# Patient Record
Sex: MALE | Race: WHITE | NOT HISPANIC OR LATINO | Employment: OTHER | URBAN - METROPOLITAN AREA
[De-identification: names, ages, dates, MRNs, and addresses within clinical notes are randomized per-mention and may not be internally consistent; named-entity substitution may affect disease eponyms.]

---

## 2017-03-23 ENCOUNTER — OFFICE VISIT (OUTPATIENT)
Dept: CARDIOLOGY | Facility: CLINIC | Age: 77
End: 2017-03-23
Payer: MEDICARE

## 2017-03-23 ENCOUNTER — HOSPITAL ENCOUNTER (OUTPATIENT)
Dept: CARDIOLOGY | Facility: HOSPITAL | Age: 77
Discharge: HOME | End: 2017-03-23
Attending: INTERNAL MEDICINE
Payer: MEDICARE

## 2017-03-23 VITALS
SYSTOLIC BLOOD PRESSURE: 130 MMHG | WEIGHT: 232 LBS | HEIGHT: 72 IN | HEART RATE: 90 BPM | DIASTOLIC BLOOD PRESSURE: 80 MMHG | BODY MASS INDEX: 31.42 KG/M2

## 2017-03-23 DIAGNOSIS — I25.84 CORONARY ARTERY DISEASE DUE TO CALCIFIED CORONARY LESION: ICD-10-CM

## 2017-03-23 DIAGNOSIS — I20.89 ANGINA EFFORT: Primary | ICD-10-CM

## 2017-03-23 DIAGNOSIS — I25.10 CORONARY ARTERY DISEASE DUE TO CALCIFIED CORONARY LESION: ICD-10-CM

## 2017-03-23 DIAGNOSIS — I25.5 CARDIOMYOPATHY, ISCHEMIC: ICD-10-CM

## 2017-03-23 DIAGNOSIS — G47.33 OSA (OBSTRUCTIVE SLEEP APNEA): ICD-10-CM

## 2017-03-23 DIAGNOSIS — E78.2 MIXED HYPERLIPIDEMIA: ICD-10-CM

## 2017-03-23 DIAGNOSIS — I10 ESSENTIAL HYPERTENSION: ICD-10-CM

## 2017-03-23 DIAGNOSIS — E11.9 TYPE 2 DIABETES MELLITUS WITHOUT COMPLICATION, WITHOUT LONG-TERM CURRENT USE OF INSULIN: ICD-10-CM

## 2017-03-23 LAB
DIASTOLIC DYSFUNCTION: YES
MITRAL VALVE MOBILITY: NORMAL
RETIRED EF AND QEF - SEE NOTES: 55 (ref 55–65)

## 2017-03-23 PROCEDURE — 1160F RVW MEDS BY RX/DR IN RCRD: CPT | Mod: S$GLB,,, | Performed by: INTERNAL MEDICINE

## 2017-03-23 PROCEDURE — 93306 TTE W/DOPPLER COMPLETE: CPT | Mod: 26,,, | Performed by: INTERNAL MEDICINE

## 2017-03-23 PROCEDURE — 3075F SYST BP GE 130 - 139MM HG: CPT | Mod: S$GLB,,, | Performed by: INTERNAL MEDICINE

## 2017-03-23 PROCEDURE — 1126F AMNT PAIN NOTED NONE PRSNT: CPT | Mod: S$GLB,,, | Performed by: INTERNAL MEDICINE

## 2017-03-23 PROCEDURE — 3079F DIAST BP 80-89 MM HG: CPT | Mod: S$GLB,,, | Performed by: INTERNAL MEDICINE

## 2017-03-23 PROCEDURE — 99999 PR PBB SHADOW E&M-EST. PATIENT-LVL III: CPT | Mod: PBBFAC,,, | Performed by: INTERNAL MEDICINE

## 2017-03-23 PROCEDURE — 1157F ADVNC CARE PLAN IN RCRD: CPT | Mod: S$GLB,,, | Performed by: INTERNAL MEDICINE

## 2017-03-23 PROCEDURE — 93306 TTE W/DOPPLER COMPLETE: CPT

## 2017-03-23 PROCEDURE — 1159F MED LIST DOCD IN RCRD: CPT | Mod: S$GLB,,, | Performed by: INTERNAL MEDICINE

## 2017-03-23 PROCEDURE — 99214 OFFICE O/P EST MOD 30 MIN: CPT | Mod: S$GLB,,, | Performed by: INTERNAL MEDICINE

## 2017-03-23 RX ORDER — AMLODIPINE BESYLATE 5 MG/1
5 TABLET ORAL DAILY
Qty: 90 TABLET | Refills: 3 | Status: SHIPPED | OUTPATIENT
Start: 2017-03-23 | End: 2017-09-18 | Stop reason: DRUGHIGH

## 2017-03-23 RX ORDER — SODIUM CHLORIDE 9 MG/ML
INJECTION, SOLUTION INTRAVENOUS CONTINUOUS
Status: CANCELLED | OUTPATIENT
Start: 2017-03-23

## 2017-03-23 RX ORDER — NITROGLYCERIN 0.4 MG/1
0.4 TABLET SUBLINGUAL EVERY 5 MIN PRN
Qty: 20 TABLET | Refills: 12 | Status: SHIPPED | OUTPATIENT
Start: 2017-03-23 | End: 2023-03-25 | Stop reason: SDUPTHER

## 2017-03-23 RX ORDER — DIPHENHYDRAMINE HCL 25 MG
50 CAPSULE ORAL ONCE
Status: CANCELLED | OUTPATIENT
Start: 2017-03-23 | End: 2017-03-23

## 2017-03-23 RX ORDER — CLOPIDOGREL BISULFATE 75 MG/1
75 TABLET ORAL DAILY
Qty: 90 TABLET | Refills: 3 | Status: SHIPPED | OUTPATIENT
Start: 2017-03-23 | End: 2017-07-06

## 2017-03-23 NOTE — PROGRESS NOTES
Subjective:   Patient ID:  Julien Meléndez is a 76 y.o. male who presents for follow up of Coronary Artery Disease; Hyperlipidemia; Hypertension; and Chest Pain      HPI:       Julien Meléndez 76 y.o. male is here complaining of worsening dyspnea and chest discomfort over the past month. 5/10 discomfort. Symptoms are anginal equivalent. He has CAD with ASMI 2010 and MILES LAD. Recurrent chest pains 12/2015 with new MILES placed RCA. ( OM! And 70% apical LAD disease treated medicaly). EF 50-55%. Last PCI was guided with a stress test. He has KELSEY but has not used the machine for several years. He has worsening KELSEY symptoms. He is compliant with his medications. He is on aspirin and plavix for DAPT.         Patient Active Problem List    Diagnosis Date Noted    Angina effort 03/23/2017    KELSEY (obstructive sleep apnea) 10/26/2016    Type 2 diabetes mellitus without complication 01/26/2016    Abnormal findings on diagnostic imaging of heart and coronary circulation 12/29/2015    Chest pain 12/29/2015    Coronary artery disease due to calcified coronary lesion 12/28/2015           Left heart cath 12/2015-Dr. Hernandez        LM patent  LAD patent except for distal 75%  LCX patent  OM1   RCA 90%      PCI of RCA 2.75 x 18 mm MILES  Unsuccessful PCI of OM1         LVEDP 11 mmHg  3.5 x 18 mm       Left hearth cath 3/1/2010 -Dr. Hernandez   RCA PCI with 3.5 x 15 mm  BMS    Left heart cath 1/31/2010-Dr. Hernandez   LAD 3.0 x 12 and 3.5 x 24 mm  BMS       Cardiomyopathy, ischemic 09/17/2013    Diabetes mellitus 09/17/2013    MI (myocardial infarction)     Diabetes mellitus     Hyperlipidemia     Hypertension     Hiatal hernia            Right Arm BP - Sitting: (P) 120/80  Left Arm BP - Sitting: (P) 130/80        LABS    LAST HbA1c  Lab Results   Component Value Date    HGBA1C 6.9 (H) 02/01/2010       Lipid panel  Lab Results   Component Value Date    CHOL 149 02/01/2010     Lab Results   Component Value Date     HDL 31 (L) 02/01/2010     Lab Results   Component Value Date    LDLCALC 97.4 02/01/2010     Lab Results   Component Value Date    TRIG 103 02/01/2010     Lab Results   Component Value Date    CHOLHDL 20.8 02/01/2010            Review of Systems   Constitution: Positive for malaise/fatigue. Negative for diaphoresis, weakness, night sweats, weight gain and weight loss.   HENT: Negative for congestion.    Eyes: Negative for blurred vision, discharge and double vision.   Cardiovascular: Positive for chest pain and dyspnea on exertion. Negative for claudication, cyanosis, irregular heartbeat, leg swelling, near-syncope, orthopnea, palpitations, paroxysmal nocturnal dyspnea and syncope.   Respiratory: Positive for shortness of breath, sleep disturbances due to breathing and snoring. Negative for cough and wheezing.    Endocrine: Negative for cold intolerance, heat intolerance and polyphagia.   Hematologic/Lymphatic: Negative for adenopathy and bleeding problem. Does not bruise/bleed easily.   Skin: Negative for dry skin and nail changes.   Musculoskeletal: Negative for arthritis, back pain, falls, joint pain, myalgias and neck pain.   Gastrointestinal: Negative for bloating, abdominal pain, change in bowel habit and constipation.   Genitourinary: Negative for bladder incontinence, dysuria, flank pain, genital sores and missed menses.   Neurological: Negative for aphonia, brief paralysis, difficulty with concentration and dizziness.   Psychiatric/Behavioral: Negative for altered mental status and memory loss. The patient does not have insomnia.    Allergic/Immunologic: Negative for environmental allergies.       Objective:   Physical Exam   Constitutional: He is oriented to person, place, and time. He appears well-developed and well-nourished. He is not intubated.   HENT:   Head: Normocephalic and atraumatic.   Right Ear: External ear normal.   Left Ear: External ear normal.   Mouth/Throat: Oropharynx is clear and moist.    Eyes: Conjunctivae and EOM are normal. Pupils are equal, round, and reactive to light. Right eye exhibits no discharge. Left eye exhibits no discharge. No scleral icterus.   Neck: Normal range of motion. Neck supple. Normal carotid pulses, no hepatojugular reflux and no JVD present. Carotid bruit is not present. No tracheal deviation present. No thyromegaly present.   Cardiovascular: Normal rate, regular rhythm, S1 normal and S2 normal.   No extrasystoles are present. PMI is not displaced.  Exam reveals no gallop, no S3, no distant heart sounds, no friction rub and no midsystolic click.    No murmur heard.  Pulses:       Carotid pulses are 2+ on the right side, and 2+ on the left side.       Radial pulses are 1+ on the right side, and 2+ on the left side.        Femoral pulses are 2+ on the right side, and 2+ on the left side.       Popliteal pulses are 2+ on the right side, and 2+ on the left side.        Dorsalis pedis pulses are 1+ on the right side, and 1+ on the left side.        Posterior tibial pulses are 1+ on the right side, and 1+ on the left side.   Pulmonary/Chest: Effort normal and breath sounds normal. No accessory muscle usage or stridor. No apnea, no tachypnea and no bradypnea. He is not intubated. No respiratory distress. He has no decreased breath sounds. He has no wheezes. He has no rales. He exhibits no tenderness and no bony tenderness.   Abdominal: He exhibits no distension, no pulsatile liver, no abdominal bruit, no ascites, no pulsatile midline mass and no mass. There is no tenderness. There is no rebound and no guarding.   Musculoskeletal: Normal range of motion. He exhibits no edema or tenderness.     Deformed ankle   Lymphadenopathy:     He has no cervical adenopathy.   Neurological: He is alert and oriented to person, place, and time. He has normal reflexes. No cranial nerve deficit. Coordination normal.   Skin: Skin is warm. No rash noted. No erythema. No pallor.   Psychiatric: He has  a normal mood and affect. His behavior is normal. Judgment and thought content normal.       Assessment:     1. Coronary artery disease due to calcified coronary lesion    2. KELSEY (obstructive sleep apnea)    3. Type 2 diabetes mellitus without complication, without long-term current use of insulin    4. Mixed hyperlipidemia    5. Cardiomyopathy, ischemic    6. Essential hypertension    7. Angina effort        Plan:           Ischemic work up   Echo   Stress test   ECG    Add norvasc as second anti-anginal medication        He will need a left heart cath  R radial access  MILES candidate  Will call with date and time      Risks, benefits and alternatives of cardiac catheterization and possible intervention were discussed with the patient. All questions were answered and informed consent obtained. I discussed the importance of compliance with dual antiplatelet therapy with the patient to prevent acute or late stent thrombosis with premature discontinuation of the therapy.          Continue with current medical plan and lifestyle changes.  Return sooner for concerns or questions. If symptoms persist go to the ED  I have reviewed all pertinent data on this patient         He will have a sleep study and a referral to sleep disorder clinic            I have reviewed the patient's medical history in detail and updated the computerized patient record.    Orders Placed This Encounter   Procedures    NM Myocardial Perfusion Spect Multi Pharmacologic     Standing Status:   Future     Standing Expiration Date:   3/23/2018     Order Specific Question:   Stress Medication to use:     Answer:   Regadenoson    Lipid panel     fasting     Standing Status:   Future     Standing Expiration Date:   5/22/2018    Comprehensive metabolic panel     Standing Status:   Future     Standing Expiration Date:   5/22/2018    CBC auto differential     Standing Status:   Future     Standing Expiration Date:   5/22/2018    NM Multi Study RX  Stress Card Component     Standing Status:   Future     Standing Expiration Date:   3/23/2018     Order Specific Question:   Which medicaton for the stress procedure?     Answer:   Regadenoson    EKG 12-lead    2D Echo w/ Color Flow Doppler     Standing Status:   Future     Standing Expiration Date:   3/23/2018       Follow up as scheduled. Return sooner for concerns or questions            He expressed verbal understanding and agreed with the plan        Patient's Medications   New Prescriptions    AMLODIPINE (NORVASC) 5 MG TABLET    Take 1 tablet (5 mg total) by mouth once daily.    CLOPIDOGREL (PLAVIX) 75 MG TABLET    Take 1 tablet (75 mg total) by mouth once daily.    NITROGLYCERIN (NITROSTAT) 0.4 MG SL TABLET    Place 1 tablet (0.4 mg total) under the tongue every 5 (five) minutes as needed for Chest pain.   Previous Medications    ASPIRIN (ECOTRIN) 81 MG EC TABLET    Take 81 mg by mouth once daily.    ATORVASTATIN (LIPITOR) 40 MG TABLET    Take 1 tablet (40 mg total) by mouth once daily.    FLUOXETINE (PROZAC) 20 MG CAPSULE    Take 1 capsule by mouth once daily.    GLIPIZIDE (GLUCOTROL) 5 MG TABLET    Take 1 tablet by mouth Daily.    LOSARTAN (COZAAR) 100 MG TABLET    Take 1 tablet by mouth Daily.    MECLIZINE (ANTIVERT) 25 MG TABLET    Take 1 tablet (25 mg total) by mouth 3 (three) times daily as needed for Dizziness.    METFORMIN (GLUCOPHAGE) 1000 MG TABLET    Take 1 tablet (1,000 mg total) by mouth daily with breakfast.    METOPROLOL SUCCINATE (TOPROL-XL) 25 MG 24 HR TABLET    Take 1 tablet (25 mg total) by mouth once daily.    TRAZODONE (DESYREL) 150 MG TABLET       Modified Medications    No medications on file   Discontinued Medications    No medications on file

## 2017-03-23 NOTE — PATIENT INSTRUCTIONS
Stable Angina  The chest discomfort you have appears to be coming from your heart -- a condition called angina. Angina is a pain in the heart due to poor blood flow to the heart muscle. This can occur when plaque builds up on the artery wall or a blood clot forms in one or more of the small blood vessels that deliver oxygen to the heart muscle. Plaque is a fatty material made up of cholesterol, calcium deposits, and other materials.  Exercise, increased activity, emotional upset, or stress can trigger this pain. With proper treatment and lifestyle changes to reduce risk factors, most people with angina are able to maintain a full and active life.  Angina is not a heart attack. But if angina pain is severe or prolonged, it is a sign of an impending heart attack, also called acute myocardial infarction, or AMI. Your angina is under control at this time. Therefore, it is safe for you to go home. Follow up as instructed by your doctor for any further tests and office visits.    Home care  · Rest at home today and avoid any emotional or physically strenuous activity.  · Take medicine (usually nitroglycerin) for chest pain exactly as prescribed. Keep your nitroglycerin with you at all times.  · When taking nitroglycerin for angina, sit or lie down. The medicine may make you feel dizzy.  ¨ Place one tablet under your tongue, or between your lip and gum, or between your cheek and gum. Let the tablet dissolve completely; do not chew or swallow the tablet.  ¨ If you use a spray, then spray once on or under your tongue. Do not inhale. Right after you use the spray, close your mouth. Wait a few seconds before you swallow.  ¨ After taking one tablet or spraying once, continue sitting or lying for 5 minutes.  ¨ If the angina goes away completely, rest awhile and continue your normal routine.  Note: Your healthcare provider may give you slightly different instructions than those above. If so, follow them  carefully.  Prevention  · Learn how to take your own blood pressure. Keep a record of your results. Ask your doctor which readings mean that you need medical attention. Reduce salt intake and follow lifestyle change instructions if you have high blood pressure (hypertension).  · Maintain a healthy weight. Get help to lose any extra pounds. Talk to your doctor about a safe diet program. Sudden large weight losses can be dangerous.  · If you have diabetes, talk to your doctor about healthy control of your blood sugar.  · Begin an exercise program. Ask your doctor how to get started. You can benefit from simple activities such as walking or gardening. Short, high intensity exercise sessions may also be beneficial.  · Break the smoking habit. Enroll in a stop-smoking program to improve your chances of success.  · Get adequate rest.  · Avoid stressful situations. Learn stress-management techniques.  Follow-up care  Follow up with your doctor, or as advised.  If an X-ray was done , it will be reviewed by another specialist. You will be notified of any new findings that may affect your care.  Call 911  This is the fastest and safest way to get to the nearest emergency department. The paramedics can also start treatment on the way to the hospital, saving valuable time for your heart.  · If angina gets worse, it continues, or if it stops and returns, call 911 immediately, Do not delay. You may be having a heart attack.  · After you call 911, take a second nitroglycerine tablet or spray unless instructed otherwise. When repeating doses, sit down if possible because it can make you feel lightheaded or dizzy. Wait another 5 minutes. If the angina still does not go away, take a third tablet or spray. Do not take more than 3 tablets or sprays within 15 minutes. Stay on the phone with 911 for further instructions.  · Your healthcare provider may give you slightly different instructions than those above. If so, follow them  "carefully.  Don't wait until your symptoms are severe to call 911. Other reasons to call 911 besides chest pain include:  · Trouble breathing  · Feeling lightheaded, faint, or dizzy  · Rapid heart beat  · Slower than usual heart rate compared to your normal  · Angina with weakness, dizziness, fainting, heavy sweating, nausea, or vomiting  · Extreme drowsiness, or confusion  · Weakness of an arm or leg or on one side of the face  · Difficulty with speech or vision  When to seek medical advice  Remember, the signs and symptoms of a heart attack are not always like they are on TV. Sometimes they are not so obvious. You may only feel weak or just "not right." If it is not clear or if you have any doubt, call for advice.  · Seek help if there is a change in the type of pain, if it feels different, or if your symptoms are mild.  · Do not drive yourself. Have someone else drive. If no one can drive you, call 911.  · If your doctor has given you medicine to take when you have symptoms, take them, but do not delay getting  help.  · Do not delay. Fast diagnosis and treatment can prevent or  limit the amount of heart damage during a heart attack or stroke.  · Do not go to your doctor's office or a clinic because they may not be able to provide all the testing and treatment you need.  Date Last Reviewed: 12/30/2015  © 3653-3020 [employer]. [address] [place] [address], [place] [address]. All rights reserved. This information is not intended as a substitute for professional medical care. Always follow your healthcare professional's instructions.        "

## 2017-03-24 ENCOUNTER — HOSPITAL ENCOUNTER (OUTPATIENT)
Dept: RADIOLOGY | Facility: HOSPITAL | Age: 77
Discharge: HOME | End: 2017-03-24
Attending: INTERNAL MEDICINE
Payer: MEDICARE

## 2017-03-24 ENCOUNTER — HOSPITAL ENCOUNTER (OUTPATIENT)
Dept: CARDIOLOGY | Facility: HOSPITAL | Age: 77
Discharge: HOME | End: 2017-03-24
Attending: INTERNAL MEDICINE
Payer: MEDICARE

## 2017-03-24 DIAGNOSIS — I25.84 CORONARY ARTERY DISEASE DUE TO CALCIFIED CORONARY LESION: ICD-10-CM

## 2017-03-24 DIAGNOSIS — I25.10 CORONARY ARTERY DISEASE DUE TO CALCIFIED CORONARY LESION: ICD-10-CM

## 2017-03-24 LAB — DIASTOLIC DYSFUNCTION: NO

## 2017-03-24 PROCEDURE — 93016 CV STRESS TEST SUPVJ ONLY: CPT | Mod: ,,, | Performed by: INTERNAL MEDICINE

## 2017-03-24 PROCEDURE — 78452 HT MUSCLE IMAGE SPECT MULT: CPT | Mod: TC

## 2017-03-24 PROCEDURE — 78452 HT MUSCLE IMAGE SPECT MULT: CPT | Mod: 26,,, | Performed by: RADIOLOGY

## 2017-03-24 PROCEDURE — 93018 CV STRESS TEST I&R ONLY: CPT | Mod: ,,, | Performed by: INTERNAL MEDICINE

## 2017-03-27 ENCOUNTER — TELEPHONE (OUTPATIENT)
Dept: CARDIOLOGY | Facility: CLINIC | Age: 77
End: 2017-03-27

## 2017-03-27 DIAGNOSIS — I25.84 CORONARY ARTERY DISEASE DUE TO CALCIFIED CORONARY LESION: Primary | ICD-10-CM

## 2017-03-27 DIAGNOSIS — I25.10 CORONARY ARTERY DISEASE DUE TO CALCIFIED CORONARY LESION: Primary | ICD-10-CM

## 2017-03-27 DIAGNOSIS — R94.39 EQUIVOCAL STRESS TEST: ICD-10-CM

## 2017-03-27 DIAGNOSIS — R93.1 ABNORMAL FINDINGS ON DIAGNOSTIC IMAGING OF HEART AND CORONARY CIRCULATION: ICD-10-CM

## 2017-03-27 NOTE — PROGRESS NOTES
Your echo revealed diastolic dysfunction with a normal ejection fraction        No problems with the valves        Thanks        ZN

## 2017-03-27 NOTE — PROGRESS NOTES
Your stress test revealed an area with a fixed defect meaning related to a previous heart attack      I will obtain a PET scan for further assessment to find out the source of your symptoms      Thanks        ZN

## 2017-03-27 NOTE — TELEPHONE ENCOUNTER
Your stress test revealed an area with a fixed defect meaning related to a previous heart attack      I will obtain a PET scan for further assessment to find out the source of your symptoms      Thanks        ZN          He needs a PET scan  Order placed        ZN

## 2017-04-03 ENCOUNTER — CLINICAL SUPPORT (OUTPATIENT)
Dept: CARDIOLOGY | Facility: CLINIC | Age: 77
End: 2017-04-03
Payer: MEDICARE

## 2017-04-03 DIAGNOSIS — I25.84 CORONARY ARTERY DISEASE DUE TO CALCIFIED CORONARY LESION: ICD-10-CM

## 2017-04-03 DIAGNOSIS — R94.39 EQUIVOCAL STRESS TEST: ICD-10-CM

## 2017-04-03 DIAGNOSIS — I25.10 CORONARY ARTERY DISEASE DUE TO CALCIFIED CORONARY LESION: ICD-10-CM

## 2017-04-03 DIAGNOSIS — R93.1 ABNORMAL FINDINGS ON DIAGNOSTIC IMAGING OF HEART AND CORONARY CIRCULATION: ICD-10-CM

## 2017-04-03 LAB — DIASTOLIC DYSFUNCTION: NO

## 2017-04-03 PROCEDURE — A9555 RB82 RUBIDIUM: HCPCS | Mod: S$GLB,,, | Performed by: INTERNAL MEDICINE

## 2017-04-03 PROCEDURE — 93015 CV STRESS TEST SUPVJ I&R: CPT | Mod: S$GLB,,, | Performed by: INTERNAL MEDICINE

## 2017-04-03 PROCEDURE — 78492 MYOCRD IMG PET MLT RST&STRS: CPT | Mod: S$GLB,,, | Performed by: INTERNAL MEDICINE

## 2017-04-07 ENCOUNTER — TELEPHONE (OUTPATIENT)
Dept: CARDIOLOGY | Facility: CLINIC | Age: 77
End: 2017-04-07

## 2017-04-07 NOTE — TELEPHONE ENCOUNTER
----- Message from Bess Campa sent at 4/7/2017 10:33 AM CDT -----  Contact: self/[phone]  Patient is calling to get his test results.  Please advise

## 2017-04-12 ENCOUNTER — TELEPHONE (OUTPATIENT)
Dept: CARDIOLOGY | Facility: CLINIC | Age: 77
End: 2017-04-12

## 2017-04-12 NOTE — TELEPHONE ENCOUNTER
Overall your stress test is ok        It revealed an area of an old event at the tip of the heart        There is a also a small area at the back of the heart where it has been the same for a long time        This is the conclusion of the stress test        1. There is a small sized moderate intensity fixed defect consistent with non-transmural infarct in the anteroapical wall in the usual distribution of the distal Left Anterior Descending Artery. This defect comprises 10 % of the left ventricular   myocardium.   2. There is a very small sized mild ischemia in the distal to apical inferolateral wall in the usual distribution of the distal OM1. This defect comprises 5 % of the left ventricular myocardium.         For now we can continue with medical therapy unless you are having refractory symptoms          We can discuss these results during your follow up          Thanks        ZN

## 2017-04-13 NOTE — TELEPHONE ENCOUNTER
Related results to patient, he scheduled an appointment to see us in [facility] on Wednesday.

## 2017-04-19 ENCOUNTER — OFFICE VISIT (OUTPATIENT)
Dept: CARDIOLOGY | Facility: CLINIC | Age: 77
End: 2017-04-19
Payer: MEDICARE

## 2017-04-19 VITALS
HEART RATE: 78 BPM | BODY MASS INDEX: 30.09 KG/M2 | WEIGHT: 227 LBS | DIASTOLIC BLOOD PRESSURE: 80 MMHG | HEIGHT: 73 IN | SYSTOLIC BLOOD PRESSURE: 130 MMHG

## 2017-04-19 DIAGNOSIS — G47.33 OSA (OBSTRUCTIVE SLEEP APNEA): ICD-10-CM

## 2017-04-19 DIAGNOSIS — I25.10 CORONARY ARTERY DISEASE, ANGINA PRESENCE UNSPECIFIED, UNSPECIFIED VESSEL OR LESION TYPE, UNSPECIFIED WHETHER NATIVE OR TRANSPLANTED HEART: Primary | ICD-10-CM

## 2017-04-19 DIAGNOSIS — I25.10 CORONARY ARTERY DISEASE DUE TO CALCIFIED CORONARY LESION: ICD-10-CM

## 2017-04-19 DIAGNOSIS — E78.2 MIXED HYPERLIPIDEMIA: ICD-10-CM

## 2017-04-19 DIAGNOSIS — I25.84 CORONARY ARTERY DISEASE DUE TO CALCIFIED CORONARY LESION: ICD-10-CM

## 2017-04-19 DIAGNOSIS — I20.89 ANGINA EFFORT: ICD-10-CM

## 2017-04-19 DIAGNOSIS — R93.1 ABNORMAL FINDINGS ON DIAGNOSTIC IMAGING OF HEART AND CORONARY CIRCULATION: ICD-10-CM

## 2017-04-19 PROCEDURE — 99214 OFFICE O/P EST MOD 30 MIN: CPT | Mod: S$GLB,,, | Performed by: INTERNAL MEDICINE

## 2017-04-19 PROCEDURE — 99999 PR PBB SHADOW E&M-EST. PATIENT-LVL III: CPT | Mod: PBBFAC,,, | Performed by: INTERNAL MEDICINE

## 2017-04-19 PROCEDURE — 1159F MED LIST DOCD IN RCRD: CPT | Mod: S$GLB,,, | Performed by: INTERNAL MEDICINE

## 2017-04-19 PROCEDURE — 1126F AMNT PAIN NOTED NONE PRSNT: CPT | Mod: S$GLB,,, | Performed by: INTERNAL MEDICINE

## 2017-04-19 PROCEDURE — 1160F RVW MEDS BY RX/DR IN RCRD: CPT | Mod: S$GLB,,, | Performed by: INTERNAL MEDICINE

## 2017-04-19 PROCEDURE — 3079F DIAST BP 80-89 MM HG: CPT | Mod: S$GLB,,, | Performed by: INTERNAL MEDICINE

## 2017-04-19 PROCEDURE — 3075F SYST BP GE 130 - 139MM HG: CPT | Mod: S$GLB,,, | Performed by: INTERNAL MEDICINE

## 2017-04-19 RX ORDER — ISOSORBIDE MONONITRATE 30 MG/1
30 TABLET, EXTENDED RELEASE ORAL DAILY
Qty: 90 TABLET | Refills: 3 | Status: SHIPPED | OUTPATIENT
Start: 2017-04-19 | End: 2018-03-31 | Stop reason: SDUPTHER

## 2017-04-19 RX ORDER — LOSARTAN POTASSIUM AND HYDROCHLOROTHIAZIDE 25; 100 MG/1; MG/1
1 TABLET ORAL DAILY
Qty: 90 TABLET | Refills: 3 | Status: SHIPPED | OUTPATIENT
Start: 2017-04-19 | End: 2017-09-18 | Stop reason: SDUPTHER

## 2017-04-19 NOTE — PATIENT INSTRUCTIONS
Obstructive Sleep Apnea  Obstructive sleep apnea is a condition that causes your air passages to become narrowed or blocked during sleep. As a result, breathing stops for short periods. Your body wakes up enough for breathing to begin again, though you don't remember it. The cycle of stopped breathing and brief awakenings can repeat dozens of times a night. This prevents the body from getting to the deeper stages of sleep that are needed for good rest.  Signs of sleep apnea include loud snoring, noisy breathing, and gasping sounds during sleep. Daytime symptoms include waking up tired after a full night's sleep, waking up with headaches, feeling very sleepy or falling asleep during the day, and having problems with memory or concentration.  Risk factors for sleep apnea include:  · Being overweight  · Being a man, or a woman in menopause  · Smoking  · Using alcohol or sedating medications or herbs  · Having enlarged structures in the nose or throat  Home care  Lifestyle changes that can help treat snoring and sleep apnea include the following:  · If you are overweight, lose weight. Talk to your healthcare provider about a weight-loss plan for you.  · Avoid alcohol for 3 to 4 hours before bedtime. Avoid sedating medications. Ask your healthcare provider about the medications you take.  · If you smoke, talk to your healthcare provider about ways to quit.  · Sleep on your side. This can help prevent gravity from pulling relaxed throat tissues into your breathing passages.  · If you have allergies or sinus problems that block your nose, ask your healthcare provider for help.  Follow up  Follow up with your healthcare provider as advised. A diagnosis of sleep apnea is made with a sleep study. Your healthcare provider can tell you more about this test.  When to seek medical care  Sleep apnea can make you more likely to have certain health problems. These include high blood pressure, heart attack, stroke, and sexual  dysfunction. If you have sleep apnea, talk to your healthcare provider about the best treatments for you.  Date Last Reviewed: 5/3/2015  © 8749-4426 [employer]. [address] [place] [address], [place] [address]. All rights reserved. This information is not intended as a substitute for professional medical care. Always follow your healthcare professional's instructions.        Obstructive Sleep Apnea  Obstructive sleep apnea is a condition that causes your air passages to become narrowed or blocked during sleep. As a result, breathing stops for short periods. Your body wakes up enough for breathing to begin again, though you don't remember it. The cycle of stopped breathing and brief awakenings can repeat dozens of times a night. This prevents the body from getting to the deeper stages of sleep that are needed for good rest.  Signs of sleep apnea include loud snoring, noisy breathing, and gasping sounds during sleep. Daytime symptoms include waking up tired after a full night's sleep, waking up with headaches, feeling very sleepy or falling asleep during the day, and having problems with memory or concentration.  Risk factors for sleep apnea include:  · Being overweight  · Being a man, or a woman in menopause  · Smoking  · Using alcohol or sedating medications or herbs  · Having enlarged structures in the nose or throat  Home care  Lifestyle changes that can help treat snoring and sleep apnea include the following:  · If you are overweight, lose weight. Talk to your healthcare provider about a weight-loss plan for you.  · Avoid alcohol for 3 to 4 hours before bedtime. Avoid sedating medications. Ask your healthcare provider about the medications you take.  · If you smoke, talk to your healthcare provider about ways to quit.  · Sleep on your side. This can help prevent gravity from pulling relaxed throat tissues into your breathing passages.  · If you have allergies or sinus problems that block your nose, ask  your healthcare provider for help.  Follow up  Follow up with your healthcare provider as advised. A diagnosis of sleep apnea is made with a sleep study. Your healthcare provider can tell you more about this test.  When to seek medical care  Sleep apnea can make you more likely to have certain health problems. These include high blood pressure, heart attack, stroke, and sexual dysfunction. If you have sleep apnea, talk to your healthcare provider about the best treatments for you.  Date Last Reviewed: 5/3/2015  © 4220-2722 [employer]. [address] [place] [id number]. All rights reserved. This information is not intended as a substitute for professional medical care. Always follow your healthcare professional's instructions.        Heart Disease Education    The heart beats 60 to 100 times per minute, 24 hours a day. This equals almost 1000,000 times a day. It pumps blood with oxygen and nutrients to the tissues and organs of the body. But the heart is a muscle and needs its own supply of blood. Blood flow to the heart is supplied by the coronary arteries. Coronary artery disease (atherosclerosis) is a result of cholesterol, saturated fat, and calcium deposits (plaques) that build up inside the walls. This causes inflammation within the coronary arteries. These plaques narrow the artery and reduce blood flow to the heart muscle. The reduction in blood flow to the heart muscle decreases oxygen supply to the heart. If the narrowing is significant enough, the oxygen supply to one or more regions of the heart can be temporarily or permanently shut down. This can cause chest pain, and possibly death of heart tissue (heart attack).  Types of chest pain  Angina is the name for pain in the heart muscle. Angina is a warning sign of serious heart disease. When untreated it can lead to a heart attack, also known as acute myocardial infarction, or AMI. Angina occurs when there is not enough blood and  oxygen flowing to the heart for the amount of work it is doing. This most often happens during physical exertion, when the heart is working hardest. It is usually relieved by rest or nitroglycerin. Angina may also occur after a large meal when extra blood is sent to the digestive organs and less goes to the heart. In the case of advanced or unstable heart disease, angina can occur at rest or awaken you from sleep. Angina usually lasts from a few minutes up to 20 minutes or more. When treated early, the effects of angina can be reversed without permanent damage to the heart. Angina is a serious condition and needs to be evaluated by a medical professional immediately.  There are two types of angina -- stable and unstable:  · Stable angina usually occurs with a predictable level of activity. Being stable, its character, severity, and occurrence do not change much over time. It usually starts with activity, and resolves with rest or taking your medicine as instructed by your doctor. The symptoms usually do not last long.  · Unstable angina changes or gets worse over time. It is different from whatever you are used to. It may feel different or worse, begin without cause, occur with exercise or exertion, wake you up from sleep, and last longer. It may not respond in the same way as it does when you take your usual medicines for an attack. This type of angina can be a warning sign of an impending heart attack.     A heart attack is usually the result of a blood clot that suddenly forms in a coronary artery that has been narrowed with plaque. When this occurs, blood flow may be cut off to a part of the heart muscle, causing the cells to die. This weakens the pumping action of the heart, which affects the delivery of blood to all the other organs in the body including the brain. This damage is not reversible. However, early treatment can limit the amount of damage.  The pain you feel with angina and a heart attack may have  "a similar quality. However, it is usually different in intensity and duration. Here are some typical descriptions of a heart attack:  · It is most often experienced as a squeezing, crushing, pressure-like sensation in the center of the chest.  · It is sometimes described as something heavy sitting on my chest.  · It may feel more like a bad case of indigestion.  · The pain may spread from the chest to the arm, shoulder, throat or jaw.  · Sometimes the pain is not felt in the chest at all, but only in the arm, shoulder, throat or jaw.  · There may also be nausea, vomiting, dizziness or light-headedness, sweating and trouble breathing.  · Palpitations, or your heart beating rapidly  · A new, irregular heart beat  · Unexplained weakness  You may not be able to tell the difference between "bad" angina and a heart attack at home. Seek help if your symptoms are different than usual. Do not be in denial or just try to "tough it out."  Call 911  This is the fastest and safest way to get to the emergency department. The paramedics can also start treatment on the way to the hospital, saving valuable time for your heart.  · If the angina gets worse, if it continues, or if it stops and returns, call 911 immediately. Do not delay. You may be having a heart attack.  · After you call 911, take a second tablet or spray unless instructed otherwise. When repeating doses, sit down if possible, because it can make you feel lightheaded or dizzy. Wait another 5 minutes. If the angina still does not go away, take a third tablet or spray. Do not take more than 3 tablets or sprays within 15 minutes. Stay on the phone with 911 for further instruction.  · Your healthcare provider may give you slightly different instructions than those above. If so, follow them carefully.  Do not wait until symptoms become severe to call 911.  Other reasons to call 911 include:  · Trouble breathing  · Feeling lightheaded, faint, or dizzy  · Rapid heart " "beat  · Slower than usual heart rate compared to your normal  · Angina with weakness, dizziness, fainting, heavy sweating, nausea, or vomiting  · Extreme drowsiness, confusion  · Weakness of an arm or leg or one side of the face  · Difficulty with speech or vision  When to seek medical care  Remember, the signs and symptoms of a heart attack are not always like they are on TV. Sometimes they are not so obvious. You may only feel weak, or just not right. If it is not clear or if you have any doubt, call for advice.  · Seek help if there is a change in the type of pain, if it feels different, or if your symptoms are mild.  · Do not drive yourself. Have someone else drive you. If no one can drive, call 911.  · Do not delay. Fast diagnosis and treatment can prevent or limit the amount of heart damage during a heart attack.  · Do not go to your doctor's office or a clinic as they may not be able to provide all the testing and treatment required for this condition.  · If your doctor has given you medicine to take when symptoms occur, take them but don't delay getting help trying to locate medicines.  What happens in the emergency department  The emergency department is connected to your local emergency medical system (EMS) through 911. That's why during a cardiac emergency, calling 911 is the fastest way to get help. The goal of the emergency department is to rapidly screen, evaluate, and treat people.  Once you are there, an electrocardiogram (ECG or heart tracing) will be done. Blood samples may be taken to look for the presence of heart enzymes that leak from damaged heart cells and show if a heart attack is occurring. You will often be evaluated by a heart specialist (cardiologist) who decides the best course of action. In the case of severe angina or early heart attack, and depending on the circumstances, powerful "clot busting" medicines can be used to dissolve blood clots in the coronary artery. In other cases, you " may be taken to a cardiac catheterization lab. Here, a tiny balloon-tipped catheter is advanced through blood vessels to the heart. There the balloon is inflated pushing open the blood vessel restoring blood flow.  Risk factors for heart disease  Risk factors for heart disease are a combination of genetic and lifestyle. Many risk factors work by either directly or indirectly damaging the blood vessels of the heart, or by increasing the risk of forming blood or cholesterol clots, which then clog up and block the arteries.     Examples of physical lifestyle risk factors:  · Cigarette smoking  · High blood pressure  · High blood cholesterol  · Use of stimulant drugs such as cocaine, crack, and amphetamines  · Eating a high-fat, high-cholesterol meal  · Diabetes   · Obesity which increases risk for diabetes and high blood pressure  · Lack of regular physical activity     Examples of emotional lifestyle factors:  · Chronic high stress levels release stress hormones. These raise blood pressure and cholesterol level and makes blood clot more easily.  · Held-in anger, hostile or cynical attitude  · Social and emotional isolation, lack of intimacy  · Loss of relationship  · Depression  Other factors that increase the risk of heart attack that you cannot control :  · Age. The older you get beyond 40, the greater is your risk of significant coronary artery disease.  · Gender. More men than women get heart disease; but once past menopause, women who are not taking estrogen replacement have the same risk as men for a heart attack.  · Family history. If your mother, father, brother or sister has coronary artery disease, your risk of having it is higher than a person your age without this family history.  What can you do to decrease your risk  To reduce your risk of heart disease:  · Get regular checkups with your doctor.  · Take your medicines for blood pressure, cholesterol or diabetes as directed.  · Watch your diet. Eat a  heart healthy diet choosing fresh foods, less salt, cholesterol, and fat  · Stop smoking. Get help if needed.  · Get regular exercise.  · Manage stress.  · Carry a list of medicines and doses in your wallet.  Date Last Reviewed: 12/30/2015  © 1091-1931 [employer]. [address] [place] [address], [place] [address]. All rights reserved. This information is not intended as a substitute for professional medical care. Always follow your healthcare professional's instructions.

## 2017-04-19 NOTE — PROGRESS NOTES
Subjective:   Patient ID:  Julien Meléndez is a 76 y.o. male who presents for follow up of Coronary Artery Disease; Hyperlipidemia; Hypertension; Shortness of Breath; and Fatigue      HPI:       Julien Meléndez 76 y.o. male is here complaining of worsening dyspnea and chest discomfort over the past month. 5/10 discomfort. Symptoms are not 100% similar to his angina. He has CAD with AMI 2010 and MILES LAD. Recurrent chest pains 12/2015 with new MILES placed distal RCA. ( OM! and 70% small apical LAD disease treated medicaly). EF 50-55%. Last PCI was guided with a stress test. He has KELSEY but has not used the machine for several years. He has worsening KELSEY symptoms. He is compliant with his medications. He is on aspirin and plavix for DAPT.       Nuclear stress test 3/2017 revealed a sizable fixed perfusion defect with associated akinesis involving the septal wall near the apex consistent with infarction without reversible ischemia. EF 49% down to 46% with stress.      PET stress 4/2017 revealed a small sized moderate intensity fixed defect consistent with non-transmural infarct in the anteroapical wall in the usual distribution of the distal Left Anterior Descending Artery. This defect comprises 10 % of the left ventricular myocardium. There is a very small sized mild ischemia in the distal to apical inferolateral wall in the usual distribution of the distal OM1. This defect comprises 5 % of the left ventricular myocardium.            Patient Active Problem List    Diagnosis Date Noted    Equivocal stress test 03/27/2017       Fixed defect noted on SPECT      Angina effort 03/23/2017    KELSEY (obstructive sleep apnea) 10/26/2016         12/2016   AHI 5.7 with oxygen jude 87%   AHI 11.2 in supine position   REM AHI 40            Type 2 diabetes mellitus without complication 01/26/2016    Abnormal findings on diagnostic imaging of heart and coronary circulation 12/29/2015         PET stress 4/2017   10% apical fixed  defect   5% reversible ischemia at the distal apex   Normal EF                Chest pain 12/29/2015    Coronary artery disease due to calcified coronary lesion 12/28/2015           Left heart cath 12/2015-Dr. Hernandez        LM patent  LAD patent except for distal 75%  LCX patent  OM1   RCA 90%      PCI of RCA 2.75 x 18 mm MILES  Unsuccessful PCI of OM1         LVEDP 11 mmHg  3.5 x 18 mm       Left hearth cath 3/1/2010 -Dr. Hernandez   RCA PCI with 3.5 x 15 mm  BMS    Left heart cath 1/31/2010-Dr. Hernandez   LAD 3.0 x 12 and 3.5 x 24 mm  BMS       Cardiomyopathy, ischemic 09/17/2013    Diabetes mellitus 09/17/2013    MI (myocardial infarction)     Diabetes mellitus     Hyperlipidemia     Hypertension     Hiatal hernia            Right Arm BP - Sitting: (P) 130/80  Left Arm BP - Sitting: (P) 132/80        LABS    LAST HbA1c  Lab Results   Component Value Date    HGBA1C 6.9 (H) 02/01/2010       Lipid panel  Lab Results   Component Value Date    CHOL 149 02/01/2010     Lab Results   Component Value Date    HDL 31 (L) 02/01/2010     Lab Results   Component Value Date    LDLCALC 97.4 02/01/2010     Lab Results   Component Value Date    TRIG 103 02/01/2010     Lab Results   Component Value Date    CHOLHDL 20.8 02/01/2010            Review of Systems   Constitution: Positive for malaise/fatigue. Negative for diaphoresis, weakness, night sweats, weight gain and weight loss.   HENT: Negative for congestion.    Eyes: Negative for blurred vision, discharge and double vision.   Cardiovascular: Positive for chest pain and dyspnea on exertion. Negative for claudication, cyanosis, irregular heartbeat, leg swelling, near-syncope, orthopnea, palpitations, paroxysmal nocturnal dyspnea and syncope.   Respiratory: Positive for shortness of breath, sleep disturbances due to breathing and snoring. Negative for cough and wheezing.    Endocrine: Negative for cold intolerance, heat intolerance and polyphagia.    Hematologic/Lymphatic: Negative for adenopathy and bleeding problem. Does not bruise/bleed easily.   Skin: Negative for dry skin and nail changes.   Musculoskeletal: Negative for arthritis, back pain, falls, joint pain, myalgias and neck pain.   Gastrointestinal: Negative for bloating, abdominal pain, change in bowel habit and constipation.   Genitourinary: Negative for bladder incontinence, dysuria, flank pain, genital sores and missed menses.   Neurological: Negative for aphonia, brief paralysis, difficulty with concentration and dizziness.   Psychiatric/Behavioral: Negative for altered mental status and memory loss. The patient does not have insomnia.    Allergic/Immunologic: Negative for environmental allergies.       Objective:   Physical Exam   Constitutional: He is oriented to person, place, and time. He appears well-developed and well-nourished. He is not intubated.   HENT:   Head: Normocephalic and atraumatic.   Right Ear: External ear normal.   Left Ear: External ear normal.   Mouth/Throat: Oropharynx is clear and moist.   Eyes: Conjunctivae and EOM are normal. Pupils are equal, round, and reactive to light. Right eye exhibits no discharge. Left eye exhibits no discharge. No scleral icterus.   Neck: Normal range of motion. Neck supple. Normal carotid pulses, no hepatojugular reflux and no JVD present. Carotid bruit is not present. No tracheal deviation present. No thyromegaly present.   Cardiovascular: Normal rate, regular rhythm, S1 normal and S2 normal.   No extrasystoles are present. PMI is not displaced.  Exam reveals no gallop, no S3, no distant heart sounds, no friction rub and no midsystolic click.    No murmur heard.  Pulses:       Carotid pulses are 2+ on the right side, and 2+ on the left side.       Radial pulses are 1+ on the right side, and 2+ on the left side.        Femoral pulses are 2+ on the right side, and 2+ on the left side.       Popliteal pulses are 2+ on the right side, and 2+ on  the left side.        Dorsalis pedis pulses are 1+ on the right side, and 1+ on the left side.        Posterior tibial pulses are 1+ on the right side, and 1+ on the left side.   Pulmonary/Chest: Effort normal and breath sounds normal. No accessory muscle usage or stridor. No apnea, no tachypnea and no bradypnea. He is not intubated. No respiratory distress. He has no decreased breath sounds. He has no wheezes. He has no rales. He exhibits no tenderness and no bony tenderness.   Abdominal: He exhibits no distension, no pulsatile liver, no abdominal bruit, no ascites, no pulsatile midline mass and no mass. There is no tenderness. There is no rebound and no guarding.   Musculoskeletal: Normal range of motion. He exhibits no edema or tenderness.     Deformed ankle   Lymphadenopathy:     He has no cervical adenopathy.   Neurological: He is alert and oriented to person, place, and time. He has normal reflexes. No cranial nerve deficit. Coordination normal.   Skin: Skin is warm. No rash noted. No erythema. No pallor.   Psychiatric: He has a normal mood and affect. His behavior is normal. Judgment and thought content normal.       Assessment:     1. Coronary artery disease, angina presence unspecified, unspecified vessel or lesion type, unspecified whether native or transplanted heart    2. KELSEY (obstructive sleep apnea)    3. Abnormal findings on diagnostic imaging of heart and coronary circulation    4. Mixed hyperlipidemia    5. Coronary artery disease due to calcified coronary lesion    6. Angina effort        Plan:           Based on his 2 stress tests we will continue with medical therapy   I reviewed his angiogram with him    We discussed the stress tests with him   No intervention for fixed defect   Medical therapy for 5% reversible ischemia        Will add imdur  Will hctz to losartan      Meanwhile he will proceed with treatment of KELSEY  Referral to sleep disorder           If all fails    He will proceed with left  heart cath   R CFA for support    MILES candidate               Continue with current medical plan and lifestyle changes.  Return sooner for concerns or questions. If symptoms persist go to the ED  I have reviewed all pertinent data on this patient         He will have a sleep study and a referral to sleep disorder clinic            I have reviewed the patient's medical history in detail and updated the computerized patient record.    Orders Placed This Encounter   Procedures    Ambulatory consult to Sleep Disorders     Referral Priority:   Routine     Referral Type:   Consultation     Referred to Provider:   Edel Chiu MD     Number of Visits Requested:   1       Follow up as scheduled. Return sooner for concerns or questions  Follow up in 3 months              He expressed verbal understanding and agreed with the plan        Patient's Medications   New Prescriptions    ISOSORBIDE MONONITRATE (IMDUR) 30 MG 24 HR TABLET    Take 1 tablet (30 mg total) by mouth once daily.    LOSARTAN-HYDROCHLOROTHIAZIDE 100-25 MG (HYZAAR) 100-25 MG PER TABLET    Take 1 tablet by mouth once daily.   Previous Medications    AMLODIPINE (NORVASC) 5 MG TABLET    Take 1 tablet (5 mg total) by mouth once daily.    ASPIRIN (ECOTRIN) 81 MG EC TABLET    Take 81 mg by mouth once daily.    ATORVASTATIN (LIPITOR) 40 MG TABLET    Take 1 tablet (40 mg total) by mouth once daily.    CLOPIDOGREL (PLAVIX) 75 MG TABLET    Take 1 tablet (75 mg total) by mouth once daily.    FLUOXETINE (PROZAC) 20 MG CAPSULE    Take 1 capsule by mouth once daily.    GLIPIZIDE (GLUCOTROL) 5 MG TABLET    Take 1 tablet by mouth Daily.    MECLIZINE (ANTIVERT) 25 MG TABLET    Take 1 tablet (25 mg total) by mouth 3 (three) times daily as needed for Dizziness.    METFORMIN (GLUCOPHAGE) 1000 MG TABLET    Take 1 tablet (1,000 mg total) by mouth daily with breakfast.    METOPROLOL SUCCINATE (TOPROL-XL) 25 MG 24 HR TABLET    Take 1 tablet (25 mg total) by mouth once daily.     NITROGLYCERIN (NITROSTAT) 0.4 MG SL TABLET    Place 1 tablet (0.4 mg total) under the tongue every 5 (five) minutes as needed for Chest pain.    TRAZODONE (DESYREL) 150 MG TABLET       Modified Medications    No medications on file   Discontinued Medications    LOSARTAN (COZAAR) 100 MG TABLET    Take 1 tablet by mouth Daily.

## 2017-04-25 ENCOUNTER — LAB VISIT (OUTPATIENT)
Dept: LAB | Facility: HOSPITAL | Age: 77
End: 2017-04-25
Attending: INTERNAL MEDICINE
Payer: MEDICARE

## 2017-04-25 DIAGNOSIS — R19.03 ABDOMINAL SWELLING, RIGHT LOWER QUADRANT: Primary | ICD-10-CM

## 2017-04-25 LAB
ALBUMIN SERPL BCP-MCNC: 4.4 G/DL
ALP SERPL-CCNC: 44 U/L
ALT SERPL W/O P-5'-P-CCNC: 17 U/L
ANION GAP SERPL CALC-SCNC: 8 MMOL/L
AST SERPL-CCNC: 11 U/L
BILIRUB SERPL-MCNC: 1 MG/DL
BUN SERPL-MCNC: 29 MG/DL
CALCIUM SERPL-MCNC: 10.1 MG/DL
CHLORIDE SERPL-SCNC: 101 MMOL/L
CO2 SERPL-SCNC: 30 MMOL/L
CREAT SERPL-MCNC: 1 MG/DL
EST. GFR  (AFRICAN AMERICAN): >60 ML/MIN/1.73 M^2
EST. GFR  (NON AFRICAN AMERICAN): >60 ML/MIN/1.73 M^2
GLUCOSE SERPL-MCNC: 143 MG/DL
POTASSIUM SERPL-SCNC: 4.4 MMOL/L
PROT SERPL-MCNC: 7.4 G/DL
SODIUM SERPL-SCNC: 139 MMOL/L

## 2017-04-25 PROCEDURE — 36415 COLL VENOUS BLD VENIPUNCTURE: CPT

## 2017-04-25 PROCEDURE — 80053 COMPREHEN METABOLIC PANEL: CPT

## 2017-04-28 ENCOUNTER — HOSPITAL ENCOUNTER (OUTPATIENT)
Dept: RADIOLOGY | Facility: HOSPITAL | Age: 77
Discharge: HOME | End: 2017-04-28
Attending: INTERNAL MEDICINE
Payer: MEDICARE

## 2017-04-28 DIAGNOSIS — R19.03 ABDOMINAL SWELLING, RIGHT LOWER QUADRANT: ICD-10-CM

## 2017-04-28 PROCEDURE — 74160 CT ABDOMEN W/CONTRAST: CPT | Mod: 26,,, | Performed by: RADIOLOGY

## 2017-04-28 PROCEDURE — 74160 CT ABDOMEN W/CONTRAST: CPT | Mod: TC

## 2017-04-28 PROCEDURE — 25500020 PHARM REV CODE 255: Performed by: INTERNAL MEDICINE

## 2017-04-28 RX ADMIN — IOHEXOL 100 ML: 350 INJECTION, SOLUTION INTRAVENOUS at 01:04

## 2017-07-03 ENCOUNTER — TELEPHONE (OUTPATIENT)
Dept: CARDIOLOGY | Facility: CLINIC | Age: 77
End: 2017-07-03

## 2017-07-03 NOTE — TELEPHONE ENCOUNTER
Spoke to patient, he states he has been experiencing SOB more frequent. Patient is requesting an appt asap, appt has been scheduled with NP for evaluation.

## 2017-07-03 NOTE — TELEPHONE ENCOUNTER
----- Message from Sarita Casanova sent at 6/30/2017  2:25 PM CDT -----  Contact: [phone]  Pt requesting a sooner appt either [facility]/ sooner than September. Please advise.

## 2017-07-06 ENCOUNTER — OFFICE VISIT (OUTPATIENT)
Dept: CARDIOLOGY | Facility: CLINIC | Age: 77
End: 2017-07-06
Payer: MEDICARE

## 2017-07-06 VITALS
WEIGHT: 225 LBS | OXYGEN SATURATION: 97 % | BODY MASS INDEX: 29.82 KG/M2 | HEART RATE: 94 BPM | SYSTOLIC BLOOD PRESSURE: 103 MMHG | HEIGHT: 73 IN | DIASTOLIC BLOOD PRESSURE: 66 MMHG

## 2017-07-06 DIAGNOSIS — E78.2 MIXED HYPERLIPIDEMIA: ICD-10-CM

## 2017-07-06 DIAGNOSIS — R53.82 CHRONIC FATIGUE: ICD-10-CM

## 2017-07-06 DIAGNOSIS — I25.5 CARDIOMYOPATHY, ISCHEMIC: ICD-10-CM

## 2017-07-06 DIAGNOSIS — I25.10 CORONARY ARTERY DISEASE DUE TO CALCIFIED CORONARY LESION: ICD-10-CM

## 2017-07-06 DIAGNOSIS — I25.84 CORONARY ARTERY DISEASE DUE TO CALCIFIED CORONARY LESION: ICD-10-CM

## 2017-07-06 DIAGNOSIS — I10 ESSENTIAL HYPERTENSION: ICD-10-CM

## 2017-07-06 DIAGNOSIS — G47.33 OSA (OBSTRUCTIVE SLEEP APNEA): Primary | ICD-10-CM

## 2017-07-06 DIAGNOSIS — R06.02 SHORTNESS OF BREATH: ICD-10-CM

## 2017-07-06 PROCEDURE — 99214 OFFICE O/P EST MOD 30 MIN: CPT | Mod: S$GLB,,, | Performed by: NURSE PRACTITIONER

## 2017-07-06 PROCEDURE — 99999 PR PBB SHADOW E&M-EST. PATIENT-LVL III: CPT | Mod: PBBFAC,,, | Performed by: NURSE PRACTITIONER

## 2017-07-06 PROCEDURE — 1159F MED LIST DOCD IN RCRD: CPT | Mod: S$GLB,,, | Performed by: NURSE PRACTITIONER

## 2017-07-06 PROCEDURE — 1125F AMNT PAIN NOTED PAIN PRSNT: CPT | Mod: S$GLB,,, | Performed by: NURSE PRACTITIONER

## 2017-07-06 RX ORDER — TRAMADOL HYDROCHLORIDE 50 MG/1
1 TABLET ORAL 2 TIMES DAILY PRN
COMMUNITY
Start: 2017-05-01 | End: 2020-07-20 | Stop reason: SDUPTHER

## 2017-07-06 NOTE — PROGRESS NOTES
Subjective:       Patient ID: Julien Meléndez is a 76 y.o. male who presents for follow up of Coronary Artery Disease; Hyperlipidemia; Hypertension; Shortness of Breath; and Fatigue    Chief Complaint: Shortness of Breath    Julien Meléndez 76 y.o. male is here complaining of worsening dyspnea over the past month. He has CAD with AMI 2010 and MILES LAD. Recurrent chest pains 12/2015 with new MILES placed distal RCA. ( OM1 and 70% small apical LAD disease treated medicaly). EF 50-55%. Last PCI was guided with a stress test. He has KELSEY but has not used the machine for several years. He has worsening KELSEY symptoms. He is compliant with his medications. He is on aspirin alone, has not taken Plavix since seen by Dr Hernandez.    Nuclear stress test 3/2017 revealed a sizable fixed perfusion defect with associated akinesis involving the septal wall near the apex consistent with infarction without reversible ischemia. EF 49% down to 46% with stress.   PET stress 4/2017 revealed a small sized moderate intensity fixed defect consistent with non-transmural infarct in the anteroapical wall in the usual distribution of the distal Left Anterior Descending Artery. This defect comprises 10 % of the left ventricular myocardium. There is a very small sized mild ischemia in the distal to apical inferolateral wall in the usual distribution of the distal OM1. This defect comprises 5 % of the left ventricular myocardium.  Based on his 2 stress tests recommended continued medical therapy.   His KELSEY remains untreated and he has not followed up with sleep medicine since he was last seen in clinic (04/2017).   Plan at that time was to resume treatment for KELSEY and - If all fails                         He will proceed with left heart cath                        R CFA for support                         MILES candidate           His fatigue and SOB remain his largest complaints. Denies any chest pain, tightness, dizziness, syncope, diaphoresis. Denies  edema, palpitations, and his SOB occurs at rest. He does not sleep well at night, + snoring, with daytime fatigue and frequent naps.       Review of Systems   Constitutional: Positive for fatigue. Negative for diaphoresis.   HENT: Negative.    Eyes: Negative.    Respiratory: Positive for shortness of breath. Negative for cough, chest tightness, wheezing and stridor.    Cardiovascular: Negative for chest pain, palpitations and leg swelling.   Gastrointestinal: Negative.    Endocrine: Negative.    Genitourinary: Negative.    Musculoskeletal: Negative.    Skin: Negative.    Allergic/Immunologic: Negative.    Neurological: Negative for dizziness and light-headedness.   Hematological: Negative.    Psychiatric/Behavioral: Negative.        Objective:      Physical Exam   Constitutional: He is oriented to person, place, and time. He appears well-developed and well-nourished. No distress.   HENT:   Head: Normocephalic and atraumatic.   Eyes: Right eye exhibits no discharge. Left eye exhibits no discharge.   Neck: No JVD present.   Cardiovascular: Normal rate, regular rhythm, normal heart sounds and intact distal pulses.  Exam reveals no gallop and no friction rub.    No murmur heard.  Pulmonary/Chest: Effort normal and breath sounds normal. No respiratory distress. He has no wheezes. He has no rales. He exhibits no tenderness.   Abdominal: Soft. Bowel sounds are normal.   Musculoskeletal: He exhibits no edema.   Neurological: He is alert and oriented to person, place, and time.   Skin: Skin is dry. Capillary refill takes less than 2 seconds. He is not diaphoretic.   Psychiatric: He has a normal mood and affect. His behavior is normal. Judgment and thought content normal.       Assessment:       1. KELSEY (obstructive sleep apnea)    2. Cardiomyopathy, ischemic    3. Coronary artery disease due to calcified coronary lesion    4. Essential hypertension    5. Mixed hyperlipidemia    6. Chronic fatigue    7. Shortness of breath         Plan:       Julien was seen today for shortness of breath.    Diagnoses and all orders for this visit:    KELSEY (obstructive sleep apnea)  -     Ambulatory consult to Sleep Disorders    Cardiomyopathy, ischemic    Coronary artery disease due to calcified coronary lesion    Essential hypertension    Mixed hyperlipidemia    Chronic fatigue    Shortness of breath    PET STRESS 04/2017  CONCLUSIONS: ABNORMAL MYOCARDIAL PERFUSION PET STRESS TEST  1. There is a small sized moderate intensity fixed defect consistent with non-transmural infarct in the anteroapical wall in the usual distribution of the distal Left Anterior Descending Artery. This defect comprises 10 % of the left ventricular   myocardium.   2. There is a very small sized mild ischemia in the distal to apical inferolateral wall in the usual distribution of the distal OM1. This defect comprises 5 % of the left ventricular myocardium.   3. There is abnormal wall motion at rest showing hypokinesis of the anteroseptal wall of the left ventricle. There is abnormal wall motion at stress showing hypokinesis of the anteroseptal wall of the left ventricle.   4. Visually estimated LV function is low normal at rest. LV function is normal at stress.  (normal is >= 51%)  5. The ventricular volumes are normal at rest and stress.   6. The extracardiac distribution of radioactivity is normal.   7. There was no previous study available to compare.    ECHO 03/2017  CONCLUSIONS     1 - Normal left ventricular systolic function (EF 55-60%).     2 - Left ventricular diastolic dysfunction.     3 - Concentric remodeling.     4 - Normal right ventricular systolic function .     Left heart cath 12/2015-Dr. Hernandez   LM patent  LAD patent except for distal 75%  LCX patent  OM1   RCA 90%   PCI of RCA 2.75 x 18 mm MILES  Unsuccessful PCI of OM1    LVEDP 11 mmHg  3.5 x 18 mm         Left hearth cath 3/1/2010 -Dr. Hernandez                        RCA PCI with 3.5 x 15 mm   BMS     Left heart cath 1/31/2010-Dr. Hernandez                        LAD 3.0 x 12 and 3.5 x 24 mm  BMS     Had a long discussion with patient regarding symptoms of untreated KELSEY. He is to proceed with previous recommendations of sleep study and referral to sleep disorder clinic.  No longer having chest pain and tolerating medications without significant side effects; continue current regimen  As discussed in clinic in 04/2017- If all fails                         He will proceed with left heart cath                        R CFA for support                         MILES candidate   Continue with current medical plan and lifestyle changes.  Follow up in one month- after sleep study and recs followed per sleep med  Return sooner for concerns or questions. If symptoms persist or worsen go to the ED  Recent labs and testing reviewed with patient and all questions answered to patient's satisfaction with verbalization of understanding

## 2017-07-11 PROBLEM — R53.82 CHRONIC FATIGUE: Status: ACTIVE | Noted: 2017-07-11

## 2017-07-11 PROBLEM — R06.02 SHORTNESS OF BREATH: Status: ACTIVE | Noted: 2017-07-11

## 2017-08-08 ENCOUNTER — OFFICE VISIT (OUTPATIENT)
Dept: CARDIOLOGY | Facility: CLINIC | Age: 77
End: 2017-08-08
Payer: MEDICARE

## 2017-08-08 VITALS
HEIGHT: 73 IN | HEART RATE: 86 BPM | WEIGHT: 224.69 LBS | BODY MASS INDEX: 29.78 KG/M2 | SYSTOLIC BLOOD PRESSURE: 98 MMHG | DIASTOLIC BLOOD PRESSURE: 64 MMHG | OXYGEN SATURATION: 96 %

## 2017-08-08 DIAGNOSIS — G47.33 OSA (OBSTRUCTIVE SLEEP APNEA): Primary | ICD-10-CM

## 2017-08-08 DIAGNOSIS — I25.84 CORONARY ARTERY DISEASE DUE TO CALCIFIED CORONARY LESION: ICD-10-CM

## 2017-08-08 DIAGNOSIS — R93.1 ABNORMAL FINDINGS ON DIAGNOSTIC IMAGING OF HEART AND CORONARY CIRCULATION: ICD-10-CM

## 2017-08-08 DIAGNOSIS — I10 ESSENTIAL HYPERTENSION: ICD-10-CM

## 2017-08-08 DIAGNOSIS — I25.10 CORONARY ARTERY DISEASE DUE TO CALCIFIED CORONARY LESION: ICD-10-CM

## 2017-08-08 DIAGNOSIS — I25.5 CARDIOMYOPATHY, ISCHEMIC: ICD-10-CM

## 2017-08-08 PROCEDURE — 1125F AMNT PAIN NOTED PAIN PRSNT: CPT | Mod: S$GLB,,, | Performed by: NURSE PRACTITIONER

## 2017-08-08 PROCEDURE — 99214 OFFICE O/P EST MOD 30 MIN: CPT | Mod: S$GLB,,, | Performed by: NURSE PRACTITIONER

## 2017-08-08 PROCEDURE — 3008F BODY MASS INDEX DOCD: CPT | Mod: S$GLB,,, | Performed by: NURSE PRACTITIONER

## 2017-08-08 PROCEDURE — 99999 PR PBB SHADOW E&M-EST. PATIENT-LVL III: CPT | Mod: PBBFAC,,, | Performed by: NURSE PRACTITIONER

## 2017-08-08 PROCEDURE — 3078F DIAST BP <80 MM HG: CPT | Mod: S$GLB,,, | Performed by: NURSE PRACTITIONER

## 2017-08-08 PROCEDURE — 3074F SYST BP LT 130 MM HG: CPT | Mod: S$GLB,,, | Performed by: NURSE PRACTITIONER

## 2017-08-08 PROCEDURE — 1159F MED LIST DOCD IN RCRD: CPT | Mod: S$GLB,,, | Performed by: NURSE PRACTITIONER

## 2017-08-08 RX ORDER — GLUCOSAM/CHON-MSM1/C/MANG/BOSW 500-416.6
TABLET ORAL
Status: ON HOLD | COMMUNITY
Start: 2017-07-15 | End: 2023-04-21 | Stop reason: HOSPADM

## 2017-08-08 RX ORDER — CALCIUM CITRATE/VITAMIN D3 200MG-6.25
TABLET ORAL
COMMUNITY
Start: 2017-07-15 | End: 2023-02-28 | Stop reason: SDUPTHER

## 2017-08-08 NOTE — PROGRESS NOTES
Subjective:       Patient ID: Julien Meléndez is a 76 y.o. male who presents for follow up of Coronary Artery Disease; Hyperlipidemia; Hypertension; Shortness of Breath; and Fatigue    Chief Complaint: SOB, fatigue    Julien Meléndez 76 y.o. male is here for follow up, last seen a month ago with worsening dyspnea and daytime fatigue.   He has CAD with AMI 2010 and MILES LAD. Recurrent chest pains 12/2015 with new MILES placed distal RCA. ( OM1 and 70% small apical LAD disease treated medicaly). EF 50-55%. Last PCI was guided with a stress test. He has KELSEY but has not used the machine for several years. He has worsening KELSEY symptoms. He is compliant with his medications. He is on aspirin alone, has not taken Plavix since seen by Dr Hernandez.    Nuclear stress test 3/2017 revealed a sizable fixed perfusion defect with associated akinesis involving the septal wall near the apex consistent with infarction without reversible ischemia. EF 49% down to 46% with stress.   PET stress 4/2017 revealed a small sized moderate intensity fixed defect consistent with non-transmural infarct in the anteroapical wall in the usual distribution of the distal Left Anterior Descending Artery. This defect comprises 10 % of the left ventricular myocardium. There is a very small sized mild ischemia in the distal to apical inferolateral wall in the usual distribution of the distal OM1. This defect comprises 5 % of the left ventricular myocardium.  Based on his 2 stress tests recommended continued medical therapy.   His KELSEY remains untreated and he has not followed up with sleep medicine since he was last seen in clinic (04/2017, 07/2017).   Plan at that time was to resume treatment for KELSEY and - If all fails                         He will proceed with left heart cath                        R CFA for support                         MILES candidate           His fatigue and SOB remain his largest complaints. Denies any chest pain, tightness, dizziness,  syncope, diaphoresis. Denies edema, palpitations, and his SOB occurs at rest. He does not sleep well at night, + snoring, with daytime fatigue and frequent naps. He has an appointment with sleep medicine at the end of September.        Review of Systems   Constitutional: Positive for fatigue.   HENT: Negative.    Eyes: Negative.    Respiratory: Positive for shortness of breath. Negative for chest tightness and wheezing.    Cardiovascular: Negative for chest pain, palpitations and leg swelling.   Gastrointestinal: Negative.    Endocrine: Negative.    Genitourinary: Negative.    Musculoskeletal: Positive for arthralgias.   Skin: Negative.    Allergic/Immunologic: Negative.    Neurological: Negative for dizziness, syncope, speech difficulty, weakness, light-headedness and headaches.   Hematological: Negative.    Psychiatric/Behavioral: Positive for sleep disturbance.       Objective:      Physical Exam   Constitutional: He is oriented to person, place, and time. He appears well-developed and well-nourished. No distress.   HENT:   Head: Normocephalic and atraumatic.   Eyes: Right eye exhibits no discharge. Left eye exhibits no discharge.   Neck: No JVD present.   Cardiovascular: Normal rate, regular rhythm, normal heart sounds and intact distal pulses.  Exam reveals no gallop and no friction rub.    No murmur heard.  Pulmonary/Chest: Effort normal and breath sounds normal. He has no wheezes. He has no rales.   Abdominal: Soft. Bowel sounds are normal.   Musculoskeletal: He exhibits no edema.   Neurological: He is alert and oriented to person, place, and time.   Skin: Skin is warm and dry. Capillary refill takes less than 2 seconds. He is not diaphoretic.   Psychiatric: He has a normal mood and affect. His behavior is normal. Judgment and thought content normal.       Assessment:       1. KELSEY (obstructive sleep apnea)    2. Coronary artery disease due to calcified coronary lesion    3. Cardiomyopathy, ischemic    4.  Abnormal findings on diagnostic imaging of heart and coronary circulation    5. Essential hypertension        Plan:       Diagnoses and all orders for this visit:    KELSEY (obstructive sleep apnea)    Coronary artery disease due to calcified coronary lesion    Cardiomyopathy, ischemic    Abnormal findings on diagnostic imaging of heart and coronary circulation    Essential hypertension      PET STRESS 04/2017  CONCLUSIONS: ABNORMAL MYOCARDIAL PERFUSION PET STRESS TEST  1. There is a small sized moderate intensity fixed defect consistent with non-transmural infarct in the anteroapical wall in the usual distribution of the distal Left Anterior Descending Artery. This defect comprises 10 % of the left ventricular   myocardium.   2. There is a very small sized mild ischemia in the distal to apical inferolateral wall in the usual distribution of the distal OM1. This defect comprises 5 % of the left ventricular myocardium.   3. There is abnormal wall motion at rest showing hypokinesis of the anteroseptal wall of the left ventricle. There is abnormal wall motion at stress showing hypokinesis of the anteroseptal wall of the left ventricle.   4. Visually estimated LV function is low normal at rest. LV function is normal at stress.  (normal is >= 51%)  5. The ventricular volumes are normal at rest and stress.   6. The extracardiac distribution of radioactivity is normal.   7. There was no previous study available to compare.     ECHO 03/2017  CONCLUSIONS     1 - Normal left ventricular systolic function (EF 55-60%).     2 - Left ventricular diastolic dysfunction.     3 - Concentric remodeling.     4 - Normal right ventricular systolic function .      Left heart cath 12/2015-Dr. Hernandez   LM patent  LAD patent except for distal 75%  LCX patent  OM1   RCA 90%   PCI of RCA 2.75 x 18 mm MILES  Unsuccessful PCI of OM1    LVEDP 11 mmHg  3.5 x 18 mm         Left hearth cath 3/1/2010 -Dr. Hernandez                        RCA PCI with  3.5 x 15 mm  BMS     Left heart cath 1/31/2010-Dr. Hernandez                        LAD 3.0 x 12 and 3.5 x 24 mm  BMS      Current symptoms of fatigue and SOB felt to be related to untreated KELSEY.   Keep appointment with sleep disorder clinic in September  Tolerating medications without significant side effects; continue current regimen  Continued weight loss encouraged; down 6 lbs from January    As previously discussed- If all fails                         He will proceed with left heart cath                        R CFA for support                         MILES candidate   Continue with current medical plan and lifestyle changes.  Follow up in 3 mo  Return sooner for concerns or questions. If symptoms persist or worsen go to the ED  Recent labs and testing reviewed with patient and all questions answered to patient's satisfaction with verbalization of understanding    Results for orders placed or performed in visit on 04/25/17   COMPREHENSIVE METABOLIC PANEL   Result Value Ref Range    Sodium 139 136 - 145 mmol/L    Potassium 4.4 3.5 - 5.1 mmol/L    Chloride 101 95 - 110 mmol/L    CO2 30 (H) 23 - 29 mmol/L    Glucose 143 (H) 70 - 110 mg/dL    BUN, Bld 29 (H) 8 - 23 mg/dL    Creatinine 1.0 0.5 - 1.4 mg/dL    Calcium 10.1 8.7 - 10.5 mg/dL    Total Protein 7.4 6.0 - 8.4 g/dL    Albumin 4.4 3.5 - 5.2 g/dL    Total Bilirubin 1.0 0.1 - 1.0 mg/dL    Alkaline Phosphatase 44 (L) 55 - 135 U/L    AST 11 10 - 40 U/L    ALT 17 10 - 44 U/L    Anion Gap 8 8 - 16 mmol/L    eGFR if African American >60 >60 mL/min/1.73 m^2    eGFR if non African American >60 >60 mL/min/1.73 m^2       Current Outpatient Prescriptions on File Prior to Visit   Medication Sig Dispense Refill    amlodipine (NORVASC) 5 MG tablet Take 1 tablet (5 mg total) by mouth once daily. 90 tablet 3    aspirin (ECOTRIN) 81 MG EC tablet Take 81 mg by mouth once daily.      atorvastatin (LIPITOR) 40 MG tablet Take 1 tablet (40 mg total) by mouth once daily.  90 tablet 3    fluoxetine (PROZAC) 20 MG capsule Take 1 capsule by mouth once daily.      glipiZIDE (GLUCOTROL) 5 MG tablet Take 1 tablet by mouth Daily.      isosorbide mononitrate (IMDUR) 30 MG 24 hr tablet Take 1 tablet (30 mg total) by mouth once daily. 90 tablet 3    losartan-hydrochlorothiazide 100-25 mg (HYZAAR) 100-25 mg per tablet Take 1 tablet by mouth once daily. 90 tablet 3    metformin (GLUCOPHAGE) 1000 MG tablet Take 1 tablet (1,000 mg total) by mouth daily with breakfast. 30 tablet 11    metoprolol succinate (TOPROL-XL) 25 MG 24 hr tablet Take 1 tablet (25 mg total) by mouth once daily. 90 tablet 3    nitroGLYCERIN (NITROSTAT) 0.4 MG SL tablet Place 1 tablet (0.4 mg total) under the tongue every 5 (five) minutes as needed for Chest pain. 20 tablet 12    tramadol (ULTRAM) 50 mg tablet Take 1 tablet by mouth 2 (two) times daily as needed.      trazodone (DESYREL) 150 MG tablet   0    meclizine (ANTIVERT) 25 mg tablet Take 1 tablet (25 mg total) by mouth 3 (three) times daily as needed for Dizziness. 20 tablet 0     No current facility-administered medications on file prior to visit.

## 2017-09-18 ENCOUNTER — OFFICE VISIT (OUTPATIENT)
Dept: CARDIOLOGY | Facility: CLINIC | Age: 77
End: 2017-09-18
Payer: MEDICARE

## 2017-09-18 VITALS
WEIGHT: 228 LBS | BODY MASS INDEX: 30.22 KG/M2 | DIASTOLIC BLOOD PRESSURE: 50 MMHG | HEART RATE: 90 BPM | HEIGHT: 73 IN | SYSTOLIC BLOOD PRESSURE: 92 MMHG

## 2017-09-18 DIAGNOSIS — G47.33 OSA (OBSTRUCTIVE SLEEP APNEA): ICD-10-CM

## 2017-09-18 DIAGNOSIS — R53.82 CHRONIC FATIGUE: ICD-10-CM

## 2017-09-18 DIAGNOSIS — I25.10 CORONARY ARTERY DISEASE, ANGINA PRESENCE UNSPECIFIED, UNSPECIFIED VESSEL OR LESION TYPE, UNSPECIFIED WHETHER NATIVE OR TRANSPLANTED HEART: ICD-10-CM

## 2017-09-18 DIAGNOSIS — I25.10 CORONARY ARTERY DISEASE DUE TO CALCIFIED CORONARY LESION: Primary | ICD-10-CM

## 2017-09-18 DIAGNOSIS — I25.5 CARDIOMYOPATHY, ISCHEMIC: ICD-10-CM

## 2017-09-18 DIAGNOSIS — R06.02 SHORTNESS OF BREATH: ICD-10-CM

## 2017-09-18 DIAGNOSIS — E11.9 TYPE 2 DIABETES MELLITUS WITHOUT COMPLICATION, WITHOUT LONG-TERM CURRENT USE OF INSULIN: ICD-10-CM

## 2017-09-18 DIAGNOSIS — I25.84 CORONARY ARTERY DISEASE DUE TO CALCIFIED CORONARY LESION: Primary | ICD-10-CM

## 2017-09-18 PROCEDURE — 3074F SYST BP LT 130 MM HG: CPT | Mod: S$GLB,,, | Performed by: INTERNAL MEDICINE

## 2017-09-18 PROCEDURE — 1126F AMNT PAIN NOTED NONE PRSNT: CPT | Mod: S$GLB,,, | Performed by: INTERNAL MEDICINE

## 2017-09-18 PROCEDURE — 99999 PR PBB SHADOW E&M-EST. PATIENT-LVL III: CPT | Mod: PBBFAC,,, | Performed by: INTERNAL MEDICINE

## 2017-09-18 PROCEDURE — 99214 OFFICE O/P EST MOD 30 MIN: CPT | Mod: S$GLB,,, | Performed by: INTERNAL MEDICINE

## 2017-09-18 PROCEDURE — 1159F MED LIST DOCD IN RCRD: CPT | Mod: S$GLB,,, | Performed by: INTERNAL MEDICINE

## 2017-09-18 PROCEDURE — 3078F DIAST BP <80 MM HG: CPT | Mod: S$GLB,,, | Performed by: INTERNAL MEDICINE

## 2017-09-18 PROCEDURE — 3008F BODY MASS INDEX DOCD: CPT | Mod: S$GLB,,, | Performed by: INTERNAL MEDICINE

## 2017-09-18 RX ORDER — LOSARTAN POTASSIUM AND HYDROCHLOROTHIAZIDE 25; 100 MG/1; MG/1
0.5 TABLET ORAL DAILY
Qty: 45 TABLET | Refills: 3
Start: 2017-09-18 | End: 2018-01-17 | Stop reason: SDUPTHER

## 2017-09-18 NOTE — PROGRESS NOTES
Subjective:   Patient ID:  Julien Meléndez is a 77 y.o. male who presents for follow up of Coronary Artery Disease; Hyperlipidemia; Hypertension; iatrogenic hypotension; and Abnormal Stress Test      HPI:     Julien Meléndez 77 y.o. male is here follow up and complaining of worsening dyspnea. He has also noted worsening fatigue. He has KELSEY and a visit with the sleep disorder team soon to assist with CPAP machine. He has CAD with AMI 2010 and MILES LAD. Recurrent chest pains 12/2015 with new MILES placed distal RCA. ( OM1 and 70% small apical LAD disease treated medically). EF 50-55%. Last PCI was guided with a stress test. He is on aspirin and plavix for DAPT. He is complaining of dizziness with fatigue. He is on 5 hypertensive agents with a SBP 90 mmHg        Nuclear stress test 3/2017 revealed a sizable fixed perfusion defect with associated akinesis involving the septal wall near the apex consistent with infarction without reversible ischemia. EF 49% down to 46% with stress. PET stress 4/2017 revealed a small sized moderate intensity fixed defect consistent with non-transmural infarct in the anteroapical wall in the usual distribution of the distal Left Anterior Descending Artery. This defect comprises 10 % of the left ventricular myocardium. There is a very small sized mild ischemia in the distal to apical inferolateral wall in the usual distribution of the distal OM1. This defect comprises 5 % of the left ventricular myocardium. After his PET stress the decision was medical therapy.             Patient Active Problem List    Diagnosis Date Noted    Chronic fatigue 07/11/2017    Shortness of breath 07/11/2017    Equivocal stress test 03/27/2017       Fixed defect noted on SPECT      Angina effort 03/23/2017    KELSEY (obstructive sleep apnea) 10/26/2016         12/2016   AHI 5.7 with oxygen jued 87%   AHI 11.2 in supine position   REM AHI 40            Type 2 diabetes mellitus without complication 01/26/2016     Abnormal findings on diagnostic imaging of heart and coronary circulation 2015         PET stress 2017   10% apical fixed defect   5% reversible ischemia at the distal apex   Normal EF                Chest pain 2015    Coronary artery disease due to calcified coronary lesion 2015           Left heart cath 2015-Dr. Hernandez        LM patent  LAD patent except for distal 75%  LCX patent  OM1   RCA 90%      PCI of RCA 2.75 x 18 mm MILES  Unsuccessful PCI of OM1         LVEDP 11 mmHg  3.5 x 18 mm       Left hearth cath 3/1/2010 -Dr. Hernandez   RCA PCI with 3.5 x 15 mm  BMS    Left heart cath 2010-Dr. Hernandez   LAD 3.0 x 12 and 3.5 x 24 mm  BMS       Cardiomyopathy, ischemic 2013    Diabetes mellitus 2013    MI (myocardial infarction)     Diabetes mellitus     Hyperlipidemia     Hypertension     Hiatal hernia            Right Arm BP - Sittin/50  Left Arm BP - Sittin/50        LABS    LAST HbA1c  Lab Results   Component Value Date    HGBA1C 6.9 (H) 2010       Lipid panel  Lab Results   Component Value Date    CHOL 149 2010     Lab Results   Component Value Date    HDL 31 (L) 2010     Lab Results   Component Value Date    LDLCALC 97.4 2010     Lab Results   Component Value Date    TRIG 103 2010     Lab Results   Component Value Date    CHOLHDL 20.8 2010            Review of Systems   Constitution: Positive for weakness. Negative for diaphoresis, night sweats, weight gain and weight loss.   HENT: Negative for congestion.    Eyes: Negative for blurred vision, discharge and double vision.   Cardiovascular: Negative for chest pain, claudication, cyanosis, dyspnea on exertion, irregular heartbeat, leg swelling, near-syncope, orthopnea, palpitations, paroxysmal nocturnal dyspnea and syncope.   Respiratory: Positive for shortness of breath, sleep disturbances due to breathing and snoring. Negative for cough and wheezing.     Endocrine: Negative for cold intolerance, heat intolerance and polyphagia.   Hematologic/Lymphatic: Negative for adenopathy and bleeding problem. Does not bruise/bleed easily.   Skin: Negative for dry skin and nail changes.   Musculoskeletal: Negative for arthritis, back pain, falls, joint pain, myalgias and neck pain.   Gastrointestinal: Negative for bloating, abdominal pain, change in bowel habit and constipation.   Genitourinary: Negative for bladder incontinence, dysuria, flank pain, genital sores and missed menses.   Neurological: Positive for dizziness. Negative for aphonia, brief paralysis and difficulty with concentration.   Psychiatric/Behavioral: Negative for altered mental status and memory loss. The patient does not have insomnia.    Allergic/Immunologic: Negative for environmental allergies.       Objective:   Physical Exam   Constitutional: He is oriented to person, place, and time. He appears well-developed and well-nourished. He is not intubated.   HENT:   Head: Normocephalic and atraumatic.   Right Ear: External ear normal.   Left Ear: External ear normal.   Mouth/Throat: Oropharynx is clear and moist.   Eyes: Conjunctivae and EOM are normal. Pupils are equal, round, and reactive to light. Right eye exhibits no discharge. Left eye exhibits no discharge. No scleral icterus.   Neck: Normal range of motion. Neck supple. Normal carotid pulses, no hepatojugular reflux and no JVD present. Carotid bruit is not present. No tracheal deviation present. No thyromegaly present.   Cardiovascular: Normal rate, regular rhythm, S1 normal and S2 normal.   No extrasystoles are present. PMI is not displaced.  Exam reveals no gallop, no S3, no distant heart sounds, no friction rub and no midsystolic click.    No murmur heard.  Pulses:       Carotid pulses are 2+ on the right side, and 2+ on the left side.       Radial pulses are 2+ on the right side, and 2+ on the left side.        Femoral pulses are 2+ on the right  side, and 2+ on the left side.       Popliteal pulses are 2+ on the right side, and 2+ on the left side.        Dorsalis pedis pulses are 2+ on the right side, and 2+ on the left side.        Posterior tibial pulses are 2+ on the right side, and 2+ on the left side.   Pulmonary/Chest: Effort normal and breath sounds normal. No accessory muscle usage or stridor. No apnea, no tachypnea and no bradypnea. He is not intubated. No respiratory distress. He has no decreased breath sounds. He has no wheezes. He has no rales. He exhibits no tenderness and no bony tenderness.   Abdominal: He exhibits no distension, no pulsatile liver, no abdominal bruit, no ascites, no pulsatile midline mass and no mass. There is no tenderness. There is no rebound and no guarding.   Musculoskeletal: Normal range of motion. He exhibits no edema or tenderness.   Lymphadenopathy:     He has no cervical adenopathy.   Neurological: He is alert and oriented to person, place, and time. He has normal reflexes. No cranial nerve deficit. Coordination normal.   Skin: Skin is warm. No rash noted. No erythema. No pallor.   Psychiatric: He has a normal mood and affect. His behavior is normal. Judgment and thought content normal.       Assessment:     1. Coronary artery disease due to calcified coronary lesion    2. KELSEY (obstructive sleep apnea)    3. Cardiomyopathy, ischemic    4. Type 2 diabetes mellitus without complication, without long-term current use of insulin    5. Chronic fatigue    6. Shortness of breath    7. Coronary artery disease, angina presence unspecified, unspecified vessel or lesion type, unspecified whether native or transplanted heart        Plan:         Stop amlodipine for now  Reduce losartan/hctz 100/25 to 50/12.5 mg daily  Return in 2 to 3 weeks for BP check  If BP remains less than 120 he will reduce toprol xl to 12.5 mg daily        Follow with sleep disorder clinic for KELSEY  Reassurance  Medical therapy for CAD for now   Distal  LAD is infarcted   OM1  with 5% ischemic burden      Follow up with [facility] in 2 to 3 weeks        Return sooner for concerns or questions. If symptoms persist go to the ED  I have reviewed all pertinent data on this patient         Follow up as scheduled. Return sooner for concerns or questions            He expressed verbal understanding and agreed with the plan        Patient's Medications   New Prescriptions    No medications on file   Previous Medications    ASPIRIN (ECOTRIN) 81 MG EC TABLET    Take 81 mg by mouth once daily.    ATORVASTATIN (LIPITOR) 40 MG TABLET    Take 1 tablet (40 mg total) by mouth once daily.    FLUOXETINE (PROZAC) 20 MG CAPSULE    Take 1 capsule by mouth once daily.    GLIPIZIDE (GLUCOTROL) 5 MG TABLET    Take 1 tablet by mouth Daily.    ISOSORBIDE MONONITRATE (IMDUR) 30 MG 24 HR TABLET    Take 1 tablet (30 mg total) by mouth once daily.    MECLIZINE (ANTIVERT) 25 MG TABLET    Take 1 tablet (25 mg total) by mouth 3 (three) times daily as needed for Dizziness.    METFORMIN (GLUCOPHAGE) 1000 MG TABLET    Take 1 tablet (1,000 mg total) by mouth daily with breakfast.    METOPROLOL SUCCINATE (TOPROL-XL) 25 MG 24 HR TABLET    Take 1 tablet (25 mg total) by mouth once daily.    NITROGLYCERIN (NITROSTAT) 0.4 MG SL TABLET    Place 1 tablet (0.4 mg total) under the tongue every 5 (five) minutes as needed for Chest pain.    TRAMADOL (ULTRAM) 50 MG TABLET    Take 1 tablet by mouth 2 (two) times daily as needed.    TRAZODONE (DESYREL) 150 MG TABLET        TRUE METRIX GLUCOSE TEST STRIP STRP        TRUEPLUS LANCETS 28 GAUGE MISC       Modified Medications    Modified Medication Previous Medication    LOSARTAN-HYDROCHLOROTHIAZIDE 100-25 MG (HYZAAR) 100-25 MG PER TABLET losartan-hydrochlorothiazide 100-25 mg (HYZAAR) 100-25 mg per tablet       Take 0.5 tablets by mouth once daily.    Take 1 tablet by mouth once daily.   Discontinued Medications    AMLODIPINE (NORVASC) 5 MG TABLET    Take 1  tablet (5 mg total) by mouth once daily.

## 2017-09-18 NOTE — PATIENT INSTRUCTIONS
Heart Disease Education    The heart beats 60 to 100 times per minute, 24 hours a day. This equals almost 1000,000 times a day. It pumps blood with oxygen and nutrients to the tissues and organs of the body. But the heart is a muscle and needs its own supply of blood. Blood flow to the heart is supplied by the coronary arteries. Coronary artery disease (atherosclerosis) is a result of cholesterol, saturated fat, and calcium deposits (plaques) that build up inside the walls. This causes inflammation within the coronary arteries. These plaques narrow the artery and reduce blood flow to the heart muscle. The reduction in blood flow to the heart muscle decreases oxygen supply to the heart. If the narrowing is significant enough, the oxygen supply to one or more regions of the heart can be temporarily or permanently shut down. This can cause chest pain, and possibly death of heart tissue (heart attack).  Types of chest pain  Angina is the name for pain in the heart muscle. Angina is a warning sign of serious heart disease. When untreated it can lead to a heart attack, also known as acute myocardial infarction, or AMI. Angina occurs when there is not enough blood and oxygen flowing to the heart for the amount of work it is doing. This most often happens during physical exertion, when the heart is working hardest. It is usually relieved by rest or nitroglycerin. Angina may also occur after a large meal when extra blood is sent to the digestive organs and less goes to the heart. In the case of advanced or unstable heart disease, angina can occur at rest or awaken you from sleep. Angina usually lasts from a few minutes up to 20 minutes or more. When treated early, the effects of angina can be reversed without permanent damage to the heart. Angina is a serious condition and needs to be evaluated by a medical professional immediately.  There are two types of angina -- stable and unstable:  · Stable angina usually occurs  with a predictable level of activity. Being stable, its character, severity, and occurrence do not change much over time. It usually starts with activity, and resolves with rest or taking your medicine as instructed by your doctor. The symptoms usually do not last long.  · Unstable angina changes or gets worse over time. It is different from whatever you are used to. It may feel different or worse, begin without cause, occur with exercise or exertion, wake you up from sleep, and last longer. It may not respond in the same way as it does when you take your usual medicines for an attack. This type of angina can be a warning sign of an impending heart attack.     A heart attack is usually the result of a blood clot that suddenly forms in a coronary artery that has been narrowed with plaque. When this occurs, blood flow may be cut off to a part of the heart muscle, causing the cells to die. This weakens the pumping action of the heart, which affects the delivery of blood to all the other organs in the body including the brain. This damage is not reversible. However, early treatment can limit the amount of damage.  The pain you feel with angina and a heart attack may have a similar quality. However, it is usually different in intensity and duration. Here are some typical descriptions of a heart attack:  · It is most often experienced as a squeezing, crushing, pressure-like sensation in the center of the chest.  · It is sometimes described as something heavy sitting on my chest.  · It may feel more like a bad case of indigestion.  · The pain may spread from the chest to the arm, shoulder, throat or jaw.  · Sometimes the pain is not felt in the chest at all, but only in the arm, shoulder, throat or jaw.  · There may also be nausea, vomiting, dizziness or light-headedness, sweating and trouble breathing.  · Palpitations, or your heart beating rapidly  · A new, irregular heart beat  · Unexplained weakness  You may not be  "able to tell the difference between "bad" angina and a heart attack at home. Seek help if your symptoms are different than usual. Do not be in denial or just try to "tough it out."  Call 911  This is the fastest and safest way to get to the emergency department. The paramedics can also start treatment on the way to the hospital, saving valuable time for your heart.  · If the angina gets worse, if it continues, or if it stops and returns, call 911 immediately. Do not delay. You may be having a heart attack.  · After you call 911, take a second tablet or spray unless instructed otherwise. When repeating doses, sit down if possible, because it can make you feel lightheaded or dizzy. Wait another 5 minutes. If the angina still does not go away, take a third tablet or spray. Do not take more than 3 tablets or sprays within 15 minutes. Stay on the phone with 911 for further instruction.  · Your healthcare provider may give you slightly different instructions than those above. If so, follow them carefully.  Do not wait until symptoms become severe to call 911.  Other reasons to call 911 include:  · Trouble breathing  · Feeling lightheaded, faint, or dizzy  · Rapid heart beat  · Slower than usual heart rate compared to your normal  · Angina with weakness, dizziness, fainting, heavy sweating, nausea, or vomiting  · Extreme drowsiness, confusion  · Weakness of an arm or leg or one side of the face  · Difficulty with speech or vision  When to seek medical care  Remember, the signs and symptoms of a heart attack are not always like they are on TV. Sometimes they are not so obvious. You may only feel weak, or just not right. If it is not clear or if you have any doubt, call for advice.  · Seek help if there is a change in the type of pain, if it feels different, or if your symptoms are mild.  · Do not drive yourself. Have someone else drive you. If no one can drive, call 911.  · Do not delay. Fast diagnosis and treatment can " "prevent or limit the amount of heart damage during a heart attack.  · Do not go to your doctor's office or a clinic as they may not be able to provide all the testing and treatment required for this condition.  · If your doctor has given you medicine to take when symptoms occur, take them but don't delay getting help trying to locate medicines.  What happens in the emergency department  The emergency department is connected to your local emergency medical system (EMS) through 911. That's why during a cardiac emergency, calling 911 is the fastest way to get help. The goal of the emergency department is to rapidly screen, evaluate, and treat people.  Once you are there, an electrocardiogram (ECG or heart tracing) will be done. Blood samples may be taken to look for the presence of heart enzymes that leak from damaged heart cells and show if a heart attack is occurring. You will often be evaluated by a heart specialist (cardiologist) who decides the best course of action. In the case of severe angina or early heart attack, and depending on the circumstances, powerful "clot busting" medicines can be used to dissolve blood clots in the coronary artery. In other cases, you may be taken to a cardiac catheterization lab. Here, a tiny balloon-tipped catheter is advanced through blood vessels to the heart. There the balloon is inflated pushing open the blood vessel restoring blood flow.  Risk factors for heart disease  Risk factors for heart disease are a combination of genetic and lifestyle. Many risk factors work by either directly or indirectly damaging the blood vessels of the heart, or by increasing the risk of forming blood or cholesterol clots, which then clog up and block the arteries.     Examples of physical lifestyle risk factors:  · Cigarette smoking  · High blood pressure  · High blood cholesterol  · Use of stimulant drugs such as cocaine, crack, and amphetamines  · Eating a high-fat, high-cholesterol " meal  · Diabetes   · Obesity which increases risk for diabetes and high blood pressure  · Lack of regular physical activity     Examples of emotional lifestyle factors:  · Chronic high stress levels release stress hormones. These raise blood pressure and cholesterol level and makes blood clot more easily.  · Held-in anger, hostile or cynical attitude  · Social and emotional isolation, lack of intimacy  · Loss of relationship  · Depression  Other factors that increase the risk of heart attack that you cannot control :  · Age. The older you get beyond 40, the greater is your risk of significant coronary artery disease.  · Gender. More men than women get heart disease; but once past menopause, women who are not taking estrogen replacement have the same risk as men for a heart attack.  · Family history. If your mother, father, brother or sister has coronary artery disease, your risk of having it is higher than a person your age without this family history.  What can you do to decrease your risk  To reduce your risk of heart disease:  · Get regular checkups with your doctor.  · Take your medicines for blood pressure, cholesterol or diabetes as directed.  · Watch your diet. Eat a heart healthy diet choosing fresh foods, less salt, cholesterol, and fat  · Stop smoking. Get help if needed.  · Get regular exercise.  · Manage stress.  · Carry a list of medicines and doses in your wallet.  Date Last Reviewed: 12/30/2015  © 2581-8008 [employer]. [address] [place] [address], [place] [address]. All rights reserved. This information is not intended as a substitute for professional medical care. Always follow your healthcare professional's instructions.

## 2017-09-25 ENCOUNTER — OFFICE VISIT (OUTPATIENT)
Dept: SLEEP MEDICINE | Facility: CLINIC | Age: 77
End: 2017-09-25
Payer: MEDICARE

## 2017-09-25 VITALS
HEIGHT: 73 IN | WEIGHT: 228 LBS | DIASTOLIC BLOOD PRESSURE: 67 MMHG | HEART RATE: 82 BPM | BODY MASS INDEX: 30.22 KG/M2 | SYSTOLIC BLOOD PRESSURE: 111 MMHG

## 2017-09-25 DIAGNOSIS — G47.33 OSA (OBSTRUCTIVE SLEEP APNEA): Primary | ICD-10-CM

## 2017-09-25 PROCEDURE — 3008F BODY MASS INDEX DOCD: CPT | Mod: S$GLB,,, | Performed by: PSYCHIATRY & NEUROLOGY

## 2017-09-25 PROCEDURE — 3078F DIAST BP <80 MM HG: CPT | Mod: S$GLB,,, | Performed by: PSYCHIATRY & NEUROLOGY

## 2017-09-25 PROCEDURE — 3074F SYST BP LT 130 MM HG: CPT | Mod: S$GLB,,, | Performed by: PSYCHIATRY & NEUROLOGY

## 2017-09-25 PROCEDURE — 1125F AMNT PAIN NOTED PAIN PRSNT: CPT | Mod: S$GLB,,, | Performed by: PSYCHIATRY & NEUROLOGY

## 2017-09-25 PROCEDURE — 99999 PR PBB SHADOW E&M-EST. PATIENT-LVL III: CPT | Mod: PBBFAC,,, | Performed by: PSYCHIATRY & NEUROLOGY

## 2017-09-25 PROCEDURE — 99204 OFFICE O/P NEW MOD 45 MIN: CPT | Mod: S$GLB,,, | Performed by: PSYCHIATRY & NEUROLOGY

## 2017-09-25 PROCEDURE — 1159F MED LIST DOCD IN RCRD: CPT | Mod: S$GLB,,, | Performed by: PSYCHIATRY & NEUROLOGY

## 2017-09-25 NOTE — PROGRESS NOTES
Julien Meléndez  was seen at the request of  James Mathew MD for sleep evaluation.    09/25/2017 INITIAL HISTORY OF PRESENT ILLNESS:  Julien Meléndez is a 77 y.o. male is here to be evaluated for a sleep disorder.       CHIEF COMPLAINT:      The patient's complaints include excessive daytime sleepiness, excessive daytime fatigue, snoring and interrupted sleep since  Years back.    Had PSG in 2016 and had been using his CPAP set at 7 cm - due for replacement. Usage 5.7 hrs on the old F and P machine.    Denies  dry mouth and sore throat  Denies nasal congestion   Denies  morning headaches  Reports  interrupted sleep  Denies frequent leg movements  Denies symptoms concerning for parasomnia    The ESS ([place] Sleepiness Score) taken on initial visit is 8 /24    The patient never had tonsillectomy, adenoidectomy or UPPP      SLEEP ROUTINE AND LIFESTYLE 09/25/2017 :    Occupation:    Bed partner: his wife     Time to bed - wake up time on a workday : 11pm to 8 AM  Time to bed - wake up time on a day off: 1 pm to  8 AM  Sleep onset latency: depends up to 30 min  Disruptions or awakenings: 1-3  Time to fall back into sleep: a few min   Perceived sleep quality: 2/5  Perceived total sleep time:  2  hours.  Daytime naps: 0-1     PREVIOUS SLEEP STUDIES:     PSG/ SPLIT night study  In 12/3/16 showed significant KELSEY with the AHI of 5.7/hour and SaO2 minimum of 87%.    DME:       PAST MEDICAL HISTORY:    Active Ambulatory Problems     Diagnosis Date Noted    MI (myocardial infarction)     Diabetes mellitus     Hyperlipidemia     Hypertension     Hiatal hernia     Cardiomyopathy, ischemic 09/17/2013    Diabetes mellitus 09/17/2013    Coronary artery disease due to calcified coronary lesion 12/28/2015    Abnormal findings on diagnostic imaging of heart and coronary circulation 12/29/2015    Chest pain 12/29/2015    Type 2 diabetes mellitus without complication 01/26/2016    KELSEY (obstructive sleep apnea) 10/26/2016     Angina effort 03/23/2017    Equivocal stress test 03/27/2017    Chronic fatigue 07/11/2017    Shortness of breath 07/11/2017     Resolved Ambulatory Problems     Diagnosis Date Noted    No Resolved Ambulatory Problems     Past Medical History:   Diagnosis Date    Diabetes mellitus     Hiatal hernia     Hyperlipidemia     Hypertension     MI (myocardial infarction)                 PAST SURGICAL HISTORY:    Past Surgical History:   Procedure Laterality Date    ABDOMINAL HERNIA REPAIR      CARDIAC CATHETERIZATION      3 stents placed    CATARACT EXTRACTION, BILATERAL      HERNIA REPAIR      inguinal         FAMILY HISTORY:                Family History   Problem Relation Age of Onset    Heart disease Mother     Stroke Mother     Heart disease Sister     Heart disease Brother     Heart disease Maternal Uncle     Heart disease Brother     Heart disease Sister     Heart disease Maternal Uncle     Heart disease Maternal Uncle     Heart disease Maternal Uncle        SOCIAL HISTORY:          Tobacco:   History   Smoking Status    Never Smoker   Smokeless Tobacco    Never Used       alcohol use:    History   Alcohol Use No                   ALLERGIES:  Review of patient's allergies indicates:  No Known Allergies    CURRENT MEDICATIONS:    Current Outpatient Prescriptions   Medication Sig Dispense Refill    aspirin (ECOTRIN) 81 MG EC tablet Take 81 mg by mouth once daily.      atorvastatin (LIPITOR) 40 MG tablet Take 1 tablet (40 mg total) by mouth once daily. 90 tablet 3    fluoxetine (PROZAC) 20 MG capsule Take 1 capsule by mouth once daily.      glipiZIDE (GLUCOTROL) 5 MG tablet Take 1 tablet by mouth Daily.      isosorbide mononitrate (IMDUR) 30 MG 24 hr tablet Take 1 tablet (30 mg total) by mouth once daily. 90 tablet 3    losartan-hydrochlorothiazide 100-25 mg (HYZAAR) 100-25 mg per tablet Take 0.5 tablets by mouth once daily. 45 tablet 3    meclizine (ANTIVERT) 25 mg tablet Take 1  "tablet (25 mg total) by mouth 3 (three) times daily as needed for Dizziness. 20 tablet 0    metformin (GLUCOPHAGE) 1000 MG tablet Take 1 tablet (1,000 mg total) by mouth daily with breakfast. 30 tablet 11    metoprolol succinate (TOPROL-XL) 25 MG 24 hr tablet Take 1 tablet (25 mg total) by mouth once daily. 90 tablet 3    tramadol (ULTRAM) 50 mg tablet Take 1 tablet by mouth 2 (two) times daily as needed.      trazodone (DESYREL) 150 MG tablet   0    TRUE METRIX GLUCOSE TEST STRIP Strp       TRUEPLUS LANCETS 28 gauge Misc       nitroGLYCERIN (NITROSTAT) 0.4 MG SL tablet Place 1 tablet (0.4 mg total) under the tongue every 5 (five) minutes as needed for Chest pain. 20 tablet 12     No current facility-administered medications for this visit.                       REVIEW OF SYSTEMS:   Sleep related symptoms as per HPI    denies weight gain  Denies dyspnea  Denies palpitations  Denies acid reflux   Reports polyuria x 1-2  Denies  mood diturbance  Denies  anemia  Denies  muscle pain  Denies  Gait imbalance    Otherwise, a balance of 10 systems reviewed is negative.    PHYSICAL EXAM:  /67 (BP Location: Left arm, Patient Position: Sitting)   Pulse 82   Ht 6' 1" (1.854 m)   Wt 103.4 kg (228 lb)   BMI 30.08 kg/m²   GENERAL: Overweight body habitus, well groomed.  HEENT:   HEENT:  Conjunctivae are non-erythematous; Pupils equal, round, and reactive to light; Nose is symmetrical; Nasal mucosa is pink and moist; Septum is midline; Inferior turbinates are normal; Nasal airflow is normal; Posterior pharynx is pink; Modified Mallampati:II-III; Posterior palate is low; Tonsils not visualized; Uvula is wide and elongated;Tongue is enlarged; Dentition is fair; No TMJ tenderness; Jaw opening and protrusion without click and without discomfort.  NECK: Supple. Neck circumference is 16 inches. No thyromegaly. No palpable nodes.     SKIN: On face and neck: No abrasions, no rashes, no lesions.  No subcutaneous nodules are " "palpable.  RESPIRATORY: Chest is clear to auscultation.  Normal chest expansion and non-labored breathing at rest.  CARDIOVASCULAR: Normal S1, S2.  No murmurs, gallops or rubs. No carotid bruits bilaterally.  No edema. No clubbing. No cyanosis.    NEURO: Oriented to time, place and person. Normal attention span and concentration. Gait normal.    PSYCH: Affect is full. Mood is normal  MUSCULOSKELETAL: Moves 4 extremities. Gait normal.         Using [facility]: no      ASSESSMENT:    1. KELSEY (obstructive sleep apnea). The patient symptomatically has  excessive daytime sleepiness, snoring, excessive daytime fatigue and interrupted sleep  with exam findings of "a crowded oral airway and elevated body mass index. The patient has medical co-morbidities of CAD and diabetes, h/o MI and cardiomyopathy,  which can be worsened by KELSEY. This warrants ttreatment. Mild-to moderate KELSEY re-confirmed Dev 2016. His old CPAP is reflecting 5.7 hrs of use every night, but old and is due for replacement.           PLAN:      Treatment: prescription  for autoCPAP  6-18 cm with the mask of a patient's choice was given to the patient.    Education: During our discussion today, we talked about the etiology of obstructive sleep apnea as well as the potential ramifications of untreated sleep apnea, which could include daytime sleepiness, hypertension, heart disease and/or stroke.      We discussed potential treatment options, which could include weight loss, body positioning, continuous positive airway pressure (CPAP), or referral for surgical consideration. The patient preferred CPAP option.     Discussed purpose of PAP therapy, health benefits of CPAP, as well as the potential ramifications of untreated sleep apnea, which could include daytime sleepiness, hypertension, heart disease and/or stroke. An AHI of 15 is associated with increased risk CVD.     The patient should avoid ETOH and sedatives at night, as it tends to aggravate KELSEY. Regular " replacement of CPAP mask, tubing and filter was recommended.    Precautions: The patient was advised to abstain from driving should he feel sleepy or drowsy.    Follow up: MD/NP after 1 month of PAP use.    Thank you for allowing me the opportunity to participate in the care of your patient.

## 2017-09-25 NOTE — LETTER
September 28, 2017      James Mathew MD  [address]  [address]  [facility] [address]           [facility]  [address] [facility] [address]  Phone: [phone]  Fax: [phone]          Patient: Julien Meléndez   MR Number: [id number]   YOB: 1940   Date of Visit: 9/25/2017       Dear Dr. James Mathew:    Thank you for referring Julien Meléndez to me for evaluation. Attached you will find relevant portions of my assessment and plan of care.    If you have questions, please do not hesitate to call me. I look forward to following Julien Meléndez along with you.    Sincerely,        Enclosure  CC:  No Recipients    If you would like to receive this communication electronically, please contact [email or web address] or [phone] to request more information on [employer] access.    For providers and/or their staff who would like to refer a patient to [facility], please contact us through our one-stop-shop provider referral line, Uli Tejeda, at [phone].    If you feel you have received this communication in error or would no longer like to receive these types of communications, please e-mail [email or web address]

## 2017-09-25 NOTE — PATIENT INSTRUCTIONS
DME [facility]:  [phone]     You will hear from them within week.    We will schedule follow up    Our number is  [phone] (ext 1).

## 2017-10-10 ENCOUNTER — OFFICE VISIT (OUTPATIENT)
Dept: CARDIOLOGY | Facility: CLINIC | Age: 77
End: 2017-10-10
Payer: MEDICARE

## 2017-10-10 VITALS
SYSTOLIC BLOOD PRESSURE: 114 MMHG | BODY MASS INDEX: 30.06 KG/M2 | DIASTOLIC BLOOD PRESSURE: 75 MMHG | OXYGEN SATURATION: 96 % | HEART RATE: 91 BPM | HEIGHT: 73 IN | WEIGHT: 226.81 LBS

## 2017-10-10 DIAGNOSIS — I25.10 CORONARY ARTERY DISEASE, ANGINA PRESENCE UNSPECIFIED, UNSPECIFIED VESSEL OR LESION TYPE, UNSPECIFIED WHETHER NATIVE OR TRANSPLANTED HEART: Primary | ICD-10-CM

## 2017-10-10 DIAGNOSIS — I25.5 CARDIOMYOPATHY, ISCHEMIC: ICD-10-CM

## 2017-10-10 DIAGNOSIS — I25.10 CORONARY ARTERY DISEASE DUE TO CALCIFIED CORONARY LESION: ICD-10-CM

## 2017-10-10 DIAGNOSIS — I10 ESSENTIAL HYPERTENSION: ICD-10-CM

## 2017-10-10 DIAGNOSIS — R06.09 DOE (DYSPNEA ON EXERTION): ICD-10-CM

## 2017-10-10 DIAGNOSIS — I25.84 CORONARY ARTERY DISEASE DUE TO CALCIFIED CORONARY LESION: ICD-10-CM

## 2017-10-10 DIAGNOSIS — I20.89 ANGINA EFFORT: Primary | ICD-10-CM

## 2017-10-10 DIAGNOSIS — R53.82 CHRONIC FATIGUE: ICD-10-CM

## 2017-10-10 DIAGNOSIS — R07.9 CHEST PAIN, UNSPECIFIED TYPE: ICD-10-CM

## 2017-10-10 PROCEDURE — 99999 PR PBB SHADOW E&M-EST. PATIENT-LVL III: CPT | Mod: PBBFAC,,, | Performed by: NURSE PRACTITIONER

## 2017-10-10 PROCEDURE — 99214 OFFICE O/P EST MOD 30 MIN: CPT | Mod: S$GLB,,, | Performed by: NURSE PRACTITIONER

## 2017-10-10 RX ORDER — CLOPIDOGREL BISULFATE 75 MG/1
1 TABLET ORAL DAILY
COMMUNITY
Start: 2017-08-19 | End: 2017-12-18 | Stop reason: SDUPTHER

## 2017-10-10 RX ORDER — DIPHENHYDRAMINE HCL 25 MG
50 CAPSULE ORAL ONCE
Status: CANCELLED | OUTPATIENT
Start: 2017-10-10 | End: 2017-10-10

## 2017-10-10 RX ORDER — METOPROLOL SUCCINATE 25 MG/1
12.5 TABLET, EXTENDED RELEASE ORAL DAILY
Qty: 90 TABLET | Refills: 3 | Status: SHIPPED | OUTPATIENT
Start: 2017-10-10 | End: 2021-04-30

## 2017-10-10 NOTE — PROGRESS NOTES
Subjective:       Patient ID: Julien Meléndez is a 77 y.o. male  who presents for follow up of Coronary Artery Disease; Hyperlipidemia; Hypertension; iatrogenic hypotension; and Abnormal Stress Test        Chief Complaint: Follow-up and Shortness of Breath    HPI: Julien Meléndez 77 y.o. male is here follow up.     He has CAD with AMI 2010 and MILES LAD.   Recurrent chest pains 12/2015 with new MILES placed distal RCA. ( OM1 and 70% small apical LAD disease treated medically).   EF 50-55%.   Last PCI was guided with a stress test.   He is on aspirin and plavix for DAPT.     Nuclear stress test 3/2017 revealed a sizable fixed perfusion defect with associated akinesis involving the septal wall near the apex consistent with infarction without reversible ischemia.   EF 49% down to 46% with stress.   PET stress 4/2017 revealed a small sized moderate intensity fixed defect consistent with non-transmural infarct in the anteroapical wall in the usual distribution of the distal Left Anterior Descending Artery. This defect comprises 10 % of the left ventricular myocardium. There is a very small sized mild ischemia in the distal to apical inferolateral wall in the usual distribution of the distal OM1. This defect comprises 5 % of the left ventricular myocardium.     After his PET stress the decision was medical therapy.     Last seen in Clinic on 09/18/2017 and was complaining of dizziness, worsening dyspnea and worsening fatigue. (on 5 hypertensive agents with a SBP 90 mmHg). where his amlodipine was discontinued and losartan/hctz 100/25 was decreased to 50/12.5 mg daily  Since then he followed up with sleep disorder clinic for KELSEY and had titration of  autoCPAP  6-18 cm with the mask of a patient's choice was given to the patient- he is to follow up with sleep med in 1 mo. Used his new mask last night for the first time and reports it was well tolerated.   He is here today for BP check and further medication adjustment if  indicated.   Continues to report fatigue and severe KERNS that improves with rest. Dizziness improved with medication changes.   Patient irritated and expressing frustration today regarding continued symptoms and feels his QOL is very poor due to symptoms.   Denies any chest pain but strongly feels his symptoms are his anginal equivalent. Compliant with medications and diet and plans to continue CPAP.  /70 right sitting, 110/70 left sitting  Reduce toprol xl to 12.5 mg daily   [facility] for further coronary evaluation given CAD history and symptoms worrisome for anginal equivalent               Review of Systems   Constitutional: Positive for activity change and fatigue. Negative for diaphoresis.   HENT: Negative.    Eyes: Negative.    Respiratory: Positive for chest tightness and shortness of breath.         KELSEY   Cardiovascular: Negative for chest pain, palpitations and leg swelling.   Gastrointestinal: Negative.  Negative for abdominal pain and nausea.   Endocrine: Negative.    Genitourinary: Negative.    Musculoskeletal: Negative.    Skin: Negative.    Allergic/Immunologic: Negative.    Neurological: Positive for dizziness (improved).   Hematological: Negative.    Psychiatric/Behavioral: Positive for agitation and dysphoric mood.       Objective:      Physical Exam   Constitutional: He is oriented to person, place, and time. He appears well-developed and well-nourished. No distress.   HENT:   Head: Normocephalic and atraumatic.   Eyes: Right eye exhibits no discharge. Left eye exhibits no discharge.   Neck: No JVD present.   Cardiovascular: Normal rate and regular rhythm.    Pulmonary/Chest: Effort normal and breath sounds normal. No respiratory distress. He has no wheezes. He exhibits no tenderness.   Abdominal: Soft. Bowel sounds are normal.   Musculoskeletal: He exhibits no edema.   Neurological: He is alert and oriented to person, place, and time.   Skin: Skin is warm and dry. Capillary refill takes less than  2 seconds. He is not diaphoretic.   Psychiatric: He has a normal mood and affect. His behavior is normal. Judgment and thought content normal.       Assessment:       1. Angina effort    2. Cardiomyopathy, ischemic    3. Coronary artery disease due to calcified coronary lesion    4. Essential hypertension    5. Chest pain, unspecified type    6. Chronic fatigue        Plan:       Julien was seen today for follow-up and shortness of breath.    Diagnoses and all orders for this visit:    Angina effort  -     metoprolol succinate (TOPROL-XL) 25 MG 24 hr tablet; Take 0.5 tablets (12.5 mg total) by mouth once daily.    Cardiomyopathy, ischemic  -     metoprolol succinate (TOPROL-XL) 25 MG 24 hr tablet; Take 0.5 tablets (12.5 mg total) by mouth once daily.    Coronary artery disease due to calcified coronary lesion  -     metoprolol succinate (TOPROL-XL) 25 MG 24 hr tablet; Take 0.5 tablets (12.5 mg total) by mouth once daily.    Essential hypertension  -     metoprolol succinate (TOPROL-XL) 25 MG 24 hr tablet; Take 0.5 tablets (12.5 mg total) by mouth once daily.    Last seen in Clinic on 09/18/2017 and was complaining of dizziness, worsening dyspnea and worsening fatigue. (on 5 hypertensive agents with a SBP 90 mmHg). where his amlodipine was discontinued and losartan/hctz 100/25 was decreased to 50/12.5 mg daily  Since then he followed up with sleep disorder clinic for KELSEY and had titration of  autoCPAP  6-18 cm with the mask of a patient's choice was given to the patient- he is to follow up with sleep med in 1 mo. Used his new mask last night for the first time and reports it was well tolerated.   He is here today for BP check and further medication adjustment if indicated.   Continues to report fatigue and severe KERNS that improves with rest. Dizziness improved with medication changes.   Patient irritated and expressing frustration today regarding continued symptoms and feels his QOL is very poor due to symptoms.  "  Denies any chest pain but strongly feels his symptoms are his anginal equivalent. Compliant with medications and diet and plans to continue CPAP.  /70 right sitting, 110/70 left sitting  Reduce toprol xl to 12.5 mg daily   Recommend [facility] given persistence of symptoms worrisome for anginal equivalent     Vitals:    10/10/17 1435   BP: 114/75   BP Location: Left arm   BP Method: Large (Automatic)   Pulse: 91   SpO2: 96%   Weight: 102.9 kg (226 lb 12.8 oz)   Height: 6' 1" (1.854 m)     Results for orders placed or performed in visit on 04/25/17   COMPREHENSIVE METABOLIC PANEL   Result Value Ref Range    Sodium 139 136 - 145 mmol/L    Potassium 4.4 3.5 - 5.1 mmol/L    Chloride 101 95 - 110 mmol/L    CO2 30 (H) 23 - 29 mmol/L    Glucose 143 (H) 70 - 110 mg/dL    BUN, Bld 29 (H) 8 - 23 mg/dL    Creatinine 1.0 0.5 - 1.4 mg/dL    Calcium 10.1 8.7 - 10.5 mg/dL    Total Protein 7.4 6.0 - 8.4 g/dL    Albumin 4.4 3.5 - 5.2 g/dL    Total Bilirubin 1.0 0.1 - 1.0 mg/dL    Alkaline Phosphatase 44 (L) 55 - 135 U/L    AST 11 10 - 40 U/L    ALT 17 10 - 44 U/L    Anion Gap 8 8 - 16 mmol/L    eGFR if African American >60 >60 mL/min/1.73 m^2    eGFR if non African American >60 >60 mL/min/1.73 m^2               "

## 2017-10-19 NOTE — NURSING
Called and spoke with patient.  Arrival time 7:30am.  Stop Metformin on Sunday and do not take glipizide the day of procedure. .  Pt verbalizes full understanding of all pre op instructions and denies questions.

## 2017-10-24 ENCOUNTER — HOSPITAL ENCOUNTER (OUTPATIENT)
Facility: HOSPITAL | Age: 77
Discharge: HOME | End: 2017-10-24
Attending: INTERNAL MEDICINE | Admitting: INTERNAL MEDICINE
Payer: MEDICARE

## 2017-10-24 VITALS
HEART RATE: 70 BPM | HEIGHT: 72 IN | WEIGHT: 222 LBS | RESPIRATION RATE: 14 BRPM | OXYGEN SATURATION: 97 % | SYSTOLIC BLOOD PRESSURE: 150 MMHG | BODY MASS INDEX: 30.07 KG/M2 | DIASTOLIC BLOOD PRESSURE: 81 MMHG | TEMPERATURE: 98 F

## 2017-10-24 DIAGNOSIS — I25.10 CORONARY ARTERY DISEASE, ANGINA PRESENCE UNSPECIFIED, UNSPECIFIED VESSEL OR LESION TYPE, UNSPECIFIED WHETHER NATIVE OR TRANSPLANTED HEART: ICD-10-CM

## 2017-10-24 DIAGNOSIS — I25.10 CORONARY ARTERY DISEASE DUE TO CALCIFIED CORONARY LESION: Primary | ICD-10-CM

## 2017-10-24 DIAGNOSIS — R06.09 DOE (DYSPNEA ON EXERTION): ICD-10-CM

## 2017-10-24 DIAGNOSIS — I25.84 CORONARY ARTERY DISEASE DUE TO CALCIFIED CORONARY LESION: Primary | ICD-10-CM
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
## 2017-10-24 LAB
ANION GAP SERPL CALC-SCNC: 9 MMOL/L
BASOPHILS # BLD AUTO: 0.01 K/UL
BASOPHILS NFR BLD: 0.1 %
BUN SERPL-MCNC: 21 MG/DL
CALCIUM SERPL-MCNC: 10 MG/DL
CHLORIDE SERPL-SCNC: 100 MMOL/L
CO2 SERPL-SCNC: 30 MMOL/L
CORONARY STENOSIS: ABNORMAL
CREAT SERPL-MCNC: 1.1 MG/DL
DIFFERENTIAL METHOD: ABNORMAL
EOSINOPHIL # BLD AUTO: 0.2 K/UL
EOSINOPHIL NFR BLD: 2.3 %
ERYTHROCYTE [DISTWIDTH] IN BLOOD BY AUTOMATED COUNT: 13.3 %
EST. GFR  (AFRICAN AMERICAN): >60 ML/MIN/1.73 M^2
EST. GFR  (NON AFRICAN AMERICAN): >60 ML/MIN/1.73 M^2
GLUCOSE SERPL-MCNC: 177 MG/DL
HCT VFR BLD AUTO: 46.9 %
HGB BLD-MCNC: 16.3 G/DL
INR PPP: 1
LYMPHOCYTES # BLD AUTO: 2.2 K/UL
LYMPHOCYTES NFR BLD: 31.2 %
MCH RBC QN AUTO: 31.5 PG
MCHC RBC AUTO-ENTMCNC: 34.8 G/DL
MCV RBC AUTO: 91 FL
MONOCYTES # BLD AUTO: 0.8 K/UL
MONOCYTES NFR BLD: 10.6 %
NEUTROPHILS # BLD AUTO: 3.9 K/UL
NEUTROPHILS NFR BLD: 55.5 %
PLATELET # BLD AUTO: 181 K/UL
PMV BLD AUTO: 11 FL
POTASSIUM SERPL-SCNC: 3.6 MMOL/L
PROTHROMBIN TIME: 10.5 SEC
RBC # BLD AUTO: 5.17 M/UL
SODIUM SERPL-SCNC: 139 MMOL/L
WBC # BLD AUTO: 7.06 K/UL

## 2017-10-24 PROCEDURE — 27200085 CATH LAB PROCEDURE

## 2017-10-24 PROCEDURE — 85610 PROTHROMBIN TIME: CPT

## 2017-10-24 PROCEDURE — 99152 MOD SED SAME PHYS/QHP 5/>YRS: CPT | Mod: ,,, | Performed by: INTERNAL MEDICINE

## 2017-10-24 PROCEDURE — 63600175 PHARM REV CODE 636 W HCPCS

## 2017-10-24 PROCEDURE — 25000003 PHARM REV CODE 250

## 2017-10-24 PROCEDURE — 85025 COMPLETE CBC W/AUTO DIFF WBC: CPT

## 2017-10-24 PROCEDURE — 25500020 PHARM REV CODE 255

## 2017-10-24 PROCEDURE — 80048 BASIC METABOLIC PNL TOTAL CA: CPT

## 2017-10-24 PROCEDURE — 93005 ELECTROCARDIOGRAM TRACING: CPT

## 2017-10-24 PROCEDURE — 93010 ELECTROCARDIOGRAM REPORT: CPT | Mod: 59,,, | Performed by: INTERNAL MEDICINE

## 2017-10-24 PROCEDURE — 36415 COLL VENOUS BLD VENIPUNCTURE: CPT

## 2017-10-24 PROCEDURE — 93458 L HRT ARTERY/VENTRICLE ANGIO: CPT | Mod: 26,,, | Performed by: INTERNAL MEDICINE

## 2017-10-24 RX ORDER — SODIUM CHLORIDE 9 MG/ML
INJECTION, SOLUTION INTRAVENOUS CONTINUOUS
Status: ACTIVE | OUTPATIENT
Start: 2017-10-24 | End: 2017-10-24

## 2017-10-24 RX ORDER — DIPHENHYDRAMINE HCL 50 MG
50 CAPSULE ORAL ONCE
Status: DISCONTINUED | OUTPATIENT
Start: 2017-10-24 | End: 2017-10-24 | Stop reason: HOSPADM

## 2017-10-24 NOTE — NURSING
Patient discharged to home as ordered. Right radial site CDI with no sign of hematoma or bleeding. .  VSS. No c/o of pain or distress.  PIV d/c. Tip in tact.    All discharge instructions,given  to patient. Patient instructed on follow-up appointment as ordered. Patient verbalized understanding of and agreement with all discharge instructions given.

## 2017-10-24 NOTE — INTERVAL H&P NOTE
The patient has been examined and the H&P has been reviewed:        Anesthesia/Surgery risks, benefits and alternative options discussed and understood by patient/family.          Active Hospital Problems    Diagnosis  POA    Coronary artery disease [I25.10]  Yes    Shortness of breath [R06.02]  Yes    Chronic fatigue [R53.82]  Yes    Equivocal stress test [R94.39]  Yes       Fixed defect noted on SPECT      Angina effort [I20.8]  Yes    KELSEY (obstructive sleep apnea) [G47.33]  Yes         12/2016   AHI 5.7 with oxygen jude 87%   AHI 11.2 in supine position   REM AHI 40            Type 2 diabetes mellitus without complication [E11.9]  Yes    Chest pain [R07.9]  Yes    Abnormal findings on diagnostic imaging of heart and coronary circulation [R93.1]  Yes         PET stress 4/2017   10% apical fixed defect   5% reversible ischemia at the distal apex   Normal EF                Coronary artery disease due to calcified coronary lesion [I25.10, I25.84]  Yes           Left heart cath 12/2015-Dr. Hernandez        LM patent  LAD patent except for distal 75%  LCX patent  OM1   RCA 90%      PCI of RCA 2.75 x 18 mm MILES  Unsuccessful PCI of OM1         LVEDP 11 mmHg  3.5 x 18 mm       Left hearth cath 3/1/2010 -Dr. Hernandez   RCA PCI with 3.5 x 15 mm  BMS    Left heart cath 1/31/2010-Dr. Hernandez   LAD 3.0 x 12 and 3.5 x 24 mm  BMS       Cardiomyopathy, ischemic [I25.5]  Yes    MI (myocardial infarction) [I21.9]  Yes    Hypertension [I10]  Yes    Hyperlipidemia [E78.5]  Yes      Resolved Hospital Problems    Diagnosis Date Resolved POA   No resolved problems to display.         He is here for [facility] via R radial access        Risks, benefits and alternatives of cardiac catheterization and possible intervention were discussed with the patient. All questions were answered and informed consent obtained.     I discussed the importance of compliance with dual antiplatelet therapy with the patient to prevent  acute or late stent thrombosis with premature discontinuation of the therapy.      Patient is a candidate for drug eluting stents.     Verbally the patient has expressed full understanding of the clinical importance of dual antiplatelet therapy compliance. Drug eluting stents require a minimum of 12 months of dual anti-platelet therapy.

## 2017-10-24 NOTE — H&P (VIEW-ONLY)
Subjective:       Patient ID: Julien Meléndez is a 77 y.o. male  who presents for follow up of Coronary Artery Disease; Hyperlipidemia; Hypertension; iatrogenic hypotension; and Abnormal Stress Test        Chief Complaint: Follow-up and Shortness of Breath    HPI: Julien Meléndez 77 y.o. male is here follow up.     He has CAD with AMI 2010 and MILES LAD.   Recurrent chest pains 12/2015 with new MIELS placed distal RCA. ( OM1 and 70% small apical LAD disease treated medically).   EF 50-55%.   Last PCI was guided with a stress test.   He is on aspirin and plavix for DAPT.     Nuclear stress test 3/2017 revealed a sizable fixed perfusion defect with associated akinesis involving the septal wall near the apex consistent with infarction without reversible ischemia.   EF 49% down to 46% with stress.   PET stress 4/2017 revealed a small sized moderate intensity fixed defect consistent with non-transmural infarct in the anteroapical wall in the usual distribution of the distal Left Anterior Descending Artery. This defect comprises 10 % of the left ventricular myocardium. There is a very small sized mild ischemia in the distal to apical inferolateral wall in the usual distribution of the distal OM1. This defect comprises 5 % of the left ventricular myocardium.     After his PET stress the decision was medical therapy.     Last seen in Clinic on 09/18/2017 and was complaining of dizziness, worsening dyspnea and worsening fatigue. (on 5 hypertensive agents with a SBP 90 mmHg). where his amlodipine was discontinued and losartan/hctz 100/25 was decreased to 50/12.5 mg daily  Since then he followed up with sleep disorder clinic for KELSEY and had titration of  autoCPAP  6-18 cm with the mask of a patient's choice was given to the patient- he is to follow up with sleep med in 1 mo. Used his new mask last night for the first time and reports it was well tolerated.   He is here today for BP check and further medication adjustment if  indicated.   Continues to report fatigue and severe KERNS that improves with rest. Dizziness improved with medication changes.   Patient irritated and expressing frustration today regarding continued symptoms and feels his QOL is very poor due to symptoms.   Denies any chest pain but strongly feels his symptoms are his anginal equivalent. Compliant with medications and diet and plans to continue CPAP.  /70 right sitting, 110/70 left sitting  Reduce toprol xl to 12.5 mg daily   [facility] for further coronary evaluation given CAD history and symptoms worrisome for anginal equivalent               Review of Systems   Constitutional: Positive for activity change and fatigue. Negative for diaphoresis.   HENT: Negative.    Eyes: Negative.    Respiratory: Positive for chest tightness and shortness of breath.         KELSEY   Cardiovascular: Negative for chest pain, palpitations and leg swelling.   Gastrointestinal: Negative.  Negative for abdominal pain and nausea.   Endocrine: Negative.    Genitourinary: Negative.    Musculoskeletal: Negative.    Skin: Negative.    Allergic/Immunologic: Negative.    Neurological: Positive for dizziness (improved).   Hematological: Negative.    Psychiatric/Behavioral: Positive for agitation and dysphoric mood.       Objective:      Physical Exam   Constitutional: He is oriented to person, place, and time. He appears well-developed and well-nourished. No distress.   HENT:   Head: Normocephalic and atraumatic.   Eyes: Right eye exhibits no discharge. Left eye exhibits no discharge.   Neck: No JVD present.   Cardiovascular: Normal rate and regular rhythm.    Pulmonary/Chest: Effort normal and breath sounds normal. No respiratory distress. He has no wheezes. He exhibits no tenderness.   Abdominal: Soft. Bowel sounds are normal.   Musculoskeletal: He exhibits no edema.   Neurological: He is alert and oriented to person, place, and time.   Skin: Skin is warm and dry. Capillary refill takes less than  2 seconds. He is not diaphoretic.   Psychiatric: He has a normal mood and affect. His behavior is normal. Judgment and thought content normal.       Assessment:       1. Angina effort    2. Cardiomyopathy, ischemic    3. Coronary artery disease due to calcified coronary lesion    4. Essential hypertension    5. Chest pain, unspecified type    6. Chronic fatigue        Plan:       Julien was seen today for follow-up and shortness of breath.    Diagnoses and all orders for this visit:    Angina effort  -     metoprolol succinate (TOPROL-XL) 25 MG 24 hr tablet; Take 0.5 tablets (12.5 mg total) by mouth once daily.    Cardiomyopathy, ischemic  -     metoprolol succinate (TOPROL-XL) 25 MG 24 hr tablet; Take 0.5 tablets (12.5 mg total) by mouth once daily.    Coronary artery disease due to calcified coronary lesion  -     metoprolol succinate (TOPROL-XL) 25 MG 24 hr tablet; Take 0.5 tablets (12.5 mg total) by mouth once daily.    Essential hypertension  -     metoprolol succinate (TOPROL-XL) 25 MG 24 hr tablet; Take 0.5 tablets (12.5 mg total) by mouth once daily.    Last seen in Clinic on 09/18/2017 and was complaining of dizziness, worsening dyspnea and worsening fatigue. (on 5 hypertensive agents with a SBP 90 mmHg). where his amlodipine was discontinued and losartan/hctz 100/25 was decreased to 50/12.5 mg daily  Since then he followed up with sleep disorder clinic for KELSEY and had titration of  autoCPAP  6-18 cm with the mask of a patient's choice was given to the patient- he is to follow up with sleep med in 1 mo. Used his new mask last night for the first time and reports it was well tolerated.   He is here today for BP check and further medication adjustment if indicated.   Continues to report fatigue and severe KERNS that improves with rest. Dizziness improved with medication changes.   Patient irritated and expressing frustration today regarding continued symptoms and feels his QOL is very poor due to symptoms.  "  Denies any chest pain but strongly feels his symptoms are his anginal equivalent. Compliant with medications and diet and plans to continue CPAP.  /70 right sitting, 110/70 left sitting  Reduce toprol xl to 12.5 mg daily   Recommend [facility] given persistence of symptoms worrisome for anginal equivalent     Vitals:    10/10/17 1435   BP: 114/75   BP Location: Left arm   BP Method: Large (Automatic)   Pulse: 91   SpO2: 96%   Weight: 102.9 kg (226 lb 12.8 oz)   Height: 6' 1" (1.854 m)     Results for orders placed or performed in visit on 04/25/17   COMPREHENSIVE METABOLIC PANEL   Result Value Ref Range    Sodium 139 136 - 145 mmol/L    Potassium 4.4 3.5 - 5.1 mmol/L    Chloride 101 95 - 110 mmol/L    CO2 30 (H) 23 - 29 mmol/L    Glucose 143 (H) 70 - 110 mg/dL    BUN, Bld 29 (H) 8 - 23 mg/dL    Creatinine 1.0 0.5 - 1.4 mg/dL    Calcium 10.1 8.7 - 10.5 mg/dL    Total Protein 7.4 6.0 - 8.4 g/dL    Albumin 4.4 3.5 - 5.2 g/dL    Total Bilirubin 1.0 0.1 - 1.0 mg/dL    Alkaline Phosphatase 44 (L) 55 - 135 U/L    AST 11 10 - 40 U/L    ALT 17 10 - 44 U/L    Anion Gap 8 8 - 16 mmol/L    eGFR if African American >60 >60 mL/min/1.73 m^2    eGFR if non African American >60 >60 mL/min/1.73 m^2               "

## 2017-10-24 NOTE — PROGRESS NOTES
S/p LHC via R radial access      LM patent  LAD patent proximal stent and diffuse disease distally   LCX patent with OM1  that obtains collaterals from RCA  RCA patent stent with luminal irregularities distally      EF 55% with LVEDP 15 mmHg        Plan:      Medical therapy  Evaluate for non cardiac causes of CP        Full report to follow

## 2017-10-24 NOTE — PROGRESS NOTES
Pt arrived to cath lab recovery, pt aaox4, vss, denies pain or any other discomfort. R radial arterial access site w vasc band in place. Site cdi, no bleeding or hematoma. Pulses palpable, cap refill less <3sec, pt denies numbness or any other discomfort to extremity. Site care instructions reviewed at this time w pt, verbalized understanding. Will cont to monitor.

## 2017-10-24 NOTE — PLAN OF CARE
Patient lying in bed with no c/o pain or distress noted.  Monitors applied, VSS.  Yellow socks placed on patient and fall risk reviewed with patient.  Pt verbalizes understanding.  Procedure and recovery process explained to pt and all questions answered.  Will continue to monitor.

## 2017-10-24 NOTE — NURSING
2cc air removed from right radial vasc band.  No hematoma or bleeding noted.  Will continue to monitor

## 2017-11-03 NOTE — DISCHARGE SUMMARY
[facility]  Cardiology  Discharge Summary      Patient Name: Julien Meléndez  MRN: [id number]  Admission Date: 10/24/2017  Hospital Length of Stay: 0 days  Discharge Date and Time: 10/24/2017  Attending Physician: No att. providers found  Discharging Provider: Edwin Jose MD  Primary Care Physician: Kelvin Wayne MD    HPI:       Narrative        Date of Procedure:  10/24/2017    A. Indication/Pre-Operative Diagnosis     The patient is a 77 year old male that was referred for catheterization by Dr. Edwin Jose for abnormal CV function study (Intermediate risk) and angina (CCS III).     B. Summary/Post-Operative Diagnosis       S/p LHC via R radial access.    Patent LM.    Patent proximal LAD stent and diffuse disease distally.    LCX: 60 prox LCX with subtotal OM1  that obtains collaterals from RCA.    RCA patent stent with luminal irregularities distally.    EF 55% with LVEDP 15 mmHg.    C. HPI     I have reviewed the history and physical completed on 10/24/2017. The patient has been examined and the following changes have been noted:        Subjective:      Patient ID: Julien Meléndez is a 77 y.o. male  who presents for follow up of Coronary Artery Disease; Hyperlipidemia; Hypertension; iatrogenic hypotension; and Abnormal Stress Test        Chief Complaint: Follow-up and Shortness of Breath     HPI: Julien Meléndez 77 y.o. male is here follow up.      He has CAD with AMI 2010 and MILES LAD.   Recurrent chest pains 12/2015 with new MILES placed distal RCA. ( OM1 and 70% small apical LAD disease treated medically).   EF 50-55%.   Last PCI was guided with a stress test.   He is on aspirin and plavix for DAPT.     Nuclear stress test 3/2017 revealed a sizable fixed perfusion defect with associated akinesis involving the septal wall near the apex consistent with infarction without reversible ischemia.   EF 49% down to 46% with stress.   PET stress 4/2017 revealed a small sized moderate  intensity fixed defect consistent with non-transmural infarct in the anteroapical wall in the usual distribution of the distal Left Anterior Descending Artery. This defect comprises 10 % of the left   ventricular myocardium. There is a very small sized mild ischemia in the distal to apical inferolateral wall in the usual distribution of the distal OM1. This defect comprises 5 % of the left ventricular myocardium.      After his PET stress the decision was medical therapy.      He continues to have recurrent chest pain    Patient history was obtained from the patient.     Counseling: The patient was counseled regarding the potential risks and benefits of this procedure, as well as possible alternative approaches to the problem and gave informed consent.    The ASA Score was Class II.     [facility] Risk Score for MACE is 2.30%. [facility] Risk Score for Death is 0.20%.     The patient had a previous angiogram at an outside facility. The films were available for review.     Height: 70 in.      Weight: 222 lbs.      BMI: 31.80 kg/m2    OUTPATIENT MEDICATIONS: Medications were reviewed.  ALLERGIES: Allergies were reviewed.    Laboratory data revealed:     10/24/2017 CREAT  1.1, GLU  177, HCT  46.9, HGB  16.3, INR  1.0, K  3.6, NA  139, PLT  181, PTI  10.5, WBCIR  7.06, BUN  21                D. Hemodynamic Results     LVEDP (Pre): 15 mmHg  LVEDP (Post): 15 mmHg  Ejection Fraction: 55%  Global LV Function: normal    AIR REST:  AO: 110/60 (81)  LV: 110  LVEDP: 15    E. Angiographic Results     Diagnostic:          Patient has a right dominant coronary artery.        - Left Main Coronary Artery:             The LM has luminal irregularities. There is MARISABEL 3 flow.     - Left Anterior Descending Artery:             The proximal LAD has luminal irregularities. There is MARISABEL 3 flow. Patent stent             The mid LAD has luminal irregularities. There is MARISABEL 3 flow.             The distal LAD has a 95% stenosis. There is  MARISABEL 3 flow. The remaining portion of the vessel is of small caliber.     - Left Circumflex Artery:             The proximal LCX has a 60% stenosis. There is MARISABEL 3 flow.             The distal LCX has luminal irregularities. There is MARISABEL 3 flow.     - OM1:             The proximal OM1 has subtotal (99). There is MARISABEL 3 flow. Obtains collaterals from RCA      - Right Coronary Artery:             The mid RCA has a 40% stenosis. There is MARISABEL 3 flow. Patent distal stent     - Posterior Descending Artery:             The ostial PDA has a 70% stenosis. There is MARISABEL 3 flow. The remaining portion of the vessel is of small caliber.     - Posterior Lateral Branch:             The PLB has luminal irregularities. There is MARISABEL 3 flow.     - Radial:             The radial is normal.      Intervention          Radial:              The following items were used: Us Probe Cover.    F. Details of Procedure     PROCEDURES PERFORMED: LHC, Left Ventriculogram and Coronary Angio    ANESTHESIA: Conscious sedation was achieved with 50 mcg of FENTANYL 100MCG/2ML and 2 mg of Versed. Local anesthesia was achieved with 20 ml of Lidocaine 1%. Moderate conscious sedation was performed and cardiorespiratory functions were monitored the   entire procedure by Cesar Padilla RN. Sedation began at 09:27 AM and concluded at 09:42 AM, totalling 15.7 minutes.     PRIMARY SURGEON: Edwin Azul MD    COMPLICATIONS: There were no complications.    Medications given on sterile field: Lidocaine 1% (20 ml).    Medications given during procedure: Heparin 1000u/500cc Bag (2 bags), Verapamil Inj 2ml / 5 Mg (1.25 mg), Nitroglycerine (tridil) 5mg/ml (1250 mcg), Heparin 1000u/ml Inj (2750 units), Versed (2 mg) and Fentanyl 100mcg/2ml (50 mcg).    The patient was brought to the catheterization laboratory after premedication with oral Benadryl 50 mg. Bilateral groin prepped and draped. Right radial prepped and draped. The Kit, Manifold was flushed and connected  to contrast. A Us Probe Cover was   applied to the right radial. After local anesthesia, a Sheath 5/6 Fr Glide Slender was inserted into the right Radial artery.     AORTIC ROOT    A Wire Bentson 035 X 260 was inserted into the Aortic Root.     Right  CORONARY ARTERIES    A Cath 5fr Jayden 110cm was inserted into the right coronary arteries. The Wire Bentson 035 X 260 was removed. Angiography performed in multiple views in the right coronary arteries.     Left  CORONARY ARTERIES    The Cath 5fr Jayden 110cm was repositioned into the left coronary arteries. Angiography performed in multiple views in the left coronary arteries. The Cath 5fr Jayden 110cm was removed.     Left  VENTRICLE    A Cath 5fr Pigtail 125cm was inserted into the left ventricle. Hemodynamics recorded in the left ventricle. Angiography performed in the left ventricle.     The Cath 5fr Pigtail 125cm was removed. Total Visipaque injected was 150.0 ml. Total Visipaque used was 200.0 ml. The Sheath 5/6 Fr Glide Slender was removed. A Closure Vascband Radial L was applied to the right radial.       Fluoroscopy Time 5.3 minutes   Radiation Dose 813.2 mGy   Contrast Injected 150 ml Visipaque   Contrast Used  200 ml Visipaque            Procedure log documented by RT Andrey and verified by Edwin Azul MD    ESTIMATED BLOOD LOSS is < 50 cc.    SPECIMEN: No specimen.     G. Recommendations     1. Routine post-cath care.  2. Maximize medical management.  3. Reassurance.  4. ASA 81mg.  5. Statin therapy.  6. Evaluate for non cardiac causes of chest pain    I certify that I was present for catheter insertion, catheter manipulation, angiography, angiographic interpretation, and all interventional procedures performed on this patient.     This document has been electronically          Procedure(s) (LRB):  HEART CATH-LEFT (Left)     Indwelling Lines/Drains at time of discharge:  Lines/Drains/Airways          No matching active lines, drains, or airways           Hospital Course see HP    Consults: none    Significant Diagnostic Studies:       Labs, ecg, and angiogram       Pending Diagnostic Studies:     None          Final Active Diagnoses:    Diagnosis Date Noted POA    Coronary artery disease [I25.10] 10/24/2017 Yes    Shortness of breath [R06.02] 07/11/2017 Yes    Chronic fatigue [R53.82] 07/11/2017 Yes    Equivocal stress test [R94.39] 03/27/2017 Yes    Angina effort [I20.8] 03/23/2017 Yes    KELSEY (obstructive sleep apnea) [G47.33] 10/26/2016 Yes    Type 2 diabetes mellitus without complication [E11.9] 01/26/2016 Yes    Chest pain [R07.9] 12/29/2015 Yes    Abnormal findings on diagnostic imaging of heart and coronary circulation [R93.1] 12/29/2015 Yes    Coronary artery disease due to calcified coronary lesion [I25.10, I25.84] 12/28/2015 Yes    Cardiomyopathy, ischemic [I25.5] 09/17/2013 Yes    MI (myocardial infarction) [I21.9]  Yes    Hypertension [I10]  Yes    Hyperlipidemia [E78.5]  Yes      Problems Resolved During this Admission:    Diagnosis Date Noted Date Resolved POA       Discharged Condition:  Stable     Follow Up:    Patient Instructions:     DC home      Follow up with PCP      Follow up with Dr. Azul or primary cardiologist as scheduled      Cardiac diet      Resume normal activities in 3 days      Medications:      Discharge Medication List as of 10/24/2017 11:37 AM      CONTINUE these medications which have NOT CHANGED    Details   aspirin (ECOTRIN) 81 MG EC tablet Take 81 mg by mouth once daily., Until Discontinued, Historical Med      atorvastatin (LIPITOR) 40 MG tablet Take 1 tablet (40 mg total) by mouth once daily., Starting 10/26/2016, Until Thu 10/26/17, Normal      clopidogrel (PLAVIX) 75 mg tablet Take 1 tablet by mouth once daily., Starting Sat 8/19/2017, Historical Med      fluoxetine (PROZAC) 20 MG capsule Take 1 capsule by mouth once daily., Starting 6/30/2014, Until Discontinued, Historical Med      glipiZIDE  (GLUCOTROL) 5 MG tablet Take 1 tablet by mouth Daily., Starting 9/4/2013, Until Discontinued, Historical Med      isosorbide mononitrate (IMDUR) 30 MG 24 hr tablet Take 1 tablet (30 mg total) by mouth once daily., Starting 4/19/2017, Until Thu 4/19/18, Normal      losartan-hydrochlorothiazide 100-25 mg (HYZAAR) 100-25 mg per tablet Take 0.5 tablets by mouth once daily., Starting Mon 9/18/2017, Until Tue 9/18/2018, No Print      meclizine (ANTIVERT) 25 mg tablet Take 1 tablet (25 mg total) by mouth 3 (three) times daily as needed for Dizziness., Starting 2/16/2017, Until Discontinued, Print      metformin (GLUCOPHAGE) 1000 MG tablet Take 1 tablet (1,000 mg total) by mouth daily with breakfast., Starting 12/30/2015, Until Discontinued, Normal      metoprolol succinate (TOPROL-XL) 25 MG 24 hr tablet Take 0.5 tablets (12.5 mg total) by mouth once daily., Starting Tue 10/10/2017, Normal      trazodone (DESYREL) 150 MG tablet Starting 6/16/2014, Until Discontinued, Historical Med      TRUE METRIX GLUCOSE TEST STRIP Strp Starting Sat 7/15/2017, Historical Med      TRUEPLUS LANCETS 28 gauge Misc Starting Sat 7/15/2017, Historical Med      nitroGLYCERIN (NITROSTAT) 0.4 MG SL tablet Place 1 tablet (0.4 mg total) under the tongue every 5 (five) minutes as needed for Chest pain., Starting Thu 3/23/2017, Until Tue 8/8/2017, Normal      tramadol (ULTRAM) 50 mg tablet Take 1 tablet by mouth 2 (two) times daily as needed., Starting Mon 5/1/2017, Historical Med                Time spent on the discharge of patient: 35 minutes    Edwin Azul MD  Cardiology  [facility]

## 2017-11-09 ENCOUNTER — OFFICE VISIT (OUTPATIENT)
Dept: SLEEP MEDICINE | Facility: CLINIC | Age: 77
End: 2017-11-09
Payer: MEDICARE

## 2017-11-09 VITALS
HEART RATE: 72 BPM | HEIGHT: 72 IN | SYSTOLIC BLOOD PRESSURE: 102 MMHG | WEIGHT: 220.44 LBS | DIASTOLIC BLOOD PRESSURE: 70 MMHG | BODY MASS INDEX: 29.86 KG/M2

## 2017-11-09 DIAGNOSIS — G47.30 HYPERSOMNIA WITH SLEEP APNEA: Primary | ICD-10-CM

## 2017-11-09 DIAGNOSIS — G47.10 HYPERSOMNIA WITH SLEEP APNEA: Primary | ICD-10-CM

## 2017-11-09 PROCEDURE — 99999 PR PBB SHADOW E&M-EST. PATIENT-LVL III: CPT | Mod: PBBFAC,,, | Performed by: NURSE PRACTITIONER

## 2017-11-09 PROCEDURE — 99214 OFFICE O/P EST MOD 30 MIN: CPT | Mod: S$GLB,,, | Performed by: NURSE PRACTITIONER

## 2017-11-09 RX ORDER — ARMODAFINIL 150 MG/1
150 TABLET ORAL DAILY
Qty: 90 TABLET | Refills: 1 | Status: SHIPPED | OUTPATIENT
Start: 2017-11-09 | End: 2018-02-07

## 2017-11-09 NOTE — PROGRESS NOTES
Julien Meléndez  was seen as f/u today for mgt of KELSEY. Last seen by MD Chiu 9/25/17, this is my initial visit with him.     He has since been setup with apap 6-18cm. Long time user cpap. Continued resolution snoring. Still very tired in am, just as tired as when he goes to bed.Has undergone workup for this, negative thus far. KERNS with bending over, also has hiatal hernia though.Tired with activities. BP low side, his meds have been cut in half already. ESS=10    Interrogation, 90% tile 7.8-8.5cm. AHI 3, 0% periodic, 27/30d>4h. avg 7.8h/n. % mask fit  2010 last hemoglobin A1c 6.9    HISTORY  09/25/2017 INITIAL HISTORY OF PRESENT ILLNESS:  Julien Meléndez is a 77 y.o. male is here to be evaluated for a sleep disorder.       CHIEF COMPLAINT:      The patient's complaints include excessive daytime sleepiness, excessive daytime fatigue, snoring and interrupted sleep since  Years back.    Had PSG in 2016 and had been using his CPAP set at 7 cm - due for replacement. Usage 5.7 hrs on the old F and P machine.    Denies  dry mouth and sore throat  Denies nasal congestion   Denies  morning headaches  Reports  interrupted sleep  Denies frequent leg movements  Denies symptoms concerning for parasomnia    The ESS ([place] Sleepiness Score) taken on initial visit is 8 /24    The patient never had tonsillectomy, adenoidectomy or UPPP      SLEEP ROUTINE AND LIFESTYLE 11/09/2017 :    Occupation:    Bed partner: his wife     Time to bed - wake up time on a workday : 11pm to 8 AM  Time to bed - wake up time on a day off: 1 pm to  8 AM  Sleep onset latency: depends up to 30 min  Disruptions or awakenings: 1-3  Time to fall back into sleep: a few min   Perceived sleep quality: 2/5  Perceived total sleep time:  2  hours.  Daytime naps: 0-1     PREVIOUS SLEEP STUDIES:     PSG/ SPLIT night study  In 12/3/16 showed significant KELSEY with the AHI of 5.7/hour and SaO2 minimum of 87%.    PAST MEDICAL HISTORY:    Active Ambulatory  Problems     Diagnosis Date Noted    MI (myocardial infarction)     Diabetes mellitus     Hyperlipidemia     Hypertension     Hiatal hernia     Cardiomyopathy, ischemic 09/17/2013    Diabetes mellitus 09/17/2013    Coronary artery disease due to calcified coronary lesion 12/28/2015    Abnormal findings on diagnostic imaging of heart and coronary circulation 12/29/2015    Chest pain 12/29/2015    Type 2 diabetes mellitus without complication 01/26/2016    KELSEY (obstructive sleep apnea) 10/26/2016    Angina effort 03/23/2017    Equivocal stress test 03/27/2017    Chronic fatigue 07/11/2017    Shortness of breath 07/11/2017    Coronary artery disease 10/24/2017                    REVIEW OF SYSTEMS: Sleep related symptoms as per HPI, tried/sleepy, 8# loss, low mood b/c symptoms limit his activities fully. Otherwise a balance review of 10-systems is negative.     PHYSICAL EXAM:  /70   Pulse 72   Ht 6' (1.829 m)   Wt 100 kg (220 lb 7.4 oz)   BMI 29.90 kg/m²   GENERAL: Overweight body habitus, well groomed.      ASSESSMENT:    1. KELSEY (obstructive sleep apnea). Remains adherent with cpap, effective AHI<5. Snoring resolved. Mask fit optimal and no periodic breathing. Candidate for wake promoting medication due to some lingering hypersomnia  He has medical co-morbidities of CAD, hypertension and diabetes, h/o MI and cardiomyopathy,  which can be worsened by KELSEY. This warrants continued ttreatment.       PLAN:    Continue apap, adjust 6-10cm. Continue excellent use nightly.   [employer] prn supplies   Discussed effectiveness oftherapy and potential ramifications of untreated KELSEY, including heart disease, hypertension, cognitive difficulties, stroke, and diabetes.    Trial nuvigil 150mg 1/2-1 tab qd, discussed safe use/potential s/e/purpose  RTC 6-mos

## 2017-12-11 RX ORDER — ATORVASTATIN CALCIUM 40 MG/1
TABLET, FILM COATED ORAL
Qty: 90 TABLET | Refills: 3 | Status: SHIPPED | OUTPATIENT
Start: 2017-12-11 | End: 2018-09-10 | Stop reason: SDUPTHER

## 2017-12-19 RX ORDER — CLOPIDOGREL BISULFATE 75 MG/1
TABLET ORAL
Qty: 90 TABLET | Refills: 3 | Status: SHIPPED | OUTPATIENT
Start: 2017-12-19 | End: 2018-05-11

## 2017-12-20 RX ORDER — AMLODIPINE BESYLATE 5 MG/1
TABLET ORAL
Qty: 90 TABLET | Refills: 3 | Status: SHIPPED | OUTPATIENT
Start: 2017-12-20 | End: 2018-09-26 | Stop reason: SDUPTHER

## 2018-01-17 DIAGNOSIS — R53.82 CHRONIC FATIGUE: ICD-10-CM

## 2018-01-17 DIAGNOSIS — I25.10 CORONARY ARTERY DISEASE, ANGINA PRESENCE UNSPECIFIED, UNSPECIFIED VESSEL OR LESION TYPE, UNSPECIFIED WHETHER NATIVE OR TRANSPLANTED HEART: ICD-10-CM

## 2018-01-17 RX ORDER — LOSARTAN POTASSIUM AND HYDROCHLOROTHIAZIDE 25; 100 MG/1; MG/1
TABLET ORAL
Qty: 90 TABLET | Refills: 3 | Status: SHIPPED | OUTPATIENT
Start: 2018-01-17 | End: 2020-09-28 | Stop reason: SDUPTHER

## 2018-03-31 DIAGNOSIS — I25.10 CORONARY ARTERY DISEASE, ANGINA PRESENCE UNSPECIFIED, UNSPECIFIED VESSEL OR LESION TYPE, UNSPECIFIED WHETHER NATIVE OR TRANSPLANTED HEART: ICD-10-CM

## 2018-04-02 RX ORDER — ISOSORBIDE MONONITRATE 30 MG/1
TABLET, EXTENDED RELEASE ORAL
Qty: 90 TABLET | Refills: 3 | Status: SHIPPED | OUTPATIENT
Start: 2018-04-02 | End: 2021-03-17

## 2018-05-11 ENCOUNTER — OFFICE VISIT (OUTPATIENT)
Dept: SLEEP MEDICINE | Facility: CLINIC | Age: 78
End: 2018-05-11
Payer: MEDICARE

## 2018-05-11 VITALS
WEIGHT: 233.5 LBS | HEIGHT: 72 IN | HEART RATE: 89 BPM | BODY MASS INDEX: 31.63 KG/M2 | SYSTOLIC BLOOD PRESSURE: 134 MMHG | DIASTOLIC BLOOD PRESSURE: 80 MMHG

## 2018-05-11 DIAGNOSIS — G47.10 HYPERSOMNIA WITH SLEEP APNEA: Primary | ICD-10-CM

## 2018-05-11 DIAGNOSIS — I25.5 CARDIOMYOPATHY, ISCHEMIC: ICD-10-CM

## 2018-05-11 DIAGNOSIS — I10 ESSENTIAL HYPERTENSION: ICD-10-CM

## 2018-05-11 DIAGNOSIS — E11.9 TYPE 2 DIABETES MELLITUS WITHOUT COMPLICATION, WITHOUT LONG-TERM CURRENT USE OF INSULIN: ICD-10-CM

## 2018-05-11 DIAGNOSIS — G47.30 HYPERSOMNIA WITH SLEEP APNEA: Primary | ICD-10-CM

## 2018-05-11 PROCEDURE — 99999 PR PBB SHADOW E&M-EST. PATIENT-LVL IV: CPT | Mod: PBBFAC,,, | Performed by: NURSE PRACTITIONER

## 2018-05-11 PROCEDURE — 3079F DIAST BP 80-89 MM HG: CPT | Mod: CPTII,S$GLB,, | Performed by: NURSE PRACTITIONER

## 2018-05-11 PROCEDURE — 3075F SYST BP GE 130 - 139MM HG: CPT | Mod: CPTII,S$GLB,, | Performed by: NURSE PRACTITIONER

## 2018-05-11 PROCEDURE — 99214 OFFICE O/P EST MOD 30 MIN: CPT | Mod: S$GLB,,, | Performed by: NURSE PRACTITIONER

## 2018-05-11 RX ORDER — MODAFINIL 200 MG/1
200 TABLET ORAL DAILY
Qty: 30 TABLET | Refills: 3 | Status: SHIPPED | OUTPATIENT
Start: 2018-05-11 | End: 2018-06-10

## 2018-05-11 NOTE — PROGRESS NOTES
Julien Meléndez  was seen as f/u today for mgt of KELSEY. Last seen 11/2017    He continues to feel short of breath and always tired. Aggravated that he can't do things he wants to.Has had cardiac and lung testing all normal. Using apap. Didn't get nuvigil after last seen, didn't hear anything from anyone. 13# gain. ESS=10. Occasional oral drying.   CECILE mask, DORA 3.5-7, % mask fit, avg use 6.2-8h/n. 24/30d>4h. 90% tile 7-8cm      HISTORY  09/25/2017 INITIAL HISTORY OF PRESENT ILLNESS:  Julien Meléndez is a 77 y.o. male is here to be evaluated for a sleep disorder.       CHIEF COMPLAINT:      The patient's complaints include excessive daytime sleepiness, excessive daytime fatigue, snoring and interrupted sleep since  Years back.    Had PSG in 2016 and had been using his CPAP set at 7 cm - due for replacement. Usage 5.7 hrs on the old F and P machine.    Denies  dry mouth and sore throat  Denies nasal congestion   Denies  morning headaches  Reports  interrupted sleep  Denies frequent leg movements  Denies symptoms concerning for parasomnia    The ESS ([place] Sleepiness Score) taken on initial visit is 8 /24    The patient never had tonsillectomy, adenoidectomy or UPPP      SLEEP ROUTINE AND LIFESTYLE 05/11/2018 :    Occupation:    Bed partner: his wife     Time to bed - wake up time on a workday : 11pm to 8 AM  Time to bed - wake up time on a day off: 1 pm to  8 AM  Sleep onset latency: depends up to 30 min  Disruptions or awakenings: 1-3  Time to fall back into sleep: a few min   Perceived sleep quality: 2/5  Perceived total sleep time:  2  hours.  Daytime naps: 0-1    Nov 2017:  He has since been setup with apap 6-18cm. Long time user cpap. Continued resolution snoring. Still very tired in am, just as tired as when he goes to bed.Has undergone workup for this, negative thus far. KERNS with bending over, also has hiatal hernia though.Tired with activities. BP low side, his meds have been cut in half already.  ESS=10    Interrogation, 90% tile 7.8-8.5cm. AHI 3, 0% periodic, 27/30d>4h. avg 7.8h/n. % mask fit  2010 last hemoglobin A1c 6.9       PREVIOUS SLEEP STUDIES:     PSG/ SPLIT night study  In 12/3/16 showed significant KELSEY with the AHI of 5.7/hour and SaO2 minimum of 87%.    PAST MEDICAL HISTORY:    Active Ambulatory Problems     Diagnosis Date Noted    MI (myocardial infarction)     Diabetes mellitus     Hyperlipidemia     Hypertension     Hiatal hernia     Cardiomyopathy, ischemic 09/17/2013    Diabetes mellitus 09/17/2013    Coronary artery disease due to calcified coronary lesion 12/28/2015    Abnormal findings on diagnostic imaging of heart and coronary circulation 12/29/2015    Chest pain 12/29/2015    Type 2 diabetes mellitus without complication 01/26/2016    KELSEY (obstructive sleep apnea) 10/26/2016    Angina effort 03/23/2017    Equivocal stress test 03/27/2017    Chronic fatigue 07/11/2017    Shortness of breath 07/11/2017    Coronary artery disease 10/24/2017                    REVIEW OF SYSTEMS: Sleep related symptoms as per HPI, tired/sleepy. Otherwise a balance review of 10-systems is negative.     PHYSICAL EXAM:  /80   Pulse 89   Ht 6' (1.829 m)   Wt 105.9 kg (233 lb 8 oz)   BMI 31.67 kg/m²   GENERAL: Obese body habitus, well groomed.      ASSESSMENT:    1. KELSEY (obstructive sleep apnea). Remains adherent with cpap, effective AHI<5. Snoring resolved. Mask fit optimal and no periodic breathing. Candidate for wake promoting medication due to some lingering hypersomnia 5//11/18: Persistent hypersomnia, using apap therapy consistenty ahi 3.5-7 (mild residual elevated).   He has medical co-morbidities of CAD, hypertension and diabetes, h/o MI and cardiomyopathy,  which can be worsened by KELSEY. This warrants continued ttreatment.       PLAN:  Adjust today apap 8-14cm, ramp 6cm. Consistent begin use of chin strap. Continue excellent use nightly. OBTAIN dedicated CPAP TITRATION  study to ensure effective therapy/given persistent hypersomnia, assess central  [employer] prn supplies   Discussed effectiveness oftherapy and potential ramifications of untreated KELSEY, including heart disease, hypertension, cognitive difficulties, stroke, and diabetes.    Trial Provigil 200mg 1/2 tab bid prn, discussed safe use/potential s/e/purpose  RTC 6 wks, sooner prn according to study results

## 2018-06-05 ENCOUNTER — TELEPHONE (OUTPATIENT)
Dept: SLEEP MEDICINE | Facility: OTHER | Age: 78
End: 2018-06-05

## 2018-07-19 ENCOUNTER — TELEPHONE (OUTPATIENT)
Dept: SLEEP MEDICINE | Facility: OTHER | Age: 78
End: 2018-07-19

## 2018-07-30 ENCOUNTER — TELEPHONE (OUTPATIENT)
Dept: SLEEP MEDICINE | Facility: CLINIC | Age: 78
End: 2018-07-30

## 2018-08-06 ENCOUNTER — TELEPHONE (OUTPATIENT)
Dept: SLEEP MEDICINE | Facility: OTHER | Age: 78
End: 2018-08-06

## 2018-08-07 ENCOUNTER — HOSPITAL ENCOUNTER (OUTPATIENT)
Dept: SLEEP MEDICINE | Facility: HOSPITAL | Age: 78
Discharge: HOME | End: 2018-08-07
Attending: INTERNAL MEDICINE
Payer: MEDICARE

## 2018-08-07 DIAGNOSIS — G47.10 HYPERSOMNIA WITH SLEEP APNEA: ICD-10-CM

## 2018-08-07 DIAGNOSIS — G47.30 HYPERSOMNIA WITH SLEEP APNEA: ICD-10-CM

## 2018-08-07 PROCEDURE — 95811 POLYSOM 6/>YRS CPAP 4/> PARM: CPT

## 2018-08-08 NOTE — PROGRESS NOTES
"Education was done via explanation of sleep study process and procedsure. All questions were answered.    Pt. tolerated CPAP well. Questionable optimal of 10 appeared to have elininated most respiratory events. Some arousals were observed. REM sleep was obtained in side position. Supine sleep was encouraged.     Low sat of 92% was observed in study. EKG revealed rare PAC and rare PVC. Medium Simplus Full Face Mask was used during CPAP titration. Pt. responds to titration in a.m. "I like this mask better" Thank you letter was given in a.m.  "

## 2018-08-31 ENCOUNTER — PATIENT MESSAGE (OUTPATIENT)
Dept: SLEEP MEDICINE | Facility: CLINIC | Age: 78
End: 2018-08-31

## 2018-08-31 DIAGNOSIS — G47.33 OBSTRUCTIVE SLEEP APNEA: Primary | ICD-10-CM

## 2018-09-10 RX ORDER — ATORVASTATIN CALCIUM 40 MG/1
TABLET, FILM COATED ORAL
Qty: 90 TABLET | Refills: 3 | Status: SHIPPED | OUTPATIENT
Start: 2018-09-10 | End: 2019-07-01 | Stop reason: SDUPTHER

## 2018-09-27 RX ORDER — CLOPIDOGREL BISULFATE 75 MG/1
TABLET ORAL
Qty: 90 TABLET | Refills: 3 | Status: SHIPPED | OUTPATIENT
Start: 2018-09-27 | End: 2020-11-02 | Stop reason: ALTCHOICE

## 2018-09-27 RX ORDER — AMLODIPINE BESYLATE 5 MG/1
TABLET ORAL
Qty: 90 TABLET | Refills: 3 | Status: ON HOLD | OUTPATIENT
Start: 2018-09-27 | End: 2023-06-09 | Stop reason: HOSPADM

## 2018-12-07 ENCOUNTER — HOSPITAL ENCOUNTER (OUTPATIENT)
Dept: RADIOLOGY | Facility: HOSPITAL | Age: 78
Discharge: HOME | End: 2018-12-07
Attending: INTERNAL MEDICINE
Payer: MEDICARE

## 2018-12-07 DIAGNOSIS — M25.522 LEFT ELBOW PAIN: ICD-10-CM

## 2018-12-07 DIAGNOSIS — M25.522 LEFT ELBOW PAIN: Primary | ICD-10-CM

## 2018-12-07 DIAGNOSIS — S59.902A INJURY OF LEFT ELBOW: ICD-10-CM

## 2018-12-07 PROCEDURE — 73070 X-RAY EXAM OF ELBOW: CPT | Mod: TC,FY,LT

## 2018-12-07 PROCEDURE — 73070 X-RAY EXAM OF ELBOW: CPT | Mod: 26,LT,, | Performed by: RADIOLOGY

## 2019-04-25 DIAGNOSIS — R10.9 RIGHT SIDED ABDOMINAL PAIN: Primary | ICD-10-CM

## 2019-05-02 ENCOUNTER — HOSPITAL ENCOUNTER (OUTPATIENT)
Dept: RADIOLOGY | Facility: HOSPITAL | Age: 79
Discharge: HOME | End: 2019-05-02
Attending: INTERNAL MEDICINE
Payer: MEDICARE

## 2019-05-02 DIAGNOSIS — R10.9 RIGHT SIDED ABDOMINAL PAIN: ICD-10-CM

## 2019-05-02 LAB
CREAT SERPL-MCNC: 1 MG/DL (ref 0.5–1.4)
SAMPLE: NORMAL

## 2019-05-02 PROCEDURE — 74160 CT ABDOMEN W/CONTRAST: CPT | Mod: TC

## 2019-05-02 PROCEDURE — 74160 CT ABDOMEN WITH CONTRAST: ICD-10-PCS | Mod: 26,,, | Performed by: RADIOLOGY

## 2019-05-02 PROCEDURE — 25500020 PHARM REV CODE 255: Performed by: INTERNAL MEDICINE

## 2019-05-02 PROCEDURE — 74160 CT ABDOMEN W/CONTRAST: CPT | Mod: 26,,, | Performed by: RADIOLOGY

## 2019-05-02 RX ADMIN — IOHEXOL 30 ML: 350 INJECTION, SOLUTION INTRAVENOUS at 08:05

## 2019-05-02 RX ADMIN — IOHEXOL 100 ML: 350 INJECTION, SOLUTION INTRAVENOUS at 09:05

## 2019-07-01 RX ORDER — ATORVASTATIN CALCIUM 40 MG/1
TABLET, FILM COATED ORAL
Qty: 90 TABLET | Refills: 3 | Status: SHIPPED | OUTPATIENT
Start: 2019-07-01 | End: 2022-04-12 | Stop reason: SDUPTHER

## 2019-07-25 ENCOUNTER — TELEPHONE (OUTPATIENT)
Dept: CARDIOLOGY | Facility: HOSPITAL | Age: 79
End: 2019-07-25

## 2019-07-25 DIAGNOSIS — E78.2 MIXED HYPERLIPIDEMIA: ICD-10-CM

## 2019-07-25 DIAGNOSIS — I25.10 CORONARY ARTERY DISEASE WITHOUT ANGINA PECTORIS, UNSPECIFIED VESSEL OR LESION TYPE, UNSPECIFIED WHETHER NATIVE OR TRANSPLANTED HEART: Primary | ICD-10-CM

## 2019-07-25 NOTE — TELEPHONE ENCOUNTER
----- Message from Tory Jeronimo MA sent at 7/25/2019  3:05 PM CDT -----  Does patient need labs prior to follow up?

## 2019-07-31 ENCOUNTER — HOSPITAL ENCOUNTER (OUTPATIENT)
Dept: CARDIOLOGY | Facility: HOSPITAL | Age: 79
Discharge: HOME | End: 2019-07-31
Attending: INTERNAL MEDICINE
Payer: MEDICARE

## 2019-07-31 DIAGNOSIS — I25.10 CORONARY ARTERY DISEASE WITHOUT ANGINA PECTORIS, UNSPECIFIED VESSEL OR LESION TYPE, UNSPECIFIED WHETHER NATIVE OR TRANSPLANTED HEART: ICD-10-CM

## 2019-07-31 LAB
AORTIC ROOT ANNULUS: 2.99 CM
AORTIC VALVE CUSP SEPERATION: 1.81 CM
AV INDEX (PROSTH): 0.74
AV MEAN GRADIENT: 4 MMHG
AV PEAK GRADIENT: 6 MMHG
AV VALVE AREA: 2.87 CM2
AV VELOCITY RATIO: 0.74
CV ECHO LV RWT: 0.56 CM
DOP CALC AO PEAK VEL: 1.26 M/S
DOP CALC AO VTI: 27.43 CM
DOP CALC LVOT AREA: 3.9 CM2
DOP CALC LVOT DIAMETER: 2.22 CM
DOP CALC LVOT PEAK VEL: 0.93 M/S
DOP CALC LVOT STROKE VOLUME: 78.73 CM3
DOP CALC MV VTI: 34 CM
DOP CALCLVOT PEAK VEL VTI: 20.35 CM
E WAVE DECELERATION TIME: 219.57 MSEC
E/A RATIO: 0.56
ECHO LV POSTERIOR WALL: 1.18 CM (ref 0.6–1.1)
FRACTIONAL SHORTENING: 29 % (ref 28–44)
INTERVENTRICULAR SEPTUM: 0.87 CM (ref 0.6–1.1)
IVC PROX: 0.8 CM
LA MAJOR: 5.26 CM
LA MINOR: 5.6 CM
LA WIDTH: 3.9 CM
LEFT ATRIUM SIZE: 3.32 CM
LEFT ATRIUM VOLUME: 59.7 CM3
LEFT INTERNAL DIMENSION IN SYSTOLE: 2.98 CM (ref 2.1–4)
LEFT VENTRICLE DIASTOLIC VOLUME: 78.68 ML
LEFT VENTRICLE SYSTOLIC VOLUME: 34.46 ML
LEFT VENTRICULAR INTERNAL DIMENSION IN DIASTOLE: 4.2 CM (ref 3.5–6)
LEFT VENTRICULAR MASS: 142.08 G
MV A" WAVE DURATION": 143 MSEC
MV PEAK A VEL: 1.49 M/S
MV PEAK E VEL: 0.83 M/S
MV STENOSIS PRESSURE HALF TIME: 64 MS
MV VALVE AREA BY CONTINUITY EQUATION: 2.32 CM2
MV VALVE AREA P 1/2 METHOD: 3.44 CM2
PV PEAK VELOCITY: 0.96 CM/S
RA MAJOR: 4.55 CM
RA PRESSURE: 3 MMHG
RA WIDTH: 2.55 CM
SINUS: 2.55 CM
TRICUSPID ANNULAR PLANE SYSTOLIC EXCURSION: 2.14 CM

## 2019-07-31 PROCEDURE — 93306 TTE W/DOPPLER COMPLETE: CPT | Mod: 26,,, | Performed by: STUDENT IN AN ORGANIZED HEALTH CARE EDUCATION/TRAINING PROGRAM

## 2019-07-31 PROCEDURE — 93306 TRANSTHORACIC ECHO (TTE) COMPLETE (CUPID ONLY): ICD-10-PCS | Mod: 26,,, | Performed by: STUDENT IN AN ORGANIZED HEALTH CARE EDUCATION/TRAINING PROGRAM

## 2019-07-31 PROCEDURE — 93306 TTE W/DOPPLER COMPLETE: CPT | Mod: PO

## 2019-08-07 ENCOUNTER — OFFICE VISIT (OUTPATIENT)
Dept: CARDIOLOGY | Facility: CLINIC | Age: 79
End: 2019-08-07
Payer: MEDICARE

## 2019-08-07 VITALS
BODY MASS INDEX: 30.07 KG/M2 | WEIGHT: 222 LBS | DIASTOLIC BLOOD PRESSURE: 80 MMHG | SYSTOLIC BLOOD PRESSURE: 140 MMHG | HEIGHT: 72 IN

## 2019-08-07 DIAGNOSIS — I10 ESSENTIAL HYPERTENSION: ICD-10-CM

## 2019-08-07 DIAGNOSIS — R93.1 ABNORMAL FINDINGS ON DIAGNOSTIC IMAGING OF HEART AND CORONARY CIRCULATION: ICD-10-CM

## 2019-08-07 DIAGNOSIS — I20.89 ANGINA EFFORT: Primary | ICD-10-CM

## 2019-08-07 DIAGNOSIS — E78.2 MIXED HYPERLIPIDEMIA: ICD-10-CM

## 2019-08-07 DIAGNOSIS — I25.5 CARDIOMYOPATHY, ISCHEMIC: ICD-10-CM

## 2019-08-07 DIAGNOSIS — I25.84 CORONARY ARTERY DISEASE DUE TO CALCIFIED CORONARY LESION: ICD-10-CM

## 2019-08-07 DIAGNOSIS — R53.82 CHRONIC FATIGUE: ICD-10-CM

## 2019-08-07 DIAGNOSIS — I25.10 CORONARY ARTERY DISEASE, ANGINA PRESENCE UNSPECIFIED, UNSPECIFIED VESSEL OR LESION TYPE, UNSPECIFIED WHETHER NATIVE OR TRANSPLANTED HEART: ICD-10-CM

## 2019-08-07 DIAGNOSIS — R06.02 SHORTNESS OF BREATH: ICD-10-CM

## 2019-08-07 DIAGNOSIS — I25.10 CORONARY ARTERY DISEASE DUE TO CALCIFIED CORONARY LESION: ICD-10-CM

## 2019-08-07 DIAGNOSIS — E11.9 TYPE 2 DIABETES MELLITUS WITHOUT COMPLICATION, WITHOUT LONG-TERM CURRENT USE OF INSULIN: ICD-10-CM

## 2019-08-07 PROCEDURE — 99999 PR PBB SHADOW E&M-EST. PATIENT-LVL III: ICD-10-PCS | Mod: PBBFAC,,, | Performed by: INTERNAL MEDICINE

## 2019-08-07 PROCEDURE — 3079F DIAST BP 80-89 MM HG: CPT | Mod: CPTII,S$GLB,, | Performed by: INTERNAL MEDICINE

## 2019-08-07 PROCEDURE — 99215 PR OFFICE/OUTPT VISIT, EST, LEVL V, 40-54 MIN: ICD-10-PCS | Mod: S$GLB,,, | Performed by: INTERNAL MEDICINE

## 2019-08-07 PROCEDURE — 93000 EKG 12-LEAD: ICD-10-PCS | Mod: S$GLB,,, | Performed by: INTERNAL MEDICINE

## 2019-08-07 PROCEDURE — 99215 OFFICE O/P EST HI 40 MIN: CPT | Mod: S$GLB,,, | Performed by: INTERNAL MEDICINE

## 2019-08-07 PROCEDURE — 3077F SYST BP >= 140 MM HG: CPT | Mod: CPTII,S$GLB,, | Performed by: INTERNAL MEDICINE

## 2019-08-07 PROCEDURE — 93000 ELECTROCARDIOGRAM COMPLETE: CPT | Mod: S$GLB,,, | Performed by: INTERNAL MEDICINE

## 2019-08-07 PROCEDURE — 99999 PR PBB SHADOW E&M-EST. PATIENT-LVL III: CPT | Mod: PBBFAC,,, | Performed by: INTERNAL MEDICINE

## 2019-08-07 PROCEDURE — 3077F PR MOST RECENT SYSTOLIC BLOOD PRESSURE >= 140 MM HG: ICD-10-PCS | Mod: CPTII,S$GLB,, | Performed by: INTERNAL MEDICINE

## 2019-08-07 PROCEDURE — 3079F PR MOST RECENT DIASTOLIC BLOOD PRESSURE 80-89 MM HG: ICD-10-PCS | Mod: CPTII,S$GLB,, | Performed by: INTERNAL MEDICINE

## 2019-08-07 RX ORDER — SODIUM CHLORIDE 9 MG/ML
INJECTION, SOLUTION INTRAVENOUS CONTINUOUS
Status: CANCELLED | OUTPATIENT
Start: 2019-08-07

## 2019-08-07 RX ORDER — DIPHENHYDRAMINE HCL 25 MG
50 CAPSULE ORAL ONCE
Status: CANCELLED | OUTPATIENT
Start: 2019-08-07 | End: 2019-08-07

## 2019-08-07 NOTE — PATIENT INSTRUCTIONS
Heart Disease Education    The heart beats 60 to 100 times per minute, 24 hours a day. This equals almost 1000,000 times a day. It pumps blood with oxygen and nutrients to the tissues and organs of the body. But the heart is a muscle and needs its own supply of blood. Blood flow to the heart is supplied by the coronary arteries. Coronary artery disease (atherosclerosis) is a result of cholesterol, saturated fat, and calcium deposits (plaques) that build up inside the walls. This causes inflammation within the coronary arteries. These plaques narrow the artery and reduce blood flow to the heart muscle. The reduction in blood flow to the heart muscle decreases oxygen supply to the heart. If the narrowing is significant enough, the oxygen supply to one or more regions of the heart can be temporarily or permanently shut down. This can cause chest pain, and possibly death of heart tissue (heart attack).  Types of chest pain  Angina is the name for pain in the heart muscle. Angina is a warning sign of serious heart disease. When untreated it can lead to a heart attack, also known as acute myocardial infarction, or AMI. Angina occurs when there is not enough blood and oxygen flowing to the heart for the amount of work it is doing. This most often happens during physical exertion, when the heart is working hardest. It is usually relieved by rest or nitroglycerin. Angina may also occur after a large meal when extra blood is sent to the digestive organs and less goes to the heart. In the case of advanced or unstable heart disease, angina can occur at rest or awaken you from sleep. Angina usually lasts from a few minutes up to 20 minutes or more. When treated early, the effects of angina can be reversed without permanent damage to the heart. Angina is a serious condition and needs to be evaluated by a medical professional immediately.  There are two types of angina -- stable and unstable:  · Stable angina usually occurs  with a predictable level of activity. Being stable, its character, severity, and occurrence do not change much over time. It usually starts with activity, and resolves with rest or taking your medicine as instructed by your doctor. The symptoms usually do not last long.  · Unstable angina changes or gets worse over time. It is different from whatever you are used to. It may feel different or worse, begin without cause, occur with exercise or exertion, wake you up from sleep, and last longer. It may not respond in the same way as it does when you take your usual medicines for an attack. This type of angina can be a warning sign of an impending heart attack.     A heart attack is usually the result of a blood clot that suddenly forms in a coronary artery that has been narrowed with plaque. When this occurs, blood flow may be cut off to a part of the heart muscle, causing the cells to die. This weakens the pumping action of the heart, which affects the delivery of blood to all the other organs in the body including the brain. This damage is not reversible. However, early treatment can limit the amount of damage.  The pain you feel with angina and a heart attack may have a similar quality. However, it is usually different in intensity and duration. Here are some typical descriptions of a heart attack:  · It is most often experienced as a squeezing, crushing, pressure-like sensation in the center of the chest.  · It is sometimes described as something heavy sitting on my chest.  · It may feel more like a bad case of indigestion.  · The pain may spread from the chest to the arm, shoulder, throat or jaw.  · Sometimes the pain is not felt in the chest at all, but only in the arm, shoulder, throat or jaw.  · There may also be nausea, vomiting, dizziness or light-headedness, sweating and trouble breathing.  · Palpitations, or your heart beating rapidly  · A new, irregular heart beat  · Unexplained weakness  You may not be  "able to tell the difference between "bad" angina and a heart attack at home. Seek help if your symptoms are different than usual. Do not be in denial or just try to "tough it out."  Call 911  This is the fastest and safest way to get to the emergency department. The paramedics can also start treatment on the way to the hospital, saving valuable time for your heart.  · If the angina gets worse, if it continues, or if it stops and returns, call 911 immediately. Do not delay. You may be having a heart attack.  · After you call 911, take a second tablet or spray unless instructed otherwise. When repeating doses, sit down if possible, because it can make you feel lightheaded or dizzy. Wait another 5 minutes. If the angina still does not go away, take a third tablet or spray. Do not take more than 3 tablets or sprays within 15 minutes. Stay on the phone with 911 for further instruction.  · Your healthcare provider may give you slightly different instructions than those above. If so, follow them carefully.  Do not wait until symptoms become severe to call 911.  Other reasons to call 911 include:  · Trouble breathing  · Feeling lightheaded, faint, or dizzy  · Rapid heart beat  · Slower than usual heart rate compared to your normal  · Angina with weakness, dizziness, fainting, heavy sweating, nausea, or vomiting  · Extreme drowsiness, confusion  · Weakness of an arm or leg or one side of the face  · Difficulty with speech or vision  When to seek medical care  Remember, the signs and symptoms of a heart attack are not always like they are on TV. Sometimes they are not so obvious. You may only feel weak, or just not right. If it is not clear or if you have any doubt, call for advice.  · Seek help if there is a change in the type of pain, if it feels different, or if your symptoms are mild.  · Do not drive yourself. Have someone else drive you. If no one can drive, call 911.  · Do not delay. Fast diagnosis and treatment can " "prevent or limit the amount of heart damage during a heart attack.  · Do not go to your doctor's office or a clinic as they may not be able to provide all the testing and treatment required for this condition.  · If your doctor has given you medicine to take when symptoms occur, take them but don't delay getting help trying to locate medicines.  What happens in the emergency department  The emergency department is connected to your local emergency medical system (EMS) through 911. That's why during a cardiac emergency, calling 911 is the fastest way to get help. The goal of the emergency department is to rapidly screen, evaluate, and treat people.  Once you are there, an electrocardiogram (ECG or heart tracing) will be done. Blood samples may be taken to look for the presence of heart enzymes that leak from damaged heart cells and show if a heart attack is occurring. You will often be evaluated by a heart specialist (cardiologist) who decides the best course of action. In the case of severe angina or early heart attack, and depending on the circumstances, powerful "clot busting" medicines can be used to dissolve blood clots in the coronary artery. In other cases, you may be taken to a cardiac catheterization lab. Here, a tiny balloon-tipped catheter is advanced through blood vessels to the heart. There the balloon is inflated pushing open the blood vessel restoring blood flow.  Risk factors for heart disease  Risk factors for heart disease are a combination of genetic and lifestyle. Many risk factors work by either directly or indirectly damaging the blood vessels of the heart, or by increasing the risk of forming blood or cholesterol clots, which then clog up and block the arteries.     Examples of physical lifestyle risk factors:  · Cigarette smoking  · High blood pressure  · High blood cholesterol  · Use of stimulant drugs such as cocaine, crack, and amphetamines  · Eating a high-fat, high-cholesterol " meal  · Diabetes   · Obesity which increases risk for diabetes and high blood pressure  · Lack of regular physical activity     Examples of emotional lifestyle factors:  · Chronic high stress levels release stress hormones. These raise blood pressure and cholesterol level and makes blood clot more easily.  · Held-in anger, hostile or cynical attitude  · Social and emotional isolation, lack of intimacy  · Loss of relationship  · Depression  Other factors that increase the risk of heart attack that you cannot control :  · Age. The older you get beyond 40, the greater is your risk of significant coronary artery disease.  · Gender. More men than women get heart disease; but once past menopause, women who are not taking estrogen replacement have the same risk as men for a heart attack.  · Family history. If your mother, father, brother or sister has coronary artery disease, your risk of having it is higher than a person your age without this family history.  What can you do to decrease your risk  To reduce your risk of heart disease:  · Get regular checkups with your doctor.  · Take your medicines for blood pressure, cholesterol or diabetes as directed.  · Watch your diet. Eat a heart healthy diet choosing fresh foods, less salt, cholesterol, and fat  · Stop smoking. Get help if needed.  · Get regular exercise.  · Manage stress.  · Carry a list of medicines and doses in your wallet.  Date Last Reviewed: 12/30/2015  © 4118-5219 [employer]. [address] [place] [address], [place] [address]. All rights reserved. This information is not intended as a substitute for professional medical care. Always follow your healthcare professional's instructions.        What Is Angina?  Angina is a warning that your heart muscle is not getting enough oxygen-rich blood and is at risk for damage. Medicines, certain medical procedures, and lifestyle changes can help control angina. Talk with your healthcare provider about how to  prevent angina and what to do if you get it.    How does angina feel?  Angina is often described as chest pain, but this can be misleading. Angina is not always painful, and it isnt always felt in the chest. Angina might feel like:  · Discomfort, aching, tightness, or pressure that comes and goes. You may feel this in your chest, back, abdomen, arm, shoulder, neck, or jaw.  · Tiredness that gets worse or you have more tiredness than usual for no clear reason  · Shortness of breath while doing something that used to be easy  · Heartburn, indigestion, nausea, or sweating  Call 911 right away if any of your symptoms lasts for more than a few minutes. Or if they go away and come back. Or if they happen at rest and don't go away after taking nitroglycerin. Or if they continue to get worse. You could be having a heart attack (acute myocardial infarction).  When does angina happen?  · Angina usually happens during activity. It can also occur when youre upset or after a large meal. Sometimes angina can happen when the weather is too hot or too cold. All of these things can put more stress on your body and your heart.  · You may have unstable angina if angina starts occurring more often, lasts longer, happens even when you are resting, or causes more discomfort. Its a sign that your heart problem may be getting worse. You need to call your healthcare provider right away.  Date Last Reviewed: 10/1/2016  © 9323-6745 [employer]. [address] [place] [address], [place] [address]. All rights reserved. This information is not intended as a substitute for professional medical care. Always follow your healthcare professional's instructions.

## 2019-08-07 NOTE — PROGRESS NOTES
Subjective:   Patient ID:  Julien Meléndez is a 78 y.o. male who presents for follow up of Coronary Artery Disease; Congestive Heart Failure; Hyperlipidemia; Hypertension; and Chest Pain      HPI:     Julien Meléndez 78 y.o. male is here follow up and complaining of worsening dyspnea with chest pain. He has also noted worsening fatigue. He has KELSEY and a visit with the sleep disorder team soon to assist with CPAP machine. He has CAD with AMI 2010 and MILES LAD. Recurrent chest pains 12/2015 with new MILES placed distal RCA. ( OM1 and 70% small apical LAD disease treated medically). EF 50-55%. Last PCI was guided with a stress test. He is on aspirin and plavix for DAPT. He is complaining of dizziness with fatigue. He is on 5 hypertensive agents with a SBP 90 mmHg        Nuclear stress test 3/2017 revealed a sizable fixed perfusion defect with associated akinesis involving the septal wall near the apex consistent with infarction without reversible ischemia. EF 49% down to 46% with stress. PET stress 4/2017 revealed a small sized moderate intensity fixed defect consistent with non-transmural infarct in the anteroapical wall in the usual distribution of the distal Left Anterior Descending Artery. This defect comprises 10 % of the left ventricular myocardium. There is a very small sized mild ischemia in the distal to apical inferolateral wall in the usual distribution of the distal OM1. This defect comprises 5 % of the left ventricular myocardium. After his PET stress the decision was medical therapy.           Echo 7/2019     EF 45%   Diastolic dysfunction   Normal RV function   Normal PA pressure            Patient Active Problem List    Diagnosis Date Noted    Hypersomnia with sleep apnea     Coronary artery disease 10/24/2017    Chronic fatigue 07/11/2017    Shortness of breath 07/11/2017    Equivocal stress test 03/27/2017       Fixed defect noted on SPECT      Angina effort 03/23/2017    KELSEY (obstructive sleep  apnea) 10/26/2016         2016   AHI 5.7 with oxygen jude 87%   AHI 11.2 in supine position   REM AHI 40            Type 2 diabetes mellitus without complication 2016    Abnormal findings on diagnostic imaging of heart and coronary circulation 2015         PET stress 2017   10% apical fixed defect   5% reversible ischemia at the distal apex   Normal EF                Chest pain 2015    Coronary artery disease due to calcified coronary lesion 2015           Left heart cath 2015-Dr. Hernandez        LM patent  LAD patent except for distal 75%  LCX patent  OM1   RCA 90%      PCI of RCA 2.75 x 18 mm MILES  Unsuccessful PCI of OM1         LVEDP 11 mmHg  3.5 x 18 mm       Left hearth cath 3/1/2010 -Dr. Hernandez   RCA PCI with 3.5 x 15 mm  BMS    Left heart cath 2010-Dr. Hernandez   LAD 3.0 x 12 and 3.5 x 24 mm  BMS       Cardiomyopathy, ischemic 2013    Diabetes mellitus 2013    MI (myocardial infarction)     Diabetes mellitus     Hyperlipidemia     Hypertension     Hiatal hernia            Right Arm BP - Sittin/80  Left Arm BP - Sittin/80        LABS    LAST HbA1c  Lab Results   Component Value Date    HGBA1C 6.9 (H) 2010       Lipid panel  Lab Results   Component Value Date    CHOL 144 2019    CHOL 149 2010     Lab Results   Component Value Date    HDL 33 (L) 2019    HDL 31 (L) 2010     Lab Results   Component Value Date    LDLCALC 77.6 2019    LDLCALC 97.4 2010     Lab Results   Component Value Date    TRIG 167 (H) 2019    TRIG 103 2010     Lab Results   Component Value Date    CHOLHDL 22.9 2019    CHOLHDL 20.8 2010            Review of Systems   Constitution: Negative for diaphoresis, night sweats, weight gain and weight loss.   HENT: Negative for congestion.    Eyes: Negative for blurred vision, discharge and double vision.   Cardiovascular: Negative for chest pain,  claudication, cyanosis, dyspnea on exertion, irregular heartbeat, leg swelling, near-syncope, orthopnea, palpitations, paroxysmal nocturnal dyspnea and syncope.   Respiratory: Positive for shortness of breath, sleep disturbances due to breathing and snoring. Negative for cough and wheezing.    Endocrine: Negative for cold intolerance, heat intolerance and polyphagia.   Hematologic/Lymphatic: Negative for adenopathy and bleeding problem. Does not bruise/bleed easily.   Skin: Negative for dry skin and nail changes.   Musculoskeletal: Negative for arthritis, back pain, falls, joint pain, myalgias and neck pain.   Gastrointestinal: Negative for bloating, abdominal pain, change in bowel habit and constipation.   Genitourinary: Negative for bladder incontinence, dysuria, flank pain, genital sores and missed menses.   Neurological: Positive for dizziness and weakness. Negative for aphonia, brief paralysis and difficulty with concentration.   Psychiatric/Behavioral: Negative for altered mental status and memory loss. The patient does not have insomnia.    Allergic/Immunologic: Negative for environmental allergies.       Objective:   Physical Exam   Constitutional: He is oriented to person, place, and time. He appears well-developed and well-nourished. He is not intubated.   HENT:   Head: Normocephalic and atraumatic.   Right Ear: External ear normal.   Left Ear: External ear normal.   Mouth/Throat: Oropharynx is clear and moist.   Eyes: Pupils are equal, round, and reactive to light. Conjunctivae and EOM are normal. Right eye exhibits no discharge. Left eye exhibits no discharge. No scleral icterus.   Neck: Normal range of motion. Neck supple. Normal carotid pulses, no hepatojugular reflux and no JVD present. Carotid bruit is not present. No tracheal deviation present. No thyromegaly present.   Cardiovascular: Normal rate, regular rhythm, S1 normal and S2 normal.  No extrasystoles are present. PMI is not displaced. Exam  reveals no gallop, no S3, no distant heart sounds, no friction rub and no midsystolic click.   No murmur heard.  Pulses:       Carotid pulses are 2+ on the right side, and 2+ on the left side.       Radial pulses are 2+ on the right side, and 2+ on the left side.        Femoral pulses are 2+ on the right side, and 2+ on the left side.       Popliteal pulses are 2+ on the right side, and 2+ on the left side.        Dorsalis pedis pulses are 2+ on the right side, and 2+ on the left side.        Posterior tibial pulses are 2+ on the right side, and 2+ on the left side.   Pulmonary/Chest: Effort normal and breath sounds normal. No accessory muscle usage or stridor. No apnea, no tachypnea and no bradypnea. He is not intubated. No respiratory distress. He has no decreased breath sounds. He has no wheezes. He has no rales. He exhibits no tenderness and no bony tenderness.   Abdominal: He exhibits no distension, no pulsatile liver, no abdominal bruit, no ascites, no pulsatile midline mass and no mass. There is no tenderness. There is no rebound and no guarding.   Musculoskeletal: Normal range of motion. He exhibits no edema or tenderness.   Lymphadenopathy:     He has no cervical adenopathy.   Neurological: He is alert and oriented to person, place, and time. He has normal reflexes. No cranial nerve deficit. Coordination normal.   Skin: Skin is warm. No rash noted. No erythema. No pallor.   Psychiatric: He has a normal mood and affect. His behavior is normal. Judgment and thought content normal.       Assessment:     1. Angina effort    2. Mixed hyperlipidemia    3. Essential hypertension    4. Cardiomyopathy, ischemic    5. Coronary artery disease due to calcified coronary lesion    6. Abnormal findings on diagnostic imaging of heart and coronary circulation    7. Coronary artery disease, angina presence unspecified, unspecified vessel or lesion type, unspecified whether native or transplanted heart    8. Type 2 diabetes  mellitus without complication, without long-term current use of insulin    9. Shortness of breath    10. Chronic fatigue        Plan:         PET stress  [facility] if more ischemic than 2017  R radial access   MILES candidate        He would like to proceed to C if stress is abnormal and bypass a clinic visit          Return sooner for concerns or questions. If symptoms persist go to the ED  I have reviewed all pertinent data on this patient         Follow up as scheduled. Return sooner for concerns or questions            He expressed verbal understanding and agreed with the plan        I have reviewed the patient's medical history in detail and updated the computerized patient record.    Orders Placed This Encounter   Procedures    Cardiac PET Scan Stress     Standing Status:   Future     Standing Expiration Date:   8/7/2020     Order Specific Question:   Which medicaton for the stress procedure?     Answer:   Regadenoson    IN OFFICE EKG 12-LEAD (to Muse)     Order Specific Question:   Diagnosis     Answer:   CAD (coronary artery disease) [[id number]]    Case Request-Cath Lab: Left heart cath     Standing Status:   Standing     Number of Occurrences:   1     Order Specific Question:   CPT Code:     Answer:   CO LEFT HEART CATH INJECT VETRICULOGRAPHY, IMAGE SUPERVISE/INTERP [07805]     Order Specific Question:   Medical Necessity:     Answer:   Medically Urgent [101]       Follow up as scheduled. Return sooner for concerns or questions          Patient's Medications   New Prescriptions    No medications on file   Previous Medications    AMLODIPINE (NORVASC) 5 MG TABLET    TAKE 1 TABLET EVERY DAY    ASPIRIN (ECOTRIN) 81 MG EC TABLET    Take 81 mg by mouth once daily.    ATORVASTATIN (LIPITOR) 40 MG TABLET    TAKE 1 TABLET EVERY DAY    CLOPIDOGREL (PLAVIX) 75 MG TABLET    TAKE 1 TABLET EVERY DAY    FLUOXETINE (PROZAC) 20 MG CAPSULE    Take 1 capsule by mouth once daily.    GLIPIZIDE (GLUCOTROL) 5 MG TABLET    Take 1 tablet  by mouth Daily.    ISOSORBIDE MONONITRATE (IMDUR) 30 MG 24 HR TABLET    TAKE 1 TABLET ONE TIME DAILY    LOSARTAN-HYDROCHLOROTHIAZIDE 100-25 MG (HYZAAR) 100-25 MG PER TABLET    TAKE 1 TABLET EVERY DAY    MECLIZINE (ANTIVERT) 25 MG TABLET    Take 1 tablet (25 mg total) by mouth 3 (three) times daily as needed for Dizziness.    METFORMIN (GLUCOPHAGE) 1000 MG TABLET    Take 1 tablet (1,000 mg total) by mouth daily with breakfast.    METOPROLOL SUCCINATE (TOPROL-XL) 25 MG 24 HR TABLET    Take 0.5 tablets (12.5 mg total) by mouth once daily.    NITROGLYCERIN (NITROSTAT) 0.4 MG SL TABLET    Place 1 tablet (0.4 mg total) under the tongue every 5 (five) minutes as needed for Chest pain.    TRAMADOL (ULTRAM) 50 MG TABLET    Take 1 tablet by mouth 2 (two) times daily as needed.    TRAZODONE (DESYREL) 150 MG TABLET        TRUE METRIX GLUCOSE TEST STRIP STRP        TRUEPLUS LANCETS 28 GAUGE MISC       Modified Medications    No medications on file   Discontinued Medications    No medications on file

## 2019-09-04 ENCOUNTER — CLINICAL SUPPORT (OUTPATIENT)
Dept: CARDIOLOGY | Facility: CLINIC | Age: 79
End: 2019-09-04
Attending: INTERNAL MEDICINE
Payer: MEDICARE

## 2019-09-04 ENCOUNTER — TELEPHONE (OUTPATIENT)
Dept: CARDIOLOGY | Facility: CLINIC | Age: 79
End: 2019-09-04

## 2019-09-04 VITALS — WEIGHT: 222 LBS | HEIGHT: 72 IN | BODY MASS INDEX: 30.07 KG/M2

## 2019-09-04 DIAGNOSIS — I25.10 CORONARY ARTERY DISEASE DUE TO CALCIFIED CORONARY LESION: ICD-10-CM

## 2019-09-04 DIAGNOSIS — I25.84 CORONARY ARTERY DISEASE DUE TO CALCIFIED CORONARY LESION: ICD-10-CM

## 2019-09-04 DIAGNOSIS — I20.89 ANGINA EFFORT: ICD-10-CM

## 2019-09-04 PROCEDURE — 99999 PR PBB SHADOW E&M-EST. PATIENT-LVL I: ICD-10-PCS | Mod: PBBFAC,,,

## 2019-09-04 PROCEDURE — 99999 PR PBB SHADOW E&M-EST. PATIENT-LVL I: CPT | Mod: PBBFAC,,,

## 2019-09-04 RX ORDER — DIPYRIDAMOLE 5 MG/ML
56.51 INJECTION INTRAVENOUS
Status: DISCONTINUED | OUTPATIENT
Start: 2019-09-04 | End: 2019-11-12 | Stop reason: HOSPADM

## 2019-09-04 NOTE — TELEPHONE ENCOUNTER
----- Message from Kevyn Wolfe sent at 9/4/2019  2:11 PM CDT -----  Contact: pt   Pt was unable to complete PET scan was in too much pain pt insisted on stronger medication or to see what options are available       Pt can be reached at [phone]

## 2019-09-13 ENCOUNTER — HOSPITAL ENCOUNTER (OUTPATIENT)
Facility: HOSPITAL | Age: 79
Discharge: HOME | End: 2019-09-13
Attending: INTERNAL MEDICINE | Admitting: INTERNAL MEDICINE
Payer: MEDICARE

## 2019-09-13 ENCOUNTER — TELEPHONE (OUTPATIENT)
Dept: CARDIOLOGY | Facility: HOSPITAL | Age: 79
End: 2019-09-13

## 2019-09-13 VITALS
TEMPERATURE: 99 F | WEIGHT: 211 LBS | SYSTOLIC BLOOD PRESSURE: 135 MMHG | HEIGHT: 72 IN | DIASTOLIC BLOOD PRESSURE: 79 MMHG | HEART RATE: 77 BPM | RESPIRATION RATE: 13 BRPM | BODY MASS INDEX: 28.58 KG/M2 | OXYGEN SATURATION: 97 %

## 2019-09-13 DIAGNOSIS — N17.9 ACUTE RENAL FAILURE, UNSPECIFIED ACUTE RENAL FAILURE TYPE: Primary | ICD-10-CM

## 2019-09-13 DIAGNOSIS — I25.84 CORONARY ARTERY DISEASE DUE TO CALCIFIED CORONARY LESION: ICD-10-CM

## 2019-09-13 DIAGNOSIS — R94.39 ABNORMAL CARDIOVASCULAR STRESS TEST: Primary | ICD-10-CM

## 2019-09-13 DIAGNOSIS — I25.10 CORONARY ARTERY DISEASE DUE TO CALCIFIED CORONARY LESION: ICD-10-CM

## 2019-09-13 DIAGNOSIS — I20.89 ANGINA EFFORT: ICD-10-CM

## 2019-09-13 DIAGNOSIS — R93.1 ABNORMAL FINDINGS ON DIAGNOSTIC IMAGING OF HEART AND CORONARY CIRCULATION: ICD-10-CM

## 2019-09-13 DIAGNOSIS — I25.5 CARDIOMYOPATHY, ISCHEMIC: ICD-10-CM

## 2019-09-13 DIAGNOSIS — R79.89 ABNORMAL BLOOD CREATININE LEVEL: Primary | ICD-10-CM

## 2019-09-13 LAB
ANION GAP SERPL CALC-SCNC: 10 MMOL/L (ref 8–16)
BUN SERPL-MCNC: 33 MG/DL (ref 8–23)
CALCIUM SERPL-MCNC: 10.3 MG/DL (ref 8.7–10.5)
CHLORIDE SERPL-SCNC: 100 MMOL/L (ref 95–110)
CO2 SERPL-SCNC: 28 MMOL/L (ref 23–29)
CREAT SERPL-MCNC: 1.4 MG/DL (ref 0.5–1.4)
ERYTHROCYTE [DISTWIDTH] IN BLOOD BY AUTOMATED COUNT: 13.1 % (ref 11.5–14.5)
EST. GFR  (AFRICAN AMERICAN): 55 ML/MIN/1.73 M^2
EST. GFR  (NON AFRICAN AMERICAN): 47 ML/MIN/1.73 M^2
GLUCOSE SERPL-MCNC: 285 MG/DL (ref 70–110)
HCT VFR BLD AUTO: 46.8 % (ref 40–54)
HGB BLD-MCNC: 16.2 G/DL (ref 14–18)
MCH RBC QN AUTO: 30.9 PG (ref 27–31)
MCHC RBC AUTO-ENTMCNC: 34.6 G/DL (ref 32–36)
MCV RBC AUTO: 89 FL (ref 82–98)
PLATELET # BLD AUTO: 206 K/UL (ref 150–350)
PMV BLD AUTO: 11.8 FL (ref 9.2–12.9)
POTASSIUM SERPL-SCNC: 4.4 MMOL/L (ref 3.5–5.1)
RBC # BLD AUTO: 5.25 M/UL (ref 4.6–6.2)
SODIUM SERPL-SCNC: 138 MMOL/L (ref 136–145)
WBC # BLD AUTO: 7.95 K/UL (ref 3.9–12.7)

## 2019-09-13 PROCEDURE — 85027 COMPLETE CBC AUTOMATED: CPT

## 2019-09-13 PROCEDURE — 99900035 HC TECH TIME PER 15 MIN (STAT)

## 2019-09-13 PROCEDURE — 93005 ELECTROCARDIOGRAM TRACING: CPT

## 2019-09-13 PROCEDURE — 93010 ELECTROCARDIOGRAM REPORT: CPT | Mod: ,,, | Performed by: INTERNAL MEDICINE

## 2019-09-13 PROCEDURE — 80048 BASIC METABOLIC PNL TOTAL CA: CPT

## 2019-09-13 PROCEDURE — 93010 EKG 12-LEAD: ICD-10-PCS | Mod: ,,, | Performed by: INTERNAL MEDICINE

## 2019-09-13 PROCEDURE — 63600175 PHARM REV CODE 636 W HCPCS: Performed by: INTERNAL MEDICINE

## 2019-09-13 PROCEDURE — 36415 COLL VENOUS BLD VENIPUNCTURE: CPT

## 2019-09-13 RX ORDER — SODIUM CHLORIDE 9 MG/ML
INJECTION, SOLUTION INTRAVENOUS CONTINUOUS
Status: CANCELLED | OUTPATIENT
Start: 2019-09-13

## 2019-09-13 RX ORDER — SODIUM CHLORIDE 9 MG/ML
INJECTION, SOLUTION INTRAVENOUS CONTINUOUS
Status: DISCONTINUED | OUTPATIENT
Start: 2019-09-13 | End: 2019-09-13 | Stop reason: HOSPADM

## 2019-09-13 RX ORDER — DIPHENHYDRAMINE HCL 25 MG
50 CAPSULE ORAL ONCE
Status: CANCELLED | OUTPATIENT
Start: 2019-09-13 | End: 2019-09-13

## 2019-09-13 RX ORDER — DIPHENHYDRAMINE HCL 25 MG
50 CAPSULE ORAL ONCE
Status: DISCONTINUED | OUTPATIENT
Start: 2019-09-13 | End: 2019-09-13 | Stop reason: HOSPADM

## 2019-09-13 RX ADMIN — SODIUM CHLORIDE 100 ML/HR: 0.9 INJECTION, SOLUTION INTRAVENOUS at 06:09

## 2019-09-13 NOTE — PLAN OF CARE
Pt resting quietly in bed; pt is ambulatory and aao; no pain at rest; skin wm dry color pink; iv access left arm; pt wears glasses; clothing and glasses at bedside labeled; sinus on monitor; preop teaching completed and pt able to teach back

## 2019-09-13 NOTE — NURSING
Pt departed cath lab ambulatory with spouse after case aborted per Dr LUIS Zurita--- creat 1.4; plan discussed with pt and pts spouse; pt stable ambulatory and without complaints

## 2019-09-13 NOTE — H&P (VIEW-ONLY)
Patient ID:  Juline Meléndez is a 78 y.o. male who presents for follow up of Coronary Artery Disease; Congestive Heart Failure; Hyperlipidemia; Hypertension; and Chest Pain        HPI:      Julien Meléndez 78 y.o. male is here follow up and complaining of worsening dyspnea with chest pain. He has also noted worsening fatigue. He has KELSEY and a visit with the sleep disorder team soon to assist with CPAP machine. He has CAD with AMI 2010 and MILES LAD. Recurrent chest pains 12/2015 with new MILES placed distal RCA. ( OM1 and 70% small apical LAD disease treated medically). EF 50-55%. Last PCI was guided with a stress test. He is on aspirin and plavix for DAPT. He is complaining of dizziness with fatigue. He is on 5 hypertensive agents with a SBP 90 mmHg        Nuclear stress test 3/2017 revealed a sizable fixed perfusion defect with associated akinesis involving the septal wall near the apex consistent with infarction without reversible ischemia. EF 49% down to 46% with stress. PET stress 4/2017 revealed a small sized moderate intensity fixed defect consistent with non-transmural infarct in the anteroapical wall in the usual distribution of the distal Left Anterior Descending Artery. This defect comprises 10 % of the left ventricular myocardium. There is a very small sized mild ischemia in the distal to apical inferolateral wall in the usual distribution of the distal OM1. This defect comprises 5 % of the left ventricular myocardium. After his PET stress the decision was medical therapy.               Echo 7/2019                 EF 45%              Diastolic dysfunction              Normal RV function              Normal PA pressure                       Patient Active Problem List     Diagnosis Date Noted    Hypersomnia with sleep apnea      Coronary artery disease 10/24/2017    Chronic fatigue 07/11/2017    Shortness of breath 07/11/2017    Equivocal stress test 03/27/2017          Fixed defect noted on  SPECT       Angina effort 2017    KELSEY (obstructive sleep apnea) 10/26/2016             2016              AHI 5.7 with oxygen jude 87%              AHI 11.2 in supine position              REM AHI 40                Type 2 diabetes mellitus without complication 2016    Abnormal findings on diagnostic imaging of heart and coronary circulation 2015             PET stress 2017              10% apical fixed defect              5% reversible ischemia at the distal apex              Normal EF                      Chest pain 2015    Coronary artery disease due to calcified coronary lesion 2015                Left heart cath 2015-Dr. Hernandez           LM patent  LAD patent except for distal 75%  LCX patent  OM1   RCA 90%        PCI of RCA 2.75 x 18 mm MILES  Unsuccessful PCI of OM1            LVEDP 11 mmHg  3.5 x 18 mm         Left hearth cath 3/1/2010 -Dr. Hernandez              RCA PCI with 3.5 x 15 mm  BMS     Left heart cath 2010-Dr. Hernandez              LAD 3.0 x 12 and 3.5 x 24 mm  BMS        Cardiomyopathy, ischemic 2013    Diabetes mellitus 2013    MI (myocardial infarction)      Diabetes mellitus      Hyperlipidemia      Hypertension      Hiatal hernia                 Right Arm BP - Sittin/80  Left Arm BP - Sittin/80           LABS     LAST HbA1c        Lab Results   Component Value Date     HGBA1C 6.9 (H) 2010         Lipid panel        Lab Results   Component Value Date     CHOL 144 2019     CHOL 149 2010            Lab Results   Component Value Date     HDL 33 (L) 2019     HDL 31 (L) 2010            Lab Results   Component Value Date     LDLCALC 77.6 2019     LDLCALC 97.4 2010            Lab Results   Component Value Date     TRIG 167 (H) 2019     TRIG 103 2010            Lab Results   Component Value Date     CHOLHDL 22.9 2019     CHOLHDL 20.8 2010                Review of Systems   Constitution: Negative for diaphoresis, night sweats, weight gain and weight loss.   HENT: Negative for congestion.    Eyes: Negative for blurred vision, discharge and double vision.   Cardiovascular: Negative for chest pain, claudication, cyanosis, dyspnea on exertion, irregular heartbeat, leg swelling, near-syncope, orthopnea, palpitations, paroxysmal nocturnal dyspnea and syncope.   Respiratory: Positive for shortness of breath, sleep disturbances due to breathing and snoring. Negative for cough and wheezing.    Endocrine: Negative for cold intolerance, heat intolerance and polyphagia.   Hematologic/Lymphatic: Negative for adenopathy and bleeding problem. Does not bruise/bleed easily.   Skin: Negative for dry skin and nail changes.   Musculoskeletal: Negative for arthritis, back pain, falls, joint pain, myalgias and neck pain.   Gastrointestinal: Negative for bloating, abdominal pain, change in bowel habit and constipation.   Genitourinary: Negative for bladder incontinence, dysuria, flank pain, genital sores and missed menses.   Neurological: Positive for dizziness and weakness. Negative for aphonia, brief paralysis and difficulty with concentration.   Psychiatric/Behavioral: Negative for altered mental status and memory loss. The patient does not have insomnia.    Allergic/Immunologic: Negative for environmental allergies.         Objective:   Physical Exam   Constitutional: He is oriented to person, place, and time. He appears well-developed and well-nourished. He is not intubated.   HENT:   Head: Normocephalic and atraumatic.   Right Ear: External ear normal.   Left Ear: External ear normal.   Mouth/Throat: Oropharynx is clear and moist.   Eyes: Pupils are equal, round, and reactive to light. Conjunctivae and EOM are normal. Right eye exhibits no discharge. Left eye exhibits no discharge. No scleral icterus.   Neck: Normal range of motion. Neck supple. Normal carotid pulses, no  hepatojugular reflux and no JVD present. Carotid bruit is not present. No tracheal deviation present. No thyromegaly present.   Cardiovascular: Normal rate, regular rhythm, S1 normal and S2 normal.  No extrasystoles are present. PMI is not displaced. Exam reveals no gallop, no S3, no distant heart sounds, no friction rub and no midsystolic click.   No murmur heard.  Pulses:       Carotid pulses are 2+ on the right side, and 2+ on the left side.       Radial pulses are 2+ on the right side, and 2+ on the left side.        Femoral pulses are 2+ on the right side, and 2+ on the left side.       Popliteal pulses are 2+ on the right side, and 2+ on the left side.        Dorsalis pedis pulses are 2+ on the right side, and 2+ on the left side.        Posterior tibial pulses are 2+ on the right side, and 2+ on the left side.   Pulmonary/Chest: Effort normal and breath sounds normal. No accessory muscle usage or stridor. No apnea, no tachypnea and no bradypnea. He is not intubated. No respiratory distress. He has no decreased breath sounds. He has no wheezes. He has no rales. He exhibits no tenderness and no bony tenderness.   Abdominal: He exhibits no distension, no pulsatile liver, no abdominal bruit, no ascites, no pulsatile midline mass and no mass. There is no tenderness. There is no rebound and no guarding.   Musculoskeletal: Normal range of motion. He exhibits no edema or tenderness.   Lymphadenopathy:     He has no cervical adenopathy.   Neurological: He is alert and oriented to person, place, and time. He has normal reflexes. No cranial nerve deficit. Coordination normal.   Skin: Skin is warm. No rash noted. No erythema. No pallor.   Psychiatric: He has a normal mood and affect. His behavior is normal. Judgment and thought content normal.         Assessment:      1. Angina effort    2. Mixed hyperlipidemia    3. Essential hypertension    4. Cardiomyopathy, ischemic    5. Coronary artery disease due to calcified  coronary lesion    6. Abnormal findings on diagnostic imaging of heart and coronary circulation    7. Coronary artery disease, angina presence unspecified, unspecified vessel or lesion type, unspecified whether native or transplanted heart    8. Type 2 diabetes mellitus without complication, without long-term current use of insulin    9. Shortness of breath    10. Chronic fatigue          Plan:            PET stress  LHC if more ischemic than 2017  R radial access   MILES candidate           He would like to proceed to LHC if stress is abnormal and bypass a clinic visit              Return sooner for concerns or questions. If symptoms persist go to the ED  I have reviewed all pertinent data on this patient            Follow up as scheduled. Return sooner for concerns or questions                 He expressed verbal understanding and agreed with the plan           I have reviewed the patient's medical history in detail and updated the computerized patient record.           Orders Placed This Encounter   Procedures    Cardiac PET Scan Stress       Standing Status:   Future       Standing Expiration Date:   8/7/2020       Order Specific Question:   Which medicaton for the stress procedure?       Answer:   Regadenoson    IN OFFICE EKG 12-LEAD (to Muse)       Order Specific Question:   Diagnosis       Answer:   CAD (coronary artery disease) [[id number]]    Case Request-Cath Lab: Left heart cath       Standing Status:   Standing       Number of Occurrences:   1       Order Specific Question:   CPT Code:       Answer:   NE LEFT HEART CATH INJECT VETRICULOGRAPHY, IMAGE SUPERVISE/INTERP [51918]       Order Specific Question:   Medical Necessity:       Answer:   Medically Urgent [101]         Follow up as scheduled. Return sooner for concerns or questions                   Patient's Medications   New Prescriptions     No medications on file   Previous Medications     AMLODIPINE (NORVASC) 5 MG TABLET    TAKE 1 TABLET EVERY DAY      ASPIRIN (ECOTRIN) 81 MG EC TABLET    Take 81 mg by mouth once daily.     ATORVASTATIN (LIPITOR) 40 MG TABLET    TAKE 1 TABLET EVERY DAY     CLOPIDOGREL (PLAVIX) 75 MG TABLET    TAKE 1 TABLET EVERY DAY     FLUOXETINE (PROZAC) 20 MG CAPSULE    Take 1 capsule by mouth once daily.     GLIPIZIDE (GLUCOTROL) 5 MG TABLET    Take 1 tablet by mouth Daily.     ISOSORBIDE MONONITRATE (IMDUR) 30 MG 24 HR TABLET    TAKE 1 TABLET ONE TIME DAILY     LOSARTAN-HYDROCHLOROTHIAZIDE 100-25 MG (HYZAAR) 100-25 MG PER TABLET    TAKE 1 TABLET EVERY DAY     MECLIZINE (ANTIVERT) 25 MG TABLET    Take 1 tablet (25 mg total) by mouth 3 (three) times daily as needed for Dizziness.     METFORMIN (GLUCOPHAGE) 1000 MG TABLET    Take 1 tablet (1,000 mg total) by mouth daily with breakfast.     METOPROLOL SUCCINATE (TOPROL-XL) 25 MG 24 HR TABLET    Take 0.5 tablets (12.5 mg total) by mouth once daily.     NITROGLYCERIN (NITROSTAT) 0.4 MG SL TABLET    Place 1 tablet (0.4 mg total) under the tongue every 5 (five) minutes as needed for Chest pain.     TRAMADOL (ULTRAM) 50 MG TABLET    Take 1 tablet by mouth 2 (two) times daily as needed.     TRAZODONE (DESYREL) 150 MG TABLET         TRUE METRIX GLUCOSE TEST STRIP STRP         TRUEPLUS LANCETS 28 GAUGE MISC       Modified Medications     No medications on file   Discontinued Medications     No medications on file                He could not tolerate a stress test  He was cancelled   He came here today for [facility]  It was cancelled because of ARF      He will have a BMP next week to determine an alternative date        Patient and wife expressed verbal understanding of the plan

## 2019-09-19 ENCOUNTER — TELEPHONE (OUTPATIENT)
Dept: CARDIOLOGY | Facility: CLINIC | Age: 79
End: 2019-09-19

## 2019-09-19 NOTE — TELEPHONE ENCOUNTER
----- Message from Elaina Calvillo sent at 9/19/2019  1:48 PM CDT -----  Contact: [phone]/self  Patient is requesting orders for lab work. Briana Hood. Please notify pt when orders have been sent.

## 2019-09-21 LAB
BUN SERPL-MCNC: 28 MG/DL (ref 7–25)
BUN/CREAT SERPL: 25 (CALC) (ref 6–22)
CALCIUM SERPL-MCNC: 10.7 MG/DL (ref 8.6–10.3)
CHLORIDE SERPL-SCNC: 98 MMOL/L (ref 98–110)
CO2 SERPL-SCNC: 34 MMOL/L (ref 20–32)
CREAT SERPL-MCNC: 1.14 MG/DL (ref 0.7–1.18)
GFRSERPLBLD MDRD-ARVRAT: 61 ML/MIN/1.73M2
GLUCOSE SERPL-MCNC: 162 MG/DL (ref 65–99)
POTASSIUM SERPL-SCNC: 4.5 MMOL/L (ref 3.5–5.3)
SODIUM SERPL-SCNC: 138 MMOL/L (ref 135–146)

## 2019-09-24 ENCOUNTER — TELEPHONE (OUTPATIENT)
Dept: CARDIOLOGY | Facility: CLINIC | Age: 79
End: 2019-09-24

## 2019-09-24 NOTE — TELEPHONE ENCOUNTER
----- Message from Nany Sorto sent at 9/24/2019  9:44 AM CDT -----  Contact: [phone]-self  Patient states he is still waiting on his test results.

## 2019-09-24 NOTE — TELEPHONE ENCOUNTER
Patient notified of lab results.     Patient would like to know when will his procedure be set up.

## 2019-09-25 NOTE — TELEPHONE ENCOUNTER
The cath lab will call him with a date and time       Renal function has improved we can proceed with angiography      Thanks      ZN

## 2019-09-27 ENCOUNTER — TELEPHONE (OUTPATIENT)
Dept: CARDIOLOGY | Facility: CLINIC | Age: 79
End: 2019-09-27

## 2019-09-27 DIAGNOSIS — R06.00 DYSPNEA, UNSPECIFIED TYPE: ICD-10-CM

## 2019-09-27 DIAGNOSIS — R07.9 ACUTE CHEST PAIN: ICD-10-CM

## 2019-09-27 NOTE — TELEPHONE ENCOUNTER
----- Message from Anastasia Frank sent at 9/27/2019 11:23 AM CDT -----  Contact: Pt  Patient called in regards to testing and pt would like information concerning the tests.       Patient can be reached at [phone]

## 2019-09-27 NOTE — TELEPHONE ENCOUNTER
We have rescheduled his angiogram       We can start with a nuclear stress and wait for the result       Orders were placed      Thanks      ZN

## 2019-09-30 NOTE — TELEPHONE ENCOUNTER
Patient has been notified that his angiogram has been rescheduled and that he has a stress test scheduled 10/11. He understood

## 2019-10-03 ENCOUNTER — HOSPITAL ENCOUNTER (OUTPATIENT)
Facility: HOSPITAL | Age: 79
Discharge: HOME | End: 2019-10-03
Attending: INTERNAL MEDICINE | Admitting: INTERNAL MEDICINE
Payer: MEDICARE

## 2019-10-03 VITALS
DIASTOLIC BLOOD PRESSURE: 81 MMHG | BODY MASS INDEX: 28.58 KG/M2 | WEIGHT: 211 LBS | TEMPERATURE: 99 F | SYSTOLIC BLOOD PRESSURE: 139 MMHG | HEART RATE: 85 BPM | OXYGEN SATURATION: 98 % | HEIGHT: 72 IN | RESPIRATION RATE: 20 BRPM

## 2019-10-03 DIAGNOSIS — I20.89 ANGINA EFFORT: ICD-10-CM

## 2019-10-03 DIAGNOSIS — I25.10 CAD (CORONARY ARTERY DISEASE): ICD-10-CM

## 2019-10-03 DIAGNOSIS — R94.39 ABNORMAL CARDIOVASCULAR STRESS TEST: ICD-10-CM

## 2019-10-03 PROBLEM — N18.30 STAGE 3 CHRONIC KIDNEY DISEASE: Status: ACTIVE | Noted: 2019-10-03

## 2019-10-03 LAB
ANION GAP SERPL CALC-SCNC: 11 MMOL/L (ref 8–16)
BUN SERPL-MCNC: 18 MG/DL (ref 8–23)
CALCIUM SERPL-MCNC: 10 MG/DL (ref 8.7–10.5)
CHLORIDE SERPL-SCNC: 103 MMOL/L (ref 95–110)
CO2 SERPL-SCNC: 27 MMOL/L (ref 23–29)
CREAT SERPL-MCNC: 1.2 MG/DL (ref 0.5–1.4)
ERYTHROCYTE [DISTWIDTH] IN BLOOD BY AUTOMATED COUNT: 13.3 % (ref 11.5–14.5)
EST. GFR  (AFRICAN AMERICAN): >60 ML/MIN/1.73 M^2
EST. GFR  (NON AFRICAN AMERICAN): 57 ML/MIN/1.73 M^2
GLUCOSE SERPL-MCNC: 242 MG/DL (ref 70–110)
HCT VFR BLD AUTO: 45.8 % (ref 40–54)
HGB BLD-MCNC: 16.1 G/DL (ref 14–18)
INR PPP: 1 (ref 0.8–1.2)
INR PPP: 1 (ref 0.8–1.2)
MCH RBC QN AUTO: 31.4 PG (ref 27–31)
MCHC RBC AUTO-ENTMCNC: 35.2 G/DL (ref 32–36)
MCV RBC AUTO: 90 FL (ref 82–98)
PLATELET # BLD AUTO: 152 K/UL (ref 150–350)
PMV BLD AUTO: 11.3 FL (ref 9.2–12.9)
POC ACTIVATED CLOTTING TIME K: 180 SEC (ref 74–137)
POC ACTIVATED CLOTTING TIME K: 252 SEC (ref 74–137)
POTASSIUM SERPL-SCNC: 4.2 MMOL/L (ref 3.5–5.1)
PROTHROMBIN TIME: 10.5 SEC (ref 9–12.5)
PROTHROMBIN TIME: 10.5 SEC (ref 9–12.5)
RBC # BLD AUTO: 5.12 M/UL (ref 4.6–6.2)
SAMPLE: ABNORMAL
SAMPLE: ABNORMAL
SODIUM SERPL-SCNC: 141 MMOL/L (ref 136–145)
WBC # BLD AUTO: 6.11 K/UL (ref 3.9–12.7)

## 2019-10-03 PROCEDURE — 63600175 PHARM REV CODE 636 W HCPCS: Performed by: INTERNAL MEDICINE

## 2019-10-03 PROCEDURE — 85610 PROTHROMBIN TIME: CPT

## 2019-10-03 PROCEDURE — 27201423 OPTIME MED/SURG SUP & DEVICES STERILE SUPPLY: Performed by: INTERNAL MEDICINE

## 2019-10-03 PROCEDURE — 85347 COAGULATION TIME ACTIVATED: CPT | Performed by: INTERNAL MEDICINE

## 2019-10-03 PROCEDURE — 93458 PR CATH PLACE/CORON ANGIO, IMG SUPER/INTERP,W LEFT HEART VENTRICULOGRAPHY: ICD-10-PCS | Mod: 26,,, | Performed by: INTERNAL MEDICINE

## 2019-10-03 PROCEDURE — 99024 POSTOP FOLLOW-UP VISIT: CPT | Mod: ,,, | Performed by: INTERNAL MEDICINE

## 2019-10-03 PROCEDURE — 36415 COLL VENOUS BLD VENIPUNCTURE: CPT

## 2019-10-03 PROCEDURE — 99152 PR MOD CONSCIOUS SEDATION, SAME PHYS, 5+ YRS, FIRST 15 MIN: ICD-10-PCS | Mod: ,,, | Performed by: INTERNAL MEDICINE

## 2019-10-03 PROCEDURE — 25000003 PHARM REV CODE 250: Performed by: INTERNAL MEDICINE

## 2019-10-03 PROCEDURE — 99152 MOD SED SAME PHYS/QHP 5/>YRS: CPT | Mod: ,,, | Performed by: INTERNAL MEDICINE

## 2019-10-03 PROCEDURE — 93458 L HRT ARTERY/VENTRICLE ANGIO: CPT | Mod: 26,,, | Performed by: INTERNAL MEDICINE

## 2019-10-03 PROCEDURE — C1769 GUIDE WIRE: HCPCS | Performed by: INTERNAL MEDICINE

## 2019-10-03 PROCEDURE — 99153 MOD SED SAME PHYS/QHP EA: CPT | Performed by: INTERNAL MEDICINE

## 2019-10-03 PROCEDURE — 25500020 PHARM REV CODE 255: Performed by: INTERNAL MEDICINE

## 2019-10-03 PROCEDURE — C1894 INTRO/SHEATH, NON-LASER: HCPCS | Performed by: INTERNAL MEDICINE

## 2019-10-03 PROCEDURE — 99024 PR POST-OP FOLLOW-UP VISIT: ICD-10-PCS | Mod: ,,, | Performed by: INTERNAL MEDICINE

## 2019-10-03 PROCEDURE — 93458 L HRT ARTERY/VENTRICLE ANGIO: CPT | Performed by: INTERNAL MEDICINE

## 2019-10-03 PROCEDURE — 80048 BASIC METABOLIC PNL TOTAL CA: CPT

## 2019-10-03 PROCEDURE — 85027 COMPLETE CBC AUTOMATED: CPT

## 2019-10-03 PROCEDURE — C1887 CATHETER, GUIDING: HCPCS | Performed by: INTERNAL MEDICINE

## 2019-10-03 PROCEDURE — 99152 MOD SED SAME PHYS/QHP 5/>YRS: CPT | Performed by: INTERNAL MEDICINE

## 2019-10-03 PROCEDURE — 93005 ELECTROCARDIOGRAM TRACING: CPT | Mod: 59

## 2019-10-03 RX ORDER — VERAPAMIL HYDROCHLORIDE 2.5 MG/ML
INJECTION, SOLUTION INTRAVENOUS
Status: DISCONTINUED | OUTPATIENT
Start: 2019-10-03 | End: 2019-10-03 | Stop reason: HOSPADM

## 2019-10-03 RX ORDER — MIDAZOLAM HYDROCHLORIDE 1 MG/ML
INJECTION, SOLUTION INTRAMUSCULAR; INTRAVENOUS
Status: DISCONTINUED | OUTPATIENT
Start: 2019-10-03 | End: 2019-10-03 | Stop reason: HOSPADM

## 2019-10-03 RX ORDER — SODIUM CHLORIDE 9 MG/ML
INJECTION, SOLUTION INTRAVENOUS CONTINUOUS
Status: DISCONTINUED | OUTPATIENT
Start: 2019-10-03 | End: 2019-10-03 | Stop reason: HOSPADM

## 2019-10-03 RX ORDER — FENTANYL CITRATE 50 UG/ML
INJECTION, SOLUTION INTRAMUSCULAR; INTRAVENOUS
Status: DISCONTINUED | OUTPATIENT
Start: 2019-10-03 | End: 2019-10-03 | Stop reason: HOSPADM

## 2019-10-03 RX ORDER — LIDOCAINE HYDROCHLORIDE 10 MG/ML
INJECTION, SOLUTION EPIDURAL; INFILTRATION; INTRACAUDAL; PERINEURAL
Status: DISCONTINUED | OUTPATIENT
Start: 2019-10-03 | End: 2019-10-03 | Stop reason: HOSPADM

## 2019-10-03 RX ORDER — DIPHENHYDRAMINE HYDROCHLORIDE 50 MG/ML
INJECTION INTRAMUSCULAR; INTRAVENOUS
Status: DISCONTINUED | OUTPATIENT
Start: 2019-10-03 | End: 2019-10-03 | Stop reason: HOSPADM

## 2019-10-03 RX ORDER — IODIXANOL 320 MG/ML
INJECTION, SOLUTION INTRAVASCULAR
Status: DISCONTINUED | OUTPATIENT
Start: 2019-10-03 | End: 2019-10-03 | Stop reason: HOSPADM

## 2019-10-03 RX ORDER — HEPARIN SODIUM 1000 [USP'U]/ML
INJECTION, SOLUTION INTRAVENOUS; SUBCUTANEOUS
Status: DISCONTINUED | OUTPATIENT
Start: 2019-10-03 | End: 2019-10-03 | Stop reason: HOSPADM

## 2019-10-03 RX ORDER — DIPHENHYDRAMINE HCL 25 MG
50 CAPSULE ORAL ONCE
Status: DISCONTINUED | OUTPATIENT
Start: 2019-10-03 | End: 2019-10-03 | Stop reason: HOSPADM

## 2019-10-03 RX ORDER — HEPARIN SODIUM 200 [USP'U]/100ML
INJECTION, SOLUTION INTRAVENOUS
Status: DISCONTINUED | OUTPATIENT
Start: 2019-10-03 | End: 2019-10-03 | Stop reason: HOSPADM

## 2019-10-03 RX ADMIN — SODIUM CHLORIDE 143.55 ML: 0.9 INJECTION, SOLUTION INTRAVENOUS at 04:10

## 2019-10-03 NOTE — PLAN OF CARE
1520    Patient transferred to recovery cath lab slot 5 via stretcher with side rails up x2 .  Pt AAOx3, connected to monitors.  VSS. Right radial vasc band in place with 12ml of air in band c.d.i. no bleeding or hematoma noted. +2 petra radial pulses palpated.   Skin normal in color and warm to touch, < 3 sec cap refill.  Fall risk precautions given and patient acknowledges.  AIDET completed to pt.  Orders noted and implemented. Will continue to monitor patient.  Updated pt's wife in sx waiting room.  Will continue to monitor.

## 2019-10-03 NOTE — PLAN OF CARE
1600  2mL of air removed from radial vasc band without incident.  No hematoma or bleeding noted.  +2 petra radial pulses palpated. Skin normal in color, warm to touch, < 3 sec cap refill. No numbness or tingling.  Will continue to monitor pt.

## 2019-10-03 NOTE — INTERVAL H&P NOTE
The patient has been examined and the H&P has been reviewed:        Anesthesia/Surgery risks, benefits and alternative options discussed and understood by patient/family.          Active Hospital Problems    Diagnosis  POA    Angina effort [I20.8]  Yes     Priority: Low    Stage 3 chronic kidney disease [N18.3]  Unknown    Equivocal stress test [R94.39]  Yes       Fixed defect noted on SPECT      KELSEY (obstructive sleep apnea) [G47.33]  Yes         12/2016   AHI 5.7 with oxygen jude 87%   AHI 11.2 in supine position   REM AHI 40            Type 2 diabetes mellitus without complication [E11.9]  Yes    Abnormal findings on diagnostic imaging of heart and coronary circulation [R93.1]  Yes         PET stress 4/2017   10% apical fixed defect   5% reversible ischemia at the distal apex   Normal EF                Chest pain [R07.9]  Yes    Coronary artery disease due to calcified coronary lesion [I25.10, I25.84]  Yes           Left heart cath 12/2015-Dr. Hernandez        LM patent  LAD patent except for distal 75%  LCX patent  OM1   RCA 90%      PCI of RCA 2.75 x 18 mm MILES  Unsuccessful PCI of OM1         LVEDP 11 mmHg  3.5 x 18 mm       Left hearth cath 3/1/2010 -Dr. Hernandez   RCA PCI with 3.5 x 15 mm  BMS    Left heart cath 1/31/2010-Dr. Hernandez   LAD 3.0 x 12 and 3.5 x 24 mm  BMS       Diabetes mellitus [E11.9]  Yes    Cardiomyopathy, ischemic [I25.5]  Yes    Hyperlipidemia [E78.5]  Yes      Resolved Hospital Problems   No resolved problems to display.       He is here for [facility]  R radial access  MILES candidate      Risks, benefits and alternatives of cardiac catheterization and possible intervention were discussed with the patient. All questions were answered and informed consent obtained.     I discussed the importance of compliance with dual antiplatelet therapy with the patient to prevent acute or late stent thrombosis with premature discontinuation of the therapy.

## 2019-10-03 NOTE — PLAN OF CARE
1030  Patient lying in bed with no complaints of pain or distress noted.  Monitors applied.  VSS.  Yellow non- skid socks placed on patient. Fall risk review with patient.  Procedure and recovery process explained to patient and all questions answered.  Patient verbalized understanding. Wife is present in the WR.  Will continue to monitor.

## 2019-10-03 NOTE — DISCHARGE INSTRUCTIONS
Discharge Instructions: After Your Surgery  Youve just had surgery. During surgery, you were given medicine called anesthesia to keep you relaxed and free of pain. After surgery, you may have some pain or nausea. This is common. Here are some tips for feeling better and getting well after surgery.     Stay on schedule with your medicine.   Going home  Your healthcare provider will show you how to take care of yourself when you go home. He or she will also answer your questions. Have an adult family member or friend drive you home. For the first 24 hours after your surgery:  · Do not drive or use heavy equipment.  · Do not make important decisions or sign legal papers.  · Do not drink alcohol.  · Have someone stay with you, if needed. He or she can watch for problems and help keep you safe.  Be sure to go to all follow-up visits with your healthcare provider. And rest after your surgery for as long as your healthcare provider tells you to.  Coping with pain  If you have pain after surgery, pain medicine will help you feel better. Take it as told, before pain becomes severe. Also, ask your healthcare provider or pharmacist about other ways to control pain. This might be with heat, ice, or relaxation. And follow any other instructions your surgeon or nurse gives you.  Tips for taking pain medicine  To get the best relief possible, remember these points:  · Pain medicines can upset your stomach. Taking them with a little food may help.  · Most pain relievers taken by mouth need at least 20 to 30 minutes to start to work.  · Taking medicine on a schedule can help you remember to take it. Try to time your medicine so that you can take it before starting an activity. This might be before you get dressed, go for a walk, or sit down for dinner.  · Constipation is a common side effect of pain medicines. Call your healthcare provider before taking any medicines such as laxatives or stool softeners to help ease constipation.  Also ask if you should skip any foods. Drinking lots of fluids and eating foods such as fruits and vegetables that are high in fiber can also help. Remember, do not take laxatives unless your surgeon has prescribed them.  · Drinking alcohol and taking pain medicine can cause dizziness and slow your breathing. It can even be deadly. Do not drink alcohol while taking pain medicine.  · Pain medicine can make you react more slowly to things. Do not drive or run machinery while taking pain medicine.  Your healthcare provider may tell you to take acetaminophen to help ease your pain. Ask him or her how much you are supposed to take each day. Acetaminophen or other pain relievers may interact with your prescription medicines or other over-the-counter (OTC) medicines. Some prescription medicines have acetaminophen and other ingredients. Using both prescription and OTC acetaminophen for pain can cause you to overdose. Read the labels on your OTC medicines with care. This will help you to clearly know the list of ingredients, how much to take, and any warnings. It may also help you not take too much acetaminophen. If you have questions or do not understand the information, ask your pharmacist or healthcare provider to explain it to you before you take the OTC medicine.  Managing nausea  Some people have an upset stomach after surgery. This is often because of anesthesia, pain, or pain medicine, or the stress of surgery. These tips will help you handle nausea and eat healthy foods as you get better. If you were on a special food plan before surgery, ask your healthcare provider if you should follow it while you get better. These tips may help:  · Do not push yourself to eat. Your body will tell you when to eat and how much.  · Start off with clear liquids and soup. They are easier to digest.  · Next try semi-solid foods, such as mashed potatoes, applesauce, and gelatin, as you feel ready.  · Slowly move to solid foods. Dont  eat fatty, rich, or spicy foods at first.  · Do not force yourself to have 3 large meals a day. Instead eat smaller amounts more often.  · Take pain medicines with a small amount of solid food, such as crackers or toast, to avoid nausea.     Call your surgeon if  · You still have pain an hour after taking medicine. The medicine may not be strong enough.  · You feel too sleepy, dizzy, or groggy. The medicine may be too strong.  · You have side effects like nausea, vomiting, or skin changes, such as rash, itching, or hives.       If you have obstructive sleep apnea  You were given anesthesia medicine during surgery to keep you comfortable and free of pain. After surgery, you may have more apnea spells because of this medicine and other medicines you were given. The spells may last longer than usual.   At home:  · Keep using the continuous positive airway pressure (CPAP) device when you sleep. Unless your healthcare provider tells you not to, use it when you sleep, day or night. CPAP is a common device used to treat obstructive sleep apnea.  · Talk with your provider before taking any pain medicine, muscle relaxants, or sedatives. Your provider will tell you about the possible dangers of taking these medicines.  Date Last Reviewed: 12/1/2016 © 2000-2017 [employer]. [address] [place] [address]. All rights reserved. This information is not intended as a substitute for professional medical care. Always follow your healthcare professional's instructions.        Recovery After Procedural Sedation (Adult)  You have been given medicine by vein to make you sleep during your surgery. This may have included both a pain medicine and sleeping medicine. Most of the effects have worn off. But you may still have some drowsiness for the next 6 to 8 hours.  Home care  Follow these guidelines when you get home:  · For the next 8 hours, you should be watched by a responsible adult. This person should make  sure your condition is not getting worse.  · Don't drink any alcohol for the next 24 hours.  · Don't drive, operate dangerous machinery, or make important business or personal decisions during the next 24 hours.  Note: Your healthcare provider may tell you not to take any medicine by mouth for pain or sleep in the next 4 hours. These medicines may react with the medicines you were given in the hospital. This could cause a much stronger response than usual.  Follow-up care  Follow up with your healthcare provider if you are not alert and back to your usual level of activity within 12 hours.  When to seek medical advice  Call your healthcare provider right away if any of these occur:  · Drowsiness gets worse  · Weakness or dizziness gets worse  · Repeated vomiting  · You can't be awakened   Date Last Reviewed: 10/18/2016  © 7857-1325 [employer]. [address] [place] [address]. All rights reserved. This information is not intended as a substitute for professional medical care. Always follow your healthcare professional's instructions.  Understanding Transradial Cardiac Catheterization    Cardiac catheterization (cardiac cath) is a common, non-surgical procedure. During the procedure, your doctor will insert a long, thin tube (catheter) into an artery and move it up into your heart. Transradial means the catheter is inserted into an artery in the wrist (the radial artery). This procedure can be used to diagnose and treat certain heart problems.  Why do I need a transradial cardiac cath?  You may need a cardiac cath if signs indicate a problem with your heart. These may include:  · Symptoms of chest pain, tightness, or heaviness (known as angina). This is a common symptom of blocked heart arteries, known as coronary artery disease.  · Symptoms of weakness, dizziness, trouble breathing, or swollen legs or feet. These may be symptoms of a problem with a heart valve or the heart muscle.  · Other  test results show heart problems. Tests may include stress tests, heart scans, and echocardiography.  During a cardiac cath, your doctor can see the condition of the coronary arteries and heart valves. He or she can also check how well the heart pumps and the flow of blood through the heart. Your doctor can also measure pressures and take blood samples. And, if needed, he or she can open blocked arteries. This can help reduce symptoms of angina.  Cardiac cath is often done using a catheter inserted into an artery in the groin. During transradial cardiac cath, the catheter is inserted into an artery in the wrist. This can mean less bleeding and a faster recovery. Some people may have blockages in the groin arteries as well as in the heart arteries, making it difficult to reach the heart. The transradial approach can be used to get around this problem.   What happens during a transradial cardiac cath?  The procedure is done in the hospital or a surgery center. First, an IV line is put in your arm or hand to deliver fluids and medicines. You will likely be given medicine to relax you and make you drowsy. When the procedure begins:  · You lie on an X-ray table.  · The skin over the insertion site in your wrist is numbed.  · The doctor makes a tiny puncture or incision into the artery in the wrist. He or she then inserts a catheter and threads it through the blood vessel into your heart.    · The doctor may inject a contrast fluid through the catheter into the arteries. This fluid makes the arteries show up better on X-rays.  · Tests may be done to check the condition of your heart and arteries. If needed, the doctor can clear blockages in the arteries or do other repairs.  · When the doctor is finished, he or she will remove the catheter and put direct pressure on the site to prevent bleeding.  · You will stay for a time to recover, and then go home.  What are the risks of transradial cardiac cath?  These  include:  · Bleeding, bruising, infection, or blood clots  · Damage to the radial artery that may cause injury to the hand  · Allergic reaction to the contrast fluid  · Abnormal heartbeat (arrhythmia)  · Damage to blood vessels or tissues  · Kidney damage or failure  · The need for emergency heart surgery  · Heart attack, stroke, or death  Date Last Reviewed: 5/1/2016  © 1963-5865 [employer] [address] [place] [address], [place] [address]. All rights reserved. This information is not intended as a substitute for professional medical care. Always follow your healthcare professional's instructions.

## 2019-10-03 NOTE — Clinical Note
How Severe Is Your Skin Lesion?: mild The sheath is inserted into the right radial artery.  Has Your Skin Lesion Been Treated?: not been treated Is This A New Presentation, Or A Follow-Up?: Mole

## 2019-10-03 NOTE — Clinical Note
40 ml injected throughout the case. 260 mL total wasted during the case. 300 mL total used in the case.

## 2019-10-03 NOTE — PLAN OF CARE
1600   Remaining air removed from right radial vasc band. Gauze and tegaderm dressing applied c.d.i.   No bleeding or hematoma noted. +2 petra radial pulses palpated. Skin normal in color, warm to touch, < 3 sec cap refill, no numbness, no tingling.  Pt tolerated well.  Will continue to monitor pt.

## 2019-10-03 NOTE — PLAN OF CARE
1815    Patient VS stable, denies chest pain, right radial gauze/tegaderm CDI, no bleeding or hematoma.  +2 radial pulses, tolerated dinner with no nausea vomiting.  Iv site with tip intact/ gauze and coban CDI.  Patient verbalized understanding of post procedure instructions, copy given.  Patient escorted downstairs in wheelchair to home with wife.  Dr Azul will call patient if any medicine changes are necessary.

## 2019-10-03 NOTE — BRIEF OP NOTE
S/p LHC via R radial        Patent RCA   Patent LM   Patent LAD except for distal disease   New prox LCX  with R to L collaterals   LVEDP 10 mmHg      Plan:     Return for LCX  PCI         Full report

## 2019-10-03 NOTE — Clinical Note
Catheter is repositioned to the ostium   left main. Angiography performed of the left coronary arteries in multiple views. Angiography performed via hand injection with 18 mL of contrast.

## 2019-10-03 NOTE — Clinical Note
Catheter is inserted into the ostium   left main. Angiography performed of the left coronary arteries in multiple views. Angiography performed via hand injection with 6 mL of contrast.

## 2019-10-08 ENCOUNTER — TELEPHONE (OUTPATIENT)
Dept: CARDIOLOGY | Facility: CLINIC | Age: 79
End: 2019-10-08

## 2019-10-08 NOTE — TELEPHONE ENCOUNTER
----- Message from Kristel Ramirez sent at 10/8/2019 10:01 AM CDT -----  Contact: [phone] self   Pt is requesting to speak with you re: upcoming procedure. Please advise

## 2019-10-08 NOTE — TELEPHONE ENCOUNTER
Returned pt phone call     Pt was scheduled for nuclear stress test this Friday, requested to cancel due to him having angiogram done recently and stated he was going to be brought back to have stents placed     Cancelled stress test for pt

## 2019-10-15 DIAGNOSIS — I25.10 CORONARY ARTERY DISEASE, ANGINA PRESENCE UNSPECIFIED, UNSPECIFIED VESSEL OR LESION TYPE, UNSPECIFIED WHETHER NATIVE OR TRANSPLANTED HEART: Primary | ICD-10-CM

## 2019-10-15 NOTE — DISCHARGE SUMMARY
[facility]  Cardiology  Discharge Summary      Patient Name: Julien Meléndez  MRN: [id number]  Admission Date: 10/3/2019  Hospital Length of Stay: 0 days  Discharge Date and Time: 10/3/2019  6:30 PM  Attending Physician: No att. providers found  Discharging Provider: BEATRICE Robison, ANP  Primary Care Physician: Kelvin Wayne MD    HPI: Julien Meléndez 78 y.o. male is here follow up and complaining of worsening dyspnea with chest pain. He has also noted worsening fatigue. He has KELSEY and a visit with the sleep disorder team soon to assist with CPAP machine. He has CAD with AMI 2010 and MILES LAD. Recurrent chest pains 12/2015 with new MILES placed distal RCA. ( OM1 and 70% small apical LAD disease treated medically). EF 50-55%. Last PCI was guided with a stress test. He is on aspirin and plavix for DAPT. He is complaining of dizziness with fatigue. He is on 5 hypertensive agents with a SBP 90 mmHg        Nuclear stress test 3/2017 revealed a sizable fixed perfusion defect with associated akinesis involving the septal wall near the apex consistent with infarction without reversible ischemia. EF 49% down to 46% with stress. PET stress 4/2017 revealed a small sized moderate intensity fixed defect consistent with non-transmural infarct in the anteroapical wall in the usual distribution of the distal Left Anterior Descending Artery. This defect comprises 10 % of the left ventricular myocardium. There is a very small sized mild ischemia in the distal to apical inferolateral wall in the usual distribution of the distal OM1. This defect comprises 5 % of the left ventricular myocardium. After his PET stress the decision was medical therapy.               Echo 7/2019                 EF 45%              Diastolic dysfunction              Normal RV function              Normal PA pressure          Procedure(s) (LRB):  Left heart cath (N/A)  ANGIOGRAM, CORONARY ARTERY (N/A)     Indwelling Lines/Drains at  time of discharge: none     Hospital Course     10/3/2019 Admitted for elective [facility] for further cardiac evaluation. Taken to the cath lab for the procedure via  R radial access with following results and plan:                   Patent RCA              Patent LM              Patent LAD except for distal disease              New prox LCX  with R to L collaterals              LVEDP 10 mmHg        Plan:                 Return for LCX  PCI                      Tolerated procedure well. Recovered in the pre post cath area. HR and BP remained stable. Right radial site with no active bleeding, hematoma or ecchymosis. Deemed ready for discharge and sent home on medication regimen as detailed on AVS. Will follow up for staged intervention of LCX  PCI-will be called to schedule     Consults: none         Pending Diagnostic Studies:     None          Final Active Diagnoses:    Diagnosis Date Noted POA    Stage 3 chronic kidney disease [N18.3] 10/03/2019 Unknown    Equivocal stress test [R94.39] 03/27/2017 Yes    Angina effort [I20.8] 03/23/2017 Yes    KELSEY (obstructive sleep apnea) [G47.33] 10/26/2016 Yes    Type 2 diabetes mellitus without complication [E11.9] 01/26/2016 Yes    Abnormal findings on diagnostic imaging of heart and coronary circulation [R93.1] 12/29/2015 Yes    Chest pain [R07.9] 12/29/2015 Yes    Coronary artery disease due to calcified coronary lesion [I25.10, I25.84] 12/28/2015 Yes    Diabetes mellitus [E11.9] 09/17/2013 Yes    Cardiomyopathy, ischemic [I25.5] 09/17/2013 Yes    Hyperlipidemia [E78.5]  Yes      Problems Resolved During this Admission:       Discharged Condition: good    Follow Up:  Follow-up Information     Schedule an appointment as soon as possible for a visit to follow up.               Patient Instructions:   No discharge procedures on file.  Medications:  Reconciled Home Medications:      Medication List      ASK your doctor about these medications    amLODIPine 5 MG  tablet  Commonly known as:  NORVASC  TAKE 1 TABLET EVERY DAY     aspirin 81 MG EC tablet  Commonly known as:  ECOTRIN  Take 81 mg by mouth once daily.     atorvastatin 40 MG tablet  Commonly known as:  LIPITOR  TAKE 1 TABLET EVERY DAY     clopidogrel 75 mg tablet  Commonly known as:  PLAVIX  TAKE 1 TABLET EVERY DAY     FLUoxetine 20 MG capsule  Take 1 capsule by mouth once daily.     glipiZIDE 5 MG tablet  Commonly known as:  GLUCOTROL  Take 1 tablet by mouth Daily.     isosorbide mononitrate 30 MG 24 hr tablet  Commonly known as:  IMDUR  TAKE 1 TABLET ONE TIME DAILY     losartan-hydrochlorothiazide 100-25 mg 100-25 mg per tablet  Commonly known as:  HYZAAR  TAKE 1 TABLET EVERY DAY     meclizine 25 mg tablet  Commonly known as:  ANTIVERT  Take 1 tablet (25 mg total) by mouth 3 (three) times daily as needed for Dizziness.     metFORMIN 1000 MG tablet  Commonly known as:  GLUCOPHAGE  Take 1 tablet (1,000 mg total) by mouth daily with breakfast.     metoprolol succinate 25 MG 24 hr tablet  Commonly known as:  TOPROL-XL  Take 0.5 tablets (12.5 mg total) by mouth once daily.     nitroGLYCERIN 0.4 MG SL tablet  Commonly known as:  NITROSTAT  Place 1 tablet (0.4 mg total) under the tongue every 5 (five) minutes as needed for Chest pain.     traMADol 50 mg tablet  Commonly known as:  ULTRAM  Take 1 tablet by mouth 2 (two) times daily as needed.     traZODone 150 MG tablet  Commonly known as:  DESYREL     TRUE METRIX GLUCOSE TEST STRIP Strp  Generic drug:  blood sugar diagnostic     TRUEPLUS LANCETS 28 gauge Misc  Generic drug:  lancets            Time spent on the discharge of patient: 45 minutes    BEATRICE Robison, ANP  Cardiology  [facility]

## 2019-10-21 ENCOUNTER — TELEPHONE (OUTPATIENT)
Dept: CARDIOLOGY | Facility: CLINIC | Age: 79
End: 2019-10-21

## 2019-10-25 ENCOUNTER — TELEPHONE (OUTPATIENT)
Dept: CARDIOLOGY | Facility: CLINIC | Age: 79
End: 2019-10-25

## 2019-10-25 NOTE — TELEPHONE ENCOUNTER
Returned pt phone call     Received message in regards to pt having a question about scheduling a test     Requested call back

## 2019-10-25 NOTE — TELEPHONE ENCOUNTER
----- Message from Gina Ho sent at 10/25/2019  3:53 PM CDT -----  Contact: [phone] /self  Type:  Patient Returning Call    Who Called: self  Who Left Message for Patient: Inga Lgaos MA    Does the patient know what this is regarding?: test  Would the patient rather a call back or a response via [employer]?  call  Best Call Back Number:   Additional Information:       Post-Care Instructions: I reviewed with the patient in detail post-care instructions. Patient is to keep the biopsy site dry overnight, and then apply Vaseline twice daily until healed.

## 2019-11-12 ENCOUNTER — HOSPITAL ENCOUNTER (OUTPATIENT)
Facility: HOSPITAL | Age: 79
Discharge: HOME | End: 2019-11-12
Attending: INTERNAL MEDICINE | Admitting: INTERNAL MEDICINE
Payer: MEDICARE

## 2019-11-12 VITALS
RESPIRATION RATE: 20 BRPM | TEMPERATURE: 98 F | SYSTOLIC BLOOD PRESSURE: 137 MMHG | OXYGEN SATURATION: 98 % | HEIGHT: 72 IN | WEIGHT: 211 LBS | BODY MASS INDEX: 28.58 KG/M2 | DIASTOLIC BLOOD PRESSURE: 72 MMHG | HEART RATE: 71 BPM

## 2019-11-12 DIAGNOSIS — Z01.810 PREOP CARDIOVASCULAR EXAM: ICD-10-CM

## 2019-11-12 DIAGNOSIS — I25.10 CORONARY ARTERY DISEASE, ANGINA PRESENCE UNSPECIFIED, UNSPECIFIED VESSEL OR LESION TYPE, UNSPECIFIED WHETHER NATIVE OR TRANSPLANTED HEART: ICD-10-CM

## 2019-11-12 LAB
ANION GAP SERPL CALC-SCNC: 11 MMOL/L (ref 8–16)
ANION GAP SERPL CALC-SCNC: 8 MMOL/L (ref 8–16)
BASOPHILS # BLD AUTO: 0.04 K/UL (ref 0–0.2)
BASOPHILS NFR BLD: 0.5 % (ref 0–1.9)
BUN SERPL-MCNC: 20 MG/DL (ref 8–23)
BUN SERPL-MCNC: 22 MG/DL (ref 8–23)
CALCIUM SERPL-MCNC: 10.4 MG/DL (ref 8.7–10.5)
CALCIUM SERPL-MCNC: 9.2 MG/DL (ref 8.7–10.5)
CHLORIDE SERPL-SCNC: 100 MMOL/L (ref 95–110)
CHLORIDE SERPL-SCNC: 102 MMOL/L (ref 95–110)
CO2 SERPL-SCNC: 27 MMOL/L (ref 23–29)
CO2 SERPL-SCNC: 30 MMOL/L (ref 23–29)
CREAT SERPL-MCNC: 1 MG/DL (ref 0.5–1.4)
CREAT SERPL-MCNC: 1.2 MG/DL (ref 0.5–1.4)
DIFFERENTIAL METHOD: ABNORMAL
EOSINOPHIL # BLD AUTO: 0.2 K/UL (ref 0–0.5)
EOSINOPHIL NFR BLD: 2.2 % (ref 0–8)
ERYTHROCYTE [DISTWIDTH] IN BLOOD BY AUTOMATED COUNT: 13.7 % (ref 11.5–14.5)
EST. GFR  (AFRICAN AMERICAN): >60 ML/MIN/1.73 M^2
EST. GFR  (AFRICAN AMERICAN): >60 ML/MIN/1.73 M^2
EST. GFR  (NON AFRICAN AMERICAN): 57 ML/MIN/1.73 M^2
EST. GFR  (NON AFRICAN AMERICAN): >60 ML/MIN/1.73 M^2
GLUCOSE SERPL-MCNC: 247 MG/DL (ref 70–110)
GLUCOSE SERPL-MCNC: 251 MG/DL (ref 70–110)
HCT VFR BLD AUTO: 47.3 % (ref 40–54)
HGB BLD-MCNC: 16.1 G/DL (ref 14–18)
LYMPHOCYTES # BLD AUTO: 2.2 K/UL (ref 1–4.8)
LYMPHOCYTES NFR BLD: 27.7 % (ref 18–48)
MCH RBC QN AUTO: 31.1 PG (ref 27–31)
MCHC RBC AUTO-ENTMCNC: 34 G/DL (ref 32–36)
MCV RBC AUTO: 92 FL (ref 82–98)
MONOCYTES # BLD AUTO: 1 K/UL (ref 0.3–1)
MONOCYTES NFR BLD: 12.3 % (ref 4–15)
NEUTROPHILS # BLD AUTO: 4.6 K/UL (ref 1.8–7.7)
NEUTROPHILS NFR BLD: 57.3 % (ref 38–73)
PLATELET # BLD AUTO: 210 K/UL (ref 150–350)
PMV BLD AUTO: 11.6 FL (ref 9.2–12.9)
POC ACTIVATED CLOTTING TIME K: 191 SEC (ref 74–137)
POC ACTIVATED CLOTTING TIME K: 208 SEC (ref 74–137)
POC ACTIVATED CLOTTING TIME K: 219 SEC (ref 74–137)
POTASSIUM SERPL-SCNC: 4.1 MMOL/L (ref 3.5–5.1)
POTASSIUM SERPL-SCNC: 4.5 MMOL/L (ref 3.5–5.1)
RBC # BLD AUTO: 5.17 M/UL (ref 4.6–6.2)
SAMPLE: ABNORMAL
SODIUM SERPL-SCNC: 137 MMOL/L (ref 136–145)
SODIUM SERPL-SCNC: 141 MMOL/L (ref 136–145)
WBC # BLD AUTO: 8.06 K/UL (ref 3.9–12.7)

## 2019-11-12 PROCEDURE — C1887 CATHETER, GUIDING: HCPCS | Performed by: INTERNAL MEDICINE

## 2019-11-12 PROCEDURE — 85347 COAGULATION TIME ACTIVATED: CPT | Performed by: INTERNAL MEDICINE

## 2019-11-12 PROCEDURE — 99220 PR INITIAL OBSERVATION CARE,LEVL III: CPT | Mod: ,,, | Performed by: INTERNAL MEDICINE

## 2019-11-12 PROCEDURE — 99153 MOD SED SAME PHYS/QHP EA: CPT | Performed by: INTERNAL MEDICINE

## 2019-11-12 PROCEDURE — 93005 ELECTROCARDIOGRAM TRACING: CPT | Mod: 59

## 2019-11-12 PROCEDURE — C1753 CATH, INTRAVAS ULTRASOUND: HCPCS | Performed by: INTERNAL MEDICINE

## 2019-11-12 PROCEDURE — 63600175 PHARM REV CODE 636 W HCPCS: Performed by: INTERNAL MEDICINE

## 2019-11-12 PROCEDURE — 99220 PR INITIAL OBSERVATION CARE,LEVL III: ICD-10-PCS | Mod: ,,, | Performed by: INTERNAL MEDICINE

## 2019-11-12 PROCEDURE — C1894 INTRO/SHEATH, NON-LASER: HCPCS | Performed by: INTERNAL MEDICINE

## 2019-11-12 PROCEDURE — 99152 MOD SED SAME PHYS/QHP 5/>YRS: CPT | Performed by: INTERNAL MEDICINE

## 2019-11-12 PROCEDURE — 25500020 PHARM REV CODE 255: Performed by: INTERNAL MEDICINE

## 2019-11-12 PROCEDURE — 92978 ENDOLUMINL IVUS OCT C 1ST: CPT | Mod: LC | Performed by: INTERNAL MEDICINE

## 2019-11-12 PROCEDURE — 99152 MOD SED SAME PHYS/QHP 5/>YRS: CPT | Mod: ,,, | Performed by: INTERNAL MEDICINE

## 2019-11-12 PROCEDURE — 93458 PR CATH PLACE/CORON ANGIO, IMG SUPER/INTERP,W LEFT HEART VENTRICULOGRAPHY: ICD-10-PCS | Mod: 26,59,51, | Performed by: INTERNAL MEDICINE

## 2019-11-12 PROCEDURE — C1769 GUIDE WIRE: HCPCS | Performed by: INTERNAL MEDICINE

## 2019-11-12 PROCEDURE — 93458 L HRT ARTERY/VENTRICLE ANGIO: CPT | Mod: 26,59,51, | Performed by: INTERNAL MEDICINE

## 2019-11-12 PROCEDURE — C1760 CLOSURE DEV, VASC: HCPCS | Performed by: INTERNAL MEDICINE

## 2019-11-12 PROCEDURE — 36415 COLL VENOUS BLD VENIPUNCTURE: CPT

## 2019-11-12 PROCEDURE — 85025 COMPLETE CBC W/AUTO DIFF WBC: CPT

## 2019-11-12 PROCEDURE — 92978 PR IVUS, CORONARY, 1ST VESSEL: ICD-10-PCS | Mod: 26,LC,, | Performed by: INTERNAL MEDICINE

## 2019-11-12 PROCEDURE — 92943 PRQ TRLUML REVSC CH OCC ANT: CPT | Mod: LC,,, | Performed by: INTERNAL MEDICINE

## 2019-11-12 PROCEDURE — 92978 ENDOLUMINL IVUS OCT C 1ST: CPT | Mod: 26,LC,, | Performed by: INTERNAL MEDICINE

## 2019-11-12 PROCEDURE — C9607 PERC D-E COR REVASC CHRO SIN: HCPCS | Mod: LC | Performed by: INTERNAL MEDICINE

## 2019-11-12 PROCEDURE — C1874 STENT, COATED/COV W/DEL SYS: HCPCS | Performed by: INTERNAL MEDICINE

## 2019-11-12 PROCEDURE — 93458 L HRT ARTERY/VENTRICLE ANGIO: CPT | Mod: 59,51 | Performed by: INTERNAL MEDICINE

## 2019-11-12 PROCEDURE — 25000003 PHARM REV CODE 250: Performed by: INTERNAL MEDICINE

## 2019-11-12 PROCEDURE — 92943 PR CTO: ICD-10-PCS | Mod: LC,,, | Performed by: INTERNAL MEDICINE

## 2019-11-12 PROCEDURE — 99152 PR MOD CONSCIOUS SEDATION, SAME PHYS, 5+ YRS, FIRST 15 MIN: ICD-10-PCS | Mod: ,,, | Performed by: INTERNAL MEDICINE

## 2019-11-12 PROCEDURE — C1725 CATH, TRANSLUMIN NON-LASER: HCPCS | Performed by: INTERNAL MEDICINE

## 2019-11-12 PROCEDURE — 80048 BASIC METABOLIC PNL TOTAL CA: CPT | Mod: 91

## 2019-11-12 DEVICE — STENT RONYX30012UX RESOLUTE ONYX 3.00X12
Type: IMPLANTABLE DEVICE | Site: HEART | Status: FUNCTIONAL
Brand: RESOLUTE ONYX™

## 2019-11-12 DEVICE — STENT RONYX30026UX RESOLUTE ONYX 3.00X26
Type: IMPLANTABLE DEVICE | Site: HEART | Status: FUNCTIONAL
Brand: RESOLUTE ONYX™

## 2019-11-12 RX ORDER — IODIXANOL 320 MG/ML
INJECTION, SOLUTION INTRAVASCULAR
Status: DISCONTINUED | OUTPATIENT
Start: 2019-11-12 | End: 2019-11-12 | Stop reason: HOSPADM

## 2019-11-12 RX ORDER — FENTANYL CITRATE 50 UG/ML
INJECTION, SOLUTION INTRAMUSCULAR; INTRAVENOUS
Status: DISCONTINUED | OUTPATIENT
Start: 2019-11-12 | End: 2019-11-12 | Stop reason: HOSPADM

## 2019-11-12 RX ORDER — HEPARIN SODIUM 1000 [USP'U]/ML
INJECTION, SOLUTION INTRAVENOUS; SUBCUTANEOUS
Status: DISCONTINUED | OUTPATIENT
Start: 2019-11-12 | End: 2019-11-12 | Stop reason: HOSPADM

## 2019-11-12 RX ORDER — DIPHENHYDRAMINE HYDROCHLORIDE 50 MG/ML
INJECTION INTRAMUSCULAR; INTRAVENOUS
Status: DISCONTINUED | OUTPATIENT
Start: 2019-11-12 | End: 2019-11-12 | Stop reason: HOSPADM

## 2019-11-12 RX ORDER — MIDAZOLAM HYDROCHLORIDE 1 MG/ML
INJECTION, SOLUTION INTRAMUSCULAR; INTRAVENOUS
Status: DISCONTINUED | OUTPATIENT
Start: 2019-11-12 | End: 2019-11-12 | Stop reason: HOSPADM

## 2019-11-12 RX ORDER — LIDOCAINE HYDROCHLORIDE 10 MG/ML
INJECTION, SOLUTION EPIDURAL; INFILTRATION; INTRACAUDAL; PERINEURAL
Status: DISCONTINUED | OUTPATIENT
Start: 2019-11-12 | End: 2019-11-12 | Stop reason: HOSPADM

## 2019-11-12 RX ORDER — HEPARIN SODIUM 200 [USP'U]/100ML
INJECTION, SOLUTION INTRAVENOUS
Status: DISCONTINUED | OUTPATIENT
Start: 2019-11-12 | End: 2019-11-12 | Stop reason: HOSPADM

## 2019-11-12 RX ADMIN — SODIUM CHLORIDE 287 ML: 0.9 INJECTION, SOLUTION INTRAVENOUS at 11:11

## 2019-11-12 NOTE — DISCHARGE INSTRUCTIONS
Recovery After Procedural Sedation (Adult)  You have been given medicine by vein to make you sleep during your surgery. This may have included both a pain medicine and sleeping medicine. Most of the effects have worn off. But you may still have some drowsiness for the next 6 to 8 hours.  Home care  Follow these guidelines when you get home:  · For the next 8 hours, you should be watched by a responsible adult. This person should make sure your condition is not getting worse.  · Don't drink any alcohol for the next 24 hours.  · Don't drive, operate dangerous machinery, or make important business or personal decisions during the next 24 hours.  Note: Your healthcare provider may tell you not to take any medicine by mouth for pain or sleep in the next 4 hours. These medicines may react with the medicines you were given in the hospital. This could cause a much stronger response than usual.  Follow-up care  Follow up with your healthcare provider if you are not alert and back to your usual level of activity within 12 hours.  When to seek medical advice  Call your healthcare provider right away if any of these occur:  · Drowsiness gets worse  · Weakness or dizziness gets worse  · Repeated vomiting  · You can't be awakened   Date Last Reviewed: 10/18/2016  © 6783-7297 [employer]. [address] [place] [address], [place] [address]. All rights reserved. This information is not intended as a substitute for professional medical care. Always follow your healthcare professional's instructions.  Bleeding or Hematoma After Cardiac Catheterization  You recently had cardiac catheterization. A catheter was put into your body through a puncture site in your groin or arm. You now have bleeding from this site. When bleeding occurs, it may drip or spurt from the site. Or it may collect in a lump (hematoma) under the skin. In the emergency department, pressure will be put on the site to stop bleeding. You will be sent home when  "the bleeding stops. To prevent repeat bleeding, take some precautions at home. If the bleeding starts again, follow the advice below.    Home care  For the next 48 hours:  · Don't do any strenuous activity.  · Don't climb stairs.  · Don't lift anything heavy.  · If the puncture site is in your arm or wrist, don't lie on that arm.  · If your puncture site is in the groin, don't strain at bowel movements.  · Don't scrub the site when bathing. It is fine to get it wet after your healthcare provider says it is OK, but don't scrub it or massage it.  If bleeding happens again, call 911. Take the following steps to stop the bleeding until help arrives:  · Lie on your back.  · Place a clean cloth or gauze pad over the puncture site. Then hold firm pressure right on the site. Or have someone else apply firm pressure using the gauze pad or washcloth.  · Keep your arm straight and raised above the level of your heart if the site is in your arm or wrist. If the site is in your groin, have someone else hold pressure on the site. If you dont have someone to do this, put a 5-pound bag of rice or flour on the site and apply pressure with your hand over the bag.  · Don't press too hard! If you press too hard, your leg and foot (or arm and hand) will not get blood flow and the skin under your toenails or fingernails may turn white. The skin should look pink, like the toenails on your other foot. When you (or someone) presses on the toenail it will turn white, but when you stop pressing on the nail it will turn pink again. This is called "capillary refill." If there is someone with you, he or she can check it.  · If your toes, foot, or leg start feeling numb, tingly, or cold, ease up on the pressure, because you are probably pressing too hard.  · As soon as emergency care arrives, they will take over your care.  Follow-up care  Follow up with your healthcare provider, or as advised. Ask your healthcare provider for a contact number " to call.  Call 911  Call 911 if any of these occur:  · Chest pain or pressure  · Any bleeding from the site  · Feeling weak or faint  · Trouble breathing  · Lump (hematoma) is quickly getting larger  · Coolness, numbness, tingling, or skin color changes in the leg or arm with the puncture site  When to seek medical advice  Call your healthcare provider right away if any of these occur:  · Increased pain, redness, swelling, or drainage from the puncture site  · Nausea or vomiting  Date Last Reviewed: 1/11/2016 © 2000-2017 [employer]. [address] [place] [id number]. All rights reserved. This information is not intended as a substitute for professional medical care. Always follow your healthcare professional's instructions.        Discharge Instructions: After Your Surgery  Youve just had surgery. During surgery, you were given medicine called anesthesia to keep you relaxed and free of pain. After surgery, you may have some pain or nausea. This is common. Here are some tips for feeling better and getting well after surgery.     Stay on schedule with your medicine.   Going home  Your healthcare provider will show you how to take care of yourself when you go home. He or she will also answer your questions. Have an adult family member or friend drive you home. For the first 24 hours after your surgery:  · Do not drive or use heavy equipment.  · Do not make important decisions or sign legal papers.  · Do not drink alcohol.  · Have someone stay with you, if needed. He or she can watch for problems and help keep you safe.  Be sure to go to all follow-up visits with your healthcare provider. And rest after your surgery for as long as your healthcare provider tells you to.  Coping with pain  If you have pain after surgery, pain medicine will help you feel better. Take it as told, before pain becomes severe. Also, ask your healthcare provider or pharmacist about other ways to control pain. This might  be with heat, ice, or relaxation. And follow any other instructions your surgeon or nurse gives you.  Tips for taking pain medicine  To get the best relief possible, remember these points:  · Pain medicines can upset your stomach. Taking them with a little food may help.  · Most pain relievers taken by mouth need at least 20 to 30 minutes to start to work.  · Taking medicine on a schedule can help you remember to take it. Try to time your medicine so that you can take it before starting an activity. This might be before you get dressed, go for a walk, or sit down for dinner.  · Constipation is a common side effect of pain medicines. Call your healthcare provider before taking any medicines such as laxatives or stool softeners to help ease constipation. Also ask if you should skip any foods. Drinking lots of fluids and eating foods such as fruits and vegetables that are high in fiber can also help. Remember, do not take laxatives unless your surgeon has prescribed them.  · Drinking alcohol and taking pain medicine can cause dizziness and slow your breathing. It can even be deadly. Do not drink alcohol while taking pain medicine.  · Pain medicine can make you react more slowly to things. Do not drive or run machinery while taking pain medicine.  Your healthcare provider may tell you to take acetaminophen to help ease your pain. Ask him or her how much you are supposed to take each day. Acetaminophen or other pain relievers may interact with your prescription medicines or other over-the-counter (OTC) medicines. Some prescription medicines have acetaminophen and other ingredients. Using both prescription and OTC acetaminophen for pain can cause you to overdose. Read the labels on your OTC medicines with care. This will help you to clearly know the list of ingredients, how much to take, and any warnings. It may also help you not take too much acetaminophen. If you have questions or do not understand the information, ask  your pharmacist or healthcare provider to explain it to you before you take the OTC medicine.  Managing nausea  Some people have an upset stomach after surgery. This is often because of anesthesia, pain, or pain medicine, or the stress of surgery. These tips will help you handle nausea and eat healthy foods as you get better. If you were on a special food plan before surgery, ask your healthcare provider if you should follow it while you get better. These tips may help:  · Do not push yourself to eat. Your body will tell you when to eat and how much.  · Start off with clear liquids and soup. They are easier to digest.  · Next try semi-solid foods, such as mashed potatoes, applesauce, and gelatin, as you feel ready.  · Slowly move to solid foods. Dont eat fatty, rich, or spicy foods at first.  · Do not force yourself to have 3 large meals a day. Instead eat smaller amounts more often.  · Take pain medicines with a small amount of solid food, such as crackers or toast, to avoid nausea.     Call your surgeon if  · You still have pain an hour after taking medicine. The medicine may not be strong enough.  · You feel too sleepy, dizzy, or groggy. The medicine may be too strong.  · You have side effects like nausea, vomiting, or skin changes, such as rash, itching, or hives.       If you have obstructive sleep apnea  You were given anesthesia medicine during surgery to keep you comfortable and free of pain. After surgery, you may have more apnea spells because of this medicine and other medicines you were given. The spells may last longer than usual.   At home:  · Keep using the continuous positive airway pressure (CPAP) device when you sleep. Unless your healthcare provider tells you not to, use it when you sleep, day or night. CPAP is a common device used to treat obstructive sleep apnea.  · Talk with your provider before taking any pain medicine, muscle relaxants, or sedatives. Your provider will tell you about the  possible dangers of taking these medicines.  Date Last Reviewed: 12/1/2016  © 7979-6376 [employer]. [address] [place] [address], [place] [address]. All rights reserved. This information is not intended as a substitute for professional medical care. Always follow your healthcare professional's instructions.

## 2019-11-12 NOTE — NURSING
Meal tray served and pt tolerating po; pt is aao; sinus w/o ectopy on monitor; skin wm dry color pink; bilateral groin drsgs cdi and soft to touch and no blding swelling nor hematoma; iv patent with NS infusing per iv access left arm; radial pulses palpable; pedal and pt pulses audible w/ doppler bilaterally; discharge instructions reviewed with pt and family and family has stent card and perclose pamphlet and verbalized understanding

## 2019-11-12 NOTE — PLAN OF CARE
Patient to cath recovery per stretcher w/ rails up x 2 and connected to monitor and sinus w/o any ectopy; pt aao; pt has a slight lower back ache 3/10 and able to tolerate this pain and usual for him; skin wm dry color pink; NS patent left arm iv access; pt has bilateral groin access drsgs and cdi and soft to touch; no blding swelling nor hematoma; 2+ radial pulses and audible dp and pt pulses bilaterally; began post op and home care teaching and able to teach back; pt flat in bed; yellow band id band and yellow socks in place; pt belongings at bedside including glasses

## 2019-11-12 NOTE — BRIEF OP NOTE
S/p LCX  PCI     Bilateral CFA access 8 fr       LM patent   Distal LAD 80% stenosis   Patent RCA   LCX  with R to L collaterals     LVEDP 8 mmHg       Crossed LCX  with antegrade Filder XT + LP Turnpike   PCI with 3.0 x 26 and 3.0 x 12 mm Resolute MILES post dilated with 3.5 NC       Plan:       MILES with aspirin + plavix   Statin therapy   GMT for CAD       Full report to follow

## 2019-11-12 NOTE — NURSING
Pt ambulated in unit and to bathroom; pt has no complaints; spouse to bedside; bilateral groin drsgs cdi and no blding swelling no hematoma and reports his low back is good now;pt dressing self and reviewed care at home again as earlier and verbalized understanding; pt cleared for discharge to home

## 2019-11-12 NOTE — H&P
Patient ID:  Julien Meléndez is a 78 y.o. male who presents for follow up of Coronary Artery Disease; Congestive Heart Failure; Hyperlipidemia; Hypertension; and Chest Pain        HPI:      Julien Meléndez 78 y.o. male is here follow up and complaining of worsening dyspnea with chest pain. He has also noted worsening fatigue. He has KELSEY and a visit with the sleep disorder team soon to assist with CPAP machine. He has CAD with AMI 2010 and MILES LAD. Recurrent chest pains 12/2015 with new MILES placed distal RCA. ( OM1 and 70% small apical LAD disease treated medically). EF 50-55%. Last PCI was guided with a stress test. He is on aspirin and plavix for DAPT. He is complaining of dizziness with fatigue. He is on 5 hypertensive agents with a SBP 90 mmHg        Nuclear stress test 3/2017 revealed a sizable fixed perfusion defect with associated akinesis involving the septal wall near the apex consistent with infarction without reversible ischemia. EF 49% down to 46% with stress. PET stress 4/2017 revealed a small sized moderate intensity fixed defect consistent with non-transmural infarct in the anteroapical wall in the usual distribution of the distal Left Anterior Descending Artery. This defect comprises 10 % of the left ventricular myocardium. There is a very small sized mild ischemia in the distal to apical inferolateral wall in the usual distribution of the distal OM1. This defect comprises 5 % of the left ventricular myocardium. After his PET stress the decision was medical therapy.               Echo 7/2019                 EF 45%              Diastolic dysfunction              Normal RV function              Normal PA pressure                       Patient Active Problem List     Diagnosis Date Noted    Hypersomnia with sleep apnea      Coronary artery disease 10/24/2017    Chronic fatigue 07/11/2017    Shortness of breath 07/11/2017    Equivocal stress test 03/27/2017          Fixed defect noted on  SPECT       Angina effort 2017    KELSEY (obstructive sleep apnea) 10/26/2016             2016              AHI 5.7 with oxygen jude 87%              AHI 11.2 in supine position              REM AHI 40                Type 2 diabetes mellitus without complication 2016    Abnormal findings on diagnostic imaging of heart and coronary circulation 2015             PET stress 2017              10% apical fixed defect              5% reversible ischemia at the distal apex              Normal EF                      Chest pain 2015    Coronary artery disease due to calcified coronary lesion 2015                Left heart cath 2015-Dr. Hernandez           LM patent  LAD patent except for distal 75%  LCX patent  OM1   RCA 90%        PCI of RCA 2.75 x 18 mm MILES  Unsuccessful PCI of OM1            LVEDP 11 mmHg  3.5 x 18 mm         Left hearth cath 3/1/2010 -Dr. Hernandez              RCA PCI with 3.5 x 15 mm  BMS     Left heart cath 2010-Dr. Hernandez              LAD 3.0 x 12 and 3.5 x 24 mm  BMS        Cardiomyopathy, ischemic 2013    Diabetes mellitus 2013    MI (myocardial infarction)      Diabetes mellitus      Hyperlipidemia      Hypertension      Hiatal hernia                 Right Arm BP - Sittin/80  Left Arm BP - Sittin/80           LABS     LAST HbA1c        Lab Results   Component Value Date     HGBA1C 6.9 (H) 2010         Lipid panel        Lab Results   Component Value Date     CHOL 144 2019     CHOL 149 2010            Lab Results   Component Value Date     HDL 33 (L) 2019     HDL 31 (L) 2010            Lab Results   Component Value Date     LDLCALC 77.6 2019     LDLCALC 97.4 2010            Lab Results   Component Value Date     TRIG 167 (H) 2019     TRIG 103 2010            Lab Results   Component Value Date     CHOLHDL 22.9 2019     CHOLHDL 20.8 2010                Review of Systems   Constitution: Negative for diaphoresis, night sweats, weight gain and weight loss.   HENT: Negative for congestion.    Eyes: Negative for blurred vision, discharge and double vision.   Cardiovascular: Negative for chest pain, claudication, cyanosis, dyspnea on exertion, irregular heartbeat, leg swelling, near-syncope, orthopnea, palpitations, paroxysmal nocturnal dyspnea and syncope.   Respiratory: Positive for shortness of breath, sleep disturbances due to breathing and snoring. Negative for cough and wheezing.    Endocrine: Negative for cold intolerance, heat intolerance and polyphagia.   Hematologic/Lymphatic: Negative for adenopathy and bleeding problem. Does not bruise/bleed easily.   Skin: Negative for dry skin and nail changes.   Musculoskeletal: Negative for arthritis, back pain, falls, joint pain, myalgias and neck pain.   Gastrointestinal: Negative for bloating, abdominal pain, change in bowel habit and constipation.   Genitourinary: Negative for bladder incontinence, dysuria, flank pain, genital sores and missed menses.   Neurological: Positive for dizziness and weakness. Negative for aphonia, brief paralysis and difficulty with concentration.   Psychiatric/Behavioral: Negative for altered mental status and memory loss. The patient does not have insomnia.    Allergic/Immunologic: Negative for environmental allergies.         Objective:   Physical Exam   Constitutional: He is oriented to person, place, and time. He appears well-developed and well-nourished. He is not intubated.   HENT:   Head: Normocephalic and atraumatic.   Right Ear: External ear normal.   Left Ear: External ear normal.   Mouth/Throat: Oropharynx is clear and moist.   Eyes: Pupils are equal, round, and reactive to light. Conjunctivae and EOM are normal. Right eye exhibits no discharge. Left eye exhibits no discharge. No scleral icterus.   Neck: Normal range of motion. Neck supple. Normal carotid pulses, no  hepatojugular reflux and no JVD present. Carotid bruit is not present. No tracheal deviation present. No thyromegaly present.   Cardiovascular: Normal rate, regular rhythm, S1 normal and S2 normal.  No extrasystoles are present. PMI is not displaced. Exam reveals no gallop, no S3, no distant heart sounds, no friction rub and no midsystolic click.   No murmur heard.  Pulses:       Carotid pulses are 2+ on the right side, and 2+ on the left side.       Radial pulses are 2+ on the right side, and 2+ on the left side.        Femoral pulses are 2+ on the right side, and 2+ on the left side.       Popliteal pulses are 2+ on the right side, and 2+ on the left side.        Dorsalis pedis pulses are 2+ on the right side, and 2+ on the left side.        Posterior tibial pulses are 2+ on the right side, and 2+ on the left side.   Pulmonary/Chest: Effort normal and breath sounds normal. No accessory muscle usage or stridor. No apnea, no tachypnea and no bradypnea. He is not intubated. No respiratory distress. He has no decreased breath sounds. He has no wheezes. He has no rales. He exhibits no tenderness and no bony tenderness.   Abdominal: He exhibits no distension, no pulsatile liver, no abdominal bruit, no ascites, no pulsatile midline mass and no mass. There is no tenderness. There is no rebound and no guarding.   Musculoskeletal: Normal range of motion. He exhibits no edema or tenderness.   Lymphadenopathy:     He has no cervical adenopathy.   Neurological: He is alert and oriented to person, place, and time. He has normal reflexes. No cranial nerve deficit. Coordination normal.   Skin: Skin is warm. No rash noted. No erythema. No pallor.   Psychiatric: He has a normal mood and affect. His behavior is normal. Judgment and thought content normal.         Assessment:      1. Angina effort    2. Mixed hyperlipidemia    3. Essential hypertension    4. Cardiomyopathy, ischemic    5. Coronary artery disease due to calcified  coronary lesion    6. Abnormal findings on diagnostic imaging of heart and coronary circulation    7. Coronary artery disease, angina presence unspecified, unspecified vessel or lesion type, unspecified whether native or transplanted heart    8. Type 2 diabetes mellitus without complication, without long-term current use of insulin    9. Shortness of breath    10. Chronic fatigue          Plan:            PET stress  LHC if more ischemic than 2017  R radial access   MILES candidate           He would like to proceed to LHC if stress is abnormal and bypass a clinic visit              Return sooner for concerns or questions. If symptoms persist go to the ED  I have reviewed all pertinent data on this patient            Follow up as scheduled. Return sooner for concerns or questions                 He expressed verbal understanding and agreed with the plan           I have reviewed the patient's medical history in detail and updated the computerized patient record.           Orders Placed This Encounter   Procedures    Cardiac PET Scan Stress       Standing Status:   Future       Standing Expiration Date:   8/7/2020       Order Specific Question:   Which medicaton for the stress procedure?       Answer:   Regadenoson    IN OFFICE EKG 12-LEAD (to Muse)       Order Specific Question:   Diagnosis       Answer:   CAD (coronary artery disease) [[id number]]    Case Request-Cath Lab: Left heart cath       Standing Status:   Standing       Number of Occurrences:   1       Order Specific Question:   CPT Code:       Answer:   [place] LEFT HEART CATH INJECT VETRICULOGRAPHY, IMAGE SUPERVISE/INTERP [67688]       Order Specific Question:   Medical Necessity:       Answer:   Medically Urgent [101]         Follow up as scheduled. Return sooner for concerns or questions                   Patient's Medications   New Prescriptions     No medications on file   Previous Medications     AMLODIPINE (NORVASC) 5 MG TABLET    TAKE 1 TABLET EVERY DAY      ASPIRIN (ECOTRIN) 81 MG EC TABLET    Take 81 mg by mouth once daily.     ATORVASTATIN (LIPITOR) 40 MG TABLET    TAKE 1 TABLET EVERY DAY     CLOPIDOGREL (PLAVIX) 75 MG TABLET    TAKE 1 TABLET EVERY DAY     FLUOXETINE (PROZAC) 20 MG CAPSULE    Take 1 capsule by mouth once daily.     GLIPIZIDE (GLUCOTROL) 5 MG TABLET    Take 1 tablet by mouth Daily.     ISOSORBIDE MONONITRATE (IMDUR) 30 MG 24 HR TABLET    TAKE 1 TABLET ONE TIME DAILY     LOSARTAN-HYDROCHLOROTHIAZIDE 100-25 MG (HYZAAR) 100-25 MG PER TABLET    TAKE 1 TABLET EVERY DAY     MECLIZINE (ANTIVERT) 25 MG TABLET    Take 1 tablet (25 mg total) by mouth 3 (three) times daily as needed for Dizziness.     METFORMIN (GLUCOPHAGE) 1000 MG TABLET    Take 1 tablet (1,000 mg total) by mouth daily with breakfast.     METOPROLOL SUCCINATE (TOPROL-XL) 25 MG 24 HR TABLET    Take 0.5 tablets (12.5 mg total) by mouth once daily.     NITROGLYCERIN (NITROSTAT) 0.4 MG SL TABLET    Place 1 tablet (0.4 mg total) under the tongue every 5 (five) minutes as needed for Chest pain.     TRAMADOL (ULTRAM) 50 MG TABLET    Take 1 tablet by mouth 2 (two) times daily as needed.     TRAZODONE (DESYREL) 150 MG TABLET         TRUE METRIX GLUCOSE TEST STRIP STRP         TRUEPLUS LANCETS 28 GAUGE MISC       Modified Medications     No medications on file   Discontinued Medications     No medications on file            He is here for another attempt to treat LCX         Risks, benefits and alternatives of cardiac catheterization and possible intervention were discussed with the patient. All questions were answered and informed consent obtained.     I discussed the importance of compliance with dual antiplatelet therapy with the patient to prevent acute or late stent thrombosis with premature discontinuation of the therapy.

## 2019-11-12 NOTE — Clinical Note
Catheter is removed from the ostium   right coronary artery. Angiography performed post intervention of the right coronary arteries.

## 2019-11-12 NOTE — NURSING
Patient discharged home with spouse and daughter per pov; pt escorted per nurse in [facility] to pov; pt aao; pt stable and has no complaints; pt able to ambulate and no assistance was needed; pt has his belongings and glasses; pts spouse has stent card, perclose pamphlet and copy of written discharge instructions

## 2019-11-12 NOTE — NURSING
Urinal provided; position changed; pt supine in bed; sinus on monitor; reviewed discharge instructions with pt and family and copy to family and verbalized understanding

## 2019-11-22 ENCOUNTER — TELEPHONE (OUTPATIENT)
Dept: CARDIOLOGY | Facility: HOSPITAL | Age: 79
End: 2019-11-22

## 2019-11-28 NOTE — DISCHARGE SUMMARY
[facility]  Cardiology  Discharge Summary      Patient Name: Julien Meléndez  MRN: [id number]  Admission Date: 11/12/2019  Hospital Length of Stay: 0 days   Discharge Date and Time: 11/12/2019  Attending Physician: No att. providers found  Discharging Provider: BEATRICE Robison, ANP  Primary Care Physician: Kelvin Wayne MD    HPI:  78 y.o. male is here follow up and complaining of worsening dyspnea with chest pain. He has also noted worsening fatigue. He has KELSEY and a visit with the sleep disorder team soon to assist with CPAP machine. He has CAD with AMI 2010 and MILES LAD. Recurrent chest pains 12/2015 with new MILES placed distal RCA. ( OM1 and 70% small apical LAD disease treated medically). EF 50-55%. Last PCI was guided with a stress test. He is on aspirin and plavix for DAPT. He is complaining of dizziness with fatigue. He is on 5 hypertensive agents with a SBP 90 mmHg        Nuclear stress test 3/2017 revealed a sizable fixed perfusion defect with associated akinesis involving the septal wall near the apex consistent with infarction without reversible ischemia. EF 49% down to 46% with stress. PET stress 4/2017 revealed a small sized moderate intensity fixed defect consistent with non-transmural infarct in the anteroapical wall in the usual distribution of the distal Left Anterior Descending Artery. This defect comprises 10 % of the left ventricular myocardium. There is a very small sized mild ischemia in the distal to apical inferolateral wall in the usual distribution of the distal OM1. This defect comprises 5 % of the left ventricular myocardium. After his PET stress the decision was medical therapy.               Echo 7/2019                 EF 45%              Diastolic dysfunction              Normal RV function              Normal PA pressure                   Procedure(s) (LRB):  Repair, Chronic Total Occlusion, Coronary (N/A)     Indwelling Lines/Drains at time of discharge:  none     Hospital Course       11/12/2019 Admitted for elective revasculization of LCX . Taken to cath lab for the procedure with following results:    · S/p LCX  PCI for angina III + ICM  · Patent LM, RCA, and LAD except for 80% distal LAD  · LCX  with R to L collaterals  · LVEDP 8 mmHg  · Crossed LCX  with antegrade Filder XT + LP Turnpike  · PCI with 3.0 x 26 and 3.0 x 12 mm Resolute MILES post dilated with 3.5 NC                 Plan:                    DAPT with aspirin + plavix              Statin therapy              GMT for CAD                 Repeat echo in 3 months          Tolerated procedure well. Recovered in the pre post cath area. HR and BP remained stable. Access site remains stable  with no active bleeding, hematoma or ecchymosis. Deemed ready for discharge and sent home on medication regimen as detailed on AVS. Will follow up in cardiology clinic with Dr. Azul    Consults: none     Significant Diagnostic Studies: none     Pending Diagnostic Studies:     None          Final Active Diagnoses:    Diagnosis Date Noted POA    PRINCIPAL PROBLEM:  Cardiomyopathy, ischemic [I25.5] 09/17/2013 Yes    Coronary artery disease [I25.10] 10/24/2017 Yes    Angina effort [I20.8] 03/23/2017 Yes    KELSEY (obstructive sleep apnea) [G47.33] 10/26/2016 Yes    Type 2 diabetes mellitus without complication [E11.9] 01/26/2016 Yes    Abnormal findings on diagnostic imaging of heart and coronary circulation [R93.1] 12/29/2015 Yes    Coronary artery disease due to calcified coronary lesion [I25.10, I25.84] 12/28/2015 Yes    Hypertension [I10]  Yes    Hyperlipidemia [E78.5]  Yes      Problems Resolved During this Admission:       Discharged Condition: good     Follow Up:  Follow-up Information     Schedule an appointment as soon as possible for a visit to follow up.               Patient Instructions:   No discharge procedures on file.  Medications:  Reconciled Home Medications:      Medication List       CONTINUE taking these medications    amLODIPine 5 MG tablet  Commonly known as:  NORVASC  TAKE 1 TABLET EVERY DAY     aspirin 81 MG EC tablet  Commonly known as:  ECOTRIN  Take 81 mg by mouth once daily.     atorvastatin 40 MG tablet  Commonly known as:  LIPITOR  TAKE 1 TABLET EVERY DAY     clopidogrel 75 mg tablet  Commonly known as:  PLAVIX  TAKE 1 TABLET EVERY DAY     FLUoxetine 20 MG capsule  Take 1 capsule by mouth once daily.     glipiZIDE 5 MG tablet  Commonly known as:  GLUCOTROL  Take 1 tablet by mouth Daily.     isosorbide mononitrate 30 MG 24 hr tablet  Commonly known as:  IMDUR  TAKE 1 TABLET ONE TIME DAILY     Janumet 50-1,000 mg per tablet  Generic drug:  SITagliptan-metformin  Take 1 tablet by mouth 2 (two) times daily with meals.     losartan-hydrochlorothiazide 100-25 mg 100-25 mg per tablet  Commonly known as:  HYZAAR  TAKE 1 TABLET EVERY DAY     meclizine 25 mg tablet  Commonly known as:  ANTIVERT  Take 1 tablet (25 mg total) by mouth 3 (three) times daily as needed for Dizziness.     metFORMIN 1000 MG tablet  Commonly known as:  GLUCOPHAGE  Take 1 tablet (1,000 mg total) by mouth daily with breakfast.     metoprolol succinate 25 MG 24 hr tablet  Commonly known as:  TOPROL-XL  Take 0.5 tablets (12.5 mg total) by mouth once daily.     nitroGLYCERIN 0.4 MG SL tablet  Commonly known as:  NITROSTAT  Place 1 tablet (0.4 mg total) under the tongue every 5 (five) minutes as needed for Chest pain.     traMADol 50 mg tablet  Commonly known as:  ULTRAM  Take 1 tablet by mouth 2 (two) times daily as needed.     traZODone 150 MG tablet  Commonly known as:  DESYREL     True Metrix Glucose Test Strip Strp  Generic drug:  blood sugar diagnostic     TRUEplus Lancets 28 gauge Misc  Generic drug:  lancets            Time spent on the discharge of patient: 30 minutes    BEATRICE Robison, ANP  Cardiology  [facility]

## 2020-08-09 NOTE — H&P
Patient ID:  Julien Meléndez is a 78 y.o. male who presents for follow up of Coronary Artery Disease; Congestive Heart Failure; Hyperlipidemia; Hypertension; and Chest Pain        HPI:      Julien Meléndez 78 y.o. male is here follow up and complaining of worsening dyspnea with chest pain. He has also noted worsening fatigue. He has KELSEY and a visit with the sleep disorder team soon to assist with CPAP machine. He has CAD with AMI 2010 and MILES LAD. Recurrent chest pains 12/2015 with new MILES placed distal RCA. ( OM1 and 70% small apical LAD disease treated medically). EF 50-55%. Last PCI was guided with a stress test. He is on aspirin and plavix for DAPT. He is complaining of dizziness with fatigue. He is on 5 hypertensive agents with a SBP 90 mmHg        Nuclear stress test 3/2017 revealed a sizable fixed perfusion defect with associated akinesis involving the septal wall near the apex consistent with infarction without reversible ischemia. EF 49% down to 46% with stress. PET stress 4/2017 revealed a small sized moderate intensity fixed defect consistent with non-transmural infarct in the anteroapical wall in the usual distribution of the distal Left Anterior Descending Artery. This defect comprises 10 % of the left ventricular myocardium. There is a very small sized mild ischemia in the distal to apical inferolateral wall in the usual distribution of the distal OM1. This defect comprises 5 % of the left ventricular myocardium. After his PET stress the decision was medical therapy.               Echo 7/2019                 EF 45%              Diastolic dysfunction              Normal RV function              Normal PA pressure                       Patient Active Problem List     Diagnosis Date Noted    Hypersomnia with sleep apnea      Coronary artery disease 10/24/2017    Chronic fatigue 07/11/2017    Shortness of breath 07/11/2017    Equivocal stress test 03/27/2017          Fixed defect noted on  SPECT       Angina effort 2017    KELSEY (obstructive sleep apnea) 10/26/2016             2016              AHI 5.7 with oxygen jude 87%              AHI 11.2 in supine position              REM AHI 40                Type 2 diabetes mellitus without complication 2016    Abnormal findings on diagnostic imaging of heart and coronary circulation 2015             PET stress 2017              10% apical fixed defect              5% reversible ischemia at the distal apex              Normal EF                      Chest pain 2015    Coronary artery disease due to calcified coronary lesion 2015                Left heart cath 2015-Dr. Hernandez           LM patent  LAD patent except for distal 75%  LCX patent  OM1   RCA 90%        PCI of RCA 2.75 x 18 mm MILES  Unsuccessful PCI of OM1            LVEDP 11 mmHg  3.5 x 18 mm         Left hearth cath 3/1/2010 -Dr. Hernandez              RCA PCI with 3.5 x 15 mm  BMS     Left heart cath 2010-Dr. Hernandez              LAD 3.0 x 12 and 3.5 x 24 mm  BMS        Cardiomyopathy, ischemic 2013    Diabetes mellitus 2013    MI (myocardial infarction)      Diabetes mellitus      Hyperlipidemia      Hypertension      Hiatal hernia                 Right Arm BP - Sittin/80  Left Arm BP - Sittin/80           LABS     LAST HbA1c        Lab Results   Component Value Date     HGBA1C 6.9 (H) 2010         Lipid panel        Lab Results   Component Value Date     CHOL 144 2019     CHOL 149 2010            Lab Results   Component Value Date     HDL 33 (L) 2019     HDL 31 (L) 2010            Lab Results   Component Value Date     LDLCALC 77.6 2019     LDLCALC 97.4 2010            Lab Results   Component Value Date     TRIG 167 (H) 2019     TRIG 103 2010            Lab Results   Component Value Date     CHOLHDL 22.9 2019     CHOLHDL 20.8 2010                Review of Systems   Constitution: Negative for diaphoresis, night sweats, weight gain and weight loss.   HENT: Negative for congestion.    Eyes: Negative for blurred vision, discharge and double vision.   Cardiovascular: Negative for chest pain, claudication, cyanosis, dyspnea on exertion, irregular heartbeat, leg swelling, near-syncope, orthopnea, palpitations, paroxysmal nocturnal dyspnea and syncope.   Respiratory: Positive for shortness of breath, sleep disturbances due to breathing and snoring. Negative for cough and wheezing.    Endocrine: Negative for cold intolerance, heat intolerance and polyphagia.   Hematologic/Lymphatic: Negative for adenopathy and bleeding problem. Does not bruise/bleed easily.   Skin: Negative for dry skin and nail changes.   Musculoskeletal: Negative for arthritis, back pain, falls, joint pain, myalgias and neck pain.   Gastrointestinal: Negative for bloating, abdominal pain, change in bowel habit and constipation.   Genitourinary: Negative for bladder incontinence, dysuria, flank pain, genital sores and missed menses.   Neurological: Positive for dizziness and weakness. Negative for aphonia, brief paralysis and difficulty with concentration.   Psychiatric/Behavioral: Negative for altered mental status and memory loss. The patient does not have insomnia.    Allergic/Immunologic: Negative for environmental allergies.         Objective:   Physical Exam   Constitutional: He is oriented to person, place, and time. He appears well-developed and well-nourished. He is not intubated.   HENT:   Head: Normocephalic and atraumatic.   Right Ear: External ear normal.   Left Ear: External ear normal.   Mouth/Throat: Oropharynx is clear and moist.   Eyes: Pupils are equal, round, and reactive to light. Conjunctivae and EOM are normal. Right eye exhibits no discharge. Left eye exhibits no discharge. No scleral icterus.   Neck: Normal range of motion. Neck supple. Normal carotid pulses, no  hepatojugular reflux and no JVD present. Carotid bruit is not present. No tracheal deviation present. No thyromegaly present.   Cardiovascular: Normal rate, regular rhythm, S1 normal and S2 normal.  No extrasystoles are present. PMI is not displaced. Exam reveals no gallop, no S3, no distant heart sounds, no friction rub and no midsystolic click.   No murmur heard.  Pulses:       Carotid pulses are 2+ on the right side, and 2+ on the left side.       Radial pulses are 2+ on the right side, and 2+ on the left side.        Femoral pulses are 2+ on the right side, and 2+ on the left side.       Popliteal pulses are 2+ on the right side, and 2+ on the left side.        Dorsalis pedis pulses are 2+ on the right side, and 2+ on the left side.        Posterior tibial pulses are 2+ on the right side, and 2+ on the left side.   Pulmonary/Chest: Effort normal and breath sounds normal. No accessory muscle usage or stridor. No apnea, no tachypnea and no bradypnea. He is not intubated. No respiratory distress. He has no decreased breath sounds. He has no wheezes. He has no rales. He exhibits no tenderness and no bony tenderness.   Abdominal: He exhibits no distension, no pulsatile liver, no abdominal bruit, no ascites, no pulsatile midline mass and no mass. There is no tenderness. There is no rebound and no guarding.   Musculoskeletal: Normal range of motion. He exhibits no edema or tenderness.   Lymphadenopathy:     He has no cervical adenopathy.   Neurological: He is alert and oriented to person, place, and time. He has normal reflexes. No cranial nerve deficit. Coordination normal.   Skin: Skin is warm. No rash noted. No erythema. No pallor.   Psychiatric: He has a normal mood and affect. His behavior is normal. Judgment and thought content normal.         Assessment:      1. Angina effort    2. Mixed hyperlipidemia    3. Essential hypertension    4. Cardiomyopathy, ischemic    5. Coronary artery disease due to calcified  coronary lesion    6. Abnormal findings on diagnostic imaging of heart and coronary circulation    7. Coronary artery disease, angina presence unspecified, unspecified vessel or lesion type, unspecified whether native or transplanted heart    8. Type 2 diabetes mellitus without complication, without long-term current use of insulin    9. Shortness of breath    10. Chronic fatigue          Plan:            PET stress  LHC if more ischemic than 2017  R radial access   MILES candidate           He would like to proceed to LHC if stress is abnormal and bypass a clinic visit              Return sooner for concerns or questions. If symptoms persist go to the ED  I have reviewed all pertinent data on this patient            Follow up as scheduled. Return sooner for concerns or questions                 He expressed verbal understanding and agreed with the plan           I have reviewed the patient's medical history in detail and updated the computerized patient record.           Orders Placed This Encounter   Procedures    Cardiac PET Scan Stress       Standing Status:   Future       Standing Expiration Date:   8/7/2020       Order Specific Question:   Which medicaton for the stress procedure?       Answer:   Regadenoson    IN OFFICE EKG 12-LEAD (to Muse)       Order Specific Question:   Diagnosis       Answer:   CAD (coronary artery disease) [157287]    Case Request-Cath Lab: Left heart cath       Standing Status:   Standing       Number of Occurrences:   1       Order Specific Question:   CPT Code:       Answer:   [place] LEFT HEART CATH INJECT VETRICULOGRAPHY, IMAGE SUPERVISE/INTERP [74718]       Order Specific Question:   Medical Necessity:       Answer:   Medically Urgent [101]         Follow up as scheduled. Return sooner for concerns or questions                   Patient's Medications   New Prescriptions     No medications on file   Previous Medications     AMLODIPINE (NORVASC) 5 MG TABLET    TAKE 1 TABLET EVERY DAY      ASPIRIN (ECOTRIN) 81 MG EC TABLET    Take 81 mg by mouth once daily.     ATORVASTATIN (LIPITOR) 40 MG TABLET    TAKE 1 TABLET EVERY DAY     CLOPIDOGREL (PLAVIX) 75 MG TABLET    TAKE 1 TABLET EVERY DAY     FLUOXETINE (PROZAC) 20 MG CAPSULE    Take 1 capsule by mouth once daily.     GLIPIZIDE (GLUCOTROL) 5 MG TABLET    Take 1 tablet by mouth Daily.     ISOSORBIDE MONONITRATE (IMDUR) 30 MG 24 HR TABLET    TAKE 1 TABLET ONE TIME DAILY     LOSARTAN-HYDROCHLOROTHIAZIDE 100-25 MG (HYZAAR) 100-25 MG PER TABLET    TAKE 1 TABLET EVERY DAY     MECLIZINE (ANTIVERT) 25 MG TABLET    Take 1 tablet (25 mg total) by mouth 3 (three) times daily as needed for Dizziness.     METFORMIN (GLUCOPHAGE) 1000 MG TABLET    Take 1 tablet (1,000 mg total) by mouth daily with breakfast.     METOPROLOL SUCCINATE (TOPROL-XL) 25 MG 24 HR TABLET    Take 0.5 tablets (12.5 mg total) by mouth once daily.     NITROGLYCERIN (NITROSTAT) 0.4 MG SL TABLET    Place 1 tablet (0.4 mg total) under the tongue every 5 (five) minutes as needed for Chest pain.     TRAMADOL (ULTRAM) 50 MG TABLET    Take 1 tablet by mouth 2 (two) times daily as needed.     TRAZODONE (DESYREL) 150 MG TABLET         TRUE METRIX GLUCOSE TEST STRIP STRP         TRUEPLUS LANCETS 28 GAUGE MISC       Modified Medications     No medications on file   Discontinued Medications     No medications on file                He could not tolerate a stress test  He was cancelled   He came here today for [facility]  It was cancelled because of ARF      He will have a BMP next week to determine an alternative date        Patient and wife expressed verbal understanding of the plan           Yes

## 2020-09-01 ENCOUNTER — TELEPHONE (OUTPATIENT)
Dept: CARDIOLOGY | Facility: CLINIC | Age: 80
End: 2020-09-01

## 2020-09-01 NOTE — TELEPHONE ENCOUNTER
----- Message from Vanessa Bhat sent at 9/1/2020  9:44 AM CDT -----  Contact: [phone]/Self  Patient is requesting to speak with nurse regarding scheduling an f/u appointment. Please advise.

## 2020-09-01 NOTE — TELEPHONE ENCOUNTER
I reached out  To patient today.     you will need some test done prior to you scheduling an appointment with .    Please call us back. To schedule.      NW                                    ----- Message from Adrianna Caceres MA sent at 2020  4:54 PM CDT -----  Vanessa GUZMAN Staff  Caller: [phone]/Self (Today,  9:40 AM)         Patient is requesting to speak with nurse regarding scheduling a f/u appointment. Call back this afternoon, pt is going to a . Please advise.

## 2020-09-02 NOTE — TELEPHONE ENCOUNTER
I called and left V/message.    Mr. Meléndez you will Need to schedule some Test before     you see .    All orders are in under  Name. , and you can schedule @ [phone].    1) Myocardial Stress test    2) Lab    3) ekg.    Have all test done  1 week before you see the Doctor.        Thank you--      NW

## 2020-09-03 ENCOUNTER — LAB VISIT (OUTPATIENT)
Dept: LAB | Facility: HOSPITAL | Age: 80
End: 2020-09-03
Attending: INTERNAL MEDICINE
Payer: MEDICARE

## 2020-09-03 DIAGNOSIS — E11.9 TYPE 2 DIABETES MELLITUS WITHOUT COMPLICATION, WITHOUT LONG-TERM CURRENT USE OF INSULIN: ICD-10-CM

## 2020-09-03 DIAGNOSIS — N18.30 STAGE 3 CHRONIC KIDNEY DISEASE: ICD-10-CM

## 2020-09-03 DIAGNOSIS — Z12.5 PROSTATE CANCER SCREENING: ICD-10-CM

## 2020-09-03 LAB
ALBUMIN SERPL BCP-MCNC: 4.1 G/DL (ref 3.5–5.2)
ALP SERPL-CCNC: 50 U/L (ref 55–135)
ALT SERPL W/O P-5'-P-CCNC: 15 U/L (ref 10–44)
ANION GAP SERPL CALC-SCNC: 12 MMOL/L (ref 8–16)
AST SERPL-CCNC: 15 U/L (ref 10–40)
BILIRUB SERPL-MCNC: 0.8 MG/DL (ref 0.1–1)
BUN SERPL-MCNC: 27 MG/DL (ref 8–23)
CALCIUM SERPL-MCNC: 10 MG/DL (ref 8.7–10.5)
CHLORIDE SERPL-SCNC: 106 MMOL/L (ref 95–110)
CHOLEST SERPL-MCNC: 118 MG/DL (ref 120–199)
CHOLEST/HDLC SERPL: 3.3 {RATIO} (ref 2–5)
CO2 SERPL-SCNC: 23 MMOL/L (ref 23–29)
COMPLEXED PSA SERPL-MCNC: 1.3 NG/ML (ref 0–4)
CREAT SERPL-MCNC: 1.1 MG/DL (ref 0.5–1.4)
ERYTHROCYTE [DISTWIDTH] IN BLOOD BY AUTOMATED COUNT: 13.2 % (ref 11.5–14.5)
EST. GFR  (AFRICAN AMERICAN): >60 ML/MIN/1.73 M^2
EST. GFR  (NON AFRICAN AMERICAN): >60 ML/MIN/1.73 M^2
ESTIMATED AVG GLUCOSE: 148 MG/DL (ref 68–131)
GLUCOSE SERPL-MCNC: 118 MG/DL (ref 70–110)
HBA1C MFR BLD HPLC: 6.8 % (ref 4–5.6)
HCT VFR BLD AUTO: 44.9 % (ref 40–54)
HDLC SERPL-MCNC: 36 MG/DL (ref 40–75)
HDLC SERPL: 30.5 % (ref 20–50)
HGB BLD-MCNC: 14.9 G/DL (ref 14–18)
LDLC SERPL CALC-MCNC: 65.4 MG/DL (ref 63–159)
MCH RBC QN AUTO: 30.2 PG (ref 27–31)
MCHC RBC AUTO-ENTMCNC: 33.2 G/DL (ref 32–36)
MCV RBC AUTO: 91 FL (ref 82–98)
NONHDLC SERPL-MCNC: 82 MG/DL
PLATELET # BLD AUTO: 158 K/UL (ref 150–350)
PMV BLD AUTO: 12.2 FL (ref 9.2–12.9)
POTASSIUM SERPL-SCNC: 4.5 MMOL/L (ref 3.5–5.1)
PROT SERPL-MCNC: 6.8 G/DL (ref 6–8.4)
RBC # BLD AUTO: 4.94 M/UL (ref 4.6–6.2)
SODIUM SERPL-SCNC: 141 MMOL/L (ref 136–145)
TRIGL SERPL-MCNC: 83 MG/DL (ref 30–150)
WBC # BLD AUTO: 7.72 K/UL (ref 3.9–12.7)

## 2020-09-03 PROCEDURE — 36415 COLL VENOUS BLD VENIPUNCTURE: CPT

## 2020-09-03 PROCEDURE — 83036 HEMOGLOBIN GLYCOSYLATED A1C: CPT

## 2020-09-03 PROCEDURE — 80061 LIPID PANEL: CPT

## 2020-09-03 PROCEDURE — 84153 ASSAY OF PSA TOTAL: CPT

## 2020-09-03 PROCEDURE — 85027 COMPLETE CBC AUTOMATED: CPT

## 2020-09-03 PROCEDURE — 80053 COMPREHEN METABOLIC PANEL: CPT

## 2020-09-04 ENCOUNTER — TELEPHONE (OUTPATIENT)
Dept: CARDIOLOGY | Facility: CLINIC | Age: 80
End: 2020-09-04
Payer: MEDICARE

## 2020-09-08 ENCOUNTER — TELEPHONE (OUTPATIENT)
Dept: CARDIOLOGY | Facility: CLINIC | Age: 80
End: 2020-09-08

## 2020-09-08 PROBLEM — I25.10 ATHEROSCLEROTIC HEART DISEASE OF NATIVE CORONARY ARTERY WITHOUT ANGINA PECTORIS: Status: ACTIVE | Noted: 2017-04-25

## 2020-09-08 PROBLEM — K57.30 DIVERTICULOSIS OF LARGE INTESTINE WITHOUT PERFORATION OR ABSCESS WITHOUT BLEEDING: Status: ACTIVE | Noted: 2017-04-25

## 2020-09-08 PROBLEM — I51.9 SYSTOLIC DYSFUNCTION: Status: ACTIVE | Noted: 2017-04-25

## 2020-09-08 NOTE — TELEPHONE ENCOUNTER
I called and left V/Message reminding patient of his up coming appointments .    Nuclear Stress Test   And Lab  And /irena.          ARACELI

## 2020-09-16 ENCOUNTER — HOSPITAL ENCOUNTER (OUTPATIENT)
Dept: RADIOLOGY | Facility: HOSPITAL | Age: 80
Discharge: HOME | End: 2020-09-16
Attending: INTERNAL MEDICINE
Payer: MEDICARE

## 2020-09-16 ENCOUNTER — HOSPITAL ENCOUNTER (OUTPATIENT)
Dept: CARDIOLOGY | Facility: HOSPITAL | Age: 80
Discharge: HOME | End: 2020-09-16
Attending: INTERNAL MEDICINE
Payer: MEDICARE

## 2020-09-16 VITALS — BODY MASS INDEX: 30.48 KG/M2 | HEIGHT: 72 IN | WEIGHT: 225 LBS

## 2020-09-16 DIAGNOSIS — R07.9 ACUTE CHEST PAIN: ICD-10-CM

## 2020-09-16 DIAGNOSIS — R06.00 DYSPNEA, UNSPECIFIED TYPE: ICD-10-CM

## 2020-09-16 PROCEDURE — A9502 TC99M TETROFOSMIN: HCPCS

## 2020-09-16 PROCEDURE — 93016 CV STRESS TEST SUPVJ ONLY: CPT | Mod: ,,, | Performed by: INTERNAL MEDICINE

## 2020-09-16 PROCEDURE — 93017 CV STRESS TEST TRACING ONLY: CPT

## 2020-09-16 PROCEDURE — 93018 TREADMILL STRESS TEST (CUPID ONLY): ICD-10-PCS | Mod: ,,, | Performed by: INTERNAL MEDICINE

## 2020-09-16 PROCEDURE — 78452 HT MUSCLE IMAGE SPECT MULT: CPT | Mod: 26,,, | Performed by: RADIOLOGY

## 2020-09-16 PROCEDURE — 63600175 PHARM REV CODE 636 W HCPCS: Performed by: INTERNAL MEDICINE

## 2020-09-16 PROCEDURE — 78452 NM MYOCARDIAL PERFUSION SPECT MULTI PHARM: ICD-10-PCS | Mod: 26,,, | Performed by: RADIOLOGY

## 2020-09-16 PROCEDURE — 93018 CV STRESS TEST I&R ONLY: CPT | Mod: ,,, | Performed by: INTERNAL MEDICINE

## 2020-09-16 PROCEDURE — 93016 TREADMILL STRESS TEST (CUPID ONLY): ICD-10-PCS | Mod: ,,, | Performed by: INTERNAL MEDICINE

## 2020-09-16 RX ORDER — REGADENOSON 0.08 MG/ML
0.4 INJECTION, SOLUTION INTRAVENOUS ONCE
Status: COMPLETED | OUTPATIENT
Start: 2020-09-16 | End: 2020-09-16

## 2020-09-16 RX ADMIN — REGADENOSON 0.4 MG: 0.08 INJECTION, SOLUTION INTRAVENOUS at 10:09

## 2020-09-17 LAB
CV STRESS BASE HR: 75 BPM
DIASTOLIC BLOOD PRESSURE: 54 MMHG
OHS CV CPX 85 PERCENT MAX PREDICTED HEART RATE MALE: 119
OHS CV CPX MAX PREDICTED HEART RATE: 140
OHS CV CPX PATIENT IS FEMALE: 0
OHS CV CPX PATIENT IS MALE: 1
OHS CV CPX PEAK DIASTOLIC BLOOD PRESSURE: 66 MMHG
OHS CV CPX PEAK HEAR RATE: 96 BPM
OHS CV CPX PEAK RATE PRESSURE PRODUCT: NORMAL
OHS CV CPX PEAK SYSTOLIC BLOOD PRESSURE: 125 MMHG
OHS CV CPX PERCENT MAX PREDICTED HEART RATE ACHIEVED: 69
OHS CV CPX RATE PRESSURE PRODUCT PRESENTING: 9375
SYSTOLIC BLOOD PRESSURE: 125 MMHG

## 2020-09-28 ENCOUNTER — OFFICE VISIT (OUTPATIENT)
Dept: CARDIOLOGY | Facility: CLINIC | Age: 80
End: 2020-09-28
Payer: MEDICARE

## 2020-09-28 VITALS
HEIGHT: 73 IN | SYSTOLIC BLOOD PRESSURE: 120 MMHG | DIASTOLIC BLOOD PRESSURE: 73 MMHG | BODY MASS INDEX: 29.69 KG/M2 | HEART RATE: 75 BPM | WEIGHT: 224 LBS

## 2020-09-28 DIAGNOSIS — E11.51 TYPE 2 DIABETES MELLITUS WITH DIABETIC PERIPHERAL ANGIOPATHY WITHOUT GANGRENE, WITHOUT LONG-TERM CURRENT USE OF INSULIN: ICD-10-CM

## 2020-09-28 DIAGNOSIS — I51.9 SYSTOLIC DYSFUNCTION: ICD-10-CM

## 2020-09-28 DIAGNOSIS — I25.84 CORONARY ARTERY DISEASE DUE TO CALCIFIED CORONARY LESION: Primary | ICD-10-CM

## 2020-09-28 DIAGNOSIS — I10 ESSENTIAL HYPERTENSION: ICD-10-CM

## 2020-09-28 DIAGNOSIS — I25.5 CARDIOMYOPATHY, ISCHEMIC: ICD-10-CM

## 2020-09-28 DIAGNOSIS — I73.9 PAD (PERIPHERAL ARTERY DISEASE): ICD-10-CM

## 2020-09-28 DIAGNOSIS — I87.2 VENOUS INSUFFICIENCY OF BOTH LOWER EXTREMITIES: ICD-10-CM

## 2020-09-28 DIAGNOSIS — G47.33 OSA (OBSTRUCTIVE SLEEP APNEA): ICD-10-CM

## 2020-09-28 DIAGNOSIS — R53.82 CHRONIC FATIGUE: ICD-10-CM

## 2020-09-28 DIAGNOSIS — I25.10 CORONARY ARTERY DISEASE, ANGINA PRESENCE UNSPECIFIED, UNSPECIFIED VESSEL OR LESION TYPE, UNSPECIFIED WHETHER NATIVE OR TRANSPLANTED HEART: ICD-10-CM

## 2020-09-28 DIAGNOSIS — I25.10 CORONARY ARTERY DISEASE DUE TO CALCIFIED CORONARY LESION: Primary | ICD-10-CM

## 2020-09-28 DIAGNOSIS — E78.2 MIXED HYPERLIPIDEMIA: ICD-10-CM

## 2020-09-28 PROCEDURE — 3078F PR MOST RECENT DIASTOLIC BLOOD PRESSURE < 80 MM HG: ICD-10-PCS | Mod: CPTII,S$GLB,, | Performed by: INTERNAL MEDICINE

## 2020-09-28 PROCEDURE — 99999 PR PBB SHADOW E&M-EST. PATIENT-LVL IV: ICD-10-PCS | Mod: PBBFAC,,, | Performed by: INTERNAL MEDICINE

## 2020-09-28 PROCEDURE — 3074F PR MOST RECENT SYSTOLIC BLOOD PRESSURE < 130 MM HG: ICD-10-PCS | Mod: CPTII,S$GLB,, | Performed by: INTERNAL MEDICINE

## 2020-09-28 PROCEDURE — 3078F DIAST BP <80 MM HG: CPT | Mod: CPTII,S$GLB,, | Performed by: INTERNAL MEDICINE

## 2020-09-28 PROCEDURE — 1159F PR MEDICATION LIST DOCUMENTED IN MEDICAL RECORD: ICD-10-PCS | Mod: S$GLB,,, | Performed by: INTERNAL MEDICINE

## 2020-09-28 PROCEDURE — 1101F PR PT FALLS ASSESS DOC 0-1 FALLS W/OUT INJ PAST YR: ICD-10-PCS | Mod: CPTII,S$GLB,, | Performed by: INTERNAL MEDICINE

## 2020-09-28 PROCEDURE — 1159F MED LIST DOCD IN RCRD: CPT | Mod: S$GLB,,, | Performed by: INTERNAL MEDICINE

## 2020-09-28 PROCEDURE — 1101F PT FALLS ASSESS-DOCD LE1/YR: CPT | Mod: CPTII,S$GLB,, | Performed by: INTERNAL MEDICINE

## 2020-09-28 PROCEDURE — 99999 PR PBB SHADOW E&M-EST. PATIENT-LVL IV: CPT | Mod: PBBFAC,,, | Performed by: INTERNAL MEDICINE

## 2020-09-28 PROCEDURE — 3074F SYST BP LT 130 MM HG: CPT | Mod: CPTII,S$GLB,, | Performed by: INTERNAL MEDICINE

## 2020-09-28 PROCEDURE — 99215 OFFICE O/P EST HI 40 MIN: CPT | Mod: S$GLB,,, | Performed by: INTERNAL MEDICINE

## 2020-09-28 PROCEDURE — 99215 PR OFFICE/OUTPT VISIT, EST, LEVL V, 40-54 MIN: ICD-10-PCS | Mod: S$GLB,,, | Performed by: INTERNAL MEDICINE

## 2020-09-28 PROCEDURE — 1126F PR PAIN SEVERITY QUANTIFIED, NO PAIN PRESENT: ICD-10-PCS | Mod: S$GLB,,, | Performed by: INTERNAL MEDICINE

## 2020-09-28 PROCEDURE — 1126F AMNT PAIN NOTED NONE PRSNT: CPT | Mod: S$GLB,,, | Performed by: INTERNAL MEDICINE

## 2020-09-28 RX ORDER — LOSARTAN POTASSIUM AND HYDROCHLOROTHIAZIDE 25; 100 MG/1; MG/1
1 TABLET ORAL DAILY
Qty: 90 TABLET | Refills: 3 | Status: SHIPPED | OUTPATIENT
Start: 2020-09-28 | End: 2021-08-20

## 2020-09-28 NOTE — PROGRESS NOTES
Subjective:   Patient ID:  Julien Meléndez is a 80 y.o. male who presents for follow up of Coronary Artery Disease, Hyperlipidemia, Hypertension, and Sleep Apnea      HPI:     Julien Meléndez 80 y.o. male is here follow up of CAD, HTN, HLP, and KELSEY.         He has KELSEY but does not use his CPAP machine regularly. He has CAD with AMI 2010 and MILES LAD. Recurrent chest pains 12/2015 with new MILES placed distal RCA. ( OM1 and 70% small apical LAD disease treated medically). 11/2019 he had LCX  PCI.          Nuclear stress test 3/2017 revealed a sizable fixed perfusion defect with associated akinesis involving the septal wall near the apex consistent with infarction without reversible ischemia. EF 49% down to 46% with stress. PET stress 4/2017 revealed a small sized moderate intensity fixed defect consistent with non-transmural infarct in the anteroapical wall in the usual distribution of the distal Left Anterior Descending Artery. This defect comprises 10 % of the left ventricular myocardium. There is a very small sized mild ischemia in the distal to apical inferolateral wall in the usual distribution of the distal OM1. This defect comprises 5 % of the left ventricular myocardium. After his PET stress the decision was medical therapy.      He has noted trace bilateral edema after a long day. He has dyspnea with strenuous activities.         Echo 7/2019     EF 45%   Diastolic dysfunction   Normal RV function   Normal PA pressure       S/p LCX  PCI 11/2019                 Bilateral CFA access 8 fr                    LM patent              Distal LAD 80% stenosis              Patent RCA              LCX  with R to L collaterals                 LVEDP 8 mmHg                    Crossed LCX  with antegrade Filder XT + LP Turnpike              PCI with 3.0 x 26 and 3.0 x 12 mm Resolute MILES post dilated with 3.5 NC        9/2020       Stress test with mild hypokinesis in LV apex with infarct   Normal EF 57% at rest  down to 47% with stress   No ischemia noted     ECG during stress test revealed atrial flutter              Patient Active Problem List    Diagnosis Date Noted    Angina effort 03/23/2017     Priority: Low     IMO Regulatory Load October 2019      Stage 3 chronic kidney disease 10/03/2019    Hypersomnia with sleep apnea     Coronary artery disease 10/24/2017    Chronic fatigue 07/11/2017    Shortness of breath 07/11/2017    Atherosclerotic heart disease of native coronary artery without angina pectoris 04/25/2017    Diverticulosis of large intestine without perforation or abscess without bleeding 04/25/2017    Systolic dysfunction 04/25/2017    Equivocal stress test 03/27/2017       Fixed defect noted on SPECT      Major depressive disorder, single episode, mild 12/19/2016    KELSEY (obstructive sleep apnea) 10/26/2016         12/2016   AHI 5.7 with oxygen jude 87%   AHI 11.2 in supine position   REM AHI 40            Type 2 diabetes mellitus without complication 01/26/2016    Abnormal findings on diagnostic imaging of heart and coronary circulation 12/29/2015         PET stress 4/2017   10% apical fixed defect   5% reversible ischemia at the distal apex   Normal EF                Chest pain 12/29/2015    Coronary artery disease due to calcified coronary lesion 12/28/2015           Left heart cath 12/2015-Dr. Hernandez        LM patent  LAD patent except for distal 75%  LCX patent  OM1   RCA 90%      PCI of RCA 2.75 x 18 mm MILES  Unsuccessful PCI of OM1         LVEDP 11 mmHg  3.5 x 18 mm       Left hearth cath 3/1/2010 -Dr. Hernandez   RCA PCI with 3.5 x 15 mm  BMS    Left heart cath 1/31/2010-Dr. Hernandez   LAD 3.0 x 12 and 3.5 x 24 mm  BMS         [facility] 11/12/2019      S/p LCX  PCI     Bilateral CFA access 8 fr       LM patent   Distal LAD 80% stenosis   Patent RCA   LCX  with R to L collaterals     LVEDP 8 mmHg       Crossed LCX  with antegrade Filder XT + LP Turnpike   PCI with 3.0  x 26 and 3.0 x 12 mm Resolute MILES post dilated with 3.5 NC           PAD (peripheral artery disease) 2015    Type 2 diabetes mellitus with diabetic peripheral angiopathy without gangrene 2015    Unspecified osteoarthritis, unspecified site 2015    Cardiomyopathy, ischemic 2013    Diabetes mellitus 2013    MI (myocardial infarction)     Diabetes mellitus     Hyperlipidemia     Hypertension     Hiatal hernia            Right Arm BP - Sittin/69  Left Arm BP - Sittin/73        LABS    LAST HbA1c  Lab Results   Component Value Date    HGBA1C 6.8 (H) 2020       Lipid panel  Lab Results   Component Value Date    CHOL 118 (L) 2020    CHOL 144 2019    CHOL 149 2010     Lab Results   Component Value Date    HDL 36 (L) 2020    HDL 33 (L) 2019    HDL 31 (L) 2010     Lab Results   Component Value Date    LDLCALC 65.4 2020    LDLCALC 77.6 2019    LDLCALC 97.4 2010     Lab Results   Component Value Date    TRIG 83 2020    TRIG 167 (H) 2019    TRIG 103 2010     Lab Results   Component Value Date    CHOLHDL 30.5 2020    CHOLHDL 22.9 2019    CHOLHDL 20.8 2010            Review of Systems   Constitution: Negative for diaphoresis, night sweats, weight gain and weight loss.   HENT: Negative for congestion.    Eyes: Negative for blurred vision, discharge and double vision.   Cardiovascular: Negative for chest pain, claudication, cyanosis, dyspnea on exertion, irregular heartbeat, leg swelling, near-syncope, orthopnea, palpitations, paroxysmal nocturnal dyspnea and syncope.   Respiratory: Positive for shortness of breath, sleep disturbances due to breathing and snoring. Negative for cough and wheezing.    Endocrine: Negative for cold intolerance, heat intolerance and polyphagia.   Hematologic/Lymphatic: Negative for adenopathy and bleeding problem. Does not bruise/bleed easily.   Skin:  Negative for dry skin and nail changes.   Musculoskeletal: Negative for arthritis, back pain, falls, joint pain, myalgias and neck pain.   Gastrointestinal: Negative for bloating, abdominal pain, change in bowel habit and constipation.   Genitourinary: Negative for bladder incontinence, dysuria, flank pain, genital sores and missed menses.   Neurological: Positive for dizziness and weakness. Negative for aphonia, brief paralysis and difficulty with concentration.   Psychiatric/Behavioral: Negative for altered mental status and memory loss. The patient does not have insomnia.    Allergic/Immunologic: Negative for environmental allergies.       Objective:   Physical Exam   Constitutional: He is oriented to person, place, and time. He appears well-developed and well-nourished. He is not intubated.   HENT:   Head: Normocephalic and atraumatic.   Right Ear: External ear normal.   Left Ear: External ear normal.   Mouth/Throat: Oropharynx is clear and moist.   Eyes: Pupils are equal, round, and reactive to light. Conjunctivae and EOM are normal. Right eye exhibits no discharge. Left eye exhibits no discharge. No scleral icterus.   Neck: Normal range of motion. Neck supple. Normal carotid pulses, no hepatojugular reflux and no JVD present. Carotid bruit is not present. No tracheal deviation present. No thyromegaly present.   Cardiovascular: Normal rate, regular rhythm, S1 normal and S2 normal.  No extrasystoles are present. PMI is not displaced. Exam reveals no gallop, no S3, no distant heart sounds, no friction rub and no midsystolic click.   No murmur heard.  Pulses:       Carotid pulses are 2+ on the right side and 2+ on the left side.       Radial pulses are 2+ on the right side and 2+ on the left side.        Femoral pulses are 2+ on the right side and 2+ on the left side.       Popliteal pulses are 2+ on the right side and 2+ on the left side.        Dorsalis pedis pulses are 2+ on the right side and 2+ on the left  side.        Posterior tibial pulses are 2+ on the right side and 2+ on the left side.   Pulmonary/Chest: Effort normal and breath sounds normal. No accessory muscle usage or stridor. No apnea, no tachypnea and no bradypnea. He is not intubated. No respiratory distress. He has no decreased breath sounds. He has no wheezes. He has no rales. He exhibits no tenderness and no bony tenderness.   Abdominal: He exhibits no distension, no pulsatile liver, no abdominal bruit, no ascites, no pulsatile midline mass and no mass. There is no abdominal tenderness. There is no rebound and no guarding.   Musculoskeletal: Normal range of motion.         General: No tenderness or edema.   Lymphadenopathy:     He has no cervical adenopathy.   Neurological: He is alert and oriented to person, place, and time. He has normal reflexes. No cranial nerve deficit. Coordination normal.   Skin: Skin is warm. No rash noted. No erythema. No pallor.   Psychiatric: He has a normal mood and affect. His behavior is normal. Judgment and thought content normal.       Assessment:     1. Coronary artery disease due to calcified coronary lesion    2. Cardiomyopathy, ischemic    3. Essential hypertension    4. Mixed hyperlipidemia    5. PAD (peripheral artery disease)    6. Systolic dysfunction    7. Type 2 diabetes mellitus with diabetic peripheral angiopathy without gangrene, without long-term current use of insulin    8. KELSEY (obstructive sleep apnea)    9. Coronary artery disease, angina presence unspecified, unspecified vessel or lesion type, unspecified whether native or transplanted heart    10. Chronic fatigue    11. Venous insufficiency of both lower extremities        Plan:       Continue with medical therapy for coronary disease.  Decrease salt intake.  Encourage patient to use CPAP obstructive sleep apnea.  Exercise.  Weight loss.  Patient will start using compression stockings for edema of bilateral lower extremity which is consistent with  mild venous insufficiency.      Start anti-coagulation for atrial flutter  Referral to EP for RFA       Return sooner for concerns or questions. If symptoms persist go to the ED  I have reviewed all pertinent data on this patient         Follow up as scheduled. Return sooner for concerns or questions            He expressed verbal understanding and agreed with the plan        I have reviewed the patient's medical history in detail and updated the computerized patient record.    Orders Placed This Encounter   Procedures    COMPRESSION STOCKINGS     Knee high     Order Specific Question:   Pressure amount:     Answer:   20-30 mmHg    Comprehensive metabolic panel     Standing Status:   Future     Standing Expiration Date:   11/27/2021    Lipid Panel     Standing Status:   Future     Standing Expiration Date:   11/27/2021    Echo Color Flow Doppler? Yes     Standing Status:   Future     Standing Expiration Date:   9/28/2021     Order Specific Question:   Color Flow Doppler?     Answer:   Yes       Follow up as scheduled. Return sooner for concerns or questions          Patient's Medications   New Prescriptions    No medications on file   Previous Medications    AMLODIPINE (NORVASC) 5 MG TABLET    TAKE 1 TABLET EVERY DAY    ASPIRIN (ECOTRIN) 81 MG EC TABLET    Take 81 mg by mouth once daily.    ATORVASTATIN (LIPITOR) 40 MG TABLET    TAKE 1 TABLET EVERY DAY    CLOPIDOGREL (PLAVIX) 75 MG TABLET    TAKE 1 TABLET EVERY DAY    FLUOXETINE (PROZAC) 20 MG CAPSULE    Take 1 capsule by mouth once daily.    GLIPIZIDE (GLUCOTROL) 5 MG TABLET    Take 1 tablet by mouth Daily.    ISOSORBIDE MONONITRATE (IMDUR) 30 MG 24 HR TABLET    TAKE 1 TABLET ONE TIME DAILY    METOPROLOL SUCCINATE (TOPROL-XL) 25 MG 24 HR TABLET    Take 0.5 tablets (12.5 mg total) by mouth once daily.    NITROGLYCERIN (NITROSTAT) 0.4 MG SL TABLET    Place 1 tablet (0.4 mg total) under the tongue every 5 (five) minutes as needed for Chest pain.     SITAGLIPTAN-METFORMIN (JANUMET) 50-1,000 MG PER TABLET    Take 1 tablet by mouth 2 (two) times daily with meals.    TRAMADOL (ULTRAM) 50 MG TABLET    Take 1 tablet (50 mg total) by mouth 2 (two) times daily as needed.    TRAZODONE (DESYREL) 150 MG TABLET        TRUE METRIX GLUCOSE TEST STRIP STRP        TRUEPLUS LANCETS 28 GAUGE MISC       Modified Medications    Modified Medication Previous Medication    LOSARTAN-HYDROCHLOROTHIAZIDE 100-25 MG (HYZAAR) 100-25 MG PER TABLET losartan-hydrochlorothiazide 100-25 mg (HYZAAR) 100-25 mg per tablet       Take 1 tablet by mouth once daily.    TAKE 1 TABLET EVERY DAY   Discontinued Medications    No medications on file

## 2020-09-28 NOTE — PATIENT INSTRUCTIONS
Heart Disease Education    The heart beats 60 to 100 times per minute, 24 hours a day. This equals almost 1000,000 times a day. It pumps blood with oxygen and nutrients to the tissues and organs of the body. But the heart is a muscle and needs its own supply of blood. Blood flow to the heart is supplied by the coronary arteries. Coronary artery disease (atherosclerosis) is a result of cholesterol, saturated fat, and calcium deposits (plaques) that build up inside the walls. This causes inflammation within the coronary arteries. These plaques narrow the artery and reduce blood flow to the heart muscle. The reduction in blood flow to the heart muscle decreases oxygen supply to the heart. If the narrowing is significant enough, the oxygen supply to one or more regions of the heart can be temporarily or permanently shut down. This can cause chest pain, and possibly death of heart tissue (heart attack).  Types of chest pain  Angina is the name for pain in the heart muscle. Angina is a warning sign of serious heart disease. When untreated it can lead to a heart attack, also known as acute myocardial infarction, or AMI. Angina occurs when there is not enough blood and oxygen flowing to the heart for the amount of work it is doing. This most often happens during physical exertion, when the heart is working hardest. It is usually relieved by rest or nitroglycerin. Angina may also occur after a large meal when extra blood is sent to the digestive organs and less goes to the heart. In the case of advanced or unstable heart disease, angina can occur at rest or awaken you from sleep. Angina usually lasts from a few minutes up to 20 minutes or more. When treated early, the effects of angina can be reversed without permanent damage to the heart. Angina is a serious condition and needs to be evaluated by a medical professional immediately.  There are two types of angina -- stable and unstable:  · Stable angina usually occurs  with a predictable level of activity. Being stable, its character, severity, and occurrence do not change much over time. It usually starts with activity, and resolves with rest or taking your medicine as instructed by your doctor. The symptoms usually do not last long.  · Unstable angina changes or gets worse over time. It is different from whatever you are used to. It may feel different or worse, begin without cause, occur with exercise or exertion, wake you up from sleep, and last longer. It may not respond in the same way as it does when you take your usual medicines for an attack. This type of angina can be a warning sign of an impending heart attack.     A heart attack is usually the result of a blood clot that suddenly forms in a coronary artery that has been narrowed with plaque. When this occurs, blood flow may be cut off to a part of the heart muscle, causing the cells to die. This weakens the pumping action of the heart, which affects the delivery of blood to all the other organs in the body including the brain. This damage is not reversible. However, early treatment can limit the amount of damage.  The pain you feel with angina and a heart attack may have a similar quality. However, it is usually different in intensity and duration. Here are some typical descriptions of a heart attack:  · It is most often experienced as a squeezing, crushing, pressure-like sensation in the center of the chest.  · It is sometimes described as something heavy sitting on my chest.  · It may feel more like a bad case of indigestion.  · The pain may spread from the chest to the arm, shoulder, throat or jaw.  · Sometimes the pain is not felt in the chest at all, but only in the arm, shoulder, throat or jaw.  · There may also be nausea, vomiting, dizziness or light-headedness, sweating and trouble breathing.  · Palpitations, or your heart beating rapidly  · A new, irregular heart beat  · Unexplained weakness  You may not be  "able to tell the difference between "bad" angina and a heart attack at home. Seek help if your symptoms are different than usual. Do not be in denial or just try to "tough it out."  Call 911  This is the fastest and safest way to get to the emergency department. The paramedics can also start treatment on the way to the hospital, saving valuable time for your heart.  · If the angina gets worse, if it continues, or if it stops and returns, call 911 immediately. Do not delay. You may be having a heart attack.  · After you call 911, take a second tablet or spray unless instructed otherwise. When repeating doses, sit down if possible, because it can make you feel lightheaded or dizzy. Wait another 5 minutes. If the angina still does not go away, take a third tablet or spray. Do not take more than 3 tablets or sprays within 15 minutes. Stay on the phone with 911 for further instruction.  · Your healthcare provider may give you slightly different instructions than those above. If so, follow them carefully.  Do not wait until symptoms become severe to call 911.  Other reasons to call 911 include:  · Trouble breathing  · Feeling lightheaded, faint, or dizzy  · Rapid heart beat  · Slower than usual heart rate compared to your normal  · Angina with weakness, dizziness, fainting, heavy sweating, nausea, or vomiting  · Extreme drowsiness, confusion  · Weakness of an arm or leg or one side of the face  · Difficulty with speech or vision  When to seek medical care  Remember, the signs and symptoms of a heart attack are not always like they are on TV. Sometimes they are not so obvious. You may only feel weak, or just not right. If it is not clear or if you have any doubt, call for advice.  · Seek help if there is a change in the type of pain, if it feels different, or if your symptoms are mild.  · Do not drive yourself. Have someone else drive you. If no one can drive, call 911.  · Do not delay. Fast diagnosis and treatment can " "prevent or limit the amount of heart damage during a heart attack.  · Do not go to your doctor's office or a clinic as they may not be able to provide all the testing and treatment required for this condition.  · If your doctor has given you medicine to take when symptoms occur, take them but don't delay getting help trying to locate medicines.  What happens in the emergency department  The emergency department is connected to your local emergency medical system (EMS) through 911. That's why during a cardiac emergency, calling 911 is the fastest way to get help. The goal of the emergency department is to rapidly screen, evaluate, and treat people.  Once you are there, an electrocardiogram (ECG or heart tracing) will be done. Blood samples may be taken to look for the presence of heart enzymes that leak from damaged heart cells and show if a heart attack is occurring. You will often be evaluated by a heart specialist (cardiologist) who decides the best course of action. In the case of severe angina or early heart attack, and depending on the circumstances, powerful "clot busting" medicines can be used to dissolve blood clots in the coronary artery. In other cases, you may be taken to a cardiac catheterization lab. Here, a tiny balloon-tipped catheter is advanced through blood vessels to the heart. There the balloon is inflated pushing open the blood vessel restoring blood flow.  Risk factors for heart disease  Risk factors for heart disease are a combination of genetic and lifestyle. Many risk factors work by either directly or indirectly damaging the blood vessels of the heart, or by increasing the risk of forming blood or cholesterol clots, which then clog up and block the arteries.     Examples of physical lifestyle risk factors:  · Cigarette smoking  · High blood pressure  · High blood cholesterol  · Use of stimulant drugs such as cocaine, crack, and amphetamines  · Eating a high-fat, high-cholesterol " meal  · Diabetes   · Obesity which increases risk for diabetes and high blood pressure  · Lack of regular physical activity     Examples of emotional lifestyle factors:  · Chronic high stress levels release stress hormones. These raise blood pressure and cholesterol level and makes blood clot more easily.  · Held-in anger, hostile or cynical attitude  · Social and emotional isolation, lack of intimacy  · Loss of relationship  · Depression  Other factors that increase the risk of heart attack that you cannot control :  · Age. The older you get beyond 40, the greater is your risk of significant coronary artery disease.  · Gender. More men than women get heart disease; but once past menopause, women who are not taking estrogen replacement have the same risk as men for a heart attack.  · Family history. If your mother, father, brother or sister has coronary artery disease, your risk of having it is higher than a person your age without this family history.  What can you do to decrease your risk  To reduce your risk of heart disease:  · Get regular checkups with your doctor.  · Take your medicines for blood pressure, cholesterol or diabetes as directed.  · Watch your diet. Eat a heart healthy diet choosing fresh foods, less salt, cholesterol, and fat  · Stop smoking. Get help if needed.  · Get regular exercise.  · Manage stress.  · Carry a list of medicines and doses in your wallet.  Date Last Reviewed: 12/30/2015  © 1362-4191 [employer]. [address] [place] [address], [place] [address]. All rights reserved. This information is not intended as a substitute for professional medical care. Always follow your healthcare professional's instructions.

## 2020-11-02 ENCOUNTER — OFFICE VISIT (OUTPATIENT)
Dept: CARDIOLOGY | Facility: CLINIC | Age: 80
End: 2020-11-02
Payer: MEDICARE

## 2020-11-02 VITALS
HEART RATE: 96 BPM | HEIGHT: 73 IN | DIASTOLIC BLOOD PRESSURE: 77 MMHG | SYSTOLIC BLOOD PRESSURE: 120 MMHG | WEIGHT: 222 LBS | OXYGEN SATURATION: 98 % | BODY MASS INDEX: 29.42 KG/M2

## 2020-11-02 DIAGNOSIS — I73.9 PAD (PERIPHERAL ARTERY DISEASE): ICD-10-CM

## 2020-11-02 DIAGNOSIS — I48.91 ATRIAL FIBRILLATION, UNSPECIFIED TYPE: Primary | ICD-10-CM

## 2020-11-02 DIAGNOSIS — N18.31 STAGE 3A CHRONIC KIDNEY DISEASE: ICD-10-CM

## 2020-11-02 DIAGNOSIS — I25.10 ATHEROSCLEROSIS OF NATIVE CORONARY ARTERY OF NATIVE HEART WITHOUT ANGINA PECTORIS: ICD-10-CM

## 2020-11-02 DIAGNOSIS — I25.5 CARDIOMYOPATHY, ISCHEMIC: ICD-10-CM

## 2020-11-02 PROCEDURE — 3078F PR MOST RECENT DIASTOLIC BLOOD PRESSURE < 80 MM HG: ICD-10-PCS | Mod: CPTII,S$GLB,, | Performed by: INTERNAL MEDICINE

## 2020-11-02 PROCEDURE — 1126F AMNT PAIN NOTED NONE PRSNT: CPT | Mod: S$GLB,,, | Performed by: INTERNAL MEDICINE

## 2020-11-02 PROCEDURE — 93010 EKG 12-LEAD: ICD-10-PCS | Mod: S$GLB,,, | Performed by: INTERNAL MEDICINE

## 2020-11-02 PROCEDURE — 93005 ELECTROCARDIOGRAM TRACING: CPT | Mod: S$GLB,,, | Performed by: INTERNAL MEDICINE

## 2020-11-02 PROCEDURE — 3078F DIAST BP <80 MM HG: CPT | Mod: CPTII,S$GLB,, | Performed by: INTERNAL MEDICINE

## 2020-11-02 PROCEDURE — 1126F PR PAIN SEVERITY QUANTIFIED, NO PAIN PRESENT: ICD-10-PCS | Mod: S$GLB,,, | Performed by: INTERNAL MEDICINE

## 2020-11-02 PROCEDURE — 1159F MED LIST DOCD IN RCRD: CPT | Mod: S$GLB,,, | Performed by: INTERNAL MEDICINE

## 2020-11-02 PROCEDURE — 1101F PT FALLS ASSESS-DOCD LE1/YR: CPT | Mod: CPTII,S$GLB,, | Performed by: INTERNAL MEDICINE

## 2020-11-02 PROCEDURE — 99999 PR PBB SHADOW E&M-EST. PATIENT-LVL III: CPT | Mod: PBBFAC,,, | Performed by: INTERNAL MEDICINE

## 2020-11-02 PROCEDURE — 1159F PR MEDICATION LIST DOCUMENTED IN MEDICAL RECORD: ICD-10-PCS | Mod: S$GLB,,, | Performed by: INTERNAL MEDICINE

## 2020-11-02 PROCEDURE — 99215 PR OFFICE/OUTPT VISIT, EST, LEVL V, 40-54 MIN: ICD-10-PCS | Mod: S$GLB,,, | Performed by: INTERNAL MEDICINE

## 2020-11-02 PROCEDURE — 3074F PR MOST RECENT SYSTOLIC BLOOD PRESSURE < 130 MM HG: ICD-10-PCS | Mod: CPTII,S$GLB,, | Performed by: INTERNAL MEDICINE

## 2020-11-02 PROCEDURE — 99215 OFFICE O/P EST HI 40 MIN: CPT | Mod: S$GLB,,, | Performed by: INTERNAL MEDICINE

## 2020-11-02 PROCEDURE — 93010 ELECTROCARDIOGRAM REPORT: CPT | Mod: S$GLB,,, | Performed by: INTERNAL MEDICINE

## 2020-11-02 PROCEDURE — 93005 EKG 12-LEAD: ICD-10-PCS | Mod: S$GLB,,, | Performed by: INTERNAL MEDICINE

## 2020-11-02 PROCEDURE — 3074F SYST BP LT 130 MM HG: CPT | Mod: CPTII,S$GLB,, | Performed by: INTERNAL MEDICINE

## 2020-11-02 PROCEDURE — 1101F PR PT FALLS ASSESS DOC 0-1 FALLS W/OUT INJ PAST YR: ICD-10-PCS | Mod: CPTII,S$GLB,, | Performed by: INTERNAL MEDICINE

## 2020-11-02 PROCEDURE — 99999 PR PBB SHADOW E&M-EST. PATIENT-LVL III: ICD-10-PCS | Mod: PBBFAC,,, | Performed by: INTERNAL MEDICINE

## 2020-11-02 RX ORDER — SODIUM CHLORIDE 9 MG/ML
INJECTION, SOLUTION INTRAVENOUS CONTINUOUS
Status: CANCELLED | OUTPATIENT
Start: 2020-11-02

## 2020-11-02 NOTE — H&P (VIEW-ONLY)
Subjective:   Patient ID:  Juilen Meléndez is a 80 y.o. male who presents for follow up of Atrial Fibrillation, Coronary Artery Disease, Hyperlipidemia, Hypertension, and Congestive Heart Failure      HPI:     Julien Meléndez 80 y.o. male is here follow up of CAD, HTN, HLP, and KELSEY.         He has KELSEY but does not use his CPAP machine regularly. He has CAD with AMI 2010 and MILES LAD. Recurrent chest pains 12/2015 with new MILES placed distal RCA. ( OM1 and 70% small apical LAD disease treated medically). 11/2019 he had LCX  PCI.          Nuclear stress test 3/2017 revealed a sizable fixed perfusion defect with associated akinesis involving the septal wall near the apex consistent with infarction without reversible ischemia. EF 49% down to 46% with stress. PET stress 4/2017 revealed a small sized moderate intensity fixed defect consistent with non-transmural infarct in the anteroapical wall in the usual distribution of the distal Left Anterior Descending Artery. This defect comprises 10 % of the left ventricular myocardium. There is a very small sized mild ischemia in the distal to apical inferolateral wall in the usual distribution of the distal OM1. This defect comprises 5 % of the left ventricular myocardium. After his PET stress the decision was medical therapy.      He has noted trace bilateral edema after a long day. He has dyspnea with strenuous activities. Overall not feeling well. Denies palpitations.         Echo 7/2019     EF 45%   Diastolic dysfunction   Normal RV function   Normal PA pressure       S/p LCX  PCI 11/2019                 Bilateral CFA access 8 fr                    LM patent              Distal LAD 80% stenosis              Patent RCA              LCX  with R to L collaterals                 LVEDP 8 mmHg                    Crossed LCX  with antegrade Filder XT + LP Turnpike              PCI with 3.0 x 26 and 3.0 x 12 mm Resolute MILES post dilated with 3.5 NC         9/2020       Stress test with mild hypokinesis in LV apex with infarct   Normal EF 57% at rest down to 47% with stress   No ischemia noted     ECG during stress test revealed atrial flutter        ECG 11/2/2020: afib with       Patient Active Problem List    Diagnosis Date Noted    Angina effort 03/23/2017     Priority: Low     IMO Regulatory Load October 2019      Atrial fibrillation 11/02/2020    Stage 3 chronic kidney disease 10/03/2019    Hypersomnia with sleep apnea     Coronary artery disease 10/24/2017    Chronic fatigue 07/11/2017    Shortness of breath 07/11/2017    Atherosclerotic heart disease of native coronary artery without angina pectoris 04/25/2017    Diverticulosis of large intestine without perforation or abscess without bleeding 04/25/2017    Systolic dysfunction 04/25/2017    Equivocal stress test 03/27/2017       Fixed defect noted on SPECT      Major depressive disorder, single episode, mild 12/19/2016    KELSEY (obstructive sleep apnea) 10/26/2016         12/2016   AHI 5.7 with oxygen jude 87%   AHI 11.2 in supine position   REM AHI 40            Type 2 diabetes mellitus without complication 01/26/2016    Abnormal findings on diagnostic imaging of heart and coronary circulation 12/29/2015         PET stress 4/2017   10% apical fixed defect   5% reversible ischemia at the distal apex   Normal EF                Chest pain 12/29/2015    Coronary artery disease due to calcified coronary lesion 12/28/2015           Left heart cath 12/2015-Dr. Hernandez        LM patent  LAD patent except for distal 75%  LCX patent  OM1   RCA 90%      PCI of RCA 2.75 x 18 mm MILES  Unsuccessful PCI of OM1         LVEDP 11 mmHg  3.5 x 18 mm       Left hearth cath 3/1/2010 -Dr. Hernandez   RCA PCI with 3.5 x 15 mm  BMS    Left heart cath 1/31/2010-Dr. Hernandez   LAD 3.0 x 12 and 3.5 x 24 mm  BMS         C 11/12/2019      S/p LCX  PCI     Bilateral CFA access 8 fr       LM  patent   Distal LAD 80% stenosis   Patent RCA   LCX  with R to L collaterals     LVEDP 8 mmHg       Crossed LCX  with antegrade Filder XT + LP Turnpike   PCI with 3.0 x 26 and 3.0 x 12 mm Resolute MILES post dilated with 3.5 NC           PAD (peripheral artery disease) 2015    Type 2 diabetes mellitus with diabetic peripheral angiopathy without gangrene 2015    Unspecified osteoarthritis, unspecified site 2015    Cardiomyopathy, ischemic 2013    Diabetes mellitus 2013    MI (myocardial infarction)     Diabetes mellitus     Hyperlipidemia     Hypertension     Hiatal hernia            Right Arm BP - Sittin/77  Left Arm BP - Sittin/79        LABS    LAST HbA1c  Lab Results   Component Value Date    HGBA1C 6.8 (H) 2020       Lipid panel  Lab Results   Component Value Date    CHOL 118 (L) 2020    CHOL 144 2019    CHOL 149 2010     Lab Results   Component Value Date    HDL 36 (L) 2020    HDL 33 (L) 2019    HDL 31 (L) 2010     Lab Results   Component Value Date    LDLCALC 65.4 2020    LDLCALC 77.6 2019    LDLCALC 97.4 2010     Lab Results   Component Value Date    TRIG 83 2020    TRIG 167 (H) 2019    TRIG 103 2010     Lab Results   Component Value Date    CHOLHDL 30.5 2020    CHOLHDL 22.9 2019    CHOLHDL 20.8 2010            Review of Systems   Constitution: Negative for diaphoresis, night sweats, weight gain and weight loss.   HENT: Negative for congestion.    Eyes: Negative for blurred vision, discharge and double vision.   Cardiovascular: Negative for chest pain, claudication, cyanosis, dyspnea on exertion, irregular heartbeat, leg swelling, near-syncope, orthopnea, palpitations, paroxysmal nocturnal dyspnea and syncope.   Respiratory: Positive for shortness of breath, sleep disturbances due to breathing and snoring. Negative for cough and wheezing.    Endocrine:  Negative for cold intolerance, heat intolerance and polyphagia.   Hematologic/Lymphatic: Negative for adenopathy and bleeding problem. Does not bruise/bleed easily.   Skin: Negative for dry skin and nail changes.   Musculoskeletal: Negative for arthritis, back pain, falls, joint pain, myalgias and neck pain.   Gastrointestinal: Negative for bloating, abdominal pain, change in bowel habit and constipation.   Genitourinary: Negative for bladder incontinence, dysuria, flank pain, genital sores and missed menses.   Neurological: Positive for dizziness and weakness. Negative for aphonia, brief paralysis and difficulty with concentration.   Psychiatric/Behavioral: Negative for altered mental status and memory loss. The patient does not have insomnia.    Allergic/Immunologic: Negative for environmental allergies.       Objective:   Physical Exam   Constitutional: He is oriented to person, place, and time. He appears well-developed and well-nourished. He is not intubated.   HENT:   Head: Normocephalic and atraumatic.   Right Ear: External ear normal.   Left Ear: External ear normal.   Mouth/Throat: Oropharynx is clear and moist.   Eyes: Pupils are equal, round, and reactive to light. Conjunctivae and EOM are normal. Right eye exhibits no discharge. Left eye exhibits no discharge. No scleral icterus.   Neck: Normal range of motion. Neck supple. Normal carotid pulses, no hepatojugular reflux and no JVD present. Carotid bruit is not present. No tracheal deviation present. No thyromegaly present.   Cardiovascular: S1 normal and S2 normal. An irregularly irregular rhythm present.  No extrasystoles are present. Tachycardia present. PMI is not displaced. Exam reveals no gallop, no S3, no distant heart sounds, no friction rub and no midsystolic click.   No murmur heard.  Pulses:       Carotid pulses are 2+ on the right side and 2+ on the left side.       Radial pulses are 2+ on the right side and 2+ on the left side.        Femoral  pulses are 2+ on the right side and 2+ on the left side.       Popliteal pulses are 2+ on the right side and 2+ on the left side.        Dorsalis pedis pulses are 2+ on the right side and 2+ on the left side.        Posterior tibial pulses are 2+ on the right side and 2+ on the left side.   Pulmonary/Chest: Effort normal and breath sounds normal. No accessory muscle usage or stridor. No apnea, no tachypnea and no bradypnea. He is not intubated. No respiratory distress. He has no decreased breath sounds. He has no wheezes. He has no rales. He exhibits no tenderness and no bony tenderness.   Abdominal: He exhibits no distension, no pulsatile liver, no abdominal bruit, no ascites, no pulsatile midline mass and no mass. There is no abdominal tenderness. There is no rebound and no guarding.   Musculoskeletal: Normal range of motion.         General: No tenderness or edema.   Lymphadenopathy:     He has no cervical adenopathy.   Neurological: He is alert and oriented to person, place, and time. He has normal reflexes. No cranial nerve deficit. Coordination normal.   Skin: Skin is warm. No rash noted. No erythema. No pallor.   Psychiatric: He has a normal mood and affect. His behavior is normal. Judgment and thought content normal.       Assessment:     1. Atrial fibrillation, unspecified type    2. Atherosclerosis of native coronary artery of native heart without angina pectoris    3. PAD (peripheral artery disease)    4. Cardiomyopathy, ischemic    5. Stage 3a chronic kidney disease        Plan:       Continue with medical therapy for coronary disease.  Decrease salt intake.  Encourage patient to use CPAP obstructive sleep apnea.  Exercise.  Weight loss.  Patient will start using compression stockings for edema of bilateral lower extremity which is consistent with mild venous insufficiency.      Start anti-coagulation for atrial fibrillation  [facility] +/- DCCV   Referral to EP for RFA   He can stop plavix (he completed 12  months of DAPT)  Reduce bleeding risk with triple therapy         Return sooner for concerns or questions. If symptoms persist go to the ED  I have reviewed all pertinent data on this patient         Follow up as scheduled. Return sooner for concerns or questions            He expressed verbal understanding and agreed with the plan        I have reviewed the patient's medical history in detail and updated the computerized patient record.    Orders Placed This Encounter   Procedures    EKG 12-lead     Order Specific Question:   Diagnosis     Answer:   A-fib [[id number]]    Case Request-Cath Lab: Transesophageal echo ([facility]) intra-procedure log documentation     Standing Status:   Standing     Number of Occurrences:   1     Order Specific Question:   CPT Code:     Answer:   [facility] ECHO HEART,TRANSESOPHAGEAL,COMPLETE [14013]     Order Specific Question:   CPT Code:     Answer:   [facility] CARDIOVERSION, ELECTIVE;EXTERN [57193]     Order Specific Question:   Medical Necessity:     Answer:   Medically Urgent [101]     Order Specific Question:   Is an on-site pathologist required for this procedure?     Answer:   N/A       Follow up as scheduled. Return sooner for concerns or questions          Patient's Medications   New Prescriptions    APIXABAN (ELIQUIS) 5 MG TAB    Take 1 tablet (5 mg total) by mouth 2 (two) times daily.   Previous Medications    AMLODIPINE (NORVASC) 5 MG TABLET    TAKE 1 TABLET EVERY DAY    ASPIRIN (ECOTRIN) 81 MG EC TABLET    Take 81 mg by mouth once daily.    ATORVASTATIN (LIPITOR) 40 MG TABLET    TAKE 1 TABLET EVERY DAY    FLUOXETINE (PROZAC) 20 MG CAPSULE    Take 1 capsule by mouth once daily.    GLIPIZIDE (GLUCOTROL) 5 MG TABLET    Take 1 tablet by mouth Daily.    ISOSORBIDE MONONITRATE (IMDUR) 30 MG 24 HR TABLET    TAKE 1 TABLET ONE TIME DAILY    LOSARTAN-HYDROCHLOROTHIAZIDE 100-25 MG (HYZAAR) 100-25 MG PER TABLET    Take 1 tablet by mouth once daily.    METOPROLOL SUCCINATE (TOPROL-XL) 25 MG 24 HR TABLET     Take 0.5 tablets (12.5 mg total) by mouth once daily.    NITROGLYCERIN (NITROSTAT) 0.4 MG SL TABLET    Place 1 tablet (0.4 mg total) under the tongue every 5 (five) minutes as needed for Chest pain.    SITAGLIPTAN-METFORMIN (JANUMET) 50-1,000 MG PER TABLET    Take 1 tablet by mouth 2 (two) times daily with meals.    TRAMADOL (ULTRAM) 50 MG TABLET    Take 1 tablet (50 mg total) by mouth 2 (two) times daily as needed.    TRAZODONE (DESYREL) 150 MG TABLET        TRUE METRIX GLUCOSE TEST STRIP STRP        TRUEPLUS LANCETS 28 GAUGE MISC       Modified Medications    No medications on file   Discontinued Medications    CLOPIDOGREL (PLAVIX) 75 MG TABLET    TAKE 1 TABLET EVERY DAY

## 2020-11-02 NOTE — PROGRESS NOTES
Subjective:   Patient ID:  Julien Meléndez is a 80 y.o. male who presents for follow up of Atrial Fibrillation, Coronary Artery Disease, Hyperlipidemia, Hypertension, and Congestive Heart Failure      HPI:     Julien Meléndez 80 y.o. male is here follow up of CAD, HTN, HLP, and KELSEY.         He has KELSEY but does not use his CPAP machine regularly. He has CAD with AMI 2010 and MILES LAD. Recurrent chest pains 12/2015 with new MILES placed distal RCA. ( OM1 and 70% small apical LAD disease treated medically). 11/2019 he had LCX  PCI.          Nuclear stress test 3/2017 revealed a sizable fixed perfusion defect with associated akinesis involving the septal wall near the apex consistent with infarction without reversible ischemia. EF 49% down to 46% with stress. PET stress 4/2017 revealed a small sized moderate intensity fixed defect consistent with non-transmural infarct in the anteroapical wall in the usual distribution of the distal Left Anterior Descending Artery. This defect comprises 10 % of the left ventricular myocardium. There is a very small sized mild ischemia in the distal to apical inferolateral wall in the usual distribution of the distal OM1. This defect comprises 5 % of the left ventricular myocardium. After his PET stress the decision was medical therapy.      He has noted trace bilateral edema after a long day. He has dyspnea with strenuous activities. Overall not feeling well. Denies palpitations.         Echo 7/2019     EF 45%   Diastolic dysfunction   Normal RV function   Normal PA pressure       S/p LCX  PCI 11/2019                 Bilateral CFA access 8 fr                    LM patent              Distal LAD 80% stenosis              Patent RCA              LCX  with R to L collaterals                 LVEDP 8 mmHg                    Crossed LCX  with antegrade Filder XT + LP Turnpike              PCI with 3.0 x 26 and 3.0 x 12 mm Resolute MILES post dilated with 3.5 NC         9/2020       Stress test with mild hypokinesis in LV apex with infarct   Normal EF 57% at rest down to 47% with stress   No ischemia noted     ECG during stress test revealed atrial flutter        ECG 11/2/2020: afib with       Patient Active Problem List    Diagnosis Date Noted    Angina effort 03/23/2017     Priority: Low     IMO Regulatory Load October 2019      Atrial fibrillation 11/02/2020    Stage 3 chronic kidney disease 10/03/2019    Hypersomnia with sleep apnea     Coronary artery disease 10/24/2017    Chronic fatigue 07/11/2017    Shortness of breath 07/11/2017    Atherosclerotic heart disease of native coronary artery without angina pectoris 04/25/2017    Diverticulosis of large intestine without perforation or abscess without bleeding 04/25/2017    Systolic dysfunction 04/25/2017    Equivocal stress test 03/27/2017       Fixed defect noted on SPECT      Major depressive disorder, single episode, mild 12/19/2016    KELSEY (obstructive sleep apnea) 10/26/2016         12/2016   AHI 5.7 with oxygen jude 87%   AHI 11.2 in supine position   REM AHI 40            Type 2 diabetes mellitus without complication 01/26/2016    Abnormal findings on diagnostic imaging of heart and coronary circulation 12/29/2015         PET stress 4/2017   10% apical fixed defect   5% reversible ischemia at the distal apex   Normal EF                Chest pain 12/29/2015    Coronary artery disease due to calcified coronary lesion 12/28/2015           Left heart cath 12/2015-Dr. Hernandez        LM patent  LAD patent except for distal 75%  LCX patent  OM1   RCA 90%      PCI of RCA 2.75 x 18 mm MILES  Unsuccessful PCI of OM1         LVEDP 11 mmHg  3.5 x 18 mm       Left hearth cath 3/1/2010 -Dr. Hernandez   RCA PCI with 3.5 x 15 mm  BMS    Left heart cath 1/31/2010-Dr. Hernandez   LAD 3.0 x 12 and 3.5 x 24 mm  BMS         C 11/12/2019      S/p LCX  PCI     Bilateral CFA access 8 fr       LM  patent   Distal LAD 80% stenosis   Patent RCA   LCX  with R to L collaterals     LVEDP 8 mmHg       Crossed LCX  with antegrade Filder XT + LP Turnpike   PCI with 3.0 x 26 and 3.0 x 12 mm Resolute MILES post dilated with 3.5 NC           PAD (peripheral artery disease) 2015    Type 2 diabetes mellitus with diabetic peripheral angiopathy without gangrene 2015    Unspecified osteoarthritis, unspecified site 2015    Cardiomyopathy, ischemic 2013    Diabetes mellitus 2013    MI (myocardial infarction)     Diabetes mellitus     Hyperlipidemia     Hypertension     Hiatal hernia            Right Arm BP - Sittin/77  Left Arm BP - Sittin/79        LABS    LAST HbA1c  Lab Results   Component Value Date    HGBA1C 6.8 (H) 2020       Lipid panel  Lab Results   Component Value Date    CHOL 118 (L) 2020    CHOL 144 2019    CHOL 149 2010     Lab Results   Component Value Date    HDL 36 (L) 2020    HDL 33 (L) 2019    HDL 31 (L) 2010     Lab Results   Component Value Date    LDLCALC 65.4 2020    LDLCALC 77.6 2019    LDLCALC 97.4 2010     Lab Results   Component Value Date    TRIG 83 2020    TRIG 167 (H) 2019    TRIG 103 2010     Lab Results   Component Value Date    CHOLHDL 30.5 2020    CHOLHDL 22.9 2019    CHOLHDL 20.8 2010            Review of Systems   Constitution: Negative for diaphoresis, night sweats, weight gain and weight loss.   HENT: Negative for congestion.    Eyes: Negative for blurred vision, discharge and double vision.   Cardiovascular: Negative for chest pain, claudication, cyanosis, dyspnea on exertion, irregular heartbeat, leg swelling, near-syncope, orthopnea, palpitations, paroxysmal nocturnal dyspnea and syncope.   Respiratory: Positive for shortness of breath, sleep disturbances due to breathing and snoring. Negative for cough and wheezing.    Endocrine:  Negative for cold intolerance, heat intolerance and polyphagia.   Hematologic/Lymphatic: Negative for adenopathy and bleeding problem. Does not bruise/bleed easily.   Skin: Negative for dry skin and nail changes.   Musculoskeletal: Negative for arthritis, back pain, falls, joint pain, myalgias and neck pain.   Gastrointestinal: Negative for bloating, abdominal pain, change in bowel habit and constipation.   Genitourinary: Negative for bladder incontinence, dysuria, flank pain, genital sores and missed menses.   Neurological: Positive for dizziness and weakness. Negative for aphonia, brief paralysis and difficulty with concentration.   Psychiatric/Behavioral: Negative for altered mental status and memory loss. The patient does not have insomnia.    Allergic/Immunologic: Negative for environmental allergies.       Objective:   Physical Exam   Constitutional: He is oriented to person, place, and time. He appears well-developed and well-nourished. He is not intubated.   HENT:   Head: Normocephalic and atraumatic.   Right Ear: External ear normal.   Left Ear: External ear normal.   Mouth/Throat: Oropharynx is clear and moist.   Eyes: Pupils are equal, round, and reactive to light. Conjunctivae and EOM are normal. Right eye exhibits no discharge. Left eye exhibits no discharge. No scleral icterus.   Neck: Normal range of motion. Neck supple. Normal carotid pulses, no hepatojugular reflux and no JVD present. Carotid bruit is not present. No tracheal deviation present. No thyromegaly present.   Cardiovascular: S1 normal and S2 normal. An irregularly irregular rhythm present.  No extrasystoles are present. Tachycardia present. PMI is not displaced. Exam reveals no gallop, no S3, no distant heart sounds, no friction rub and no midsystolic click.   No murmur heard.  Pulses:       Carotid pulses are 2+ on the right side and 2+ on the left side.       Radial pulses are 2+ on the right side and 2+ on the left side.        Femoral  pulses are 2+ on the right side and 2+ on the left side.       Popliteal pulses are 2+ on the right side and 2+ on the left side.        Dorsalis pedis pulses are 2+ on the right side and 2+ on the left side.        Posterior tibial pulses are 2+ on the right side and 2+ on the left side.   Pulmonary/Chest: Effort normal and breath sounds normal. No accessory muscle usage or stridor. No apnea, no tachypnea and no bradypnea. He is not intubated. No respiratory distress. He has no decreased breath sounds. He has no wheezes. He has no rales. He exhibits no tenderness and no bony tenderness.   Abdominal: He exhibits no distension, no pulsatile liver, no abdominal bruit, no ascites, no pulsatile midline mass and no mass. There is no abdominal tenderness. There is no rebound and no guarding.   Musculoskeletal: Normal range of motion.         General: No tenderness or edema.   Lymphadenopathy:     He has no cervical adenopathy.   Neurological: He is alert and oriented to person, place, and time. He has normal reflexes. No cranial nerve deficit. Coordination normal.   Skin: Skin is warm. No rash noted. No erythema. No pallor.   Psychiatric: He has a normal mood and affect. His behavior is normal. Judgment and thought content normal.       Assessment:     1. Atrial fibrillation, unspecified type    2. Atherosclerosis of native coronary artery of native heart without angina pectoris    3. PAD (peripheral artery disease)    4. Cardiomyopathy, ischemic    5. Stage 3a chronic kidney disease        Plan:       Continue with medical therapy for coronary disease.  Decrease salt intake.  Encourage patient to use CPAP obstructive sleep apnea.  Exercise.  Weight loss.  Patient will start using compression stockings for edema of bilateral lower extremity which is consistent with mild venous insufficiency.      Start anti-coagulation for atrial fibrillation  [facility] +/- DCCV   Referral to EP for RFA   He can stop plavix (he completed 12  months of DAPT)  Reduce bleeding risk with triple therapy         Return sooner for concerns or questions. If symptoms persist go to the ED  I have reviewed all pertinent data on this patient         Follow up as scheduled. Return sooner for concerns or questions            He expressed verbal understanding and agreed with the plan        I have reviewed the patient's medical history in detail and updated the computerized patient record.    Orders Placed This Encounter   Procedures    EKG 12-lead     Order Specific Question:   Diagnosis     Answer:   A-fib [[id number]]    Case Request-Cath Lab: Transesophageal echo ([facility]) intra-procedure log documentation     Standing Status:   Standing     Number of Occurrences:   1     Order Specific Question:   CPT Code:     Answer:   [facility] ECHO HEART,TRANSESOPHAGEAL,COMPLETE [69099]     Order Specific Question:   CPT Code:     Answer:   [facility] CARDIOVERSION, ELECTIVE;EXTERN [45454]     Order Specific Question:   Medical Necessity:     Answer:   Medically Urgent [101]     Order Specific Question:   Is an on-site pathologist required for this procedure?     Answer:   N/A       Follow up as scheduled. Return sooner for concerns or questions          Patient's Medications   New Prescriptions    APIXABAN (ELIQUIS) 5 MG TAB    Take 1 tablet (5 mg total) by mouth 2 (two) times daily.   Previous Medications    AMLODIPINE (NORVASC) 5 MG TABLET    TAKE 1 TABLET EVERY DAY    ASPIRIN (ECOTRIN) 81 MG EC TABLET    Take 81 mg by mouth once daily.    ATORVASTATIN (LIPITOR) 40 MG TABLET    TAKE 1 TABLET EVERY DAY    FLUOXETINE (PROZAC) 20 MG CAPSULE    Take 1 capsule by mouth once daily.    GLIPIZIDE (GLUCOTROL) 5 MG TABLET    Take 1 tablet by mouth Daily.    ISOSORBIDE MONONITRATE (IMDUR) 30 MG 24 HR TABLET    TAKE 1 TABLET ONE TIME DAILY    LOSARTAN-HYDROCHLOROTHIAZIDE 100-25 MG (HYZAAR) 100-25 MG PER TABLET    Take 1 tablet by mouth once daily.    METOPROLOL SUCCINATE (TOPROL-XL) 25 MG 24 HR TABLET     Take 0.5 tablets (12.5 mg total) by mouth once daily.    NITROGLYCERIN (NITROSTAT) 0.4 MG SL TABLET    Place 1 tablet (0.4 mg total) under the tongue every 5 (five) minutes as needed for Chest pain.    SITAGLIPTAN-METFORMIN (JANUMET) 50-1,000 MG PER TABLET    Take 1 tablet by mouth 2 (two) times daily with meals.    TRAMADOL (ULTRAM) 50 MG TABLET    Take 1 tablet (50 mg total) by mouth 2 (two) times daily as needed.    TRAZODONE (DESYREL) 150 MG TABLET        TRUE METRIX GLUCOSE TEST STRIP STRP        TRUEPLUS LANCETS 28 GAUGE MISC       Modified Medications    No medications on file   Discontinued Medications    CLOPIDOGREL (PLAVIX) 75 MG TABLET    TAKE 1 TABLET EVERY DAY

## 2020-11-03 ENCOUNTER — CLINICAL SUPPORT (OUTPATIENT)
Dept: URGENT CARE | Facility: CLINIC | Age: 80
End: 2020-11-03
Payer: MEDICARE

## 2020-11-03 DIAGNOSIS — I48.0 PAROXYSMAL A-FIB: ICD-10-CM

## 2020-11-03 DIAGNOSIS — I48.91 ATRIAL FIBRILLATION, UNSPECIFIED TYPE: Primary | ICD-10-CM

## 2020-11-03 DIAGNOSIS — Z01.818 PRE-OP EXAM: ICD-10-CM

## 2020-11-03 PROCEDURE — 99211 OFF/OP EST MAY X REQ PHY/QHP: CPT | Mod: S$GLB,,, | Performed by: PHYSICIAN ASSISTANT

## 2020-11-03 PROCEDURE — 99211 PR OFFICE/OUTPT VISIT, EST, LEVL I: ICD-10-PCS | Mod: S$GLB,,, | Performed by: PHYSICIAN ASSISTANT

## 2020-11-03 PROCEDURE — U0003 INFECTIOUS AGENT DETECTION BY NUCLEIC ACID (DNA OR RNA); SEVERE ACUTE RESPIRATORY SYNDROME CORONAVIRUS 2 (SARS-COV-2) (CORONAVIRUS DISEASE [COVID-19]), AMPLIFIED PROBE TECHNIQUE, MAKING USE OF HIGH THROUGHPUT TECHNOLOGIES AS DESCRIBED BY CMS-2020-01-R: HCPCS

## 2020-11-03 NOTE — PROGRESS NOTES

## 2020-11-04 LAB — SARS-COV-2 RNA RESP QL NAA+PROBE: NOT DETECTED

## 2020-11-05 ENCOUNTER — HOSPITAL ENCOUNTER (OUTPATIENT)
Facility: HOSPITAL | Age: 80
Discharge: HOME | End: 2020-11-05
Attending: INTERNAL MEDICINE | Admitting: INTERNAL MEDICINE
Payer: MEDICARE

## 2020-11-05 ENCOUNTER — ANESTHESIA EVENT (OUTPATIENT)
Dept: CARDIOLOGY | Facility: HOSPITAL | Age: 80
End: 2020-11-05
Payer: MEDICARE

## 2020-11-05 ENCOUNTER — HOSPITAL ENCOUNTER (OUTPATIENT)
Dept: CARDIOLOGY | Facility: HOSPITAL | Age: 80
Discharge: HOME | End: 2020-11-05
Attending: INTERNAL MEDICINE | Admitting: INTERNAL MEDICINE
Payer: MEDICARE

## 2020-11-05 ENCOUNTER — ANESTHESIA (OUTPATIENT)
Dept: CARDIOLOGY | Facility: HOSPITAL | Age: 80
End: 2020-11-05
Payer: MEDICARE

## 2020-11-05 VITALS
HEART RATE: 85 BPM | OXYGEN SATURATION: 98 % | SYSTOLIC BLOOD PRESSURE: 135 MMHG | HEIGHT: 72 IN | BODY MASS INDEX: 29.39 KG/M2 | WEIGHT: 217 LBS | TEMPERATURE: 98 F | RESPIRATION RATE: 19 BRPM | DIASTOLIC BLOOD PRESSURE: 80 MMHG

## 2020-11-05 VITALS — BODY MASS INDEX: 29.39 KG/M2 | WEIGHT: 217 LBS | HEIGHT: 72 IN

## 2020-11-05 DIAGNOSIS — I48.91 ATRIAL FIBRILLATION, UNSPECIFIED TYPE: ICD-10-CM

## 2020-11-05 DIAGNOSIS — Z01.818 PRE-OP EXAM: ICD-10-CM

## 2020-11-05 DIAGNOSIS — I48.0 PAROXYSMAL A-FIB: ICD-10-CM

## 2020-11-05 LAB
ANION GAP SERPL CALC-SCNC: 11 MMOL/L (ref 8–16)
BASOPHILS # BLD AUTO: 0.05 K/UL (ref 0–0.2)
BASOPHILS NFR BLD: 0.6 % (ref 0–1.9)
BUN SERPL-MCNC: 19 MG/DL (ref 8–23)
CALCIUM SERPL-MCNC: 10 MG/DL (ref 8.7–10.5)
CHLORIDE SERPL-SCNC: 104 MMOL/L (ref 95–110)
CO2 SERPL-SCNC: 27 MMOL/L (ref 23–29)
CREAT SERPL-MCNC: 1.2 MG/DL (ref 0.5–1.4)
DIFFERENTIAL METHOD: NORMAL
EOSINOPHIL # BLD AUTO: 0.2 K/UL (ref 0–0.5)
EOSINOPHIL NFR BLD: 2.1 % (ref 0–8)
ERYTHROCYTE [DISTWIDTH] IN BLOOD BY AUTOMATED COUNT: 13.5 % (ref 11.5–14.5)
EST. GFR  (AFRICAN AMERICAN): >60 ML/MIN/1.73 M^2
EST. GFR  (NON AFRICAN AMERICAN): 57 ML/MIN/1.73 M^2
GLUCOSE SERPL-MCNC: 131 MG/DL (ref 70–110)
HCT VFR BLD AUTO: 46.8 % (ref 40–54)
HGB BLD-MCNC: 15.8 G/DL (ref 14–18)
IMM GRANULOCYTES # BLD AUTO: 0.04 K/UL (ref 0–0.04)
IMM GRANULOCYTES NFR BLD AUTO: 0.5 % (ref 0–0.5)
LYMPHOCYTES # BLD AUTO: 2.1 K/UL (ref 1–4.8)
LYMPHOCYTES NFR BLD: 23.9 % (ref 18–48)
MCH RBC QN AUTO: 30.6 PG (ref 27–31)
MCHC RBC AUTO-ENTMCNC: 33.8 G/DL (ref 32–36)
MCV RBC AUTO: 91 FL (ref 82–98)
MONOCYTES # BLD AUTO: 0.8 K/UL (ref 0.3–1)
MONOCYTES NFR BLD: 9.3 % (ref 4–15)
NEUTROPHILS # BLD AUTO: 5.6 K/UL (ref 1.8–7.7)
NEUTROPHILS NFR BLD: 63.6 % (ref 38–73)
NRBC BLD-RTO: 0 /100 WBC
PLATELET # BLD AUTO: 167 K/UL (ref 150–350)
PMV BLD AUTO: 11.7 FL (ref 9.2–12.9)
POTASSIUM SERPL-SCNC: 4.5 MMOL/L (ref 3.5–5.1)
RBC # BLD AUTO: 5.17 M/UL (ref 4.6–6.2)
SODIUM SERPL-SCNC: 142 MMOL/L (ref 136–145)
WBC # BLD AUTO: 8.71 K/UL (ref 3.9–12.7)

## 2020-11-05 PROCEDURE — 25000003 PHARM REV CODE 250: Performed by: NURSE ANESTHETIST, CERTIFIED REGISTERED

## 2020-11-05 PROCEDURE — 93312 TRANSESOPHAGEAL ECHO (TEE) W/ POSSIBLE CARDIOVERSION: ICD-10-PCS | Mod: 26,,, | Performed by: INTERNAL MEDICINE

## 2020-11-05 PROCEDURE — 85025 COMPLETE CBC W/AUTO DIFF WBC: CPT

## 2020-11-05 PROCEDURE — 93312 ECHO TRANSESOPHAGEAL: CPT | Mod: 26,,, | Performed by: INTERNAL MEDICINE

## 2020-11-05 PROCEDURE — 80048 BASIC METABOLIC PNL TOTAL CA: CPT

## 2020-11-05 PROCEDURE — 93010 EKG 12-LEAD: ICD-10-PCS | Mod: ,,, | Performed by: INTERNAL MEDICINE

## 2020-11-05 PROCEDURE — 93320 DOPPLER ECHO COMPLETE: CPT | Mod: 26,,, | Performed by: INTERNAL MEDICINE

## 2020-11-05 PROCEDURE — 37000008 HC ANESTHESIA 1ST 15 MINUTES: Performed by: INTERNAL MEDICINE

## 2020-11-05 PROCEDURE — 93005 ELECTROCARDIOGRAM TRACING: CPT

## 2020-11-05 PROCEDURE — 93320 TRANSESOPHAGEAL ECHO (TEE) W/ POSSIBLE CARDIOVERSION: ICD-10-PCS | Mod: 26,,, | Performed by: INTERNAL MEDICINE

## 2020-11-05 PROCEDURE — 36415 COLL VENOUS BLD VENIPUNCTURE: CPT

## 2020-11-05 PROCEDURE — 92960 TRANSESOPHAGEAL ECHO (TEE) W/ POSSIBLE CARDIOVERSION: ICD-10-PCS | Mod: ,,, | Performed by: INTERNAL MEDICINE

## 2020-11-05 PROCEDURE — 63600175 PHARM REV CODE 636 W HCPCS: Performed by: NURSE ANESTHETIST, CERTIFIED REGISTERED

## 2020-11-05 PROCEDURE — 37000009 HC ANESTHESIA EA ADD 15 MINS: Performed by: INTERNAL MEDICINE

## 2020-11-05 PROCEDURE — 93325 DOPPLER ECHO COLOR FLOW MAPG: CPT

## 2020-11-05 PROCEDURE — 93010 ELECTROCARDIOGRAM REPORT: CPT | Mod: 76,,, | Performed by: INTERNAL MEDICINE

## 2020-11-05 PROCEDURE — 92960 CARDIOVERSION ELECTRIC EXT: CPT | Mod: ,,, | Performed by: INTERNAL MEDICINE

## 2020-11-05 RX ORDER — PROPOFOL 10 MG/ML
VIAL (ML) INTRAVENOUS
Status: DISCONTINUED | OUTPATIENT
Start: 2020-11-05 | End: 2020-11-05

## 2020-11-05 RX ORDER — SODIUM CHLORIDE 9 MG/ML
INJECTION, SOLUTION INTRAVENOUS CONTINUOUS PRN
Status: DISCONTINUED | OUTPATIENT
Start: 2020-11-05 | End: 2020-11-05

## 2020-11-05 RX ORDER — LIDOCAINE HYDROCHLORIDE 20 MG/ML
INJECTION INTRAVENOUS
Status: DISCONTINUED | OUTPATIENT
Start: 2020-11-05 | End: 2020-11-05

## 2020-11-05 RX ADMIN — PROPOFOL 20 MG: 10 INJECTION, EMULSION INTRAVENOUS at 12:11

## 2020-11-05 RX ADMIN — PROPOFOL 50 MG: 10 INJECTION, EMULSION INTRAVENOUS at 12:11

## 2020-11-05 RX ADMIN — LIDOCAINE HYDROCHLORIDE 100 MG: 20 INJECTION, SOLUTION INTRAVENOUS at 12:11

## 2020-11-05 RX ADMIN — SODIUM CHLORIDE: 0.9 INJECTION, SOLUTION INTRAVENOUS at 12:11

## 2020-11-05 NOTE — ANESTHESIA PREPROCEDURE EVALUATION
11/05/2020     Julien Meléndez is a 80 y.o., male here for KIRA    Past Medical History:   Diagnosis Date    Diabetes mellitus     Hiatal hernia     under Dr Saenz' care    Hyperlipidemia     Hypertension     MI (myocardial infarction)     Sleep apnea      Past Surgical History:   Procedure Laterality Date    ABDOMINAL HERNIA REPAIR      CARDIAC CATHETERIZATION      3 stents placed    CATARACT EXTRACTION, BILATERAL      CORONARY ANGIOGRAPHY N/A 10/3/2019    Procedure: ANGIOGRAM, CORONARY ARTERY;  Surgeon: Edwin Azul MD;  Location: [facility] CATH LAB/EP;  Service: Cardiology;  Laterality: N/A;    HERNIA REPAIR      inguinal    LEFT HEART CATHETERIZATION Left 9/13/2019    Procedure: Left heart cath;  Surgeon: Edwin Azul MD;  Location: [facility] CATH LAB/EP;  Service: Cardiology;  Laterality: Left;    LEFT HEART CATHETERIZATION N/A 10/3/2019    Procedure: Left heart cath;  Surgeon: Edwin Azul MD;  Location: [facility] CATH LAB/EP;  Service: Cardiology;  Laterality: N/A;         Anesthesia Evaluation    I have reviewed the Patient Summary Reports.    I have reviewed the Nursing Notes. I have reviewed the NPO Status.      Review of Systems  Anesthesia Hx:  History of prior surgery of interest to airway management or planning:  Denies Personal Hx of Anesthesia complications.   Cardiovascular:   Hypertension Past MI CAD   Angina    Pulmonary:   Sleep Apnea    Renal/:   Chronic Renal Disease    Hepatic/GI:   Hiatal Hernia,    Endocrine:   Diabetes        Physical Exam  General:  Well nourished    Airway/Jaw/Neck:  Airway Findings: Mouth Opening: Normal Tongue: Normal     Eyes/Ears/Nose:  EYES/EARS/NOSE FINDINGS: Normal    Chest/Lungs:  Chest/Lungs Clear    Heart/Vascular:  Heart Findings: Normal       Mental Status:  Mental Status Findings: Normal      TTE 2019  · Mildly decreased left ventricular  systolic function. The estimated ejection fraction is 45-50%  · Local segmental wall motion abnormalities: LV apex is hypokinetic. (unchanged from prior)  · Concentric left ventricular hypertrophy.  · Grade I (mild) left ventricular diastolic dysfunction consistent with impaired relaxation.  · Normal right ventricular systolic function.  · Normal central venous pressure (3 mm Hg).         Anesthesia Plan  Type of Anesthesia, risks & benefits discussed:  Anesthesia Type:  general, MAC  Patient's Preference:   Intra-op Monitoring Plan:   Intra-op Monitoring Plan Comments:   Post Op Pain Control Plan:   Post Op Pain Control Plan Comments:   Induction:    Beta Blocker:         Informed Consent: Patient understands risks and agrees with Anesthesia plan.  Questions answered. Anesthesia consent signed with patient.  ASA Score: 3     Day of Surgery Review of History & Physical:            Ready For Surgery From Anesthesia Perspective.

## 2020-11-05 NOTE — DISCHARGE INSTRUCTIONS
· Because cardioversion typically requires sedation, you won't be able to drive home. You will need a ride. Wait at least 24 hours before driving a car or operating heavy machinery after receiving sedating medicines.  · Dont be alarmed if the skin on your chest is irritated or feels like it is sunburned. Your healthcare provider may prescribe a soothing lotion to relieve this discomfort. These minor symptoms will go away in a few days.  · Ask your healthcare provider about medicines to keep your heart rhythm steady.  · Learn to take your own pulse. Keep a record of your results. Ask your healthcare provider when you should seek emergency medical attention. He or she will tell you which pulse rate reading is dangerous.   · Keep in mind this procedure may need to be repeated if the abnormal heart rhythm returns. After the procedure, your healthcare provider will tell you if the treatment worked or if you will need further treatments or medication.  · For the next 8 hours, you should be watched by a responsible adult. This person should make sure your condition is not getting worse.  · Don't drink any alcohol for the next 24 hours.  · Don't drive, operate dangerous machinery, or make important business or personal decisions during the next 24 hours.  · Tell your healthcare provider about any pain, or if you cough up or vomit blood, or have trouble swallowing.  You can eat and drink again when your throat is no longer numb.  Discharge Instructions: After Your Surgery  Youve just had surgery. During surgery, you were given medicine called anesthesia to keep you relaxed and free of pain. After surgery, you may have some pain or nausea. This is common. Here are some tips for feeling better and getting well after surgery.     Stay on schedule with your medicine.   Going home  Your healthcare provider will show you how to take care of yourself when you go home. He or she will also answer your questions. Have an adult  family member or friend drive you home. For the first 24 hours after your surgery:  Do not drive or use heavy equipment.  Do not make important decisions or sign legal papers.  Do not drink alcohol.  Have someone stay with you, if needed. He or she can watch for problems and help keep you safe.  Be sure to go to all follow-up visits with your healthcare provider. And rest after your surgery for as long as your healthcare provider tells you to.  Coping with pain  If you have pain after surgery, pain medicine will help you feel better. Take it as told, before pain becomes severe. Also, ask your healthcare provider or pharmacist about other ways to control pain. This might be with heat, ice, or relaxation. And follow any other instructions your surgeon or nurse gives you.  Tips for taking pain medicine  To get the best relief possible, remember these points:  Pain medicines can upset your stomach. Taking them with a little food may help.  Most pain relievers taken by mouth need at least 20 to 30 minutes to start to work.  Taking medicine on a schedule can help you remember to take it. Try to time your medicine so that you can take it before starting an activity. This might be before you get dressed, go for a walk, or sit down for dinner.  Constipation is a common side effect of pain medicines. Call your healthcare provider before taking any medicines such as laxatives or stool softeners to help ease constipation. Also ask if you should skip any foods. Drinking lots of fluids and eating foods such as fruits and vegetables that are high in fiber can also help. Remember, do not take laxatives unless your surgeon has prescribed them.  Drinking alcohol and taking pain medicine can cause dizziness and slow your breathing. It can even be deadly. Do not drink alcohol while taking pain medicine.  Pain medicine can make you react more slowly to things. Do not drive or run machinery while taking pain medicine.  Your healthcare  provider may tell you to take acetaminophen to help ease your pain. Ask him or her how much you are supposed to take each day. Acetaminophen or other pain relievers may interact with your prescription medicines or other over-the-counter (OTC) medicines. Some prescription medicines have acetaminophen and other ingredients. Using both prescription and OTC acetaminophen for pain can cause you to overdose. Read the labels on your OTC medicines with care. This will help you to clearly know the list of ingredients, how much to take, and any warnings. It may also help you not take too much acetaminophen. If you have questions or do not understand the information, ask your pharmacist or healthcare provider to explain it to you before you take the OTC medicine.  Managing nausea  Some people have an upset stomach after surgery. This is often because of anesthesia, pain, or pain medicine, or the stress of surgery. These tips will help you handle nausea and eat healthy foods as you get better. If you were on a special food plan before surgery, ask your healthcare provider if you should follow it while you get better. These tips may help:  Do not push yourself to eat. Your body will tell you when to eat and how much.  Start off with clear liquids and soup. They are easier to digest.  Next try semi-solid foods, such as mashed potatoes, applesauce, and gelatin, as you feel ready.  Slowly move to solid foods. Dont eat fatty, rich, or spicy foods at first.  Do not force yourself to have 3 large meals a day. Instead eat smaller amounts more often.  Take pain medicines with a small amount of solid food, such as crackers or toast, to avoid nausea.     Call your surgeon if  You still have pain an hour after taking medicine. The medicine may not be strong enough.  You feel too sleepy, dizzy, or groggy. The medicine may be too strong.  You have side effects like nausea, vomiting, or skin changes, such as rash, itching, or hives.        If you have obstructive sleep apnea  You were given anesthesia medicine during surgery to keep you comfortable and free of pain. After surgery, you may have more apnea spells because of this medicine and other medicines you were given. The spells may last longer than usual.   At home:  Keep using the continuous positive airway pressure (CPAP) device when you sleep. Unless your healthcare provider tells you not to, use it when you sleep, day or night. CPAP is a common device used to treat obstructive sleep apnea.  Talk with your provider before taking any pain medicine, muscle relaxants, or sedatives. Your provider will tell you about the possible dangers of taking these medicines.  Date Last Reviewed: 12/1/2016  © 0286-0830 [employer]. [address] [place] [address], [place] [id number]. All rights reserved. This information is not intended as a substitute for professional medical care. Always follow your healthcare professional's instructions.        Transesophageal Echocardiography (KIRA)      Transesophageal echocardiography (KIRA) is a test done to record images of your heart with a probe inside your esophagus. These images help your healthcare provider find and treat problems such as infection, disease, or defects in your hearts function, walls or valves. This test may be done when a chest echocardiogram (transthoracic) does not give your provider enough information.  Before your test  Tell your provider about all the medicines you take. Ask if its OK to take them before the test.  Dont eat or drink for 6 to 8 hours before the test. This includes water.  Tell your healthcare provider if you have ulcers, a hiatal hernia, or problems swallowing. Also report a history of narrowing of the esophagus, or any other previous gastrointestinal problems.  Also, let him or her know of any allergies to medicines or sedatives.  Also let your provider know if you have dental implants or dentures that should be  removed before the test.  Arrange to have someone drive you home after the exam.  During your KIRA  When you arrive for your KIRA, you will change into a hospital gown, and then be taken to the testing room.  Your provider will spray your throat with a numbing medicine. You may be given a medicine through an IV (intravenous) in your arm to help you relax. You may also be given oxygen. Then youll be asked to lie on your left side.  The healthcare provider gently inserts the small, lubricated probe into your mouth. As you swallow, he or she will slowly guide the tube into your esophagus.  You may feel the healthcare provider moving the probe, but it shouldnt hurt or interfere with your breathing. A nurse checks your heart rate, blood pressure, and breathing. The test usually takes 20 to 40 minutes.  The nurse or assistant will suction any saliva out of your mouth, similar to when you have a dental cleaning.  After the test  Tell your healthcare provider about any pain, or if you cough up or vomit blood, or have trouble swallowing.  You can eat and drink again when your throat is no longer numb.  Do not drive a car or run heavy machinery for at least 24 hours after getting sedation. After 24 hours you can return to normal activity unless your healthcare provider tells you otherwise.  Be sure to keep your follow-up appointment to go over the results with your healthcare provider.  Your next appointment is: ____________________  Date Last Reviewed: 12/1/2016  © 8346-8318 [employer]. [address] [place] [address], [place] [address]. All rights reserved. This information is not intended as a substitute for professional medical care. Always follow your healthcare professional's instructions.      ·

## 2020-11-05 NOTE — BRIEF OP NOTE
S/p KIRA + DCCV       No YOSSI thrombus   DCCV to NSR         DC home         DOAC  Rate control           Full report to follow

## 2020-11-05 NOTE — INTERVAL H&P NOTE
The patient has been examined and the H&P has been reviewed:          Active Hospital Problems    Diagnosis  POA    *Atrial fibrillation [I48.91]  Yes    Shortness of breath [R06.02]  Yes    KELSEY (obstructive sleep apnea) [G47.33]  Yes         12/2016   AHI 5.7 with oxygen jude 87%   AHI 11.2 in supine position   REM AHI 40            Abnormal findings on diagnostic imaging of heart and coronary circulation [R93.1]  Yes         PET stress 4/2017   10% apical fixed defect   5% reversible ischemia at the distal apex   Normal EF                Coronary artery disease due to calcified coronary lesion [I25.10, I25.84]  Yes           Left heart cath 12/2015-Dr. Hernandez        LM patent  LAD patent except for distal 75%  LCX patent  OM1   RCA 90%      PCI of RCA 2.75 x 18 mm MILES  Unsuccessful PCI of OM1         LVEDP 11 mmHg  3.5 x 18 mm       Left hearth cath 3/1/2010 -Dr. Hernandez   RCA PCI with 3.5 x 15 mm  BMS    Left heart cath 1/31/2010-Dr. Hernandez   LAD 3.0 x 12 and 3.5 x 24 mm  BMS         [facility] 11/12/2019      S/p LCX  PCI     Bilateral CFA access 8 fr       LM patent   Distal LAD 80% stenosis   Patent RCA   LCX  with R to L collaterals     LVEDP 8 mmHg       Crossed LCX  with antegrade Filder XT + LP Turnpike   PCI with 3.0 x 26 and 3.0 x 12 mm Resolute MILES post dilated with 3.5 NC           PAD (peripheral artery disease) [I73.9]  Yes    Unspecified osteoarthritis, unspecified site [M19.90]  Yes      Resolved Hospital Problems   No resolved problems to display.

## 2020-11-05 NOTE — PROGRESS NOTES
Pt ready for discharge as ordered per MD Pickering. Pt aaox4, neuro intact, denies any cp, NV or any other discomfort. Discharge instructions reviewed w pt and spouse, both verbalized full understanding and denies further questions. PIV removed as ordered, tip intact, gauze and coban applied. Pt wheeled to front of building where he left w spouse on private vehicle, NAD.

## 2020-11-05 NOTE — TRANSFER OF CARE
Anesthesia Transfer of Care Note    Patient: Julien Meléndez    Procedure(s) Performed: Procedure(s) (LRB):  Transesophageal echo (KIRA) intra-procedure log documentation (N/A)    Patient location: Cath Lab    Anesthesia Type: MAC    Transport from OR: Transported from OR on 6-10 L/min O2 by face mask with adequate spontaneous ventilation    Post pain: adequate analgesia    Post assessment: no apparent anesthetic complications    Post vital signs: stable    Level of consciousness: awake    Nausea/Vomiting: no nausea/vomiting    Complications: none    Transfer of care protocol was followed      Last vitals:   Visit Vitals  BP (!) 142/81 (BP Location: Right arm, Patient Position: Lying)   Pulse 95   Temp 36.7 °C (98.1 °F) (Oral)   Resp 16   Ht 6' (1.829 m)   Wt 98.4 kg (217 lb)   SpO2 99%   BMI 29.43 kg/m²

## 2020-11-05 NOTE — PROGRESS NOTES
KIRA completed. Pt tolerated well. Will recover as per protocol. Fall risk precautions in place. Will cont to monitor.

## 2020-11-05 NOTE — ANESTHESIA POSTPROCEDURE EVALUATION
Anesthesia Post Evaluation    Patient: Julien Meléndez    Procedure(s) Performed: Procedure(s) (LRB):  Transesophageal echo (KIRA) intra-procedure log documentation (N/A)    Final Anesthesia Type: MAC    Patient location during evaluation: Cath Lab  Patient participation: Yes- Able to Participate  Level of consciousness: awake and alert  Post-procedure vital signs: reviewed and stable  Pain management: adequate  Airway patency: patent  KELSEY mitigation strategies: Multimodal analgesia  PONV status at discharge: No PONV  Anesthetic complications: no      Cardiovascular status: hemodynamically stable and blood pressure returned to baseline  Respiratory status: room air, unassisted and spontaneous ventilation  Hydration status: euvolemic  Follow-up not needed.          Vitals Value Taken Time   /54 11/05/20 1325   Temp 36.6 °C (97.8 °F) 11/05/20 1300   Pulse 84 11/05/20 1325   Resp 24 11/05/20 1325   SpO2 95 % 11/05/20 1325   Vitals shown include unvalidated device data.      No case tracking events are documented in the log.      Pain/Rangel Score: Rangel Score: 8 (11/5/2020  1:00 PM)

## 2020-11-17 LAB
AORTIC ROOT ANNULUS: 3.3 CM
BSA FOR ECHO PROCEDURE: 2.24 M2
TV PEAK SYSTOLIC PULMONARY ARTERY PRESSURE: 25 MMHG

## 2020-12-03 NOTE — DISCHARGE SUMMARY
[facility]  Cardiology  Discharge Summary      Patient Name: Julien Meléndez  MRN: [id number]  Admission Date: 11/5/2020  Hospital Length of Stay: 0 days  Discharge Date and Time: 11/5/2020  Attending Physician: No att. providers found  Discharging Provider: Edwin Azul MD  Primary Care Physician: Kelvin Wayne MD    HPI:     · A 200 J synchronized cardioversion was successfully performed with restoration of normal sinus rhythm.  · The left ventricle is normal in size with low normal systolic function. The estimated ejection fraction is 50%.  · Normal right ventricular size with normal right ventricular systolic function.  · Mild tricuspid regurgitation.  · No interatrial septal defect present.  · No ASD or PFO closure device in interatrial septum.  · Normal appearing left atrial appendage. No thrombus is present in the appendage. Normal appendage velocities.  · Grade 1 plaque present in the descending aorta.    Procedure(s) (LRB):  Transesophageal echo (MIRELA) intra-procedure log documentation (N/A)     Indwelling Lines/Drains at time of discharge:  Lines/Drains/Airways     None                 Hospital Course see above    Consults: anesthesia     Significant Diagnostic Studies:     Labs + ecg + mirela + dccv      Pending Diagnostic Studies:     None          Final Active Diagnoses:    Diagnosis Date Noted POA    PRINCIPAL PROBLEM:  Atrial fibrillation [I48.91] 11/02/2020 Yes    Shortness of breath [R06.02] 07/11/2017 Yes    KELSEY (obstructive sleep apnea) [G47.33] 10/26/2016 Yes    Abnormal findings on diagnostic imaging of heart and coronary circulation [R93.1] 12/29/2015 Yes    Coronary artery disease due to calcified coronary lesion [I25.10, I25.84] 12/28/2015 Yes    PAD (peripheral artery disease) [I73.9] 11/12/2015 Yes    Unspecified osteoarthritis, unspecified site [M19.90] 11/12/2015 Yes      Problems Resolved During this Admission:       Discharged Condition: stable     Follow  Up:      DC home      Follow up with PCP      Follow up with Dr. Azul or primary cardiologist as scheduled      Cardiac diet      Resume normal activities in 3 days      Patient Instructions:      CBC W/ AUTO DIFFERENTIAL   Standing Status: Future Standing Exp. Date: 01/01/22     Basic Metabolic Panel   Standing Status: Future Standing Exp. Date: 01/01/22     COVID-19 Routine Screening   Standing Status: Future Number of Occurrences: 1 Standing Exp. Date: 01/01/22     Order Specific Question Answer Comments   Is the patient symptomatic? No    Is this needed for pre-procedure or pre-op testing? Yes    Diagnosis: Pre-op exam [580433]      Medications:      Discharge Medication List as of 11/5/2020  1:58 PM      CONTINUE these medications which have NOT CHANGED    Details   amLODIPine (NORVASC) 5 MG tablet TAKE 1 TABLET EVERY DAY, Normal      apixaban (ELIQUIS) 5 mg Tab Take 1 tablet (5 mg total) by mouth 2 (two) times daily., Starting Mon 11/2/2020, Normal      aspirin (ECOTRIN) 81 MG EC tablet Take 81 mg by mouth once daily., Until Discontinued, Historical Med      atorvastatin (LIPITOR) 40 MG tablet TAKE 1 TABLET EVERY DAY, Normal      fluoxetine (PROZAC) 20 MG capsule Take 1 capsule by mouth once daily., Starting 6/30/2014, Until Discontinued, Historical Med      glipiZIDE (GLUCOTROL) 5 MG tablet Take 1 tablet by mouth Daily., Starting 9/4/2013, Until Discontinued, Historical Med      isosorbide mononitrate (IMDUR) 30 MG 24 hr tablet TAKE 1 TABLET ONE TIME DAILY, Normal      losartan-hydrochlorothiazide 100-25 mg (HYZAAR) 100-25 mg per tablet Take 1 tablet by mouth once daily., Starting Mon 9/28/2020, Normal      metoprolol succinate (TOPROL-XL) 25 MG 24 hr tablet Take 0.5 tablets (12.5 mg total) by mouth once daily., Starting Tue 10/10/2017, Normal      SITagliptan-metformin (JANUMET) 50-1,000 mg per tablet Take 1 tablet by mouth 2 (two) times daily with meals., Historical Med      traMADoL (ULTRAM) 50 mg  tablet Take 1 tablet (50 mg total) by mouth 2 (two) times daily as needed., Starting Mon 10/26/2020, Normal      trazodone (DESYREL) 150 MG tablet Starting 6/16/2014, Until Discontinued, Historical Med      nitroGLYCERIN (NITROSTAT) 0.4 MG SL tablet Place 1 tablet (0.4 mg total) under the tongue every 5 (five) minutes as needed for Chest pain., Starting Thu 3/23/2017, Until Mon 11/2/2020, Normal      TRUE METRIX GLUCOSE TEST STRIP Strp Starting Sat 7/15/2017, Historical Med      TRUEPLUS LANCETS 28 gauge Misc Starting Sat 7/15/2017, Historical Med                Time spent on the discharge of patient: 45 minutes    Edwin Azul MD  Cardiology  [facility]

## 2021-03-09 ENCOUNTER — LAB VISIT (OUTPATIENT)
Dept: LAB | Facility: HOSPITAL | Age: 81
End: 2021-03-09
Attending: INTERNAL MEDICINE
Payer: MEDICARE

## 2021-03-09 DIAGNOSIS — N18.31 STAGE 3A CHRONIC KIDNEY DISEASE: ICD-10-CM

## 2021-03-09 DIAGNOSIS — I10 ESSENTIAL HYPERTENSION: ICD-10-CM

## 2021-03-09 DIAGNOSIS — E78.2 MIXED HYPERLIPIDEMIA: ICD-10-CM

## 2021-03-09 DIAGNOSIS — R53.82 CHRONIC FATIGUE: ICD-10-CM

## 2021-03-09 DIAGNOSIS — R45.84 ANHEDONIA: ICD-10-CM

## 2021-03-09 DIAGNOSIS — E11.51 TYPE 2 DIABETES MELLITUS WITH DIABETIC PERIPHERAL ANGIOPATHY WITHOUT GANGRENE, WITHOUT LONG-TERM CURRENT USE OF INSULIN: ICD-10-CM

## 2021-03-09 DIAGNOSIS — Z86.16 HISTORY OF COVID-19: ICD-10-CM

## 2021-03-09 PROBLEM — M25.562 PAIN IN LEFT KNEE: Status: ACTIVE | Noted: 2021-03-09

## 2021-03-09 LAB
ALBUMIN SERPL BCP-MCNC: 4.4 G/DL (ref 3.5–5.2)
ALP SERPL-CCNC: 48 U/L (ref 55–135)
ALT SERPL W/O P-5'-P-CCNC: 16 U/L (ref 10–44)
ANION GAP SERPL CALC-SCNC: 10 MMOL/L (ref 8–16)
AST SERPL-CCNC: 15 U/L (ref 10–40)
BASOPHILS # BLD AUTO: 0.05 K/UL (ref 0–0.2)
BASOPHILS NFR BLD: 0.7 % (ref 0–1.9)
BILIRUB SERPL-MCNC: 1 MG/DL (ref 0.1–1)
BUN SERPL-MCNC: 28 MG/DL (ref 8–23)
CALCIUM SERPL-MCNC: 10.3 MG/DL (ref 8.7–10.5)
CHLORIDE SERPL-SCNC: 102 MMOL/L (ref 95–110)
CHOLEST SERPL-MCNC: 133 MG/DL (ref 120–199)
CHOLEST/HDLC SERPL: 3.7 {RATIO} (ref 2–5)
CO2 SERPL-SCNC: 29 MMOL/L (ref 23–29)
CREAT SERPL-MCNC: 1.2 MG/DL (ref 0.5–1.4)
DIFFERENTIAL METHOD: ABNORMAL
EOSINOPHIL # BLD AUTO: 0.1 K/UL (ref 0–0.5)
EOSINOPHIL NFR BLD: 1.9 % (ref 0–8)
ERYTHROCYTE [DISTWIDTH] IN BLOOD BY AUTOMATED COUNT: 13.4 % (ref 11.5–14.5)
EST. GFR  (AFRICAN AMERICAN): >60 ML/MIN/1.73 M^2
EST. GFR  (NON AFRICAN AMERICAN): 57 ML/MIN/1.73 M^2
ESTIMATED AVG GLUCOSE: 154 MG/DL (ref 68–131)
GLUCOSE SERPL-MCNC: 198 MG/DL (ref 70–110)
HBA1C MFR BLD: 7 % (ref 4–5.6)
HCT VFR BLD AUTO: 45.7 % (ref 40–54)
HDLC SERPL-MCNC: 36 MG/DL (ref 40–75)
HDLC SERPL: 27.1 % (ref 20–50)
HGB BLD-MCNC: 15.5 G/DL (ref 14–18)
IMM GRANULOCYTES # BLD AUTO: 0.04 K/UL (ref 0–0.04)
IMM GRANULOCYTES NFR BLD AUTO: 0.5 % (ref 0–0.5)
LDLC SERPL CALC-MCNC: 76 MG/DL (ref 63–159)
LYMPHOCYTES # BLD AUTO: 1.8 K/UL (ref 1–4.8)
LYMPHOCYTES NFR BLD: 24 % (ref 18–48)
MCH RBC QN AUTO: 31.6 PG (ref 27–31)
MCHC RBC AUTO-ENTMCNC: 33.9 G/DL (ref 32–36)
MCV RBC AUTO: 93 FL (ref 82–98)
MONOCYTES # BLD AUTO: 0.9 K/UL (ref 0.3–1)
MONOCYTES NFR BLD: 12 % (ref 4–15)
NEUTROPHILS # BLD AUTO: 4.6 K/UL (ref 1.8–7.7)
NEUTROPHILS NFR BLD: 60.9 % (ref 38–73)
NONHDLC SERPL-MCNC: 97 MG/DL
NRBC BLD-RTO: 0 /100 WBC
PLATELET # BLD AUTO: 187 K/UL (ref 150–350)
PMV BLD AUTO: 11.9 FL (ref 9.2–12.9)
POTASSIUM SERPL-SCNC: 4.8 MMOL/L (ref 3.5–5.1)
PROT SERPL-MCNC: 7.7 G/DL (ref 6–8.4)
RBC # BLD AUTO: 4.9 M/UL (ref 4.6–6.2)
SODIUM SERPL-SCNC: 141 MMOL/L (ref 136–145)
TRIGL SERPL-MCNC: 105 MG/DL (ref 30–150)
TSH SERPL DL<=0.005 MIU/L-ACNC: 1.57 UIU/ML (ref 0.4–4)
VIT B12 SERPL-MCNC: 413 PG/ML (ref 210–950)
WBC # BLD AUTO: 7.49 K/UL (ref 3.9–12.7)

## 2021-03-09 PROCEDURE — 86769 SARS-COV-2 COVID-19 ANTIBODY: CPT | Performed by: INTERNAL MEDICINE

## 2021-03-09 PROCEDURE — 80053 COMPREHEN METABOLIC PANEL: CPT | Performed by: INTERNAL MEDICINE

## 2021-03-09 PROCEDURE — 85025 COMPLETE CBC W/AUTO DIFF WBC: CPT | Performed by: INTERNAL MEDICINE

## 2021-03-09 PROCEDURE — 82607 VITAMIN B-12: CPT | Performed by: INTERNAL MEDICINE

## 2021-03-09 PROCEDURE — 80061 LIPID PANEL: CPT | Performed by: INTERNAL MEDICINE

## 2021-03-09 PROCEDURE — 84443 ASSAY THYROID STIM HORMONE: CPT | Performed by: INTERNAL MEDICINE

## 2021-03-09 PROCEDURE — 83036 HEMOGLOBIN GLYCOSYLATED A1C: CPT | Performed by: INTERNAL MEDICINE

## 2021-03-10 LAB — SARS-COV-2 IGG SERPLBLD QL IA.RAPID: POSITIVE

## 2021-03-18 ENCOUNTER — TELEPHONE (OUTPATIENT)
Dept: CARDIOLOGY | Facility: CLINIC | Age: 81
End: 2021-03-18

## 2021-03-19 ENCOUNTER — TELEPHONE (OUTPATIENT)
Dept: CARDIOLOGY | Facility: CLINIC | Age: 81
End: 2021-03-19

## 2021-03-26 ENCOUNTER — TELEPHONE (OUTPATIENT)
Dept: CARDIOLOGY | Facility: CLINIC | Age: 81
End: 2021-03-26

## 2021-03-29 ENCOUNTER — TELEPHONE (OUTPATIENT)
Dept: CARDIOLOGY | Facility: CLINIC | Age: 81
End: 2021-03-29

## 2021-03-29 ENCOUNTER — ANTI-COAG VISIT (OUTPATIENT)
Dept: CARDIOLOGY | Facility: CLINIC | Age: 81
End: 2021-03-29

## 2021-03-29 DIAGNOSIS — I48.91 ATRIAL FIBRILLATION, UNSPECIFIED TYPE: Primary | ICD-10-CM

## 2021-03-29 DIAGNOSIS — I48.91 ATRIAL FIBRILLATION, UNSPECIFIED TYPE: ICD-10-CM

## 2021-03-29 DIAGNOSIS — Z79.01 LONG TERM (CURRENT) USE OF ANTICOAGULANTS: Primary | ICD-10-CM

## 2021-03-29 RX ORDER — WARFARIN SODIUM 5 MG/1
5 TABLET ORAL DAILY
Qty: 90 TABLET | Refills: 3 | Status: SHIPPED | OUTPATIENT
Start: 2021-03-29 | End: 2021-03-29

## 2021-03-29 RX ORDER — WARFARIN SODIUM 5 MG/1
5 TABLET ORAL DAILY
Qty: 90 TABLET | Refills: 3 | Status: SHIPPED | OUTPATIENT
Start: 2021-03-29 | End: 2021-06-02 | Stop reason: SDUPTHER

## 2021-03-30 ENCOUNTER — TELEPHONE (OUTPATIENT)
Dept: CARDIOLOGY | Facility: CLINIC | Age: 81
End: 2021-03-30

## 2021-04-07 ENCOUNTER — RESEARCH ENCOUNTER (OUTPATIENT)
Dept: RESEARCH | Facility: HOSPITAL | Age: 81
End: 2021-04-07

## 2021-04-08 ENCOUNTER — PATIENT MESSAGE (OUTPATIENT)
Dept: CARDIOLOGY | Facility: CLINIC | Age: 81
End: 2021-04-08

## 2021-04-12 ENCOUNTER — TELEPHONE (OUTPATIENT)
Dept: ELECTROPHYSIOLOGY | Facility: CLINIC | Age: 81
End: 2021-04-12

## 2021-04-14 ENCOUNTER — TELEPHONE (OUTPATIENT)
Dept: CARDIOLOGY | Facility: CLINIC | Age: 81
End: 2021-04-14

## 2021-04-14 ENCOUNTER — TELEPHONE (OUTPATIENT)
Dept: ELECTROPHYSIOLOGY | Facility: CLINIC | Age: 81
End: 2021-04-14

## 2021-04-15 ENCOUNTER — TELEPHONE (OUTPATIENT)
Dept: ELECTROPHYSIOLOGY | Facility: CLINIC | Age: 81
End: 2021-04-15

## 2021-04-16 ENCOUNTER — PATIENT MESSAGE (OUTPATIENT)
Dept: CARDIOLOGY | Facility: CLINIC | Age: 81
End: 2021-04-16

## 2021-04-20 ENCOUNTER — TELEPHONE (OUTPATIENT)
Dept: CARDIOLOGY | Facility: CLINIC | Age: 81
End: 2021-04-20

## 2021-04-21 ENCOUNTER — ANTI-COAG VISIT (OUTPATIENT)
Dept: CARDIOLOGY | Facility: CLINIC | Age: 81
End: 2021-04-21
Payer: MEDICARE

## 2021-04-21 DIAGNOSIS — I48.91 ATRIAL FIBRILLATION, UNSPECIFIED TYPE: Primary | ICD-10-CM

## 2021-04-21 DIAGNOSIS — Z79.01 LONG TERM (CURRENT) USE OF ANTICOAGULANTS: ICD-10-CM

## 2021-04-21 PROCEDURE — 93793 ANTICOAG MGMT PT WARFARIN: CPT | Mod: S$GLB,,,

## 2021-04-21 PROCEDURE — 93793 PR ANTICOAGULANT MGMT FOR PT TAKING WARFARIN: ICD-10-PCS | Mod: S$GLB,,,

## 2021-04-27 ENCOUNTER — ANTI-COAG VISIT (OUTPATIENT)
Dept: CARDIOLOGY | Facility: CLINIC | Age: 81
End: 2021-04-27
Payer: MEDICARE

## 2021-04-27 DIAGNOSIS — I48.91 ATRIAL FIBRILLATION, UNSPECIFIED TYPE: Primary | ICD-10-CM

## 2021-04-27 DIAGNOSIS — Z79.01 LONG TERM (CURRENT) USE OF ANTICOAGULANTS: ICD-10-CM

## 2021-04-27 PROCEDURE — 93793 ANTICOAG MGMT PT WARFARIN: CPT | Mod: S$GLB,,,

## 2021-04-27 PROCEDURE — 93793 PR ANTICOAGULANT MGMT FOR PT TAKING WARFARIN: ICD-10-PCS | Mod: S$GLB,,,

## 2021-04-29 ENCOUNTER — TELEPHONE (OUTPATIENT)
Dept: RESEARCH | Facility: HOSPITAL | Age: 81
End: 2021-04-29

## 2021-04-29 PROBLEM — I48.3 TYPICAL ATRIAL FLUTTER: Status: ACTIVE | Noted: 2021-04-29

## 2021-04-30 ENCOUNTER — RESEARCH ENCOUNTER (OUTPATIENT)
Dept: RESEARCH | Facility: HOSPITAL | Age: 81
End: 2021-04-30

## 2021-04-30 ENCOUNTER — OFFICE VISIT (OUTPATIENT)
Dept: CARDIOLOGY | Facility: CLINIC | Age: 81
End: 2021-04-30
Payer: MEDICARE

## 2021-04-30 VITALS
DIASTOLIC BLOOD PRESSURE: 74 MMHG | HEART RATE: 72 BPM | HEIGHT: 72 IN | WEIGHT: 218 LBS | BODY MASS INDEX: 29.53 KG/M2 | SYSTOLIC BLOOD PRESSURE: 143 MMHG

## 2021-04-30 DIAGNOSIS — I49.8 OTHER SPECIFIED CARDIAC ARRHYTHMIAS: Primary | ICD-10-CM

## 2021-04-30 DIAGNOSIS — I48.19 PERSISTENT ATRIAL FIBRILLATION: Primary | ICD-10-CM

## 2021-04-30 DIAGNOSIS — R53.82 CHRONIC FATIGUE: ICD-10-CM

## 2021-04-30 DIAGNOSIS — G47.33 OSA (OBSTRUCTIVE SLEEP APNEA): ICD-10-CM

## 2021-04-30 DIAGNOSIS — E11.9 TYPE 2 DIABETES MELLITUS WITHOUT COMPLICATION, WITHOUT LONG-TERM CURRENT USE OF INSULIN: ICD-10-CM

## 2021-04-30 DIAGNOSIS — I25.10 CORONARY ARTERY DISEASE DUE TO CALCIFIED CORONARY LESION: ICD-10-CM

## 2021-04-30 DIAGNOSIS — I25.10 CORONARY ARTERY DISEASE, ANGINA PRESENCE UNSPECIFIED, UNSPECIFIED VESSEL OR LESION TYPE, UNSPECIFIED WHETHER NATIVE OR TRANSPLANTED HEART: ICD-10-CM

## 2021-04-30 DIAGNOSIS — I25.84 CORONARY ARTERY DISEASE DUE TO CALCIFIED CORONARY LESION: ICD-10-CM

## 2021-04-30 DIAGNOSIS — I48.3 TYPICAL ATRIAL FLUTTER: ICD-10-CM

## 2021-04-30 DIAGNOSIS — I10 ESSENTIAL HYPERTENSION: ICD-10-CM

## 2021-04-30 DIAGNOSIS — I49.8 OTHER SPECIFIED CARDIAC ARRHYTHMIAS: ICD-10-CM

## 2021-04-30 DIAGNOSIS — R07.9 CHEST PAIN, UNSPECIFIED TYPE: ICD-10-CM

## 2021-04-30 DIAGNOSIS — I25.5 CARDIOMYOPATHY, ISCHEMIC: ICD-10-CM

## 2021-04-30 PROCEDURE — 99214 PR OFFICE/OUTPT VISIT, EST, LEVL IV, 30-39 MIN: ICD-10-PCS | Mod: S$GLB,,, | Performed by: INTERNAL MEDICINE

## 2021-04-30 PROCEDURE — 99999 PR PBB SHADOW E&M-EST. PATIENT-LVL III: CPT | Mod: PBBFAC,,, | Performed by: INTERNAL MEDICINE

## 2021-04-30 PROCEDURE — 93010 ELECTROCARDIOGRAM REPORT: CPT | Mod: S$GLB,,, | Performed by: INTERNAL MEDICINE

## 2021-04-30 PROCEDURE — 99999 PR PBB SHADOW E&M-EST. PATIENT-LVL III: ICD-10-PCS | Mod: PBBFAC,,, | Performed by: INTERNAL MEDICINE

## 2021-04-30 PROCEDURE — 93005 ELECTROCARDIOGRAM TRACING: CPT | Mod: S$GLB,,, | Performed by: INTERNAL MEDICINE

## 2021-04-30 PROCEDURE — 99214 OFFICE O/P EST MOD 30 MIN: CPT | Mod: S$GLB,,, | Performed by: INTERNAL MEDICINE

## 2021-04-30 PROCEDURE — 93010 RHYTHM STRIP: ICD-10-PCS | Mod: S$GLB,,, | Performed by: INTERNAL MEDICINE

## 2021-04-30 PROCEDURE — 93005 RHYTHM STRIP: ICD-10-PCS | Mod: S$GLB,,, | Performed by: INTERNAL MEDICINE

## 2021-04-30 RX ORDER — METOPROLOL SUCCINATE 50 MG/1
50 TABLET, EXTENDED RELEASE ORAL DAILY
Qty: 90 TABLET | Refills: 3 | Status: SHIPPED | OUTPATIENT
Start: 2021-04-30 | End: 2022-03-14

## 2021-05-03 ENCOUNTER — ANTI-COAG VISIT (OUTPATIENT)
Dept: CARDIOLOGY | Facility: CLINIC | Age: 81
End: 2021-05-03
Payer: MEDICARE

## 2021-05-03 DIAGNOSIS — I48.91 ATRIAL FIBRILLATION, UNSPECIFIED TYPE: Primary | ICD-10-CM

## 2021-05-03 DIAGNOSIS — Z79.01 LONG TERM (CURRENT) USE OF ANTICOAGULANTS: ICD-10-CM

## 2021-05-03 PROCEDURE — 93793 PR ANTICOAGULANT MGMT FOR PT TAKING WARFARIN: ICD-10-PCS | Mod: S$GLB,,,

## 2021-05-03 PROCEDURE — 93793 ANTICOAG MGMT PT WARFARIN: CPT | Mod: S$GLB,,,

## 2021-05-10 ENCOUNTER — ANTI-COAG VISIT (OUTPATIENT)
Dept: CARDIOLOGY | Facility: CLINIC | Age: 81
End: 2021-05-10
Payer: MEDICARE

## 2021-05-10 DIAGNOSIS — I48.91 ATRIAL FIBRILLATION, UNSPECIFIED TYPE: ICD-10-CM

## 2021-05-10 DIAGNOSIS — Z79.01 LONG TERM (CURRENT) USE OF ANTICOAGULANTS: Primary | ICD-10-CM

## 2021-05-10 PROCEDURE — 93793 ANTICOAG MGMT PT WARFARIN: CPT | Mod: S$GLB,,,

## 2021-05-10 PROCEDURE — 93793 PR ANTICOAGULANT MGMT FOR PT TAKING WARFARIN: ICD-10-PCS | Mod: S$GLB,,,

## 2021-05-17 ENCOUNTER — ANTI-COAG VISIT (OUTPATIENT)
Dept: CARDIOLOGY | Facility: CLINIC | Age: 81
End: 2021-05-17
Payer: MEDICARE

## 2021-05-17 DIAGNOSIS — Z79.01 LONG TERM (CURRENT) USE OF ANTICOAGULANTS: ICD-10-CM

## 2021-05-17 DIAGNOSIS — I48.91 ATRIAL FIBRILLATION, UNSPECIFIED TYPE: Primary | ICD-10-CM

## 2021-05-17 PROCEDURE — 93793 PR ANTICOAGULANT MGMT FOR PT TAKING WARFARIN: ICD-10-PCS | Mod: S$GLB,,,

## 2021-05-17 PROCEDURE — 93793 ANTICOAG MGMT PT WARFARIN: CPT | Mod: S$GLB,,,

## 2021-05-26 ENCOUNTER — TELEPHONE (OUTPATIENT)
Dept: RESEARCH | Facility: HOSPITAL | Age: 81
End: 2021-05-26

## 2021-06-02 ENCOUNTER — ANTI-COAG VISIT (OUTPATIENT)
Dept: CARDIOLOGY | Facility: CLINIC | Age: 81
End: 2021-06-02
Payer: MEDICARE

## 2021-06-02 DIAGNOSIS — Z79.01 LONG TERM (CURRENT) USE OF ANTICOAGULANTS: Primary | ICD-10-CM

## 2021-06-02 DIAGNOSIS — I48.91 ATRIAL FIBRILLATION, UNSPECIFIED TYPE: ICD-10-CM

## 2021-06-02 PROCEDURE — 93793 PR ANTICOAGULANT MGMT FOR PT TAKING WARFARIN: ICD-10-PCS | Mod: S$GLB,,,

## 2021-06-02 PROCEDURE — 93793 ANTICOAG MGMT PT WARFARIN: CPT | Mod: S$GLB,,,

## 2021-06-02 RX ORDER — WARFARIN SODIUM 5 MG/1
TABLET ORAL
Qty: 45 TABLET | Refills: 5 | Status: SHIPPED | OUTPATIENT
Start: 2021-06-02 | End: 2021-09-28

## 2021-06-07 ENCOUNTER — RESEARCH ENCOUNTER (OUTPATIENT)
Dept: RESEARCH | Facility: HOSPITAL | Age: 81
End: 2021-06-07

## 2021-06-14 ENCOUNTER — ANTI-COAG VISIT (OUTPATIENT)
Dept: CARDIOLOGY | Facility: CLINIC | Age: 81
End: 2021-06-14
Payer: MEDICARE

## 2021-06-14 DIAGNOSIS — Z79.01 LONG TERM (CURRENT) USE OF ANTICOAGULANTS: ICD-10-CM

## 2021-06-14 DIAGNOSIS — I48.91 ATRIAL FIBRILLATION, UNSPECIFIED TYPE: Primary | ICD-10-CM

## 2021-06-14 PROCEDURE — 93793 ANTICOAG MGMT PT WARFARIN: CPT | Mod: S$GLB,,,

## 2021-06-14 PROCEDURE — 93793 PR ANTICOAGULANT MGMT FOR PT TAKING WARFARIN: ICD-10-PCS | Mod: S$GLB,,,

## 2021-06-28 ENCOUNTER — ANTI-COAG VISIT (OUTPATIENT)
Dept: CARDIOLOGY | Facility: CLINIC | Age: 81
End: 2021-06-28
Payer: MEDICARE

## 2021-06-28 DIAGNOSIS — I48.91 ATRIAL FIBRILLATION, UNSPECIFIED TYPE: Primary | ICD-10-CM

## 2021-06-28 DIAGNOSIS — Z79.01 LONG TERM (CURRENT) USE OF ANTICOAGULANTS: ICD-10-CM

## 2021-06-28 PROCEDURE — 93793 PR ANTICOAGULANT MGMT FOR PT TAKING WARFARIN: ICD-10-PCS | Mod: S$GLB,,,

## 2021-06-28 PROCEDURE — 93793 ANTICOAG MGMT PT WARFARIN: CPT | Mod: S$GLB,,,

## 2021-07-08 ENCOUNTER — ANTI-COAG VISIT (OUTPATIENT)
Dept: CARDIOLOGY | Facility: CLINIC | Age: 81
End: 2021-07-08
Payer: MEDICARE

## 2021-07-08 DIAGNOSIS — Z79.01 LONG TERM (CURRENT) USE OF ANTICOAGULANTS: ICD-10-CM

## 2021-07-08 DIAGNOSIS — I48.91 ATRIAL FIBRILLATION, UNSPECIFIED TYPE: Primary | ICD-10-CM

## 2021-07-08 PROCEDURE — 93793 PR ANTICOAGULANT MGMT FOR PT TAKING WARFARIN: ICD-10-PCS | Mod: S$GLB,,,

## 2021-07-08 PROCEDURE — 93793 ANTICOAG MGMT PT WARFARIN: CPT | Mod: S$GLB,,,

## 2021-07-15 ENCOUNTER — ANTI-COAG VISIT (OUTPATIENT)
Dept: CARDIOLOGY | Facility: CLINIC | Age: 81
End: 2021-07-15
Payer: MEDICARE

## 2021-07-15 DIAGNOSIS — Z79.01 LONG TERM (CURRENT) USE OF ANTICOAGULANTS: ICD-10-CM

## 2021-07-15 DIAGNOSIS — I48.91 ATRIAL FIBRILLATION, UNSPECIFIED TYPE: Primary | ICD-10-CM

## 2021-07-15 PROCEDURE — 93793 ANTICOAG MGMT PT WARFARIN: CPT | Mod: S$GLB,,,

## 2021-07-15 PROCEDURE — 93793 PR ANTICOAGULANT MGMT FOR PT TAKING WARFARIN: ICD-10-PCS | Mod: S$GLB,,,

## 2021-07-29 ENCOUNTER — ANTI-COAG VISIT (OUTPATIENT)
Dept: CARDIOLOGY | Facility: CLINIC | Age: 81
End: 2021-07-29
Payer: MEDICARE

## 2021-07-29 ENCOUNTER — PATIENT MESSAGE (OUTPATIENT)
Dept: CARDIOLOGY | Facility: CLINIC | Age: 81
End: 2021-07-29

## 2021-07-29 DIAGNOSIS — I48.91 ATRIAL FIBRILLATION, UNSPECIFIED TYPE: Primary | ICD-10-CM

## 2021-07-29 DIAGNOSIS — Z79.01 LONG TERM (CURRENT) USE OF ANTICOAGULANTS: ICD-10-CM

## 2021-07-29 PROCEDURE — 93793 PR ANTICOAGULANT MGMT FOR PT TAKING WARFARIN: ICD-10-PCS | Mod: S$GLB,,,

## 2021-07-29 PROCEDURE — 93793 ANTICOAG MGMT PT WARFARIN: CPT | Mod: S$GLB,,,

## 2021-08-05 ENCOUNTER — ANTI-COAG VISIT (OUTPATIENT)
Dept: CARDIOLOGY | Facility: CLINIC | Age: 81
End: 2021-08-05
Payer: MEDICARE

## 2021-08-05 DIAGNOSIS — Z79.01 LONG TERM (CURRENT) USE OF ANTICOAGULANTS: ICD-10-CM

## 2021-08-05 DIAGNOSIS — I48.91 ATRIAL FIBRILLATION, UNSPECIFIED TYPE: Primary | ICD-10-CM

## 2021-08-05 PROCEDURE — 93793 ANTICOAG MGMT PT WARFARIN: CPT | Mod: S$GLB,,,

## 2021-08-05 PROCEDURE — 93793 PR ANTICOAGULANT MGMT FOR PT TAKING WARFARIN: ICD-10-PCS | Mod: S$GLB,,,

## 2021-08-16 ENCOUNTER — ANTI-COAG VISIT (OUTPATIENT)
Dept: CARDIOLOGY | Facility: CLINIC | Age: 81
End: 2021-08-16
Payer: MEDICARE

## 2021-08-16 DIAGNOSIS — Z79.01 LONG TERM (CURRENT) USE OF ANTICOAGULANTS: ICD-10-CM

## 2021-08-16 DIAGNOSIS — I48.91 ATRIAL FIBRILLATION, UNSPECIFIED TYPE: Primary | ICD-10-CM

## 2021-08-16 PROCEDURE — 93793 PR ANTICOAGULANT MGMT FOR PT TAKING WARFARIN: ICD-10-PCS | Mod: S$GLB,,,

## 2021-08-16 PROCEDURE — 93793 ANTICOAG MGMT PT WARFARIN: CPT | Mod: S$GLB,,,

## 2021-09-14 ENCOUNTER — LAB VISIT (OUTPATIENT)
Dept: LAB | Facility: HOSPITAL | Age: 81
End: 2021-09-14
Attending: INTERNAL MEDICINE
Payer: MEDICARE

## 2021-09-14 ENCOUNTER — ANTI-COAG VISIT (OUTPATIENT)
Dept: CARDIOLOGY | Facility: CLINIC | Age: 81
End: 2021-09-14
Payer: MEDICARE

## 2021-09-14 DIAGNOSIS — Z12.5 PROSTATE CANCER SCREENING: ICD-10-CM

## 2021-09-14 DIAGNOSIS — E11.9 TYPE 2 DIABETES MELLITUS WITHOUT COMPLICATION, WITHOUT LONG-TERM CURRENT USE OF INSULIN: ICD-10-CM

## 2021-09-14 DIAGNOSIS — Z79.01 LONG TERM (CURRENT) USE OF ANTICOAGULANTS: ICD-10-CM

## 2021-09-14 DIAGNOSIS — I48.91 ATRIAL FIBRILLATION, UNSPECIFIED TYPE: Primary | ICD-10-CM

## 2021-09-14 DIAGNOSIS — N18.31 STAGE 3A CHRONIC KIDNEY DISEASE: ICD-10-CM

## 2021-09-14 DIAGNOSIS — I10 ESSENTIAL HYPERTENSION: ICD-10-CM

## 2021-09-14 LAB
ALBUMIN SERPL BCP-MCNC: 4.1 G/DL (ref 3.5–5.2)
ALP SERPL-CCNC: 50 U/L (ref 55–135)
ALT SERPL W/O P-5'-P-CCNC: 17 U/L (ref 10–44)
ANION GAP SERPL CALC-SCNC: 9 MMOL/L (ref 8–16)
AST SERPL-CCNC: 17 U/L (ref 10–40)
BASOPHILS # BLD AUTO: 0.04 K/UL (ref 0–0.2)
BASOPHILS NFR BLD: 0.5 % (ref 0–1.9)
BILIRUB SERPL-MCNC: 0.8 MG/DL (ref 0.1–1)
BUN SERPL-MCNC: 24 MG/DL (ref 8–23)
CALCIUM SERPL-MCNC: 10.3 MG/DL (ref 8.7–10.5)
CHLORIDE SERPL-SCNC: 103 MMOL/L (ref 95–110)
CHOLEST SERPL-MCNC: 204 MG/DL (ref 120–199)
CHOLEST/HDLC SERPL: 5.1 {RATIO} (ref 2–5)
CO2 SERPL-SCNC: 26 MMOL/L (ref 23–29)
COMPLEXED PSA SERPL-MCNC: 2.3 NG/ML (ref 0–4)
CREAT SERPL-MCNC: 1.3 MG/DL (ref 0.5–1.4)
DIFFERENTIAL METHOD: ABNORMAL
EOSINOPHIL # BLD AUTO: 0.1 K/UL (ref 0–0.5)
EOSINOPHIL NFR BLD: 1.5 % (ref 0–8)
ERYTHROCYTE [DISTWIDTH] IN BLOOD BY AUTOMATED COUNT: 13.2 % (ref 11.5–14.5)
EST. GFR  (AFRICAN AMERICAN): 59 ML/MIN/1.73 M^2
EST. GFR  (NON AFRICAN AMERICAN): 51 ML/MIN/1.73 M^2
GLUCOSE SERPL-MCNC: 199 MG/DL (ref 70–110)
HCT VFR BLD AUTO: 44.8 % (ref 40–54)
HDLC SERPL-MCNC: 40 MG/DL (ref 40–75)
HDLC SERPL: 19.6 % (ref 20–50)
HGB BLD-MCNC: 15.8 G/DL (ref 14–18)
IMM GRANULOCYTES # BLD AUTO: 0.05 K/UL (ref 0–0.04)
IMM GRANULOCYTES NFR BLD AUTO: 0.6 % (ref 0–0.5)
LDLC SERPL CALC-MCNC: 134 MG/DL (ref 63–159)
LYMPHOCYTES # BLD AUTO: 2.3 K/UL (ref 1–4.8)
LYMPHOCYTES NFR BLD: 26.9 % (ref 18–48)
MCH RBC QN AUTO: 32 PG (ref 27–31)
MCHC RBC AUTO-ENTMCNC: 35.3 G/DL (ref 32–36)
MCV RBC AUTO: 91 FL (ref 82–98)
MONOCYTES # BLD AUTO: 0.9 K/UL (ref 0.3–1)
MONOCYTES NFR BLD: 10 % (ref 4–15)
NEUTROPHILS # BLD AUTO: 5.2 K/UL (ref 1.8–7.7)
NEUTROPHILS NFR BLD: 60.5 % (ref 38–73)
NONHDLC SERPL-MCNC: 164 MG/DL
NRBC BLD-RTO: 0 /100 WBC
PLATELET # BLD AUTO: 181 K/UL (ref 150–450)
PMV BLD AUTO: 11.6 FL (ref 9.2–12.9)
POTASSIUM SERPL-SCNC: 4.7 MMOL/L (ref 3.5–5.1)
PROT SERPL-MCNC: 7.3 G/DL (ref 6–8.4)
RBC # BLD AUTO: 4.94 M/UL (ref 4.6–6.2)
SODIUM SERPL-SCNC: 138 MMOL/L (ref 136–145)
TRIGL SERPL-MCNC: 150 MG/DL (ref 30–150)
WBC # BLD AUTO: 8.56 K/UL (ref 3.9–12.7)

## 2021-09-14 PROCEDURE — 93793 ANTICOAG MGMT PT WARFARIN: CPT | Mod: S$GLB,,,

## 2021-09-14 PROCEDURE — 80061 LIPID PANEL: CPT | Performed by: INTERNAL MEDICINE

## 2021-09-14 PROCEDURE — 36415 COLL VENOUS BLD VENIPUNCTURE: CPT | Performed by: INTERNAL MEDICINE

## 2021-09-14 PROCEDURE — 84153 ASSAY OF PSA TOTAL: CPT | Performed by: INTERNAL MEDICINE

## 2021-09-14 PROCEDURE — 85025 COMPLETE CBC W/AUTO DIFF WBC: CPT | Performed by: INTERNAL MEDICINE

## 2021-09-14 PROCEDURE — 80053 COMPREHEN METABOLIC PANEL: CPT | Performed by: INTERNAL MEDICINE

## 2021-09-14 PROCEDURE — 93793 PR ANTICOAGULANT MGMT FOR PT TAKING WARFARIN: ICD-10-PCS | Mod: S$GLB,,,

## 2021-09-20 ENCOUNTER — ANTI-COAG VISIT (OUTPATIENT)
Dept: CARDIOLOGY | Facility: CLINIC | Age: 81
End: 2021-09-20
Payer: MEDICARE

## 2021-09-20 DIAGNOSIS — Z79.01 LONG TERM (CURRENT) USE OF ANTICOAGULANTS: ICD-10-CM

## 2021-09-20 DIAGNOSIS — I48.91 ATRIAL FIBRILLATION, UNSPECIFIED TYPE: Primary | ICD-10-CM

## 2021-09-20 PROCEDURE — 93793 ANTICOAG MGMT PT WARFARIN: CPT | Mod: S$GLB,,,

## 2021-09-20 PROCEDURE — 93793 PR ANTICOAGULANT MGMT FOR PT TAKING WARFARIN: ICD-10-PCS | Mod: S$GLB,,,

## 2021-09-27 ENCOUNTER — ANTI-COAG VISIT (OUTPATIENT)
Dept: CARDIOLOGY | Facility: CLINIC | Age: 81
End: 2021-09-27
Payer: MEDICARE

## 2021-09-27 DIAGNOSIS — Z79.01 LONG TERM (CURRENT) USE OF ANTICOAGULANTS: ICD-10-CM

## 2021-09-27 DIAGNOSIS — I48.91 ATRIAL FIBRILLATION, UNSPECIFIED TYPE: Primary | ICD-10-CM

## 2021-09-27 PROCEDURE — 93793 PR ANTICOAGULANT MGMT FOR PT TAKING WARFARIN: ICD-10-PCS | Mod: S$GLB,,,

## 2021-09-27 PROCEDURE — 93793 ANTICOAG MGMT PT WARFARIN: CPT | Mod: S$GLB,,,

## 2021-09-29 ENCOUNTER — OFFICE VISIT (OUTPATIENT)
Dept: CARDIOLOGY | Facility: CLINIC | Age: 81
End: 2021-09-29
Payer: MEDICARE

## 2021-09-29 VITALS
SYSTOLIC BLOOD PRESSURE: 131 MMHG | WEIGHT: 217 LBS | DIASTOLIC BLOOD PRESSURE: 79 MMHG | HEIGHT: 72 IN | HEART RATE: 70 BPM | BODY MASS INDEX: 29.39 KG/M2

## 2021-09-29 DIAGNOSIS — G47.33 OSA (OBSTRUCTIVE SLEEP APNEA): ICD-10-CM

## 2021-09-29 DIAGNOSIS — E78.2 MIXED HYPERLIPIDEMIA: ICD-10-CM

## 2021-09-29 DIAGNOSIS — Z79.01 LONG TERM (CURRENT) USE OF ANTICOAGULANTS: ICD-10-CM

## 2021-09-29 DIAGNOSIS — I48.0 PAROXYSMAL ATRIAL FIBRILLATION: ICD-10-CM

## 2021-09-29 DIAGNOSIS — N18.31 STAGE 3A CHRONIC KIDNEY DISEASE: ICD-10-CM

## 2021-09-29 DIAGNOSIS — I25.10 CORONARY ARTERY DISEASE WITHOUT ANGINA PECTORIS, UNSPECIFIED VESSEL OR LESION TYPE, UNSPECIFIED WHETHER NATIVE OR TRANSPLANTED HEART: Primary | ICD-10-CM

## 2021-09-29 PROCEDURE — 1159F PR MEDICATION LIST DOCUMENTED IN MEDICAL RECORD: ICD-10-PCS | Mod: CPTII,S$GLB,, | Performed by: INTERNAL MEDICINE

## 2021-09-29 PROCEDURE — 3078F DIAST BP <80 MM HG: CPT | Mod: CPTII,S$GLB,, | Performed by: INTERNAL MEDICINE

## 2021-09-29 PROCEDURE — 93000 ELECTROCARDIOGRAM COMPLETE: CPT | Mod: S$GLB,,, | Performed by: INTERNAL MEDICINE

## 2021-09-29 PROCEDURE — 1126F AMNT PAIN NOTED NONE PRSNT: CPT | Mod: CPTII,S$GLB,, | Performed by: INTERNAL MEDICINE

## 2021-09-29 PROCEDURE — 99999 PR PBB SHADOW E&M-EST. PATIENT-LVL IV: CPT | Mod: PBBFAC,,, | Performed by: INTERNAL MEDICINE

## 2021-09-29 PROCEDURE — 3075F PR MOST RECENT SYSTOLIC BLOOD PRESS GE 130-139MM HG: ICD-10-PCS | Mod: CPTII,S$GLB,, | Performed by: INTERNAL MEDICINE

## 2021-09-29 PROCEDURE — 3075F SYST BP GE 130 - 139MM HG: CPT | Mod: CPTII,S$GLB,, | Performed by: INTERNAL MEDICINE

## 2021-09-29 PROCEDURE — 99215 PR OFFICE/OUTPT VISIT, EST, LEVL V, 40-54 MIN: ICD-10-PCS | Mod: S$GLB,,, | Performed by: INTERNAL MEDICINE

## 2021-09-29 PROCEDURE — 1159F MED LIST DOCD IN RCRD: CPT | Mod: CPTII,S$GLB,, | Performed by: INTERNAL MEDICINE

## 2021-09-29 PROCEDURE — 99999 PR PBB SHADOW E&M-EST. PATIENT-LVL IV: ICD-10-PCS | Mod: PBBFAC,,, | Performed by: INTERNAL MEDICINE

## 2021-09-29 PROCEDURE — 1101F PR PT FALLS ASSESS DOC 0-1 FALLS W/OUT INJ PAST YR: ICD-10-PCS | Mod: CPTII,S$GLB,, | Performed by: INTERNAL MEDICINE

## 2021-09-29 PROCEDURE — 3288F PR FALLS RISK ASSESSMENT DOCUMENTED: ICD-10-PCS | Mod: CPTII,S$GLB,, | Performed by: INTERNAL MEDICINE

## 2021-09-29 PROCEDURE — 3288F FALL RISK ASSESSMENT DOCD: CPT | Mod: CPTII,S$GLB,, | Performed by: INTERNAL MEDICINE

## 2021-09-29 PROCEDURE — 99215 OFFICE O/P EST HI 40 MIN: CPT | Mod: S$GLB,,, | Performed by: INTERNAL MEDICINE

## 2021-09-29 PROCEDURE — 1101F PT FALLS ASSESS-DOCD LE1/YR: CPT | Mod: CPTII,S$GLB,, | Performed by: INTERNAL MEDICINE

## 2021-09-29 PROCEDURE — 3078F PR MOST RECENT DIASTOLIC BLOOD PRESSURE < 80 MM HG: ICD-10-PCS | Mod: CPTII,S$GLB,, | Performed by: INTERNAL MEDICINE

## 2021-09-29 PROCEDURE — 1126F PR PAIN SEVERITY QUANTIFIED, NO PAIN PRESENT: ICD-10-PCS | Mod: CPTII,S$GLB,, | Performed by: INTERNAL MEDICINE

## 2021-09-29 PROCEDURE — 93000 EKG 12-LEAD: ICD-10-PCS | Mod: S$GLB,,, | Performed by: INTERNAL MEDICINE

## 2021-10-06 ENCOUNTER — RESEARCH ENCOUNTER (OUTPATIENT)
Dept: RESEARCH | Facility: HOSPITAL | Age: 81
End: 2021-10-06

## 2021-10-11 ENCOUNTER — ANTI-COAG VISIT (OUTPATIENT)
Dept: CARDIOLOGY | Facility: CLINIC | Age: 81
End: 2021-10-11
Payer: MEDICARE

## 2021-10-11 DIAGNOSIS — Z79.01 LONG TERM (CURRENT) USE OF ANTICOAGULANTS: ICD-10-CM

## 2021-10-11 DIAGNOSIS — I48.91 ATRIAL FIBRILLATION, UNSPECIFIED TYPE: Primary | ICD-10-CM

## 2021-10-11 PROCEDURE — 93793 PR ANTICOAGULANT MGMT FOR PT TAKING WARFARIN: ICD-10-PCS | Mod: S$GLB,,,

## 2021-10-11 PROCEDURE — 93793 ANTICOAG MGMT PT WARFARIN: CPT | Mod: S$GLB,,,

## 2021-10-25 ENCOUNTER — ANTI-COAG VISIT (OUTPATIENT)
Dept: CARDIOLOGY | Facility: CLINIC | Age: 81
End: 2021-10-25
Payer: MEDICARE

## 2021-10-25 DIAGNOSIS — I48.91 ATRIAL FIBRILLATION, UNSPECIFIED TYPE: Primary | ICD-10-CM

## 2021-10-25 DIAGNOSIS — Z79.01 LONG TERM (CURRENT) USE OF ANTICOAGULANTS: ICD-10-CM

## 2021-10-25 PROCEDURE — 93793 PR ANTICOAGULANT MGMT FOR PT TAKING WARFARIN: ICD-10-PCS | Mod: S$GLB,,,

## 2021-10-25 PROCEDURE — 93793 ANTICOAG MGMT PT WARFARIN: CPT | Mod: S$GLB,,,

## 2021-11-08 ENCOUNTER — ANTI-COAG VISIT (OUTPATIENT)
Dept: CARDIOLOGY | Facility: CLINIC | Age: 81
End: 2021-11-08
Payer: MEDICARE

## 2021-11-08 DIAGNOSIS — Z79.01 LONG TERM (CURRENT) USE OF ANTICOAGULANTS: ICD-10-CM

## 2021-11-08 DIAGNOSIS — I48.91 ATRIAL FIBRILLATION, UNSPECIFIED TYPE: Primary | ICD-10-CM

## 2021-11-08 PROCEDURE — 93793 ANTICOAG MGMT PT WARFARIN: CPT | Mod: S$GLB,,,

## 2021-11-08 PROCEDURE — 93793 PR ANTICOAGULANT MGMT FOR PT TAKING WARFARIN: ICD-10-PCS | Mod: S$GLB,,,

## 2021-11-23 ENCOUNTER — TELEPHONE (OUTPATIENT)
Dept: SLEEP MEDICINE | Facility: CLINIC | Age: 81
End: 2021-11-23
Payer: MEDICARE

## 2021-11-24 ENCOUNTER — OFFICE VISIT (OUTPATIENT)
Dept: SLEEP MEDICINE | Facility: CLINIC | Age: 81
End: 2021-11-24
Payer: MEDICARE

## 2021-11-24 VITALS
HEART RATE: 63 BPM | HEIGHT: 72 IN | BODY MASS INDEX: 29.12 KG/M2 | SYSTOLIC BLOOD PRESSURE: 119 MMHG | DIASTOLIC BLOOD PRESSURE: 69 MMHG | WEIGHT: 215 LBS

## 2021-11-24 DIAGNOSIS — I25.10 CORONARY ARTERY DISEASE WITHOUT ANGINA PECTORIS, UNSPECIFIED VESSEL OR LESION TYPE, UNSPECIFIED WHETHER NATIVE OR TRANSPLANTED HEART: ICD-10-CM

## 2021-11-24 PROCEDURE — 99999 PR PBB SHADOW E&M-EST. PATIENT-LVL IV: CPT | Mod: PBBFAC,,, | Performed by: PSYCHIATRY & NEUROLOGY

## 2021-11-24 PROCEDURE — 99204 OFFICE O/P NEW MOD 45 MIN: CPT | Mod: S$GLB,,, | Performed by: PSYCHIATRY & NEUROLOGY

## 2021-11-24 PROCEDURE — 99204 PR OFFICE/OUTPT VISIT, NEW, LEVL IV, 45-59 MIN: ICD-10-PCS | Mod: S$GLB,,, | Performed by: PSYCHIATRY & NEUROLOGY

## 2021-11-24 PROCEDURE — 99999 PR PBB SHADOW E&M-EST. PATIENT-LVL IV: ICD-10-PCS | Mod: PBBFAC,,, | Performed by: PSYCHIATRY & NEUROLOGY

## 2021-11-29 ENCOUNTER — ANTI-COAG VISIT (OUTPATIENT)
Dept: CARDIOLOGY | Facility: CLINIC | Age: 81
End: 2021-11-29
Payer: MEDICARE

## 2021-11-29 DIAGNOSIS — I48.91 ATRIAL FIBRILLATION, UNSPECIFIED TYPE: Primary | ICD-10-CM

## 2021-11-29 DIAGNOSIS — Z79.01 LONG TERM (CURRENT) USE OF ANTICOAGULANTS: ICD-10-CM

## 2021-11-29 PROCEDURE — 93793 ANTICOAG MGMT PT WARFARIN: CPT | Mod: S$GLB,,,

## 2021-11-29 PROCEDURE — 93793 PR ANTICOAGULANT MGMT FOR PT TAKING WARFARIN: ICD-10-PCS | Mod: S$GLB,,,

## 2022-01-03 ENCOUNTER — ANTI-COAG VISIT (OUTPATIENT)
Dept: CARDIOLOGY | Facility: CLINIC | Age: 82
End: 2022-01-03
Payer: MEDICARE

## 2022-01-03 DIAGNOSIS — I48.91 ATRIAL FIBRILLATION, UNSPECIFIED TYPE: Primary | ICD-10-CM

## 2022-01-03 DIAGNOSIS — Z79.01 LONG TERM (CURRENT) USE OF ANTICOAGULANTS: ICD-10-CM

## 2022-01-03 PROCEDURE — 93793 ANTICOAG MGMT PT WARFARIN: CPT | Mod: S$GLB,,,

## 2022-01-03 PROCEDURE — 93793 PR ANTICOAGULANT MGMT FOR PT TAKING WARFARIN: ICD-10-PCS | Mod: S$GLB,,,

## 2022-01-10 ENCOUNTER — ANTI-COAG VISIT (OUTPATIENT)
Dept: CARDIOLOGY | Facility: CLINIC | Age: 82
End: 2022-01-10
Payer: MEDICARE

## 2022-01-10 DIAGNOSIS — Z79.01 LONG TERM (CURRENT) USE OF ANTICOAGULANTS: ICD-10-CM

## 2022-01-10 DIAGNOSIS — I48.91 ATRIAL FIBRILLATION, UNSPECIFIED TYPE: Primary | ICD-10-CM

## 2022-01-10 PROCEDURE — 93793 ANTICOAG MGMT PT WARFARIN: CPT | Mod: S$GLB,,,

## 2022-01-10 PROCEDURE — 93793 PR ANTICOAGULANT MGMT FOR PT TAKING WARFARIN: ICD-10-PCS | Mod: S$GLB,,,

## 2022-01-24 ENCOUNTER — ANTI-COAG VISIT (OUTPATIENT)
Dept: CARDIOLOGY | Facility: CLINIC | Age: 82
End: 2022-01-24
Payer: MEDICARE

## 2022-01-24 DIAGNOSIS — Z79.01 LONG TERM (CURRENT) USE OF ANTICOAGULANTS: ICD-10-CM

## 2022-01-24 DIAGNOSIS — I48.91 ATRIAL FIBRILLATION, UNSPECIFIED TYPE: Primary | ICD-10-CM

## 2022-01-24 PROCEDURE — 93793 PR ANTICOAGULANT MGMT FOR PT TAKING WARFARIN: ICD-10-PCS | Mod: S$GLB,,,

## 2022-01-24 PROCEDURE — 93793 ANTICOAG MGMT PT WARFARIN: CPT | Mod: S$GLB,,,

## 2022-02-21 ENCOUNTER — ANTI-COAG VISIT (OUTPATIENT)
Dept: CARDIOLOGY | Facility: CLINIC | Age: 82
End: 2022-02-21
Payer: MEDICARE

## 2022-02-21 DIAGNOSIS — I48.91 ATRIAL FIBRILLATION, UNSPECIFIED TYPE: Primary | ICD-10-CM

## 2022-02-21 DIAGNOSIS — Z79.01 LONG TERM (CURRENT) USE OF ANTICOAGULANTS: ICD-10-CM

## 2022-02-21 PROCEDURE — 93793 ANTICOAG MGMT PT WARFARIN: CPT | Mod: S$GLB,,,

## 2022-02-21 PROCEDURE — 93793 PR ANTICOAGULANT MGMT FOR PT TAKING WARFARIN: ICD-10-PCS | Mod: S$GLB,,,

## 2022-03-07 ENCOUNTER — ANTI-COAG VISIT (OUTPATIENT)
Dept: CARDIOLOGY | Facility: CLINIC | Age: 82
End: 2022-03-07
Payer: MEDICARE

## 2022-03-07 DIAGNOSIS — Z79.01 LONG TERM (CURRENT) USE OF ANTICOAGULANTS: ICD-10-CM

## 2022-03-07 DIAGNOSIS — I48.91 ATRIAL FIBRILLATION, UNSPECIFIED TYPE: Primary | ICD-10-CM

## 2022-03-07 PROCEDURE — 93793 ANTICOAG MGMT PT WARFARIN: CPT | Mod: S$GLB,,,

## 2022-03-07 PROCEDURE — 93793 PR ANTICOAGULANT MGMT FOR PT TAKING WARFARIN: ICD-10-PCS | Mod: S$GLB,,,

## 2022-03-07 NOTE — PROGRESS NOTES
Patient was given lab result, verified correct coumadin dose, reports no changes, Patient was given coumadin instructions and next lab date, verbalized understanding

## 2022-03-14 PROBLEM — I50.9 CONGESTIVE HEART FAILURE, UNSPECIFIED HF CHRONICITY, UNSPECIFIED HEART FAILURE TYPE: Status: ACTIVE | Noted: 2022-03-14

## 2022-03-21 ENCOUNTER — ANTI-COAG VISIT (OUTPATIENT)
Dept: CARDIOLOGY | Facility: CLINIC | Age: 82
End: 2022-03-21
Payer: MEDICARE

## 2022-03-21 DIAGNOSIS — Z79.01 LONG TERM (CURRENT) USE OF ANTICOAGULANTS: ICD-10-CM

## 2022-03-21 DIAGNOSIS — I48.91 ATRIAL FIBRILLATION, UNSPECIFIED TYPE: Primary | ICD-10-CM

## 2022-03-21 PROCEDURE — 99211 PR OFFICE/OUTPT VISIT, EST, LEVL I: ICD-10-PCS | Mod: S$GLB,,, | Performed by: INTERNAL MEDICINE

## 2022-03-21 PROCEDURE — 99211 OFF/OP EST MAY X REQ PHY/QHP: CPT | Mod: S$GLB,,, | Performed by: INTERNAL MEDICINE

## 2022-04-11 ENCOUNTER — ANTI-COAG VISIT (OUTPATIENT)
Dept: CARDIOLOGY | Facility: CLINIC | Age: 82
End: 2022-04-11
Payer: MEDICARE

## 2022-04-11 DIAGNOSIS — Z79.01 LONG TERM (CURRENT) USE OF ANTICOAGULANTS: ICD-10-CM

## 2022-04-11 DIAGNOSIS — I48.91 ATRIAL FIBRILLATION, UNSPECIFIED TYPE: Primary | ICD-10-CM

## 2022-04-11 PROCEDURE — 93793 ANTICOAG MGMT PT WARFARIN: CPT | Mod: S$GLB,,,

## 2022-04-11 PROCEDURE — 93793 PR ANTICOAGULANT MGMT FOR PT TAKING WARFARIN: ICD-10-PCS | Mod: S$GLB,,,

## 2022-04-22 DIAGNOSIS — R06.02 SOB (SHORTNESS OF BREATH): Primary | ICD-10-CM

## 2022-04-26 DIAGNOSIS — Z20.822 ENCOUNTER FOR LABORATORY TESTING FOR COVID-19 VIRUS: ICD-10-CM

## 2022-05-09 ENCOUNTER — ANTI-COAG VISIT (OUTPATIENT)
Dept: CARDIOLOGY | Facility: CLINIC | Age: 82
End: 2022-05-09
Payer: MEDICARE

## 2022-05-09 DIAGNOSIS — I48.91 ATRIAL FIBRILLATION, UNSPECIFIED TYPE: Primary | ICD-10-CM

## 2022-05-09 DIAGNOSIS — Z79.01 LONG TERM (CURRENT) USE OF ANTICOAGULANTS: ICD-10-CM

## 2022-05-09 PROCEDURE — 93793 PR ANTICOAGULANT MGMT FOR PT TAKING WARFARIN: ICD-10-PCS | Mod: S$GLB,,,

## 2022-05-09 PROCEDURE — 93793 ANTICOAG MGMT PT WARFARIN: CPT | Mod: S$GLB,,,

## 2022-05-20 ENCOUNTER — HOSPITAL ENCOUNTER (OUTPATIENT)
Dept: PREADMISSION TESTING | Facility: HOSPITAL | Age: 82
Discharge: HOME | End: 2022-05-20
Attending: INTERNAL MEDICINE
Payer: MEDICARE

## 2022-05-20 DIAGNOSIS — Z20.822 ENCOUNTER FOR LABORATORY TESTING FOR COVID-19 VIRUS: ICD-10-CM

## 2022-05-20 LAB — SARS-COV-2 RNA RESP QL NAA+PROBE: NOT DETECTED

## 2022-05-20 PROCEDURE — U0003 INFECTIOUS AGENT DETECTION BY NUCLEIC ACID (DNA OR RNA); SEVERE ACUTE RESPIRATORY SYNDROME CORONAVIRUS 2 (SARS-COV-2) (CORONAVIRUS DISEASE [COVID-19]), AMPLIFIED PROBE TECHNIQUE, MAKING USE OF HIGH THROUGHPUT TECHNOLOGIES AS DESCRIBED BY CMS-2020-01-R: HCPCS | Performed by: INTERNAL MEDICINE

## 2022-05-20 PROCEDURE — U0005 INFEC AGEN DETEC AMPLI PROBE: HCPCS | Performed by: INTERNAL MEDICINE

## 2022-05-23 ENCOUNTER — HOSPITAL ENCOUNTER (OUTPATIENT)
Dept: PULMONOLOGY | Facility: HOSPITAL | Age: 82
Discharge: HOME | End: 2022-05-23
Attending: INTERNAL MEDICINE
Payer: MEDICARE

## 2022-05-23 DIAGNOSIS — R06.02 SOB (SHORTNESS OF BREATH): ICD-10-CM

## 2022-05-23 PROCEDURE — 99900035 HC TECH TIME PER 15 MIN (STAT)

## 2022-05-23 PROCEDURE — 94729 DIFFUSING CAPACITY: CPT

## 2022-05-23 PROCEDURE — 94060 EVALUATION OF WHEEZING: CPT

## 2022-05-23 PROCEDURE — 94727 PR PULM FUNCTION TEST BY GAS: ICD-10-PCS | Mod: 26,,, | Performed by: INTERNAL MEDICINE

## 2022-05-23 PROCEDURE — 94729 DIFFUSING CAPACITY: CPT | Mod: 26,,, | Performed by: INTERNAL MEDICINE

## 2022-05-23 PROCEDURE — 94799 PR NIF/PIF PULMONARY FUNCTION TEST: ICD-10-PCS | Mod: 26,,, | Performed by: INTERNAL MEDICINE

## 2022-05-23 PROCEDURE — 94799 UNLISTED PULMONARY SVC/PX: CPT | Mod: 26,,, | Performed by: INTERNAL MEDICINE

## 2022-05-23 PROCEDURE — 94727 GAS DIL/WSHOT DETER LNG VOL: CPT | Mod: 26,,, | Performed by: INTERNAL MEDICINE

## 2022-05-23 PROCEDURE — 94729 PR C02/MEMBANE DIFFUSE CAPACITY: ICD-10-PCS | Mod: 26,,, | Performed by: INTERNAL MEDICINE

## 2022-05-23 PROCEDURE — 99900031 HC PATIENT EDUCATION (STAT)

## 2022-05-23 PROCEDURE — 94727 GAS DIL/WSHOT DETER LNG VOL: CPT

## 2022-05-23 PROCEDURE — 94060 EVALUATION OF WHEEZING: CPT | Mod: 26,,, | Performed by: INTERNAL MEDICINE

## 2022-05-23 PROCEDURE — 94060 PR EVAL OF BRONCHOSPASM: ICD-10-PCS | Mod: 26,,, | Performed by: INTERNAL MEDICINE

## 2022-05-25 LAB
BRPFT: ABNORMAL
DLCO SINGLE BREATH LLN: 19.77
DLCO SINGLE BREATH PRE REF: 88.2 %
DLCO SINGLE BREATH REF: 26.7
DLCOC SBVA LLN: 2.47
DLCOC SBVA REF: 3.54
DLCOC SINGLE BREATH LLN: 19.77
DLCOC SINGLE BREATH REF: 26.7
DLCOVA LLN: 2.47
DLCOVA PRE REF: 104 %
DLCOVA PRE: 3.69 ML/(MIN*MMHG*L) (ref 2.47–4.62)
DLCOVA REF: 3.54
ERVN2 LLN: -16449.03
ERVN2 PRE REF: 68.7 %
ERVN2 PRE: 0.67 L (ref -16449.03–16450.97)
ERVN2 REF: 0.97
FEF 25 75 CHG: 12.5 %
FEF 25 75 LLN: 0.76
FEF 25 75 POST REF: 124.8 %
FEF 25 75 PRE REF: 110.9 %
FEF 25 75 REF: 2.06
FET100 CHG: -14.4 %
FEV1 CHG: 6.2 %
FEV1 FVC CHG: 2.1 %
FEV1 FVC LLN: 59
FEV1 FVC POST REF: 109 %
FEV1 FVC PRE REF: 106.8 %
FEV1 FVC REF: 74
FEV1 LLN: 2.07
FEV1 POST REF: 88 %
FEV1 PRE REF: 82.8 %
FEV1 REF: 3.01
FRCN2 LLN: 2.93
FRCN2 PRE REF: 77.3 %
FRCN2 REF: 3.92
FVC CHG: 4 %
FVC LLN: 2.95
FVC POST REF: 79.8 %
FVC PRE REF: 76.7 %
FVC REF: 4.11
IVC PRE: 3.07 L (ref 2.95–5.28)
IVC SINGLE BREATH LLN: 2.95
IVC SINGLE BREATH PRE REF: 74.8 %
IVC SINGLE BREATH REF: 4.11
MEP LLN: 83
MEP PRE REF: 97.6 %
MEP PRE: 97.58 CMH2O (ref 83.23–116.78)
MEP REF: 100
MIP LLN: 58
MIP PRE REF: 133.2 %
MIP PRE: 99.87 CMH2O (ref 58.23–91.78)
MIP REF: 75
MVV LLN: 103
MVV PRE REF: 62.7 %
MVV REF: 121
PEF CHG: 11.9 %
PEF LLN: 5.11
PEF POST REF: 116.9 %
PEF PRE REF: 104.5 %
PEF REF: 7.57
POST FEF 25 75: 2.58 L/S (ref 0.76–4)
POST FET 100: 5.46 SEC
POST FEV1 FVC: 80.8 % (ref 59.2–87.68)
POST FEV1: 2.65 L (ref 2.07–3.88)
POST FVC: 3.28 L (ref 2.95–5.28)
POST PEF: 8.85 L/S (ref 5.11–10.02)
PRE DLCO: 23.55 ML/(MIN*MMHG) (ref 19.77–33.63)
PRE FEF 25 75: 2.29 L/S (ref 0.76–4)
PRE FET 100: 6.38 SEC
PRE FEV1 FVC: 79.12 % (ref 59.2–87.68)
PRE FEV1: 2.49 L (ref 2.07–3.88)
PRE FRC N2: 3.03 L (ref 2.93–4.91)
PRE FVC: 3.15 L (ref 2.95–5.28)
PRE MVV: 75.83 L/MIN (ref 102.84–139.14)
PRE PEF: 7.91 L/S (ref 5.11–10.02)
RVN2 LLN: 2.27
RVN2 PRE REF: 80.2 %
RVN2 PRE: 2.36 L (ref 2.27–3.62)
RVN2 REF: 2.95
RVN2TLCN2 LLN: 36.57
RVN2TLCN2 PRE REF: 86.3 %
RVN2TLCN2 PRE: 39.33 % (ref 36.57–54.53)
RVN2TLCN2 REF: 45.55
TLCN2 LLN: 6.38
TLCN2 PRE REF: 79.8 %
TLCN2 PRE: 6.01 L (ref 6.38–8.68)
TLCN2 REF: 7.53
VA PRE: 6.39 L (ref 7.38–7.38)
VA SINGLE BREATH LLN: 7.38
VA SINGLE BREATH PRE REF: 86.6 %
VA SINGLE BREATH REF: 7.38
VCMAXN2 LLN: 2.95
VCMAXN2 PRE REF: 88.8 %
VCMAXN2 PRE: 3.65 L (ref 2.95–5.28)
VCMAXN2 REF: 4.11

## 2022-06-06 ENCOUNTER — ANTI-COAG VISIT (OUTPATIENT)
Dept: CARDIOLOGY | Facility: CLINIC | Age: 82
End: 2022-06-06
Payer: MEDICARE

## 2022-06-06 DIAGNOSIS — I48.91 ATRIAL FIBRILLATION, UNSPECIFIED TYPE: Primary | ICD-10-CM

## 2022-06-06 DIAGNOSIS — Z79.01 LONG TERM (CURRENT) USE OF ANTICOAGULANTS: ICD-10-CM

## 2022-06-06 PROCEDURE — 93793 PR ANTICOAGULANT MGMT FOR PT TAKING WARFARIN: ICD-10-PCS | Mod: S$GLB,,,

## 2022-06-06 PROCEDURE — 93793 ANTICOAG MGMT PT WARFARIN: CPT | Mod: S$GLB,,,

## 2022-06-13 ENCOUNTER — ANTI-COAG VISIT (OUTPATIENT)
Dept: CARDIOLOGY | Facility: CLINIC | Age: 82
End: 2022-06-13
Payer: MEDICARE

## 2022-06-13 DIAGNOSIS — Z79.01 LONG TERM (CURRENT) USE OF ANTICOAGULANTS: ICD-10-CM

## 2022-06-13 DIAGNOSIS — I48.91 ATRIAL FIBRILLATION, UNSPECIFIED TYPE: Primary | ICD-10-CM

## 2022-06-13 PROCEDURE — 93793 PR ANTICOAGULANT MGMT FOR PT TAKING WARFARIN: ICD-10-PCS | Mod: S$GLB,,,

## 2022-06-13 PROCEDURE — 93793 ANTICOAG MGMT PT WARFARIN: CPT | Mod: S$GLB,,,

## 2022-06-20 ENCOUNTER — ANTI-COAG VISIT (OUTPATIENT)
Dept: CARDIOLOGY | Facility: CLINIC | Age: 82
End: 2022-06-20
Payer: MEDICARE

## 2022-06-20 DIAGNOSIS — Z79.01 LONG TERM (CURRENT) USE OF ANTICOAGULANTS: ICD-10-CM

## 2022-06-20 DIAGNOSIS — I48.91 ATRIAL FIBRILLATION, UNSPECIFIED TYPE: Primary | ICD-10-CM

## 2022-06-20 PROCEDURE — 93793 PR ANTICOAGULANT MGMT FOR PT TAKING WARFARIN: ICD-10-PCS | Mod: S$GLB,,,

## 2022-06-20 PROCEDURE — 93793 ANTICOAG MGMT PT WARFARIN: CPT | Mod: S$GLB,,,

## 2022-06-20 NOTE — PROGRESS NOTES
Patient called 06/20/22 to get advisement also to inform us he still has () as his DR. He went to DR Martin) for a second opinon.

## 2022-06-20 NOTE — PROGRESS NOTES
INR low. Pt reports holding for angiogram with stent on 6/17. Has patient changed cardiologists and no longer seeing Dr. Jose? There is no info in [facility] or [facility] everywhere regarding clearance or procedure. If patient has new cardiologist, then coumadin management should be referred to them.

## 2022-06-27 ENCOUNTER — ANTI-COAG VISIT (OUTPATIENT)
Dept: CARDIOLOGY | Facility: CLINIC | Age: 82
End: 2022-06-27
Payer: MEDICARE

## 2022-06-27 DIAGNOSIS — I48.91 ATRIAL FIBRILLATION, UNSPECIFIED TYPE: Primary | ICD-10-CM

## 2022-06-27 DIAGNOSIS — Z79.01 LONG TERM (CURRENT) USE OF ANTICOAGULANTS: ICD-10-CM

## 2022-06-27 PROCEDURE — 93793 PR ANTICOAGULANT MGMT FOR PT TAKING WARFARIN: ICD-10-PCS | Mod: S$GLB,,,

## 2022-06-27 PROCEDURE — 93793 ANTICOAG MGMT PT WARFARIN: CPT | Mod: S$GLB,,,

## 2022-06-27 NOTE — PROGRESS NOTES
INR below goal, no pertinent patient findings. Will increase weekly dose back to 52.5mg, patient previously therapeutic at this dose but was reduced due to procedure. Please see calendar for further details.

## 2022-07-05 ENCOUNTER — ANTI-COAG VISIT (OUTPATIENT)
Dept: CARDIOLOGY | Facility: CLINIC | Age: 82
End: 2022-07-05
Payer: MEDICARE

## 2022-07-05 DIAGNOSIS — Z79.01 LONG TERM (CURRENT) USE OF ANTICOAGULANTS: ICD-10-CM

## 2022-07-05 DIAGNOSIS — I48.91 ATRIAL FIBRILLATION, UNSPECIFIED TYPE: Primary | ICD-10-CM

## 2022-07-05 PROCEDURE — 93793 ANTICOAG MGMT PT WARFARIN: CPT | Mod: S$GLB,,,

## 2022-07-05 PROCEDURE — 93793 PR ANTICOAGULANT MGMT FOR PT TAKING WARFARIN: ICD-10-PCS | Mod: S$GLB,,,

## 2022-07-19 ENCOUNTER — ANTI-COAG VISIT (OUTPATIENT)
Dept: CARDIOLOGY | Facility: CLINIC | Age: 82
End: 2022-07-19
Payer: MEDICARE

## 2022-07-19 DIAGNOSIS — I48.91 ATRIAL FIBRILLATION, UNSPECIFIED TYPE: Primary | ICD-10-CM

## 2022-07-19 DIAGNOSIS — Z79.01 LONG TERM (CURRENT) USE OF ANTICOAGULANTS: ICD-10-CM

## 2022-07-19 PROCEDURE — 93793 PR ANTICOAGULANT MGMT FOR PT TAKING WARFARIN: ICD-10-PCS | Mod: S$GLB,,,

## 2022-07-19 PROCEDURE — 93793 ANTICOAG MGMT PT WARFARIN: CPT | Mod: S$GLB,,,

## 2022-07-19 NOTE — PROGRESS NOTES
INR baseline. Pt denies changes. We do not get baseline INRs without some kind of change. Dose is established. He either missed dose(s) and doesn't realize it or he started a vitamin/supplement or ate something to reverse his levels. Bolus dose today. Recommend set up a weekly pillbox to keep tabs on dosing.  Recheck INR in 1 week    Update: Pt now reports he had another angiogram 7/11. He held 7/9-7/13. He reported an angiogram was done last month too. This is concerning that he has a new cardiologist. If he has switched providers his warfarin management needs to be referred to his new cardiologist. Otherwise, patient needs to keep us informed anytime someone tells him to hold his warfarin.     Update 7/20: Pt did not bolus as planned 7/19. He is unsure if he will be changing providers at this time. If pt will no longer be seeing Dr. Jose or Dr. Buenrostro, then he needs to let us know when he decides.

## 2022-07-26 ENCOUNTER — ANTI-COAG VISIT (OUTPATIENT)
Dept: CARDIOLOGY | Facility: CLINIC | Age: 82
End: 2022-07-26
Payer: MEDICARE

## 2022-07-26 DIAGNOSIS — Z79.01 LONG TERM (CURRENT) USE OF ANTICOAGULANTS: ICD-10-CM

## 2022-07-26 DIAGNOSIS — I48.91 ATRIAL FIBRILLATION, UNSPECIFIED TYPE: Primary | ICD-10-CM

## 2022-07-26 PROCEDURE — 93793 PR ANTICOAGULANT MGMT FOR PT TAKING WARFARIN: ICD-10-PCS | Mod: S$GLB,,,

## 2022-07-26 PROCEDURE — 93793 ANTICOAG MGMT PT WARFARIN: CPT | Mod: S$GLB,,,

## 2022-08-02 ENCOUNTER — ANTI-COAG VISIT (OUTPATIENT)
Dept: CARDIOLOGY | Facility: CLINIC | Age: 82
End: 2022-08-02
Payer: MEDICARE

## 2022-08-02 DIAGNOSIS — I48.91 ATRIAL FIBRILLATION, UNSPECIFIED TYPE: Primary | ICD-10-CM

## 2022-08-02 DIAGNOSIS — Z79.01 LONG TERM (CURRENT) USE OF ANTICOAGULANTS: ICD-10-CM

## 2022-08-02 PROCEDURE — 93793 PR ANTICOAGULANT MGMT FOR PT TAKING WARFARIN: ICD-10-PCS | Mod: S$GLB,,,

## 2022-08-02 PROCEDURE — 93793 ANTICOAG MGMT PT WARFARIN: CPT | Mod: S$GLB,,,

## 2022-08-02 RX ORDER — METHYLPREDNISOLONE 4 MG/1
TABLET ORAL
COMMUNITY
Start: 2022-07-19 | End: 2022-10-05

## 2022-08-02 NOTE — PROGRESS NOTES
INR is high. Current dose has been most stable. Pt denies changes. Hold dose. Pt has other labs tomorrow. Repeat INR tomorrow.     Noted Rx for medrol on 7/19 - Did pt take this? Has he been ill?    Update:  Pt cannot confirm med name but says he took a pack of something and has had back pain. INR likely high due to DDI. Or high due to increased pain/health change. Will reassess INR tomorrow and plan depending on INR trend after the hold.

## 2022-08-03 ENCOUNTER — ANTI-COAG VISIT (OUTPATIENT)
Dept: CARDIOLOGY | Facility: CLINIC | Age: 82
End: 2022-08-03
Payer: MEDICARE

## 2022-08-03 DIAGNOSIS — I48.91 ATRIAL FIBRILLATION, UNSPECIFIED TYPE: Primary | ICD-10-CM

## 2022-08-03 DIAGNOSIS — Z79.01 LONG TERM (CURRENT) USE OF ANTICOAGULANTS: ICD-10-CM

## 2022-08-03 PROCEDURE — 93793 PR ANTICOAGULANT MGMT FOR PT TAKING WARFARIN: ICD-10-PCS | Mod: S$GLB,,,

## 2022-08-03 PROCEDURE — 93793 ANTICOAG MGMT PT WARFARIN: CPT | Mod: S$GLB,,,

## 2022-08-09 ENCOUNTER — ANTI-COAG VISIT (OUTPATIENT)
Dept: CARDIOLOGY | Facility: CLINIC | Age: 82
End: 2022-08-09
Payer: MEDICARE

## 2022-08-09 DIAGNOSIS — I48.91 ATRIAL FIBRILLATION, UNSPECIFIED TYPE: Primary | ICD-10-CM

## 2022-08-09 DIAGNOSIS — Z79.01 LONG TERM (CURRENT) USE OF ANTICOAGULANTS: ICD-10-CM

## 2022-08-09 PROCEDURE — 93793 ANTICOAG MGMT PT WARFARIN: CPT | Mod: S$GLB,,,

## 2022-08-09 PROCEDURE — 93793 PR ANTICOAGULANT MGMT FOR PT TAKING WARFARIN: ICD-10-PCS | Mod: S$GLB,,,

## 2022-08-16 ENCOUNTER — ANTI-COAG VISIT (OUTPATIENT)
Dept: CARDIOLOGY | Facility: CLINIC | Age: 82
End: 2022-08-16
Payer: MEDICARE

## 2022-08-16 DIAGNOSIS — Z79.01 LONG TERM (CURRENT) USE OF ANTICOAGULANTS: ICD-10-CM

## 2022-08-16 DIAGNOSIS — I48.91 ATRIAL FIBRILLATION, UNSPECIFIED TYPE: Primary | ICD-10-CM

## 2022-08-16 PROCEDURE — 93793 ANTICOAG MGMT PT WARFARIN: CPT | Mod: S$GLB,,,

## 2022-08-16 PROCEDURE — 93793 PR ANTICOAGULANT MGMT FOR PT TAKING WARFARIN: ICD-10-PCS | Mod: S$GLB,,,

## 2022-08-24 ENCOUNTER — ANTI-COAG VISIT (OUTPATIENT)
Dept: CARDIOLOGY | Facility: CLINIC | Age: 82
End: 2022-08-24
Payer: MEDICARE

## 2022-08-24 PROCEDURE — 93793 PR ANTICOAGULANT MGMT FOR PT TAKING WARFARIN: ICD-10-PCS | Mod: S$GLB,,,

## 2022-08-24 PROCEDURE — 93793 ANTICOAG MGMT PT WARFARIN: CPT | Mod: S$GLB,,,

## 2022-08-24 NOTE — PROGRESS NOTES
INR not at goal. Medications, chart, and patient findings reviewed. 2.5mg 8/24 x1 and decreased maintenance dose. See calendar for adjustments to dose and follow up plan.

## 2022-09-01 ENCOUNTER — ANTI-COAG VISIT (OUTPATIENT)
Dept: CARDIOLOGY | Facility: CLINIC | Age: 82
End: 2022-09-01
Payer: MEDICARE

## 2022-09-01 DIAGNOSIS — Z79.01 LONG TERM (CURRENT) USE OF ANTICOAGULANTS: ICD-10-CM

## 2022-09-01 DIAGNOSIS — I48.91 ATRIAL FIBRILLATION, UNSPECIFIED TYPE: Primary | ICD-10-CM

## 2022-09-01 PROCEDURE — 93793 ANTICOAG MGMT PT WARFARIN: CPT | Mod: S$GLB,,,

## 2022-09-01 PROCEDURE — 93793 PR ANTICOAGULANT MGMT FOR PT TAKING WARFARIN: ICD-10-PCS | Mod: S$GLB,,,

## 2022-09-08 ENCOUNTER — ANTI-COAG VISIT (OUTPATIENT)
Dept: CARDIOLOGY | Facility: CLINIC | Age: 82
End: 2022-09-08
Payer: MEDICARE

## 2022-09-08 DIAGNOSIS — Z79.01 LONG TERM (CURRENT) USE OF ANTICOAGULANTS: ICD-10-CM

## 2022-09-08 DIAGNOSIS — I48.91 ATRIAL FIBRILLATION, UNSPECIFIED TYPE: Primary | ICD-10-CM

## 2022-09-08 PROCEDURE — 93793 ANTICOAG MGMT PT WARFARIN: CPT | Mod: S$GLB,,,

## 2022-09-08 PROCEDURE — 93793 PR ANTICOAGULANT MGMT FOR PT TAKING WARFARIN: ICD-10-PCS | Mod: S$GLB,,,

## 2022-09-15 ENCOUNTER — ANTI-COAG VISIT (OUTPATIENT)
Dept: CARDIOLOGY | Facility: CLINIC | Age: 82
End: 2022-09-15
Payer: MEDICARE

## 2022-09-15 DIAGNOSIS — Z79.01 LONG TERM (CURRENT) USE OF ANTICOAGULANTS: ICD-10-CM

## 2022-09-15 DIAGNOSIS — I48.91 ATRIAL FIBRILLATION, UNSPECIFIED TYPE: Primary | ICD-10-CM

## 2022-09-15 PROCEDURE — 93793 PR ANTICOAGULANT MGMT FOR PT TAKING WARFARIN: ICD-10-PCS | Mod: S$GLB,,,

## 2022-09-15 PROCEDURE — 93793 ANTICOAG MGMT PT WARFARIN: CPT | Mod: S$GLB,,,

## 2022-09-22 ENCOUNTER — ANTI-COAG VISIT (OUTPATIENT)
Dept: CARDIOLOGY | Facility: CLINIC | Age: 82
End: 2022-09-22
Payer: MEDICARE

## 2022-09-22 DIAGNOSIS — I48.91 ATRIAL FIBRILLATION, UNSPECIFIED TYPE: Primary | ICD-10-CM

## 2022-09-22 DIAGNOSIS — Z79.01 LONG TERM (CURRENT) USE OF ANTICOAGULANTS: ICD-10-CM

## 2022-09-22 PROCEDURE — 93793 ANTICOAG MGMT PT WARFARIN: CPT | Mod: S$GLB,,,

## 2022-09-22 PROCEDURE — 93793 PR ANTICOAGULANT MGMT FOR PT TAKING WARFARIN: ICD-10-PCS | Mod: S$GLB,,,

## 2022-09-22 NOTE — PROGRESS NOTES
INR very low. No changes reports. Dose recently lowered for high INRs of no explanation. Increase dose. Recheck INR in 1 week

## 2022-09-29 ENCOUNTER — ANTI-COAG VISIT (OUTPATIENT)
Dept: CARDIOLOGY | Facility: CLINIC | Age: 82
End: 2022-09-29
Payer: MEDICARE

## 2022-09-29 DIAGNOSIS — I48.91 ATRIAL FIBRILLATION, UNSPECIFIED TYPE: Primary | ICD-10-CM

## 2022-09-29 DIAGNOSIS — Z79.01 LONG TERM (CURRENT) USE OF ANTICOAGULANTS: ICD-10-CM

## 2022-09-29 PROCEDURE — 93793 PR ANTICOAGULANT MGMT FOR PT TAKING WARFARIN: ICD-10-PCS | Mod: S$GLB,,,

## 2022-09-29 PROCEDURE — 93793 ANTICOAG MGMT PT WARFARIN: CPT | Mod: S$GLB,,,

## 2022-09-29 NOTE — PROGRESS NOTES
INR the same despite dose increase. Pt denies changes. Dose previously much higher and had to be lowered recently for unexplained high INRs. Increase dose. Recheck INR in 1 week

## 2022-10-05 PROBLEM — F32.A DEPRESSIVE DISORDER: Status: ACTIVE | Noted: 2022-04-20

## 2022-10-05 PROBLEM — E11.9 ABSENCE OF DISORDER OF RETINA OF RIGHT EYE DUE TO DIABETES MELLITUS: Status: ACTIVE | Noted: 2022-04-20

## 2022-10-05 PROBLEM — I70.213 INTERMITTENT CLAUDICATION OF BOTH LOWER EXTREMITIES DUE TO ATHEROSCLEROSIS: Status: ACTIVE | Noted: 2022-05-31

## 2022-10-05 PROBLEM — Z98.890 HISTORY OF LEFT HEART CATHETERIZATION (LHC): Status: ACTIVE | Noted: 2022-04-20

## 2022-10-06 ENCOUNTER — ANTI-COAG VISIT (OUTPATIENT)
Dept: CARDIOLOGY | Facility: CLINIC | Age: 82
End: 2022-10-06
Payer: MEDICARE

## 2022-10-06 DIAGNOSIS — Z79.01 LONG TERM (CURRENT) USE OF ANTICOAGULANTS: ICD-10-CM

## 2022-10-06 DIAGNOSIS — I48.91 ATRIAL FIBRILLATION, UNSPECIFIED TYPE: Primary | ICD-10-CM

## 2022-10-06 PROCEDURE — 93793 PR ANTICOAGULANT MGMT FOR PT TAKING WARFARIN: ICD-10-PCS | Mod: S$GLB,,,

## 2022-10-06 PROCEDURE — 93793 ANTICOAG MGMT PT WARFARIN: CPT | Mod: S$GLB,,,

## 2022-10-13 ENCOUNTER — ANTI-COAG VISIT (OUTPATIENT)
Dept: CARDIOLOGY | Facility: CLINIC | Age: 82
End: 2022-10-13
Payer: MEDICARE

## 2022-10-13 DIAGNOSIS — Z79.01 LONG TERM (CURRENT) USE OF ANTICOAGULANTS: ICD-10-CM

## 2022-10-13 DIAGNOSIS — I48.91 ATRIAL FIBRILLATION, UNSPECIFIED TYPE: Primary | ICD-10-CM

## 2022-10-13 PROCEDURE — 93793 PR ANTICOAGULANT MGMT FOR PT TAKING WARFARIN: ICD-10-PCS | Mod: S$GLB,,,

## 2022-10-13 PROCEDURE — 93793 ANTICOAG MGMT PT WARFARIN: CPT | Mod: S$GLB,,,

## 2022-10-27 ENCOUNTER — ANTI-COAG VISIT (OUTPATIENT)
Dept: CARDIOLOGY | Facility: CLINIC | Age: 82
End: 2022-10-27
Payer: MEDICARE

## 2022-10-27 DIAGNOSIS — Z79.01 LONG TERM (CURRENT) USE OF ANTICOAGULANTS: ICD-10-CM

## 2022-10-27 DIAGNOSIS — I48.91 ATRIAL FIBRILLATION, UNSPECIFIED TYPE: Primary | ICD-10-CM

## 2022-10-27 PROCEDURE — 93793 PR ANTICOAGULANT MGMT FOR PT TAKING WARFARIN: ICD-10-PCS | Mod: S$GLB,,,

## 2022-10-27 PROCEDURE — 93793 ANTICOAG MGMT PT WARFARIN: CPT | Mod: S$GLB,,,

## 2022-11-03 ENCOUNTER — ANTI-COAG VISIT (OUTPATIENT)
Dept: CARDIOLOGY | Facility: CLINIC | Age: 82
End: 2022-11-03
Payer: MEDICARE

## 2022-11-03 DIAGNOSIS — Z79.01 LONG TERM (CURRENT) USE OF ANTICOAGULANTS: ICD-10-CM

## 2022-11-03 DIAGNOSIS — I48.91 ATRIAL FIBRILLATION, UNSPECIFIED TYPE: Primary | ICD-10-CM

## 2022-11-03 PROCEDURE — 93793 ANTICOAG MGMT PT WARFARIN: CPT | Mod: S$GLB,,,

## 2022-11-03 PROCEDURE — 93793 PR ANTICOAGULANT MGMT FOR PT TAKING WARFARIN: ICD-10-PCS | Mod: S$GLB,,,

## 2022-11-10 ENCOUNTER — ANTI-COAG VISIT (OUTPATIENT)
Dept: CARDIOLOGY | Facility: CLINIC | Age: 82
End: 2022-11-10
Payer: MEDICARE

## 2022-11-10 DIAGNOSIS — Z79.01 LONG TERM (CURRENT) USE OF ANTICOAGULANTS: ICD-10-CM

## 2022-11-10 DIAGNOSIS — I48.91 ATRIAL FIBRILLATION, UNSPECIFIED TYPE: Primary | ICD-10-CM

## 2022-11-10 PROCEDURE — 93793 PR ANTICOAGULANT MGMT FOR PT TAKING WARFARIN: ICD-10-PCS | Mod: S$GLB,,,

## 2022-11-10 PROCEDURE — 93793 ANTICOAG MGMT PT WARFARIN: CPT | Mod: S$GLB,,,

## 2022-11-21 ENCOUNTER — ANTI-COAG VISIT (OUTPATIENT)
Dept: CARDIOLOGY | Facility: CLINIC | Age: 82
End: 2022-11-21
Payer: MEDICARE

## 2022-11-21 DIAGNOSIS — I48.91 ATRIAL FIBRILLATION, UNSPECIFIED TYPE: Primary | ICD-10-CM

## 2022-11-21 DIAGNOSIS — Z79.01 LONG TERM (CURRENT) USE OF ANTICOAGULANTS: ICD-10-CM

## 2022-11-21 PROCEDURE — 93793 ANTICOAG MGMT PT WARFARIN: CPT | Mod: S$GLB,,,

## 2022-11-21 PROCEDURE — 93793 PR ANTICOAGULANT MGMT FOR PT TAKING WARFARIN: ICD-10-PCS | Mod: S$GLB,,,

## 2022-12-13 ENCOUNTER — ANTI-COAG VISIT (OUTPATIENT)
Dept: CARDIOLOGY | Facility: CLINIC | Age: 82
End: 2022-12-13
Payer: MEDICARE

## 2022-12-13 DIAGNOSIS — Z79.01 LONG TERM (CURRENT) USE OF ANTICOAGULANTS: ICD-10-CM

## 2022-12-13 DIAGNOSIS — I48.91 ATRIAL FIBRILLATION, UNSPECIFIED TYPE: Primary | ICD-10-CM

## 2022-12-13 PROCEDURE — 93793 ANTICOAG MGMT PT WARFARIN: CPT | Mod: S$GLB,,,

## 2022-12-13 PROCEDURE — 93793 PR ANTICOAGULANT MGMT FOR PT TAKING WARFARIN: ICD-10-PCS | Mod: S$GLB,,,

## 2023-01-04 ENCOUNTER — HOSPITAL ENCOUNTER (OUTPATIENT)
Dept: RADIOLOGY | Facility: HOSPITAL | Age: 83
Discharge: HOME | End: 2023-01-04
Attending: STUDENT IN AN ORGANIZED HEALTH CARE EDUCATION/TRAINING PROGRAM
Payer: MEDICARE

## 2023-01-04 ENCOUNTER — OFFICE VISIT (OUTPATIENT)
Dept: PODIATRY | Facility: CLINIC | Age: 83
End: 2023-01-04
Payer: MEDICARE

## 2023-01-04 VITALS
HEART RATE: 74 BPM | DIASTOLIC BLOOD PRESSURE: 76 MMHG | SYSTOLIC BLOOD PRESSURE: 138 MMHG | HEIGHT: 72 IN | WEIGHT: 218 LBS | BODY MASS INDEX: 29.53 KG/M2

## 2023-01-04 DIAGNOSIS — M21.619 BUNION: ICD-10-CM

## 2023-01-04 DIAGNOSIS — L60.9 DISEASE OF NAIL: ICD-10-CM

## 2023-01-04 DIAGNOSIS — E11.42 DIABETIC POLYNEUROPATHY ASSOCIATED WITH TYPE 2 DIABETES MELLITUS: ICD-10-CM

## 2023-01-04 DIAGNOSIS — I73.9 PAD (PERIPHERAL ARTERY DISEASE): ICD-10-CM

## 2023-01-04 DIAGNOSIS — M21.619 BUNION: Primary | ICD-10-CM

## 2023-01-04 PROCEDURE — 3078F DIAST BP <80 MM HG: CPT | Mod: CPTII,S$GLB,, | Performed by: STUDENT IN AN ORGANIZED HEALTH CARE EDUCATION/TRAINING PROGRAM

## 2023-01-04 PROCEDURE — 11721 ROUTINE FOOT CARE: ICD-10-PCS | Mod: Q8,S$GLB,, | Performed by: STUDENT IN AN ORGANIZED HEALTH CARE EDUCATION/TRAINING PROGRAM

## 2023-01-04 PROCEDURE — 1100F PTFALLS ASSESS-DOCD GE2>/YR: CPT | Mod: CPTII,S$GLB,, | Performed by: STUDENT IN AN ORGANIZED HEALTH CARE EDUCATION/TRAINING PROGRAM

## 2023-01-04 PROCEDURE — 73630 X-RAY EXAM OF FOOT: CPT | Mod: TC,PN,RT

## 2023-01-04 PROCEDURE — 1125F PR PAIN SEVERITY QUANTIFIED, PAIN PRESENT: ICD-10-PCS | Mod: CPTII,S$GLB,, | Performed by: STUDENT IN AN ORGANIZED HEALTH CARE EDUCATION/TRAINING PROGRAM

## 2023-01-04 PROCEDURE — 73630 XR FOOT COMPLETE 3 VIEW RIGHT: ICD-10-PCS | Mod: 26,RT,, | Performed by: STUDENT IN AN ORGANIZED HEALTH CARE EDUCATION/TRAINING PROGRAM

## 2023-01-04 PROCEDURE — 1100F PR PT FALLS ASSESS DOC 2+ FALLS/FALL W/INJURY/YR: ICD-10-PCS | Mod: CPTII,S$GLB,, | Performed by: STUDENT IN AN ORGANIZED HEALTH CARE EDUCATION/TRAINING PROGRAM

## 2023-01-04 PROCEDURE — 1159F PR MEDICATION LIST DOCUMENTED IN MEDICAL RECORD: ICD-10-PCS | Mod: CPTII,S$GLB,, | Performed by: STUDENT IN AN ORGANIZED HEALTH CARE EDUCATION/TRAINING PROGRAM

## 2023-01-04 PROCEDURE — 99203 OFFICE O/P NEW LOW 30 MIN: CPT | Mod: 25,S$GLB,, | Performed by: STUDENT IN AN ORGANIZED HEALTH CARE EDUCATION/TRAINING PROGRAM

## 2023-01-04 PROCEDURE — 99203 PR OFFICE/OUTPT VISIT, NEW, LEVL III, 30-44 MIN: ICD-10-PCS | Mod: 25,S$GLB,, | Performed by: STUDENT IN AN ORGANIZED HEALTH CARE EDUCATION/TRAINING PROGRAM

## 2023-01-04 PROCEDURE — 3075F PR MOST RECENT SYSTOLIC BLOOD PRESS GE 130-139MM HG: ICD-10-PCS | Mod: CPTII,S$GLB,, | Performed by: STUDENT IN AN ORGANIZED HEALTH CARE EDUCATION/TRAINING PROGRAM

## 2023-01-04 PROCEDURE — 3288F FALL RISK ASSESSMENT DOCD: CPT | Mod: CPTII,S$GLB,, | Performed by: STUDENT IN AN ORGANIZED HEALTH CARE EDUCATION/TRAINING PROGRAM

## 2023-01-04 PROCEDURE — 99999 PR PBB SHADOW E&M-EST. PATIENT-LVL IV: ICD-10-PCS | Mod: PBBFAC,,, | Performed by: STUDENT IN AN ORGANIZED HEALTH CARE EDUCATION/TRAINING PROGRAM

## 2023-01-04 PROCEDURE — 73630 X-RAY EXAM OF FOOT: CPT | Mod: 26,RT,, | Performed by: STUDENT IN AN ORGANIZED HEALTH CARE EDUCATION/TRAINING PROGRAM

## 2023-01-04 PROCEDURE — 3075F SYST BP GE 130 - 139MM HG: CPT | Mod: CPTII,S$GLB,, | Performed by: STUDENT IN AN ORGANIZED HEALTH CARE EDUCATION/TRAINING PROGRAM

## 2023-01-04 PROCEDURE — 3078F PR MOST RECENT DIASTOLIC BLOOD PRESSURE < 80 MM HG: ICD-10-PCS | Mod: CPTII,S$GLB,, | Performed by: STUDENT IN AN ORGANIZED HEALTH CARE EDUCATION/TRAINING PROGRAM

## 2023-01-04 PROCEDURE — 11721 DEBRIDE NAIL 6 OR MORE: CPT | Mod: Q8,S$GLB,, | Performed by: STUDENT IN AN ORGANIZED HEALTH CARE EDUCATION/TRAINING PROGRAM

## 2023-01-04 PROCEDURE — 99999 PR PBB SHADOW E&M-EST. PATIENT-LVL IV: CPT | Mod: PBBFAC,,, | Performed by: STUDENT IN AN ORGANIZED HEALTH CARE EDUCATION/TRAINING PROGRAM

## 2023-01-04 PROCEDURE — 1159F MED LIST DOCD IN RCRD: CPT | Mod: CPTII,S$GLB,, | Performed by: STUDENT IN AN ORGANIZED HEALTH CARE EDUCATION/TRAINING PROGRAM

## 2023-01-04 PROCEDURE — 1125F AMNT PAIN NOTED PAIN PRSNT: CPT | Mod: CPTII,S$GLB,, | Performed by: STUDENT IN AN ORGANIZED HEALTH CARE EDUCATION/TRAINING PROGRAM

## 2023-01-04 PROCEDURE — 3288F PR FALLS RISK ASSESSMENT DOCUMENTED: ICD-10-PCS | Mod: CPTII,S$GLB,, | Performed by: STUDENT IN AN ORGANIZED HEALTH CARE EDUCATION/TRAINING PROGRAM

## 2023-01-04 NOTE — PROCEDURES
"Routine Foot Care    Date/Time: 1/4/2023 1:30 PM  Performed by: Diya Leung DPM  Authorized by: Diya Leung DPM     Time out: Immediately prior to procedure a "time out" was called to verify the correct patient, procedure, equipment, support staff and site/side marked as required.    Consent Done?:  Yes (Verbal)  Hyperkeratotic Skin Lesions?: No      Nail Care Type:  Debride  Location(s): All  (Left 1st Toe, Left 3rd Toe, Left 2nd Toe, Left 4th Toe, Left 5th Toe, Right 1st Toe, Right 2nd Toe, Right 3rd Toe, Right 4th Toe and Right 5th Toe)  Patient tolerance:  Patient tolerated the procedure well with no immediate complications  "

## 2023-01-04 NOTE — PROGRESS NOTES
Subjective:      Patient ID: Julien Meléndez is a 82 y.o. male.    Chief Complaint: Nail Care (Nail care with concern for nail fungus)    Julien is a 82 y.o. male who presents to the clinic upon referral from Dr. Lora  for evaluation and treatment of diabetic feet. Julien has a past medical history of Diabetes mellitus, Hiatal hernia, Hyperlipidemia, Hypertension, MI (myocardial infarction), and Sleep apnea. Patient relates no major problem with feet. Only complaints today consist of relates to bunion pain to right foot. States history of PAD. Here for routine foot care.    PCP: Kelvin Wayne MD    Date Last Seen by PCP: Dr. Martin 10/12/22    Current shoe gear: Casual shoes    Hemoglobin A1C   Date Value Ref Range Status   10/06/2022 7.2 (H) 4.0 - 5.6 % Final     Comment:     ADA Screening Guidelines:  5.7-6.4%  Consistent with prediabetes  >or=6.5%  Consistent with diabetes    High levels of fetal hemoglobin interfere with the HbA1C  assay. Heterozygous hemoglobin variants (HbS, HgC, etc)do  not significantly interfere with this assay.   However, presence of multiple variants may affect accuracy.     08/03/2022 7.8 (H) 4.0 - 5.6 % Final     Comment:     ADA Screening Guidelines:  5.7-6.4%  Consistent with prediabetes  >or=6.5%  Consistent with diabetes    High levels of fetal hemoglobin interfere with the HbA1C  assay. Heterozygous hemoglobin variants (HbS, HgC, etc)do  not significantly interfere with this assay.   However, presence of multiple variants may affect accuracy.     03/21/2022 7.6 (H) 4.0 - 5.6 % Final     Comment:     ADA Screening Guidelines:  5.7-6.4%  Consistent with prediabetes  >or=6.5%  Consistent with diabetes    High levels of fetal hemoglobin interfere with the HbA1C  assay. Heterozygous hemoglobin variants (HbS, HgC, etc)do  not significantly interfere with this assay.   However, presence of multiple variants may affect accuracy.           Review of Systems   Constitutional: Negative for  chills, decreased appetite, diaphoresis and fever.   HENT:  Negative for congestion and hearing loss.    Cardiovascular:  Negative for chest pain, leg swelling and syncope.   Respiratory:  Negative for cough and shortness of breath.    Skin:  Positive for color change, dry skin, nail changes and poor wound healing. Negative for flushing, itching and rash.   Musculoskeletal:  Positive for arthritis, back pain and joint pain.   Gastrointestinal:  Negative for nausea and vomiting.   Neurological:  Positive for numbness and paresthesias. Negative for focal weakness and weakness.   Psychiatric/Behavioral:  Negative for altered mental status. The patient is not nervous/anxious.          Objective:      Physical Exam  Constitutional:       General: He is not in acute distress.     Appearance: Normal appearance. He is well-developed. He is not diaphoretic.   Cardiovascular:      Comments: Dorsalis pedis and posterior tibial pulses are diminished. Skin temperature is within normal limits. Toes are cool to touch and feet are warm proximally. Hair growth is diminished. Skin is atrophic and with mild hyperpigmentation. Mild edema noted, bilaterally. Telangiectasias to medial ankle, bilaterally   Musculoskeletal:         General: Tenderness present.      Comments: Adequate joint range of motion without pain, limitation, nor crepitation to foot and ankle joints. Muscle strength is 5/5 in all groups bilaterally.    Pes planus with HAV deformity, bilaterally, R>L with localized erythema and tenderness to right foot dorsomedial bony exostosis at 1st MTP       Feet:      Right foot:      Skin integrity: Dry skin present. No ulcer, blister, erythema or warmth.      Left foot:      Skin integrity: Dry skin present. No ulcer, blister, erythema or warmth.   Skin:     General: Skin is warm and dry.      Capillary Refill: Capillary refill takes less than 2 seconds.      Comments: Skin is warm and dry, no acute signs of infection noted  bilaterally      Toenails are thickened by 2-4 mm's, dystrophic, and are darkened in coloration with subungual fungal debris.     Otherwise, no open wounds, macerations or hyperkeratotic lesions, bilaterally            Neurological:      Mental Status: He is alert and oriented to person, place, and time.      Sensory: Sensory deficit present.      Motor: No abnormal muscle tone.      Comments: Light touch within normal limits. [place] 5.07 monofilamant testing is diminished. Vibratory sensation is diminished bilaterally.   Psychiatric:         Mood and Affect: Mood normal.         Behavior: Behavior normal.         Thought Content: Thought content normal.             Assessment:       Encounter Diagnoses   Name Primary?    Bunion Yes    Diabetic polyneuropathy associated with type 2 diabetes mellitus     PAD (peripheral artery disease)     Disease of nail          Plan:       Julien was seen today for nail care.    Diagnoses and all orders for this visit:    Bunion  -     X-Ray Foot Complete Right; Future  -     Uric acid; Future    Diabetic polyneuropathy associated with type 2 diabetes mellitus  -     Routine Foot Care    PAD (peripheral artery disease)  -     Routine Foot Care    Disease of nail  -     Routine Foot Care      I counseled the patient on his conditions, their implications and medical management.    -Xray ordered for bunion pain, discussed importance of accommodative and supportive shoes with a wide toe box to reduce pressure. Recommend daily and frequent foot inspections     -Continue follow up with Interventional Cardiology for PAD    -Recommend OTC treatment for thickened toenails    - Shoe inspection. Diabetic Foot Education. Patient reminded of the importance of good nutrition and blood sugar control to help prevent podiatric complications of diabetes. Patient instructed on proper foot hygeine. We discussed wearing proper shoe gear, daily foot inspections, never walking without  protective shoe gear, never putting sharp instruments to feet, routine podiatric nail visits every 2-3 months.      - With patient's permission, nails were aggressively reduced and debrided x 10 to their soft tissue attachment mechanically, removing all offending nail and debris. Patient relates relief following the procedure. He will continue to monitor the areas daily, inspect his feet, wear protective shoe gear when ambulatory, moisturizer to maintain skin integrity and follow in this office in approximately 2-3 months, sooner p.r.n.

## 2023-01-09 ENCOUNTER — TELEPHONE (OUTPATIENT)
Dept: PODIATRY | Facility: CLINIC | Age: 83
End: 2023-01-09
Payer: MEDICARE

## 2023-01-10 ENCOUNTER — ANTI-COAG VISIT (OUTPATIENT)
Dept: CARDIOLOGY | Facility: CLINIC | Age: 83
End: 2023-01-10
Payer: MEDICARE

## 2023-01-10 DIAGNOSIS — I48.91 ATRIAL FIBRILLATION, UNSPECIFIED TYPE: Primary | ICD-10-CM

## 2023-01-10 DIAGNOSIS — Z79.01 LONG TERM (CURRENT) USE OF ANTICOAGULANTS: ICD-10-CM

## 2023-01-10 PROCEDURE — 93793 PR ANTICOAGULANT MGMT FOR PT TAKING WARFARIN: ICD-10-PCS | Mod: S$GLB,,,

## 2023-01-10 PROCEDURE — 93793 ANTICOAG MGMT PT WARFARIN: CPT | Mod: S$GLB,,,

## 2023-01-26 ENCOUNTER — ANTI-COAG VISIT (OUTPATIENT)
Dept: CARDIOLOGY | Facility: CLINIC | Age: 83
End: 2023-01-26
Payer: MEDICARE

## 2023-01-26 DIAGNOSIS — Z79.01 LONG TERM (CURRENT) USE OF ANTICOAGULANTS: ICD-10-CM

## 2023-01-26 DIAGNOSIS — I48.91 ATRIAL FIBRILLATION, UNSPECIFIED TYPE: Primary | ICD-10-CM

## 2023-01-26 PROCEDURE — 93793 PR ANTICOAGULANT MGMT FOR PT TAKING WARFARIN: ICD-10-PCS | Mod: S$GLB,,,

## 2023-01-26 PROCEDURE — 93793 ANTICOAG MGMT PT WARFARIN: CPT | Mod: S$GLB,,,

## 2023-02-03 ENCOUNTER — TELEPHONE (OUTPATIENT)
Dept: SLEEP MEDICINE | Facility: CLINIC | Age: 83
End: 2023-02-03
Payer: MEDICARE

## 2023-02-03 NOTE — TELEPHONE ENCOUNTER
----- Message from Rupal Chacon sent at 2/3/2023  3:44 PM CST -----  Regarding: FW: Appt Request  Contact: JULIEN TORO [[id number]]    ----- Message -----  From: Bridget Zayas  Sent: 2/3/2023   3:38 PM CST  To: [facility]ON[facility] Staff  Subject: Appt Request                                     Name of Who is Calling:   Julien Toro          What is the request in detail: Pt states recently received his CPAP Machine and wanted to schedule an appt in regards to getting the . He is requesting a sooner appt then 5/2 ( which is showing the next avail )   Please advise           Can the clinic reply by [facility]: No           What Number to Call Back if not in [email or web address]: Home Phone      [phone]  Work Phone      Not on file.  Mobile          [phone]

## 2023-02-16 ENCOUNTER — ANTI-COAG VISIT (OUTPATIENT)
Dept: CARDIOLOGY | Facility: CLINIC | Age: 83
End: 2023-02-16
Payer: MEDICARE

## 2023-02-16 DIAGNOSIS — I48.91 ATRIAL FIBRILLATION, UNSPECIFIED TYPE: Primary | ICD-10-CM

## 2023-02-16 DIAGNOSIS — Z79.01 LONG TERM (CURRENT) USE OF ANTICOAGULANTS: ICD-10-CM

## 2023-02-16 PROCEDURE — 93793 ANTICOAG MGMT PT WARFARIN: CPT | Mod: S$GLB,,,

## 2023-02-16 PROCEDURE — 93793 PR ANTICOAGULANT MGMT FOR PT TAKING WARFARIN: ICD-10-PCS | Mod: S$GLB,,,

## 2023-03-16 ENCOUNTER — ANTI-COAG VISIT (OUTPATIENT)
Dept: CARDIOLOGY | Facility: CLINIC | Age: 83
End: 2023-03-16
Payer: MEDICARE

## 2023-03-16 DIAGNOSIS — Z79.01 LONG TERM (CURRENT) USE OF ANTICOAGULANTS: ICD-10-CM

## 2023-03-16 DIAGNOSIS — I48.91 ATRIAL FIBRILLATION, UNSPECIFIED TYPE: Primary | ICD-10-CM

## 2023-03-16 PROCEDURE — 93793 ANTICOAG MGMT PT WARFARIN: CPT | Mod: S$GLB,,,

## 2023-03-16 PROCEDURE — 93793 PR ANTICOAGULANT MGMT FOR PT TAKING WARFARIN: ICD-10-PCS | Mod: S$GLB,,,

## 2023-03-25 PROBLEM — I48.91 ATRIAL FIBRILLATION, UNSPECIFIED TYPE: Status: ACTIVE | Noted: 2021-03-29

## 2023-03-25 PROBLEM — L03.116 CELLULITIS OF LEFT FOOT: Status: ACTIVE | Noted: 2023-03-25

## 2023-03-27 PROBLEM — L89.890: Status: ACTIVE | Noted: 2023-03-27

## 2023-03-27 PROBLEM — M79.672 ACUTE PAIN OF LEFT FOOT: Status: ACTIVE | Noted: 2023-03-27

## 2023-04-03 PROBLEM — I48.0 PAF (PAROXYSMAL ATRIAL FIBRILLATION): Status: ACTIVE | Noted: 2021-03-29

## 2023-04-06 DIAGNOSIS — S91.302A OPEN WOUND OF LEFT FOOT, INITIAL ENCOUNTER: Primary | ICD-10-CM

## 2023-04-11 PROBLEM — M79.672 ACUTE PAIN OF LEFT FOOT: Status: RESOLVED | Noted: 2023-03-27 | Resolved: 2023-04-11

## 2023-04-11 PROBLEM — I70.203 ATHEROSCLEROSIS OF ARTERY OF BOTH LOWER EXTREMITIES: Status: ACTIVE | Noted: 2023-04-11

## 2023-04-11 PROBLEM — I70.213 ATHEROSCLEROSIS OF NATIVE ARTERY OF BOTH LOWER EXTREMITIES WITH INTERMITTENT CLAUDICATION: Status: ACTIVE | Noted: 2023-04-11

## 2023-04-13 PROBLEM — I73.9 PAD (PERIPHERAL ARTERY DISEASE): Status: ACTIVE | Noted: 2023-04-13

## 2023-04-13 PROBLEM — I70.213 ATHEROSCLEROSIS OF NATIVE ARTERY OF BOTH LOWER EXTREMITIES WITH INTERMITTENT CLAUDICATION: Status: RESOLVED | Noted: 2023-04-11 | Resolved: 2023-04-13

## 2023-04-14 DIAGNOSIS — S91.302A OPEN WOUND OF LEFT FOOT, INITIAL ENCOUNTER: Primary | ICD-10-CM

## 2023-04-14 PROBLEM — L03.116 CELLULITIS OF LEFT FOOT: Status: RESOLVED | Noted: 2023-03-25 | Resolved: 2023-04-14

## 2023-04-14 PROBLEM — I70.223 CRITICAL LIMB ISCHEMIA OF BOTH LOWER EXTREMITIES: Status: ACTIVE | Noted: 2023-04-14

## 2023-04-16 ENCOUNTER — HOSPITAL ENCOUNTER (INPATIENT)
Facility: HOSPITAL | Age: 83
LOS: 3 days | Discharge: NURSING, REHAB OR LONG-TERM CARE | DRG: 377 | End: 2023-04-21
Attending: STUDENT IN AN ORGANIZED HEALTH CARE EDUCATION/TRAINING PROGRAM | Admitting: FAMILY MEDICINE
Payer: MEDICARE

## 2023-04-16 DIAGNOSIS — K92.2 UGIB (UPPER GASTROINTESTINAL BLEED): ICD-10-CM

## 2023-04-16 DIAGNOSIS — I48.91 ATRIAL FIBRILLATION, UNSPECIFIED TYPE: ICD-10-CM

## 2023-04-16 DIAGNOSIS — Z79.01 LONG TERM (CURRENT) USE OF ANTICOAGULANTS: ICD-10-CM

## 2023-04-16 DIAGNOSIS — R11.10 VOMITING: ICD-10-CM

## 2023-04-16 LAB
ABO + RH BLD: NORMAL
ALBUMIN SERPL BCP-MCNC: 3 G/DL (ref 3.5–5.2)
ALP SERPL-CCNC: 72 U/L (ref 55–135)
ALT SERPL W/O P-5'-P-CCNC: 14 U/L (ref 10–44)
ANION GAP SERPL CALC-SCNC: 11 MMOL/L (ref 8–16)
AST SERPL-CCNC: 14 U/L (ref 10–40)
BASOPHILS # BLD AUTO: 0.04 K/UL (ref 0–0.2)
BASOPHILS NFR BLD: 0.5 % (ref 0–1.9)
BILIRUB SERPL-MCNC: 0.6 MG/DL (ref 0.1–1)
BLD GP AB SCN CELLS X3 SERPL QL: NORMAL
BUN SERPL-MCNC: 22 MG/DL (ref 8–23)
CALCIUM SERPL-MCNC: 9.5 MG/DL (ref 8.7–10.5)
CHLORIDE SERPL-SCNC: 104 MMOL/L (ref 95–110)
CO2 SERPL-SCNC: 23 MMOL/L (ref 23–29)
CREAT SERPL-MCNC: 1.1 MG/DL (ref 0.5–1.4)
DIFFERENTIAL METHOD: ABNORMAL
EOSINOPHIL # BLD AUTO: 0.2 K/UL (ref 0–0.5)
EOSINOPHIL NFR BLD: 1.9 % (ref 0–8)
ERYTHROCYTE [DISTWIDTH] IN BLOOD BY AUTOMATED COUNT: 12.9 % (ref 11.5–14.5)
EST. GFR  (NO RACE VARIABLE): >60 ML/MIN/1.73 M^2
GLUCOSE SERPL-MCNC: 187 MG/DL (ref 70–110)
HCT VFR BLD AUTO: 35.5 % (ref 40–54)
HGB BLD-MCNC: 12 G/DL (ref 14–18)
IMM GRANULOCYTES # BLD AUTO: 0.07 K/UL (ref 0–0.04)
IMM GRANULOCYTES NFR BLD AUTO: 0.9 % (ref 0–0.5)
INR PPP: 1.1 (ref 0.8–1.2)
INR PPP: 1.2 (ref 0.8–1.2)
LYMPHOCYTES # BLD AUTO: 1.6 K/UL (ref 1–4.8)
LYMPHOCYTES NFR BLD: 20.2 % (ref 18–48)
MCH RBC QN AUTO: 31.2 PG (ref 27–31)
MCHC RBC AUTO-ENTMCNC: 33.8 G/DL (ref 32–36)
MCV RBC AUTO: 92 FL (ref 82–98)
MONOCYTES # BLD AUTO: 0.9 K/UL (ref 0.3–1)
MONOCYTES NFR BLD: 11.1 % (ref 4–15)
NEUTROPHILS # BLD AUTO: 5.3 K/UL (ref 1.8–7.7)
NEUTROPHILS NFR BLD: 65.4 % (ref 38–73)
NRBC BLD-RTO: 0 /100 WBC
PLATELET # BLD AUTO: 286 K/UL (ref 150–450)
PMV BLD AUTO: 11.7 FL (ref 9.2–12.9)
POTASSIUM SERPL-SCNC: 4.3 MMOL/L (ref 3.5–5.1)
PROT SERPL-MCNC: 6.3 G/DL (ref 6–8.4)
PROTHROMBIN TIME: 11.4 SEC (ref 9–12.5)
PROTHROMBIN TIME: 11.9 SEC (ref 9–12.5)
RBC # BLD AUTO: 3.85 M/UL (ref 4.6–6.2)
SODIUM SERPL-SCNC: 138 MMOL/L (ref 136–145)
SPECIMEN OUTDATE: NORMAL
WBC # BLD AUTO: 8.08 K/UL (ref 3.9–12.7)

## 2023-04-16 PROCEDURE — 96375 TX/PRO/DX INJ NEW DRUG ADDON: CPT

## 2023-04-16 PROCEDURE — 80053 COMPREHEN METABOLIC PANEL: CPT | Performed by: STUDENT IN AN ORGANIZED HEALTH CARE EDUCATION/TRAINING PROGRAM

## 2023-04-16 PROCEDURE — 63600175 PHARM REV CODE 636 W HCPCS: Performed by: STUDENT IN AN ORGANIZED HEALTH CARE EDUCATION/TRAINING PROGRAM

## 2023-04-16 PROCEDURE — 93010 ELECTROCARDIOGRAM REPORT: CPT | Mod: ,,, | Performed by: INTERNAL MEDICINE

## 2023-04-16 PROCEDURE — 63600175 PHARM REV CODE 636 W HCPCS: Performed by: NURSE PRACTITIONER

## 2023-04-16 PROCEDURE — 85610 PROTHROMBIN TIME: CPT | Performed by: STUDENT IN AN ORGANIZED HEALTH CARE EDUCATION/TRAINING PROGRAM

## 2023-04-16 PROCEDURE — 99285 EMERGENCY DEPT VISIT HI MDM: CPT

## 2023-04-16 PROCEDURE — 93005 ELECTROCARDIOGRAM TRACING: CPT

## 2023-04-16 PROCEDURE — C9113 INJ PANTOPRAZOLE SODIUM, VIA: HCPCS | Performed by: STUDENT IN AN ORGANIZED HEALTH CARE EDUCATION/TRAINING PROGRAM

## 2023-04-16 PROCEDURE — G0378 HOSPITAL OBSERVATION PER HR: HCPCS

## 2023-04-16 PROCEDURE — 93010 EKG 12-LEAD: ICD-10-PCS | Mod: ,,, | Performed by: INTERNAL MEDICINE

## 2023-04-16 PROCEDURE — 85025 COMPLETE CBC W/AUTO DIFF WBC: CPT | Performed by: STUDENT IN AN ORGANIZED HEALTH CARE EDUCATION/TRAINING PROGRAM

## 2023-04-16 PROCEDURE — 85610 PROTHROMBIN TIME: CPT | Mod: 91 | Performed by: NURSE PRACTITIONER

## 2023-04-16 PROCEDURE — 86900 BLOOD TYPING SEROLOGIC ABO: CPT | Performed by: INTERNAL MEDICINE

## 2023-04-16 PROCEDURE — 25000003 PHARM REV CODE 250: Performed by: NURSE PRACTITIONER

## 2023-04-16 RX ORDER — AMLODIPINE BESYLATE 5 MG/1
5 TABLET ORAL DAILY
Status: DISCONTINUED | OUTPATIENT
Start: 2023-04-17 | End: 2023-04-19

## 2023-04-16 RX ORDER — DOXYCYCLINE HYCLATE 100 MG
100 TABLET ORAL EVERY 12 HOURS
Status: DISCONTINUED | OUTPATIENT
Start: 2023-04-16 | End: 2023-04-21 | Stop reason: HOSPADM

## 2023-04-16 RX ORDER — ATORVASTATIN CALCIUM 40 MG/1
40 TABLET, FILM COATED ORAL DAILY
Status: DISCONTINUED | OUTPATIENT
Start: 2023-04-17 | End: 2023-04-21 | Stop reason: HOSPADM

## 2023-04-16 RX ORDER — ONDANSETRON 2 MG/ML
4 INJECTION INTRAMUSCULAR; INTRAVENOUS EVERY 6 HOURS PRN
Status: DISCONTINUED | OUTPATIENT
Start: 2023-04-16 | End: 2023-04-21 | Stop reason: HOSPADM

## 2023-04-16 RX ORDER — PANTOPRAZOLE SODIUM 40 MG/10ML
40 INJECTION, POWDER, LYOPHILIZED, FOR SOLUTION INTRAVENOUS 2 TIMES DAILY
Status: DISCONTINUED | OUTPATIENT
Start: 2023-04-17 | End: 2023-04-17

## 2023-04-16 RX ORDER — METOPROLOL SUCCINATE 25 MG/1
25 TABLET, EXTENDED RELEASE ORAL DAILY
Status: DISCONTINUED | OUTPATIENT
Start: 2023-04-17 | End: 2023-04-19

## 2023-04-16 RX ORDER — GLUCAGON 1 MG
1 KIT INJECTION
Status: DISCONTINUED | OUTPATIENT
Start: 2023-04-16 | End: 2023-04-21 | Stop reason: HOSPADM

## 2023-04-16 RX ORDER — HYDROCHLOROTHIAZIDE 25 MG/1
25 TABLET ORAL DAILY
Status: DISCONTINUED | OUTPATIENT
Start: 2023-04-17 | End: 2023-04-17

## 2023-04-16 RX ORDER — INSULIN ASPART 100 [IU]/ML
0-5 INJECTION, SOLUTION INTRAVENOUS; SUBCUTANEOUS EVERY 6 HOURS PRN
Status: DISCONTINUED | OUTPATIENT
Start: 2023-04-16 | End: 2023-04-18

## 2023-04-16 RX ORDER — OXYCODONE AND ACETAMINOPHEN 5; 325 MG/1; MG/1
1 TABLET ORAL EVERY 4 HOURS PRN
Status: DISCONTINUED | OUTPATIENT
Start: 2023-04-16 | End: 2023-04-21 | Stop reason: HOSPADM

## 2023-04-16 RX ORDER — RANOLAZINE 500 MG/1
1000 TABLET, EXTENDED RELEASE ORAL 2 TIMES DAILY
Status: DISCONTINUED | OUTPATIENT
Start: 2023-04-16 | End: 2023-04-17

## 2023-04-16 RX ORDER — PANTOPRAZOLE SODIUM 40 MG/10ML
40 INJECTION, POWDER, LYOPHILIZED, FOR SOLUTION INTRAVENOUS
Status: COMPLETED | OUTPATIENT
Start: 2023-04-16 | End: 2023-04-16

## 2023-04-16 RX ORDER — LOSARTAN POTASSIUM 50 MG/1
100 TABLET ORAL DAILY
Status: DISCONTINUED | OUTPATIENT
Start: 2023-04-17 | End: 2023-04-17

## 2023-04-16 RX ORDER — ACETAMINOPHEN 325 MG/1
650 TABLET ORAL EVERY 6 HOURS PRN
Status: DISCONTINUED | OUTPATIENT
Start: 2023-04-16 | End: 2023-04-21 | Stop reason: HOSPADM

## 2023-04-16 RX ORDER — ISOSORBIDE MONONITRATE 30 MG/1
30 TABLET, EXTENDED RELEASE ORAL DAILY
Status: DISCONTINUED | OUTPATIENT
Start: 2023-04-17 | End: 2023-04-21 | Stop reason: HOSPADM

## 2023-04-16 RX ORDER — FLUOXETINE HYDROCHLORIDE 20 MG/1
40 CAPSULE ORAL DAILY
Status: DISCONTINUED | OUTPATIENT
Start: 2023-04-17 | End: 2023-04-21 | Stop reason: HOSPADM

## 2023-04-16 RX ORDER — ONDANSETRON 8 MG/1
8 TABLET, ORALLY DISINTEGRATING ORAL EVERY 6 HOURS PRN
Status: DISCONTINUED | OUTPATIENT
Start: 2023-04-16 | End: 2023-04-21 | Stop reason: HOSPADM

## 2023-04-16 RX ADMIN — OXYCODONE HYDROCHLORIDE AND ACETAMINOPHEN 1 TABLET: 5; 325 TABLET ORAL at 09:04

## 2023-04-16 RX ADMIN — DOXYCYCLINE HYCLATE 100 MG: 100 TABLET, COATED ORAL at 09:04

## 2023-04-16 RX ADMIN — TRAZODONE HYDROCHLORIDE 150 MG: 100 TABLET ORAL at 09:04

## 2023-04-16 RX ADMIN — ONDANSETRON HYDROCHLORIDE 4 MG: 2 INJECTION, SOLUTION INTRAMUSCULAR; INTRAVENOUS at 08:04

## 2023-04-16 RX ADMIN — PANTOPRAZOLE SODIUM 40 MG: 40 INJECTION, POWDER, FOR SOLUTION INTRAVENOUS at 06:04

## 2023-04-16 RX ADMIN — RANOLAZINE 1000 MG: 500 TABLET, FILM COATED, EXTENDED RELEASE ORAL at 09:04

## 2023-04-16 NOTE — ED PROVIDER NOTES
Encounter Date: 4/16/2023       History     Chief Complaint   Patient presents with    Emesis     Nursing home reports coffee ground emesis x 1 today. No other symptoms. Presents awake, alert. Denies pain at this time.      [age] T presents with an episode of coffee-ground emesis that occurred this morning.  S recently admitted to [facility] for cellulitis and was discharged home on doxycycline.  Today he had a moderate size by volume coffee-ground emesis.  He is asymptomatic here in feels mildly improved admits to mild nausea.  No melena or.  No recent endoscopy.  No chest pain shortness a breath or fevers.    Review of patient's allergies indicates:   Allergen Reactions    Nystatin      Other reaction(s): Unknown     Past Medical History:   Diagnosis Date    Diabetes mellitus     Hiatal hernia     under Dr Saenz' care    Hyperlipidemia     Hypertension     MI (myocardial infarction)     Sleep apnea      Past Surgical History:   Procedure Laterality Date    ABDOMINAL HERNIA REPAIR      ANGIOGRAM, EXTREMITY, BILATERAL Bilateral 4/5/2023    Procedure: ANGIOGRAM, EXTREMITY, BILATERAL;  Surgeon: Michael Giraldo III, MD;  Location: [facility] CATH LAB;  Service: Cardiology;  Laterality: Bilateral;    ANGIOGRAPHY OF LOWER EXTREMITY Left 4/12/2023    Procedure: Angiogram Extremity Unilateral;  Surgeon: Michael Giraldo III, MD;  Location: [facility] CATH LAB;  Service: Cardiology;  Laterality: Left;    AORTOGRAPHY WITH SERIALOGRAPHY Bilateral 4/5/2023    Procedure: AORTOGRAM, WITH SERIALOGRAPHY;  Surgeon: Michael Giraldo III, MD;  Location: [facility] CATH LAB;  Service: Cardiology;  Laterality: Bilateral;    APPLICATION OF WOUND VACUUM-ASSISTED CLOSURE DEVICE Left 3/29/2023    Procedure: APPLICATION, WOUND VAC;  Surgeon: Alona Pierce DPM;  Location: [facility] OR;  Service: Podiatry;  Laterality: Left;    APPLICATION OF WOUND VACUUM-ASSISTED CLOSURE DEVICE Left 4/4/2023    Procedure: APPLICATION, WOUND VAC;  Surgeon: Alona  KILLIAN Pierce DPM;  Location: [facility] OR;  Service: Podiatry;  Laterality: Left;    BONE BIOPSY Left 3/29/2023    Procedure: BIOPSY, BONE;  Surgeon: Alona Pierce DPM;  Location: [facility] OR;  Service: Podiatry;  Laterality: Left;  1st met    CARDIAC CATHETERIZATION      3 stents placed    CATARACT EXTRACTION, BILATERAL      CLOSURE DEVICE Left 4/12/2023    Procedure: Placement of Closure Device;  Surgeon: Michael Giraldo III, MD;  Location: [facility] CATH LAB;  Service: Cardiology;  Laterality: Left;    CORONARY ANGIOGRAPHY N/A 10/3/2019    Procedure: ANGIOGRAM, CORONARY ARTERY;  Surgeon: Edwin Azul MD;  Location: [facility] CATH LAB/EP;  Service: Cardiology;  Laterality: N/A;    DEBRIDEMENT OF FOOT Left 3/29/2023    Procedure: DEBRIDEMENT, FOOT;  Surgeon: Alona Pierce DPM;  Location: [facility] OR;  Service: Podiatry;  Laterality: Left;    DEBRIDEMENT OF FOOT Left 4/4/2023    Procedure: DEBRIDEMENT, FOOT;  Surgeon: Alona Pierce DPM;  Location: [facility];  Service: Podiatry;  Laterality: Left;    HERNIA REPAIR      inguinal    IVUS (INTRAVASCULAR ULTRASOUND) Left 4/12/2023    Procedure: ULTRASOUND, INTRAVASCULAR;  Surgeon: Michael Giraldo III, MD;  Location: [facility] CATH LAB;  Service: Cardiology;  Laterality: Left;    LEFT HEART CATHETERIZATION Left 9/13/2019    Procedure: Left heart cath;  Surgeon: Edwin Azul MD;  Location: [facility] CATH LAB/EP;  Service: Cardiology;  Laterality: Left;    LEFT HEART CATHETERIZATION N/A 10/3/2019    Procedure: Left heart cath;  Surgeon: Edwin Azul MD;  Location: [facility] CATH LAB/EP;  Service: Cardiology;  Laterality: N/A;    OSTEOTOMY OF METATARSAL BONE Right 4/4/2023    Procedure: OSTEOTOMY, METATARSAL BONE;  Surgeon: Alona Pierce DPM;  Location: [facility] OR;  Service: Podiatry;  Laterality: Right;    PERCUTANEOUS TRANSLUMINAL ANGIOPLASTY Left 4/12/2023    Procedure: PTA (ANGIOPLASTY, PERCUTANEOUS, TRANSLUMINAL);  Surgeon: Michael Giraldo III, MD;  Location: [facility] CATH LAB;  Service: Cardiology;   Laterality: Left;    SURGICAL REMOVAL OF BUNION WITH OSTEOTOMY OF METATARSAL BONE Right 4/4/2023    Procedure: BUNIONECTOMY, WITH METATARSAL OSTEOTOMY;  Surgeon: Alona Pierce DPM;  Location: [facility] OR;  Service: Podiatry;  Laterality: Right;    TOE AMPUTATION Left 4/4/2023    Procedure: AMPUTATION, TOE;  Surgeon: Alona Pierce DPM;  Location: [facility] OR;  Service: Podiatry;  Laterality: Left;  1st ray     Family History   Problem Relation Age of Onset    Heart disease Mother     Stroke Mother     Heart disease Sister     Heart disease Brother     Heart disease Maternal Uncle     Heart disease Brother     Heart disease Sister     Heart disease Maternal Uncle     Heart disease Maternal Uncle     Heart disease Maternal Uncle      Social History     Tobacco Use    Smoking status: Never    Smokeless tobacco: Never   Substance Use Topics    Alcohol use: No    Drug use: No     Review of Systems   All other systems reviewed and are negative.    Physical Exam     Initial Vitals   BP Pulse Resp Temp SpO2   04/16/23 1721 04/16/23 1721 04/16/23 1723 04/16/23 1723 04/16/23 1721   123/66 90 20 98.2 °F (36.8 °C) 97 %      MAP       --                Physical Exam    Nursing note and vitals reviewed.  Constitutional: He appears well-developed and well-nourished. He is not diaphoretic. No distress.   Lying supine holding an emesis bag his face   HENT:   Head: Normocephalic and atraumatic.   Eyes: EOM are normal. Pupils are equal, round, and reactive to light.   Neck: Neck supple. No JVD present.   Normal range of motion.  Cardiovascular:  Normal rate and regular rhythm.           Pulmonary/Chest: Breath sounds normal. No stridor. No respiratory distress.   Abdominal: Abdomen is soft. There is no abdominal tenderness.   Musculoskeletal:         General: No edema. Normal range of motion.      Cervical back: Normal range of motion and neck supple.     Neurological: He is alert and oriented to person, place, and time. GCS score is 15. GCS  eye subscore is 4. GCS verbal subscore is 5. GCS motor subscore is 6.   Skin: Skin is warm and dry. Capillary refill takes less than 2 seconds.   Psychiatric: He has a normal mood and affect. Thought content normal.       ED Course   Procedures  Labs Reviewed   CBC W/ AUTO DIFFERENTIAL - Abnormal; Notable for the following components:       Result Value    RBC 3.85 (*)     Hemoglobin 12.0 (*)     Hematocrit 35.5 (*)     MCH 31.2 (*)     Immature Granulocytes 0.9 (*)     Immature Grans (Abs) 0.07 (*)     All other components within normal limits   COMPREHENSIVE METABOLIC PANEL - Abnormal; Notable for the following components:    Glucose 187 (*)     Albumin 3.0 (*)     All other components within normal limits   PROTIME-INR   PROTIME-INR   TYPE & SCREEN   POCT GLUCOSE MONITORING CONTINUOUS          Imaging Results    None          Medications   amLODIPine tablet 5 mg (has no administration in time range)   atorvastatin tablet 40 mg (has no administration in time range)   doxycycline tablet 100 mg (100 mg Oral Given 4/16/23 2148)   FLUoxetine capsule 40 mg (has no administration in time range)   isosorbide mononitrate 24 hr tablet 30 mg (has no administration in time range)   metoprolol succinate (TOPROL-XL) 24 hr tablet 25 mg (has no administration in time range)   oxyCODONE-acetaminophen 5-325 mg per tablet 1 tablet (1 tablet Oral Given 4/16/23 2148)   ranolazine 12 hr tablet 1,000 mg (1,000 mg Oral Given 4/16/23 2148)   traZODone tablet 150 mg (150 mg Oral Given 4/16/23 2149)   losartan tablet 100 mg (has no administration in time range)   hydroCHLOROthiazide tablet 25 mg (has no administration in time range)   glucagon (human recombinant) injection 1 mg (has no administration in time range)   dextrose 10% bolus 125 mL 125 mL (has no administration in time range)   dextrose 10% bolus 250 mL 250 mL (has no administration in time range)   pantoprazole injection 40 mg (has no administration in time range)   insulin  aspart U-100 pen 0-5 Units (has no administration in time range)   ondansetron injection 4 mg (4 mg Intravenous Given 4/16/23 2027)   ondansetron disintegrating tablet 8 mg (has no administration in time range)   acetaminophen tablet 650 mg (has no administration in time range)   pantoprazole injection 40 mg (40 mg Intravenous Given 4/16/23 1819)     Medical Decision Making:   Initial Assessment:   Hemodynamically stable. Afebrile. Phonating and protecting the airway spontaneously. No clinical evidence for cardiovascular instability or impending airway compromise. Examination as above.  Plan for admission for dedicated Gastroenterology evaluation and endoscopy.  Hemoglobin stable.  Him a height no hypotension no evidence for melena.  Is not tachycardic.  Will provide dose of Protonix in bed.           ED Course as of 04/16/23 2346   Sun Apr 16, 2023 1852 Twelve lead EKG per my independent interpreted as normal sinus rhythm at a rate of 86.  Normal axis.  There is a prolonged QT interval.  No ST segment elevation.   [BG]   1852 Labs reviewed.  Cbc stable.  CMP negative. INR negative.  [BG]   1853 The patient was reassessed frequently and managed aggressively while in the emergency department. Due to the clinical workup and presentation, the patient's condition is concerning and will require additional evaluation. I discussed the objective findings with him including laboratory studies, diagnostic imaging, and any consultant involvement. He was educated on their clinical presentation and all questions were answered. he acknowledged and verbally agreed to the treatment plan. The patient will be admitted to the hospital for further management.     DISCLAIMER: This note was prepared with [employer] voice recognition transcription software. Garbled syntax, mangled pronouns, and other bizarre constructions may be attributed to that software system.     [BG]      ED Course User Index  [BG] Zbigniew Cardoso MD                  Clinical Impression:   Final diagnoses:  [R11.10] Vomiting  [K92.2] UGIB (upper gastrointestinal bleed)        ED Disposition Condition    Observation Stable                Zbigniew Cardoso MD  04/16/23 9505

## 2023-04-16 NOTE — Clinical Note
Diagnosis: UGIB (upper gastrointestinal bleed) [005445]   Future Attending Provider: ERIN MÉNDEZ [[id number]]   Admitting Provider:: CANDI ROOT [[id number]]

## 2023-04-17 ENCOUNTER — PATIENT OUTREACH (OUTPATIENT)
Dept: ADMINISTRATIVE | Facility: CLINIC | Age: 83
End: 2023-04-17
Payer: MEDICARE

## 2023-04-17 ENCOUNTER — ANESTHESIA (OUTPATIENT)
Dept: ENDOSCOPY | Facility: HOSPITAL | Age: 83
DRG: 377 | End: 2023-04-17
Payer: MEDICARE

## 2023-04-17 ENCOUNTER — ANESTHESIA EVENT (OUTPATIENT)
Dept: ENDOSCOPY | Facility: HOSPITAL | Age: 83
DRG: 377 | End: 2023-04-17
Payer: MEDICARE

## 2023-04-17 PROBLEM — Z98.890 POSTOPERATIVE STATE: Status: ACTIVE | Noted: 2023-04-17

## 2023-04-17 LAB
ANION GAP SERPL CALC-SCNC: 13 MMOL/L (ref 8–16)
BASOPHILS # BLD AUTO: 0.05 K/UL (ref 0–0.2)
BASOPHILS # BLD AUTO: 0.05 K/UL (ref 0–0.2)
BASOPHILS NFR BLD: 0.6 % (ref 0–1.9)
BASOPHILS NFR BLD: 0.8 % (ref 0–1.9)
BUN SERPL-MCNC: 26 MG/DL (ref 8–23)
CALCIUM SERPL-MCNC: 9 MG/DL (ref 8.7–10.5)
CHLORIDE SERPL-SCNC: 105 MMOL/L (ref 95–110)
CO2 SERPL-SCNC: 21 MMOL/L (ref 23–29)
CREAT SERPL-MCNC: 1 MG/DL (ref 0.5–1.4)
DIFFERENTIAL METHOD: ABNORMAL
DIFFERENTIAL METHOD: ABNORMAL
EOSINOPHIL # BLD AUTO: 0.2 K/UL (ref 0–0.5)
EOSINOPHIL # BLD AUTO: 0.2 K/UL (ref 0–0.5)
EOSINOPHIL NFR BLD: 1.8 % (ref 0–8)
EOSINOPHIL NFR BLD: 3.2 % (ref 0–8)
ERYTHROCYTE [DISTWIDTH] IN BLOOD BY AUTOMATED COUNT: 12.9 % (ref 11.5–14.5)
ERYTHROCYTE [DISTWIDTH] IN BLOOD BY AUTOMATED COUNT: 12.9 % (ref 11.5–14.5)
EST. GFR  (NO RACE VARIABLE): >60 ML/MIN/1.73 M^2
GLUCOSE SERPL-MCNC: 204 MG/DL (ref 70–110)
HCT VFR BLD AUTO: 32.2 % (ref 40–54)
HCT VFR BLD AUTO: 34.2 % (ref 40–54)
HGB BLD-MCNC: 11 G/DL (ref 14–18)
HGB BLD-MCNC: 11.6 G/DL (ref 14–18)
IMM GRANULOCYTES # BLD AUTO: 0.06 K/UL (ref 0–0.04)
IMM GRANULOCYTES # BLD AUTO: 0.08 K/UL (ref 0–0.04)
IMM GRANULOCYTES NFR BLD AUTO: 0.9 % (ref 0–0.5)
IMM GRANULOCYTES NFR BLD AUTO: 0.9 % (ref 0–0.5)
LYMPHOCYTES # BLD AUTO: 1.3 K/UL (ref 1–4.8)
LYMPHOCYTES # BLD AUTO: 1.5 K/UL (ref 1–4.8)
LYMPHOCYTES NFR BLD: 17.8 % (ref 18–48)
LYMPHOCYTES NFR BLD: 19.7 % (ref 18–48)
MCH RBC QN AUTO: 30.9 PG (ref 27–31)
MCH RBC QN AUTO: 31.3 PG (ref 27–31)
MCHC RBC AUTO-ENTMCNC: 33.9 G/DL (ref 32–36)
MCHC RBC AUTO-ENTMCNC: 34.2 G/DL (ref 32–36)
MCV RBC AUTO: 90 FL (ref 82–98)
MCV RBC AUTO: 92 FL (ref 82–98)
MONOCYTES # BLD AUTO: 0.8 K/UL (ref 0.3–1)
MONOCYTES # BLD AUTO: 1 K/UL (ref 0.3–1)
MONOCYTES NFR BLD: 11.2 % (ref 4–15)
MONOCYTES NFR BLD: 11.6 % (ref 4–15)
NEUTROPHILS # BLD AUTO: 4.2 K/UL (ref 1.8–7.7)
NEUTROPHILS # BLD AUTO: 5.8 K/UL (ref 1.8–7.7)
NEUTROPHILS NFR BLD: 63.8 % (ref 38–73)
NEUTROPHILS NFR BLD: 67.7 % (ref 38–73)
NRBC BLD-RTO: 0 /100 WBC
NRBC BLD-RTO: 0 /100 WBC
PLATELET # BLD AUTO: 250 K/UL (ref 150–450)
PLATELET # BLD AUTO: 260 K/UL (ref 150–450)
PMV BLD AUTO: 11.3 FL (ref 9.2–12.9)
PMV BLD AUTO: 11.4 FL (ref 9.2–12.9)
POCT GLUCOSE: 222 MG/DL (ref 70–110)
POTASSIUM SERPL-SCNC: 3.9 MMOL/L (ref 3.5–5.1)
RBC # BLD AUTO: 3.56 M/UL (ref 4.6–6.2)
RBC # BLD AUTO: 3.71 M/UL (ref 4.6–6.2)
SODIUM SERPL-SCNC: 139 MMOL/L (ref 136–145)
WBC # BLD AUTO: 6.61 K/UL (ref 3.9–12.7)
WBC # BLD AUTO: 8.5 K/UL (ref 3.9–12.7)

## 2023-04-17 PROCEDURE — 96376 TX/PRO/DX INJ SAME DRUG ADON: CPT

## 2023-04-17 PROCEDURE — 43255 EGD CONTROL BLEEDING ANY: CPT | Performed by: INTERNAL MEDICINE

## 2023-04-17 PROCEDURE — 80048 BASIC METABOLIC PNL TOTAL CA: CPT | Performed by: NURSE PRACTITIONER

## 2023-04-17 PROCEDURE — 25000003 PHARM REV CODE 250: Performed by: NURSE PRACTITIONER

## 2023-04-17 PROCEDURE — C9113 INJ PANTOPRAZOLE SODIUM, VIA: HCPCS | Performed by: NURSE PRACTITIONER

## 2023-04-17 PROCEDURE — 63600175 PHARM REV CODE 636 W HCPCS: Performed by: NURSE ANESTHETIST, CERTIFIED REGISTERED

## 2023-04-17 PROCEDURE — 37000008 HC ANESTHESIA 1ST 15 MINUTES: Performed by: INTERNAL MEDICINE

## 2023-04-17 PROCEDURE — 43255 EGD CONTROL BLEEDING ANY: CPT | Mod: ,,, | Performed by: INTERNAL MEDICINE

## 2023-04-17 PROCEDURE — 99024 POSTOP FOLLOW-UP VISIT: CPT | Mod: ,,, | Performed by: PODIATRIST

## 2023-04-17 PROCEDURE — D9220A PRA ANESTHESIA: Mod: CRNA,,, | Performed by: NURSE ANESTHETIST, CERTIFIED REGISTERED

## 2023-04-17 PROCEDURE — G0378 HOSPITAL OBSERVATION PER HR: HCPCS

## 2023-04-17 PROCEDURE — 96372 THER/PROPH/DIAG INJ SC/IM: CPT | Performed by: NURSE PRACTITIONER

## 2023-04-17 PROCEDURE — 96365 THER/PROPH/DIAG IV INF INIT: CPT

## 2023-04-17 PROCEDURE — 63600175 PHARM REV CODE 636 W HCPCS: Performed by: INTERNAL MEDICINE

## 2023-04-17 PROCEDURE — 27201028 HC NEEDLE, SCLERO: Performed by: INTERNAL MEDICINE

## 2023-04-17 PROCEDURE — D9220A PRA ANESTHESIA: ICD-10-PCS | Mod: CRNA,,, | Performed by: NURSE ANESTHETIST, CERTIFIED REGISTERED

## 2023-04-17 PROCEDURE — C9113 INJ PANTOPRAZOLE SODIUM, VIA: HCPCS | Performed by: INTERNAL MEDICINE

## 2023-04-17 PROCEDURE — D9220A PRA ANESTHESIA: Mod: ANES,,, | Performed by: ANESTHESIOLOGY

## 2023-04-17 PROCEDURE — 36415 COLL VENOUS BLD VENIPUNCTURE: CPT | Performed by: INTERNAL MEDICINE

## 2023-04-17 PROCEDURE — 96366 THER/PROPH/DIAG IV INF ADDON: CPT

## 2023-04-17 PROCEDURE — 85025 COMPLETE CBC W/AUTO DIFF WBC: CPT | Mod: 91 | Performed by: INTERNAL MEDICINE

## 2023-04-17 PROCEDURE — 27200997: Performed by: INTERNAL MEDICINE

## 2023-04-17 PROCEDURE — 25000003 PHARM REV CODE 250: Performed by: INTERNAL MEDICINE

## 2023-04-17 PROCEDURE — 27202363 HC INJECTION AGENT, SUBMUCOSAL, ANY: Performed by: INTERNAL MEDICINE

## 2023-04-17 PROCEDURE — 85025 COMPLETE CBC W/AUTO DIFF WBC: CPT | Performed by: NURSE PRACTITIONER

## 2023-04-17 PROCEDURE — 99024 PR POST-OP FOLLOW-UP VISIT: ICD-10-PCS | Mod: ,,, | Performed by: PODIATRIST

## 2023-04-17 PROCEDURE — 25000003 PHARM REV CODE 250: Performed by: NURSE ANESTHETIST, CERTIFIED REGISTERED

## 2023-04-17 PROCEDURE — 63600175 PHARM REV CODE 636 W HCPCS: Performed by: NURSE PRACTITIONER

## 2023-04-17 PROCEDURE — 99223 PR INITIAL HOSPITAL CARE,LEVL III: ICD-10-PCS | Mod: 25,,, | Performed by: INTERNAL MEDICINE

## 2023-04-17 PROCEDURE — D9220A PRA ANESTHESIA: ICD-10-PCS | Mod: ANES,,, | Performed by: ANESTHESIOLOGY

## 2023-04-17 PROCEDURE — 43255 PR EGD, FLEX, W/CTRL BLEED, ANY METHOD: ICD-10-PCS | Mod: ,,, | Performed by: INTERNAL MEDICINE

## 2023-04-17 PROCEDURE — 99223 1ST HOSP IP/OBS HIGH 75: CPT | Mod: 25,,, | Performed by: INTERNAL MEDICINE

## 2023-04-17 PROCEDURE — 37000009 HC ANESTHESIA EA ADD 15 MINS: Performed by: INTERNAL MEDICINE

## 2023-04-17 RX ORDER — ETOMIDATE 2 MG/ML
INJECTION INTRAVENOUS
Status: DISCONTINUED | OUTPATIENT
Start: 2023-04-17 | End: 2023-04-17

## 2023-04-17 RX ORDER — EPINEPHRINE 0.1 MG/ML
INJECTION INTRAVENOUS
Status: COMPLETED | OUTPATIENT
Start: 2023-04-17 | End: 2023-04-17

## 2023-04-17 RX ORDER — SUCRALFATE 1 G/1
1 TABLET ORAL
Status: DISCONTINUED | OUTPATIENT
Start: 2023-04-17 | End: 2023-04-21 | Stop reason: HOSPADM

## 2023-04-17 RX ORDER — LIDOCAINE HYDROCHLORIDE 20 MG/ML
INJECTION INTRAVENOUS
Status: DISCONTINUED | OUTPATIENT
Start: 2023-04-17 | End: 2023-04-17

## 2023-04-17 RX ORDER — ONDANSETRON 2 MG/ML
INJECTION INTRAMUSCULAR; INTRAVENOUS
Status: DISCONTINUED | OUTPATIENT
Start: 2023-04-17 | End: 2023-04-17

## 2023-04-17 RX ORDER — PROPOFOL 10 MG/ML
VIAL (ML) INTRAVENOUS
Status: DISCONTINUED | OUTPATIENT
Start: 2023-04-17 | End: 2023-04-17

## 2023-04-17 RX ORDER — PROPOFOL 10 MG/ML
VIAL (ML) INTRAVENOUS CONTINUOUS PRN
Status: DISCONTINUED | OUTPATIENT
Start: 2023-04-17 | End: 2023-04-17

## 2023-04-17 RX ADMIN — PROPOFOL 50 MG: 10 INJECTION, EMULSION INTRAVENOUS at 03:04

## 2023-04-17 RX ADMIN — ETOMIDATE 10 MG: 2 INJECTION, SOLUTION INTRAVENOUS at 03:04

## 2023-04-17 RX ADMIN — AMLODIPINE BESYLATE 5 MG: 5 TABLET ORAL at 09:04

## 2023-04-17 RX ADMIN — DOXYCYCLINE HYCLATE 100 MG: 100 TABLET, COATED ORAL at 09:04

## 2023-04-17 RX ADMIN — SODIUM CHLORIDE 8 MG/HR: 900 INJECTION INTRAVENOUS at 09:04

## 2023-04-17 RX ADMIN — SODIUM CHLORIDE: 0.9 INJECTION, SOLUTION INTRAVENOUS at 03:04

## 2023-04-17 RX ADMIN — LIDOCAINE HYDROCHLORIDE 100 MG: 20 INJECTION, SOLUTION INTRAVENOUS at 03:04

## 2023-04-17 RX ADMIN — ONDANSETRON 4 MG: 2 INJECTION, SOLUTION INTRAMUSCULAR; INTRAVENOUS at 03:04

## 2023-04-17 RX ADMIN — TRAZODONE HYDROCHLORIDE 150 MG: 100 TABLET ORAL at 09:04

## 2023-04-17 RX ADMIN — PANTOPRAZOLE SODIUM 40 MG: 40 INJECTION, POWDER, FOR SOLUTION INTRAVENOUS at 09:04

## 2023-04-17 RX ADMIN — INSULIN ASPART 1 UNITS: 100 INJECTION, SOLUTION INTRAVENOUS; SUBCUTANEOUS at 09:04

## 2023-04-17 RX ADMIN — PROPOFOL 50 MCG/KG/MIN: 10 INJECTION, EMULSION INTRAVENOUS at 03:04

## 2023-04-17 RX ADMIN — ESMOLOL HYDROCHLORIDE 30 MG: 100 INJECTION, SOLUTION INTRAVENOUS at 04:04

## 2023-04-17 RX ADMIN — LOSARTAN POTASSIUM 100 MG: 50 TABLET, FILM COATED ORAL at 09:04

## 2023-04-17 RX ADMIN — HYDROCHLOROTHIAZIDE 25 MG: 25 TABLET ORAL at 09:04

## 2023-04-17 RX ADMIN — SUCRALFATE 1 G: 1 TABLET ORAL at 09:04

## 2023-04-17 RX ADMIN — ESMOLOL HYDROCHLORIDE 20 MG: 100 INJECTION, SOLUTION INTRAVENOUS at 04:04

## 2023-04-17 NOTE — ASSESSMENT & PLAN NOTE
· Recently treated at [facility] and discharged 4/15  · Will continue doxycycline  · Stop date 04/28/2023  · S/p left great toe amputation, consult Podiatry  · Dressing CDI

## 2023-04-17 NOTE — PROGRESS NOTES
VN cued into room.  Pt resting comfortably in bed with call light and personal belongings within reach.  NADN.  Wife, Kylee, and daughter at bedside.  Pt reports no pain.  No other needs or complaints at this time.  Reminded pt to use call light as needed.  VN will continue to follow and be available as needed.       04/17/23 1127   Admission   Initial VN Admission Questions Complete   Communication Issues? None   Shift   Virtual Nurse - Rounding Complete   Virtual Nurse - Patient Verbalized Approval Of Camera Use;VN Rounding   Type of Frequent Check   Type Other (see comments)  (Admission)   Safety/Activity   Patient Rounds bed in low position;placement of personal items at bedside;bed wheels locked;call light in patient/parent reach;visualized patient;clutter free environment maintained   Safety Promotion/Fall Prevention assistive device/personal item within reach;family to remain at bedside   Positioning   Body Position supine   Head of Bed (HOB) Positioning HOB elevated

## 2023-04-17 NOTE — CONSULTS
[facility] - Endoscopy ([facility])  Wound Care    Patient Name:  Julien Meléndez   MRN:  [id number]  Date: 4/17/2023  Diagnosis: UGIB (upper gastrointestinal bleed)    History:     Past Medical History:   Diagnosis Date    Diabetes mellitus     Hiatal hernia     under Dr Saenz' care    Hyperlipidemia     Hypertension     MI (myocardial infarction)     Sleep apnea        Social History     Socioeconomic History    Marital status:    Tobacco Use    Smoking status: Never    Smokeless tobacco: Never   Substance and Sexual Activity    Alcohol use: No    Drug use: No       Precautions:     Allergies as of 04/16/2023 - Reviewed 04/16/2023   Allergen Reaction Noted    Nystatin  03/30/2020       WOC Assessment Details/Treatment     Pt s/p foot surgery to R/L foot- see photos in media file- pin in place to R great toe- L lateral foot with DTI- L medial foot surgery site with clotting dressing in place- applied xeroform dressings - spoke with Dr. Gomez recommending podiatry consult to manage foot wounds. Notified nurse.     04/17/2023

## 2023-04-17 NOTE — ASSESSMENT & PLAN NOTE
· Patient denies CP   · Continue statin  · Warfarin on hold at this time in the setting of GIB  · May resume when cleared by GI

## 2023-04-17 NOTE — ASSESSMENT & PLAN NOTE
With hematemesis.  On anticag.    Recs:  EGD today, higher than avg risk given anticoag use  PPI IV BID  Supportive care  Trend hgb  Monitor stool  Npo now

## 2023-04-17 NOTE — SUBJECTIVE & OBJECTIVE
Interval History: This morning patient took some PO meds about 1-2 hours later he had an episode of hematemesis, BP remained slightly elevated . CBC stable.    Review of Systems   Constitutional:  Positive for appetite change. Negative for chills, fatigue and fever.   HENT:  Negative for trouble swallowing.    Respiratory:  Negative for shortness of breath.    Cardiovascular:  Negative for chest pain.   Gastrointestinal:  Positive for nausea and vomiting. Negative for abdominal pain, blood in stool and diarrhea.        Coffee-ground emesis   Genitourinary:  Negative for decreased urine volume.   Musculoskeletal:  Negative for myalgias.   Skin:  Positive for wound (Left great toe amputation). Negative for color change and pallor.   Neurological:  Negative for dizziness, weakness, light-headedness, numbness and headaches.   Psychiatric/Behavioral:  Negative for agitation and confusion. The patient is not nervous/anxious.    Objective:     Vital Signs (Most Recent):  Temp: 97.2 °F (36.2 °C) (04/17/23 1120)  Pulse: 101 (04/17/23 1120)  Resp: 20 (04/17/23 0938)  BP: (!) 150/64 (04/17/23 1120)  SpO2: 96 % (04/17/23 1120)   Vital Signs (24h Range):  Temp:  [97.2 °F (36.2 °C)-98.6 °F (37 °C)] 97.2 °F (36.2 °C)  Pulse:  [] 101  Resp:  [15-20] 20  SpO2:  [95 %-98 %] 96 %  BP: (122-164)/(57-88) 150/64        There is no height or weight on file to calculate BMI.    Intake/Output Summary (Last 24 hours) at 4/17/2023 1330  Last data filed at 4/17/2023 1123  Gross per 24 hour   Intake --   Output 150 ml   Net -150 ml      Physical Exam  Vitals and nursing note reviewed.   Constitutional:       General: He is not in acute distress.     Appearance: He is not ill-appearing.   HENT:      Head: Normocephalic and atraumatic.   Cardiovascular:      Rate and Rhythm: Normal rate and regular rhythm.      Pulses:           Popliteal pulses are 1+ on the left side.        Posterior tibial pulses are 1+ on the right side.       Heart sounds: Normal heart sounds.   Pulmonary:      Effort: Pulmonary effort is normal. No respiratory distress.      Breath sounds: Normal breath sounds. No wheezing or rhonchi.   Abdominal:      General: Bowel sounds are normal. There is no distension.      Palpations: Abdomen is soft.      Tenderness: There is no abdominal tenderness. There is no guarding.   Musculoskeletal:      Right lower leg: No edema.      Left lower leg: No edema.      Left Lower Extremity: (Left great toe amp)  Feet:      Comments: Unable to assess left foot d/t surgical dressing - dressing CDI  Skin:     General: Skin is warm.      Findings: Bruising (BUE) present.   Neurological:      Mental Status: He is alert and oriented to person, place, and time.   Psychiatric:         Mood and Affect: Mood normal.         Behavior: Behavior normal.         Thought Content: Thought content normal.         Judgment: Judgment normal.       Significant Labs: All pertinent labs within the past 24 hours have been reviewed.    Significant Imaging: I have reviewed all pertinent imaging results/findings within the past 24 hours.

## 2023-04-17 NOTE — ASSESSMENT & PLAN NOTE
· Patient reported a moderate amount of coffee-ground emesis  · IV Protonix 40 mg b.i.d.  · Clear liquids for tonight  · NPO after MN  · Consult placed to GI  · H/H 12.0/35.5  · Transfuse for HGB < 7  · Monitor, had another episode of bright red hematemesis 4/17 in the am, awaiting EGD

## 2023-04-17 NOTE — ASSESSMENT & PLAN NOTE
Patient's FSGs are controlled on current medication regimen.  Last A1c reviewed-   Lab Results   Component Value Date    HGBA1C 7.0 (H) 03/25/2023     Most recent fingerstick glucose reviewed- No results for input(s): POCTGLUCOSE in the last 24 hours.  Current correctional scale  Low  Maintain anti-hyperglycemic dose as follows-   Antihyperglycemics (From admission, onward)    Start     Stop Route Frequency Ordered    04/16/23 2013  insulin aspart U-100 pen 0-5 Units         -- SubQ Every 6 hours PRN 04/16/23 1914        Hold Oral hypoglycemics while patient is in the hospital.

## 2023-04-17 NOTE — ASSESSMENT & PLAN NOTE
Patient with Paroxysmal (<7 days) atrial fibrillation which is controlled currently with Beta Blocker. Patient is currently in sinus rhythm.[id number] Score: 4. HASBLED Score: 3. Anticoagulation not indicated due to Active GIB.    Warfarin on hold at this time

## 2023-04-17 NOTE — HPI
This is 81 y/o male hx of multiple medical issues including Afib on coumadin, chf, CAD, left foot wound s/p amputation, here for hematemesis.  Gi consulted for hematemesis.  Pt was just d/c to nursing rehab. Last night began with hiccups then led to nausea and retching that was followed by dark red, bloody emesis x1. No further episodes. No abd pain. No radiating symptoms. No alleviating factors. Never had this before. No prior egd.

## 2023-04-17 NOTE — SUBJECTIVE & OBJECTIVE
Past Medical History:   Diagnosis Date    Diabetes mellitus     Hiatal hernia     under Dr Saenz' care    Hyperlipidemia     Hypertension     MI (myocardial infarction)     Sleep apnea        Past Surgical History:   Procedure Laterality Date    ABDOMINAL HERNIA REPAIR      ANGIOGRAM, EXTREMITY, BILATERAL Bilateral 4/5/2023    Procedure: ANGIOGRAM, EXTREMITY, BILATERAL;  Surgeon: Michael Giraldo III, MD;  Location: [facility] CATH LAB;  Service: Cardiology;  Laterality: Bilateral;    ANGIOGRAPHY OF LOWER EXTREMITY Left 4/12/2023    Procedure: Angiogram Extremity Unilateral;  Surgeon: Michael Giraldo III, MD;  Location: [facility] CATH LAB;  Service: Cardiology;  Laterality: Left;    AORTOGRAPHY WITH SERIALOGRAPHY Bilateral 4/5/2023    Procedure: AORTOGRAM, WITH SERIALOGRAPHY;  Surgeon: Michael Giraldo III, MD;  Location: [facility] CATH LAB;  Service: Cardiology;  Laterality: Bilateral;    APPLICATION OF WOUND VACUUM-ASSISTED CLOSURE DEVICE Left 3/29/2023    Procedure: APPLICATION, WOUND VAC;  Surgeon: Alona Pierce DPM;  Location: [facility] OR;  Service: Podiatry;  Laterality: Left;    APPLICATION OF WOUND VACUUM-ASSISTED CLOSURE DEVICE Left 4/4/2023    Procedure: APPLICATION, WOUND VAC;  Surgeon: Alona Pierce DPM;  Location: [facility] OR;  Service: Podiatry;  Laterality: Left;    BONE BIOPSY Left 3/29/2023    Procedure: BIOPSY, BONE;  Surgeon: Alona Pierce DPM;  Location: [facility] OR;  Service: Podiatry;  Laterality: Left;  1st met    CARDIAC CATHETERIZATION      3 stents placed    CATARACT EXTRACTION, BILATERAL      CLOSURE DEVICE Left 4/12/2023    Procedure: Placement of Closure Device;  Surgeon: Michael Giraldo III, MD;  Location: [facility] CATH LAB;  Service: Cardiology;  Laterality: Left;    CORONARY ANGIOGRAPHY N/A 10/3/2019    Procedure: ANGIOGRAM, CORONARY ARTERY;  Surgeon: Edwin Azul MD;  Location: [facility] CATH LAB/EP;  Service: Cardiology;  Laterality: N/A;    DEBRIDEMENT OF FOOT Left 3/29/2023    Procedure: DEBRIDEMENT,  FOOT;  Surgeon: Alona Pierce DPM;  Location: [facility];  Service: Podiatry;  Laterality: Left;    DEBRIDEMENT OF FOOT Left 4/4/2023    Procedure: DEBRIDEMENT, FOOT;  Surgeon: Alona Pierce DPM;  Location: [facility] OR;  Service: Podiatry;  Laterality: Left;    HERNIA REPAIR      inguinal    IVUS (INTRAVASCULAR ULTRASOUND) Left 4/12/2023    Procedure: ULTRASOUND, INTRAVASCULAR;  Surgeon: Michael Giraldo III, MD;  Location: [facility] CATH LAB;  Service: Cardiology;  Laterality: Left;    LEFT HEART CATHETERIZATION Left 9/13/2019    Procedure: Left heart cath;  Surgeon: Edwin Azul MD;  Location: [facility] CATH LAB/EP;  Service: Cardiology;  Laterality: Left;    LEFT HEART CATHETERIZATION N/A 10/3/2019    Procedure: Left heart cath;  Surgeon: Edwin Azul MD;  Location: [facility] CATH LAB/EP;  Service: Cardiology;  Laterality: N/A;    OSTEOTOMY OF METATARSAL BONE Right 4/4/2023    Procedure: OSTEOTOMY, METATARSAL BONE;  Surgeon: Alona Pierce DPM;  Location: [facility];  Service: Podiatry;  Laterality: Right;    PERCUTANEOUS TRANSLUMINAL ANGIOPLASTY Left 4/12/2023    Procedure: PTA (ANGIOPLASTY, PERCUTANEOUS, TRANSLUMINAL);  Surgeon: Michael Giraldo III, MD;  Location: [facility] CATH LAB;  Service: Cardiology;  Laterality: Left;    SURGICAL REMOVAL OF BUNION WITH OSTEOTOMY OF METATARSAL BONE Right 4/4/2023    Procedure: BUNIONECTOMY, WITH METATARSAL OSTEOTOMY;  Surgeon: Alnoa Pierce DPM;  Location: [facility];  Service: Podiatry;  Laterality: Right;    TOE AMPUTATION Left 4/4/2023    Procedure: AMPUTATION, TOE;  Surgeon: Alona Pierce DPM;  Location: [facility];  Service: Podiatry;  Laterality: Left;  1st ray       Review of patient's allergies indicates:   Allergen Reactions    Nystatin      Other reaction(s): Unknown       No current facility-administered medications on file prior to encounter.     Current Outpatient Medications on File Prior to Encounter   Medication Sig    amLODIPine (NORVASC) 5 MG tablet TAKE 1 TABLET EVERY DAY  (Patient taking differently: Take 5 mg by mouth once daily.)    aspirin (ECOTRIN) 81 MG EC tablet Take 81 mg by mouth once daily.    atorvastatin (LIPITOR) 40 MG tablet Take 1 tablet (40 mg total) by mouth once daily.    clopidogreL (PLAVIX) 75 mg tablet Take 75 mg by mouth once daily.    clotrimazole (LOTRIMIN) 1 % cream Apply topically 2 (two) times daily.    doxycycline (VIBRAMYCIN) 100 MG Cap Take 1 capsule (100 mg total) by mouth every 12 (twelve) hours. for 14 days    FLUoxetine 40 MG capsule Take 40 mg by mouth once daily.    glipiZIDE (GLUCOTROL) 5 MG tablet TAKE 1 TABLET TWICE DAILY (Patient taking differently: Take 5 mg by mouth 2 (two) times a day.)    isosorbide mononitrate (IMDUR) 30 MG 24 hr tablet TAKE 1 TABLET EVERY DAY (Patient taking differently: Take 30 mg by mouth once daily.)    losartan-hydrochlorothiazide 100-25 mg (HYZAAR) 100-25 mg per tablet TAKE 1 TABLET EVERY DAY (Patient taking differently: Take 1 tablet by mouth once daily.)    metoprolol succinate (TOPROL-XL) 25 MG 24 hr tablet TAKE 1 TABLET EVERY DAY (Patient taking differently: Take 25 mg by mouth once daily.)    nitroGLYCERIN (NITROSTAT) 0.4 MG SL tablet Place 0.4 mg under the tongue every 5 (five) minutes as needed for Chest pain.    oxyCODONE-acetaminophen (PERCOCET) 5-325 mg per tablet Take 1 tablet by mouth every 4 (four) hours as needed for Pain.    ranolazine (RANEXA) 1,000 mg Tb12 Take 1,000 mg by mouth 2 (two) times daily.    SITagliptan-metformin (JANUMET XR) 100-1,000 mg TM24 Take 1 tablet by mouth once daily.    traZODone (DESYREL) 150 MG tablet TAKE 1 TABLET (150 MG TOTAL) BY MOUTH NIGHTLY.    TRUE METRIX GLUCOSE METER Misc TEST TWICE A DAY    TRUE METRIX GLUCOSE TEST STRIP Strp TEST BLOOD SUGAR TWICE A DAY    TRUEPLUS LANCETS 28 gauge Misc     warfarin (COUMADIN) 5 MG tablet TAKE 1 AND 1/2 TABLETS (7.5MG) EVERY DAY OR AS DIRECTED BY COUMADIN CLINIC (Patient taking differently: Take 7.5 mg by mouth Daily. TAKE 1 AND 1/2  TABLETS (7.5MG) EVERY DAY OR AS DIRECTED BY COUMADIN CLINIC)     Family History       Problem Relation (Age of Onset)    Heart disease Mother, Sister, Brother, Maternal Uncle, Brother, Sister, Maternal Uncle, Maternal Uncle, Maternal Uncle    Stroke Mother          Tobacco Use    Smoking status: Never    Smokeless tobacco: Never   Substance and Sexual Activity    Alcohol use: No    Drug use: No    Sexual activity: Not on file     Review of Systems   Constitutional:  Positive for appetite change. Negative for chills, fatigue and fever.   HENT:  Negative for trouble swallowing.    Respiratory:  Negative for shortness of breath.    Cardiovascular:  Negative for chest pain.   Gastrointestinal:  Positive for nausea and vomiting. Negative for abdominal pain, blood in stool and diarrhea.        Coffee-ground emesis   Genitourinary:  Negative for decreased urine volume.   Musculoskeletal:  Negative for myalgias.   Skin:  Positive for wound (Left great toe amputation). Negative for color change and pallor.   Neurological:  Negative for dizziness, weakness, light-headedness, numbness and headaches.   Psychiatric/Behavioral:  Negative for agitation and confusion. The patient is not nervous/anxious.    Objective:     Vital Signs (Most Recent):  Temp: 98.2 °F (36.8 °C) (04/16/23 1723)  Pulse: 90 (04/16/23 1721)  Resp: 20 (04/16/23 1723)  BP: 123/66 (04/16/23 1721)  SpO2: 97 % (04/16/23 1721) Vital Signs (24h Range):  Temp:  [98.2 °F (36.8 °C)] 98.2 °F (36.8 °C)  Pulse:  [90] 90  Resp:  [20] 20  SpO2:  [97 %] 97 %  BP: (123)/(66) 123/66        There is no height or weight on file to calculate BMI.    Physical Exam  Vitals and nursing note reviewed.   Constitutional:       General: He is not in acute distress.     Appearance: He is ill-appearing.   HENT:      Head: Normocephalic and atraumatic.   Cardiovascular:      Rate and Rhythm: Normal rate and regular rhythm.      Pulses:           Popliteal pulses are 1+ on the left side.         Posterior tibial pulses are 1+ on the right side.      Heart sounds: Normal heart sounds.   Pulmonary:      Effort: Pulmonary effort is normal. No respiratory distress.      Breath sounds: Normal breath sounds. No wheezing or rhonchi.   Abdominal:      General: Bowel sounds are normal. There is no distension.      Palpations: Abdomen is soft.      Tenderness: There is no abdominal tenderness. There is no guarding.   Musculoskeletal:      Right lower leg: No edema.      Left lower leg: No edema.      Left Lower Extremity: (Left great toe amp)  Feet:      Comments: Unable to assess left foot d/t surgical dressing - dressing CDI  Skin:     General: Skin is warm.      Findings: Bruising (BUE) present.   Neurological:      Mental Status: He is alert and oriented to person, place, and time.   Psychiatric:         Mood and Affect: Mood normal.         Behavior: Behavior normal.         Thought Content: Thought content normal.         Judgment: Judgment normal.           Significant Labs: All pertinent labs within the past 24 hours have been reviewed.    Significant Imaging: I have reviewed all pertinent imaging results/findings within the past 24 hours.

## 2023-04-17 NOTE — ANESTHESIA PREPROCEDURE EVALUATION
04/17/2023  Julien Meléndez is a 82 y.o., male.      Pre-op Assessment    I have reviewed the Patient Summary Reports.     I have reviewed the Nursing Notes. I have reviewed the NPO Status.   I have reviewed the Medications.     Review of Systems  Anesthesia Hx:  History of prior surgery of interest to airway management or planning:  Denies Personal Hx of Anesthesia complications.   Cardiovascular:   Hypertension Past MI CAD   Angina CHF NL BiV Fxn on previous echo   Pulmonary:   Shortness of breath Sleep Apnea    Renal/:   Chronic Renal Disease    Hepatic/GI:   Hiatal Hernia,    Musculoskeletal:   Arthritis     Neurological:   Neuromuscular Disease,    Endocrine:   Diabetes    Psych:   Psychiatric History          Physical Exam  General: Well nourished    Airway:  Mallampati: III / II  Mouth Opening: Normal  TM Distance: Normal  Tongue: Normal  Neck ROM: Normal ROM    Chest/Lungs:  Normal Respiratory Rate    Heart:  Rate: Normal        Anesthesia Plan  Type of Anesthesia, risks & benefits discussed:    Anesthesia Type: Gen Natural Airway, MAC  Intra-op Monitoring Plan: Standard ASA Monitors  Post Op Pain Control Plan: multimodal analgesia  Induction:  IV  Informed Consent: Informed consent signed with the Patient and all parties understand the risks and agree with anesthesia plan.  All questions answered.   ASA Score: 3  Day of Surgery Review of History & Physical: H&P Update referred to the surgeon/provider.  Anesthesia Plan Notes: NPO confirmed.   Does well with Anesthesia per wife.  2020 echo with good BiV Fxn    Ready For Surgery From Anesthesia Perspective.     .

## 2023-04-17 NOTE — [employer] PATIENT INSTRUCTIONS
Discharge Summary/Instructions after an Endoscopic Procedure  Patient Name: Julien Meléndez  Patient MRN: [id number]  Patient YOB: 1940 Monday, April 17, 2023  Otto Villarreal MD  Dear patient,  As a result of recent federal legislation (The Federal Cures Act), you may   receive lab or pathology results from your procedure in your [facility]   account before your physician is able to contact you. Your physician or   their representative will relay the results to you with their   recommendations at their soonest availability.  Thank you,  Your health is very important to us during the Covid Crisis. Following your   procedure today, you will receive a daily text for 2 weeks asking about   signs or symptoms of Covid 19.  Please respond to this text when you   receive it so we can follow up and keep you as safe as possible.   RESTRICTIONS:  During your procedure today, you received medications for sedation.  These   medications may affect your judgment, balance and coordination.  Therefore,   for 24 hours, you have the following restrictions:   - DO NOT drive a car, operate machinery, make legal/financial decisions,   sign important papers or drink alcohol.    ACTIVITY:  Today: no heavy lifting, straining or running due to procedural   sedation/anesthesia.  The following day: return to full activity including work.  DIET:  Eat and drink normally unless instructed otherwise.     TREATMENT FOR COMMON SIDE EFFECTS:  - Mild abdominal pain, nausea, belching, bloating or excessive gas:  rest,   eat lightly and use a heating pad.  - Sore Throat: treat with throat lozenges and/or gargle with warm salt   water.  - Because air was used during the procedure, expelling large amounts of air   from your rectum or belching is normal.  - If a bowel prep was taken, you may not have a bowel movement for 1-3 days.    This is normal.  SYMPTOMS TO WATCH FOR AND REPORT TO YOUR PHYSICIAN:  1. Abdominal pain or bloating, other than  gas cramps.  2. Chest pain.  3. Back pain.  4. Signs of infection such as: chills or fever occurring within 24 hours   after the procedure.  5. Rectal bleeding, which would show as bright red, maroon, or black stools.   (A tablespoon of blood from the rectum is not serious, especially if   hemorrhoids are present.)  6. Vomiting.  7. Weakness or dizziness.  GO DIRECTLY TO THE NEAREST EMERGENCY ROOM IF YOU HAVE ANY OF THE FOLLOWING:      Difficulty breathing              Chills and/or fever over 101 F   Persistent vomiting and/or vomiting blood   Severe abdominal pain   Severe chest pain   Black, tarry stools   Bleeding- more than one tablespoon   Any other symptom or condition that you feel may need urgent attention  Your doctor recommends these additional instructions:  If any biopsies were taken, your doctors clinic will contact you in 1 to 2   weeks with any results.  - Return patient to hospital [facility] for ongoing care.   - Full liquid diet.   - Continue present medications.   - Protonix gtt. Add carafate TID. Watch for rebleeding.   - Will need to hold anticoag until bleeding has resolved.  For questions, problems or results please call your physician - Otto Villarreal MD.  EMERGENCY PHONE NUMBER: [phone],  LAB RESULTS: [phone]  IF A COMPLICATION OR EMERGENCY SITUATION ARISES AND YOU ARE UNABLE TO REACH   YOUR PHYSICIAN - GO DIRECTLY TO THE EMERGENCY ROOM.  Otto Villarreal MD  4/17/2023 4:06:51 PM  This report has been verified and signed electronically.  Dear patient,  As a result of recent federal legislation (The Federal Cures Act), you may   receive lab or pathology results from your procedure in your [facility]   account before your physician is able to contact you. Your physician or   their representative will relay the results to you with their   recommendations at their soonest availability.  Thank you,  PROVATION

## 2023-04-17 NOTE — H&P
[facility] Medicine  History & Physical    Patient Name: Julien Meléndez  MRN: [id number]  Patient Class: OP- Observation  Admission Date: 4/16/2023  Attending Physician: Marina Gomez MD   Primary Care Provider: Kelvin Wayne MD         Patient information was obtained from patient, past medical records and ER records.     Subjective:     Principal Problem:UGIB (upper gastrointestinal bleed)    Chief Complaint:   Chief Complaint   Patient presents with    Emesis     Nursing home reports coffee ground emesis x 1 today. No other symptoms. Presents awake, alert. Denies pain at this time.         HPI: Julien Meléndez an 82-year-old male with PMH DMT2, MI, CAD, CAD, CKD, paroxysmal AFib, CHF, ischemic cardiomyopathy, hiatal hernia, HTN, HLD, and left foot cellulitis s/p great toe amputation.  Patient presents to the ED today after an episode of coffee ground emesis that occurred this morning.  Patient was recently discharged from [facility] for left foot cellulitis which required a left great toe amputation and he was discharged to a nursing facility on doxycycline x14 days (due to stop 04/28/2023).  Per patient, he had 1 episode of coffee-ground emesis that he states was moderate in amount.  He denies CP, SOB, abdominal pain, diarrhea, melena, hematochezia, dizziness, lightheadedness, fevers, chills, or any other signs of bleeding.    Hospital medicine will admit patient for GIB workup with consult to GI.      Past Medical History:   Diagnosis Date    Diabetes mellitus     Hiatal hernia     under Dr Saenz' care    Hyperlipidemia     Hypertension     MI (myocardial infarction)     Sleep apnea        Past Surgical History:   Procedure Laterality Date    ABDOMINAL HERNIA REPAIR      ANGIOGRAM, EXTREMITY, BILATERAL Bilateral 4/5/2023    Procedure: ANGIOGRAM, EXTREMITY, BILATERAL;  Surgeon: Michael Giraldo III, MD;  Location: [facility] CATH LAB;  Service: Cardiology;   Laterality: Bilateral;    ANGIOGRAPHY OF LOWER EXTREMITY Left 4/12/2023    Procedure: Angiogram Extremity Unilateral;  Surgeon: Michael Giraldo III, MD;  Location: [facility] CATH LAB;  Service: Cardiology;  Laterality: Left;    AORTOGRAPHY WITH SERIALOGRAPHY Bilateral 4/5/2023    Procedure: AORTOGRAM, WITH SERIALOGRAPHY;  Surgeon: Michael Giraldo III, MD;  Location: [facility] CATH LAB;  Service: Cardiology;  Laterality: Bilateral;    APPLICATION OF WOUND VACUUM-ASSISTED CLOSURE DEVICE Left 3/29/2023    Procedure: APPLICATION, WOUND VAC;  Surgeon: Alona Pierce DPM;  Location: [facility] OR;  Service: Podiatry;  Laterality: Left;    APPLICATION OF WOUND VACUUM-ASSISTED CLOSURE DEVICE Left 4/4/2023    Procedure: APPLICATION, WOUND VAC;  Surgeon: Alona Pierce DPM;  Location: [facility] OR;  Service: Podiatry;  Laterality: Left;    BONE BIOPSY Left 3/29/2023    Procedure: BIOPSY, BONE;  Surgeon: Alona Pierce DPM;  Location: [facility] OR;  Service: Podiatry;  Laterality: Left;  1st met    CARDIAC CATHETERIZATION      3 stents placed    CATARACT EXTRACTION, BILATERAL      CLOSURE DEVICE Left 4/12/2023    Procedure: Placement of Closure Device;  Surgeon: Michael Giraldo III, MD;  Location: [facility] CATH LAB;  Service: Cardiology;  Laterality: Left;    CORONARY ANGIOGRAPHY N/A 10/3/2019    Procedure: ANGIOGRAM, CORONARY ARTERY;  Surgeon: Edwin Azul MD;  Location: [facility] CATH LAB/EP;  Service: Cardiology;  Laterality: N/A;    DEBRIDEMENT OF FOOT Left 3/29/2023    Procedure: DEBRIDEMENT, FOOT;  Surgeon: Alona Pierce DPM;  Location: [facility] OR;  Service: Podiatry;  Laterality: Left;    DEBRIDEMENT OF FOOT Left 4/4/2023    Procedure: DEBRIDEMENT, FOOT;  Surgeon: Alona Pierce DPM;  Location: [facility] OR;  Service: Podiatry;  Laterality: Left;    HERNIA REPAIR      inguinal    IVUS (INTRAVASCULAR ULTRASOUND) Left 4/12/2023    Procedure: ULTRASOUND, INTRAVASCULAR;  Surgeon: Michael Giraldo III, MD;  Location: [facility] CATH LAB;  Service:  Cardiology;  Laterality: Left;    LEFT HEART CATHETERIZATION Left 9/13/2019    Procedure: Left heart cath;  Surgeon: Edwin Azul MD;  Location: [facility] CATH LAB/EP;  Service: Cardiology;  Laterality: Left;    LEFT HEART CATHETERIZATION N/A 10/3/2019    Procedure: Left heart cath;  Surgeon: Edwin Azul MD;  Location: [facility] CATH LAB/EP;  Service: Cardiology;  Laterality: N/A;    OSTEOTOMY OF METATARSAL BONE Right 4/4/2023    Procedure: OSTEOTOMY, METATARSAL BONE;  Surgeon: Alona Pierce DPM;  Location: [facility] OR;  Service: Podiatry;  Laterality: Right;    PERCUTANEOUS TRANSLUMINAL ANGIOPLASTY Left 4/12/2023    Procedure: PTA (ANGIOPLASTY, PERCUTANEOUS, TRANSLUMINAL);  Surgeon: Michael Giraldo III, MD;  Location: [facility] CATH LAB;  Service: Cardiology;  Laterality: Left;    SURGICAL REMOVAL OF BUNION WITH OSTEOTOMY OF METATARSAL BONE Right 4/4/2023    Procedure: BUNIONECTOMY, WITH METATARSAL OSTEOTOMY;  Surgeon: Alona Pierce DPM;  Location: [facility] OR;  Service: Podiatry;  Laterality: Right;    TOE AMPUTATION Left 4/4/2023    Procedure: AMPUTATION, TOE;  Surgeon: Alona Pierce DPM;  Location: [facility] OR;  Service: Podiatry;  Laterality: Left;  1st ray       Review of patient's allergies indicates:   Allergen Reactions    Nystatin      Other reaction(s): Unknown       No current facility-administered medications on file prior to encounter.     Current Outpatient Medications on File Prior to Encounter   Medication Sig    amLODIPine (NORVASC) 5 MG tablet TAKE 1 TABLET EVERY DAY (Patient taking differently: Take 5 mg by mouth once daily.)    aspirin (ECOTRIN) 81 MG EC tablet Take 81 mg by mouth once daily.    atorvastatin (LIPITOR) 40 MG tablet Take 1 tablet (40 mg total) by mouth once daily.    clopidogreL (PLAVIX) 75 mg tablet Take 75 mg by mouth once daily.    clotrimazole (LOTRIMIN) 1 % cream Apply topically 2 (two) times daily.    doxycycline (VIBRAMYCIN) 100 MG Cap Take 1 capsule (100 mg total) by mouth  every 12 (twelve) hours. for 14 days    FLUoxetine 40 MG capsule Take 40 mg by mouth once daily.    glipiZIDE (GLUCOTROL) 5 MG tablet TAKE 1 TABLET TWICE DAILY (Patient taking differently: Take 5 mg by mouth 2 (two) times a day.)    isosorbide mononitrate (IMDUR) 30 MG 24 hr tablet TAKE 1 TABLET EVERY DAY (Patient taking differently: Take 30 mg by mouth once daily.)    losartan-hydrochlorothiazide 100-25 mg (HYZAAR) 100-25 mg per tablet TAKE 1 TABLET EVERY DAY (Patient taking differently: Take 1 tablet by mouth once daily.)    metoprolol succinate (TOPROL-XL) 25 MG 24 hr tablet TAKE 1 TABLET EVERY DAY (Patient taking differently: Take 25 mg by mouth once daily.)    nitroGLYCERIN (NITROSTAT) 0.4 MG SL tablet Place 0.4 mg under the tongue every 5 (five) minutes as needed for Chest pain.    oxyCODONE-acetaminophen (PERCOCET) 5-325 mg per tablet Take 1 tablet by mouth every 4 (four) hours as needed for Pain.    ranolazine (RANEXA) 1,000 mg Tb12 Take 1,000 mg by mouth 2 (two) times daily.    SITagliptan-metformin (JANUMET XR) 100-1,000 mg TM24 Take 1 tablet by mouth once daily.    traZODone (DESYREL) 150 MG tablet TAKE 1 TABLET (150 MG TOTAL) BY MOUTH NIGHTLY.    TRUE METRIX GLUCOSE METER Misc TEST TWICE A DAY    TRUE METRIX GLUCOSE TEST STRIP Strp TEST BLOOD SUGAR TWICE A DAY    TRUEPLUS LANCETS 28 gauge Misc     warfarin (COUMADIN) 5 MG tablet TAKE 1 AND 1/2 TABLETS (7.5MG) EVERY DAY OR AS DIRECTED BY COUMADIN CLINIC (Patient taking differently: Take 7.5 mg by mouth Daily. TAKE 1 AND 1/2 TABLETS (7.5MG) EVERY DAY OR AS DIRECTED BY COUMADIN CLINIC)     Family History       Problem Relation (Age of Onset)    Heart disease Mother, Sister, Brother, Maternal Uncle, Brother, Sister, Maternal Uncle, Maternal Uncle, Maternal Uncle    Stroke Mother          Tobacco Use    Smoking status: Never    Smokeless tobacco: Never   Substance and Sexual Activity    Alcohol use: No    Drug use: No    Sexual activity:  Not on file     Review of Systems   Constitutional:  Positive for appetite change. Negative for chills, fatigue and fever.   HENT:  Negative for trouble swallowing.    Respiratory:  Negative for shortness of breath.    Cardiovascular:  Negative for chest pain.   Gastrointestinal:  Positive for nausea and vomiting. Negative for abdominal pain, blood in stool and diarrhea.        Coffee-ground emesis   Genitourinary:  Negative for decreased urine volume.   Musculoskeletal:  Negative for myalgias.   Skin:  Positive for wound (Left great toe amputation). Negative for color change and pallor.   Neurological:  Negative for dizziness, weakness, light-headedness, numbness and headaches.   Psychiatric/Behavioral:  Negative for agitation and confusion. The patient is not nervous/anxious.    Objective:     Vital Signs (Most Recent):  Temp: 98.2 °F (36.8 °C) (04/16/23 1723)  Pulse: 90 (04/16/23 1721)  Resp: 20 (04/16/23 1723)  BP: 123/66 (04/16/23 1721)  SpO2: 97 % (04/16/23 1721) Vital Signs (24h Range):  Temp:  [98.2 °F (36.8 °C)] 98.2 °F (36.8 °C)  Pulse:  [90] 90  Resp:  [20] 20  SpO2:  [97 %] 97 %  BP: (123)/(66) 123/66        There is no height or weight on file to calculate BMI.    Physical Exam  Vitals and nursing note reviewed.   Constitutional:       General: He is not in acute distress.     Appearance: He is ill-appearing.   HENT:      Head: Normocephalic and atraumatic.   Cardiovascular:      Rate and Rhythm: Normal rate and regular rhythm.      Pulses:           Popliteal pulses are 1+ on the left side.        Posterior tibial pulses are 1+ on the right side.      Heart sounds: Normal heart sounds.   Pulmonary:      Effort: Pulmonary effort is normal. No respiratory distress.      Breath sounds: Normal breath sounds. No wheezing or rhonchi.   Abdominal:      General: Bowel sounds are normal. There is no distension.      Palpations: Abdomen is soft.      Tenderness: There is no abdominal tenderness. There is no  guarding.   Musculoskeletal:      Right lower leg: No edema.      Left lower leg: No edema.      Left Lower Extremity: (Left great toe amp)  Feet:      Comments: Unable to assess left foot d/t surgical dressing - dressing CDI  Skin:     General: Skin is warm.      Findings: Bruising (BUE) present.   Neurological:      Mental Status: He is alert and oriented to person, place, and time.   Psychiatric:         Mood and Affect: Mood normal.         Behavior: Behavior normal.         Thought Content: Thought content normal.         Judgment: Judgment normal.           Significant Labs: All pertinent labs within the past 24 hours have been reviewed.    Significant Imaging: I have reviewed all pertinent imaging results/findings within the past 24 hours.    Assessment/Plan:     * UGIB (upper gastrointestinal bleed)  · Patient reported a moderate amount of coffee-ground emesis  · IV Protonix 40 mg b.i.d.  · Clear liquids for tonight  · NPO after MN  · Consult placed to GI  · H/H 12.0/35.5  · Transfuse for HGB < 7  · Monitor      Cellulitis of left foot  · Recently treated at [facility] and discharged 4/15  · Will continue doxycycline  · Stop date 04/28/2023  · S/p left great toe amputation   · Dressing CDI      PAF (paroxysmal atrial fibrillation)  Patient with Paroxysmal (<7 days) atrial fibrillation which is controlled currently with Beta Blocker. Patient is currently in sinus rhythm.[id number] Score: 4. HASBLED Score: 3. Anticoagulation not indicated due to Active GIB.    Warfarin on hold at this time        Coronary artery disease due to calcified coronary lesion  · Patient denies CP   · Continue statin  · Warfarin on hold at this time in the setting of GIB  · May resume when cleared by GI      Diabetes mellitus type 2, noninsulin dependent  Patient's FSGs are controlled on current medication regimen.  Last A1c reviewed-   Lab Results   Component Value Date    HGBA1C 7.0 (H) 03/25/2023     Most recent  fingerstick glucose reviewed- No results for input(s): POCTGLUCOSE in the last 24 hours.  Current correctional scale  Low  Maintain anti-hyperglycemic dose as follows-   Antihyperglycemics (From admission, onward)    Start     Stop Route Frequency Ordered    04/16/23 2013  insulin aspart U-100 pen 0-5 Units         -- SubQ Every 6 hours PRN 04/16/23 1914        Hold Oral hypoglycemics while patient is in the hospital.    Hypertension  · Continue home BP meds   · Monitor for hypotension      Mixed hyperlipidemia  · Continue statin        VTE Risk Mitigation (From admission, onward)         Ordered     IP VTE HIGH RISK PATIENT  Once         04/16/23 1914     Place sequential compression device  Until discontinued         04/16/23 1914                     On 04/16/2023, patient should be placed in hospital observation services under my care in collaboration with Marina Gomez MD.      Kristel Persaud NP  Department of Hospital Medicine  [facility] - Emergency Dept

## 2023-04-17 NOTE — SUBJECTIVE & OBJECTIVE
Past Medical History:   Diagnosis Date    Diabetes mellitus     Hiatal hernia     under Dr Saenz' care    Hyperlipidemia     Hypertension     MI (myocardial infarction)     Sleep apnea        Past Surgical History:   Procedure Laterality Date    ABDOMINAL HERNIA REPAIR      ANGIOGRAM, EXTREMITY, BILATERAL Bilateral 4/5/2023    Procedure: ANGIOGRAM, EXTREMITY, BILATERAL;  Surgeon: Michael Giraldo III, MD;  Location: [facility] CATH LAB;  Service: Cardiology;  Laterality: Bilateral;    ANGIOGRAPHY OF LOWER EXTREMITY Left 4/12/2023    Procedure: Angiogram Extremity Unilateral;  Surgeon: Michael Giraldo III, MD;  Location: [facility] CATH LAB;  Service: Cardiology;  Laterality: Left;    AORTOGRAPHY WITH SERIALOGRAPHY Bilateral 4/5/2023    Procedure: AORTOGRAM, WITH SERIALOGRAPHY;  Surgeon: Michael Giraldo III, MD;  Location: [facility] CATH LAB;  Service: Cardiology;  Laterality: Bilateral;    APPLICATION OF WOUND VACUUM-ASSISTED CLOSURE DEVICE Left 3/29/2023    Procedure: APPLICATION, WOUND VAC;  Surgeon: Alona Pierce DPM;  Location: [facility] OR;  Service: Podiatry;  Laterality: Left;    APPLICATION OF WOUND VACUUM-ASSISTED CLOSURE DEVICE Left 4/4/2023    Procedure: APPLICATION, WOUND VAC;  Surgeon: Alona Pierce DPM;  Location: [facility] OR;  Service: Podiatry;  Laterality: Left;    BONE BIOPSY Left 3/29/2023    Procedure: BIOPSY, BONE;  Surgeon: Alona Pierce DPM;  Location: [facility] OR;  Service: Podiatry;  Laterality: Left;  1st met    CARDIAC CATHETERIZATION      3 stents placed    CATARACT EXTRACTION, BILATERAL      CLOSURE DEVICE Left 4/12/2023    Procedure: Placement of Closure Device;  Surgeon: Michael Giraldo III, MD;  Location: [facility] CATH LAB;  Service: Cardiology;  Laterality: Left;    CORONARY ANGIOGRAPHY N/A 10/3/2019    Procedure: ANGIOGRAM, CORONARY ARTERY;  Surgeon: Edwin Azul MD;  Location: [facility] CATH LAB/EP;  Service: Cardiology;  Laterality: N/A;    DEBRIDEMENT OF FOOT Left 3/29/2023    Procedure: DEBRIDEMENT,  FOOT;  Surgeon: Alona Pierce DPM;  Location: [facility];  Service: Podiatry;  Laterality: Left;    DEBRIDEMENT OF FOOT Left 4/4/2023    Procedure: DEBRIDEMENT, FOOT;  Surgeon: Alona Pierce DPM;  Location: [facility] OR;  Service: Podiatry;  Laterality: Left;    HERNIA REPAIR      inguinal    IVUS (INTRAVASCULAR ULTRASOUND) Left 4/12/2023    Procedure: ULTRASOUND, INTRAVASCULAR;  Surgeon: Michael Giraldo III, MD;  Location: [facility] CATH LAB;  Service: Cardiology;  Laterality: Left;    LEFT HEART CATHETERIZATION Left 9/13/2019    Procedure: Left heart cath;  Surgeon: Edwin Azul MD;  Location: [facility] CATH LAB/EP;  Service: Cardiology;  Laterality: Left;    LEFT HEART CATHETERIZATION N/A 10/3/2019    Procedure: Left heart cath;  Surgeon: Edwin Azul MD;  Location: [facility] CATH LAB/EP;  Service: Cardiology;  Laterality: N/A;    OSTEOTOMY OF METATARSAL BONE Right 4/4/2023    Procedure: OSTEOTOMY, METATARSAL BONE;  Surgeon: Alona Pierce DPM;  Location: [facility];  Service: Podiatry;  Laterality: Right;    PERCUTANEOUS TRANSLUMINAL ANGIOPLASTY Left 4/12/2023    Procedure: PTA (ANGIOPLASTY, PERCUTANEOUS, TRANSLUMINAL);  Surgeon: Michael Giraldo III, MD;  Location: [facility] CATH LAB;  Service: Cardiology;  Laterality: Left;    SURGICAL REMOVAL OF BUNION WITH OSTEOTOMY OF METATARSAL BONE Right 4/4/2023    Procedure: BUNIONECTOMY, WITH METATARSAL OSTEOTOMY;  Surgeon: Alona Pierce DPM;  Location: [facility];  Service: Podiatry;  Laterality: Right;    TOE AMPUTATION Left 4/4/2023    Procedure: AMPUTATION, TOE;  Surgeon: Alona Pierce DPM;  Location: [facility];  Service: Podiatry;  Laterality: Left;  1st ray       Review of patient's allergies indicates:   Allergen Reactions    Nystatin      Other reaction(s): Unknown     Family History       Problem Relation (Age of Onset)    Heart disease Mother, Sister, Brother, Maternal Uncle, Brother, Sister, Maternal Uncle, Maternal Uncle, Maternal Uncle    Stroke Mother          Tobacco Use     Smoking status: Never    Smokeless tobacco: Never   Substance and Sexual Activity    Alcohol use: No    Drug use: No    Sexual activity: Not on file     Review of Systems   Constitutional:  Positive for activity change and fatigue. Negative for chills and fever.   HENT:  Negative for facial swelling, mouth sores and trouble swallowing.    Eyes:  Negative for pain and redness.   Respiratory:  Negative for cough and shortness of breath.    Cardiovascular:  Negative for chest pain and leg swelling.   Gastrointestinal:  Positive for nausea and vomiting.   Genitourinary:  Negative for dysuria and hematuria.   Musculoskeletal:  Negative for gait problem and neck stiffness.   Skin:  Negative for rash and wound.   Neurological:  Negative for seizures and headaches.   Psychiatric/Behavioral:  Negative for confusion and hallucinations.    Objective:     Vital Signs (Most Recent):  Temp: 98.2 °F (36.8 °C) (04/16/23 1723)  Pulse: 89 (04/17/23 0500)  Resp: 18 (04/16/23 2148)  BP: 134/67 (04/17/23 0500)  SpO2: 95 % (04/17/23 0500) Vital Signs (24h Range):  Temp:  [98.2 °F (36.8 °C)] 98.2 °F (36.8 °C)  Pulse:  [85-90] 89  Resp:  [15-20] 18  SpO2:  [95 %-98 %] 95 %  BP: (123-136)/(66-88) 134/67        There is no height or weight on file to calculate BMI.    No intake or output data in the 24 hours ending 04/17/23 0704    Lines/Drains/Airways       Peripheral Intravenous Line  Duration                  Peripheral IV - Single Lumen 04/16/23 1717 20 G Left Antecubital <1 day                    Physical Exam  Vitals reviewed.   Constitutional:       General: He is not in acute distress.     Appearance: He is well-developed.   HENT:      Head: Normocephalic and atraumatic.   Eyes:      General: No scleral icterus.     Conjunctiva/sclera: Conjunctivae normal.   Neck:      Thyroid: No thyromegaly.   Cardiovascular:      Rate and Rhythm: Normal rate and regular rhythm.   Pulmonary:      Effort: Pulmonary effort is normal. No respiratory  distress.      Breath sounds: Normal breath sounds.   Abdominal:      General: There is no distension.      Palpations: Abdomen is soft.      Tenderness: There is no abdominal tenderness.   Musculoskeletal:         General: No tenderness. Normal range of motion.      Cervical back: Neck supple.      Comments: Foot bandages in place   Lymphadenopathy:      Cervical: No cervical adenopathy.   Skin:     General: Skin is warm and dry.      Findings: No rash.   Neurological:      Mental Status: He is alert and oriented to person, place, and time.      Gait: Gait normal.   Psychiatric:         Mood and Affect: Mood normal.         Behavior: Behavior normal.       Significant Labs:  CBC:   Recent Labs   Lab 04/16/23  1734 04/17/23  0552   WBC 8.08 6.61   HGB 12.0* 11.0*   HCT 35.5* 32.2*    250     BMP:   Recent Labs   Lab 04/17/23  0552   *      K 3.9      CO2 21*   BUN 26*   CREATININE 1.0   CALCIUM 9.0     CMP:   Recent Labs   Lab 04/16/23  1734 04/17/23  0552   * 204*   CALCIUM 9.5 9.0   ALBUMIN 3.0*  --    PROT 6.3  --     139   K 4.3 3.9   CO2 23 21*    105   BUN 22 26*   CREATININE 1.1 1.0   ALKPHOS 72  --    ALT 14  --    AST 14  --    BILITOT 0.6  --      Coagulation:   Recent Labs   Lab 04/16/23  1933   INR 1.2       Significant Imaging:  Imaging results within the past 24 hours have been reviewed.

## 2023-04-17 NOTE — ED NOTES
Report received from SHEELA Colin   Assumed care of pt   Pt resting in stretcher voices no complaints at this time   Wife at bedside

## 2023-04-17 NOTE — PROGRESS NOTES
[facility] Medicine  Progress Note    Patient Name: Julien Meléndez  MRN: [id number]  Patient Class: OP- Observation   Admission Date: 4/16/2023  Length of Stay: 0 days  Attending Physician: Marina Gomez MD  Primary Care Provider: Kelvin Wayne MD        Subjective:     Principal Problem:UGIB (upper gastrointestinal bleed)        HPI:  Julien Meléndez an 82-year-old male with PMH DMT2, MI, CAD, CAD, CKD, paroxysmal AFib, CHF, ischemic cardiomyopathy, hiatal hernia, HTN, HLD, and left foot cellulitis s/p great toe amputation.  Patient presents to the ED today after an episode of coffee ground emesis that occurred this morning.  Patient was recently discharged from [facility] for left foot cellulitis which required a left great toe amputation and he was discharged to a nursing facility on doxycycline x14 days (due to stop 04/28/2023).  Per patient, he had 1 episode of coffee-ground emesis that he states was moderate in amount.  He denies CP, SOB, abdominal pain, diarrhea, melena, hematochezia, dizziness, lightheadedness, fevers, chills, or any other signs of bleeding.    Hospital medicine will admit patient for GIB workup with consult to GI.      Overview/Hospital Course:  No notes on file    Interval History: This morning patient took some PO meds about 1-2 hours later he had an episode of hematemesis, BP remained slightly elevated . CBC stable.    Review of Systems   Constitutional:  Positive for appetite change. Negative for chills, fatigue and fever.   HENT:  Negative for trouble swallowing.    Respiratory:  Negative for shortness of breath.    Cardiovascular:  Negative for chest pain.   Gastrointestinal:  Positive for nausea and vomiting. Negative for abdominal pain, blood in stool and diarrhea.        Coffee-ground emesis   Genitourinary:  Negative for decreased urine volume.   Musculoskeletal:  Negative for myalgias.   Skin:  Positive for wound (Left great toe  amputation). Negative for color change and pallor.   Neurological:  Negative for dizziness, weakness, light-headedness, numbness and headaches.   Psychiatric/Behavioral:  Negative for agitation and confusion. The patient is not nervous/anxious.    Objective:     Vital Signs (Most Recent):  Temp: 97.2 °F (36.2 °C) (04/17/23 1120)  Pulse: 101 (04/17/23 1120)  Resp: 20 (04/17/23 0938)  BP: (!) 150/64 (04/17/23 1120)  SpO2: 96 % (04/17/23 1120)   Vital Signs (24h Range):  Temp:  [97.2 °F (36.2 °C)-98.6 °F (37 °C)] 97.2 °F (36.2 °C)  Pulse:  [] 101  Resp:  [15-20] 20  SpO2:  [95 %-98 %] 96 %  BP: (122-164)/(57-88) 150/64        There is no height or weight on file to calculate BMI.    Intake/Output Summary (Last 24 hours) at 4/17/2023 1330  Last data filed at 4/17/2023 1123  Gross per 24 hour   Intake --   Output 150 ml   Net -150 ml      Physical Exam  Vitals and nursing note reviewed.   Constitutional:       General: He is not in acute distress.     Appearance: He is not ill-appearing.   HENT:      Head: Normocephalic and atraumatic.   Cardiovascular:      Rate and Rhythm: Normal rate and regular rhythm.      Pulses:           Popliteal pulses are 1+ on the left side.        Posterior tibial pulses are 1+ on the right side.      Heart sounds: Normal heart sounds.   Pulmonary:      Effort: Pulmonary effort is normal. No respiratory distress.      Breath sounds: Normal breath sounds. No wheezing or rhonchi.   Abdominal:      General: Bowel sounds are normal. There is no distension.      Palpations: Abdomen is soft.      Tenderness: There is no abdominal tenderness. There is no guarding.   Musculoskeletal:      Right lower leg: No edema.      Left lower leg: No edema.      Left Lower Extremity: (Left great toe amp)  Feet:      Comments: Unable to assess left foot d/t surgical dressing - dressing CDI  Skin:     General: Skin is warm.      Findings: Bruising (BUE) present.   Neurological:      Mental Status: He is  alert and oriented to person, place, and time.   Psychiatric:         Mood and Affect: Mood normal.         Behavior: Behavior normal.         Thought Content: Thought content normal.         Judgment: Judgment normal.       Significant Labs: All pertinent labs within the past 24 hours have been reviewed.    Significant Imaging: I have reviewed all pertinent imaging results/findings within the past 24 hours.      Assessment/Plan:      * UGIB (upper gastrointestinal bleed)  · Patient reported a moderate amount of coffee-ground emesis  · IV Protonix 40 mg b.i.d.  · Clear liquids for tonight  · NPO after MN  · Consult placed to GI  · H/H 12.0/35.5  · Transfuse for HGB < 7  · Monitor, had another episode of bright red hematemesis 4/17 in the am, awaiting EGD      Cellulitis of left foot  · Recently treated at [facility] and discharged 4/15  · Will continue doxycycline  · Stop date 04/28/2023  · S/p left great toe amputation, consult Podiatry  · Dressing CDI      PAF (paroxysmal atrial fibrillation)  Patient with Paroxysmal (<7 days) atrial fibrillation which is controlled currently with Beta Blocker. Patient is currently in sinus rhythm.[id number] Score: 4. HASBLED Score: 3. Anticoagulation not indicated due to Active GIB.    Warfarin on hold at this time        Coronary artery disease due to calcified coronary lesion  · Patient denies CP   · Continue statin  · Warfarin on hold at this time in the setting of GIB  · May resume when cleared by GI      Diabetes mellitus type 2, noninsulin dependent  Patient's FSGs are controlled on current medication regimen.  Last A1c reviewed-   Lab Results   Component Value Date    HGBA1C 7.0 (H) 03/25/2023     Most recent fingerstick glucose reviewed- No results for input(s): POCTGLUCOSE in the last 24 hours.  Current correctional scale  Low  Maintain anti-hyperglycemic dose as follows-   Antihyperglycemics (From admission, onward)    Start     Stop Route Frequency Ordered     04/16/23 2013  insulin aspart U-100 pen 0-5 Units         -- SubQ Every 6 hours PRN 04/16/23 1914        Hold Oral hypoglycemics while patient is in the hospital.    Hypertension  · Continue home BP meds   · Monitor for hypotension      Mixed hyperlipidemia  · Continue statin        VTE Risk Mitigation (From admission, onward)         Ordered     IP VTE HIGH RISK PATIENT  Once         04/16/23 1914     Place sequential compression device  Until discontinued         04/16/23 1914                Discharge Planning   AURELIA:      Code Status: Full Code   Is the patient medically ready for discharge?:     Reason for patient still in hospital (select all that apply): Patient trending condition and Consult recommendations                     Marina Gomez MD  Department of Hospital Medicine   [facility]

## 2023-04-17 NOTE — ASSESSMENT & PLAN NOTE
· Recently treated at [facility] and discharged 4/15  · Will continue doxycycline  · Stop date 04/28/2023  · S/p left great toe amputation   · Dressing CDI

## 2023-04-17 NOTE — CONSULTS
[facility] - Emergency Dept  Gastroenterology  Consult Note    Patient Name: Julien Meléndez  MRN: [id number]  Admission Date: 4/16/2023  Hospital Length of Stay: 0 days  Code Status: Full Code   Attending Provider: Marina Gomez MD   Consulting Provider: Otto Villarreal MD  Primary Care Physician: Kelvin Wayne MD  Principal Problem:UGIB (upper gastrointestinal bleed)    Inpatient consult to Gastroenterology-[facility]  Consult performed by: Otto Villarreal MD  Consult ordered by: Zbigniew Cardoso MD        Subjective:     HPI:  This is 83 y/o male hx of multiple medical issues including Afib on coumadin, chf, CAD, left foot wound s/p amputation, here for hematemesis.  Gi consulted for hematemesis.  Pt was just d/c to nursing rehab. Last night began with hiccups then led to nausea and retching that was followed by dark red, bloody emesis x1. No further episodes. No abd pain. No radiating symptoms. No alleviating factors. Never had this before. No prior egd.          Past Medical History:   Diagnosis Date    Diabetes mellitus     Hiatal hernia     under Dr Saenz' care    Hyperlipidemia     Hypertension     MI (myocardial infarction)     Sleep apnea        Past Surgical History:   Procedure Laterality Date    ABDOMINAL HERNIA REPAIR      ANGIOGRAM, EXTREMITY, BILATERAL Bilateral 4/5/2023    Procedure: ANGIOGRAM, EXTREMITY, BILATERAL;  Surgeon: Michael Giraldo III, MD;  Location: [facility] CATH LAB;  Service: Cardiology;  Laterality: Bilateral;    ANGIOGRAPHY OF LOWER EXTREMITY Left 4/12/2023    Procedure: Angiogram Extremity Unilateral;  Surgeon: Michael Giraldo III, MD;  Location: [facility] CATH LAB;  Service: Cardiology;  Laterality: Left;    AORTOGRAPHY WITH SERIALOGRAPHY Bilateral 4/5/2023    Procedure: AORTOGRAM, WITH SERIALOGRAPHY;  Surgeon: Michael Giraldo III, MD;  Location: [facility] CATH LAB;  Service: Cardiology;  Laterality: Bilateral;    APPLICATION OF WOUND VACUUM-ASSISTED CLOSURE DEVICE Left  3/29/2023    Procedure: APPLICATION, WOUND VAC;  Surgeon: Alona Pierce DPM;  Location: [facility] OR;  Service: Podiatry;  Laterality: Left;    APPLICATION OF WOUND VACUUM-ASSISTED CLOSURE DEVICE Left 4/4/2023    Procedure: APPLICATION, WOUND VAC;  Surgeon: Alona Pierce DPM;  Location: [facility] OR;  Service: Podiatry;  Laterality: Left;    BONE BIOPSY Left 3/29/2023    Procedure: BIOPSY, BONE;  Surgeon: Alona Pierce DPM;  Location: [facility] OR;  Service: Podiatry;  Laterality: Left;  1st met    CARDIAC CATHETERIZATION      3 stents placed    CATARACT EXTRACTION, BILATERAL      CLOSURE DEVICE Left 4/12/2023    Procedure: Placement of Closure Device;  Surgeon: Michael Giraldo III, MD;  Location: [facility] CATH LAB;  Service: Cardiology;  Laterality: Left;    CORONARY ANGIOGRAPHY N/A 10/3/2019    Procedure: ANGIOGRAM, CORONARY ARTERY;  Surgeon: Edwin Azul MD;  Location: [facility] CATH LAB/EP;  Service: Cardiology;  Laterality: N/A;    DEBRIDEMENT OF FOOT Left 3/29/2023    Procedure: DEBRIDEMENT, FOOT;  Surgeon: Alona Pierce DPM;  Location: [facility] OR;  Service: Podiatry;  Laterality: Left;    DEBRIDEMENT OF FOOT Left 4/4/2023    Procedure: DEBRIDEMENT, FOOT;  Surgeon: Alona Pierce DPM;  Location: [facility] OR;  Service: Podiatry;  Laterality: Left;    HERNIA REPAIR      inguinal    IVUS (INTRAVASCULAR ULTRASOUND) Left 4/12/2023    Procedure: ULTRASOUND, INTRAVASCULAR;  Surgeon: Michael Giraldo III, MD;  Location: [facility] CATH LAB;  Service: Cardiology;  Laterality: Left;    LEFT HEART CATHETERIZATION Left 9/13/2019    Procedure: Left heart cath;  Surgeon: Edwin Azul MD;  Location: [facility] CATH LAB/EP;  Service: Cardiology;  Laterality: Left;    LEFT HEART CATHETERIZATION N/A 10/3/2019    Procedure: Left heart cath;  Surgeon: Edwin Azul MD;  Location: [facility] CATH LAB/EP;  Service: Cardiology;  Laterality: N/A;    OSTEOTOMY OF METATARSAL BONE Right 4/4/2023    Procedure: OSTEOTOMY, METATARSAL BONE;  Surgeon: Alona Pierce  ABIMBOLA;  [facility];  Service: Podiatry;  Laterality: Right;    PERCUTANEOUS TRANSLUMINAL ANGIOPLASTY Left 4/12/2023    Procedure: PTA (ANGIOPLASTY, PERCUTANEOUS, TRANSLUMINAL);  Surgeon: Michael Giraldo III, MD;  [facility];  Service: Cardiology;  Laterality: Left;    SURGICAL REMOVAL OF BUNION WITH OSTEOTOMY OF METATARSAL BONE Right 4/4/2023    Procedure: BUNIONECTOMY, WITH METATARSAL OSTEOTOMY;  Surgeon: Alona Pierce DPM;  [facility];  Service: Podiatry;  Laterality: Right;    TOE AMPUTATION Left 4/4/2023    Procedure: AMPUTATION, TOE;  Surgeon: Alona Pierce DPM;  [facility];  Service: Podiatry;  Laterality: Left;  1st ray       Review of patient's allergies indicates:   Allergen Reactions    Nystatin      Other reaction(s): Unknown     Family History       Problem Relation (Age of Onset)    Heart disease Mother, Sister, Brother, Maternal Uncle, Brother, Sister, Maternal Uncle, Maternal Uncle, Maternal Uncle    Stroke Mother          Tobacco Use    Smoking status: Never    Smokeless tobacco: Never   Substance and Sexual Activity    Alcohol use: No    Drug use: No    Sexual activity: Not on file     Review of Systems   Constitutional:  Positive for activity change and fatigue. Negative for chills and fever.   HENT:  Negative for facial swelling, mouth sores and trouble swallowing.    Eyes:  Negative for pain and redness.   Respiratory:  Negative for cough and shortness of breath.    Cardiovascular:  Negative for chest pain and leg swelling.   Gastrointestinal:  Positive for nausea and vomiting.   Genitourinary:  Negative for dysuria and hematuria.   Musculoskeletal:  Negative for gait problem and neck stiffness.   Skin:  Negative for rash and wound.   Neurological:  Negative for seizures and headaches.   Psychiatric/Behavioral:  Negative for confusion and hallucinations.    Objective:     Vital Signs (Most Recent):  Temp: 98.2 °F (36.8 °C) (04/16/23 1723)  Pulse: 89  (04/17/23 0500)  Resp: 18 (04/16/23 2148)  BP: 134/67 (04/17/23 0500)  SpO2: 95 % (04/17/23 0500) Vital Signs (24h Range):  Temp:  [98.2 °F (36.8 °C)] 98.2 °F (36.8 °C)  Pulse:  [85-90] 89  Resp:  [15-20] 18  SpO2:  [95 %-98 %] 95 %  BP: (123-136)/(66-88) 134/67        There is no height or weight on file to calculate BMI.    No intake or output data in the 24 hours ending 04/17/23 0704    Lines/Drains/Airways       Peripheral Intravenous Line  Duration                  Peripheral IV - Single Lumen 04/16/23 1717 20 G Left Antecubital <1 day                    Physical Exam  Vitals reviewed.   Constitutional:       General: He is not in acute distress.     Appearance: He is well-developed.   HENT:      Head: Normocephalic and atraumatic.   Eyes:      General: No scleral icterus.     Conjunctiva/sclera: Conjunctivae normal.   Neck:      Thyroid: No thyromegaly.   Cardiovascular:      Rate and Rhythm: Normal rate and regular rhythm.   Pulmonary:      Effort: Pulmonary effort is normal. No respiratory distress.      Breath sounds: Normal breath sounds.   Abdominal:      General: There is no distension.      Palpations: Abdomen is soft.      Tenderness: There is no abdominal tenderness.   Musculoskeletal:         General: No tenderness. Normal range of motion.      Cervical back: Neck supple.      Comments: Foot bandages in place   Lymphadenopathy:      Cervical: No cervical adenopathy.   Skin:     General: Skin is warm and dry.      Findings: No rash.   Neurological:      Mental Status: He is alert and oriented to person, place, and time.      Gait: Gait normal.   Psychiatric:         Mood and Affect: Mood normal.         Behavior: Behavior normal.       Significant Labs:  CBC:   Recent Labs   Lab 04/16/23  1734 04/17/23  0552   WBC 8.08 6.61   HGB 12.0* 11.0*   HCT 35.5* 32.2*    250     BMP:   Recent Labs   Lab 04/17/23  0552   *      K 3.9      CO2 21*   BUN 26*   CREATININE 1.0   CALCIUM  9.0     CMP:   Recent Labs   Lab 04/16/23  1734 04/17/23  0552   * 204*   CALCIUM 9.5 9.0   ALBUMIN 3.0*  --    PROT 6.3  --     139   K 4.3 3.9   CO2 23 21*    105   BUN 22 26*   CREATININE 1.1 1.0   ALKPHOS 72  --    ALT 14  --    AST 14  --    BILITOT 0.6  --      Coagulation:   Recent Labs   Lab 04/16/23  1933   INR 1.2       Significant Imaging:  Imaging results within the past 24 hours have been reviewed.    Assessment/Plan:     GI  * UGIB (upper gastrointestinal bleed)  With hematemesis.  On anticag.    Recs:  EGD today, higher than avg risk given anticoag use  PPI IV BID  Supportive care  Trend hgb  Monitor stool  Npo now        Thank you for your consult. I will follow-up with patient. Please contact us if you have any additional questions.    Otto Villarreal MD  Gastroenterology  [facility] - Emergency Dept

## 2023-04-17 NOTE — TRANSFER OF CARE
"Anesthesia Transfer of Care Note    Patient: Julien Meléndez    Procedure(s) Performed: Procedure(s) (LRB):  EGD (ESOPHAGOGASTRODUODENOSCOPY) (N/A)    Patient location: GI    Anesthesia Type: general and other: gen natural airway    Transport from OR: Transported from OR on room air with adequate spontaneous ventilation    Post pain: adequate analgesia    Post assessment: no apparent anesthetic complications and tolerated procedure well    Post vital signs: stable    Level of consciousness: awake, alert and oriented    Nausea/Vomiting: no nausea/vomiting    Complications: none    Transfer of care protocol was followed      Last vitals:   Visit Vitals  /78   Pulse 99   Temp 37 °C (98.6 °F) (Temporal)   Resp 18   Ht 6' 1" (1.854 m)   Wt 96 kg (211 lb 10.3 oz)   SpO2 99%   BMI 27.92 kg/m²     "

## 2023-04-17 NOTE — HPI
Julien Meléndez an 82-year-old male with PMH DMT2, MI, CAD, CAD, CKD, paroxysmal AFib, CHF, ischemic cardiomyopathy, hiatal hernia, HTN, HLD, and left foot cellulitis s/p great toe amputation.  Patient presents to the ED today after an episode of coffee ground emesis that occurred this morning.  Patient was recently discharged from [facility] for left foot cellulitis which required a left great toe amputation and he was discharged to a nursing facility on doxycycline x14 days (due to stop 04/28/2023).  Per patient, he had 1 episode of coffee-ground emesis that he states was moderate in amount.  He denies CP, SOB, abdominal pain, diarrhea, melena, hematochezia, dizziness, lightheadedness, fevers, chills, or any other signs of bleeding.    Hospital medicine will admit patient for GIB workup with consult to GI.

## 2023-04-17 NOTE — PLAN OF CARE
As of 1640 - pt is off the floor for endoscopic procedure   CM will meet with pt in am - in the event pt is discharged overnight - CM will reach out to pt with f/u apt info.

## 2023-04-17 NOTE — ASSESSMENT & PLAN NOTE
Patient with Paroxysmal (<7 days) atrial fibrillation which is controlled currently with Beta Blocker. Patient is currently in sinus rhythm.[id number] Score: 4. HASBLED Score: 3. Anticoagulation not indicated due to Active GIB.    Warfarin on hold at this time

## 2023-04-17 NOTE — ASSESSMENT & PLAN NOTE
· Patient reported a moderate amount of coffee-ground emesis  · IV Protonix 40 mg b.i.d.  · Clear liquids for tonight  · NPO after MN  · Consult placed to GI  · H/H 12.0/35.5  · Transfuse for HGB < 7  · Monitor

## 2023-04-18 ENCOUNTER — PATIENT OUTREACH (OUTPATIENT)
Dept: ADMINISTRATIVE | Facility: OTHER | Age: 83
End: 2023-04-18
Payer: MEDICARE

## 2023-04-18 LAB
ANION GAP SERPL CALC-SCNC: 12 MMOL/L (ref 8–16)
BASOPHILS # BLD AUTO: 0.05 K/UL (ref 0–0.2)
BASOPHILS # BLD AUTO: 0.07 K/UL (ref 0–0.2)
BASOPHILS NFR BLD: 0.8 % (ref 0–1.9)
BASOPHILS NFR BLD: 0.8 % (ref 0–1.9)
BUN SERPL-MCNC: 28 MG/DL (ref 8–23)
CALCIUM SERPL-MCNC: 9 MG/DL (ref 8.7–10.5)
CHLORIDE SERPL-SCNC: 104 MMOL/L (ref 95–110)
CO2 SERPL-SCNC: 24 MMOL/L (ref 23–29)
CREAT SERPL-MCNC: 1.1 MG/DL (ref 0.5–1.4)
DIFFERENTIAL METHOD: ABNORMAL
DIFFERENTIAL METHOD: ABNORMAL
EOSINOPHIL # BLD AUTO: 0.3 K/UL (ref 0–0.5)
EOSINOPHIL # BLD AUTO: 0.3 K/UL (ref 0–0.5)
EOSINOPHIL NFR BLD: 3.2 % (ref 0–8)
EOSINOPHIL NFR BLD: 5.1 % (ref 0–8)
ERYTHROCYTE [DISTWIDTH] IN BLOOD BY AUTOMATED COUNT: 13 % (ref 11.5–14.5)
ERYTHROCYTE [DISTWIDTH] IN BLOOD BY AUTOMATED COUNT: 13.4 % (ref 11.5–14.5)
EST. GFR  (NO RACE VARIABLE): >60 ML/MIN/1.73 M^2
GLUCOSE SERPL-MCNC: 205 MG/DL (ref 70–110)
HCT VFR BLD AUTO: 30.3 % (ref 40–54)
HCT VFR BLD AUTO: 33.7 % (ref 40–54)
HGB BLD-MCNC: 10.1 G/DL (ref 14–18)
HGB BLD-MCNC: 11.3 G/DL (ref 14–18)
IMM GRANULOCYTES # BLD AUTO: 0.05 K/UL (ref 0–0.04)
IMM GRANULOCYTES # BLD AUTO: 0.1 K/UL (ref 0–0.04)
IMM GRANULOCYTES NFR BLD AUTO: 0.8 % (ref 0–0.5)
IMM GRANULOCYTES NFR BLD AUTO: 1.2 % (ref 0–0.5)
LYMPHOCYTES # BLD AUTO: 1.5 K/UL (ref 1–4.8)
LYMPHOCYTES # BLD AUTO: 1.7 K/UL (ref 1–4.8)
LYMPHOCYTES NFR BLD: 17.9 % (ref 18–48)
LYMPHOCYTES NFR BLD: 26.1 % (ref 18–48)
MCH RBC QN AUTO: 30.9 PG (ref 27–31)
MCH RBC QN AUTO: 31 PG (ref 27–31)
MCHC RBC AUTO-ENTMCNC: 33.3 G/DL (ref 32–36)
MCHC RBC AUTO-ENTMCNC: 33.5 G/DL (ref 32–36)
MCV RBC AUTO: 92 FL (ref 82–98)
MCV RBC AUTO: 93 FL (ref 82–98)
MONOCYTES # BLD AUTO: 0.8 K/UL (ref 0.3–1)
MONOCYTES # BLD AUTO: 0.9 K/UL (ref 0.3–1)
MONOCYTES NFR BLD: 10.9 % (ref 4–15)
MONOCYTES NFR BLD: 12.4 % (ref 4–15)
NEUTROPHILS # BLD AUTO: 3.6 K/UL (ref 1.8–7.7)
NEUTROPHILS # BLD AUTO: 5.5 K/UL (ref 1.8–7.7)
NEUTROPHILS NFR BLD: 54.8 % (ref 38–73)
NEUTROPHILS NFR BLD: 66 % (ref 38–73)
NRBC BLD-RTO: 0 /100 WBC
NRBC BLD-RTO: 0 /100 WBC
PLATELET # BLD AUTO: 242 K/UL (ref 150–450)
PLATELET # BLD AUTO: 253 K/UL (ref 150–450)
PMV BLD AUTO: 11.3 FL (ref 9.2–12.9)
PMV BLD AUTO: 11.4 FL (ref 9.2–12.9)
POCT GLUCOSE: 201 MG/DL (ref 70–110)
POCT GLUCOSE: 213 MG/DL (ref 70–110)
POCT GLUCOSE: 223 MG/DL (ref 70–110)
POCT GLUCOSE: 229 MG/DL (ref 70–110)
POTASSIUM SERPL-SCNC: 3.6 MMOL/L (ref 3.5–5.1)
RBC # BLD AUTO: 3.27 M/UL (ref 4.6–6.2)
RBC # BLD AUTO: 3.65 M/UL (ref 4.6–6.2)
SODIUM SERPL-SCNC: 140 MMOL/L (ref 136–145)
WBC # BLD AUTO: 6.52 K/UL (ref 3.9–12.7)
WBC # BLD AUTO: 8.37 K/UL (ref 3.9–12.7)

## 2023-04-18 PROCEDURE — 36415 COLL VENOUS BLD VENIPUNCTURE: CPT | Performed by: NURSE PRACTITIONER

## 2023-04-18 PROCEDURE — C9113 INJ PANTOPRAZOLE SODIUM, VIA: HCPCS | Performed by: INTERNAL MEDICINE

## 2023-04-18 PROCEDURE — 85025 COMPLETE CBC W/AUTO DIFF WBC: CPT | Performed by: NURSE PRACTITIONER

## 2023-04-18 PROCEDURE — 97165 OT EVAL LOW COMPLEX 30 MIN: CPT

## 2023-04-18 PROCEDURE — 80048 BASIC METABOLIC PNL TOTAL CA: CPT | Performed by: NURSE PRACTITIONER

## 2023-04-18 PROCEDURE — 85025 COMPLETE CBC W/AUTO DIFF WBC: CPT | Mod: 91 | Performed by: FAMILY MEDICINE

## 2023-04-18 PROCEDURE — 25000003 PHARM REV CODE 250: Performed by: INTERNAL MEDICINE

## 2023-04-18 PROCEDURE — 97161 PT EVAL LOW COMPLEX 20 MIN: CPT

## 2023-04-18 PROCEDURE — 63600175 PHARM REV CODE 636 W HCPCS: Performed by: NURSE PRACTITIONER

## 2023-04-18 PROCEDURE — 25000003 PHARM REV CODE 250: Performed by: NURSE PRACTITIONER

## 2023-04-18 PROCEDURE — 63600175 PHARM REV CODE 636 W HCPCS: Performed by: INTERNAL MEDICINE

## 2023-04-18 PROCEDURE — 99232 SBSQ HOSP IP/OBS MODERATE 35: CPT | Mod: GC,,, | Performed by: INTERNAL MEDICINE

## 2023-04-18 PROCEDURE — 99232 PR SUBSEQUENT HOSPITAL CARE,LEVL II: ICD-10-PCS | Mod: GC,,, | Performed by: INTERNAL MEDICINE

## 2023-04-18 PROCEDURE — 11000001 HC ACUTE MED/SURG PRIVATE ROOM

## 2023-04-18 PROCEDURE — 36415 COLL VENOUS BLD VENIPUNCTURE: CPT | Performed by: FAMILY MEDICINE

## 2023-04-18 RX ORDER — TALC
6 POWDER (GRAM) TOPICAL NIGHTLY PRN
Status: DISCONTINUED | OUTPATIENT
Start: 2023-04-18 | End: 2023-04-21 | Stop reason: HOSPADM

## 2023-04-18 RX ORDER — INSULIN ASPART 100 [IU]/ML
0-5 INJECTION, SOLUTION INTRAVENOUS; SUBCUTANEOUS
Status: DISCONTINUED | OUTPATIENT
Start: 2023-04-19 | End: 2023-04-21 | Stop reason: HOSPADM

## 2023-04-18 RX ADMIN — DOXYCYCLINE HYCLATE 100 MG: 100 TABLET, COATED ORAL at 09:04

## 2023-04-18 RX ADMIN — SODIUM CHLORIDE 8 MG/HR: 900 INJECTION INTRAVENOUS at 11:04

## 2023-04-18 RX ADMIN — SUCRALFATE 1 G: 1 TABLET ORAL at 05:04

## 2023-04-18 RX ADMIN — INSULIN ASPART 1 UNITS: 100 INJECTION, SOLUTION INTRAVENOUS; SUBCUTANEOUS at 09:04

## 2023-04-18 RX ADMIN — SUCRALFATE 1 G: 1 TABLET ORAL at 11:04

## 2023-04-18 RX ADMIN — Medication 6 MG: at 09:04

## 2023-04-18 RX ADMIN — DOXYCYCLINE HYCLATE 100 MG: 100 TABLET, COATED ORAL at 08:04

## 2023-04-18 RX ADMIN — SODIUM CHLORIDE 8 MG/HR: 900 INJECTION INTRAVENOUS at 04:04

## 2023-04-18 RX ADMIN — SODIUM CHLORIDE 8 MG/HR: 900 INJECTION INTRAVENOUS at 01:04

## 2023-04-18 RX ADMIN — TRAZODONE HYDROCHLORIDE 150 MG: 100 TABLET ORAL at 09:04

## 2023-04-18 RX ADMIN — SODIUM CHLORIDE 8 MG/HR: 900 INJECTION INTRAVENOUS at 10:04

## 2023-04-18 RX ADMIN — INSULIN ASPART 2 UNITS: 100 INJECTION, SOLUTION INTRAVENOUS; SUBCUTANEOUS at 08:04

## 2023-04-18 RX ADMIN — SUCRALFATE 1 G: 1 TABLET ORAL at 03:04

## 2023-04-18 RX ADMIN — ATORVASTATIN CALCIUM 40 MG: 40 TABLET, FILM COATED ORAL at 08:04

## 2023-04-18 RX ADMIN — ONDANSETRON 8 MG: 8 TABLET, ORALLY DISINTEGRATING ORAL at 02:04

## 2023-04-18 RX ADMIN — SODIUM CHLORIDE 8 MG/HR: 900 INJECTION INTRAVENOUS at 06:04

## 2023-04-18 RX ADMIN — Medication 6 MG: at 12:04

## 2023-04-18 RX ADMIN — SUCRALFATE 1 G: 1 TABLET ORAL at 09:04

## 2023-04-18 RX ADMIN — FLUOXETINE HYDROCHLORIDE 40 MG: 20 CAPSULE ORAL at 08:04

## 2023-04-18 RX ADMIN — METOPROLOL SUCCINATE 25 MG: 25 TABLET, EXTENDED RELEASE ORAL at 08:04

## 2023-04-18 NOTE — SUBJECTIVE & OBJECTIVE
Interval History:  Awake and alert, sitting up in bed, requesting to be helped back to bed.  No new complaint  Appreciate GI- S/p EGD on 4/17 reporting hemorrhagic gastritis with focal lesion losing-treated and recommend continue Protonix gtt and Carafate.  Advance diet as tolerated  H/H slowly down trending-monitor    Review of Systems   Constitutional:  Positive for appetite change. Negative for chills and fever.   Respiratory:  Negative for shortness of breath.    Cardiovascular:  Negative for chest pain.   Gastrointestinal:  Negative for abdominal pain, blood in stool, diarrhea, nausea and vomiting.   Genitourinary:  Negative for decreased urine volume.   Musculoskeletal:  Negative for myalgias.   Skin:  Positive for wound (Left great toe amputation). Negative for color change and pallor.   Neurological:  Negative for dizziness, weakness, light-headedness, numbness and headaches.   Psychiatric/Behavioral:  Negative for agitation and confusion. The patient is not nervous/anxious.    Objective:     Vital Signs (Most Recent):  Temp: 98 °F (36.7 °C) (04/18/23 0731)  Pulse: 102 (04/18/23 0731)  Resp: 18 (04/18/23 0731)  BP: 97/64 (04/18/23 0731)  SpO2: (!) 93 % (04/18/23 0731)   Vital Signs (24h Range):  Temp:  [96.5 °F (35.8 °C)-98.6 °F (37 °C)] 98 °F (36.7 °C)  Pulse:  [] 102  Resp:  [18-22] 18  SpO2:  [93 %-100 %] 93 %  BP: ()/(60-91) 97/64     Weight: 96 kg (211 lb 10.3 oz)  Body mass index is 27.92 kg/m².    Intake/Output Summary (Last 24 hours) at 4/18/2023 1101  Last data filed at 4/18/2023 0550  Gross per 24 hour   Intake 1029.61 ml   Output 810 ml   Net 219.61 ml      Physical Exam  Vitals and nursing note reviewed.   Constitutional:       General: He is not in acute distress.     Appearance: He is not ill-appearing.   HENT:      Head: Normocephalic and atraumatic.   Cardiovascular:      Rate and Rhythm: Normal rate and regular rhythm.      Pulses:           Popliteal pulses are 1+ on the left  side.        Posterior tibial pulses are 1+ on the right side.      Heart sounds: Normal heart sounds.   Pulmonary:      Effort: Pulmonary effort is normal. No respiratory distress.      Breath sounds: Normal breath sounds. No wheezing or rhonchi.   Abdominal:      General: Bowel sounds are normal. There is no distension.      Palpations: Abdomen is soft.      Tenderness: There is no abdominal tenderness. There is no guarding.   Musculoskeletal:      Right lower leg: No edema.      Left lower leg: No edema.      Left Lower Extremity: (Left great toe amp)  Feet:      Comments: Unable to assess left foot d/t surgical dressing - dressing CDI  Skin:     General: Skin is warm.      Findings: Bruising (BUE) present.   Neurological:      Mental Status: He is alert and oriented to person, place, and time.   Psychiatric:         Mood and Affect: Mood normal.         Behavior: Behavior normal.       Significant Labs: A1C:   Recent Labs   Lab 03/25/23  1734   HGBA1C 7.0*     ABGs: No results for input(s): PH, PCO2, HCO3, POCSATURATED, BE, TOTALHB, COHB, METHB, O2HB, POCFIO2, PO2 in the last 48 hours.  Blood Culture: No results for input(s): LABBLOO in the last 48 hours.  CBC:   Recent Labs   Lab 04/17/23  0552 04/17/23  1140 04/18/23  0248   WBC 6.61 8.50 6.52   HGB 11.0* 11.6* 10.1*   HCT 32.2* 34.2* 30.3*    260 242     CMP:   Recent Labs   Lab 04/16/23  1734 04/17/23  0552 04/18/23  0248    139 140   K 4.3 3.9 3.6    105 104   CO2 23 21* 24   * 204* 205*   BUN 22 26* 28*   CREATININE 1.1 1.0 1.1   CALCIUM 9.5 9.0 9.0   PROT 6.3  --   --    ALBUMIN 3.0*  --   --    BILITOT 0.6  --   --    ALKPHOS 72  --   --    AST 14  --   --    ALT 14  --   --    ANIONGAP 11 13 12     Lactic Acid: No results for input(s): LACTATE in the last 48 hours.  Lipase: No results for input(s): LIPASE in the last 48 hours.  Lipid Panel: No results for input(s): CHOL, HDL, LDLCALC, TRIG, CHOLHDL in the last 48  hours.  Magnesium: No results for input(s): MG in the last 48 hours.  Troponin: No results for input(s): TROPONINI, TROPONINIHS in the last 48 hours.  TSH: No results for input(s): TSH in the last 4320 hours.  Urine Culture: No results for input(s): LABURIN in the last 48 hours.  Urine Studies: No results for input(s): COLORU, APPEARANCEUA, PHUR, SPECGRAV, PROTEINUA, GLUCUA, KETONESU, BILIRUBINUA, OCCULTUA, NITRITE, UROBILINOGEN, LEUKOCYTESUR, RBCUA, WBCUA, BACTERIA, SQUAMEPITHEL, HYALINECASTS in the last 48 hours.    Invalid input(s): WRIGHTSUR    Significant Imaging: I have reviewed all pertinent imaging results/findings within the past 24 hours.

## 2023-04-18 NOTE — ASSESSMENT & PLAN NOTE
S/P L 1st ray amputation 3/29 and debridement 4/13 due to gangrene and cellulitis. S/P R MILLY bunionectomy DOS 4/04.     No urgent intervention indicated   Post operative sites assessed and redressed  Appreciate wound care cleansing, dressing, and achieving hemostasis of foot wounds  Podiatry will follow with additional recommendations, considerations of wound vac pending continued hemostasis of left foot

## 2023-04-18 NOTE — SUBJECTIVE & OBJECTIVE
Subjective:     Interval History: NAEON. VSS. Afebrile. Dressings clean, dry, and intact. Patient 1 day s/p EGD with dieulafoy lesion and hemorrhagic gastritis s/p epi, bipolar cautery and clip x1. No new pedal complaints.     Scheduled Meds:   amLODIPine  5 mg Oral Daily    atorvastatin  40 mg Oral Daily    doxycycline  100 mg Oral Q12H    FLUoxetine  40 mg Oral Daily    isosorbide mononitrate  30 mg Oral Daily    metoprolol succinate  25 mg Oral Daily    sucralfate  1 g Oral QID (AC & HS)    traZODone  150 mg Oral Nightly     Continuous Infusions:   pantoprazole (PROTONIX) IV infusion 8 mg/hr (04/18/23 1637)     PRN Meds:acetaminophen, dextrose 10%, dextrose 10%, glucagon (human recombinant), insulin aspart U-100, melatonin, ondansetron, ondansetron, oxyCODONE-acetaminophen    Review of Systems   Constitutional:  Positive for appetite change. Negative for chills and fever.   Respiratory:  Negative for shortness of breath.    Cardiovascular:  Negative for chest pain.   Gastrointestinal:  Negative for abdominal pain, blood in stool, diarrhea, nausea and vomiting.   Genitourinary:  Negative for decreased urine volume.   Musculoskeletal:  Negative for myalgias.   Skin:  Positive for wound (Left foot with exposed bone). Negative for color change and pallor.   Neurological:  Negative for dizziness, weakness, light-headedness, numbness and headaches.   Psychiatric/Behavioral:  Negative for agitation and confusion. The patient is not nervous/anxious.    Objective:     Vital Signs (Most Recent):  Temp: 96.7 °F (35.9 °C) (04/18/23 1634)  Pulse: 77 (04/18/23 1634)  Resp: 18 (04/18/23 1634)  BP: 135/60 (04/18/23 1634)  SpO2: 96 % (04/18/23 1634) Vital Signs (24h Range):  Temp:  [96.5 °F (35.8 °C)-98.3 °F (36.8 °C)] 96.7 °F (35.9 °C)  Pulse:  [] 77  Resp:  [18] 18  SpO2:  [93 %-97 %] 96 %  BP: ()/(55-82) 135/60     Weight: 96 kg (211 lb 10.3 oz)  Body mass index is 27.92 kg/m².    Foot Exam    Right Foot/Ankle      Inspection and Palpation  Skin Exam: drainage and ulcer;     Neurovascular  Dorsalis pedis: absent  Posterior tibial: absent    Comments  Hardware in place, K wire through distal great toe. No signs of necrosis.     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 No excessive bleeding or non viable tissues appreciated.     Laboratory:  CBC:   Recent Labs   Lab 04/18/23  1217   WBC 8.37   RBC 3.65*   HGB 11.3*   HCT 33.7*      MCV 92   MCH 31.0   MCHC 33.5     CMP:   Recent Labs   Lab 04/16/23  1734 04/17/23  0552 04/18/23  0248   *   < > 205*   CALCIUM 9.5   < > 9.0   ALBUMIN 3.0*  --   --    PROT 6.3  --   --       < > 140   K 4.3   < > 3.6   CO2 23   < > 24      < > 104   BUN 22   < > 28*   CREATININE 1.1   < > 1.1   ALKPHOS 72  --   --    ALT 14  --   --    AST 14  --   --    BILITOT 0.6  --   --     < > = values in this interval not displayed.     Microbiology Results (last 7 days)       ** No results found for the last 168 hours. **          Specimen (24h ago, onward)      None          All pertinent labs reviewed within the last 24 hours.    Diagnostic Results:  I have reviewed all pertinent imaging results/findings within the past 24 hours.    Clinical Findings:          Wound base appears darkened below due to surgicel hemostatic dressing

## 2023-04-18 NOTE — ASSESSMENT & PLAN NOTE
· Patient reported a moderate amount of coffee-ground emesis  · IV Protonix 40 mg b.i.d.  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  Rec full liquid diet, continue Protonixgtt, add Carafate t.i.d. and will need to hold anticoagulation until bleeding has resolved

## 2023-04-18 NOTE — SUBJECTIVE & OBJECTIVE
Subjective:     Interval History: Patient states he has not had any recurrent n/v or bowel movements since his procedure yesterday. He denies any abdominal pains. He reports his appetite is not great, but able to tolerate his liquid diet yesterday with no issues.     Review of Systems   Constitutional:  Positive for appetite change.   Gastrointestinal:  Negative for abdominal pain, constipation, diarrhea, nausea and vomiting.   Objective:     Vital Signs (Most Recent):  Temp: 98 °F (36.7 °C) (04/18/23 0731)  Pulse: 102 (04/18/23 0731)  Resp: 18 (04/18/23 0731)  BP: 97/64 (04/18/23 0731)  SpO2: (!) 93 % (04/18/23 0731)   Vital Signs (24h Range):  Temp:  [96.5 °F (35.8 °C)-98.6 °F (37 °C)] 98 °F (36.7 °C)  Pulse:  [] 102  Resp:  [18-22] 18  SpO2:  [93 %-100 %] 93 %  BP: ()/(60-91) 97/64     Weight: 96 kg (211 lb 10.3 oz) (04/17/23 1300)  Body mass index is 27.92 kg/m².      Intake/Output Summary (Last 24 hours) at 4/18/2023 1052  Last data filed at 4/18/2023 0550  Gross per 24 hour   Intake 1029.61 ml   Output 810 ml   Net 219.61 ml       Lines/Drains/Airways       Peripheral Intravenous Line  Duration                  Peripheral IV - Single Lumen 04/16/23 1717 20 G Left Antecubital 1 day                    Physical Exam  Constitutional:       General: He is not in acute distress.     Appearance: He is not toxic-appearing.   Cardiovascular:      Rate and Rhythm: Rhythm irregular.   Pulmonary:      Effort: Pulmonary effort is normal. No respiratory distress.   Abdominal:      General: Abdomen is flat. Bowel sounds are normal.      Palpations: Abdomen is soft.      Tenderness: There is no abdominal tenderness.       Significant Labs:  CBC:   Recent Labs   Lab 04/17/23  0552 04/17/23  1140 04/18/23  0248   WBC 6.61 8.50 6.52   HGB 11.0* 11.6* 10.1*   HCT 32.2* 34.2* 30.3*    260 242         Significant Imaging:  Imaging results within the past 24 hours have been reviewed.

## 2023-04-18 NOTE — ANESTHESIA POSTPROCEDURE EVALUATION
Anesthesia Post Evaluation    Patient: Julien Meléndez    Procedure(s) Performed: Procedure(s) (LRB):  EGD (ESOPHAGOGASTRODUODENOSCOPY) (N/A)    Final Anesthesia Type: general      Patient location during evaluation: PACU  Patient participation: Yes- Able to Participate  Level of consciousness: awake and alert  Post-procedure vital signs: reviewed and stable  Pain management: adequate  Airway patency: patent    PONV status at discharge: No PONV  Anesthetic complications: no      Cardiovascular status: blood pressure returned to baseline  Respiratory status: unassisted  Hydration status: euvolemic  Follow-up not needed.          Vitals Value Taken Time   /60 04/18/23 0502   Temp 36.8 °C (98.3 °F) 04/18/23 0502   Pulse 91 04/18/23 0502   Resp 18 04/18/23 0502   SpO2 96 % 04/18/23 0502         Event Time   Out of Recovery 04/17/2023 16:58:19         Pain/Rangel Score: Pain Rating Post Med Admin: 0 (4/17/2023  7:02 PM)  Rangel Score: 10 (4/17/2023  4:40 PM)

## 2023-04-18 NOTE — PLAN OF CARE
VN note: Patient chart, labs, and vitals reviewed. VN to continue to be available as needed.     Problem: Adjustment to Illness (Gastrointestinal Bleeding)  Goal: Optimal Coping with Acute Illness  Outcome: Ongoing, Progressing     Problem: Bleeding (Gastrointestinal Bleeding)  Goal: Hemostasis  Outcome: Ongoing, Progressing     Problem: Adult Inpatient Plan of Care  Goal: Plan of Care Review  Outcome: Ongoing, Progressing  Goal: Patient-Specific Goal (Individualized)  Outcome: Ongoing, Progressing  Goal: Absence of Hospital-Acquired Illness or Injury  Outcome: Ongoing, Progressing  Goal: Optimal Comfort and Wellbeing  Outcome: Ongoing, Progressing  Goal: Readiness for Transition of Care  Outcome: Ongoing, Progressing     Problem: Diabetes Comorbidity  Goal: Blood Glucose Level Within Targeted Range  Outcome: Ongoing, Progressing     Problem: Impaired Wound Healing  Goal: Optimal Wound Healing  Outcome: Ongoing, Progressing     Problem: Fall Injury Risk  Goal: Absence of Fall and Fall-Related Injury  Outcome: Ongoing, Progressing     Problem: Skin or Soft Tissue Infection  Goal: Absence of Infection Signs and Symptoms  Outcome: Ongoing, Progressing     Problem: Pain Acute  Goal: Acceptable Pain Control and Functional Ability  Outcome: Ongoing, Progressing     Problem: Heart Failure Comorbidity  Goal: Maintenance of Heart Failure Symptom Control  Outcome: Ongoing, Progressing     Problem: Hypertension Comorbidity  Goal: Blood Pressure in Desired Range  Outcome: Ongoing, Progressing     Problem: Skin Injury Risk Increased  Goal: Skin Health and Integrity  Outcome: Ongoing, Progressing

## 2023-04-18 NOTE — PLAN OF CARE
1900 Pt appeared to be in no distress. Reported no pain.     2100 Accu Ck: 222 covered w/2 u aspart.    Tele: ST, , No alarms.     Bed in lowest position, wheels locked, non skid socks, ID band worn, personal items and call bell with in reach, bed alarm set.

## 2023-04-18 NOTE — ASSESSMENT & PLAN NOTE
EGD with hemorrhagic gastritis s/p epi, bipolar cautery and clip x1. No recurrent bleeding noted.     Recs:  Continue PPI gtt at this time   Carafate TID   Advance diet as tolerated   Supportive care  Trend hgb

## 2023-04-18 NOTE — PT/OT/SLP EVAL
Physical Therapy Evaluation    Patient Name:  Julien Meléndez   MRN:  [id number]    Recommendations:     Discharge Recommendations: nursing facility, skilled   Discharge Equipment Recommendations: wheelchair   Barriers to Discharge:  increased caregiver burden of care    Assessment:     Julien Meléndez is a 82 y.o. male admitted with a medical diagnosis of UGIB (upper gastrointestinal bleed).  He presents with the following impairments/functional limitations: weakness, impaired endurance, impaired sensation, impaired self care skills, impaired functional mobility, gait instability, impaired balance, decreased upper extremity function, decreased lower extremity function, decreased ROM, pain, impaired skin, orthopedic precautions. Secure chat received following completion of evaluation with updated WB status - RLE WBAT in surgical shoe, LLE PWB to heel in surgical shoe. Recommending pt return to post acute placement upon d/c.    Rehab Prognosis: Good; patient would benefit from acute skilled PT services to address these deficits and reach maximum level of function.    Recent Surgery: Procedure(s) (LRB):  EGD (ESOPHAGOGASTRODUODENOSCOPY) (N/A) 1 Day Post-Op    Plan:     During this hospitalization, patient to be seen 5 x/week to address the identified rehab impairments via therapeutic activities, therapeutic exercises, wheelchair management/training and progress toward the following goals:    Plan of Care Expires:  05/18/23    Subjective     Chief Complaint: B foot pain  Patient/Family Comments/goals: pt pleasant and agreeable to participate in therapy session  Pain/Comfort:  Pain Rating 1:  (5-6/10)  Location - Side 1: Bilateral  Location 1: foot  Pain Rating Post-Intervention 1:  (not rated)    Patients cultural, spiritual, Yazdanism conflicts given the current situation: no    Living Environment:  Pt lives with his wife in a [facility] with no JUSTIN and WIS with shower chair.  Prior to admission, patients level of function  was independent without AD prior to his foot surgeries in March and April - pt recently d/c from hospital to SNF but did not receive therapy yet as he was only there for 1-2 days prior to being admitted for GIB. Pt has not stood since his foot surgery.  Equipment used at home: bedside commode, walker, rolling, shower chair.  DME owned (not currently used):  [employer], RW . Upon discharge, patient will have assistance from NH staff.    Objective:     Communicated with nurse prior to session.  Patient found HOB elevated with bed alarm  upon PT entry to room.    General Precautions: Standard, fall  Orthopedic Precautions:RLE weight bearing as tolerated, LLE partial weight bearing (RLE WBAT in surgical shoe, LLE heel WB in surgical shoe - per secure chat with Dr. 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  Pt and wife reporting he has surgical or [employer] shoes but they are in his wife's car and she stated she will bring them tomorrow.  Pt transitioned to sit EOB. Completed UE/LE assessment then completed lateral scooting along EOB, only maintaining weight through B  heels during activity.  Returned to supine and LEs elevated.    Patient left HOB elevated with all lines intact, call button in reach, bed alarm on, nurse notified, and pt's wife present.    GOALS:   Multidisciplinary Problems       Physical Therapy Goals          Problem: Physical Therapy    Goal Priority Disciplines Outcome Goal Variances Interventions   Physical Therapy Goal     PT, PT/OT Ongoing, Progressing     Description: Goals to be met by: 23     Patient will increase functional independence with mobility by performin. Supine <> sit with Modified [place]  2. Bed to chair transfer with Contact Guard Assistance   3. Lower extremity exercise program x 10 reps per handout, with supervision                         History:     Past Medical History:   Diagnosis Date    Diabetes mellitus     Hiatal hernia     under Dr Saenz' care    Hyperlipidemia     Hypertension     MI (myocardial infarction)     Sleep apnea        Past Surgical History:   Procedure Laterality Date    ABDOMINAL HERNIA REPAIR      ANGIOGRAM, EXTREMITY, BILATERAL Bilateral 2023    Procedure: ANGIOGRAM, EXTREMITY, BILATERAL;  Surgeon: Michael Giraldo III, MD;  Location: [facility] CATH LAB;  Service: Cardiology;  Laterality: Bilateral;    ANGIOGRAPHY OF LOWER EXTREMITY Left 2023    Procedure: Angiogram Extremity Unilateral;  Surgeon: Michael Giraldo III, MD;  Location: [facility] CATH LAB;  Service: Cardiology;  Laterality: Left;    AORTOGRAPHY WITH SERIALOGRAPHY Bilateral 2023    Procedure: AORTOGRAM, WITH SERIALOGRAPHY;  Surgeon: Michael Giraldo III, MD;  Location: [facility] CATH LAB;  Service: Cardiology;  Laterality: Bilateral;    APPLICATION OF WOUND VACUUM-ASSISTED CLOSURE DEVICE Left 3/29/2023    Procedure: APPLICATION, WOUND VAC;  Surgeon: Alona Pierce DPM;  Location: [facility] OR;  Service: Podiatry;  Laterality: Left;    APPLICATION OF WOUND VACUUM-ASSISTED CLOSURE DEVICE Left 2023    Procedure: APPLICATION, WOUND  VAC;  Surgeon: Alona Piecre DPM;  Location: [facility] OR;  Service: Podiatry;  Laterality: Left;    APPLICATION OF WOUND VACUUM-ASSISTED CLOSURE DEVICE Left 4/13/2023    Procedure: APPLICATION, WOUND VAC;  Surgeon: Alona Pierce DPM;  Location: [facility] OR;  Service: Podiatry;  Laterality: Left;    BONE BIOPSY Left 3/29/2023    Procedure: BIOPSY, BONE;  Surgeon: Alona Pierce DPM;  Location: [facility] OR;  Service: Podiatry;  Laterality: Left;  1st met    CARDIAC CATHETERIZATION      3 stents placed    CATARACT EXTRACTION, BILATERAL      CLOSURE DEVICE Left 4/12/2023    Procedure: Placement of Closure Device;  Surgeon: Michael Giraldo III, MD;  Location: [facility] CATH LAB;  Service: Cardiology;  Laterality: Left;    CORONARY ANGIOGRAPHY N/A 10/3/2019    Procedure: ANGIOGRAM, CORONARY ARTERY;  Surgeon: Edwin Azul MD;  Location: [facility] CATH LAB/EP;  Service: Cardiology;  Laterality: N/A;    DEBRIDEMENT OF FOOT Left 3/29/2023    Procedure: DEBRIDEMENT, FOOT;  Surgeon: Alona Pierce DPM;  Location: [facility] OR;  Service: Podiatry;  Laterality: Left;    DEBRIDEMENT OF FOOT Left 4/4/2023    Procedure: DEBRIDEMENT, FOOT;  Surgeon: Alona Pierce DPM;  Location: [facility] OR;  Service: Podiatry;  Laterality: Left;    DEBRIDEMENT OF FOOT Left 4/13/2023    Procedure: DEBRIDEMENT, FOOT;  Surgeon: Alona Pierce DPM;  Location: [facility] OR;  Service: Podiatry;  Laterality: Left;    ESOPHAGOGASTRODUODENOSCOPY N/A 4/17/2023    Procedure: EGD (ESOPHAGOGASTRODUODENOSCOPY);  Surgeon: Otto Villarreal MD;  Location: [facility] ENDO;  Service: Endoscopy;  Laterality: N/A;    HERNIA REPAIR      inguinal    IVUS (INTRAVASCULAR ULTRASOUND) Left 4/12/2023    Procedure: ULTRASOUND, INTRAVASCULAR;  Surgeon: Michael Giraldo III, MD;  Location: [facility] CATH LAB;  Service: Cardiology;  Laterality: Left;    LEFT HEART CATHETERIZATION Left 9/13/2019    Procedure: Left heart cath;  Surgeon: Edwin Azul MD;  Location: [facility] CATH LAB/EP;  Service: Cardiology;  Laterality:  Left;    LEFT HEART CATHETERIZATION N/A 10/3/2019    Procedure: Left heart cath;  Surgeon: Edwin Azul MD;  Location: [facility] CATH LAB/EP;  Service: Cardiology;  Laterality: N/A;    OSTEOTOMY OF METATARSAL BONE Right 4/4/2023    Procedure: OSTEOTOMY, METATARSAL BONE;  Surgeon: Alona Pierce DPM;  Location: [facility] OR;  Service: Podiatry;  Laterality: Right;    PERCUTANEOUS TRANSLUMINAL ANGIOPLASTY Left 4/12/2023    Procedure: PTA (ANGIOPLASTY, PERCUTANEOUS, TRANSLUMINAL);  Surgeon: Michael Giraldo III, MD;  Location: [facility] CATH LAB;  Service: Cardiology;  Laterality: Left;    SURGICAL REMOVAL OF BUNION WITH OSTEOTOMY OF METATARSAL BONE Right 4/4/2023    Procedure: BUNIONECTOMY, WITH METATARSAL OSTEOTOMY;  Surgeon: Alona Pierce DPM;  Location: [facility] OR;  Service: Podiatry;  Laterality: Right;    TOE AMPUTATION Left 4/4/2023    Procedure: AMPUTATION, TOE;  Surgeon: Alona Pierce DPM;  Location: [facility] OR;  Service: Podiatry;  Laterality: Left;  1st ray       Time Tracking:     PT Received On: 04/18/23  PT Start Time: 1507     PT Stop Time: 1520  PT Total Time (min): 13 min     Billable Minutes: Evaluation 13      04/18/2023

## 2023-04-18 NOTE — PROGRESS NOTES
"IP Liaison - Initial Visit Note    Patient: Julien Meléndez  MRN:  [id number]  Date of Service:  4/18/2023  Completed by:  JERALD Saucedo    Reason for Visit   Patient presents with    IP Liaison Initial Visit       RSW met with patient at bedside in order to complete SDOH questionnaire and liaison assessment. RSW noted per ED note pt arrived to hospital from facility. Pt unable to name facility but stated "I am just there to gain my strength back." Pt stated his goal is to return home eventually. Pt expressed having a supportive family who assists pt with all social needs. Pt also stated that pt family checks in on him at NH. Pt declined the need for resources at this time.    The following were addressed during this visit:  - Review SDOH Questions   - Complete patient assessment   - Complete initial visit with patient        Patient Summary     IP Liaison Patient Assessment    General  Level of Caregiver support: Member independent and does not need caregiver assistance  Have you had to make a decision between paying for any of the following in the last 2 months?: None  Transportation means: Family  Employment status: Retired and not working  Assessments  Was the PHQ Depression Screening completed this visit?: No  Was the DC-7 Screening completed this visit?: No       JERALD Saucedo  "

## 2023-04-18 NOTE — PROGRESS NOTES
[facility] Medicine  Progress Note    Patient Name: Julien Meléndez  MRN: [id number]  Patient Class: IP- Inpatient   Admission Date: 4/16/2023  Length of Stay: 0 days  Attending Physician: Cynthia Sanchez*  Primary Care Provider: Kelvin Wayne MD        Subjective:     Principal Problem:UGIB (upper gastrointestinal bleed)        HPI:  Julien Meléndez an 82-year-old male with PMH DMT2, MI, CAD, CAD, CKD, paroxysmal AFib, CHF, ischemic cardiomyopathy, hiatal hernia, HTN, HLD, and left foot cellulitis s/p great toe amputation.  Patient presents to the ED today after an episode of coffee ground emesis that occurred this morning.  Patient was recently discharged from [facility] for left foot cellulitis which required a left great toe amputation and he was discharged to a nursing facility on doxycycline x14 days (due to stop 04/28/2023).  Per patient, he had 1 episode of coffee-ground emesis that he states was moderate in amount.  He denies CP, SOB, abdominal pain, diarrhea, melena, hematochezia, dizziness, lightheadedness, fevers, chills, or any other signs of bleeding.    Hospital medicine will admit patient for GIB workup with consult to GI.      Overview/Hospital Course:  No notes on file    Interval History:  Awake and alert, sitting up in bed, requesting to be helped back to bed.  No new complaint  Appreciate GI- S/p EGD on 4/17 reporting hemorrhagic gastritis with focal lesion losing-treated and recommend continue Protonix gtt and Carafate.  Advance diet as tolerated  H/H slowly down trending-monitor    Review of Systems   Constitutional:  Positive for appetite change. Negative for chills and fever.   Respiratory:  Negative for shortness of breath.    Cardiovascular:  Negative for chest pain.   Gastrointestinal:  Negative for abdominal pain, blood in stool, diarrhea, nausea and vomiting.   Genitourinary:  Negative for decreased urine volume.   Musculoskeletal:  Negative for  myalgias.   Skin:  Positive for wound (Left great toe amputation). Negative for color change and pallor.   Neurological:  Negative for dizziness, weakness, light-headedness, numbness and headaches.   Psychiatric/Behavioral:  Negative for agitation and confusion. The patient is not nervous/anxious.    Objective:     Vital Signs (Most Recent):  Temp: 98 °F (36.7 °C) (04/18/23 0731)  Pulse: 102 (04/18/23 0731)  Resp: 18 (04/18/23 0731)  BP: 97/64 (04/18/23 0731)  SpO2: (!) 93 % (04/18/23 0731)   Vital Signs (24h Range):  Temp:  [96.5 °F (35.8 °C)-98.6 °F (37 °C)] 98 °F (36.7 °C)  Pulse:  [] 102  Resp:  [18-22] 18  SpO2:  [93 %-100 %] 93 %  BP: ()/(60-91) 97/64     Weight: 96 kg (211 lb 10.3 oz)  Body mass index is 27.92 kg/m².    Intake/Output Summary (Last 24 hours) at 4/18/2023 1101  Last data filed at 4/18/2023 0550  Gross per 24 hour   Intake 1029.61 ml   Output 810 ml   Net 219.61 ml      Physical Exam  Vitals and nursing note reviewed.   Constitutional:       General: He is not in acute distress.     Appearance: He is not ill-appearing.   HENT:      Head: Normocephalic and atraumatic.   Cardiovascular:      Rate and Rhythm: Normal rate and regular rhythm.      Pulses:           Popliteal pulses are 1+ on the left side.        Posterior tibial pulses are 1+ on the right side.      Heart sounds: Normal heart sounds.   Pulmonary:      Effort: Pulmonary effort is normal. No respiratory distress.      Breath sounds: Normal breath sounds. No wheezing or rhonchi.   Abdominal:      General: Bowel sounds are normal. There is no distension.      Palpations: Abdomen is soft.      Tenderness: There is no abdominal tenderness. There is no guarding.   Musculoskeletal:      Right lower leg: No edema.      Left lower leg: No edema.      Left Lower Extremity: (Left great toe amp)  Feet:      Comments: Unable to assess left foot d/t surgical dressing - dressing CDI  Skin:     General: Skin is warm.      Findings:  Bruising (BUE) present.   Neurological:      Mental Status: He is alert and oriented to person, place, and time.   Psychiatric:         Mood and Affect: Mood normal.         Behavior: Behavior normal.       Significant Labs: A1C:   Recent Labs   Lab 03/25/23  1734   HGBA1C 7.0*     ABGs: No results for input(s): PH, PCO2, HCO3, POCSATURATED, BE, TOTALHB, COHB, METHB, O2HB, POCFIO2, PO2 in the last 48 hours.  Blood Culture: No results for input(s): LABBLOO in the last 48 hours.  CBC:   Recent Labs   Lab 04/17/23  0552 04/17/23  1140 04/18/23  0248   WBC 6.61 8.50 6.52   HGB 11.0* 11.6* 10.1*   HCT 32.2* 34.2* 30.3*    260 242     CMP:   Recent Labs   Lab 04/16/23  1734 04/17/23  0552 04/18/23  0248    139 140   K 4.3 3.9 3.6    105 104   CO2 23 21* 24   * 204* 205*   BUN 22 26* 28*   CREATININE 1.1 1.0 1.1   CALCIUM 9.5 9.0 9.0   PROT 6.3  --   --    ALBUMIN 3.0*  --   --    BILITOT 0.6  --   --    ALKPHOS 72  --   --    AST 14  --   --    ALT 14  --   --    ANIONGAP 11 13 12     Lactic Acid: No results for input(s): LACTATE in the last 48 hours.  Lipase: No results for input(s): LIPASE in the last 48 hours.  Lipid Panel: No results for input(s): CHOL, HDL, LDLCALC, TRIG, CHOLHDL in the last 48 hours.  Magnesium: No results for input(s): MG in the last 48 hours.  Troponin: No results for input(s): TROPONINI, TROPONINIHS in the last 48 hours.  TSH: No results for input(s): TSH in the last 4320 hours.  Urine Culture: No results for input(s): LABURIN in the last 48 hours.  Urine Studies: No results for input(s): COLORU, APPEARANCEUA, PHUR, SPECGRAV, PROTEINUA, GLUCUA, KETONESU, BILIRUBINUA, OCCULTUA, NITRITE, UROBILINOGEN, LEUKOCYTESUR, RBCUA, WBCUA, BACTERIA, SQUAMEPITHEL, HYALINECASTS in the last 48 hours.    Invalid input(s): JOSÉ    Significant Imaging: I have reviewed all pertinent imaging results/findings within the past 24 hours.      Assessment/Plan:      * UGIB (upper  gastrointestinal bleed)  · Patient reported a moderate amount of coffee-ground emesis  · IV Protonix 40 mg b.i.d.  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  Rec full liquid diet, continue Protonixgtt, add Carafate t.i.d. and will need to hold anticoagulation until bleeding has resolved    Cellulitis of left foot  Peripheral artery disease  · Recently treated at [facility] and discharged 4/15  · Will continue doxycycline  · Stop date 04/28/2023  · S/p left great toe amputation, consult Podiatry  · Dressing CDI      Depressive disorder  Continue home fluoxetine and trazodone            Congestive heart failure, unspecified HF chronicity, unspecified heart failure type  Cardiomyopathy, ischemic  Continue home meds    PAF (paroxysmal atrial fibrillation)  Long term (current) use of anticoagulants  Patient with Paroxysmal (<7 days) atrial fibrillation which is controlled currently with Beta Blocker. Patient is currently in sinus rhythm.[id number] Score: 4. HASBLED Score: 3. Anticoagulation not indicated due to Active GIB.    Warfarin on hold at this time  Continue BB        Coronary artery disease due to calcified coronary lesion  · Patient denies CP   · Continue statin  · Warfarin on hold at this time in the setting of GIB  · May resume when cleared by GI      Diabetes mellitus type 2, noninsulin dependent  Patient's FSGs are controlled on current medication regimen.  Last A1c reviewed-   Lab Results   Component Value Date    HGBA1C 7.0 (H) 03/25/2023     Most recent fingerstick glucose reviewed- No results for input(s): POCTGLUCOSE in the last 24 hours.  Current correctional scale   Low  Maintain anti-hyperglycemic dose as follows-   Antihyperglycemics (From admission, onward)    Start     Stop Route Frequency Ordered    04/16/23 2013  insulin aspart U-100 pen 0-5 Units         -- SubQ Every 6 hours PRN 04/16/23 1914        Hold Oral hypoglycemics while patient is in the hospital.    Hypertension  · Continue home BP meds   · Monitor for hypotension      Mixed hyperlipidemia  · Continue statin        VTE Risk Mitigation (From admission, onward)         Ordered     IP VTE HIGH RISK PATIENT  Once         04/16/23 1914     Place sequential compression device  Until discontinued         04/16/23 1914                Discharge Planning   AURELIA:      Code Status: Full Code   Is the patient medically ready for discharge?:     Reason for patient still in hospital (select all that apply): Patient trending condition                     Cynthia Sanchez MD  Department of Hospital Medicine   [address] [facility]

## 2023-04-18 NOTE — SUBJECTIVE & OBJECTIVE
Scheduled Meds:   amLODIPine  5 mg Oral Daily    atorvastatin  40 mg Oral Daily    doxycycline  100 mg Oral Q12H    FLUoxetine  40 mg Oral Daily    isosorbide mononitrate  30 mg Oral Daily    metoprolol succinate  25 mg Oral Daily    sucralfate  1 g Oral QID (AC & HS)    traZODone  150 mg Oral Nightly     Continuous Infusions:   pantoprazole (PROTONIX) IV infusion 8 mg/hr (04/17/23 2120)     PRN Meds:acetaminophen, dextrose 10%, dextrose 10%, glucagon (human recombinant), insulin aspart U-100, ondansetron, ondansetron, oxyCODONE-acetaminophen    Review of patient's allergies indicates:   Allergen Reactions    Nystatin      Other reaction(s): Unknown        Past Medical History:   Diagnosis Date    Diabetes mellitus     Hiatal hernia     under Dr Saenz' care    Hyperlipidemia     Hypertension     MI (myocardial infarction)     Sleep apnea      Past Surgical History:   Procedure Laterality Date    ABDOMINAL HERNIA REPAIR      ANGIOGRAM, EXTREMITY, BILATERAL Bilateral 4/5/2023    Procedure: ANGIOGRAM, EXTREMITY, BILATERAL;  Surgeon: Michael Giraldo III, MD;  Location: [facility] CATH LAB;  Service: Cardiology;  Laterality: Bilateral;    ANGIOGRAPHY OF LOWER EXTREMITY Left 4/12/2023    Procedure: Angiogram Extremity Unilateral;  Surgeon: Michael Giraldo III, MD;  Location: [facility] CATH LAB;  Service: Cardiology;  Laterality: Left;    AORTOGRAPHY WITH SERIALOGRAPHY Bilateral 4/5/2023    Procedure: AORTOGRAM, WITH SERIALOGRAPHY;  Surgeon: Michael Giraldo III, MD;  Location: [facility] CATH LAB;  Service: Cardiology;  Laterality: Bilateral;    APPLICATION OF WOUND VACUUM-ASSISTED CLOSURE DEVICE Left 3/29/2023    Procedure: APPLICATION, WOUND VAC;  Surgeon: Alona Pierce DPM;  Location: [facility] OR;  Service: Podiatry;  Laterality: Left;    APPLICATION OF WOUND VACUUM-ASSISTED CLOSURE DEVICE Left 4/4/2023    Procedure: APPLICATION, WOUND VAC;  Surgeon: Alona Pierce DPM;  Location: [facility] OR;  Service: Podiatry;  Laterality: Left;     APPLICATION OF WOUND VACUUM-ASSISTED CLOSURE DEVICE Left 4/13/2023    Procedure: APPLICATION, WOUND VAC;  Surgeon: Alona Pierce DPM;  Location: [facility] OR;  Service: Podiatry;  Laterality: Left;    BONE BIOPSY Left 3/29/2023    Procedure: BIOPSY, BONE;  Surgeon: Alona Pierce DPM;  Location: [facility] OR;  Service: Podiatry;  Laterality: Left;  1st met    CARDIAC CATHETERIZATION      3 stents placed    CATARACT EXTRACTION, BILATERAL      CLOSURE DEVICE Left 4/12/2023    Procedure: Placement of Closure Device;  Surgeon: Michael Giraldo III, MD;  Location: [facility] CATH LAB;  Service: Cardiology;  Laterality: Left;    CORONARY ANGIOGRAPHY N/A 10/3/2019    Procedure: ANGIOGRAM, CORONARY ARTERY;  Surgeon: Edwin Azul MD;  Location: [facility] CATH LAB/EP;  Service: Cardiology;  Laterality: N/A;    DEBRIDEMENT OF FOOT Left 3/29/2023    Procedure: DEBRIDEMENT, FOOT;  Surgeon: Alona Pierce DPM;  Location: [facility] OR;  Service: Podiatry;  Laterality: Left;    DEBRIDEMENT OF FOOT Left 4/4/2023    Procedure: DEBRIDEMENT, FOOT;  Surgeon: Alona Pierce DPM;  Location: [facility] OR;  Service: Podiatry;  Laterality: Left;    DEBRIDEMENT OF FOOT Left 4/13/2023    Procedure: DEBRIDEMENT, FOOT;  Surgeon: Alona Pierce DPM;  Location: [facility] OR;  Service: Podiatry;  Laterality: Left;    HERNIA REPAIR      inguinal    IVUS (INTRAVASCULAR ULTRASOUND) Left 4/12/2023    Procedure: ULTRASOUND, INTRAVASCULAR;  Surgeon: Michael Giraldo III, MD;  Location: [facility] CATH LAB;  Service: Cardiology;  Laterality: Left;    LEFT HEART CATHETERIZATION Left 9/13/2019    Procedure: Left heart cath;  Surgeon: Edwin Azul MD;  Location: [facility] CATH LAB/EP;  Service: Cardiology;  Laterality: Left;    LEFT HEART CATHETERIZATION N/A 10/3/2019    Procedure: Left heart cath;  Surgeon: Edwin Azul MD;  Location: [facility] CATH LAB/EP;  Service: Cardiology;  Laterality: N/A;    OSTEOTOMY OF METATARSAL BONE Right 4/4/2023    Procedure: OSTEOTOMY, METATARSAL BONE;  Surgeon: Alona  KILLIAN Pierce DPM;  Location: [facility] OR;  Service: Podiatry;  Laterality: Right;    PERCUTANEOUS TRANSLUMINAL ANGIOPLASTY Left 4/12/2023    Procedure: PTA (ANGIOPLASTY, PERCUTANEOUS, TRANSLUMINAL);  Surgeon: Michael Giraldo III, MD;  Location: [facility] CATH LAB;  Service: Cardiology;  Laterality: Left;    SURGICAL REMOVAL OF BUNION WITH OSTEOTOMY OF METATARSAL BONE Right 4/4/2023    Procedure: BUNIONECTOMY, WITH METATARSAL OSTEOTOMY;  Surgeon: Alona Pierce DPM;  Location: [facility] OR;  Service: Podiatry;  Laterality: Right;    TOE AMPUTATION Left 4/4/2023    Procedure: AMPUTATION, TOE;  Surgeon: Alona Pierce DPM;  Location: [facility] OR;  Service: Podiatry;  Laterality: Left;  1st ray       Family History       Problem Relation (Age of Onset)    Heart disease Mother, Sister, Brother, Maternal Uncle, Brother, Sister, Maternal Uncle, Maternal Uncle, Maternal Uncle    Stroke Mother          Tobacco Use    Smoking status: Never    Smokeless tobacco: Never   Substance and Sexual Activity    Alcohol use: No    Drug use: No    Sexual activity: Not on file     Review of Systems   Constitutional:  Positive for appetite change. Negative for chills, fatigue and fever.   HENT:  Negative for trouble swallowing.    Respiratory:  Negative for shortness of breath.    Cardiovascular:  Negative for chest pain.   Gastrointestinal:  Positive for nausea and vomiting. Negative for abdominal pain, blood in stool and diarrhea.        Coffee-ground emesis   Genitourinary:  Negative for decreased urine volume.   Musculoskeletal:  Negative for myalgias.   Skin:  Positive for wound (Left great toe amputation). Negative for color change and pallor.   Neurological:  Negative for dizziness, weakness, light-headedness, numbness and headaches.   Psychiatric/Behavioral:  Negative for agitation and confusion. The patient is not nervous/anxious.    Objective:     Vital Signs (Most Recent):  Temp: 96.5 °F (35.8 °C) (04/17/23 1923)  Pulse: 97 (04/17/23 1923)  Resp: 18  (04/17/23 1923)  BP: (!) 165/82 (04/17/23 1923)  SpO2: 96 % (04/17/23 1923)   Vital Signs (24h Range):  Temp:  [96.5 °F (35.8 °C)-98.6 °F (37 °C)] 96.5 °F (35.8 °C)  Pulse:  [] 97  Resp:  [18-22] 18  SpO2:  [95 %-100 %] 96 %  BP: (121-175)/(57-91) 165/82     Weight: 96 kg (211 lb 10.3 oz)  Body mass index is 27.92 kg/m².    Foot Exam    Right Foot/Ankle     Inspection and Palpation  Skin Exam: drainage and ulcer;     Neurovascular  Dorsalis pedis: absent  Posterior tibial: absent    Comments  Hardware in place, K wire through distal great toe. No signs of necrosis.     Left Foot/Ankle      Inspection and Palpation  Skin Exam: ulcer; no drainage     Neurovascular  Dorsalis pedis: absent  Posterior tibial: absent    Comments  1st ray amputation with no soft tissue envelop, wound base looks viable, application of surgicel to proximal wound base per wound care left in place. No excessive bleeding or non viable tissues appreciated.     Laboratory:  A1C:   Recent Labs   Lab 03/25/23  1734   HGBA1C 7.0*     CBC:   Recent Labs   Lab 04/17/23  1140   WBC 8.50   RBC 3.71*   HGB 11.6*   HCT 34.2*      MCV 92   MCH 31.3*   MCHC 33.9     CMP:   Recent Labs   Lab 04/16/23  1734 04/17/23  0552   * 204*   CALCIUM 9.5 9.0   ALBUMIN 3.0*  --    PROT 6.3  --     139   K 4.3 3.9   CO2 23 21*    105   BUN 22 26*   CREATININE 1.1 1.0   ALKPHOS 72  --    ALT 14  --    AST 14  --    BILITOT 0.6  --      Wound Cultures:   Recent Labs   Lab 03/29/23  1157   LABAERO MORGANELLA MORGANII  Moderate  *  METHICILLIN RESISTANT STAPHYLOCOCCUS AUREUS  Moderate  *  MORGANELLA MORGANII  Few  *       All pertinent labs reviewed within the last 24 hours.    Diagnostic Results:  I have reviewed all pertinent imaging results/findings within the past 24 hours.    Clinical Findings:

## 2023-04-18 NOTE — PLAN OF CARE
[facility] - Telemetry  Initial Discharge Assessment       Primary Care Provider: Kelvin Wayne MD    Admission Diagnosis: Vomiting [R11.10]  UGIB (upper gastrointestinal bleed) [K92.2]    Admission Date: 4/16/2023  Expected Discharge Date: 4/21/2023    Consult: pod, wound care, & GI    Payor: [employer] MANAGED MEDICARE / Plan: [employer] MEDICARE HMO / Product Type: Capitation /     Extended Emergency Contact Information  Primary Emergency Contact: Kylee Meléndez  Address: [address]            [facility] [place] [address] [address]  Home Phone: [phone]  Mobile Phone: [phone]  Relation: Spouse    Discharge Plan A: (P) Skilled Nursing Facility  Discharge Plan B: (P) [facility] - [place], [place] - [address]  [address] [facility] [address] [address]  Phone: [phone] Fax: [phone]    [id number] - [place], [place] - [address]  [address]  [place] [address]  Phone: [phone] Fax: [phone]      Initial Assessment (most recent)       Adult Discharge Assessment - 04/18/23 1135          Discharge Assessment    Confirmed/corrected address, phone number and insurance Yes (P)      Confirmed Demographics Correct on Facesheet (P)      Communicated AURELIA with patient/caregiver Date not available/Unable to determine (P)      People in Home spouse (P)    Kylee wiggins ([phone])    Facility Arrived From: [facility] (P)      Do you expect to return to your current living situation? No (P)      Do you have help at home or someone to help you manage your care at home? Yes (P)      Prior to hospitilization cognitive status: Alert/Oriented (P)      Current cognitive status: Alert/Oriented (P)      Walking or Climbing Stairs ambulation difficulty, requires equipment (P)      Dressing/Bathing bathing difficulty, requires equipment (P)      Equipment Currently Used at Home cane, straight;bedside commode;walker, rolling (P)      Readmission  within 30 days? Yes (P)      Patient currently being followed by outpatient case management? No (P)      Do you currently have service(s) that help you manage your care at home? No (P)      Do you take prescription medications? Yes (P)      Do you have prescription coverage? Yes (P)      Do you have any problems affording any of your prescribed medications? No (P)      Is the patient taking medications as prescribed? yes (P)      How do you get to doctors appointments? family or friend will provide (P)      Are you on dialysis? No (P)      Do you take coumadin? No (P)      Discharge Plan A Skilled Nursing Facility (P)      Discharge Plan B Home Health (P)      DME Needed Upon Discharge  other (see comments) (P)    tbd    Discharge Plan discussed with: Patient (P)         Physical Activity    On average, how many days per week do you engage in moderate to strenuous exercise (like a brisk walk)? 0 days (P)      On average, how many minutes do you engage in exercise at this level? 0 min (P)         Financial Resource Strain    How hard is it for you to pay for the very basics like food, housing, medical care, and heating? Not hard at all (P)         Housing Stability    In the last 12 months, was there a time when you did not have a steady place to sleep or slept in a shelter (including now)? No (P)         Transportation Needs    In the past 12 months, has lack of transportation kept you from medical appointments or from getting medications? No (P)      In the past 12 months, has lack of transportation kept you from meetings, work, or from getting things needed for daily living? No (P)         Food Insecurity    Within the past 12 months, you worried that your food would run out before you got the money to buy more. Never true (P)      Within the past 12 months, the food you bought just didn't last and you didn't have money to get more. Never true (P)         Stress    Do you feel stress - tense, restless, nervous, or  anxious, or unable to sleep at night because your mind is troubled all the time - these days? Very much (P)         Social Connections    In a typical week, how many times do you talk on the phone with family, friends, or neighbors? More than three times a week (P)      How often do you get together with friends or relatives? More than three times a week (P)      How often do you attend Episcopalian or Gnosticism services? More than 4 times per year (P)      Do you belong to any clubs or organizations such as Episcopalian groups, unions, fraternal or athletic groups, or school groups? No (P)      How often do you attend meetings of the clubs or organizations you belong to? Never (P)      Are you , , , , never , or living with a partner?  (P)         Alcohol Use    Q1: How often do you have a drink containing alcohol? Never (P)      Q2: How many drinks containing alcohol do you have on a typical day when you are drinking? Patient does not drink (P)      Q3: How often do you have six or more drinks on one occasion? Never (P)                    1135  Patient resting quietly in bed when CM rounded. No family present. Patient was admitted with UGIB, is being followed by GI, pod, and wound care, & had an EGD done yesterday. Continuous protonix gtt in use.     Patient was hospitalized at [facility] 3/25/2023-4/14/2023 for left foot cellulitis, had a left great toe amputation done by Dr Pierce on 4/4/2023, & was discharge to [facility]. Dressing to left foot remains intact without drainage. Pt stated that he plans to return to [facility] when medically stable to discharge. Patient has equipment to assist with ADLs, & will need assistance with transportation at time of discharge.     Post-op appt with Dr Pierce on 4/25/2023 at 1015 noted.     CM updated patient's whiteboard with CM name & contact information.     1337  Referral sent to [facility] via [employer].  Message sent informing that the patient will need a wound vac following discharge. Awaiting response.       Will continue to follow.

## 2023-04-18 NOTE — ASSESSMENT & PLAN NOTE
Long term (current) use of anticoagulants  Patient with Paroxysmal (<7 days) atrial fibrillation which is controlled currently with Beta Blocker. Patient is currently in sinus rhythm.[id number] Score: 4. HASBLED Score: 3. Anticoagulation not indicated due to Active GIB.    Warfarin on hold at this time  Continue BB

## 2023-04-18 NOTE — ASSESSMENT & PLAN NOTE
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 S/P R MILLY bunionectomy DOS 4/04.     Wound vac applied to L foot amputation site.   Foot foot post operative site assessed and redressed.    Continue PO doxycycline for OM of left foot s/p partial 1st ray amputation   Offload heels and left lateral foot alteration in skin integrity   Nursing wound care routine for right foot  Wound vac of left foot currently managed by podiatry   RLE WBAT w/ dressing and surgical shoe   LLE PWB Heel touch w/ dressing and surgical shoe  Podiatry will follow

## 2023-04-18 NOTE — PROGRESS NOTES
[place] [facility]  [facility]ology  Progress Note    Patient Name: Julien Meléndez  MRN: [id number]  Admission Date: 4/16/2023  Hospital Length of Stay: 0 days  Code Status: Full Code   Attending Provider: Cynthia Sanchez*  Consulting Provider: Rodrick Saavedra MD  Primary Care Physician: Kelvin Wayne MD  Principal Problem: UGIB (upper gastrointestinal bleed)      Subjective:     Interval History: Patient states he has not had any recurrent n/v or bowel movements since his procedure yesterday. He denies any abdominal pains. He reports his appetite is not great, but able to tolerate his liquid diet yesterday with no issues.     Review of Systems   Constitutional:  Positive for appetite change.   Gastrointestinal:  Negative for abdominal pain, constipation, diarrhea, nausea and vomiting.   Objective:     Vital Signs (Most Recent):  Temp: 98 °F (36.7 °C) (04/18/23 0731)  Pulse: 102 (04/18/23 0731)  Resp: 18 (04/18/23 0731)  BP: 97/64 (04/18/23 0731)  SpO2: (!) 93 % (04/18/23 0731)   Vital Signs (24h Range):  Temp:  [96.5 °F (35.8 °C)-98.6 °F (37 °C)] 98 °F (36.7 °C)  Pulse:  [] 102  Resp:  [18-22] 18  SpO2:  [93 %-100 %] 93 %  BP: ()/(60-91) 97/64     Weight: 96 kg (211 lb 10.3 oz) (04/17/23 1300)  Body mass index is 27.92 kg/m².      Intake/Output Summary (Last 24 hours) at 4/18/2023 1052  Last data filed at 4/18/2023 0550  Gross per 24 hour   Intake 1029.61 ml   Output 810 ml   Net 219.61 ml       Lines/Drains/Airways       Peripheral Intravenous Line  Duration                  Peripheral IV - Single Lumen 04/16/23 1717 20 G Left Antecubital 1 day                    Physical Exam  Constitutional:       General: He is not in acute distress.     Appearance: He is not toxic-appearing.   Cardiovascular:      Rate and Rhythm: Rhythm irregular.   Pulmonary:      Effort: Pulmonary effort is normal. No respiratory distress.   Abdominal:      General: Abdomen is flat. Bowel sounds are normal.       Palpations: Abdomen is soft.      Tenderness: There is no abdominal tenderness.       Significant Labs:  CBC:   Recent Labs   Lab 04/17/23  0552 04/17/23  1140 04/18/23  0248   WBC 6.61 8.50 6.52   HGB 11.0* 11.6* 10.1*   HCT 32.2* 34.2* 30.3*    260 242         Significant Imaging:  Imaging results within the past 24 hours have been reviewed.    Assessment/Plan:     GI  * UGIB (upper gastrointestinal bleed)  EGD with dieulafoy lesion and hemorrhagic gastritis s/p epi, bipolar cautery and clip x1. No recurrent bleeding noted.     Recs:  Continue PPI gtt at this time   Carafate TID   Advance diet as tolerated   Supportive care  Trend hgb  Continue to hold ac      Please see Dr. Villarreal's attestation for modifications to the above assessment and plan     Thank you for your consult. I will follow-up with patient. Please contact us if you have any additional questions.    Rodrick Saavedra MD  Gastroenterology  [place] - [facility] [place]

## 2023-04-18 NOTE — PLAN OF CARE
Problem: Physical Therapy  Goal: Physical Therapy Goal  Description: Goals to be met by: 23     Patient will increase functional independence with mobility by performin. Supine <> sit with Modified [place]  2. Bed to chair transfer with Contact Guard Assistance   3. Lower extremity exercise program x 10 reps per handout, with supervision    Outcome: Ongoing, Progressing     PT evaluation completed, note to follow. Secure chat received following completion of evaluation with updated WB status - RLE WBAT in surgical shoe, LLE PWB to heel in surgical shoe. Recommending pt return to post acute placement upon d/c.

## 2023-04-18 NOTE — PROGRESS NOTES
[place] - Telemetry  Podiatry  Progress Note    Patient Name: Julien Meléndez  MRN: [id number]  Admission Date: 4/16/2023  Hospital Length of Stay: 0 days  Attending Physician: Cynthia Sanchez*  Primary Care Provider: Kelvin Wayne MD     Subjective:     Interval History: NAEON. VSS. Afebrile. Dressings clean, dry, and intact. Patient 1 day s/p EGD with dieulafoy lesion and hemorrhagic gastritis s/p epi, bipolar cautery and clip x1. No new pedal complaints.     Scheduled Meds:   amLODIPine  5 mg Oral Daily    atorvastatin  40 mg Oral Daily    doxycycline  100 mg Oral Q12H    FLUoxetine  40 mg Oral Daily    isosorbide mononitrate  30 mg Oral Daily    metoprolol succinate  25 mg Oral Daily    sucralfate  1 g Oral QID (AC & HS)    traZODone  150 mg Oral Nightly     Continuous Infusions:   pantoprazole (PROTONIX) IV infusion 8 mg/hr (04/18/23 1637)     PRN Meds:acetaminophen, dextrose 10%, dextrose 10%, glucagon (human recombinant), insulin aspart U-100, melatonin, ondansetron, ondansetron, oxyCODONE-acetaminophen    Review of Systems   Constitutional:  Positive for appetite change. Negative for chills and fever.   Respiratory:  Negative for shortness of breath.    Cardiovascular:  Negative for chest pain.   Gastrointestinal:  Negative for abdominal pain, blood in stool, diarrhea, nausea and vomiting.   Genitourinary:  Negative for decreased urine volume.   Musculoskeletal:  Negative for myalgias.   Skin:  Positive for wound (Left foot with exposed bone). Negative for color change and pallor.   Neurological:  Negative for dizziness, weakness, light-headedness, numbness and headaches.   Psychiatric/Behavioral:  Negative for agitation and confusion. The patient is not nervous/anxious.    Objective:     Vital Signs (Most Recent):  Temp: 96.7 °F (35.9 °C) (04/18/23 1634)  Pulse: 77 (04/18/23 1634)  Resp: 18 (04/18/23 1634)  BP: 135/60 (04/18/23 1634)  SpO2: 96 % (04/18/23 1634) Vital Signs (24h  Range):  Temp:  [96.5 °F (35.8 °C)-98.3 °F (36.8 °C)] 96.7 °F (35.9 °C)  Pulse:  [] 77  Resp:  [18] 18  SpO2:  [93 %-97 %] 96 %  BP: ()/(55-82) 135/60     Weight: 96 kg (211 lb 10.3 oz)  Body mass index is 27.92 kg/m².    Foot Exam    Right Foot/Ankle     Inspection and Palpation  Skin Exam: drainage and ulcer;     Neurovascular  Dorsalis pedis: absent  Posterior tibial: absent    Comments  Hardware in place, K wire through distal great toe. No signs of necrosis.     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 No excessive bleeding or non viable tissues appreciated.     Laboratory:  CBC:   Recent Labs   Lab 04/18/23  1217   WBC 8.37   RBC 3.65*   HGB 11.3*   HCT 33.7*      MCV 92   MCH 31.0   MCHC 33.5     CMP:   Recent Labs   Lab 04/16/23  1734 04/17/23  0552 04/18/23  0248   *   < > 205*   CALCIUM 9.5   < > 9.0   ALBUMIN 3.0*  --   --    PROT 6.3  --   --       < > 140   K 4.3   < > 3.6   CO2 23   < > 24      < > 104   BUN 22   < > 28*   CREATININE 1.1   < > 1.1   ALKPHOS 72  --   --    ALT 14  --   --    AST 14  --   --    BILITOT 0.6  --   --     < > = values in this interval not displayed.     Microbiology Results (last 7 days)       ** No results found for the last 168 hours. **          Specimen (24h ago, onward)      None          All pertinent labs reviewed within the last 24 hours.    Diagnostic Results:  I have reviewed all pertinent imaging results/findings within the past 24 hours.    Clinical Findings:          Wound base appears darkened below due to surgicel hemostatic dressing             Assessment/Plan:     ID  Cellulitis of left foot  See rest of plan     Palliative Care  Postoperative state  S/P L 1st ray  amputation 3/29 2/2 osteomyelitis/cellulitis/diabtetic foot infection  and debridement 4/13 due to gangrene and cellulitis. S/P R MILLY bunionectomy DOS 4/04.     Wound vac applied to L foot amputation site.   Foot foot post operative site assessed and redressed.    Continue PO doxycycline for OM of left foot s/p partial 1st ray amputation   Offload heels and left lateral foot alteration in skin integrity   Nursing wound care routine for right foot  Wound vac of left foot currently managed by podiatry   RLE WBAT w/ dressing and surgical shoe   LLE PWB Heel touch w/ dressing and surgical shoe  Podiatry will follow             Seamus Rice DPM, PGY-2  Podiatry / Foot and Ankle Surgery   Page # [phone]  Secure chat preferred

## 2023-04-18 NOTE — HPI
82-year-old male with DMT2, MI, CAD, CAD, CKD, paroxysmal AFib, CHF, ischemic cardiomyopathy, hiatal hernia, HTN, HLD, and 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 Patient presents to the ED today after an episode of coffee ground emesis that occurred this morning.  Patient was recently discharged from [facility] for left foot cellulitis which required a left 1st ray partial amputation and debridement and he was discharged to a nursing facility on doxycycline x14 days (due to stop 04/28/2023).  Per patient, he had 1 episode of coffee-ground emesis that he states was moderate in amount. Podiatry consulted for b/l LE post operative state while patient admitted to observation. At intial encounter patient BP elevated, afebrile, no leukocytosis, creatinine 1.0, albumin 3.0.

## 2023-04-18 NOTE — PT/OT/SLP EVAL
"Occupational Therapy   Evaluation    Name: Julien Meléndez  MRN: [id number]  Admitting Diagnosis: UGIB (upper gastrointestinal bleed)  Recent Surgery: Procedure(s) (LRB):  EGD (ESOPHAGOGASTRODUODENOSCOPY) (N/A) 1 Day Post-Op    Recommendations:     Discharge Recommendations: nursing facility, skilled  Discharge Equipment Recommendations:  wheelchair  Barriers to discharge:  Other (Comment) (Pt requires increased level of assistance)    Assessment:     Julien Meléndez is a 82 y.o. male with a medical diagnosis of UGIB (upper gastrointestinal bleed).  He presents with Diagnoses of Vomiting and UGIB (upper gastrointestinal bleed) were pertinent to this visit. Performance deficits affecting function: impaired functional mobility, gait instability, impaired endurance, decreased lower extremity function, orthopedic precautions, impaired self care skills, impaired skin, edema, pain, decreased coordination, impaired balance, weakness.      Pt would benefit from cont OT services in order to maximize functional independence. Recommending return to SNF upon d/c. Per secure chat with MD pt is to be "weightbearing as tolerated in surgical shoe on right foot, partial weight-bearing to heel in surgical shoe on left foot." Pt sitting EOB for eval this date; podiatry in room to apply wound vac end of session. Will progress as able.     Rehab Prognosis: Good; patient would benefit from acute skilled OT services to address these deficits and reach maximum level of function.       Plan:     Patient to be seen 5 x/week to address the above listed problems via self-care/home management, therapeutic activities, therapeutic exercises  Plan of Care Expires: 05/18/23  Plan of Care Reviewed with: patient, spouse    Subjective     Chief Complaint: Pain B feet  Patient/Family Comments/goals: Return to PLOF    Occupational Profile:  Pt lives with his wife in a [facility] with no JUSTIN and WIS with shower chair.  Prior to admission, patients level of " function was independent without AD prior to his foot surgeries in March and April - pt recently d/c from hospital to SNF but did not receive therapy yet as he was only there for 1-2 days prior to being admitted for GIB. Pt has not stood since his foot surgery.  Equipment used at home: bedside commode, walker, rolling, shower chair.  DME owned (not currently used):  [employer], JANEEN . Upon discharge, patient will have assistance from NH staff.    Pain/Comfort:  Pain Rating 1: other (see comments) (5-6)  Location - Side 1: Bilateral  Location 1: foot  Pain Addressed 1: Reposition, Distraction, Cessation of Activity, Nurse notified  Pain Rating Post-Intervention 1: other (see comments) (not rated)    Patients cultural, spiritual, Restorationism conflicts given the current situation: no    Objective:     Communicated with: nsg prior to session.  Patient found HOB elevated with bed alarm upon OT entry to room.    General Precautions: Standard, fall  Orthopedic Precautions: RLE weight bearing as tolerated, LLE partial weight bearing (weightbearing as tolerated in surgical shoe on right foot, partial weight-bearing to heel in surgical shoe on left foot.)  Braces:  (B surgical shoes to be brought by pt's wife)  Respiratory Status: Room air    Occupational Performance:    Bed Mobility:    Patient completed Supine to Sit with stand by assistance  Patient completed Sit to Supine with stand by assistance    Functional Mobility/Transfers:  Pt scooting laterally along EOB with SBA & increased v/cs and time/effort while only using B heels to assist    Cognitive/Visual Perceptual:  Cognitive/Psychosocial Skills:     -       Follows Commands/attention:Follows multistep  commands  -       Memory: No Deficits noted  -       Mood/Affect/Coping skills/emotional control: Cooperative and Pleasant    Physical Exam:  Upper Extremity Range of Motion:     -       Right Upper Extremity: WFL  -       Left Upper Extremity: WFL  Upper Extremity Strength:   "  -       Right Upper Extremity: WFL  -       Left Upper Extremity: WFL   Strength:    -       Right Upper Extremity: WFL  -       Left Upper Extremity: WFL    AMPAC 6 Click ADL:  AMPAC Total Score: 19    Treatment & Education:  Pt would benefit from cont OT services in order to maximize functional independence.   Per secure chat with MD pt is to be "weightbearing as tolerated in surgical shoe on right foot, partial weight-bearing to heel in surgical shoe on left foot." (recent history of R bunionectomy and L 1st ray amputation)  Pt performing bed mobility as above.   Pt sitting EOB for eval this date; podiatry in room to apply wound vac end of session.   Will progress as able.     Patient left HOB elevated with all lines intact, call button in reach, bed alarm on, nsg notified, spouse present, and MD in room to place wound vac    GOALS:   Multidisciplinary Problems       Occupational Therapy Goals          Problem: Occupational Therapy    Goal Priority Disciplines Outcome Interventions   Occupational Therapy Goal     OT, PT/OT Ongoing, Progressing    Description: Goals to be met by: 5/18/2023     Patient will increase functional independence with ADLs by performing:    LE Dressing with Set-up Assistance.  Toileting from bedside commode with Modified [place] for hygiene and clothing management.   Toilet transfer to drop-arm bedside commode with Stand-by Assistance.                         History:     Past Medical History:   Diagnosis Date    Diabetes mellitus     Hiatal hernia     under Dr Saenz' care    Hyperlipidemia     Hypertension     MI (myocardial infarction)     Sleep apnea          Past Surgical History:   Procedure Laterality Date    ABDOMINAL HERNIA REPAIR      ANGIOGRAM, EXTREMITY, BILATERAL Bilateral 4/5/2023    Procedure: ANGIOGRAM, EXTREMITY, BILATERAL;  Surgeon: Michael Giraldo III, MD;  Location: [facility] CATH LAB;  Service: Cardiology;  Laterality: Bilateral;    ANGIOGRAPHY OF LOWER " EXTREMITY Left 4/12/2023    Procedure: Angiogram Extremity Unilateral;  Surgeon: Michael Giraldo III, MD;  Location: [facility] CATH LAB;  Service: Cardiology;  Laterality: Left;    AORTOGRAPHY WITH SERIALOGRAPHY Bilateral 4/5/2023    Procedure: AORTOGRAM, WITH SERIALOGRAPHY;  Surgeon: Michael Giraldo III, MD;  Location: [facility] CATH LAB;  Service: Cardiology;  Laterality: Bilateral;    APPLICATION OF WOUND VACUUM-ASSISTED CLOSURE DEVICE Left 3/29/2023    Procedure: APPLICATION, WOUND VAC;  Surgeon: Alona Pierce DPM;  Location: [facility] OR;  Service: Podiatry;  Laterality: Left;    APPLICATION OF WOUND VACUUM-ASSISTED CLOSURE DEVICE Left 4/4/2023    Procedure: APPLICATION, WOUND VAC;  Surgeon: Alona Pierce DPM;  Location: [facility] OR;  Service: Podiatry;  Laterality: Left;    APPLICATION OF WOUND VACUUM-ASSISTED CLOSURE DEVICE Left 4/13/2023    Procedure: APPLICATION, WOUND VAC;  Surgeon: Alona Pierce DPM;  Location: [facility] OR;  Service: Podiatry;  Laterality: Left;    BONE BIOPSY Left 3/29/2023    Procedure: BIOPSY, BONE;  Surgeon: Alona Pierce DPM;  Location: [facility] OR;  Service: Podiatry;  Laterality: Left;  1st met    CARDIAC CATHETERIZATION      3 stents placed    CATARACT EXTRACTION, BILATERAL      CLOSURE DEVICE Left 4/12/2023    Procedure: Placement of Closure Device;  Surgeon: Michael Giraldo III, MD;  Location: [facility] CATH LAB;  Service: Cardiology;  Laterality: Left;    CORONARY ANGIOGRAPHY N/A 10/3/2019    Procedure: ANGIOGRAM, CORONARY ARTERY;  Surgeon: Edwin Azul MD;  Location: [facility] CATH LAB/EP;  Service: Cardiology;  Laterality: N/A;    DEBRIDEMENT OF FOOT Left 3/29/2023    Procedure: DEBRIDEMENT, FOOT;  Surgeon: Alona Pierce DPM;  Location: [facility] OR;  Service: Podiatry;  Laterality: Left;    DEBRIDEMENT OF FOOT Left 4/4/2023    Procedure: DEBRIDEMENT, FOOT;  Surgeon: Alona Pierce DPM;  Location: [facility] OR;  Service: Podiatry;  Laterality: Left;    DEBRIDEMENT OF FOOT Left 4/13/2023    Procedure:  DEBRIDEMENT, FOOT;  Surgeon: Alona Pierce DPM;  [facility]  Service: Podiatry;  Laterality: Left;    ESOPHAGOGASTRODUODENOSCOPY N/A 4/17/2023    Procedure: EGD (ESOPHAGOGASTRODUODENOSCOPY);  Surgeon: Otto Villarreal MD;  Location: [facility] ENDO;  Service: Endoscopy;  Laterality: N/A;    HERNIA REPAIR      inguinal    IVUS (INTRAVASCULAR ULTRASOUND) Left 4/12/2023    Procedure: ULTRASOUND, INTRAVASCULAR;  Surgeon: Michael Giraldo III, MD;  [facility];  Service: Cardiology;  Laterality: Left;    LEFT HEART CATHETERIZATION Left 9/13/2019    Procedure: Left heart cath;  Surgeon: Edwin Azul MD;  Location: [facility] CATH LAB/EP;  Service: Cardiology;  Laterality: Left;    LEFT HEART CATHETERIZATION N/A 10/3/2019    Procedure: Left heart cath;  Surgeon: Edwin Azul MD;  Location: [facility] CATH LAB/EP;  Service: Cardiology;  Laterality: N/A;    OSTEOTOMY OF METATARSAL BONE Right 4/4/2023    Procedure: OSTEOTOMY, METATARSAL BONE;  Surgeon: Alona Pierce DPM;  [facility];  Service: Podiatry;  Laterality: Right;    PERCUTANEOUS TRANSLUMINAL ANGIOPLASTY Left 4/12/2023    Procedure: PTA (ANGIOPLASTY, PERCUTANEOUS, TRANSLUMINAL);  Surgeon: Michael Giraldo III, MD;  [facility];  Service: Cardiology;  Laterality: Left;    SURGICAL REMOVAL OF BUNION WITH OSTEOTOMY OF METATARSAL BONE Right 4/4/2023    Procedure: BUNIONECTOMY, WITH METATARSAL OSTEOTOMY;  Surgeon: Alona Pierce DPM;  [facility];  Service: Podiatry;  Laterality: Right;    TOE AMPUTATION Left 4/4/2023    Procedure: AMPUTATION, TOE;  Surgeon: Alona Pierce DPM;  [facility];  Service: Podiatry;  Laterality: Left;  1st ray       Time Tracking:     OT Date of Treatment: 04/18/23  OT Start Time: 1507  OT Stop Time: 1521  OT Total Time (min): 14 min    Billable Minutes:Evaluation 14 with PT    4/18/2023   Recommendation Preamble: The following recommendations were made during the visit: Detail Level: Zone

## 2023-04-18 NOTE — ASSESSMENT & PLAN NOTE
Peripheral artery disease  · Recently treated at [facility] and discharged 4/15  · Will continue doxycycline  · Stop date 04/28/2023  · S/p left great toe amputation, consult Podiatry  · Dressing CDI

## 2023-04-18 NOTE — CONSULTS
[facility] - Telemetry  Podiatry  Consult Note    Patient Name: Julien Meléndez  MRN: [id number]  Admission Date: 4/16/2023  Hospital Length of Stay: 0 days  Attending Physician: Marina Gomez MD  Primary Care Provider: Kelvin Wayne MD     Inpatient consult to Podiatry  Consult performed by: Seamus Rice DPM  Consult ordered by: Marina Gomez MD        Subjective:     History of Present Illness:  82-year-old male with DMT2, MI, CAD, CAD, CKD, paroxysmal AFib, CHF, ischemic cardiomyopathy, hiatal hernia, HTN, HLD, and 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 Patient presents to the ED today after an episode of coffee ground emesis that occurred this morning.  Patient was recently discharged from [facility] for left foot cellulitis which required a left 1st ray partial amputation and debridement and he was discharged to a nursing facility on doxycycline x14 days (due to stop 04/28/2023).  Per patient, he had 1 episode of coffee-ground emesis that he states was moderate in amount. Podiatry consulted for b/l LE post operative state while patient admitted to observation. At intial encounter patient BP elevated, afebrile, no leukocytosis, creatinine 1.0, albumin 3.0.       Scheduled Meds:   amLODIPine  5 mg Oral Daily    atorvastatin  40 mg Oral Daily    doxycycline  100 mg Oral Q12H    FLUoxetine  40 mg Oral Daily    isosorbide mononitrate  30 mg Oral Daily    metoprolol succinate  25 mg Oral Daily    sucralfate  1 g Oral QID (AC & HS)    traZODone  150 mg Oral Nightly     Continuous Infusions:   pantoprazole (PROTONIX) IV infusion 8 mg/hr (04/17/23 2120)     PRN Meds:acetaminophen, dextrose 10%, dextrose 10%, glucagon (human recombinant), insulin aspart U-100, ondansetron, ondansetron, oxyCODONE-acetaminophen    Review of patient's allergies indicates:    Allergen Reactions    Nystatin      Other reaction(s): Unknown        Past Medical History:   Diagnosis Date    Diabetes mellitus     Hiatal hernia     under Dr Saenz' care    Hyperlipidemia     Hypertension     MI (myocardial infarction)     Sleep apnea      Past Surgical History:   Procedure Laterality Date    ABDOMINAL HERNIA REPAIR      ANGIOGRAM, EXTREMITY, BILATERAL Bilateral 4/5/2023    Procedure: ANGIOGRAM, EXTREMITY, BILATERAL;  Surgeon: Michael Giraldo III, MD;  Location: [facility] CATH LAB;  Service: Cardiology;  Laterality: Bilateral;    ANGIOGRAPHY OF LOWER EXTREMITY Left 4/12/2023    Procedure: Angiogram Extremity Unilateral;  Surgeon: Michael Giraldo III, MD;  Location: [facility] CATH LAB;  Service: Cardiology;  Laterality: Left;    AORTOGRAPHY WITH SERIALOGRAPHY Bilateral 4/5/2023    Procedure: AORTOGRAM, WITH SERIALOGRAPHY;  Surgeon: Michael Giraldo III, MD;  Location: [facility] CATH LAB;  Service: Cardiology;  Laterality: Bilateral;    APPLICATION OF WOUND VACUUM-ASSISTED CLOSURE DEVICE Left 3/29/2023    Procedure: APPLICATION, WOUND VAC;  Surgeon: Alona Pierce DPM;  Location: [facility] OR;  Service: Podiatry;  Laterality: Left;    APPLICATION OF WOUND VACUUM-ASSISTED CLOSURE DEVICE Left 4/4/2023    Procedure: APPLICATION, WOUND VAC;  Surgeon: Alona Pierce DPM;  Location: [facility] OR;  Service: Podiatry;  Laterality: Left;    APPLICATION OF WOUND VACUUM-ASSISTED CLOSURE DEVICE Left 4/13/2023    Procedure: APPLICATION, WOUND VAC;  Surgeon: Alona Pierce DPM;  Location: [facility] OR;  Service: Podiatry;  Laterality: Left;    BONE BIOPSY Left 3/29/2023    Procedure: BIOPSY, BONE;  Surgeon: Alona Pierce DPM;  Location: [facility] OR;  Service: Podiatry;  Laterality: Left;  1st met    CARDIAC CATHETERIZATION      3 stents placed    CATARACT EXTRACTION, BILATERAL      CLOSURE DEVICE Left 4/12/2023    Procedure: Placement of Closure Device;  Surgeon: Michael Giraldo III, MD;  Location: [facility] CATH LAB;   Service: Cardiology;  Laterality: Left;    CORONARY ANGIOGRAPHY N/A 10/3/2019    Procedure: ANGIOGRAM, CORONARY ARTERY;  Surgeon: Edwin Azul MD;  Location: [facility] CATH LAB/EP;  Service: Cardiology;  Laterality: N/A;    DEBRIDEMENT OF FOOT Left 3/29/2023    Procedure: DEBRIDEMENT, FOOT;  Surgeon: Alona Pierce DPM;  [facility];  Service: Podiatry;  Laterality: Left;    DEBRIDEMENT OF FOOT Left 4/4/2023    Procedure: DEBRIDEMENT, FOOT;  Surgeon: Alona Pierce DPM;  [facility];  Service: Podiatry;  Laterality: Left;    DEBRIDEMENT OF FOOT Left 4/13/2023    Procedure: DEBRIDEMENT, FOOT;  Surgeon: Alona Pierce DPM;  [facility];  Service: Podiatry;  Laterality: Left;    HERNIA REPAIR      inguinal    IVUS (INTRAVASCULAR ULTRASOUND) Left 4/12/2023    Procedure: ULTRASOUND, INTRAVASCULAR;  Surgeon: Michael Giraldo III, MD;  [facility];  Service: Cardiology;  Laterality: Left;    LEFT HEART CATHETERIZATION Left 9/13/2019    Procedure: Left heart cath;  Surgeon: Edwin Azul MD;  Location: [facility] CATH LAB/EP;  Service: Cardiology;  Laterality: Left;    LEFT HEART CATHETERIZATION N/A 10/3/2019    Procedure: Left heart cath;  Surgeon: Edwin Azul MD;  Location: [facility] CATH LAB/EP;  Service: Cardiology;  Laterality: N/A;    OSTEOTOMY OF METATARSAL BONE Right 4/4/2023    Procedure: OSTEOTOMY, METATARSAL BONE;  Surgeon: Alona Pierce DPM;  [facility];  Service: Podiatry;  Laterality: Right;    PERCUTANEOUS TRANSLUMINAL ANGIOPLASTY Left 4/12/2023    Procedure: PTA (ANGIOPLASTY, PERCUTANEOUS, TRANSLUMINAL);  Surgeon: Michael Giraldo III, MD;  [facility];  Service: Cardiology;  Laterality: Left;    SURGICAL REMOVAL OF BUNION WITH OSTEOTOMY OF METATARSAL BONE Right 4/4/2023    Procedure: BUNIONECTOMY, WITH METATARSAL OSTEOTOMY;  Surgeon: Alona Pierce DPM;  [facility];  Service: Podiatry;  Laterality: Right;    TOE AMPUTATION Left 4/4/2023     Procedure: AMPUTATION, TOE;  Surgeon: Alona Pierce DPM;  Location: [facility];  Service: Podiatry;  Laterality: Left;  1st ray       Family History       Problem Relation (Age of Onset)    Heart disease Mother, Sister, Brother, Maternal Uncle, Brother, Sister, Maternal Uncle, Maternal Uncle, Maternal Uncle    Stroke Mother          Tobacco Use    Smoking status: Never    Smokeless tobacco: Never   Substance and Sexual Activity    Alcohol use: No    Drug use: No    Sexual activity: Not on file     Review of Systems   Constitutional:  Positive for appetite change. Negative for chills, fatigue and fever.   HENT:  Negative for trouble swallowing.    Respiratory:  Negative for shortness of breath.    Cardiovascular:  Negative for chest pain.   Gastrointestinal:  Positive for nausea and vomiting. Negative for abdominal pain, blood in stool and diarrhea.        Coffee-ground emesis   Genitourinary:  Negative for decreased urine volume.   Musculoskeletal:  Negative for myalgias.   Skin:  Positive for wound (Left great toe amputation). Negative for color change and pallor.   Neurological:  Negative for dizziness, weakness, light-headedness, numbness and headaches.   Psychiatric/Behavioral:  Negative for agitation and confusion. The patient is not nervous/anxious.    Objective:     Vital Signs (Most Recent):  Temp: 96.5 °F (35.8 °C) (04/17/23 1923)  Pulse: 97 (04/17/23 1923)  Resp: 18 (04/17/23 1923)  BP: (!) 165/82 (04/17/23 1923)  SpO2: 96 % (04/17/23 1923)   Vital Signs (24h Range):  Temp:  [96.5 °F (35.8 °C)-98.6 °F (37 °C)] 96.5 °F (35.8 °C)  Pulse:  [] 97  Resp:  [18-22] 18  SpO2:  [95 %-100 %] 96 %  BP: (121-175)/(57-91) 165/82     Weight: 96 kg (211 lb 10.3 oz)  Body mass index is 27.92 kg/m².    Foot Exam    Right Foot/Ankle     Inspection and Palpation  Skin Exam: drainage and ulcer;     Neurovascular  Dorsalis pedis: absent  Posterior tibial: absent    Comments  Hardware in place, K wire through distal  great toe. No signs of necrosis.     Left Foot/Ankle      Inspection and Palpation  Skin Exam: ulcer; no drainage     Neurovascular  Dorsalis pedis: absent  Posterior tibial: absent    Comments  1st ray amputation with no soft tissue envelop, wound base looks viable, application of surgicel to proximal wound base per wound care left in place. No excessive bleeding or non viable tissues appreciated.     Laboratory:  A1C:   Recent Labs   Lab 03/25/23  1734   HGBA1C 7.0*     CBC:   Recent Labs   Lab 04/17/23  1140   WBC 8.50   RBC 3.71*   HGB 11.6*   HCT 34.2*      MCV 92   MCH 31.3*   MCHC 33.9     CMP:   Recent Labs   Lab 04/16/23  1734 04/17/23  0552   * 204*   CALCIUM 9.5 9.0   ALBUMIN 3.0*  --    PROT 6.3  --     139   K 4.3 3.9   CO2 23 21*    105   BUN 22 26*   CREATININE 1.1 1.0   ALKPHOS 72  --    ALT 14  --    AST 14  --    BILITOT 0.6  --      Wound Cultures:   Recent Labs   Lab 03/29/23  1157   LABAERO MORGANELLA MORGANII  Moderate  *  METHICILLIN RESISTANT STAPHYLOCOCCUS AUREUS  Moderate  *  MORGANELLA MORGANII  Few  *       All pertinent labs reviewed within the last 24 hours.    Diagnostic Results:  I have reviewed all pertinent imaging results/findings within the past 24 hours.    Clinical Findings:                        Assessment/Plan:     Palliative Care  Postoperative state  S/P L 1st ray amputation 3/29 and debridement 4/13 due to gangrene and cellulitis. S/P R MILLY bunionectomy DOS 4/04.     No urgent intervention indicated   Post operative sites assessed and redressed  Appreciate wound care cleansing, dressing, and achieving hemostasis of foot wounds  Podiatry will follow with additional recommendations, considerations of wound vac pending continued hemostasis of left foot            Thank you for your consult. I will follow-up with patient. Please contact us if you have any additional questions.    Seamus Rice DPM, PGY-2  Podiatry / Foot and Ankle Surgery   Page #    Secure chat preferred

## 2023-04-18 NOTE — PLAN OF CARE
"Pt would benefit from cont OT services in order to maximize functional independence. Recommending return to SNF upon d/c. Per secure chat with MD pt is to be "weightbearing as tolerated in surgical shoe on right foot, partial weight-bearing to heel in surgical shoe on left foot." Pt sitting EOB for eval this date; podiatry in room to apply wound vac end of session. Will progress as able.     Problem: Occupational Therapy  Goal: Occupational Therapy Goal  Description: Goals to be met by: 5/18/2023     Patient will increase functional independence with ADLs by performing:    LE Dressing with Set-up Assistance.  Toileting from bedside commode with Modified [place] for hygiene and clothing management.   Toilet transfer to drop-arm bedside commode with Stand-by Assistance.    Outcome: Ongoing, Progressing     "

## 2023-04-19 ENCOUNTER — PATIENT OUTREACH (OUTPATIENT)
Dept: ADMINISTRATIVE | Facility: OTHER | Age: 83
End: 2023-04-19
Payer: MEDICARE

## 2023-04-19 LAB
ANION GAP SERPL CALC-SCNC: 9 MMOL/L (ref 8–16)
BASOPHILS # BLD AUTO: 0.04 K/UL (ref 0–0.2)
BASOPHILS # BLD AUTO: 0.04 K/UL (ref 0–0.2)
BASOPHILS # BLD AUTO: 0.05 K/UL (ref 0–0.2)
BASOPHILS NFR BLD: 0.5 % (ref 0–1.9)
BASOPHILS NFR BLD: 0.6 % (ref 0–1.9)
BASOPHILS NFR BLD: 0.6 % (ref 0–1.9)
BUN SERPL-MCNC: 19 MG/DL (ref 8–23)
CALCIUM SERPL-MCNC: 8.9 MG/DL (ref 8.7–10.5)
CHLORIDE SERPL-SCNC: 105 MMOL/L (ref 95–110)
CO2 SERPL-SCNC: 26 MMOL/L (ref 23–29)
CREAT SERPL-MCNC: 1.2 MG/DL (ref 0.5–1.4)
DIFFERENTIAL METHOD: ABNORMAL
EOSINOPHIL # BLD AUTO: 0.3 K/UL (ref 0–0.5)
EOSINOPHIL # BLD AUTO: 0.3 K/UL (ref 0–0.5)
EOSINOPHIL # BLD AUTO: 0.4 K/UL (ref 0–0.5)
EOSINOPHIL NFR BLD: 3.5 % (ref 0–8)
EOSINOPHIL NFR BLD: 3.9 % (ref 0–8)
EOSINOPHIL NFR BLD: 5 % (ref 0–8)
ERYTHROCYTE [DISTWIDTH] IN BLOOD BY AUTOMATED COUNT: 13.4 % (ref 11.5–14.5)
ERYTHROCYTE [DISTWIDTH] IN BLOOD BY AUTOMATED COUNT: 13.4 % (ref 11.5–14.5)
ERYTHROCYTE [DISTWIDTH] IN BLOOD BY AUTOMATED COUNT: 13.5 % (ref 11.5–14.5)
EST. GFR  (NO RACE VARIABLE): >60 ML/MIN/1.73 M^2
GLUCOSE SERPL-MCNC: 191 MG/DL (ref 70–110)
HCT VFR BLD AUTO: 29 % (ref 40–54)
HCT VFR BLD AUTO: 31 % (ref 40–54)
HCT VFR BLD AUTO: 32.6 % (ref 40–54)
HGB BLD-MCNC: 10.2 G/DL (ref 14–18)
HGB BLD-MCNC: 10.8 G/DL (ref 14–18)
HGB BLD-MCNC: 9.6 G/DL (ref 14–18)
IMM GRANULOCYTES # BLD AUTO: 0.07 K/UL (ref 0–0.04)
IMM GRANULOCYTES # BLD AUTO: 0.08 K/UL (ref 0–0.04)
IMM GRANULOCYTES # BLD AUTO: 0.09 K/UL (ref 0–0.04)
IMM GRANULOCYTES NFR BLD AUTO: 1 % (ref 0–0.5)
IMM GRANULOCYTES NFR BLD AUTO: 1 % (ref 0–0.5)
IMM GRANULOCYTES NFR BLD AUTO: 1.2 % (ref 0–0.5)
LYMPHOCYTES # BLD AUTO: 1.6 K/UL (ref 1–4.8)
LYMPHOCYTES # BLD AUTO: 1.7 K/UL (ref 1–4.8)
LYMPHOCYTES # BLD AUTO: 1.9 K/UL (ref 1–4.8)
LYMPHOCYTES NFR BLD: 21.5 % (ref 18–48)
LYMPHOCYTES NFR BLD: 22.2 % (ref 18–48)
LYMPHOCYTES NFR BLD: 24.6 % (ref 18–48)
MCH RBC QN AUTO: 30.8 PG (ref 27–31)
MCH RBC QN AUTO: 31.1 PG (ref 27–31)
MCH RBC QN AUTO: 31.2 PG (ref 27–31)
MCHC RBC AUTO-ENTMCNC: 32.9 G/DL (ref 32–36)
MCHC RBC AUTO-ENTMCNC: 33.1 G/DL (ref 32–36)
MCHC RBC AUTO-ENTMCNC: 33.1 G/DL (ref 32–36)
MCV RBC AUTO: 93 FL (ref 82–98)
MCV RBC AUTO: 94 FL (ref 82–98)
MCV RBC AUTO: 95 FL (ref 82–98)
MONOCYTES # BLD AUTO: 0.8 K/UL (ref 0.3–1)
MONOCYTES # BLD AUTO: 0.9 K/UL (ref 0.3–1)
MONOCYTES # BLD AUTO: 0.9 K/UL (ref 0.3–1)
MONOCYTES NFR BLD: 11.3 % (ref 4–15)
MONOCYTES NFR BLD: 11.4 % (ref 4–15)
MONOCYTES NFR BLD: 11.4 % (ref 4–15)
NEUTROPHILS # BLD AUTO: 4.3 K/UL (ref 1.8–7.7)
NEUTROPHILS # BLD AUTO: 4.5 K/UL (ref 1.8–7.7)
NEUTROPHILS # BLD AUTO: 4.9 K/UL (ref 1.8–7.7)
NEUTROPHILS NFR BLD: 58.4 % (ref 38–73)
NEUTROPHILS NFR BLD: 59.8 % (ref 38–73)
NEUTROPHILS NFR BLD: 62.1 % (ref 38–73)
NRBC BLD-RTO: 0 /100 WBC
PLATELET # BLD AUTO: 238 K/UL (ref 150–450)
PLATELET # BLD AUTO: 269 K/UL (ref 150–450)
PLATELET # BLD AUTO: 273 K/UL (ref 150–450)
PMV BLD AUTO: 11.3 FL (ref 9.2–12.9)
PMV BLD AUTO: 11.3 FL (ref 9.2–12.9)
PMV BLD AUTO: 11.6 FL (ref 9.2–12.9)
POCT GLUCOSE: 203 MG/DL (ref 70–110)
POCT GLUCOSE: 273 MG/DL (ref 70–110)
POCT GLUCOSE: 320 MG/DL (ref 70–110)
POTASSIUM SERPL-SCNC: 3.9 MMOL/L (ref 3.5–5.1)
RBC # BLD AUTO: 3.12 M/UL (ref 4.6–6.2)
RBC # BLD AUTO: 3.28 M/UL (ref 4.6–6.2)
RBC # BLD AUTO: 3.46 M/UL (ref 4.6–6.2)
SODIUM SERPL-SCNC: 140 MMOL/L (ref 136–145)
WBC # BLD AUTO: 7.2 K/UL (ref 3.9–12.7)
WBC # BLD AUTO: 7.73 K/UL (ref 3.9–12.7)
WBC # BLD AUTO: 7.81 K/UL (ref 3.9–12.7)

## 2023-04-19 PROCEDURE — 63600175 PHARM REV CODE 636 W HCPCS: Performed by: INTERNAL MEDICINE

## 2023-04-19 PROCEDURE — C9113 INJ PANTOPRAZOLE SODIUM, VIA: HCPCS | Performed by: INTERNAL MEDICINE

## 2023-04-19 PROCEDURE — 11000001 HC ACUTE MED/SURG PRIVATE ROOM

## 2023-04-19 PROCEDURE — 25000003 PHARM REV CODE 250: Performed by: FAMILY MEDICINE

## 2023-04-19 PROCEDURE — 99232 PR SUBSEQUENT HOSPITAL CARE,LEVL II: ICD-10-PCS | Mod: ,,, | Performed by: INTERNAL MEDICINE

## 2023-04-19 PROCEDURE — 85025 COMPLETE CBC W/AUTO DIFF WBC: CPT | Mod: 91 | Performed by: NURSE PRACTITIONER

## 2023-04-19 PROCEDURE — 63600175 PHARM REV CODE 636 W HCPCS: Performed by: NURSE PRACTITIONER

## 2023-04-19 PROCEDURE — 36415 COLL VENOUS BLD VENIPUNCTURE: CPT | Performed by: NURSE PRACTITIONER

## 2023-04-19 PROCEDURE — 97530 THERAPEUTIC ACTIVITIES: CPT

## 2023-04-19 PROCEDURE — 80048 BASIC METABOLIC PNL TOTAL CA: CPT | Performed by: NURSE PRACTITIONER

## 2023-04-19 PROCEDURE — 36415 COLL VENOUS BLD VENIPUNCTURE: CPT | Performed by: FAMILY MEDICINE

## 2023-04-19 PROCEDURE — 99232 SBSQ HOSP IP/OBS MODERATE 35: CPT | Mod: ,,, | Performed by: INTERNAL MEDICINE

## 2023-04-19 PROCEDURE — 85025 COMPLETE CBC W/AUTO DIFF WBC: CPT | Mod: 91 | Performed by: FAMILY MEDICINE

## 2023-04-19 PROCEDURE — 25000003 PHARM REV CODE 250: Performed by: NURSE PRACTITIONER

## 2023-04-19 PROCEDURE — 25000003 PHARM REV CODE 250: Performed by: INTERNAL MEDICINE

## 2023-04-19 RX ORDER — MUPIROCIN 20 MG/G
OINTMENT TOPICAL 2 TIMES DAILY
Status: DISCONTINUED | OUTPATIENT
Start: 2023-04-19 | End: 2023-04-21 | Stop reason: HOSPADM

## 2023-04-19 RX ADMIN — TRAZODONE HYDROCHLORIDE 150 MG: 100 TABLET ORAL at 09:04

## 2023-04-19 RX ADMIN — ATORVASTATIN CALCIUM 40 MG: 40 TABLET, FILM COATED ORAL at 08:04

## 2023-04-19 RX ADMIN — SODIUM CHLORIDE 8 MG/HR: 900 INJECTION INTRAVENOUS at 09:04

## 2023-04-19 RX ADMIN — ISOSORBIDE MONONITRATE 30 MG: 30 TABLET, EXTENDED RELEASE ORAL at 08:04

## 2023-04-19 RX ADMIN — AMLODIPINE BESYLATE 5 MG: 5 TABLET ORAL at 08:04

## 2023-04-19 RX ADMIN — INSULIN ASPART 1 UNITS: 100 INJECTION, SOLUTION INTRAVENOUS; SUBCUTANEOUS at 09:04

## 2023-04-19 RX ADMIN — DOXYCYCLINE HYCLATE 100 MG: 100 TABLET, COATED ORAL at 08:04

## 2023-04-19 RX ADMIN — INSULIN ASPART 2 UNITS: 100 INJECTION, SOLUTION INTRAVENOUS; SUBCUTANEOUS at 08:04

## 2023-04-19 RX ADMIN — SUCRALFATE 1 G: 1 TABLET ORAL at 09:04

## 2023-04-19 RX ADMIN — DOXYCYCLINE HYCLATE 100 MG: 100 TABLET, COATED ORAL at 09:04

## 2023-04-19 RX ADMIN — MUPIROCIN: 20 OINTMENT TOPICAL at 09:04

## 2023-04-19 RX ADMIN — SUCRALFATE 1 G: 1 TABLET ORAL at 05:04

## 2023-04-19 RX ADMIN — INSULIN ASPART 3 UNITS: 100 INJECTION, SOLUTION INTRAVENOUS; SUBCUTANEOUS at 12:04

## 2023-04-19 RX ADMIN — SODIUM CHLORIDE 8 MG/HR: 900 INJECTION INTRAVENOUS at 03:04

## 2023-04-19 RX ADMIN — METOPROLOL SUCCINATE 25 MG: 25 TABLET, EXTENDED RELEASE ORAL at 08:04

## 2023-04-19 RX ADMIN — Medication 6 MG: at 09:04

## 2023-04-19 RX ADMIN — SUCRALFATE 1 G: 1 TABLET ORAL at 04:04

## 2023-04-19 RX ADMIN — SODIUM CHLORIDE 8 MG/HR: 900 INJECTION INTRAVENOUS at 10:04

## 2023-04-19 RX ADMIN — FLUOXETINE HYDROCHLORIDE 40 MG: 20 CAPSULE ORAL at 08:04

## 2023-04-19 RX ADMIN — INSULIN ASPART 4 UNITS: 100 INJECTION, SOLUTION INTRAVENOUS; SUBCUTANEOUS at 04:04

## 2023-04-19 RX ADMIN — SUCRALFATE 1 G: 1 TABLET ORAL at 11:04

## 2023-04-19 NOTE — PT/OT/SLP PROGRESS
"Occupational Therapy   Treatment    Name: Julien Meléndez  MRN: [id number]  Admitting Diagnosis:  UGIB (upper gastrointestinal bleed)  2 Days Post-Op    Recommendations:     Discharge Recommendations: nursing facility, skilled  Discharge Equipment Recommendations:  wheelchair  Barriers to discharge:  Other (Comment) (Pt requires increased level of assistance)    Assessment:     Julien Meléndez is a 82 y.o. male with a medical diagnosis of UGIB (upper gastrointestinal bleed).  He presents with Diagnoses of Vomiting and UGIB (upper gastrointestinal bleed) were pertinent to this visit. Performance deficits affecting function are impaired endurance, gait instability, orthopedic precautions, impaired skin, decreased lower extremity function, edema, impaired balance, impaired self care skills, decreased coordination, impaired functional mobility, decreased safety awareness, weakness, pain.     Pt progressing well towards goals this date. Pt educated on WB precautions of RLE WBAT in surgical shoe & LLE PWB heel touch with surgical shoe. Pt performing sit<>stand with Min-ModA x2 & use of RW. Pt with limited endurance. Recommending pt return to prior level of post acute placement. Continue POC.     Rehab Prognosis:  Good; patient would benefit from acute skilled OT services to address these deficits and reach maximum level of function.       Plan:     Patient to be seen 5 x/week to address the above listed problems via self-care/home management, therapeutic activities, therapeutic exercises  Plan of Care Expires: 05/18/23  Plan of Care Reviewed with: patient, spouse    Subjective     Chief Complaint: "No pain if I don't move."  Patient/Family Comments/goals: Return to PLOF  Pain/Comfort:  Pain Rating 1: 1/10  Location - Side 1: Bilateral  Location 1: foot  Pain Addressed 1: Reposition, Distraction, Cessation of Activity, Nurse notified  Pain Rating Post-Intervention 1: other (see comments) (no c/o pain)    Objective: "     Communicated with: nsg prior to session.  Patient found HOB elevated with bed alarm, wound vac, telemetry upon OT entry to room.    General Precautions: Standard, fall    Orthopedic Precautions:LLE partial weight bearing, RLE weight bearing as tolerated (WBAT to RLE in surgical shoe, PWB to heel in LLE in surgical shoe)  Braces:  (B surgical shoes)  Respiratory Status: Room air     Occupational Performance:     Bed Mobility:    Patient completed Scooting/Bridging with stand by assistance  Patient completed Supine to Sit with supervision  Patient completed Sit to Supine with moderate assistance 2/2 BLE management    Functional Mobility/Transfers:  Patient completed Sit <> Stand Transfer with minimum assistance, moderate assistance, and of 2 persons  with  rolling walker first stand, progressing to Shari x2 & RW second and third standing trials; pt with good maintenance of WB precautions with increased v/cs.   Functional Mobility: Pt given increased v/cs for upright posture & use of BUE to bear weight through RW. Pt taking side steps to HOB with Shari & increased v/cs.     Activities of Daily Living:  Lower Body Dressing: maximal assistance to david/doff B surgical shoes      [facility] 6 Click ADL: 18    Treatment & Education:  Pt progressing well towards goals this date.   Pt educated on WB precautions of RLE WBAT in surgical shoe & LLE PWB heel touch with surgical shoe. Pt's wife bringing surgical shoes to room.   Pt performing sit<>stand with Min-ModA x2 & use of RW.   Pt with limited endurance & taking seated rest breaks during mobility.   Pt with c/o mild dizziness during standing & returned to supine.   Recommending pt return to prior level of post acute placement.   Continue POC.     Patient left HOB elevated with all lines intact, call button in reach, bed alarm on, nsg notified, family present, and BLE elevated    GOALS:   Multidisciplinary Problems       Occupational Therapy Goals          Problem: Occupational  Therapy    Goal Priority Disciplines Outcome Interventions   Occupational Therapy Goal     OT, PT/OT Ongoing, Progressing    Description: Goals to be met by: 5/18/2023     Patient will increase functional independence with ADLs by performing:    LE Dressing with Set-up Assistance.  Toileting from bedside commode with Modified [place] for hygiene and clothing management.   Toilet transfer to drop-arm bedside commode with Stand-by Assistance.                         Time Tracking:     OT Date of Treatment: 04/19/23  OT Start Time: 1407  OT Stop Time: 1432  OT Total Time (min): 25 min    Billable Minutes:Therapeutic Activity 25 cotx with PT    OT/GENA: OT     Number of GENA visits since last OT visit: 0    4/19/2023

## 2023-04-19 NOTE — ASSESSMENT & PLAN NOTE
· Patient reported a moderate amount of coffee-ground emesis  · IV Protonix 40 mg b.i.d.  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  Rec full liquid diet, continue Protonixgtt, add Carafate t.i.d. and will need to hold anticoagulation until bleeding has resolved  4/19- down trending- trend q6hr and reconsult GI

## 2023-04-19 NOTE — PLAN OF CARE
Pt progressing well towards goals this date. Pt educated on WB precautions of RLE WBAT in surgical shoe & LLE PWB heel touch with surgical shoe. Pt performing sit<>stand with Min-ModA x2 & use of RW. Pt with limited endurance. Recommending pt return to prior level of post acute placement. Continue POC.     Problem: Occupational Therapy  Goal: Occupational Therapy Goal  Description: Goals to be met by: 5/18/2023     Patient will increase functional independence with ADLs by performing:    LE Dressing with Set-up Assistance.  Toileting from bedside commode with Modified [place] for hygiene and clothing management.   Toilet transfer to drop-arm bedside commode with Stand-by Assistance.    Outcome: Ongoing, Progressing

## 2023-04-19 NOTE — NURSING
Awake, no complaitns. Wound vac in place-continuous at 100mmhg, small amount of serous sang. Fluid noted.

## 2023-04-19 NOTE — PROGRESS NOTES
[facility] Medicine  Progress Note    Patient Name: Julien Meléndez  MRN: [id number]  Patient Class: IP- Inpatient   Admission Date: 4/16/2023  Length of Stay: 1 days  Attending Physician: Cynthia Sanchez*  Primary Care Provider: Kelvin Wayne MD        Subjective:     Principal Problem:UGIB (upper gastrointestinal bleed)        HPI:  Julien Meléndez an 82-year-old male with PMH DMT2, MI, CAD, CAD, CKD, paroxysmal AFib, CHF, ischemic cardiomyopathy, hiatal hernia, HTN, HLD, and left foot cellulitis s/p great toe amputation.  Patient presents to the ED today after an episode of coffee ground emesis that occurred this morning.  Patient was recently discharged from [facility] for left foot cellulitis which required a left great toe amputation and he was discharged to a nursing facility on doxycycline x14 days (due to stop 04/28/2023).  Per patient, he had 1 episode of coffee-ground emesis that he states was moderate in amount.  He denies CP, SOB, abdominal pain, diarrhea, melena, hematochezia, dizziness, lightheadedness, fevers, chills, or any other signs of bleeding.    Hospital medicine will admit patient for GIB workup with consult to GI.      Overview/Hospital Course:  No notes on file    Interval History:  Awake and alert, lying in bed, no new complaint    H/H down trending, continue Protonix gtt- re-consult GI and monitor  Bp down trending-hold BP meds, monitor    Appreciate GI- S/p EGD on 4/17 reporting hemorrhagic gastritis with focal lesion losing-treated and recommend continue Protonix gtt and Carafate.      Review of Systems   Constitutional:  Positive for appetite change. Negative for chills and fever.   Respiratory:  Negative for shortness of breath.    Cardiovascular:  Negative for chest pain.   Gastrointestinal:  Negative for abdominal pain, blood in stool, diarrhea, nausea and vomiting.   Genitourinary:  Negative for decreased urine volume.   Musculoskeletal:   Negative for myalgias.   Skin:  Positive for wound (Left great toe amputation). Negative for color change and pallor.   Neurological:  Negative for dizziness, weakness, light-headedness, numbness and headaches.   Psychiatric/Behavioral:  Negative for agitation and confusion. The patient is not nervous/anxious.    Objective:     Vital Signs (Most Recent):  Temp: 97.6 °F (36.4 °C) (04/19/23 1152)  Pulse: 84 (04/19/23 1154)  Resp: 18 (04/19/23 1152)  BP: (!) 96/51 (04/19/23 1152)  SpO2: 100 % (04/19/23 1152)   Vital Signs (24h Range):  Temp:  [96.3 °F (35.7 °C)-97.8 °F (36.6 °C)] 97.6 °F (36.4 °C)  Pulse:  [77-87] 84  Resp:  [16-18] 18  SpO2:  [94 %-100 %] 100 %  BP: ()/(51-82) 96/51     Weight: 96 kg (211 lb 10.3 oz)  Body mass index is 27.92 kg/m².    Intake/Output Summary (Last 24 hours) at 4/19/2023 1319  Last data filed at 4/19/2023 0706  Gross per 24 hour   Intake 509.08 ml   Output 900 ml   Net -390.92 ml        Physical Exam  Vitals and nursing note reviewed.   Constitutional:       General: He is not in acute distress.     Appearance: He is not ill-appearing.   HENT:      Head: Normocephalic and atraumatic.   Cardiovascular:      Rate and Rhythm: Normal rate and regular rhythm.      Pulses:           Popliteal pulses are 1+ on the left side.        Posterior tibial pulses are 1+ on the right side.      Heart sounds: Normal heart sounds.   Pulmonary:      Effort: Pulmonary effort is normal. No respiratory distress.      Breath sounds: Normal breath sounds. No wheezing or rhonchi.   Abdominal:      General: Bowel sounds are normal. There is no distension.      Palpations: Abdomen is soft.      Tenderness: There is no abdominal tenderness. There is no guarding.   Musculoskeletal:      Right lower leg: No edema.      Left lower leg: No edema.      Left Lower Extremity: (Left great toe amp)  Feet:      Comments: Unable to assess left foot d/t surgical dressing - dressing CDI  Skin:     General: Skin is warm.       Findings: Bruising (BUE) present.   Neurological:      Mental Status: He is alert and oriented to person, place, and time.   Psychiatric:         Mood and Affect: Mood normal.         Behavior: Behavior normal.       Significant Labs: A1C:   Recent Labs   Lab 03/25/23  1734   HGBA1C 7.0*       ABGs: No results for input(s): PH, PCO2, HCO3, POCSATURATED, BE, TOTALHB, COHB, METHB, O2HB, POCFIO2, PO2 in the last 48 hours.  Blood Culture: No results for input(s): LABBLOO in the last 48 hours.  CBC:   Recent Labs   Lab 04/18/23  0248 04/18/23  1217 04/19/23  0506   WBC 6.52 8.37 7.20   HGB 10.1* 11.3* 9.6*   HCT 30.3* 33.7* 29.0*    253 238       CMP:   Recent Labs   Lab 04/18/23  0248 04/19/23  0506    140   K 3.6 3.9    105   CO2 24 26   * 191*   BUN 28* 19   CREATININE 1.1 1.2   CALCIUM 9.0 8.9   ANIONGAP 12 9       Lactic Acid: No results for input(s): LACTATE in the last 48 hours.  Lipase: No results for input(s): LIPASE in the last 48 hours.  Lipid Panel: No results for input(s): CHOL, HDL, LDLCALC, TRIG, CHOLHDL in the last 48 hours.  Magnesium: No results for input(s): MG in the last 48 hours.  Troponin: No results for input(s): TROPONINI, TROPONINIHS in the last 48 hours.  TSH: No results for input(s): TSH in the last 4320 hours.  Urine Culture: No results for input(s): LABURIN in the last 48 hours.  Urine Studies: No results for input(s): COLORU, APPEARANCEUA, PHUR, SPECGRAV, PROTEINUA, GLUCUA, KETONESU, BILIRUBINUA, OCCULTUA, NITRITE, UROBILINOGEN, LEUKOCYTESUR, RBCUA, WBCUA, BACTERIA, SQUAMEPITHEL, HYALINECASTS in the last 48 hours.    Invalid input(s): WRIGHTSUR    Significant Imaging: I have reviewed all pertinent imaging results/findings within the past 24 hours.      Assessment/Plan:      * UGIB (upper gastrointestinal bleed)  · Patient reported a moderate amount of coffee-ground emesis  · IV Protonix 40 mg b.i.d.  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  Rec full liquid diet, continue Protonixgtt, add Carafate t.i.d. and will need to hold anticoagulation until bleeding has resolved  4/19- down trending- trend q6hr and reconsult GI    Cellulitis of left foot  Peripheral artery disease  · Recently treated at [facility] and discharged 4/15  · Will continue doxycycline  · Stop date 04/28/2023  · S/p left great toe amputation, consult Podiatry  · Dressing CDI      Depressive disorder  Continue home fluoxetine and trazodone            Congestive heart failure, unspecified HF chronicity, unspecified heart failure type  Cardiomyopathy, ischemic  Continue home meds    PAF (paroxysmal atrial fibrillation)  Long term (current) use of anticoagulants  Patient with Paroxysmal (<7 days) atrial fibrillation which is controlled currently with Beta Blocker. Patient is currently in sinus rhythm.[id number] Score: 4. HASBLED Score: 3. Anticoagulation not indicated due to Active GIB.    Warfarin on hold at this time  Continue BB        Coronary artery disease due to calcified coronary lesion  · Patient denies CP   · Continue statin  · Warfarin on hold at this time in the setting of GIB  · May resume when cleared by GI      Diabetes mellitus type 2, noninsulin dependent  Patient's FSGs are controlled on current medication regimen.  Last A1c reviewed-   Lab Results   Component Value Date    HGBA1C 7.0 (H) 03/25/2023     Most recent fingerstick glucose reviewed- No results for input(s): POCTGLUCOSE in the last 24 hours.  Current correctional scale  Low  Maintain anti-hyperglycemic dose as follows-   Antihyperglycemics (From admission, onward)    Start     Stop Route  Frequency Ordered    04/16/23 2013  insulin aspart U-100 pen 0-5 Units         -- SubQ Every 6 hours PRN 04/16/23 1914        Hold Oral hypoglycemics while patient is in the hospital.    Hypertension  · Continue home BP meds   · Monitor for hypotension      Mixed hyperlipidemia  · Continue statin        VTE Risk Mitigation (From admission, onward)         Ordered     IP VTE HIGH RISK PATIENT  Once         04/16/23 1914     Place sequential compression device  Until discontinued         04/16/23 1914                Discharge Planning   AURELIA: 4/21/2023     Code Status: Full Code   Is the patient medically ready for discharge?:     Reason for patient still in hospital (select all that apply): Patient trending condition  Discharge Plan A: Skilled Nursing Facility                  Cynthia Sanchez MD  Department of Hospital Medicine   [place] - Telemetry

## 2023-04-19 NOTE — SUBJECTIVE & OBJECTIVE
Interval History:  Awake and alert, lying in bed, no new complaint    H/H down trending, continue Protonix gtt- re-consult GI and monitor  Bp down trending-hold BP meds, monitor    Appreciate GI- S/p EGD on 4/17 reporting hemorrhagic gastritis with focal lesion losing-treated and recommend continue Protonix gtt and Carafate.      Review of Systems   Constitutional:  Positive for appetite change. Negative for chills and fever.   Respiratory:  Negative for shortness of breath.    Cardiovascular:  Negative for chest pain.   Gastrointestinal:  Negative for abdominal pain, blood in stool, diarrhea, nausea and vomiting.   Genitourinary:  Negative for decreased urine volume.   Musculoskeletal:  Negative for myalgias.   Skin:  Positive for wound (Left great toe amputation). Negative for color change and pallor.   Neurological:  Negative for dizziness, weakness, light-headedness, numbness and headaches.   Psychiatric/Behavioral:  Negative for agitation and confusion. The patient is not nervous/anxious.    Objective:     Vital Signs (Most Recent):  Temp: 97.6 °F (36.4 °C) (04/19/23 1152)  Pulse: 84 (04/19/23 1154)  Resp: 18 (04/19/23 1152)  BP: (!) 96/51 (04/19/23 1152)  SpO2: 100 % (04/19/23 1152)   Vital Signs (24h Range):  Temp:  [96.3 °F (35.7 °C)-97.8 °F (36.6 °C)] 97.6 °F (36.4 °C)  Pulse:  [77-87] 84  Resp:  [16-18] 18  SpO2:  [94 %-100 %] 100 %  BP: ()/(51-82) 96/51     Weight: 96 kg (211 lb 10.3 oz)  Body mass index is 27.92 kg/m².    Intake/Output Summary (Last 24 hours) at 4/19/2023 1319  Last data filed at 4/19/2023 0706  Gross per 24 hour   Intake 509.08 ml   Output 900 ml   Net -390.92 ml        Physical Exam  Vitals and nursing note reviewed.   Constitutional:       General: He is not in acute distress.     Appearance: He is not ill-appearing.   HENT:      Head: Normocephalic and atraumatic.   Cardiovascular:      Rate and Rhythm: Normal rate and regular rhythm.      Pulses:           Popliteal pulses are  1+ on the left side.        Posterior tibial pulses are 1+ on the right side.      Heart sounds: Normal heart sounds.   Pulmonary:      Effort: Pulmonary effort is normal. No respiratory distress.      Breath sounds: Normal breath sounds. No wheezing or rhonchi.   Abdominal:      General: Bowel sounds are normal. There is no distension.      Palpations: Abdomen is soft.      Tenderness: There is no abdominal tenderness. There is no guarding.   Musculoskeletal:      Right lower leg: No edema.      Left lower leg: No edema.      Left Lower Extremity: (Left great toe amp)  Feet:      Comments: Unable to assess left foot d/t surgical dressing - dressing CDI  Skin:     General: Skin is warm.      Findings: Bruising (BUE) present.   Neurological:      Mental Status: He is alert and oriented to person, place, and time.   Psychiatric:         Mood and Affect: Mood normal.         Behavior: Behavior normal.       Significant Labs: A1C:   Recent Labs   Lab 03/25/23  1734   HGBA1C 7.0*       ABGs: No results for input(s): PH, PCO2, HCO3, POCSATURATED, BE, TOTALHB, COHB, METHB, O2HB, POCFIO2, PO2 in the last 48 hours.  Blood Culture: No results for input(s): LABBLOO in the last 48 hours.  CBC:   Recent Labs   Lab 04/18/23  0248 04/18/23  1217 04/19/23  0506   WBC 6.52 8.37 7.20   HGB 10.1* 11.3* 9.6*   HCT 30.3* 33.7* 29.0*    253 238       CMP:   Recent Labs   Lab 04/18/23  0248 04/19/23  0506    140   K 3.6 3.9    105   CO2 24 26   * 191*   BUN 28* 19   CREATININE 1.1 1.2   CALCIUM 9.0 8.9   ANIONGAP 12 9       Lactic Acid: No results for input(s): LACTATE in the last 48 hours.  Lipase: No results for input(s): LIPASE in the last 48 hours.  Lipid Panel: No results for input(s): CHOL, HDL, LDLCALC, TRIG, CHOLHDL in the last 48 hours.  Magnesium: No results for input(s): MG in the last 48 hours.  Troponin: No results for input(s): TROPONINI, TROPONINIHS in the last 48 hours.  TSH: No results for  input(s): TSH in the last 4320 hours.  Urine Culture: No results for input(s): LABURIN in the last 48 hours.  Urine Studies: No results for input(s): COLORU, APPEARANCEUA, PHUR, SPECGRAV, PROTEINUA, GLUCUA, KETONESU, BILIRUBINUA, OCCULTUA, NITRITE, UROBILINOGEN, LEUKOCYTESUR, RBCUA, WBCUA, BACTERIA, SQUAMEPITHEL, HYALINECASTS in the last 48 hours.    Invalid input(s): JOSÉ    Significant Imaging: I have reviewed all pertinent imaging results/findings within the past 24 hours.

## 2023-04-19 NOTE — ASSESSMENT & PLAN NOTE
Long term (current) use of anticoagulants  Patient with Paroxysmal (<7 days) atrial fibrillation which is controlled currently with Beta Blocker. Patient is currently in sinus rhythm.ODGPO1RBCt Score: 4. HASBLED Score: 3. Anticoagulation not indicated due to Active GIB.    Warfarin on hold at this time  Continue BB

## 2023-04-19 NOTE — PROGRESS NOTES
RSW met with patient to discuss discharge and additional patient barriers to care. Pt identified no additional social barriers to care. Per pt, pt is not in need of resources at this time.    The following were addressed during this visit:  -Complete follow-up with patient    JERALD Saucedo

## 2023-04-19 NOTE — PLAN OF CARE
1900 Pt appeared to be in no distress. Reported no pain.     Wound vac on L great toe. No drainage noted in wound vac canister. No alarms or issues noted.  Nurse unable to find vacuum MM HG to wound.     2100 Accu Ck: 213 covered w/1 u aspart    Tele: NS, HR 70, No alarms.     Bed in lowest position, wheels locked, non skid socks, ID band worn, personal items and call bell with in reach, bed alarm set.

## 2023-04-19 NOTE — PT/OT/SLP PROGRESS
Physical Therapy Treatment    Patient Name:  Julien Meléndez   MRN:  [id number]    Recommendations:     Discharge Recommendations: nursing facility, skilled  Discharge Equipment Recommendations: wheelchair  Barriers to Discharge:  increased caregiver burden of care    Assessment:     Julien Meléndez is a 82 y.o. male admitted with a medical diagnosis of UGIB (upper gastrointestinal bleed).  He presents with the following impairments/functional limitations: weakness, impaired endurance, impaired self care skills, impaired functional mobility, gait instability, impaired balance, decreased lower extremity function, pain, impaired skin, orthopedic precautions. Pt completed 3 stands this date with RW and min A x 2 from elevated bed. Pt with updated BLE WB status following PT evaluation yesterday's date. RLE WBAT with surgical shoe, LLE PWB to heel with surgical shoe. Recommending return to post acute placement upon d/c.    Rehab Prognosis: Good; patient would benefit from acute skilled PT services to address these deficits and reach maximum level of function.    Recent Surgery: Procedure(s) (LRB):  EGD (ESOPHAGOGASTRODUODENOSCOPY) (N/A) 2 Days Post-Op    Plan:     During this hospitalization, patient to be seen 5 x/week to address the identified rehab impairments via gait training, therapeutic activities, therapeutic exercises, wheelchair management/training and progress toward the following goals:    Plan of Care Expires:  05/18/23    Subjective     Chief Complaint: dizziness in standing  Patient/Family Comments/goals: pt pleasant and motivated to participate in therapy   Pain/Comfort:  Pain Rating 1: 1/10  Location - Side 1: Bilateral  Location 1: foot  Pain Rating Post-Intervention 1:  (does not report increased pain)      Objective:     Communicated with nurse Peralta prior to session. Patient found HOB elevated with bed alarm, wound vac, telemetry upon PT entry to room.     General Precautions: Standard,  fall  Orthopedic Precautions: RLE weight bearing as tolerated, LLE partial weight bearing (WBAT to RLE in surgical shoe, PWB to heel in LLE in surgical shoe)  Braces:  (B surgical shoes)  Respiratory Status: Room air     Functional Mobility:  Bed Mobility:     Scooting: stand by assistance  Supine to Sit: supervision  Sit to Supine: moderate assistance to raise BLEs into bed  Transfers:     Sit to Stand:  minimum assistance and of 2 persons with rolling walker and bed elevated  Gait: 2 side steps right with RW and min A - max cueing for increased UE WB through RW and cues to maintain LLE heel WB - pt maintains L heel WB with VCs      AM-PAC 6 CLICK MOBILITY  Turning over in bed (including adjusting bedclothes, sheets and blankets)?: 3  Sitting down on and standing up from a chair with arms (e.g., wheelchair, bedside commode, etc.): 2  Moving from lying on back to sitting on the side of the bed?: 3  Moving to and from a bed to a chair (including a wheelchair)?: 1  Need to walk in hospital room?: 1  Climbing 3-5 steps with a railing?: 1  Basic Mobility Total Score: 11       Treatment & Education:  Pt agreeable to participate in therapy session.  Educated on WB status with surgical shoes and pt's wife had brought pt's shoes.  Transitioned to sit EOB, donned pt's surgical shoes.  Bed elevated and pt completed 3 stands as above:  1st stand with min A x 1, mod A x 2 with assist to maintain L heel WB.  Instructed in upright posture, increased UE WB.  Completed side stepping as above.  Sat to rest between stands.  2nd/3rd stand with min A x 2 and able to stand with no assist to maintain L heel WB, only VCs.  Pt reporting dizziness during standing.  Educated on upright posture with forward gaze.    Patient left HOB elevated with all lines intact, call button in reach, bed alarm on, nurse notified, and pt's family present.    GOALS:   Multidisciplinary Problems       Physical Therapy Goals          Problem: Physical Therapy     Goal Priority Disciplines Outcome Goal Variances Interventions   Physical Therapy Goal     PT, PT/OT Ongoing, Progressing     Description: Goals to be met by: 23     Patient will increase functional independence with mobility by performin. Supine <> sit with Modified [place]  2. Sit to stand with Contact Guard Assistance with RW   3. Bed to chair transfer with Contact Guard Assistance   4. Gait x 10 ft with CGA and RW  3. Lower extremity exercise program x 10 reps per handout, with supervision                         Time Tracking:     PT Received On: 23  PT Start Time: 1407     PT Stop Time: 1431  PT Total Time (min): 24 min     Billable Minutes: Therapeutic Activity 24       PT/PTA: PT     Number of PTA visits since last PT visit: 0     2023

## 2023-04-19 NOTE — PLAN OF CARE
VIRTUAL NURSE:  Labs, notes, orders, and careplan reviewed. VN to be available as needed.        Problem: Adjustment to Illness (Gastrointestinal Bleeding)  Goal: Optimal Coping with Acute Illness  Outcome: Ongoing, Progressing     Problem: Bleeding (Gastrointestinal Bleeding)  Goal: Hemostasis  Outcome: Ongoing, Progressing     Problem: Adult Inpatient Plan of Care  Goal: Plan of Care Review  Outcome: Ongoing, Progressing  Goal: Patient-Specific Goal (Individualized)  Outcome: Ongoing, Progressing  Goal: Absence of Hospital-Acquired Illness or Injury  Outcome: Ongoing, Progressing  Goal: Optimal Comfort and Wellbeing  Outcome: Ongoing, Progressing  Goal: Readiness for Transition of Care  Outcome: Ongoing, Progressing     Problem: Diabetes Comorbidity  Goal: Blood Glucose Level Within Targeted Range  Outcome: Ongoing, Progressing     Problem: Impaired Wound Healing  Goal: Optimal Wound Healing  Outcome: Ongoing, Progressing     Problem: Fall Injury Risk  Goal: Absence of Fall and Fall-Related Injury  Outcome: Ongoing, Progressing     Problem: Skin or Soft Tissue Infection  Goal: Absence of Infection Signs and Symptoms  Outcome: Ongoing, Progressing     Problem: Pain Acute  Goal: Acceptable Pain Control and Functional Ability  Outcome: Ongoing, Progressing     Problem: Heart Failure Comorbidity  Goal: Maintenance of Heart Failure Symptom Control  Outcome: Ongoing, Progressing     Problem: Hypertension Comorbidity  Goal: Blood Pressure in Desired Range  Outcome: Ongoing, Progressing     Problem: Skin Injury Risk Increased  Goal: Skin Health and Integrity  Outcome: Ongoing, Progressing

## 2023-04-19 NOTE — PLAN OF CARE
Problem: Physical Therapy  Goal: Physical Therapy Goal  Description: Goals to be met by: 23     Patient will increase functional independence with mobility by performin. Supine <> sit with Modified [place]  2. Sit to stand with Contact Guard Assistance with RW   3. Bed to chair transfer with Contact Guard Assistance   4. Gait x 10 ft with CGA and RW  3. Lower extremity exercise program x 10 reps per handout, with supervision    2023 1439 by Hilary Calvillo, PT  Outcome: Ongoing, Progressing     Pt completed 3 stands this date with RW and min A x 2 from elevated bed. Pt with updated BLE WB status following PT evaluation yesterday's date. RLE WBAT with surgical shoe, LLE PWB to heel with surgical shoe. Recommending return to post acute placement upon d/c.

## 2023-04-20 LAB
ANION GAP SERPL CALC-SCNC: 11 MMOL/L (ref 8–16)
BASOPHILS # BLD AUTO: 0.04 K/UL (ref 0–0.2)
BASOPHILS NFR BLD: 0.7 % (ref 0–1.9)
BUN SERPL-MCNC: 16 MG/DL (ref 8–23)
CALCIUM SERPL-MCNC: 9 MG/DL (ref 8.7–10.5)
CHLORIDE SERPL-SCNC: 106 MMOL/L (ref 95–110)
CO2 SERPL-SCNC: 24 MMOL/L (ref 23–29)
CREAT SERPL-MCNC: 1.2 MG/DL (ref 0.5–1.4)
DIFFERENTIAL METHOD: ABNORMAL
EOSINOPHIL # BLD AUTO: 0.3 K/UL (ref 0–0.5)
EOSINOPHIL NFR BLD: 5.4 % (ref 0–8)
ERYTHROCYTE [DISTWIDTH] IN BLOOD BY AUTOMATED COUNT: 13.4 % (ref 11.5–14.5)
EST. GFR  (NO RACE VARIABLE): >60 ML/MIN/1.73 M^2
GLUCOSE SERPL-MCNC: 218 MG/DL (ref 70–110)
HCT VFR BLD AUTO: 28.8 % (ref 40–54)
HGB BLD-MCNC: 9.7 G/DL (ref 14–18)
IMM GRANULOCYTES # BLD AUTO: 0.06 K/UL (ref 0–0.04)
IMM GRANULOCYTES NFR BLD AUTO: 1 % (ref 0–0.5)
LYMPHOCYTES # BLD AUTO: 1.8 K/UL (ref 1–4.8)
LYMPHOCYTES NFR BLD: 30.5 % (ref 18–48)
MCH RBC QN AUTO: 30.8 PG (ref 27–31)
MCHC RBC AUTO-ENTMCNC: 33.7 G/DL (ref 32–36)
MCV RBC AUTO: 91 FL (ref 82–98)
MONOCYTES # BLD AUTO: 0.7 K/UL (ref 0.3–1)
MONOCYTES NFR BLD: 11.3 % (ref 4–15)
NEUTROPHILS # BLD AUTO: 3 K/UL (ref 1.8–7.7)
NEUTROPHILS NFR BLD: 51.1 % (ref 38–73)
NRBC BLD-RTO: 0 /100 WBC
PLATELET # BLD AUTO: 219 K/UL (ref 150–450)
PMV BLD AUTO: 11.6 FL (ref 9.2–12.9)
POCT GLUCOSE: 278 MG/DL (ref 70–110)
POCT GLUCOSE: 289 MG/DL (ref 70–110)
POCT GLUCOSE: 329 MG/DL (ref 70–110)
POTASSIUM SERPL-SCNC: 3.8 MMOL/L (ref 3.5–5.1)
RBC # BLD AUTO: 3.15 M/UL (ref 4.6–6.2)
SODIUM SERPL-SCNC: 141 MMOL/L (ref 136–145)
WBC # BLD AUTO: 5.91 K/UL (ref 3.9–12.7)

## 2023-04-20 PROCEDURE — 25000003 PHARM REV CODE 250: Performed by: FAMILY MEDICINE

## 2023-04-20 PROCEDURE — 97530 THERAPEUTIC ACTIVITIES: CPT | Mod: CQ

## 2023-04-20 PROCEDURE — 36415 COLL VENOUS BLD VENIPUNCTURE: CPT | Performed by: NURSE PRACTITIONER

## 2023-04-20 PROCEDURE — 85025 COMPLETE CBC W/AUTO DIFF WBC: CPT | Performed by: NURSE PRACTITIONER

## 2023-04-20 PROCEDURE — 11000001 HC ACUTE MED/SURG PRIVATE ROOM

## 2023-04-20 PROCEDURE — 97110 THERAPEUTIC EXERCISES: CPT | Mod: CQ

## 2023-04-20 PROCEDURE — 97530 THERAPEUTIC ACTIVITIES: CPT

## 2023-04-20 PROCEDURE — 63600175 PHARM REV CODE 636 W HCPCS: Performed by: INTERNAL MEDICINE

## 2023-04-20 PROCEDURE — 25000003 PHARM REV CODE 250: Performed by: NURSE PRACTITIONER

## 2023-04-20 PROCEDURE — 25000003 PHARM REV CODE 250: Performed by: INTERNAL MEDICINE

## 2023-04-20 PROCEDURE — 97535 SELF CARE MNGMENT TRAINING: CPT

## 2023-04-20 PROCEDURE — C9113 INJ PANTOPRAZOLE SODIUM, VIA: HCPCS | Performed by: INTERNAL MEDICINE

## 2023-04-20 PROCEDURE — 80048 BASIC METABOLIC PNL TOTAL CA: CPT | Performed by: NURSE PRACTITIONER

## 2023-04-20 RX ORDER — LOSARTAN POTASSIUM 50 MG/1
100 TABLET ORAL DAILY
Status: DISCONTINUED | OUTPATIENT
Start: 2023-04-20 | End: 2023-04-21 | Stop reason: HOSPADM

## 2023-04-20 RX ORDER — PANTOPRAZOLE SODIUM 40 MG/1
40 TABLET, DELAYED RELEASE ORAL 2 TIMES DAILY
Status: DISCONTINUED | OUTPATIENT
Start: 2023-04-20 | End: 2023-04-21 | Stop reason: HOSPADM

## 2023-04-20 RX ORDER — METOPROLOL SUCCINATE 25 MG/1
25 TABLET, EXTENDED RELEASE ORAL DAILY
Status: DISCONTINUED | OUTPATIENT
Start: 2023-04-20 | End: 2023-04-21 | Stop reason: HOSPADM

## 2023-04-20 RX ADMIN — DOXYCYCLINE HYCLATE 100 MG: 100 TABLET, COATED ORAL at 08:04

## 2023-04-20 RX ADMIN — INSULIN ASPART 2 UNITS: 100 INJECTION, SOLUTION INTRAVENOUS; SUBCUTANEOUS at 04:04

## 2023-04-20 RX ADMIN — MUPIROCIN: 20 OINTMENT TOPICAL at 09:04

## 2023-04-20 RX ADMIN — ATORVASTATIN CALCIUM 40 MG: 40 TABLET, FILM COATED ORAL at 08:04

## 2023-04-20 RX ADMIN — FLUOXETINE HYDROCHLORIDE 40 MG: 20 CAPSULE ORAL at 08:04

## 2023-04-20 RX ADMIN — METOPROLOL SUCCINATE 25 MG: 25 TABLET, EXTENDED RELEASE ORAL at 06:04

## 2023-04-20 RX ADMIN — SUCRALFATE 1 G: 1 TABLET ORAL at 05:04

## 2023-04-20 RX ADMIN — SUCRALFATE 1 G: 1 TABLET ORAL at 10:04

## 2023-04-20 RX ADMIN — ISOSORBIDE MONONITRATE 30 MG: 30 TABLET, EXTENDED RELEASE ORAL at 08:04

## 2023-04-20 RX ADMIN — MUPIROCIN: 20 OINTMENT TOPICAL at 08:04

## 2023-04-20 RX ADMIN — INSULIN ASPART 4 UNITS: 100 INJECTION, SOLUTION INTRAVENOUS; SUBCUTANEOUS at 08:04

## 2023-04-20 RX ADMIN — SODIUM CHLORIDE 8 MG/HR: 900 INJECTION INTRAVENOUS at 08:04

## 2023-04-20 RX ADMIN — SUCRALFATE 1 G: 1 TABLET ORAL at 09:04

## 2023-04-20 RX ADMIN — PANTOPRAZOLE SODIUM 40 MG: 40 TABLET, DELAYED RELEASE ORAL at 09:04

## 2023-04-20 RX ADMIN — INSULIN ASPART 3 UNITS: 100 INJECTION, SOLUTION INTRAVENOUS; SUBCUTANEOUS at 06:04

## 2023-04-20 RX ADMIN — INSULIN ASPART 3 UNITS: 100 INJECTION, SOLUTION INTRAVENOUS; SUBCUTANEOUS at 12:04

## 2023-04-20 RX ADMIN — Medication 6 MG: at 09:04

## 2023-04-20 RX ADMIN — DOXYCYCLINE HYCLATE 100 MG: 100 TABLET, COATED ORAL at 09:04

## 2023-04-20 RX ADMIN — TRAZODONE HYDROCHLORIDE 150 MG: 100 TABLET ORAL at 09:04

## 2023-04-20 RX ADMIN — LOSARTAN POTASSIUM 100 MG: 50 TABLET, FILM COATED ORAL at 06:04

## 2023-04-20 NOTE — PROGRESS NOTES
[facility] - Telemetry  Podiatry  Progress Note    Patient Name: Julien Meléndez  MRN: [id number]  Admission Date: 4/16/2023  Hospital Length of Stay: 2 days  Attending Physician: Cynthia Sanchez*  Primary Care Provider: Kelvin Wayne MD     Subjective:     Interval History: NAEON. BP slightly soft, other vitals stable. Notes BM. No new pedal complaints. R foot dressings c/d/I managed per nursing. Left foot wound vac in place with excellent seal and less than 20 mL of drainage in cannister after 48 hours. Also notes appetitive not was it used to be but slowly improving.     Follow-up For: Procedure(s) (LRB):  EGD (ESOPHAGOGASTRODUODENOSCOPY) (N/A)    Post-Operative Day: 3 Days Post-Op    Scheduled Meds:   atorvastatin  40 mg Oral Daily    doxycycline  100 mg Oral Q12H    FLUoxetine  40 mg Oral Daily    isosorbide mononitrate  30 mg Oral Daily    mupirocin   Nasal BID    sucralfate  1 g Oral QID (AC & HS)    traZODone  150 mg Oral Nightly     Continuous Infusions:   pantoprazole (PROTONIX) IV infusion 8 mg/hr (04/20/23 0846)     PRN Meds:acetaminophen, dextrose 10%, dextrose 10%, glucagon (human recombinant), insulin aspart U-100, melatonin, ondansetron, ondansetron, oxyCODONE-acetaminophen    Review of Systems   Constitutional:  Positive for appetite change. Negative for chills and fever.   Respiratory:  Negative for shortness of breath.    Cardiovascular:  Negative for chest pain.   Gastrointestinal:  Negative for abdominal pain, blood in stool, diarrhea, nausea and vomiting.   Genitourinary:  Negative for decreased urine volume.   Musculoskeletal:  Negative for myalgias.   Skin:  Positive for wound (Left foot with exposed bone). Negative for color change and pallor.   Neurological:  Negative for dizziness, weakness, light-headedness, numbness and headaches.   Psychiatric/Behavioral:  Negative for agitation and confusion. The patient is not nervous/anxious.    Objective:     Vital Signs (Most  Recent):  Temp: 98 °F (36.7 °C) (04/20/23 0751)  Pulse: 81 (04/20/23 0751)  Resp: 18 (04/20/23 0751)  BP: (!) 121/56 (04/20/23 0751)  SpO2: 100 % (04/20/23 0751)   Vital Signs (24h Range):  Temp:  [97.2 °F (36.2 °C)-98.2 °F (36.8 °C)] 98 °F (36.7 °C)  Pulse:  [74-88] 81  Resp:  [17-18] 18  SpO2:  [93 %-100 %] 100 %  BP: ()/(51-72) 121/56     Weight: 96 kg (211 lb 10.3 oz)  Body mass index is 27.92 kg/m².    Foot Exam    Right Foot/Ankle     Inspection and Palpation  Skin Exam: drainage and ulcer;     Neurovascular  Dorsalis pedis: absent  Posterior tibial: absent    Comments  Hardware in place, K wire through distal great toe. No signs of necrosis.     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 No excessive bleeding or non viable tissues appreciated.     Wound vac in place since 04/18    Laboratory:  A1C:   Recent Labs   Lab 03/25/23  1734   HGBA1C 7.0*     CBC:   Recent Labs   Lab 04/20/23  0446   WBC 5.91   RBC 3.15*   HGB 9.7*   HCT 28.8*      MCV 91   MCH 30.8   MCHC 33.7     CMP:   Recent Labs   Lab 04/16/23  1734 04/17/23  0552 04/20/23  0446   *   < > 218*   CALCIUM 9.5   < > 9.0   ALBUMIN 3.0*  --   --    PROT 6.3  --   --       < > 141   K 4.3   < > 3.8   CO2 23   < > 24      < > 106   BUN 22   < > 16   CREATININE 1.1   < > 1.2   ALKPHOS 72  --   --    ALT 14  --   --    AST 14  --   --    BILITOT 0.6  --   --     < > = values in this interval not displayed.     CRP: No results for input(s): CRP in the last 168 hours.  Microbiology Results (last 7 days)       ** No results found for the last 168 hours. **          Specimen (24h ago, onward)      None          All pertinent labs reviewed within the last 24  hours.    Diagnostic Results:  I have reviewed all pertinent imaging results/findings within the past 24 hours.    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 S/P R MILLY bunionectomy DOS 4/04.     Wound vac applied to L foot amputation site 4/18   Plan to consult wound care to resume management of left foot wound vac.   Foot foot post operative site assessed and redressed.    Continue PO doxycycline for OM of left foot s/p partial 1st ray amputation   Offload heels and left lateral foot alteration in skin integrity   Nursing wound care routine for right foot  RLE WBAT w/ dressing and surgical shoe   LLE PWB Heel touch w/ dressing and surgical shoe  Podiatry will follow               Seamus Rice DPM, PGY-2  Podiatry / Foot and Ankle Surgery   Page # [phone]  Secure chat preferred

## 2023-04-20 NOTE — ASSESSMENT & PLAN NOTE
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 S/P R MILLY bunionectomy DOS 4/04.     Wound vac applied to L foot amputation site 4/18   Plan to consult wound care to resume management of left foot wound vac.   Foot foot post operative site assessed and redressed.    Continue PO doxycycline for OM of left foot s/p partial 1st ray amputation   Offload heels and left lateral foot alteration in skin integrity   Nursing wound care routine for right foot  RLE WBAT w/ dressing and surgical shoe   LLE PWB Heel touch w/ dressing and surgical shoe  Podiatry will follow

## 2023-04-20 NOTE — PLAN OF CARE
Recommendation:  1. Add ADA Cardiac restrictions to diet.   2. Add Raymond BID and beneprotein TID to promote wound healing.   3. Monitor weight/labs.   4. RD to follow to monitor po intake    Goals:  Pt will tolerate diet with at least 50-75% intake at meals by RD follow up  Nutrition Goal Status: new

## 2023-04-20 NOTE — PT/OT/SLP PROGRESS
Physical Therapy Treatment    Patient Name:  Julien Meléndez   MRN:  [id number]    Recommendations:     Discharge Recommendations: nursing facility, skilled  Discharge Equipment Recommendations: wheelchair  Barriers to discharge:  decreased functional mobility/strength requiring increased assistance at this time    Assessment:     Julien Meléndez is a 82 y.o. male admitted with a medical diagnosis of UGIB (upper gastrointestinal bleed).  He presents with the following impairments/functional limitations: weakness, impaired endurance, impaired self care skills, impaired functional mobility, gait instability, impaired balance, decreased coordination, impaired coordination, decreased lower extremity function, decreased upper extremity function, pain, decreased ROM, impaired skin, edema, decreased safety awareness Pt would continue to benefit from P.T. To address impairments listed above.  .    Rehab Prognosis: Fair; patient would benefit from acute skilled PT services to address these deficits and reach maximum level of function.    Recent Surgery: Procedure(s) (LRB):  EGD (ESOPHAGOGASTRODUODENOSCOPY) (N/A) 3 Days Post-Op    Plan:     During this hospitalization, patient to be seen 5 x/week to address the identified rehab impairments via gait training, therapeutic activities, therapeutic exercises, wheelchair management/training and progress toward the following goals:    Plan of Care Expires:  05/18/23    Subjective     Patient/Family Comments/goals: Pt agreed to tx.  Pain/Comfort:  Pain Rating 1: 0/10  Pain Rating Post-Intervention 1: 0/10      Objective:     Communicated with RN prior to session.  Patient found supine with wound vac, telemetry upon PT entry to room.     General Precautions: Standard, fall  Orthopedic Precautions: RLE weight bearing as tolerated, LLE partial weight bearing (WBAT to RLE in surgical shoe, PWB to heel in LLE in surgical shoe)  Braces:  (B surgical shoes)  Respiratory Status: Room air      Functional Mobility:  Bed Mobility:     Rolling Left:  supervision and b/r for assist  Scooting: supervision  Supine to Sit: supervision  Transfers:     Sit to Stand:  Mod A with Rw from EOB x 2 and Max A from b/s chair with RW (lower level).  Pt requires increased vc's for hand placement and left foot placement to remain heel weight bearing with standing.    Bed to Chair: minimum assistance and moderate assistance with  rolling walker  using  Step Transfer  B/s chair to Toilet Transfer: minimum assistance and moderate assistance with  rolling walker  using  Step Transfer  Gait: 3 small turning steps from EOB to b/s chair with RW and Mod/Shari with 1 bouts posterior LOB.  Vc's for sequencing and to remain heel w/mehdi on L.  Pt ambulated ~2 ft from b/s chair to b/s commode with RW and Mod/Shari with vc's for sequencing and to remain heel w/mehdi on L.  Balance: sitting good-, standing fair-, gait poor+      AM-PAC 6 CLICK MOBILITY  Turning over in bed (including adjusting bedclothes, sheets and blankets)?: 3  Sitting down on and standing up from a chair with arms (e.g., wheelchair, bedside commode, etc.): 2  Moving from lying on back to sitting on the side of the bed?: 3  Moving to and from a bed to a chair (including a wheelchair)?: 2  Need to walk in hospital room?: 1  Climbing 3-5 steps with a railing?: 1  Basic Mobility Total Score: 12       Treatment & Education:  Pt sat EOb and was assisted with donning B surgical shoes.  Seated BLe therex: AP, LAQs, hip flexion(vc's for slow landing of feet on floor) 10 x 2 reps.  Gait and transfers above above with pt ambulating with decreased annie, step length, increased w/mehdi through BUEs onto Rw during left heel stance to step with right foot, and posterior LOB x 1 with transfer to b/s chair. Pt required a seated rest after transfer secondary to decreased strength/endurance.  Pt required increased vc's and assist with sit to  order to maintain weight bearing  precautions which he has difficulty with at times.  Pt left sitting on b/s commode with OT present.      Patient left  sitting on b/s commode with OT  with all lines intact, call button in reach, RN notified, and OT present..    GOALS:   Multidisciplinary Problems       Physical Therapy Goals          Problem: Physical Therapy    Goal Priority Disciplines Outcome Goal Variances Interventions   Physical Therapy Goal     PT, PT/OT Ongoing, Progressing     Description: Goals to be met by: 23     Patient will increase functional independence with mobility by performin. Supine <> sit with Modified [place]  2. Sit to stand with Contact Guard Assistance with RW   3. Bed to chair transfer with Contact Guard Assistance   4. Gait x 10 ft with CGA and RW  3. Lower extremity exercise program x 10 reps per handout, with supervision                         Time Tracking:     PT Received On: 23  PT Start Time: 1517     PT Stop Time: 1555  PT Total Time (min): 38 min     Billable Minutes: Therapeutic Activity 26 and Therapeutic Exercise 12    Treatment Type: Treatment  PT/PTA: PTA     Number of PTA visits since last PT visit: 2023

## 2023-04-20 NOTE — SUBJECTIVE & OBJECTIVE
Interval History:  Awake and alert, lying in bed, no new complaint  Updated on lab results and GI rec;s   H/H down trending, continue po Protonix  re-consult GI and monitor  Bp up trending- resume Bp as BP permits.     Appreciate GI- S/p EGD on 4/17 reporting hemorrhagic gastritis with focal lesion losing-treated and recommend continue Protonix and Carafate., continue to hold blood thinner       Review of Systems   Constitutional:  Positive for appetite change. Negative for chills and fever.   Respiratory:  Negative for shortness of breath.    Cardiovascular:  Negative for chest pain.   Gastrointestinal:  Negative for abdominal pain, blood in stool, diarrhea, nausea and vomiting.   Genitourinary:  Negative for decreased urine volume.   Musculoskeletal:  Negative for myalgias.   Skin:  Positive for wound (Left great toe amputation). Negative for color change and pallor.   Neurological:  Negative for dizziness, weakness, light-headedness, numbness and headaches.   Psychiatric/Behavioral:  Negative for agitation and confusion. The patient is not nervous/anxious.    Objective:     Vital Signs (Most Recent):  Temp: 98 °F (36.7 °C) (04/20/23 1709)  Pulse: 93 (04/20/23 1709)  Resp: 18 (04/20/23 1709)  BP: (!) 174/79 (04/20/23 1709)  SpO2: 98 % (04/20/23 1709)   Vital Signs (24h Range):  Temp:  [97.2 °F (36.2 °C)-98.2 °F (36.8 °C)] 98 °F (36.7 °C)  Pulse:  [74-93] 93  Resp:  [17-18] 18  SpO2:  [93 %-100 %] 98 %  BP: (102-174)/(55-79) 174/79     Weight: 96 kg (211 lb 10.3 oz)  Body mass index is 27.92 kg/m².    Intake/Output Summary (Last 24 hours) at 4/20/2023 1726  Last data filed at 4/20/2023 0502  Gross per 24 hour   Intake --   Output 700 ml   Net -700 ml        Physical Exam  Vitals and nursing note reviewed.   Constitutional:       General: He is not in acute distress.     Appearance: He is not ill-appearing.   HENT:      Head: Normocephalic and atraumatic.   Cardiovascular:      Rate and Rhythm: Normal rate and  regular rhythm.      Pulses:           Popliteal pulses are 1+ on the left side.        Posterior tibial pulses are 1+ on the right side.      Heart sounds: Normal heart sounds.   Pulmonary:      Effort: Pulmonary effort is normal. No respiratory distress.      Breath sounds: Normal breath sounds. No wheezing or rhonchi.   Abdominal:      General: Bowel sounds are normal. There is no distension.      Palpations: Abdomen is soft.      Tenderness: There is no abdominal tenderness. There is no guarding.   Musculoskeletal:      Right lower leg: No edema.      Left lower leg: No edema.      Left Lower Extremity: (Left great toe amp)  Feet:      Comments: Unable to assess left foot d/t surgical dressing - dressing CDI  Skin:     General: Skin is warm.      Findings: Bruising (BUE) present.   Neurological:      Mental Status: He is alert and oriented to person, place, and time.   Psychiatric:         Mood and Affect: Mood normal.         Behavior: Behavior normal.       Significant Labs: A1C:   Recent Labs   Lab 03/25/23  1734   HGBA1C 7.0*       ABGs: No results for input(s): PH, PCO2, HCO3, POCSATURATED, BE, TOTALHB, COHB, METHB, O2HB, POCFIO2, PO2 in the last 48 hours.  Blood Culture: No results for input(s): LABBLOO in the last 48 hours.  CBC:   Recent Labs   Lab 04/19/23  1403 04/19/23  1938 04/20/23  0446   WBC 7.81 7.73 5.91   HGB 10.2* 10.8* 9.7*   HCT 31.0* 32.6* 28.8*    273 219       CMP:   Recent Labs   Lab 04/19/23  0506 04/20/23  0446    141   K 3.9 3.8    106   CO2 26 24   * 218*   BUN 19 16   CREATININE 1.2 1.2   CALCIUM 8.9 9.0   ANIONGAP 9 11       Lactic Acid: No results for input(s): LACTATE in the last 48 hours.  Lipase: No results for input(s): LIPASE in the last 48 hours.  Lipid Panel: No results for input(s): CHOL, HDL, LDLCALC, TRIG, CHOLHDL in the last 48 hours.  Magnesium: No results for input(s): MG in the last 48 hours.  Troponin: No results for input(s): TROPONINI,  TROPONINIHS in the last 48 hours.  TSH: No results for input(s): TSH in the last 4320 hours.  Urine Culture: No results for input(s): LABURIN in the last 48 hours.  Urine Studies: No results for input(s): COLORU, APPEARANCEUA, PHUR, SPECGRAV, PROTEINUA, GLUCUA, KETONESU, BILIRUBINUA, OCCULTUA, NITRITE, UROBILINOGEN, LEUKOCYTESUR, RBCUA, WBCUA, BACTERIA, SQUAMEPITHEL, HYALINECASTS in the last 48 hours.    Invalid input(s): JOSÉ    Significant Imaging: I have reviewed all pertinent imaging results/findings within the past 24 hours.

## 2023-04-20 NOTE — SUBJECTIVE & OBJECTIVE
Subjective:     Interval History: NAEON. BP slightly soft, other vitals stable. Notes BM. No new pedal complaints. R foot dressings c/d/I managed per nursing. Left foot wound vac in place with excellent seal and less than 20 mL of drainage in cannister after 48 hours. Also notes appetitive not was it used to be but slowly improving.     Follow-up For: Procedure(s) (LRB):  EGD (ESOPHAGOGASTRODUODENOSCOPY) (N/A)    Post-Operative Day: 3 Days Post-Op    Scheduled Meds:   atorvastatin  40 mg Oral Daily    doxycycline  100 mg Oral Q12H    FLUoxetine  40 mg Oral Daily    isosorbide mononitrate  30 mg Oral Daily    mupirocin   Nasal BID    sucralfate  1 g Oral QID (AC & HS)    traZODone  150 mg Oral Nightly     Continuous Infusions:   pantoprazole (PROTONIX) IV infusion 8 mg/hr (04/20/23 0846)     PRN Meds:acetaminophen, dextrose 10%, dextrose 10%, glucagon (human recombinant), insulin aspart U-100, melatonin, ondansetron, ondansetron, oxyCODONE-acetaminophen    Review of Systems   Constitutional:  Positive for appetite change. Negative for chills and fever.   Respiratory:  Negative for shortness of breath.    Cardiovascular:  Negative for chest pain.   Gastrointestinal:  Negative for abdominal pain, blood in stool, diarrhea, nausea and vomiting.   Genitourinary:  Negative for decreased urine volume.   Musculoskeletal:  Negative for myalgias.   Skin:  Positive for wound (Left foot with exposed bone). Negative for color change and pallor.   Neurological:  Negative for dizziness, weakness, light-headedness, numbness and headaches.   Psychiatric/Behavioral:  Negative for agitation and confusion. The patient is not nervous/anxious.    Objective:     Vital Signs (Most Recent):  Temp: 98 °F (36.7 °C) (04/20/23 0751)  Pulse: 81 (04/20/23 0751)  Resp: 18 (04/20/23 0751)  BP: (!) 121/56 (04/20/23 0751)  SpO2: 100 % (04/20/23 0751)   Vital Signs (24h Range):  Temp:  [97.2 °F (36.2 °C)-98.2 °F (36.8 °C)] 98 °F (36.7 °C)  Pulse:   [74-88] 81  Resp:  [17-18] 18  SpO2:  [93 %-100 %] 100 %  BP: ()/(51-72) 121/56     Weight: 96 kg (211 lb 10.3 oz)  Body mass index is 27.92 kg/m².    Foot Exam    Right Foot/Ankle     Inspection and Palpation  Skin Exam: drainage and ulcer;     Neurovascular  Dorsalis pedis: absent  Posterior tibial: absent    Comments  Hardware in place, K wire through distal great toe. No signs of necrosis.     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 No excessive bleeding or non viable tissues appreciated.     Wound vac in place since 04/18    Laboratory:  A1C:   Recent Labs   Lab 03/25/23  1734   HGBA1C 7.0*     CBC:   Recent Labs   Lab 04/20/23  0446   WBC 5.91   RBC 3.15*   HGB 9.7*   HCT 28.8*      MCV 91   MCH 30.8   MCHC 33.7     CMP:   Recent Labs   Lab 04/16/23  1734 04/17/23  0552 04/20/23  0446   *   < > 218*   CALCIUM 9.5   < > 9.0   ALBUMIN 3.0*  --   --    PROT 6.3  --   --       < > 141   K 4.3   < > 3.8   CO2 23   < > 24      < > 106   BUN 22   < > 16   CREATININE 1.1   < > 1.2   ALKPHOS 72  --   --    ALT 14  --   --    AST 14  --   --    BILITOT 0.6  --   --     < > = values in this interval not displayed.     CRP: No results for input(s): CRP in the last 168 hours.  Microbiology Results (last 7 days)       ** No results found for the last 168 hours. **          Specimen (24h ago, onward)      None          All pertinent labs reviewed within the last 24 hours.    Diagnostic Results:  I have reviewed all pertinent imaging results/findings within the past 24 hours.    Clinical Findings:    Left foot vac currently in place with excellent seal

## 2023-04-20 NOTE — PHYSICIAN QUERY
PT Name: Julien Meléndez  MR #: [id number]     DOCUMENTATION CLARIFICATION     CDS/: Kennedy Steiner RN       Contact information: [email or web address]    This form is a permanent document in the medical record.     Query Date: April 20, 2023    By submitting this query, we are merely seeking further clarification of documentation.  Please utilize your independent clinical judgment when addressing the question(s) below.    The Medical Record contains the following:   Indicators   Supporting Clinical Findings Location in Medical Record    Non-blanchable erythema/redness     x Ulcer/Injury/Skin Breakdown Patient also with decubitus wound along the lateral border of the foot in addition to post first and fifth metatarsal osteotomies right foot and partial first ray resection left foot per Dr. Pierce    Right Foot/Ankle   Inspection and Palpation  Skin Exam: drainage and ulcer    Left Foot/Ankle    Inspection and Palpation  Skin Exam: ulcer; no drainage     Podiatry Consult (4/17)    Deep Tissue Injury     x Wound care consult Pt s/p foot surgery to R/L foot- see photos in media file- pin in place to R great toe- L lateral foot with DTI- L medial foot surgery site with clotting dressing in place- applied xeroform dressings   WC Consult (4/17)   x Acute/Chronic Illness 82-year-old male with DMT2, MI, CAD, CAD, CKD, paroxysmal AFib, CHF, ischemic cardiomyopathy, hiatal hernia, HTN, HLD, and 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 Patient presents to the ED today after an episode of coffee ground emesis that occurred this morning.  Patient was recently discharged from [facility] for left foot cellulitis which required a left 1st ray partial amputation and debridement and he was discharged to a nursing facility on doxycycline x14 days (due to stop 04/28/2023).      Cellulitis of  left foot   Podiatry Consult (4/17)                             H&P (4/16)   x Medication/Treatment No urgent intervention indicated   Post operative sites assessed and redressed    R foot dressings c/d/I managed per nursing. Left foot wound vac in place with excellent seal and less than 20 mL of drainage in cannister after 48 hours   Podiatry Consult (4/17)      Podiatry PN (4/20)   x Other Unable to assess left foot d/t surgical dressing - dressing CDI              H&P (4/16)      Podiatry Consult (4/17)      The clinical guidelines noted are only a system guideline. It does not replace the providers clinical judgment.    Per the National Pressure Injury Advisory Panel:   A pressure injury is localized damage to the skin and underlying soft tissue usually over a bony prominence or related to a medical or other device. The injury can present as intact skin or an open ulcer and may be painful. The injury occurs as a result of intense and/or prolonged pressure or pressure in combination with shear. The tolerance of soft tissue for pressure and shear may also be affected by microclimate, nutrition, perfusion, co-morbidities and condition of the soft tissue.       Stage 1 Pressure Injury:  Intact skin with a localized area of non-blanchable erythema, which may appear differently in darkly pigmented skin. Color changes do not include purple or maroon discoloration; these may indicate deep tissue pressure injury.    Stage 2 Pressure Injury:  Partial-thickness loss of skin with exposed dermis. The wound bed is viable, pink or red, moist, and may also present as an intact or ruptured serum-filled blister.    Stage 3 Pressure Injury:  Full-thickness loss of skin, in which adipose (fat) is visible in the ulcer and granulation tissue and epibole (rolled wound edges) are often present. Slough and/or eschar may be visible. Undermining and tunneling may occur.    Stage 4 Pressure Injury:  Full-thickness skin and tissue  "loss with exposed or directly palpable fascia, muscle, tendon, ligament, cartilage or bone in the ulcer. Slough and/or eschar may be visible. Epibole (rolled edges), undermining and/or tunneling often occur.    Unstageable Pressure Injury:  Full-thickness skin and tissue loss in which the extent of tissue damage within the ulcer cannot be confirmed because it is obscured by slough or eschar. If slough or eschar is removed, a Stage 3 or Stage 4 pressure injury will be revealed.    Deep Tissue Pressure Injury:  Intact or non-intact skin with localized area of persistent non-blanchable deep red, maroon, purple discoloration or epidermal separation revealing a dark wound bed or blood filled blister. This injury results from intense and/or prolonged pressure and shear forces at the bone-muscle interface. The wound may evolve rapidly to reveal the actual extent of tissue injury, or may resolve without tissue loss. If necrotic tissue, subcutaneous tissue, granulation tissue, fascia, muscle or other underlying structures are visible, this indicates a full thickness pressure injury (Unstageable, Stage 3 or Stage 4). Do not use DTPI to describe vascular, traumatic, neuropathic, or dermatologic conditions.       Provider, please provide the integumentary diagnosis, with clarified site(s), related to the documentation of ("lateral border of the foot"): Please select all that apply.     [ x  ] Pressure Injury/Decubitus Ulcer, Stage 1, Left foot     [   ] Pressure Injury/Decubitus Ulcer, Stage 1, Right foot     [   ] Pressure Injury/Decubitus Ulcer, Stage 2, Left foot     [   ] Pressure Injury/Decubitus Ulcer, Stage 2, Right foot     [   ] Pressure Injury/Decubitus Ulcer, Stage 3, Left foot     [   ] Pressure Injury/Decubitus Ulcer, Stage 3, Right foot     [   ] Pressure Injury/Decubitus Ulcer, Stage 4, Left foot     [   ] Pressure Injury/Decubitus Ulcer, Stage 4, Right foot     [   ] Deep Tissue Pressure Injury, Left foot      [ "   ] Pressure Injury/Decubitus Ulcer, Unstageable, Left foot     [   ] Pressure Injury/Decubitus Ulcer, Unstageable, Right foot     [   ] Other Integumentary Diagnosis (please specify):______________         Please document in your progress notes daily for the duration of treatment until resolved and include in your discharge summary.    Reference:    MARVIN Boone., ABDIRASHID Carbone., Goldberg, M., MARVIN De Luna, MARVIN Almendarez, & MEGAN Phoenix. (2016). Revised National Pressure Ulcer Advisory Panel Pressure Injury Staging System: Revised Pressure Injury Staging System. J Wound Ostomy Continence Nurs, 43(6), 585-597. doi:10.1097/[id number]    Form No.[id number]

## 2023-04-20 NOTE — PROGRESS NOTES
[facility]  [facility]  Progress Note    Patient Name: Julien Meléndez  MRN: [id number]  Admission Date: 4/16/2023  Hospital Length of Stay: 1 days  Code Status: Full Code   Attending Provider: Cynthia Sanchez*  Consulting Provider: Otto Villarreal MD  Primary Care Physician: Kelvin Wayne MD  Principal Problem: UGIB (upper gastrointestinal bleed)      Subjective:     Interval History:   Doing much better. Family at bedside. No vomiting , no black stool , no radiating symptoms.  Feels well  Eating good    Review of Systems   Constitutional:  Negative for chills and fever.   Respiratory:  Negative for choking and chest tightness.    Gastrointestinal:  Negative for abdominal pain and vomiting.   Neurological:  Negative for dizziness and headaches.   Psychiatric/Behavioral:  Negative for agitation and behavioral problems.    Objective:     Vital Signs (Most Recent):  Temp: 98.2 °F (36.8 °C) (04/19/23 1912)  Pulse: 79 (04/19/23 1912)  Resp: 18 (04/19/23 1912)  BP: 122/72 (04/19/23 1912)  SpO2: (!) 93 % (04/19/23 1912)   Vital Signs (24h Range):  Temp:  [96.3 °F (35.7 °C)-98.2 °F (36.8 °C)] 98.2 °F (36.8 °C)  Pulse:  [76-87] 79  Resp:  [16-18] 18  SpO2:  [93 %-100 %] 93 %  BP: ()/(51-82) 122/72     Weight: 96 kg (211 lb 10.3 oz) (04/19/23 2039)  Body mass index is 27.92 kg/m².      Intake/Output Summary (Last 24 hours) at 4/19/2023 2215  Last data filed at 4/19/2023 0706  Gross per 24 hour   Intake 509.08 ml   Output 600 ml   Net -90.92 ml       Lines/Drains/Airways       Peripheral Intravenous Line  Duration                  Peripheral IV - Single Lumen 04/16/23 1717 20 G Left Antecubital 3 days                    Physical Exam  Vitals reviewed.   Constitutional:       Appearance: He is not toxic-appearing.   HENT:      Head: Normocephalic and atraumatic.   Eyes:      General: No scleral icterus.     Conjunctiva/sclera: Conjunctivae normal.   Neurological:      Mental Status: He is alert and  oriented to person, place, and time.      Gait: Gait normal.   Psychiatric:         Mood and Affect: Mood normal.         Behavior: Behavior normal.       Significant Labs:  CBC:   Recent Labs   Lab 04/19/23  0506 04/19/23  1403 04/19/23  1938   WBC 7.20 7.81 7.73   HGB 9.6* 10.2* 10.8*   HCT 29.0* 31.0* 32.6*    269 273     BMP:   Recent Labs   Lab 04/19/23  0506   *      K 3.9      CO2 26   BUN 19   CREATININE 1.2   CALCIUM 8.9     CMP:   Recent Labs   Lab 04/19/23  0506   *   CALCIUM 8.9      K 3.9   CO2 26      BUN 19   CREATININE 1.2         Significant Imaging:  Imaging results within the past 24 hours have been reviewed.    Assessment/Plan:     GI  * UGIB (upper gastrointestinal bleed)  EGD with hemorrhagic gastritis s/p epi, bipolar cautery and clip x1. No recurrent bleeding noted.     Hgb is bouncing up and down but overall trend is stable  BUN is normalized  No evidence of recurrent bleeding    Recs:  Transition to PPI PO BID  carafate TID    Ok to d/c if hgb stable and no overt bleeding  Follow up in GI clinic, consider repeat EGD in about 3 months to check for healing.                Thank you for your consult. I will follow-up with patient. Please contact us if you have any additional questions.    Otto Villarreal MD  Gastroenterology  [facility]

## 2023-04-20 NOTE — ASSESSMENT & PLAN NOTE
EGD with hemorrhagic gastritis s/p epi, bipolar cautery and clip x1. No recurrent bleeding noted.     Hgb is bouncing up and down but overall trend is stable  BUN is normalized  No evidence of recurrent bleeding    Recs:  Transition to PPI PO BID  carafate TID    Ok to d/c if hgb stable and no overt bleeding  Follow up in GI clinic, consider repeat EGD in about 3 months to check for healing.

## 2023-04-20 NOTE — PLAN OF CARE
"0757  Updated notes sent to [facility] via [employer]. H&H 9.7/28.8 this AM (decreased from 10.8/32.6 yesterday). Pt continues to be followed by GI, pod, nut, & PT/OT.    1040  Patient resting quietly in bed when CM rounded. Wound vac to left noted with dressing intact & good suction noted. CM informed the pt of the previously scheduled appt with Dr Pierce (pod) on 4/28/2023 at 1015. Pt verbalized understanding. Signed "Pt Choice" form placed in the pt's chart & copy of form given to the pt.       Will continue to follow.   "

## 2023-04-20 NOTE — CONSULTS
"  [place] - Telemetry  Adult Nutrition  Consult Note    SUMMARY     Recommendations    Recommendation:  1. Add ADA Cardiac restrictions to diet.   2. Add Raymond BID and beneprotein TID to promote wound healing.   3. Monitor weight/labs.   4. RD to follow to monitor po intake    Goals:  Pt will tolerate diet with at least 50-75% intake at meals by RD follow up  Nutrition Goal Status: new  Communication of RD Recs: reviewed with RN    Assessment and Plan  Nutrition Problem  Increased energy/protein needs    Related to (etiology):   Wound healing    Signs and Symptoms (as evidenced by):   B foot wounds     Interventions:  Collaboration with other providers  Increased protein diet    Nutrition Diagnosis Status:   New      Malnutrition Assessment                                       Reason for Assessment    Reason For Assessment: consult (wound healing)  Diagnosis:  (upper GI bleed)  Relevant Medical History: MI, DM, HTN, HLD, sleep apnea, hernia repair, L heart cath, toe amputation  General Information Comments: Pt on Regular diet with ~75% intake at meals. Pt with wound vac to L great toe. Yoni 19- B foot wounds. Noted 7lb weight loss x 3 months. s/p EGD 4/17. Unable to assess NFPE 2/2 pt with therapy at visit  Nutrition Discharge Planning: pt to d/c on ADA Cardiac diet    Nutrition Risk Screen    Nutrition Risk Screen: no indicators present    Nutrition/Diet History    Food Preferences: no Lutheran or cultural food prefs identified  Spiritual, Cultural Beliefs, Anabaptist Practices, Values that Affect Care: no  Factors Affecting Nutritional Intake: None identified at this time    Anthropometrics    Temp: 98.2 °F (36.8 °C)  Height: 6' 1" (185.4 cm)  Height (inches): 73 in  Weight Method: Bed Scale  Weight: 96 kg (211 lb 10.3 oz)  Weight (lb): 211.64 lb  Ideal Body Weight (IBW), Male: 184 lb  % Ideal Body Weight, Male (lb): 115.02 %  BMI (Calculated): 27.9       Lab/Procedures/Meds    Pertinent Labs Reviewed: " reviewed  Pertinent Labs Comments: Glu 191H, Alb 3.0L  Pertinent Medications Reviewed: reviewed  Pertinent Medications Comments: pantoprazole    Physical Findings/Assessment         Estimated/Assessed Needs    Weight Used For Calorie Calculations: 96 kg (211 lb 10.3 oz)  Energy Calorie Requirements (kcal): 2226  Energy Need Method: [place]-St Jeor (x1.3)  Protein Requirements: 96g (1.0g/kg)  Weight Used For Protein Calculations: 96 kg (211 lb 10.3 oz)     Estimated Fluid Requirement Method: RDA Method  RDA Method (mL): 2226  CHO Requirement: 250g      Nutrition Prescription Ordered    Current Diet Order: Regular    Evaluation of Received Nutrient/Fluid Intake    I/O: 509/900  Energy Calories Required: meeting needs  Protein Required: meeting needs  Fluid Required: meeting needs  Comments: LBM 4/19  % Intake of Estimated Energy Needs: 50 - 75 %  % Meal Intake: 50 - 75 %    Nutrition Risk  Level of Risk/Frequency of Follow-up:  (2xweekly)     Monitor and Evaluation  Food and Nutrient Intake: food and beverage intake  Food and Nutrient Adminstration: diet order  Physical Activity and Function: nutrition-related ADLs and IADLs  Anthropometric Measurements: weight  Biochemical Data, Medical Tests and Procedures: electrolyte and renal panel  Nutrition-Focused Physical Findings: overall appearance     Nutrition Follow-Up  RD Follow-up?: Yes

## 2023-04-20 NOTE — PLAN OF CARE
Pt progressing towards goals this date. Pt found up to chair with PT. Pt agreeable to t/f to BSC before back to bed. Pt requires increased v/cs for LLE PWB heel touch with surgical shoe. Pt requires increased rest breaks. Pt requiring MaxA for sit<>stand with MaxA & use of RW. Continue to recommend pt return to SNF upon d/c.     Problem: Occupational Therapy  Goal: Occupational Therapy Goal  Description: Goals to be met by: 5/18/2023     Patient will increase functional independence with ADLs by performing:    LE Dressing with Set-up Assistance.  Toileting from bedside commode with Modified [place] for hygiene and clothing management.   Toilet transfer to drop-arm bedside commode with Stand-by Assistance.    Outcome: Ongoing, Progressing

## 2023-04-20 NOTE — PLAN OF CARE
Problem: Physical Therapy  Goal: Physical Therapy Goal  Description: Goals to be met by: 23     Patient will increase functional independence with mobility by performin. Supine <> sit with Modified [place]  2. Sit to stand with Contact Guard Assistance with RW   3. Bed to chair transfer with Contact Guard Assistance   4. Gait x 10 ft with CGA and RW  3. Lower extremity exercise program x 10 reps per handout, with supervision    Outcome: Ongoing, Progressing   Continue working toward goals.

## 2023-04-20 NOTE — SUBJECTIVE & OBJECTIVE
Subjective:     Interval History:   Doing much better. Family at bedside. No vomiting , no black stool , no radiating symptoms.  Feels well  Eating good    Review of Systems   Constitutional:  Negative for chills and fever.   Respiratory:  Negative for choking and chest tightness.    Gastrointestinal:  Negative for abdominal pain and vomiting.   Neurological:  Negative for dizziness and headaches.   Psychiatric/Behavioral:  Negative for agitation and behavioral problems.    Objective:     Vital Signs (Most Recent):  Temp: 98.2 °F (36.8 °C) (04/19/23 1912)  Pulse: 79 (04/19/23 1912)  Resp: 18 (04/19/23 1912)  BP: 122/72 (04/19/23 1912)  SpO2: (!) 93 % (04/19/23 1912)   Vital Signs (24h Range):  Temp:  [96.3 °F (35.7 °C)-98.2 °F (36.8 °C)] 98.2 °F (36.8 °C)  Pulse:  [76-87] 79  Resp:  [16-18] 18  SpO2:  [93 %-100 %] 93 %  BP: ()/(51-82) 122/72     Weight: 96 kg (211 lb 10.3 oz) (04/19/23 2039)  Body mass index is 27.92 kg/m².      Intake/Output Summary (Last 24 hours) at 4/19/2023 2215  Last data filed at 4/19/2023 0706  Gross per 24 hour   Intake 509.08 ml   Output 600 ml   Net -90.92 ml       Lines/Drains/Airways       Peripheral Intravenous Line  Duration                  Peripheral IV - Single Lumen 04/16/23 1717 20 G Left Antecubital 3 days                    Physical Exam  Vitals reviewed.   Constitutional:       Appearance: He is not toxic-appearing.   HENT:      Head: Normocephalic and atraumatic.   Eyes:      General: No scleral icterus.     Conjunctiva/sclera: Conjunctivae normal.   Neurological:      Mental Status: He is alert and oriented to person, place, and time.      Gait: Gait normal.   Psychiatric:         Mood and Affect: Mood normal.         Behavior: Behavior normal.       Significant Labs:  CBC:   Recent Labs   Lab 04/19/23  0506 04/19/23  1403 04/19/23  1938   WBC 7.20 7.81 7.73   HGB 9.6* 10.2* 10.8*   HCT 29.0* 31.0* 32.6*    828 977     BMP:   Recent Labs   Lab 04/19/23  7989    *      K 3.9      CO2 26   BUN 19   CREATININE 1.2   CALCIUM 8.9     CMP:   Recent Labs   Lab 04/19/23  0506   *   CALCIUM 8.9      K 3.9   CO2 26      BUN 19   CREATININE 1.2         Significant Imaging:  Imaging results within the past 24 hours have been reviewed.

## 2023-04-20 NOTE — PROGRESS NOTES
[facility] Medicine  Progress Note    Patient Name: Julien Meléndez  MRN: [id number]  Patient Class: IP- Inpatient   Admission Date: 4/16/2023  Length of Stay: 2 days  Attending Physician: Cynthia Sanchez*  Primary Care Provider: Kelvin Wayne MD        Subjective:     Principal Problem:UGIB (upper gastrointestinal bleed)        HPI:  Julien Meléndez an 82-year-old male with PMH DMT2, MI, CAD, CAD, CKD, paroxysmal AFib, CHF, ischemic cardiomyopathy, hiatal hernia, HTN, HLD, and left foot cellulitis s/p great toe amputation.  Patient presents to the ED today after an episode of coffee ground emesis that occurred this morning.  Patient was recently discharged from [facility] for left foot cellulitis which required a left great toe amputation and he was discharged to a nursing facility on doxycycline x14 days (due to stop 04/28/2023).  Per patient, he had 1 episode of coffee-ground emesis that he states was moderate in amount.  He denies CP, SOB, abdominal pain, diarrhea, melena, hematochezia, dizziness, lightheadedness, fevers, chills, or any other signs of bleeding.    Hospital medicine will admit patient for GIB workup with consult to GI.      Overview/Hospital Course:  No notes on file    Interval History:  Awake and alert, lying in bed, no new complaint  Updated on lab results and GI rec;s   H/H down trending, continue po Protonix  re-consult GI and monitor  Bp up trending- resume Bp as BP permits.     Appreciate GI- S/p EGD on 4/17 reporting hemorrhagic gastritis with focal lesion losing-treated and recommend continue Protonix and Carafate., continue to hold blood thinner       Review of Systems   Constitutional:  Positive for appetite change. Negative for chills and fever.   Respiratory:  Negative for shortness of breath.    Cardiovascular:  Negative for chest pain.   Gastrointestinal:  Negative for abdominal pain, blood in stool, diarrhea, nausea and vomiting.    Genitourinary:  Negative for decreased urine volume.   Musculoskeletal:  Negative for myalgias.   Skin:  Positive for wound (Left great toe amputation). Negative for color change and pallor.   Neurological:  Negative for dizziness, weakness, light-headedness, numbness and headaches.   Psychiatric/Behavioral:  Negative for agitation and confusion. The patient is not nervous/anxious.    Objective:     Vital Signs (Most Recent):  Temp: 98 °F (36.7 °C) (04/20/23 1709)  Pulse: 93 (04/20/23 1709)  Resp: 18 (04/20/23 1709)  BP: (!) 174/79 (04/20/23 1709)  SpO2: 98 % (04/20/23 1709)   Vital Signs (24h Range):  Temp:  [97.2 °F (36.2 °C)-98.2 °F (36.8 °C)] 98 °F (36.7 °C)  Pulse:  [74-93] 93  Resp:  [17-18] 18  SpO2:  [93 %-100 %] 98 %  BP: (102-174)/(55-79) 174/79     Weight: 96 kg (211 lb 10.3 oz)  Body mass index is 27.92 kg/m².    Intake/Output Summary (Last 24 hours) at 4/20/2023 1726  Last data filed at 4/20/2023 0502  Gross per 24 hour   Intake --   Output 700 ml   Net -700 ml        Physical Exam  Vitals and nursing note reviewed.   Constitutional:       General: He is not in acute distress.     Appearance: He is not ill-appearing.   HENT:      Head: Normocephalic and atraumatic.   Cardiovascular:      Rate and Rhythm: Normal rate and regular rhythm.      Pulses:           Popliteal pulses are 1+ on the left side.        Posterior tibial pulses are 1+ on the right side.      Heart sounds: Normal heart sounds.   Pulmonary:      Effort: Pulmonary effort is normal. No respiratory distress.      Breath sounds: Normal breath sounds. No wheezing or rhonchi.   Abdominal:      General: Bowel sounds are normal. There is no distension.      Palpations: Abdomen is soft.      Tenderness: There is no abdominal tenderness. There is no guarding.   Musculoskeletal:      Right lower leg: No edema.      Left lower leg: No edema.      Left Lower Extremity: (Left great toe amp)  Feet:      Comments: Unable to assess left foot d/t  surgical dressing - dressing CDI  Skin:     General: Skin is warm.      Findings: Bruising (BUE) present.   Neurological:      Mental Status: He is alert and oriented to person, place, and time.   Psychiatric:         Mood and Affect: Mood normal.         Behavior: Behavior normal.       Significant Labs: A1C:   Recent Labs   Lab 03/25/23  1734   HGBA1C 7.0*       ABGs: No results for input(s): PH, PCO2, HCO3, POCSATURATED, BE, TOTALHB, COHB, METHB, O2HB, POCFIO2, PO2 in the last 48 hours.  Blood Culture: No results for input(s): LABBLOO in the last 48 hours.  CBC:   Recent Labs   Lab 04/19/23  1403 04/19/23  1938 04/20/23  0446   WBC 7.81 7.73 5.91   HGB 10.2* 10.8* 9.7*   HCT 31.0* 32.6* 28.8*    273 219       CMP:   Recent Labs   Lab 04/19/23  0506 04/20/23  0446    141   K 3.9 3.8    106   CO2 26 24   * 218*   BUN 19 16   CREATININE 1.2 1.2   CALCIUM 8.9 9.0   ANIONGAP 9 11       Lactic Acid: No results for input(s): LACTATE in the last 48 hours.  Lipase: No results for input(s): LIPASE in the last 48 hours.  Lipid Panel: No results for input(s): CHOL, HDL, LDLCALC, TRIG, CHOLHDL in the last 48 hours.  Magnesium: No results for input(s): MG in the last 48 hours.  Troponin: No results for input(s): TROPONINI, TROPONINIHS in the last 48 hours.  TSH: No results for input(s): TSH in the last 4320 hours.  Urine Culture: No results for input(s): LABURIN in the last 48 hours.  Urine Studies: No results for input(s): COLORU, APPEARANCEUA, PHUR, SPECGRAV, PROTEINUA, GLUCUA, KETONESU, BILIRUBINUA, OCCULTUA, NITRITE, UROBILINOGEN, LEUKOCYTESUR, RBCUA, WBCUA, BACTERIA, SQUAMEPITHEL, HYALINECASTS in the last 48 hours.    Invalid input(s): WRIGHTSUR    Significant Imaging: I have reviewed all pertinent imaging results/findings within the past 24 hours.      Assessment/Plan:      * UGIB (upper gastrointestinal bleed)  · Patient reported a moderate amount of coffee-ground emesis  · IV Protonix 40 mg  b.i.d.  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  Rec full liquid diet, continue Protonixgtt, add Carafate t.i.d. and will need to hold anticoagulation until bleeding has resolved  4/19- down trending- trend q6hr and reconsult GI    Cellulitis of left foot  Peripheral artery disease  · Recently treated at [facility] and discharged 4/15  · Will continue doxycycline  · Stop date 04/28/2023  · S/p left great toe amputation, consult Podiatry  · Dressing CDI      Depressive disorder  Continue home fluoxetine and trazodone            Congestive heart failure, unspecified HF chronicity, unspecified heart failure type  Cardiomyopathy, ischemic  Continue home meds    PAF (paroxysmal atrial fibrillation)  Long term (current) use of anticoagulants  Patient with Paroxysmal (<7 days) atrial fibrillation which is controlled currently with Beta Blocker. Patient is currently in sinus rhythm.[id number] Score: 4. HASBLED Score: 3. Anticoagulation not indicated due to Active GIB.    Warfarin on hold at this time  Continue BB        Coronary artery disease due to calcified coronary lesion  · Patient denies CP   · Continue statin  · Warfarin on hold at this time in the setting of GIB  · May resume when cleared by GI      Diabetes mellitus type 2, noninsulin dependent  Patient's FSGs are controlled on current medication regimen.  Last A1c reviewed-   Lab Results   Component Value Date    HGBA1C 7.0 (H) 03/25/2023     Most recent fingerstick glucose reviewed- No results for input(s): POCTGLUCOSE in the last 24 hours.  Current correctional scale  Low  Maintain anti-hyperglycemic dose as follows-    Antihyperglycemics (From admission, onward)    Start     Stop Route Frequency Ordered    04/16/23 2013  insulin aspart U-100 pen 0-5 Units         -- SubQ Every 6 hours PRN 04/16/23 1914        Hold Oral hypoglycemics while patient is in the hospital.    Hypertension  ·  home BP meds- resume home meds as Bp permit  · Monitor for hypotension      Mixed hyperlipidemia  · Continue statin        VTE Risk Mitigation (From admission, onward)         Ordered     IP VTE HIGH RISK PATIENT  Once         04/16/23 1914     Place sequential compression device  Until discontinued         04/16/23 1914                Discharge Planning   AURELIA: 4/24/2023     Code Status: Full Code   Is the patient medically ready for discharge?:     Reason for patient still in hospital (select all that apply): Patient trending condition  Discharge Plan A: Skilled Nursing Facility                  Cynthia Sanchez MD  Department of Hospital Medicine   [facility] [facility]

## 2023-04-20 NOTE — PT/OT/SLP PROGRESS
Occupational Therapy   Treatment    Name: Julien Meléndez  MRN: [id number]  Admitting Diagnosis:  UGIB (upper gastrointestinal bleed)  3 Days Post-Op    Recommendations:     Discharge Recommendations: nursing facility, skilled  Discharge Equipment Recommendations:  wheelchair  Barriers to discharge:   (Pt requires increased level of assistance)    Assessment:     Julien Meléndez is a 82 y.o. male with a medical diagnosis of UGIB (upper gastrointestinal bleed).  He presents with Diagnoses of Vomiting and UGIB (upper gastrointestinal bleed) were pertinent to this visit. Performance deficits affecting function are weakness, impaired functional mobility, gait instability, impaired endurance, impaired balance, decreased lower extremity function, decreased ROM, orthopedic precautions, edema, impaired skin, impaired self care skills, decreased coordination, pain, impaired coordination, decreased safety awareness.     Pt progressing towards goals this date. Pt found up to chair with PT. Pt agreeable to t/f to [facility] before back to bed. Pt requires increased v/cs for LLE PWB heel touch with surgical shoe. Pt requires increased rest breaks. Pt requiring MaxA for sit<>stand with MaxA & use of RW. Continue to recommend pt return to SNF upon d/c.     Rehab Prognosis:  Good; patient would benefit from acute skilled OT services to address these deficits and reach maximum level of function.       Plan:     Patient to be seen 5 x/week to address the above listed problems via self-care/home management, therapeutic activities, therapeutic exercises  Plan of Care Expires: 05/18/23  Plan of Care Reviewed with: patient    Subjective     Chief Complaint: Decreased endurance  Patient/Family Comments/goals: Return to PLOF  Pain/Comfort:  Pain Rating 1: 0/10 (at rest)  Location - Side 1: Bilateral  Location 1: foot    Objective:     Communicated with: parish prior to session.  Patient found up in chair with wound vac, telemetry upon OT entry to  room.    General Precautions: Standard, fall    Orthopedic Precautions:LLE partial weight bearing, RLE weight bearing as tolerated (WBAT to RLE in surgical shoe, PWB to heel in LLE in surgical shoe)  Braces:  (B surgical shoes)  Respiratory Status: Room air     Occupational Performance:     Bed Mobility:    Patient completed Sit to Supine with moderate assistance 2/2 BLE management    Functional Mobility/Transfers:  Patient requires MaxA for sit>stand from BSC with use of RW  Pt completed Chair > BSC Transfer using Step Transfer technique with moderate assistance with rolling walker  Patient completed BSC> bed Step Transfer technique with moderate assistance with  rolling walker    Activities of Daily Living:  Toileting: maximal assistance for diaper management      [facility] 6 Click ADL: 18    Treatment & Education:  Pt progressing towards goals this date. Pt found up to chair with PT.   Pt agreeable to t/f to [facility] before back to bed.   Pt requires increased v/cs for LLE PWB heel touch with surgical shoe.   Pt requires increased rest breaks.   Pt requiring MaxA for sit<>stand with MaxA & use of RW.   Continue to recommend pt return to SNF upon d/c.     Patient left HOB elevated with all lines intact, call button in reach, bed alarm on, nsg notified, family present, and B heels floated & elevated for pressure relief/edema management.    GOALS:   Multidisciplinary Problems       Occupational Therapy Goals          Problem: Occupational Therapy    Goal Priority Disciplines Outcome Interventions   Occupational Therapy Goal     OT, PT/OT Ongoing, Progressing    Description: Goals to be met by: 5/18/2023     Patient will increase functional independence with ADLs by performing:    LE Dressing with Set-up Assistance.  Toileting from bedside commode with [place] for hygiene and clothing management.   Toilet transfer to drop-arm bedside commode with Stand-by Assistance.                         Time Tracking:     OT  Date of Treatment: 04/20/23  OT Start Time: 1544  OT Stop Time: 1613  OT Total Time (min): 29 min Overlap with PT for portions of session due to complex nature of patient and for safety with mobility to decrease fall risk for patient and caregiver injury requiring two skilled therapists to provide different interventions.    Billable Minutes:Self Care/Home Management 14  Therapeutic Activity 15    OT/GENA: OT     Number of GENA visits since last OT visit: 0    4/20/2023

## 2023-04-21 VITALS
HEIGHT: 73 IN | RESPIRATION RATE: 18 BRPM | DIASTOLIC BLOOD PRESSURE: 60 MMHG | BODY MASS INDEX: 28.05 KG/M2 | OXYGEN SATURATION: 95 % | WEIGHT: 211.63 LBS | TEMPERATURE: 98 F | HEART RATE: 80 BPM | SYSTOLIC BLOOD PRESSURE: 95 MMHG

## 2023-04-21 LAB
ANION GAP SERPL CALC-SCNC: 10 MMOL/L (ref 8–16)
BASOPHILS # BLD AUTO: 0.04 K/UL (ref 0–0.2)
BASOPHILS NFR BLD: 0.5 % (ref 0–1.9)
BUN SERPL-MCNC: 15 MG/DL (ref 8–23)
CALCIUM SERPL-MCNC: 9.1 MG/DL (ref 8.7–10.5)
CHLORIDE SERPL-SCNC: 104 MMOL/L (ref 95–110)
CO2 SERPL-SCNC: 25 MMOL/L (ref 23–29)
CREAT SERPL-MCNC: 1.1 MG/DL (ref 0.5–1.4)
DIFFERENTIAL METHOD: ABNORMAL
EOSINOPHIL # BLD AUTO: 0.4 K/UL (ref 0–0.5)
EOSINOPHIL NFR BLD: 4.6 % (ref 0–8)
ERYTHROCYTE [DISTWIDTH] IN BLOOD BY AUTOMATED COUNT: 13.8 % (ref 11.5–14.5)
EST. GFR  (NO RACE VARIABLE): >60 ML/MIN/1.73 M^2
GLUCOSE SERPL-MCNC: 233 MG/DL (ref 70–110)
HCT VFR BLD AUTO: 30.3 % (ref 40–54)
HGB BLD-MCNC: 10 G/DL (ref 14–18)
IMM GRANULOCYTES # BLD AUTO: 0.08 K/UL (ref 0–0.04)
IMM GRANULOCYTES NFR BLD AUTO: 1 % (ref 0–0.5)
LYMPHOCYTES # BLD AUTO: 1.9 K/UL (ref 1–4.8)
LYMPHOCYTES NFR BLD: 23.6 % (ref 18–48)
MCH RBC QN AUTO: 30.9 PG (ref 27–31)
MCHC RBC AUTO-ENTMCNC: 33 G/DL (ref 32–36)
MCV RBC AUTO: 94 FL (ref 82–98)
MONOCYTES # BLD AUTO: 0.9 K/UL (ref 0.3–1)
MONOCYTES NFR BLD: 10.5 % (ref 4–15)
NEUTROPHILS # BLD AUTO: 4.9 K/UL (ref 1.8–7.7)
NEUTROPHILS NFR BLD: 59.8 % (ref 38–73)
NRBC BLD-RTO: 0 /100 WBC
PLATELET # BLD AUTO: 247 K/UL (ref 150–450)
PMV BLD AUTO: 11.6 FL (ref 9.2–12.9)
POCT GLUCOSE: 206 MG/DL (ref 70–110)
POCT GLUCOSE: 293 MG/DL (ref 70–110)
POCT GLUCOSE: 303 MG/DL (ref 70–110)
POTASSIUM SERPL-SCNC: 3.7 MMOL/L (ref 3.5–5.1)
RBC # BLD AUTO: 3.24 M/UL (ref 4.6–6.2)
SODIUM SERPL-SCNC: 139 MMOL/L (ref 136–145)
WBC # BLD AUTO: 8.19 K/UL (ref 3.9–12.7)

## 2023-04-21 PROCEDURE — 97530 THERAPEUTIC ACTIVITIES: CPT | Mod: CO

## 2023-04-21 PROCEDURE — 85025 COMPLETE CBC W/AUTO DIFF WBC: CPT | Performed by: NURSE PRACTITIONER

## 2023-04-21 PROCEDURE — 80048 BASIC METABOLIC PNL TOTAL CA: CPT | Performed by: NURSE PRACTITIONER

## 2023-04-21 PROCEDURE — 25000003 PHARM REV CODE 250: Performed by: NURSE PRACTITIONER

## 2023-04-21 PROCEDURE — 25000003 PHARM REV CODE 250: Performed by: FAMILY MEDICINE

## 2023-04-21 PROCEDURE — 97535 SELF CARE MNGMENT TRAINING: CPT | Mod: CO

## 2023-04-21 PROCEDURE — 36415 COLL VENOUS BLD VENIPUNCTURE: CPT | Performed by: NURSE PRACTITIONER

## 2023-04-21 PROCEDURE — 25000003 PHARM REV CODE 250: Performed by: INTERNAL MEDICINE

## 2023-04-21 PROCEDURE — 97530 THERAPEUTIC ACTIVITIES: CPT | Mod: CQ

## 2023-04-21 RX ORDER — ASPIRIN 81 MG/1
81 TABLET ORAL DAILY
Refills: 0
Start: 2023-04-23 | End: 2023-05-15

## 2023-04-21 RX ORDER — DOXYCYCLINE HYCLATE 100 MG
100 TABLET ORAL EVERY 12 HOURS
Qty: 14 TABLET | Refills: 0
Start: 2023-04-21 | End: 2023-04-28

## 2023-04-21 RX ORDER — CLOPIDOGREL BISULFATE 75 MG/1
75 TABLET ORAL DAILY
Start: 2023-04-23

## 2023-04-21 RX ORDER — WARFARIN SODIUM 5 MG/1
7.5 TABLET ORAL DAILY
Start: 2023-04-23 | End: 2023-05-15 | Stop reason: DRUGHIGH

## 2023-04-21 RX ORDER — PANTOPRAZOLE SODIUM 40 MG/1
40 TABLET, DELAYED RELEASE ORAL 2 TIMES DAILY
Qty: 60 TABLET | Refills: 11
Start: 2023-04-21 | End: 2024-04-20

## 2023-04-21 RX ORDER — SUCRALFATE 1 G/1
1 TABLET ORAL
Start: 2023-04-21 | End: 2024-03-21

## 2023-04-21 RX ORDER — OXYCODONE AND ACETAMINOPHEN 5; 325 MG/1; MG/1
1 TABLET ORAL EVERY 4 HOURS PRN
Qty: 20 TABLET | Refills: 0 | Status: SHIPPED | OUTPATIENT
Start: 2023-04-21 | End: 2023-04-26

## 2023-04-21 RX ORDER — OXYCODONE AND ACETAMINOPHEN 5; 325 MG/1; MG/1
1 TABLET ORAL EVERY 4 HOURS PRN
Qty: 20 TABLET | Refills: 0 | Status: SHIPPED | OUTPATIENT
Start: 2023-04-21 | End: 2023-04-21 | Stop reason: SDUPTHER

## 2023-04-21 RX ADMIN — LOSARTAN POTASSIUM 100 MG: 50 TABLET, FILM COATED ORAL at 10:04

## 2023-04-21 RX ADMIN — SUCRALFATE 1 G: 1 TABLET ORAL at 04:04

## 2023-04-21 RX ADMIN — DOXYCYCLINE HYCLATE 100 MG: 100 TABLET, COATED ORAL at 10:04

## 2023-04-21 RX ADMIN — INSULIN ASPART 2 UNITS: 100 INJECTION, SOLUTION INTRAVENOUS; SUBCUTANEOUS at 10:04

## 2023-04-21 RX ADMIN — PANTOPRAZOLE SODIUM 40 MG: 40 TABLET, DELAYED RELEASE ORAL at 10:04

## 2023-04-21 RX ADMIN — FLUOXETINE HYDROCHLORIDE 40 MG: 20 CAPSULE ORAL at 10:04

## 2023-04-21 RX ADMIN — METOPROLOL SUCCINATE 25 MG: 25 TABLET, EXTENDED RELEASE ORAL at 10:04

## 2023-04-21 RX ADMIN — SUCRALFATE 1 G: 1 TABLET ORAL at 10:04

## 2023-04-21 RX ADMIN — ISOSORBIDE MONONITRATE 30 MG: 30 TABLET, EXTENDED RELEASE ORAL at 10:04

## 2023-04-21 RX ADMIN — MUPIROCIN: 20 OINTMENT TOPICAL at 10:04

## 2023-04-21 RX ADMIN — ATORVASTATIN CALCIUM 40 MG: 40 TABLET, FILM COATED ORAL at 10:04

## 2023-04-21 NOTE — PLAN OF CARE
Discharge orders noted. AVS prepared with medication details, clinical reference list and 24/7 [facility] Nurse On Call number attached. Bedside nurse to print AVS and place in discharge packet for accepting facility.

## 2023-04-21 NOTE — ASSESSMENT & PLAN NOTE
Patient's FSGs are controlled on current medication regimen.  Last A1c reviewed-   Lab Results   Component Value Date    HGBA1C 7.0 (H) 03/25/2023     Most recent fingerstick glucose reviewed-   Recent Labs   Lab 04/20/23  1708 04/20/23  1933 04/21/23  0536 04/21/23  1154   POCTGLUCOSE 289* 329* 206* 293*     Current correctional scale  Low  Maintain anti-hyperglycemic dose as follows-   Antihyperglycemics (From admission, onward)    Start     Stop Route Frequency Ordered    04/19/23 0016  insulin aspart U-100 pen 0-5 Units         -- SubQ Before meals & nightly PRN 04/18/23 2317        Hold Oral hypoglycemics while patient is in the hospital.

## 2023-04-21 NOTE — PLAN OF CARE
[facility]    FACILITY TRANSFER ORDERS      Patient Name: Julien Meléndez  YOB: 1940    PCP: Kelvin Wayne MD   PCP Address: [address] / [place] [address]  PCP Phone Number: [phone]  PCP Fax: [phone]    Encounter Date: 04/21/2023    Admit to:  [facility]    Vital Signs:  Routine    Diagnoses:   Active Hospital Problems    Diagnosis  POA    *UGIB (upper gastrointestinal bleed) [K92.2]  Yes    Postoperative state [Z98.890]  Not Applicable    PAD (peripheral artery disease) [I73.9]  Yes    Cellulitis of left foot [L03.116]  Yes    Depressive disorder [F32.A]  Yes    Congestive heart failure, unspecified HF chronicity, unspecified heart failure type [I50.9]  Yes    PAF (paroxysmal atrial fibrillation) [I48.0]  Yes    Long term (current) use of anticoagulants [Z79.01]  Not Applicable    Coronary artery disease due to calcified coronary lesion [I25.10, I25.84]  Yes           Left heart cath 12/2015-Dr. Hernandez        LM patent  LAD patent except for distal 75%  LCX patent  OM1   RCA 90%      PCI of RCA 2.75 x 18 mm MILES  Unsuccessful PCI of OM1         LVEDP 11 mmHg  3.5 x 18 mm       Left hearth cath 3/1/2010 -Dr. Hernandez   RCA PCI with 3.5 x 15 mm  BMS    Left heart cath 1/31/2010-Dr. Hernandez   LAD 3.0 x 12 and 3.5 x 24 mm  BMS         [facility] 11/12/2019      S/p LCX  PCI     Bilateral CFA access 8 fr       LM patent   Distal LAD 80% stenosis   Patent RCA   LCX  with R to L collaterals     LVEDP 8 mmHg       Crossed LCX  with antegrade Filder XT + [place] [address]   PCI with 3.0 x 26 and 3.0 x 12 mm Resolute MILES post dilated with 3.5 NC           Diabetes mellitus type 2, noninsulin dependent [E11.9]  Yes    Cardiomyopathy, ischemic [I25.5]  Yes    Mixed hyperlipidemia [E78.2]  Yes    Hypertension [I10]  Yes      Resolved Hospital Problems   No resolved problems to display.       Allergies:  Review of patient's allergies indicates:   Allergen  Reactions    Nystatin      Other reaction(s): Unknown       Diet: cardiac diet and diabetic diet: 2000 calorie    Activities: Activity as tolerated    Goals of Care Treatment Preferences:  Code Status: Full Code      Nursing:  per facility protocol         CONSULTS:    Physical Therapy to evaluate and treat.  and Occupational Therapy to evaluate and treat.        WOUND CARE ORDERS    Right foot: cleanse pin site with vashe, dry, apply xeroform cut to 1 x 1 inch with a 1/2 inch slit placed over pin, flush several 4x4s and place around pin, follow with kerlix x 2 rolls (do not over tighten please, critical limb ischemia s/p revasc procedure with poor lower extremity blood flow), loosely secure with ace or tape. Place in green heel offloading boots.  Yes: Wound Vac: at 125 mg:  Location:  Left foot          Dressing changes every Monday, Wednesday and Friday.        ET Consult    Offload heels and left lateral foot alteration in skin integrity   Nursing wound care routine for right foot  Wound vac of left foot currently managed by podiatry   RLE WBAT w/ dressing and surgical shoe   LLE PWB Heel touch w/ dressing and surgical shoe    Medications: Review discharge medications with patient and family and provide education.      Current Discharge Medication List        START taking these medications    Details   doxycycline (VIBRA-TABS) 100 MG tablet Take 1 tablet (100 mg total) by mouth every 12 (twelve) hours. for 7 days  Qty: 14 tablet, Refills: 0      pantoprazole (PROTONIX) 40 MG tablet Take 1 tablet (40 mg total) by mouth 2 (two) times daily.  Qty: 60 tablet, Refills: 11      sucralfate (CARAFATE) 1 gram tablet Take 1 tablet (1 g total) by mouth 4 (four) times daily before meals and nightly.           CONTINUE these medications which have CHANGED    Details   aspirin (ECOTRIN) 81 MG EC tablet Take 1 tablet (81 mg total) by mouth once daily.  Refills: 0      clopidogreL (PLAVIX) 75 mg tablet Take 1 tablet (75 mg  total) by mouth once daily.      oxyCODONE-acetaminophen (PERCOCET) 5-325 mg per tablet Take 1 tablet by mouth every 4 (four) hours as needed for Pain.  Qty: 20 tablet, Refills: 0    Comments: Quantity prescribed more than 7 day supply? No      warfarin (COUMADIN) 5 MG tablet Take 1.5 tablets (7.5 mg total) by mouth Daily. TAKE 1 AND 1/2 TABLETS (7.5MG) EVERY DAY OR AS DIRECTED BY COUMADIN CLINIC    Associated Diagnoses: Long term (current) use of anticoagulants; Atrial fibrillation, unspecified type           CONTINUE these medications which have NOT CHANGED    Details   amLODIPine (NORVASC) 5 MG tablet TAKE 1 TABLET EVERY DAY  Qty: 90 tablet, Refills: 3      atorvastatin (LIPITOR) 40 MG tablet Take 1 tablet (40 mg total) by mouth once daily.  Qty: 90 tablet, Refills: 3    Associated Diagnoses: Mixed hyperlipidemia      FLUoxetine 40 MG capsule Take 40 mg by mouth once daily.      glipiZIDE (GLUCOTROL) 5 MG tablet TAKE 1 TABLET TWICE DAILY  Qty: 180 tablet, Refills: 3      isosorbide mononitrate (IMDUR) 30 MG 24 hr tablet TAKE 1 TABLET EVERY DAY  Qty: 90 tablet, Refills: 3    Associated Diagnoses: Coronary artery disease      losartan-hydrochlorothiazide 100-25 mg (HYZAAR) 100-25 mg per tablet TAKE 1 TABLET EVERY DAY  Qty: 90 tablet, Refills: 3    Associated Diagnoses: Coronary artery disease; Chronic fatigue      metoprolol succinate (TOPROL-XL) 25 MG 24 hr tablet TAKE 1 TABLET EVERY DAY  Qty: 90 tablet, Refills: 3      nitroGLYCERIN (NITROSTAT) 0.4 MG SL tablet Place 0.4 mg under the tongue every 5 (five) minutes as needed for Chest pain.      ranolazine (RANEXA) 1,000 mg Tb12 Take 1,000 mg by mouth 2 (two) times daily.      SITagliptan-metformin (JANUMET XR) 100-1,000 mg TM24 Take 1 tablet by mouth once daily.  Qty: 30 tablet, Refills: 11    Associated Diagnoses: Type 2 diabetes mellitus without complication, without long-term current use of insulin      traZODone (DESYREL) 150 MG tablet TAKE 1 TABLET (150 MG  TOTAL) BY MOUTH NIGHTLY.  Qty: 90 tablet, Refills: 3           STOP taking these medications       clotrimazole (LOTRIMIN) 1 % cream Comments:   Reason for Stopping:         doxycycline (VIBRAMYCIN) 100 MG Cap Comments:   Reason for Stopping:         TRUE METRIX GLUCOSE METER Misc Comments:   Reason for Stopping:         TRUE METRIX GLUCOSE TEST STRIP Strp Comments:   Reason for Stopping:         TRUEPLUS LANCETS 28 gauge Misc Comments:   Reason for Stopping:                  Immunizations Administered as of 4/21/2023       No immunizations on file.            Unknown    Some patients may experience side effects after vaccination.  These may include fever, headache, muscle or joint aches.  Most symptoms resolve with 24-48 hours and do not require urgent medical evaluation unless they persist for more than 72 hours or symptoms are concerning for an unrelated medical condition.          _________________________________  Cynthia Sanchez MD  04/21/2023

## 2023-04-21 NOTE — NURSING
Woundvac discontinued per order. Dressing changed to wet to dry. Sterile gauze, kerlix and ace bandage placed to left foot. Pt tolerated well, no bleeding noted. Will continue with POC.

## 2023-04-21 NOTE — ASSESSMENT & PLAN NOTE
· Patient reported a moderate amount of coffee-ground emesis  · IV Protonix 40 mg b.i.d.  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  Rec full liquid diet, continue Protonixgtt, add Carafate t.i.d. and will need to hold anticoagulation until bleeding has resolved  4/19- down trending- trend q6hr and reconsult GI-  Resume aspirin/Plavix / Coumadin on 04/23

## 2023-04-21 NOTE — DISCHARGE SUMMARY
[facility]  Discharge Summary      Patient Name: Julien Meléndez  MRN: [id number]  GIFTY: [id number]  Patient Class: IP- Inpatient  Admission Date: 4/16/2023  Hospital Length of Stay: 3 days  Discharge Date and Time: 4/21/2023  6:09 PM  Attending Physician: Cynthia Sanchez*   Discharging Provider: Cynthia Sanchez MD  Primary Care Provider: Kelvin Wayne MD    Primary Care Team: Networked reference to record PCT     HPI:   Julien Meléndez an 82-year-old male with PMH DMT2, MI, CAD, CAD, CKD, paroxysmal AFib, CHF, ischemic cardiomyopathy, hiatal hernia, HTN, HLD, and left foot cellulitis s/p great toe amputation.  Patient presents to the ED today after an episode of coffee ground emesis that occurred this morning.  Patient was recently discharged from [facility] for left foot cellulitis which required a left great toe amputation and he was discharged to a nursing facility on doxycycline x14 days (due to stop 04/28/2023).  Per patient, he had 1 episode of coffee-ground emesis that he states was moderate in amount.  He denies CP, SOB, abdominal pain, diarrhea, melena, hematochezia, dizziness, lightheadedness, fevers, chills, or any other signs of bleeding.    Hospital medicine will admit patient for GIB workup with consult to GI.      Procedure(s) (LRB):  EGD (ESOPHAGOGASTRODUODENOSCOPY) (N/A)      Hospital Course:   No notes on file     Goals of Care Treatment Preferences:  Code Status: Full Code      Consults:   Consults (From admission, onward)          Status Ordering Provider     Inpatient consult to Registered Dietitian/Nutritionist  Once        Provider:  (Not yet assigned)    Completed CYNTHIA SANCHEZ     Inpatient consult to Podiatry  Once        Provider:  (Not yet assigned)    Completed ERIN MÉNDEZ     IP consult to case management  Once        Provider:  (Not yet assigned)    Acknowledged ERIN MÉNDEZ     Inpatient consult to  Gastroenterology-[facility] Once        Provider:  Otto Villarreal MD    Completed CANDI ROOT            Cardiac/Vascular  Congestive heart failure, unspecified HF chronicity, unspecified heart failure type  Cardiomyopathy, ischemic  Continue home meds    PAF (paroxysmal atrial fibrillation)  Long term (current) use of anticoagulants  Patient with Paroxysmal (<7 days) atrial fibrillation which is controlled currently with Beta Blocker. Patient is currently in sinus rhythm.[id number] Score: 4. HASBLED Score: 3. Anticoagulation not indicated due to Active GIB.    Warfarin on hold at this time-  Resume on 04/23  Continue BB        Coronary artery disease due to calcified coronary lesion  Patient denies CP   Continue statin  Warfarin on hold at this time in the setting of GIB  May resume when cleared by GI      Hypertension   home BP meds- resume home meds as Bp permit  Monitor for hypotension      Mixed hyperlipidemia  Continue statin      ID  Cellulitis of left foot  Peripheral artery disease  Recently treated at [facility] and discharged 4/15  Will continue doxycycline  Stop date 04/28/2023  S/p left great toe amputation, consult Podiatry  Dressing CDI      Endocrine  Diabetes mellitus type 2, noninsulin dependent  Patient's FSGs are controlled on current medication regimen.  Last A1c reviewed-   Lab Results   Component Value Date    HGBA1C 7.0 (H) 03/25/2023     Most recent fingerstick glucose reviewed-   Recent Labs   Lab 04/20/23  1708 04/20/23  1933 04/21/23  0536 04/21/23  1154   POCTGLUCOSE 289* 329* 206* 293*     Current correctional scale  Low  Maintain anti-hyperglycemic dose as follows-   Antihyperglycemics (From admission, onward)      Start     Stop Route Frequency Ordered    04/19/23 0016  insulin aspart U-100 pen 0-5 Units         -- SubQ Before meals & nightly PRN 04/18/23 2317          Hold Oral hypoglycemics while patient is in the hospital.    GI  * UGIB (upper gastrointestinal  bleed)  Patient reported a moderate amount of coffee-ground emesis  IV Protonix 40 mg b.i.d.  Clear liquids for tonight  NPO after MN  Consult placed to GI  H/H 12.0/35.5  Transfuse for HGB < 7  Monitor, had another episode of bright red hematemesis 4/17   Consult GI-s/p EGD on 4/17, reported dieulafoy lesion of the stomach, erosive gastropathy with no bleeding and no stigmata of recent bleeding, hemorrhagic gastritis with focal lesion oozing-treated.  Rec full liquid diet, continue Protonixgtt, add Carafate t.i.d. and will need to hold anticoagulation until bleeding has resolved  4/19- down trending- trend q6hr and reconsult GI-  Resume aspirin/Plavix / Coumadin on 04/23    Other  Depressive disorder  Continue home fluoxetine and trazodone              Final Active Diagnoses:    Diagnosis Date Noted POA    PRINCIPAL PROBLEM:  UGIB (upper gastrointestinal bleed) [K92.2] 04/16/2023 Yes    Postoperative state [Z98.890] 04/17/2023 Not Applicable    PAD (peripheral artery disease) [I73.9] 04/13/2023 Yes    Cellulitis of left foot [L03.116] 03/25/2023 Yes    Depressive disorder [F32.A] 04/20/2022 Yes    Congestive heart failure, unspecified HF chronicity, unspecified heart failure type [I50.9] 03/14/2022 Yes    PAF (paroxysmal atrial fibrillation) [I48.0] 03/29/2021 Yes    Long term (current) use of anticoagulants [Z79.01] 03/29/2021 Not Applicable    Coronary artery disease due to calcified coronary lesion [I25.10, I25.84] 12/28/2015 Yes    Diabetes mellitus type 2, noninsulin dependent [E11.9] 09/17/2013 Yes    Cardiomyopathy, ischemic [I25.5] 09/17/2013 Yes    Mixed hyperlipidemia [E78.2]  Yes    Hypertension [I10]  Yes      Problems Resolved During this Admission:       Discharged Condition: stable    Disposition: Skilled Nursing Facility    Follow Up:   Follow-up Information       Alona Pierce DPM Follow up on 4/28/2023.    Specialty: Podiatry  Why: at 10:15 AM; previously scheduled podiatry appointment  Contact  information:  [address] [place] [id number]  [phone]                           Patient Instructions:   No discharge procedures on file.    Significant Diagnostic Studies:     Pending Diagnostic Studies:       None           Medications:  Reconciled Home Medications:      Medication List        START taking these medications      doxycycline 100 MG tablet  Commonly known as: VIBRA-TABS  Take 1 tablet (100 mg total) by mouth every 12 (twelve) hours. for 7 days  Replaces: doxycycline 100 MG Cap     pantoprazole 40 MG tablet  Commonly known as: PROTONIX  Take 1 tablet (40 mg total) by mouth 2 (two) times daily.     sucralfate 1 gram tablet  Commonly known as: CARAFATE  Take 1 tablet (1 g total) by mouth 4 (four) times daily before meals and nightly.            CHANGE how you take these medications      amLODIPine 5 MG tablet  Commonly known as: NORVASC  TAKE 1 TABLET EVERY DAY  What changed: when to take this     glipiZIDE 5 MG tablet  Commonly known as: GLUCOTROL  TAKE 1 TABLET TWICE DAILY  What changed: when to take this     isosorbide mononitrate 30 MG 24 hr tablet  Commonly known as: IMDUR  TAKE 1 TABLET EVERY DAY  What changed: when to take this     losartan-hydrochlorothiazide 100-25 mg 100-25 mg per tablet  Commonly known as: HYZAAR  TAKE 1 TABLET EVERY DAY  What changed: when to take this     metoprolol succinate 25 MG 24 hr tablet  Commonly known as: TOPROL-XL  TAKE 1 TABLET EVERY DAY  What changed: when to take this     warfarin 5 MG tablet  Commonly known as: COUMADIN  Take 1.5 tablets (7.5 mg total) by mouth Daily. TAKE 1 AND 1/2 TABLETS (7.5MG) EVERY DAY OR AS DIRECTED BY COUMADIN CLINIC  Start taking on: April 23, 2023  What changed: See the new instructions.            CONTINUE taking these medications      aspirin 81 MG EC tablet  Commonly known as: ECOTRIN  Take 1 tablet (81 mg total) by mouth once daily.  Start taking on: April 23, 2023     atorvastatin 40 MG tablet  Commonly known as:  LIPITOR  Take 1 tablet (40 mg total) by mouth once daily.     clopidogreL 75 mg tablet  Commonly known as: PLAVIX  Take 1 tablet (75 mg total) by mouth once daily.  Start taking on: April 23, 2023     FLUoxetine 40 MG capsule  Take 40 mg by mouth once daily.     JANUMET -1,000 mg Tm24  Generic drug: SITagliptan-metformin  Take 1 tablet by mouth once daily.     nitroGLYCERIN 0.4 MG SL tablet  Commonly known as: NITROSTAT  Place 0.4 mg under the tongue every 5 (five) minutes as needed for Chest pain.     oxyCODONE-acetaminophen 5-325 mg per tablet  Commonly known as: PERCOCET  Take 1 tablet by mouth every 4 (four) hours as needed for Pain.     ranolazine 1,000 mg Tb12  Commonly known as: RANEXA  Take 1,000 mg by mouth 2 (two) times daily.     traZODone 150 MG tablet  Commonly known as: DESYREL  TAKE 1 TABLET (150 MG TOTAL) BY MOUTH NIGHTLY.            STOP taking these medications      clotrimazole 1 % cream  Commonly known as: LOTRIMIN     doxycycline 100 MG Cap  Commonly known as: VIBRAMYCIN  Replaced by: doxycycline 100 MG tablet     TRUE METRIX GLUCOSE METER Misc  Generic drug: blood-glucose meter     TRUE METRIX GLUCOSE TEST STRIP Strp  Generic drug: blood sugar diagnostic     TRUEPLUS LANCETS 28 gauge Misc  Generic drug: lancets              Indwelling Lines/Drains at time of discharge:   Lines/Drains/Airways       None                   Time spent on the discharge of patient: 35 minutes         Cynthia Sanchez MD  Department of Hospital Medicine  [place] - Telemetry

## 2023-04-21 NOTE — ASSESSMENT & PLAN NOTE
Long term (current) use of anticoagulants  Patient with Paroxysmal (<7 days) atrial fibrillation which is controlled currently with Beta Blocker. Patient is currently in sinus rhythm.[id number] Score: 4. HASBLED Score: 3. Anticoagulation not indicated due to Active GIB.    Warfarin on hold at this time-  Resume on 04/23  Continue BB

## 2023-04-21 NOTE — PT/OT/SLP PROGRESS
Physical Therapy Treatment    Patient Name:  Julien Meléndez   MRN:  [id number]    Recommendations:     Discharge Recommendations: nursing facility, skilled  Discharge Equipment Recommendations: wheelchair  Barriers to discharge:  decreased functional mobility/strength    Assessment:     Julien Meléndez is a 82 y.o. male admitted with a medical diagnosis of UGIB (upper gastrointestinal bleed).  He presents with the following impairments/functional limitations: weakness, impaired endurance, impaired self care skills, impaired functional mobility, gait instability, impaired balance, impaired coordination, decreased upper extremity function, decreased lower extremity function, decreased safety awareness, impaired skin, orthopedic precautions, decreased ROM Pt would continue to benefit from P.T. To address impairments listed above.  .    Rehab Prognosis: Fair; patient would benefit from acute skilled PT services to address these deficits and reach maximum level of function.    Recent Surgery: Procedure(s) (LRB):  EGD (ESOPHAGOGASTRODUODENOSCOPY) (N/A) 4 Days Post-Op    Plan:     During this hospitalization, patient to be seen 5 x/week to address the identified rehab impairments via gait training, therapeutic activities, therapeutic exercises, wheelchair management/training and progress toward the following goals:    Plan of Care Expires:  05/18/23    Subjective       Patient/Family Comments/goals: Pt agreed to tx.  Pain/Comfort:  Pain Rating 1: 0/10  Pain Rating Post-Intervention 1: 1/10 (left ankle)      Objective:     Communicated with RN prior to session.  Patient found supine with wound vac, telemetry upon PT entry to room.     General Precautions: Standard, fall  Orthopedic Precautions: RLE weight bearing as tolerated, LLE partial weight bearing (WBAT to RLE in surgical shoe, PWB to heel in LLE in surgical shoe)  Braces:  (B surgical shoes)  Respiratory Status: Room air     Functional Mobility:  Bed Mobility:      Rolling Right: contact guard assistance  Scooting: stand by assistance and to EOB  Supine to Sit: minimum assistance  Sit to Supine: stand by assistance  Transfers:     Sit to Stand:  1st trial, Mod A of 2 with RW and vc's for hand placement.  2nd trial Shari of 2 with Rw and vc's for RW with bed minimally elevated.    Gait: 3 steps forward/back with RW and Shari of 2 with vc's for sequencing and L heel w/mehdi, seated rest, 4 small side steps with Shari of 2 and RW with same vcuing.  Pt ambulates with a 3pt gait and has difficulty maintaining L heel w/mehdi at times.  L surgery shoe donned throughout.  Balance: sitting good, standing fair/fair-, gait fair-/poor+      AM-PAC 6 CLICK MOBILITY  Turning over in bed (including adjusting bedclothes, sheets and blankets)?: 3  Sitting down on and standing up from a chair with arms (e.g., wheelchair, bedside commode, etc.): 2  Moving from lying on back to sitting on the side of the bed?: 3  Moving to and from a bed to a chair (including a wheelchair)?: 3  Need to walk in hospital room?: 3  Climbing 3-5 steps with a railing?: 1  Basic Mobility Total Score: 15       Treatment & Education:  Pt sat EOB and was instructed and assisted with donning B surgical shoes by GENA with PTA facilitating dynamic balance.  Pt c/o of mild dizziness, in sitting, as well as in standing.  Seated BLE therex: AP, LAQs x 10 reps.    Orthostatics taken  Sitting (prior to standing) 98/54mmHg; HR 81bpm  Standing after 1 min: 85/54mmHg; HR 85bpm  After sitting for ~1 min 84/52mmHg; HR 86bpm  After sitting ~91/51 mmHg; 83bpm    Pt was symptomatic c/o of dizziness in sitting and standing. Pt returned supine with HOB elevated for lunch and stated his dizziness had subsided some.      Patient left HOB elevated with all lines intact, call button in reach, bed alarm on, and Rn notified..    GOALS:   Multidisciplinary Problems       Physical Therapy Goals          Problem: Physical Therapy    Goal Priority  Disciplines Outcome Goal Variances Interventions   Physical Therapy Goal     PT, PT/OT Ongoing, Progressing     Description: Goals to be met by: 23     Patient will increase functional independence with mobility by performin. Supine <> sit with Modified [place]  2. Sit to stand with Contact Guard Assistance with RW   3. Bed to chair transfer with Contact Guard Assistance   4. Gait x 10 ft with CGA and RW  3. Lower extremity exercise program x 10 reps per handout, with supervision                         Time Tracking:     PT Received On: 23  PT Start Time: 1132     PT Stop Time: 1210  PT Total Time (min): 38 min     Billable Minutes: Therapeutic Activity 38  Overlap with GENA for portions of session due to complex nature of patient and for safety with mobility to decrease fall risk for patient and caregiver injury requiring two skilled therapists to provide different interventions.      Treatment Type: Treatment  PT/PTA: PTA     Number of PTA visits since last PT visit: 2     2023

## 2023-04-21 NOTE — PROGRESS NOTES
[facility]  Progress Note    Patient Name: Julien Meléndez  MRN: [id number]  Patient Class: IP- Inpatient   Admission Date: 4/16/2023  Length of Stay: 3 days  Attending Physician: Cynthia Sanchez*  Primary Care Provider: Kelvin Wayne MD        Subjective:     Principal Problem:UGIB (upper gastrointestinal bleed)        HPI:  Julien Meléndez an 82-year-old male with PMH DMT2, MI, CAD, CAD, CKD, paroxysmal AFib, CHF, ischemic cardiomyopathy, hiatal hernia, HTN, HLD, and left foot cellulitis s/p great toe amputation.  Patient presents to the ED today after an episode of coffee ground emesis that occurred this morning.  Patient was recently discharged from [facility] for left foot cellulitis which required a left great toe amputation and he was discharged to a nursing facility on doxycycline x14 days (due to stop 04/28/2023).  Per patient, he had 1 episode of coffee-ground emesis that he states was moderate in amount.  He denies CP, SOB, abdominal pain, diarrhea, melena, hematochezia, dizziness, lightheadedness, fevers, chills, or any other signs of bleeding.    Hospital medicine will admit patient for GIB workup with consult to GI.      Overview/Hospital Course:  No notes on file    Interval History:  Awake and alert, lying in bed, no new complaint  H/H  stable -appreciate GI recs  H/H down trending, continue po Protonix  re-consult GI and monitor      Appreciate GI- S/p EGD on 4/17 reporting hemorrhagic gastritis with focal lesion losing-treated and recommend continue Protonix and Carafate., continue to hold blood thinner       Review of Systems   Constitutional:  Positive for appetite change. Negative for chills and fever.   Respiratory:  Negative for shortness of breath.    Cardiovascular:  Negative for chest pain.   Gastrointestinal:  Negative for abdominal pain, blood in stool, diarrhea, nausea and vomiting.   Genitourinary:  Negative for decreased urine volume.    Musculoskeletal:  Negative for myalgias.   Skin:  Positive for wound (Left great toe amputation). Negative for color change and pallor.   Neurological:  Negative for dizziness, weakness, light-headedness, numbness and headaches.   Psychiatric/Behavioral:  Negative for agitation and confusion. The patient is not nervous/anxious.    Objective:     Vital Signs (Most Recent):  Temp: 97.8 °F (36.6 °C) (04/21/23 1138)  Pulse: 72 (04/21/23 1138)  Resp: 18 (04/21/23 1138)  BP: (!) 106/57 (04/21/23 1138)  SpO2: 98 % (04/21/23 1138)   Vital Signs (24h Range):  Temp:  [96.6 °F (35.9 °C)-98 °F (36.7 °C)] 97.8 °F (36.6 °C)  Pulse:  [72-93] 72  Resp:  [18-20] 18  SpO2:  [97 %-99 %] 98 %  BP: (106-174)/(53-79) 106/57     Weight: 96 kg (211 lb 10.3 oz)  Body mass index is 27.92 kg/m².    Intake/Output Summary (Last 24 hours) at 4/21/2023 1451  Last data filed at 4/21/2023 0555  Gross per 24 hour   Intake --   Output 600 ml   Net -600 ml        Physical Exam  Vitals and nursing note reviewed.   Constitutional:       General: He is not in acute distress.     Appearance: He is not ill-appearing.   HENT:      Head: Normocephalic and atraumatic.   Cardiovascular:      Rate and Rhythm: Normal rate and regular rhythm.      Pulses:           Popliteal pulses are 1+ on the left side.        Posterior tibial pulses are 1+ on the right side.      Heart sounds: Normal heart sounds.   Pulmonary:      Effort: Pulmonary effort is normal. No respiratory distress.      Breath sounds: Normal breath sounds. No wheezing or rhonchi.   Abdominal:      General: Bowel sounds are normal. There is no distension.      Palpations: Abdomen is soft.      Tenderness: There is no abdominal tenderness. There is no guarding.   Musculoskeletal:      Right lower leg: No edema.      Left lower leg: No edema.      Left Lower Extremity: (Left great toe amp)  Feet:      Comments: Unable to assess left foot d/t surgical dressing - dressing CDI  Skin:     General: Skin  is warm.      Findings: Bruising (BUE) present.   Neurological:      Mental Status: He is alert and oriented to person, place, and time.   Psychiatric:         Mood and Affect: Mood normal.         Behavior: Behavior normal.       Significant Labs: A1C:   Recent Labs   Lab 03/25/23  1734   HGBA1C 7.0*       ABGs: No results for input(s): PH, PCO2, HCO3, POCSATURATED, BE, TOTALHB, COHB, METHB, O2HB, POCFIO2, PO2 in the last 48 hours.  Blood Culture: No results for input(s): LABBLOO in the last 48 hours.  CBC:   Recent Labs   Lab 04/19/23  1938 04/20/23  0446 04/21/23  0433   WBC 7.73 5.91 8.19   HGB 10.8* 9.7* 10.0*   HCT 32.6* 28.8* 30.3*    219 247       CMP:   Recent Labs   Lab 04/20/23  0446 04/21/23  0433    139   K 3.8 3.7    104   CO2 24 25   * 233*   BUN 16 15   CREATININE 1.2 1.1   CALCIUM 9.0 9.1   ANIONGAP 11 10       Lactic Acid: No results for input(s): LACTATE in the last 48 hours.  Lipase: No results for input(s): LIPASE in the last 48 hours.  Lipid Panel: No results for input(s): CHOL, HDL, LDLCALC, TRIG, CHOLHDL in the last 48 hours.  Magnesium: No results for input(s): MG in the last 48 hours.  Troponin: No results for input(s): TROPONINI, TROPONINIHS in the last 48 hours.  TSH: No results for input(s): TSH in the last 4320 hours.  Urine Culture: No results for input(s): LABURIN in the last 48 hours.  Urine Studies: No results for input(s): COLORU, APPEARANCEUA, PHUR, SPECGRAV, PROTEINUA, GLUCUA, KETONESU, BILIRUBINUA, OCCULTUA, NITRITE, UROBILINOGEN, LEUKOCYTESUR, RBCUA, WBCUA, BACTERIA, SQUAMEPITHEL, HYALINECASTS in the last 48 hours.    Invalid input(s): WRIGHTSUR    Significant Imaging: I have reviewed all pertinent imaging results/findings within the past 24 hours.      Assessment/Plan:      * UGIB (upper gastrointestinal bleed)  · Patient reported a moderate amount of coffee-ground emesis  · IV Protonix 40 mg b.i.d.  · Clear liquids for tonight  · NPO after  MN  · Consult placed to GI  · H/H 12.0/35.5  · Transfuse for HGB < 7  Monitor, had another episode of bright red hematemesis 4/17   Consult GI-s/p EGD on 4/17, reported dieulafoy lesion of the stomach, erosive gastropathy with no bleeding and no stigmata of recent bleeding, hemorrhagic gastritis with focal lesion oozing-treated.  Rec full liquid diet, continue Protonixgtt, add Carafate t.i.d. and will need to hold anticoagulation until bleeding has resolved  4/19- down trending- trend q6hr and reconsult GI-  Resume aspirin/Plavix / Coumadin on 04/23    Cellulitis of left foot  Peripheral artery disease  · Recently treated at [facility] and discharged 4/15  · Will continue doxycycline  · Stop date 04/28/2023  · S/p left great toe amputation, consult Podiatry  · Dressing CDI      Depressive disorder  Continue home fluoxetine and trazodone            Congestive heart failure, unspecified HF chronicity, unspecified heart failure type  Cardiomyopathy, ischemic  Continue home meds    PAF (paroxysmal atrial fibrillation)  Long term (current) use of anticoagulants  Patient with Paroxysmal (<7 days) atrial fibrillation which is controlled currently with Beta Blocker. Patient is currently in sinus rhythm.[id number] Score: 4. HASBLED Score: 3. Anticoagulation not indicated due to Active GIB.    Warfarin on hold at this time-  Resume on 04/23  Continue BB        Coronary artery disease due to calcified coronary lesion  · Patient denies CP   · Continue statin  · Warfarin on hold at this time in the setting of GIB  · May resume when cleared by GI      Diabetes mellitus type 2, noninsulin dependent  Patient's FSGs are controlled on current medication regimen.  Last A1c reviewed-   Lab Results   Component Value Date    HGBA1C 7.0 (H) 03/25/2023     Most recent fingerstick glucose reviewed-   Recent Labs   Lab 04/20/23  1708 04/20/23  1933 04/21/23  0536 04/21/23  1154   POCTGLUCOSE 289* 329* 206* 293*     Current  correctional scale  Low  Maintain anti-hyperglycemic dose as follows-   Antihyperglycemics (From admission, onward)    Start     Stop Route Frequency Ordered    04/19/23 0016  insulin aspart U-100 pen 0-5 Units         -- SubQ Before meals & nightly PRN 04/18/23 2317        Hold Oral hypoglycemics while patient is in the hospital.    Hypertension  ·  home BP meds- resume home meds as Bp permit  · Monitor for hypotension      Mixed hyperlipidemia  · Continue statin        VTE Risk Mitigation (From admission, onward)         Ordered     IP VTE HIGH RISK PATIENT  Once         04/16/23 1914     Place sequential compression device  Until discontinued         04/16/23 1914                Discharge Planning   AURELIA: 4/21/2023     Code Status: Full Code   Is the patient medically ready for discharge?:     Reason for patient still in hospital (select all that apply): Pending disposition  Discharge Plan A: Skilled Nursing Facility                  Cynthia Sanchez MD  Department of Hospital Medicine   [place] - Telemetry

## 2023-04-21 NOTE — PROGRESS NOTES
[facility]                    Pharmacy       Discharge Medication Education    Patient ACCEPTED medication education. Pharmacy has provided education on the name, indication, and possible side effects of the medication(s) prescribed, using teach-back method.     The following medications have also been discussed, during this admission.        Medication List        START taking these medications      doxycycline 100 MG tablet  Commonly known as: VIBRA-TABS  Take 1 tablet (100 mg total) by mouth every 12 (twelve) hours. for 7 days  Replaces: doxycycline 100 MG Cap     pantoprazole 40 MG tablet  Commonly known as: PROTONIX  Take 1 tablet (40 mg total) by mouth 2 (two) times daily.     sucralfate 1 gram tablet  Commonly known as: CARAFATE  Take 1 tablet (1 g total) by mouth 4 (four) times daily before meals and nightly.            CHANGE how you take these medications      amLODIPine 5 MG tablet  Commonly known as: NORVASC  TAKE 1 TABLET EVERY DAY  What changed: when to take this     aspirin 81 MG EC tablet  Commonly known as: ECOTRIN  Take 1 tablet (81 mg total) by mouth once daily.  Start taking on: April 23, 2023  What changed: These instructions start on April 23, 2023. If you are unsure what to do until then, ask your doctor or other care provider.     clopidogreL 75 mg tablet  Commonly known as: PLAVIX  Take 1 tablet (75 mg total) by mouth once daily.  Start taking on: April 23, 2023  What changed: These instructions start on April 23, 2023. If you are unsure what to do until then, ask your doctor or other care provider.     glipiZIDE 5 MG tablet  Commonly known as: GLUCOTROL  TAKE 1 TABLET TWICE DAILY  What changed: when to take this     isosorbide mononitrate 30 MG 24 hr tablet  Commonly known as: IMDUR  TAKE 1 TABLET EVERY DAY  What changed: when to take this     losartan-hydrochlorothiazide 100-25 mg 100-25 mg per tablet  Commonly known as: HYZAAR  TAKE 1 TABLET EVERY DAY  What changed:  when to take this     metoprolol succinate 25 MG 24 hr tablet  Commonly known as: TOPROL-XL  TAKE 1 TABLET EVERY DAY  What changed: when to take this     warfarin 5 MG tablet  Commonly known as: COUMADIN  Take 1.5 tablets (7.5 mg total) by mouth Daily. TAKE 1 AND 1/2 TABLETS (7.5MG) EVERY DAY OR AS DIRECTED BY COUMADIN CLINIC  Start taking on: April 23, 2023  What changed:   See the new instructions.  These instructions start on April 23, 2023. If you are unsure what to do until then, ask your doctor or other care provider.            CONTINUE taking these medications      atorvastatin 40 MG tablet  Commonly known as: LIPITOR  Take 1 tablet (40 mg total) by mouth once daily.     FLUoxetine 40 MG capsule     JANUMET -1,000 mg Tm24  Generic drug: SITagliptan-metformin  Take 1 tablet by mouth once daily.     nitroGLYCERIN 0.4 MG SL tablet  Commonly known as: NITROSTAT     oxyCODONE-acetaminophen 5-325 mg per tablet  Commonly known as: PERCOCET  Take 1 tablet by mouth every 4 (four) hours as needed for Pain.     ranolazine 1,000 mg Tb12  Commonly known as: RANEXA     traZODone 150 MG tablet  Commonly known as: DESYREL  TAKE 1 TABLET (150 MG TOTAL) BY MOUTH NIGHTLY.            STOP taking these medications      clotrimazole 1 % cream  Commonly known as: LOTRIMIN     doxycycline 100 MG Cap  Commonly known as: VIBRAMYCIN  Replaced by: doxycycline 100 MG tablet     TRUE METRIX GLUCOSE METER Misc  Generic drug: blood-glucose meter     TRUE METRIX GLUCOSE TEST STRIP Strp  Generic drug: blood sugar diagnostic     TRUEPLUS LANCETS 28 gauge Misc  Generic drug: lancets               Where to Get Your Medications        You can get these medications from any pharmacy    Bring a paper prescription for each of these medications  oxyCODONE-acetaminophen 5-325 mg per tablet       Information about where to get these medications is not yet available    Ask your nurse or doctor about these medications  aspirin 81 MG EC  tablet  clopidogreL 75 mg tablet  doxycycline 100 MG tablet  pantoprazole 40 MG tablet  sucralfate 1 gram tablet  warfarin 5 MG tablet          Thank you  Ciara Qiu, PharmD  [phone]

## 2023-04-21 NOTE — SUBJECTIVE & OBJECTIVE
Interval History:  Awake and alert, lying in bed, no new complaint  H/H  stable -appreciate GI recs  H/H down trending, continue po Protonix  re-consult GI and monitor      Appreciate GI- S/p EGD on 4/17 reporting hemorrhagic gastritis with focal lesion losing-treated and recommend continue Protonix and Carafate., continue to hold blood thinner       Review of Systems   Constitutional:  Positive for appetite change. Negative for chills and fever.   Respiratory:  Negative for shortness of breath.    Cardiovascular:  Negative for chest pain.   Gastrointestinal:  Negative for abdominal pain, blood in stool, diarrhea, nausea and vomiting.   Genitourinary:  Negative for decreased urine volume.   Musculoskeletal:  Negative for myalgias.   Skin:  Positive for wound (Left great toe amputation). Negative for color change and pallor.   Neurological:  Negative for dizziness, weakness, light-headedness, numbness and headaches.   Psychiatric/Behavioral:  Negative for agitation and confusion. The patient is not nervous/anxious.    Objective:     Vital Signs (Most Recent):  Temp: 97.8 °F (36.6 °C) (04/21/23 1138)  Pulse: 72 (04/21/23 1138)  Resp: 18 (04/21/23 1138)  BP: (!) 106/57 (04/21/23 1138)  SpO2: 98 % (04/21/23 1138)   Vital Signs (24h Range):  Temp:  [96.6 °F (35.9 °C)-98 °F (36.7 °C)] 97.8 °F (36.6 °C)  Pulse:  [72-93] 72  Resp:  [18-20] 18  SpO2:  [97 %-99 %] 98 %  BP: (106-174)/(53-79) 106/57     Weight: 96 kg (211 lb 10.3 oz)  Body mass index is 27.92 kg/m².    Intake/Output Summary (Last 24 hours) at 4/21/2023 1451  Last data filed at 4/21/2023 0555  Gross per 24 hour   Intake --   Output 600 ml   Net -600 ml        Physical Exam  Vitals and nursing note reviewed.   Constitutional:       General: He is not in acute distress.     Appearance: He is not ill-appearing.   HENT:      Head: Normocephalic and atraumatic.   Cardiovascular:      Rate and Rhythm: Normal rate and regular rhythm.      Pulses:           Popliteal  pulses are 1+ on the left side.        Posterior tibial pulses are 1+ on the right side.      Heart sounds: Normal heart sounds.   Pulmonary:      Effort: Pulmonary effort is normal. No respiratory distress.      Breath sounds: Normal breath sounds. No wheezing or rhonchi.   Abdominal:      General: Bowel sounds are normal. There is no distension.      Palpations: Abdomen is soft.      Tenderness: There is no abdominal tenderness. There is no guarding.   Musculoskeletal:      Right lower leg: No edema.      Left lower leg: No edema.      Left Lower Extremity: (Left great toe amp)  Feet:      Comments: Unable to assess left foot d/t surgical dressing - dressing CDI  Skin:     General: Skin is warm.      Findings: Bruising (BUE) present.   Neurological:      Mental Status: He is alert and oriented to person, place, and time.   Psychiatric:         Mood and Affect: Mood normal.         Behavior: Behavior normal.       Significant Labs: A1C:   Recent Labs   Lab 03/25/23  1734   HGBA1C 7.0*       ABGs: No results for input(s): PH, PCO2, HCO3, POCSATURATED, BE, TOTALHB, COHB, METHB, O2HB, POCFIO2, PO2 in the last 48 hours.  Blood Culture: No results for input(s): LABBLOO in the last 48 hours.  CBC:   Recent Labs   Lab 04/19/23  1938 04/20/23  0446 04/21/23  0433   WBC 7.73 5.91 8.19   HGB 10.8* 9.7* 10.0*   HCT 32.6* 28.8* 30.3*    219 247       CMP:   Recent Labs   Lab 04/20/23  0446 04/21/23  0433    139   K 3.8 3.7    104   CO2 24 25   * 233*   BUN 16 15   CREATININE 1.2 1.1   CALCIUM 9.0 9.1   ANIONGAP 11 10       Lactic Acid: No results for input(s): LACTATE in the last 48 hours.  Lipase: No results for input(s): LIPASE in the last 48 hours.  Lipid Panel: No results for input(s): CHOL, HDL, LDLCALC, TRIG, CHOLHDL in the last 48 hours.  Magnesium: No results for input(s): MG in the last 48 hours.  Troponin: No results for input(s): TROPONINI, TROPONINIHS in the last 48 hours.  TSH: No results  for input(s): TSH in the last 4320 hours.  Urine Culture: No results for input(s): LABURIN in the last 48 hours.  Urine Studies: No results for input(s): COLORU, APPEARANCEUA, PHUR, SPECGRAV, PROTEINUA, GLUCUA, KETONESU, BILIRUBINUA, OCCULTUA, NITRITE, UROBILINOGEN, LEUKOCYTESUR, RBCUA, WBCUA, BACTERIA, SQUAMEPITHEL, HYALINECASTS in the last 48 hours.    Invalid input(s): JOSÉ    Significant Imaging: I have reviewed all pertinent imaging results/findings within the past 24 hours.

## 2023-04-21 NOTE — PT/OT/SLP PROGRESS
"Occupational Therapy   Treatment    Name: Julien Meléndez  MRN: [id number]  Admitting Diagnosis:  UGIB (upper gastrointestinal bleed)  4 Days Post-Op    Recommendations:     Discharge Recommendations: nursing facility, skilled  Discharge Equipment Recommendations:  wheelchair  Barriers to discharge:   (Requires increased level of (A))    Assessment:   Patient motivated for therapies, but was limited by orthostatic hypotension (see PT note for BP readings). Patient will benefit from continued skilled OT to address deficits and improve performance in functional ADL tasks. Cont OT per POC.    Julien Meléndez is a 82 y.o. male with a medical diagnosis of UGIB (upper gastrointestinal bleed).   Performance deficits affecting function are weakness, impaired endurance, impaired self care skills, impaired functional mobility, gait instability, impaired balance, decreased coordination, decreased upper extremity function, decreased lower extremity function, decreased safety awareness, pain, orthopedic precautions, decreased ROM, impaired coordination, impaired skin.     Rehab Prognosis:  Good; patient would benefit from acute skilled OT services to address these deficits and reach maximum level of function.       Plan:     Patient to be seen 5 x/week to address the above listed problems via self-care/home management, therapeutic activities, therapeutic exercises  Plan of Care Expires: 05/18/23  Plan of Care Reviewed with: patient    Subjective     Chief Complaint: "I'm a little dizzy"  Patient/Family Comments/goals: return to PLOF  Pain/Comfort:  Pain Rating 1: 1/10  Location - Side 1: Left  Location 1: foot  Pain Addressed 1: Reposition, Distraction, Cessation of Activity    Objective:     Communicated with: nurseTiffani prior to session.  Patient found HOB elevated with bed alarm, telemetry, wound vac upon OT entry to room.    General Precautions: Standard, fall    Orthopedic Precautions:LLE weight bearing as tolerated, RLE " weight bearing as tolerated (WBAT to RLE in surgical shoe, PWB to heel in LLE in surgical shoe)  Braces:  (B surgical shoes)  Respiratory Status: Room air     Occupational Performance:     Bed Mobility:    Patient completed Scooting/Bridging with contact guard assistance  Patient completed Supine to Sit with minimum assistance and with side rail  Patient completed Sit to Supine with stand by assistance     Functional Mobility/Transfers:  ModA of 2 persons using RW - 1st stand  Shari of 2 persons using RW - 2nd stand; bed also slightly elevated  3-4 steps fwd/bwd; 4-5 SS to HOB    Activities of Daily Living:  Lower Body Dressing: maximal assistance to david surgical shoes      [facility] 6 Click ADL: 18    Treatment & Education:  Patient required increased time, effort and VCs for tasks/transitions. Reported unable to david surgical shoes; RHODES donned /c maxA. Patient reported mild dizziness in sitting. BP readings taken in sitting and standing (see PT note for readings). Patient symptomatic in all positions and reported some improvement upon returning to bed level. Educated on importance of keeping HOB elevated for postural adjustment.     Patient left HOB elevated with all lines intact, call button in reach, bed alarm on, and nsg notified    GOALS:   Multidisciplinary Problems       Occupational Therapy Goals          Problem: Occupational Therapy    Goal Priority Disciplines Outcome Interventions   Occupational Therapy Goal     OT, PT/OT Ongoing, Progressing    Description: Goals to be met by: 5/18/2023     Patient will increase functional independence with ADLs by performing:    LE Dressing with Set-up Assistance.  Toileting from bedside commode with Modified [place] for hygiene and clothing management.   Toilet transfer to drop-arm bedside commode with Stand-by Assistance.                         Time Tracking:     OT Date of Treatment: 04/21/23  OT Start Time: 1132  OT Stop Time: 1210  OT Total Time (min): 38 min  co-tx /c PTA    Billable Minutes:Self Care/Home Management 8  Therapeutic Activity 30    OT/GENA: GENA     Number of GENA visits since last OT visit: 1    4/21/2023

## 2023-04-21 NOTE — PLAN OF CARE
Problem: Physical Therapy  Goal: Physical Therapy Goal  Description: Goals to be met by: 23     Patient will increase functional independence with mobility by performin. Supine <> sit with Modified [place]  2. Sit to stand with Contact Guard Assistance with RW   3. Bed to chair transfer with Contact Guard Assistance   4. Gait x 10 ft with CGA and RW  3. Lower extremity exercise program x 10 reps per handout, with supervision    Outcome: Ongoing, Progressing   Continue working toward goals

## 2023-04-21 NOTE — CONSULTS
Pt planning for transfer to SNF- notified nurse to remove wound vac dressing from L foot wound and apply wet-to-dry dressing, covered with abd pad and secure with Kerlix.  Nursing communication in place and Dr. RENETTA Rice DPM is aware. Confirmed with case management, that [facility] will have a vac for pt there and wound care orders are in place within d/c orders.

## 2023-04-21 NOTE — PLAN OF CARE
0821  Updated notes sent to [facility] via [employer]. H&H 10.0/30.3 this AM.     1030  CM was informed by Dr Francis that the pt is medically stable to discharge back to [facility] today.     1105  CM [phone] & requested that she submit for insurance authorization.     1335  CM was informed by Ericka that insurance authorization has been obtained & that they have a wound vac for the patient to use at the facility.  Message sent to Dr Francis & wound care nurse Opal informing of above & requesting SNF orders. Awaiting response.     Patient resting quietly in bed when CM rounded via [employer]. No family present. Patient in agreement with plan to discharge back to [facility]F today. Bryanna to removed wound vac dressing & placement wet-to-dry dressing during transport.     CM informed the pt's spouse, Kylee Meléndez ([phone]), via phone of above. Spouse verbalized understanding, agreement, & stated that she was en route to the hospital.     1445  CM requested SNF orders from Dr Francis. Awaiting response.     1505  SNF orders sent to [facility] via [employer]. Awaiting response.    1535  Oxycodone prescription sent to [facility] via [employer]. Transportation packet left at the nurse's station. [employer] van transportation scheduled with requested pickup at 1730.    1605  CM was informed by Maggy w/[facility] that the patient will be accepted to room 813B & requested that report be called to the [address] [place] [address] nurse at [phone].    Message sent to nurse Tiffani & virtual nurse David informing that the pt is cleared to discharge to [facility] room 813B, transportation packet left at the nurse's station, [employer] van transportation scheduled with requested pickup at 1730, & requested that Tiffani call report to the [address] [place] [address] nurse at [phone].    1635  Updated SNF orders with wound vac  instructions sent to [facility] via [employer].       Will continue to follow.

## 2023-04-21 NOTE — PLAN OF CARE
Problem: Occupational Therapy  Goal: Occupational Therapy Goal  Description: Goals to be met by: 5/18/2023     Patient will increase functional independence with ADLs by performing:    LE Dressing with Set-up Assistance.  Toileting from bedside commode with Modified [place] for hygiene and clothing management.   Toilet transfer to drop-arm bedside commode with Stand-by Assistance.    Outcome: Ongoing, Progressing     Patient motivated for therapies, but was limited by orthostatic hypotension (see PT note for BP readings). Patient will benefit from continued skilled OT to address deficits and improve performance in functional ADL tasks. Cont OT per POC.

## 2023-04-21 NOTE — PLAN OF CARE
Problem: Adjustment to Illness (Gastrointestinal Bleeding)  Goal: Optimal Coping with Acute Illness  Outcome: Ongoing, Progressing  Chart check complete. Vitals, orders, labs, and progress notes reviewed. Care plan updated. Will monitor.

## 2023-04-24 ENCOUNTER — PATIENT OUTREACH (OUTPATIENT)
Dept: ADMINISTRATIVE | Facility: OTHER | Age: 83
End: 2023-04-24
Payer: MEDICARE

## 2023-04-24 NOTE — PROGRESS NOTES
IP Liaison - Final Visit Note    Patient: Julien Meléndez  MRN:  [id number]  Date of Service:  4/24/2023  Completed by:  JERALD Saucedo    Reason for Visit   Patient presents with    IP Liaison Chart Review        Patient Summary     Discharge Date: 04/21/2023  Discharge telephone number/address: [phone] / [facility]  Follow up provider: n/a  Follow up appointments: n/a  Home Health agency & telephone number: n/a  DME ordered &  name: n/a  Assigned OPCM RN/SW: n/a  Report sent to follow up team (PCP/OPCM) via in basket message: n/a  Community Resources arranged including agency name & contact info: n/a      JERALD Saucedo

## 2023-05-10 ENCOUNTER — ANTI-COAG VISIT (OUTPATIENT)
Dept: CARDIOLOGY | Facility: CLINIC | Age: 83
End: 2023-05-10
Payer: MEDICARE

## 2023-05-10 DIAGNOSIS — Z79.01 LONG TERM (CURRENT) USE OF ANTICOAGULANTS: ICD-10-CM

## 2023-05-10 DIAGNOSIS — I48.0 PAF (PAROXYSMAL ATRIAL FIBRILLATION): Primary | ICD-10-CM

## 2023-05-10 PROBLEM — S98.112A AMPUTATION OF LEFT GREAT TOE: Status: ACTIVE | Noted: 2023-05-10

## 2023-05-10 PROBLEM — D50.0 ANEMIA, BLOOD LOSS: Status: ACTIVE | Noted: 2023-05-10

## 2023-05-10 NOTE — PROGRESS NOTES
Spoke with patient regarding the resumption of warfarin. Per the patient, he restarted warfarin 7.5mg daily on 5/9. The patient denied any further GI bleeding since last occurrence. Patient has home health services with [facility].     Update - INR scheduled for 05.23.23 with DAVID VICKERS ([place]).

## 2023-05-10 NOTE — PROGRESS NOTES
Received CC'd note from PCP. Dr. Wayne. Pt was admitted 3/25-4/14 s/p cellulitis, toe amputation, angiogram w/ revascularization. He was d/c to SNF then returned 4/16 for GI bleed. EGD done showing hemorrhagic gastritis w/ was treated. He was then d/c back to SNF 4/21. Per d/c note, pt was ok to resume warfarin once no signs of bleeding with date of 4/23 as expected date to resume. Per MD note today, pt was d/c home 5/9 from SNF. He notes for Dr. Martin to confirm ok for warfarin to resume. Please check with patient if  he was resumed on warfarin in SNF or still off of it. If off of it, advise to check with Dr. Martin' office if he can resume and let us know. Has he had any more signs of G bleeding? I have messaged PCP and copied Dr. Martin asking if pt can resume now after d/c note had planned that pt should've been able to resume on 4/23.     Pt reports resuming warfarin 5/9. I am uncertain if he was getting at SNF.  Lets get INR tomorrow & get a baseline check. He denies any signs of bleeding.

## 2023-05-11 ENCOUNTER — OFFICE VISIT (OUTPATIENT)
Dept: PODIATRY | Facility: CLINIC | Age: 83
End: 2023-05-11
Payer: MEDICARE

## 2023-05-11 VITALS — BODY MASS INDEX: 27.96 KG/M2 | HEIGHT: 73 IN | WEIGHT: 211 LBS

## 2023-05-11 DIAGNOSIS — Z09 FOLLOW-UP EXAMINATION FOLLOWING SURGERY: ICD-10-CM

## 2023-05-11 DIAGNOSIS — I73.9 PAD (PERIPHERAL ARTERY DISEASE): ICD-10-CM

## 2023-05-11 DIAGNOSIS — M79.671 RIGHT FOOT PAIN: Primary | ICD-10-CM

## 2023-05-11 DIAGNOSIS — S91.302A OPEN WOUND OF FOOT, LEFT, INITIAL ENCOUNTER: ICD-10-CM

## 2023-05-11 PROCEDURE — 1159F MED LIST DOCD IN RCRD: CPT | Mod: CPTII,S$GLB,, | Performed by: PODIATRIST

## 2023-05-11 PROCEDURE — 1159F PR MEDICATION LIST DOCUMENTED IN MEDICAL RECORD: ICD-10-PCS | Mod: CPTII,S$GLB,, | Performed by: PODIATRIST

## 2023-05-11 PROCEDURE — 3288F PR FALLS RISK ASSESSMENT DOCUMENTED: ICD-10-PCS | Mod: CPTII,S$GLB,, | Performed by: PODIATRIST

## 2023-05-11 PROCEDURE — 1101F PR PT FALLS ASSESS DOC 0-1 FALLS W/OUT INJ PAST YR: ICD-10-PCS | Mod: CPTII,S$GLB,, | Performed by: PODIATRIST

## 2023-05-11 PROCEDURE — 99999 PR PBB SHADOW E&M-EST. PATIENT-LVL IV: CPT | Mod: PBBFAC,,, | Performed by: PODIATRIST

## 2023-05-11 PROCEDURE — 1160F PR REVIEW ALL MEDS BY PRESCRIBER/CLIN PHARMACIST DOCUMENTED: ICD-10-PCS | Mod: CPTII,S$GLB,, | Performed by: PODIATRIST

## 2023-05-11 PROCEDURE — 1125F PR PAIN SEVERITY QUANTIFIED, PAIN PRESENT: ICD-10-PCS | Mod: CPTII,S$GLB,, | Performed by: PODIATRIST

## 2023-05-11 PROCEDURE — 99024 POSTOP FOLLOW-UP VISIT: CPT | Mod: S$GLB,,, | Performed by: PODIATRIST

## 2023-05-11 PROCEDURE — 99999 PR PBB SHADOW E&M-EST. PATIENT-LVL IV: ICD-10-PCS | Mod: PBBFAC,,, | Performed by: PODIATRIST

## 2023-05-11 PROCEDURE — 1160F RVW MEDS BY RX/DR IN RCRD: CPT | Mod: CPTII,S$GLB,, | Performed by: PODIATRIST

## 2023-05-11 PROCEDURE — 1101F PT FALLS ASSESS-DOCD LE1/YR: CPT | Mod: CPTII,S$GLB,, | Performed by: PODIATRIST

## 2023-05-11 PROCEDURE — 3288F FALL RISK ASSESSMENT DOCD: CPT | Mod: CPTII,S$GLB,, | Performed by: PODIATRIST

## 2023-05-11 PROCEDURE — 99024 PR POST-OP FOLLOW-UP VISIT: ICD-10-PCS | Mod: S$GLB,,, | Performed by: PODIATRIST

## 2023-05-11 PROCEDURE — 1125F AMNT PAIN NOTED PAIN PRSNT: CPT | Mod: CPTII,S$GLB,, | Performed by: PODIATRIST

## 2023-05-11 RX ORDER — TRAMADOL HYDROCHLORIDE 50 MG/1
50 TABLET ORAL EVERY 6 HOURS PRN
Qty: 20 TABLET | Refills: 0 | Status: ON HOLD | OUTPATIENT
Start: 2023-05-11 | End: 2023-06-09 | Stop reason: HOSPADM

## 2023-05-11 RX ORDER — DOXYCYCLINE HYCLATE 100 MG
100 TABLET ORAL 2 TIMES DAILY
Qty: 14 TABLET | Refills: 0 | Status: SHIPPED | OUTPATIENT
Start: 2023-05-11 | End: 2023-05-15 | Stop reason: SDUPTHER

## 2023-05-11 NOTE — PROGRESS NOTES
Subjective:      Patient ID: Julien Meléndez is a 82 y.o. male.    Chief Complaint: Post-op Evaluation (Sx:04/13/2023)      82 y.o. 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 With his family member today. He is also s/p angiogram/angioplasty by Dr. Giraldo. Complains of pain on left foot. VAC has been changed by nurse at rehab. He is on WC today. Recently discharged from hospital after having vomiting and Afib.     Level of ambulation/Occupation:   Smoking status:   Samira-D Def:   DM:  Other:     Review of Systems   Constitutional: Negative for decreased appetite and malaise/fatigue.   Cardiovascular:  Positive for leg swelling. Negative for claudication.   Skin:  Positive for poor wound healing.   Musculoskeletal:  Positive for arthritis. Negative for joint pain, joint swelling and muscle weakness.   Neurological:  Positive for weakness. Negative for numbness.   Psychiatric/Behavioral:  Negative for altered mental status.            Past Medical History:   Diagnosis Date    Diabetes mellitus     Hiatal hernia     under Dr Saenz' care    Hyperlipidemia     Hypertension     MI (myocardial infarction)     Sleep apnea        Past Surgical History:   Procedure Laterality Date    ABDOMINAL HERNIA REPAIR      ANGIOGRAM, EXTREMITY, BILATERAL Bilateral 4/5/2023    Procedure: ANGIOGRAM, EXTREMITY, BILATERAL;  Surgeon: Michael Giraldo III, MD;  Location: [facility] CATH LAB;  Service: Cardiology;  Laterality: Bilateral;    ANGIOGRAPHY OF LOWER EXTREMITY Left 4/12/2023    Procedure: Angiogram Extremity Unilateral;  Surgeon: Michael Giraldo III, MD;  Location: [facility] CATH LAB;  Service: Cardiology;  Laterality: Left;    AORTOGRAPHY WITH SERIALOGRAPHY Bilateral 4/5/2023    Procedure: AORTOGRAM, WITH SERIALOGRAPHY;  Surgeon: Michael Giraldo III, MD;  Location: [facility] CATH LAB;  Service: Cardiology;  Laterality:  Bilateral;    APPLICATION OF WOUND VACUUM-ASSISTED CLOSURE DEVICE Left 3/29/2023    Procedure: APPLICATION, WOUND VAC;  Surgeon: Alona Pierce DPM;  Location: [facility] OR;  Service: Podiatry;  Laterality: Left;    APPLICATION OF WOUND VACUUM-ASSISTED CLOSURE DEVICE Left 4/4/2023    Procedure: APPLICATION, WOUND VAC;  Surgeon: Alona Pierce DPM;  Location: [facility] OR;  Service: Podiatry;  Laterality: Left;    APPLICATION OF WOUND VACUUM-ASSISTED CLOSURE DEVICE Left 4/13/2023    Procedure: APPLICATION, WOUND VAC;  Surgeon: Alona Pierce DPM;  Location: [facility] OR;  Service: Podiatry;  Laterality: Left;    BONE BIOPSY Left 3/29/2023    Procedure: BIOPSY, BONE;  Surgeon: Alona Pierce DPM;  Location: [facility] OR;  Service: Podiatry;  Laterality: Left;  1st met    CARDIAC CATHETERIZATION      3 stents placed    CATARACT EXTRACTION, BILATERAL      CLOSURE DEVICE Left 4/12/2023    Procedure: Placement of Closure Device;  Surgeon: Michael Giraldo III, MD;  Location: [facility] CATH LAB;  Service: Cardiology;  Laterality: Left;    CORONARY ANGIOGRAPHY N/A 10/3/2019    Procedure: ANGIOGRAM, CORONARY ARTERY;  Surgeon: Edwin Azul MD;  Location: [facility] CATH LAB/EP;  Service: Cardiology;  Laterality: N/A;    DEBRIDEMENT OF FOOT Left 3/29/2023    Procedure: DEBRIDEMENT, FOOT;  Surgeon: Alona Pierce DPM;  Location: [facility] OR;  Service: Podiatry;  Laterality: Left;    DEBRIDEMENT OF FOOT Left 4/4/2023    Procedure: DEBRIDEMENT, FOOT;  Surgeon: Alona Pierce DPM;  Location: [facility] OR;  Service: Podiatry;  Laterality: Left;    DEBRIDEMENT OF FOOT Left 4/13/2023    Procedure: DEBRIDEMENT, FOOT;  Surgeon: Alona Pierce DPM;  Location: [facility] OR;  Service: Podiatry;  Laterality: Left;    ESOPHAGOGASTRODUODENOSCOPY N/A 4/17/2023    Procedure: EGD (ESOPHAGOGASTRODUODENOSCOPY);  Surgeon: Otto Villarreal MD;  Location: [facility] ENDO;  Service: Endoscopy;  Laterality: N/A;    HERNIA REPAIR      inguinal    IVUS (INTRAVASCULAR ULTRASOUND) Left 4/12/2023     Procedure: ULTRASOUND, INTRAVASCULAR;  Surgeon: Michael Giraldo III, MD;  Location: [facility] CATH LAB;  Service: Cardiology;  Laterality: Left;    LEFT HEART CATHETERIZATION Left 9/13/2019    Procedure: Left heart cath;  Surgeon: Edwin Azul MD;  Location: [facility] CATH LAB/EP;  Service: Cardiology;  Laterality: Left;    LEFT HEART CATHETERIZATION N/A 10/3/2019    Procedure: Left heart cath;  Surgeon: Edwin Azul MD;  Location: [facility] CATH LAB/EP;  Service: Cardiology;  Laterality: N/A;    OSTEOTOMY OF METATARSAL BONE Right 4/4/2023    Procedure: OSTEOTOMY, METATARSAL BONE;  Surgeon: Alona Pierce DPM;  Location: [facility] OR;  Service: Podiatry;  Laterality: Right;    PERCUTANEOUS TRANSLUMINAL ANGIOPLASTY Left 4/12/2023    Procedure: PTA (ANGIOPLASTY, PERCUTANEOUS, TRANSLUMINAL);  Surgeon: Michael Giraldo III, MD;  Location: [facility] CATH LAB;  Service: Cardiology;  Laterality: Left;    SURGICAL REMOVAL OF BUNION WITH OSTEOTOMY OF METATARSAL BONE Right 4/4/2023    Procedure: BUNIONECTOMY, WITH METATARSAL OSTEOTOMY;  Surgeon: Alona Pierce DPM;  Location: [facility] OR;  Service: Podiatry;  Laterality: Right;    TOE AMPUTATION Left 4/4/2023    Procedure: AMPUTATION, TOE;  Surgeon: Alona Pierce DPM;  Location: [facility] OR;  Service: Podiatry;  Laterality: Left;  1st ray       Family History   Problem Relation Age of Onset    Heart disease Mother     Stroke Mother     Heart disease Sister     Heart disease Brother     Heart disease Maternal Uncle     Heart disease Brother     Heart disease Sister     Heart disease Maternal Uncle     Heart disease Maternal Uncle     Heart disease Maternal Uncle        Social History     Socioeconomic History    Marital status:    Tobacco Use    Smoking status: Never    Smokeless tobacco: Never   Substance and Sexual Activity    Alcohol use: No    Drug use: No     Social Determinants of Health     Financial Resource Strain: Low Risk     Difficulty of Paying Living Expenses: Not hard at all    Food Insecurity: No Food Insecurity    Worried About Running Out of Food in the Last Year: Never true    Ran Out of Food in the Last Year: Never true   Transportation Needs: No Transportation Needs    Lack of Transportation (Medical): No    Lack of Transportation (Non-Medical): No   Physical Activity: Inactive    Days of Exercise per Week: 0 days    Minutes of Exercise per Session: 0 min   Stress: Stress Concern Present    Feeling of Stress : Very much   Social Connections: Moderately Integrated    Frequency of Communication with Friends and Family: More than three times a week    Frequency of Social Gatherings with Friends and Family: More than three times a week    Attends Yarsanism Services: More than 4 times per year    Active Member of Clubs or Organizations: No    Attends Club or Organization Meetings: Never    Marital Status:    Housing Stability: Low Risk     Unable to Pay for Housing in the Last Year: No    Number of Places Lived in the Last Year: 1    Unstable Housing in the Last Year: No       Current Outpatient Medications   Medication Sig Dispense Refill    amLODIPine (NORVASC) 5 MG tablet TAKE 1 TABLET EVERY DAY (Patient taking differently: Take 5 mg by mouth once daily.) 90 tablet 3    aspirin (ECOTRIN) 81 MG EC tablet Take 1 tablet (81 mg total) by mouth once daily.  0    atorvastatin (LIPITOR) 40 MG tablet Take 1 tablet (40 mg total) by mouth once daily. 90 tablet 3    clopidogreL (PLAVIX) 75 mg tablet Take 1 tablet (75 mg total) by mouth once daily.      ferrous sulfate (FEOSOL) 325 mg (65 mg iron) Tab tablet Take 325 mg by mouth daily with breakfast.      FLUoxetine 40 MG capsule Take 40 mg by mouth once daily.      glipiZIDE (GLUCOTROL) 5 MG tablet TAKE 1 TABLET TWICE DAILY (Patient taking differently: Take 5 mg by mouth 2 (two) times a day.) 180 tablet 3    isosorbide mononitrate (IMDUR) 30 MG 24 hr tablet TAKE 1 TABLET EVERY DAY (Patient taking differently: Take 30 mg by mouth once  "daily.) 90 tablet 3    losartan-hydrochlorothiazide 100-25 mg (HYZAAR) 100-25 mg per tablet TAKE 1 TABLET EVERY DAY (Patient taking differently: Take 1 tablet by mouth once daily.) 90 tablet 3    metoprolol succinate (TOPROL-XL) 25 MG 24 hr tablet TAKE 1 TABLET EVERY DAY (Patient taking differently: Take 25 mg by mouth once daily.) 90 tablet 3    nitroGLYCERIN (NITROSTAT) 0.4 MG SL tablet Place 0.4 mg under the tongue every 5 (five) minutes as needed for Chest pain.      pantoprazole (PROTONIX) 40 MG tablet Take 1 tablet (40 mg total) by mouth 2 (two) times daily. 60 tablet 11    ranolazine (RANEXA) 1,000 mg Tb12 Take 1,000 mg by mouth 2 (two) times daily.      SITagliptan-metformin (JANUMET XR) 100-1,000 mg TM24 Take 1 tablet by mouth once daily. 30 tablet 11    sucralfate (CARAFATE) 1 gram tablet Take 1 tablet (1 g total) by mouth 4 (four) times daily before meals and nightly.      traZODone (DESYREL) 150 MG tablet TAKE 1 TABLET (150 MG TOTAL) BY MOUTH NIGHTLY. 90 tablet 3    warfarin (COUMADIN) 5 MG tablet Take 1.5 tablets (7.5 mg total) by mouth Daily. TAKE 1 AND 1/2 TABLETS (7.5MG) EVERY DAY OR AS DIRECTED BY COUMADIN CLINIC      doxycycline (VIBRA-TABS) 100 MG tablet Take 1 tablet (100 mg total) by mouth 2 (two) times daily. for 7 days 14 tablet 0    doxycycline (VIBRAMYCIN) 100 MG Cap Take 1 capsule (100 mg total) by mouth 2 (two) times daily. for 7 days 14 capsule 0    traMADoL (ULTRAM) 50 mg tablet Take 1 tablet (50 mg total) by mouth every 6 (six) hours as needed for Pain. 20 tablet 0     No current facility-administered medications for this visit.       Review of patient's allergies indicates:   Allergen Reactions    Nystatin      Other reaction(s): Unknown       Vitals:    05/11/23 1448   Weight: 95.7 kg (211 lb)   Height: 6' 1" (1.854 m)   PainSc:   4   PainLoc: Foot       Objective:      Physical Exam  Constitutional:       General: He is not in acute distress.     Appearance: He is well-developed. "   HENT:      Nose: Nose normal.   Eyes:      Conjunctiva/sclera: Conjunctivae normal.   Pulmonary:      Effort: Pulmonary effort is normal.   Chest:      Chest wall: No tenderness.   Abdominal:      Tenderness: There is no abdominal tenderness.   Musculoskeletal:      Cervical back: Normal range of motion.   Neurological:      Mental Status: He is alert and oriented to person, place, and time.   Psychiatric:         Behavior: Behavior normal.       Vascular: Distal DP/PT pulses palpable 0/4. No vericosities noted to LEs.  warm to touch LE, + edema noted to LE.    Dermatologic:   L foot: 8cm x 3cm wound noted. S/p partial 1st ray amputation. Fibrotic wound base. No pus. Mild rubor, no erythema, serosanguinous drainage. No pus. No crepitus.   R foot: K wire loosening. Rubor with erythema over 1st met/hallux. No fluctuance, no drainage.    Musculoskeletal: . No calf tenderness LE, Compartments soft/compressible.    Neurological: Light touch, proprioception, and sharp/dull sensation are all intact. Protective threshold with the Cleveland-Wienstein monofilament is intact. Vibratory sensation intact.             Assessment:       Encounter Diagnoses   Name Primary?    Right foot pain Yes    PAD (peripheral artery disease)     Follow-up examination following surgery     Open wound of foot, left, initial encounter          Plan:       Julien was seen today for post-op evaluation.    Diagnoses and all orders for this visit:    Right foot pain  -     X-Ray Foot Complete 3 view Right; Future    PAD (peripheral artery disease)    Follow-up examination following surgery    Open wound of foot, left, initial encounter    Other orders  -     doxycycline (VIBRA-TABS) 100 MG tablet; Take 1 tablet (100 mg total) by mouth 2 (two) times daily. for 7 days  -     traMADoL (ULTRAM) 50 mg tablet; Take 1 tablet (50 mg total) by mouth every 6 (six) hours as needed for Pain.      I counseled the patient on his conditions, their implications and  medical management.    82 y.o. 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     -rx. Doxy  -rx. Tramadol  -R foot xrays reviewed. S/p 1st and 5th met osteotomy.   -pathology: Acute osteomyelitis, involving articular surface of bone.   -dressing changed on L foot. Slow healing noted on L foot. Recommend to c/w VAC by home nurse.   -R foot K wire removed. Pt tolerated well. Rubor with erythema noted on R foot over the hallux/1st ray. Recommend doxy. We discussed possible inpt admission for IV abx if redness/cellultis do not improve wit oral abx.     -I reviewed imaging, clinical history, and diagnosis as above with the patient. I attempted to use layman's terms to educate the patient as well as utilize foot models and/or pictures. I personally went through imaging with the patient.    -The nature of the condition, options for management, as well as potential risks and complications were discussed in detail with patient. Patient was amenable to my recommendations and left my office fully informed and will follow up as instructed or sooner if necessary.    -Patient was advised of signs and symptoms of infection including redness, drainage, purulence, odor, streaking, fever, chills and I advised patient to seek medical attention (ER or urgent care) if these symptoms arise.   -f/u 2-3 wks     Note dictated with voice recognition software, please excuse any grammatical errors.

## 2023-05-15 ENCOUNTER — ANTI-COAG VISIT (OUTPATIENT)
Dept: CARDIOLOGY | Facility: CLINIC | Age: 83
End: 2023-05-15
Payer: MEDICARE

## 2023-05-15 DIAGNOSIS — I48.0 PAF (PAROXYSMAL ATRIAL FIBRILLATION): Primary | ICD-10-CM

## 2023-05-15 DIAGNOSIS — Z79.01 LONG TERM (CURRENT) USE OF ANTICOAGULANTS: ICD-10-CM

## 2023-05-15 NOTE — PROGRESS NOTES
INR from ER 5/13 and low. Pt had just resumed warfarin on 5/9. Please check in if pt missed any doses especially day of ER. ER notes are limited at this time. Please check for changes in meds or any signs of bleeding.     Update pt in ER again on 5/15 for weakness & n/v. His H&H are decreasing and positive guaiac test. N/v resolved and he was d/c home. Also signs of infection to foot & d/c home on augmentin. No INR done. Per notes, pt to follow up with podiatry and PCP. Please check in with patient. Was he advised to hold warfarin?     Message sent to PCP, Dr. [facility], with concerns about dropping H&H and positive guaiac. Pt may need to hold warfarin again. And likely needs GI follow up. Awaiting recommendation from PCP.  Advised to hold 5/16 while awaiting PCP instruction    Update 5/18: Pt continues to hold warfarin. Dr. Wayne contacted pt and plans for CBC on 5/19 then will make decision.     Update 5/26: Pt has not gone to lab. Reached out to Dr. Wayne.  ordered to check CBC. Labs in process.     Update 5/29:  alexandra full panel of labs for Dr. Martin including CBC. Noted there may be some confusion per his notes. I have sent an outside message to touch base and update. Coumadin on hold for now due to concern for GI bleed    Update 6/6: Noted message from Dr. Martin to Dr. Wayne 6/1 that he stopped warfarin and started Eliquis but then stopped Eliquis out of concern for GI bleed. Plan is to resume eliquis once pt gets GI clearance to resume. We will sign off.

## 2023-05-16 NOTE — PROGRESS NOTES
Spoke to patient regarding discharge medication dosing. Patient verified that he has a pale yellow 7.5 mg warfarin tablet, and a discharge warfarin dose of 7.5 mg qPM.     Update - Patient advised to hold warfarin until further notice.

## 2023-05-29 ENCOUNTER — TELEPHONE (OUTPATIENT)
Dept: PODIATRY | Facility: CLINIC | Age: 83
End: 2023-05-29
Payer: MEDICARE

## 2023-05-29 NOTE — TELEPHONE ENCOUNTER
----- Message from Leena Giraldo sent at 5/29/2023  1:25 PM CDT -----  Type:  update care     Who Called: [place] [facility]/ home health  Would the patient rather a call back or a response via [facility]? call  Best Call Back Number:   Additional Information:    Caller  left foot wound is larger and a inbraction below it  Caller stated she will not put the dressing back on wound

## 2023-05-30 ENCOUNTER — TELEPHONE (OUTPATIENT)
Dept: PODIATRY | Facility: CLINIC | Age: 83
End: 2023-05-30
Payer: MEDICARE

## 2023-05-30 NOTE — TELEPHONE ENCOUNTER
----- Message from Charmaine Panda sent at 5/30/2023 12:24 PM CDT -----  Contact: pt/[phone]  Type:  Home  health rep Call    Who Called: Ms. Brink (Home  health RN)   Would the patient rather a call back or a response via [facility]? Call   Best Call Back Number:[phone]   Additional Information: Per  Keena  she  needs to speak with  a  rep regarding  pt  changes  to  his  wound  care

## 2023-05-31 ENCOUNTER — OFFICE VISIT (OUTPATIENT)
Dept: PODIATRY | Facility: CLINIC | Age: 83
End: 2023-05-31
Payer: MEDICARE

## 2023-05-31 VITALS — WEIGHT: 211 LBS | HEIGHT: 73 IN | BODY MASS INDEX: 27.96 KG/M2

## 2023-05-31 DIAGNOSIS — T14.8XXA NON-HEALING NON-SURGICAL WOUND: ICD-10-CM

## 2023-05-31 DIAGNOSIS — E11.42 DIABETIC POLYNEUROPATHY ASSOCIATED WITH TYPE 2 DIABETES MELLITUS: ICD-10-CM

## 2023-05-31 DIAGNOSIS — I73.9 PVD (PERIPHERAL VASCULAR DISEASE): ICD-10-CM

## 2023-05-31 DIAGNOSIS — L03.115 CELLULITIS OF RIGHT LOWER EXTREMITY: Primary | ICD-10-CM

## 2023-05-31 DIAGNOSIS — I73.9 PAD (PERIPHERAL ARTERY DISEASE): ICD-10-CM

## 2023-05-31 PROBLEM — R73.9 HYPERGLYCEMIA: Status: ACTIVE | Noted: 2023-05-31

## 2023-05-31 PROBLEM — E11.621 DIABETIC ULCER OF RIGHT MIDFOOT ASSOCIATED WITH TYPE 2 DIABETES MELLITUS: Status: ACTIVE | Noted: 2023-05-31

## 2023-05-31 PROBLEM — K29.50 CHRONIC GASTRITIS: Status: ACTIVE | Noted: 2023-05-31

## 2023-05-31 PROBLEM — R06.02 SOB (SHORTNESS OF BREATH): Status: RESOLVED | Noted: 2017-07-11 | Resolved: 2023-05-31

## 2023-05-31 PROBLEM — L97.419 DIABETIC ULCER OF RIGHT MIDFOOT ASSOCIATED WITH TYPE 2 DIABETES MELLITUS: Status: ACTIVE | Noted: 2023-05-31

## 2023-05-31 PROBLEM — L03.90 CELLULITIS: Status: ACTIVE | Noted: 2023-05-31

## 2023-05-31 PROCEDURE — 1125F AMNT PAIN NOTED PAIN PRSNT: CPT | Mod: CPTII,S$GLB,, | Performed by: PODIATRIST

## 2023-05-31 PROCEDURE — 99999 PR PBB SHADOW E&M-EST. PATIENT-LVL III: ICD-10-PCS | Mod: PBBFAC,,, | Performed by: PODIATRIST

## 2023-05-31 PROCEDURE — 99024 POSTOP FOLLOW-UP VISIT: CPT | Mod: S$GLB,,, | Performed by: PODIATRIST

## 2023-05-31 PROCEDURE — 1125F PR PAIN SEVERITY QUANTIFIED, PAIN PRESENT: ICD-10-PCS | Mod: CPTII,S$GLB,, | Performed by: PODIATRIST

## 2023-05-31 PROCEDURE — 99024 PR POST-OP FOLLOW-UP VISIT: ICD-10-PCS | Mod: S$GLB,,, | Performed by: PODIATRIST

## 2023-05-31 PROCEDURE — 99999 PR PBB SHADOW E&M-EST. PATIENT-LVL III: CPT | Mod: PBBFAC,,, | Performed by: PODIATRIST

## 2023-05-31 NOTE — PROGRESS NOTES
Subjective:      Patient ID: Julien Meléndez is a 82 y.o. male.    Chief Complaint: Foot Problem (Wound care)      82 y.o. 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 With his family member today. He is also s/p angiogram/angioplasty by Dr. Giraldo (on LLE only). Complains of pain on left foot. VAC has been changed by nurse at rehab. He is on WC today. Recently discharged from hospital after having vomiting and Afib.     5/31/23 b/l feet ulcer. L foot non healing ulcer s/p partial 1st ray amputation. R foot ulcer on medial 1st met head. With his wife today. Dressing gets changed by home health. Recently VAC was stopped due to soft tissue issue. Presenting with redness/cellulitis on LLE today. Pain noted along anterior ankle of L foot. On WC today.     Level of ambulation/Occupation:   Smoking status:   Samira-D Def:   DM:  Other:     Review of Systems   Constitutional: Negative for decreased appetite and malaise/fatigue.   Cardiovascular:  Positive for leg swelling. Negative for claudication.   Skin:  Positive for poor wound healing.   Musculoskeletal:  Positive for arthritis. Negative for joint pain, joint swelling and muscle weakness.   Neurological:  Positive for weakness. Negative for numbness.   Psychiatric/Behavioral:  Negative for altered mental status.            Past Medical History:   Diagnosis Date    Diabetes mellitus     Hiatal hernia     under Dr Saenz' care    Hyperlipidemia     Hypertension     MI (myocardial infarction)     Sleep apnea        Past Surgical History:   Procedure Laterality Date    ABDOMINAL HERNIA REPAIR      ANGIOGRAM, EXTREMITY, BILATERAL Bilateral 4/5/2023    Procedure: ANGIOGRAM, EXTREMITY, BILATERAL;  Surgeon: Michael Giraldo III, MD;  Location: [facility] CATH LAB;  Service: Cardiology;  Laterality: Bilateral;    ANGIOGRAPHY OF LOWER EXTREMITY Left 4/12/2023     Procedure: Angiogram Extremity Unilateral;  Surgeon: Michael Giraldo III, MD;  Location: [facility] CATH LAB;  Service: Cardiology;  Laterality: Left;    AORTOGRAPHY WITH SERIALOGRAPHY Bilateral 4/5/2023    Procedure: AORTOGRAM, WITH SERIALOGRAPHY;  Surgeon: Michael Giraldo III, MD;  Location: [facility] CATH LAB;  Service: Cardiology;  Laterality: Bilateral;    APPLICATION OF WOUND VACUUM-ASSISTED CLOSURE DEVICE Left 3/29/2023    Procedure: APPLICATION, WOUND VAC;  Surgeon: Alona Pierce DPM;  Location: [facility] OR;  Service: Podiatry;  Laterality: Left;    APPLICATION OF WOUND VACUUM-ASSISTED CLOSURE DEVICE Left 4/4/2023    Procedure: APPLICATION, WOUND VAC;  Surgeon: Alona Pierce DPM;  Location: [facility] OR;  Service: Podiatry;  Laterality: Left;    APPLICATION OF WOUND VACUUM-ASSISTED CLOSURE DEVICE Left 4/13/2023    Procedure: APPLICATION, WOUND VAC;  Surgeon: Alona Pierce DPM;  Location: [facility] OR;  Service: Podiatry;  Laterality: Left;    BONE BIOPSY Left 3/29/2023    Procedure: BIOPSY, BONE;  Surgeon: Alona Pierce DPM;  Location: [facility] OR;  Service: Podiatry;  Laterality: Left;  1st met    CARDIAC CATHETERIZATION      3 stents placed    CATARACT EXTRACTION, BILATERAL      CLOSURE DEVICE Left 4/12/2023    Procedure: Placement of Closure Device;  Surgeon: Michael Giraldo III, MD;  Location: [facility] CATH LAB;  Service: Cardiology;  Laterality: Left;    CORONARY ANGIOGRAPHY N/A 10/3/2019    Procedure: ANGIOGRAM, CORONARY ARTERY;  Surgeon: Edwin Azul MD;  Location[facility] CATH LAB[facility]EP;  Service: Cardiology;  Laterality: N/A;    DEBRIDEMENT OF FOOT Left 3/29/2023    Procedure: DEBRIDEMENT, FOOT;  Surgeon: Alona Pierce DPM;  Location: [facility] OR;  Service: Podiatry;  Laterality: Left;    DEBRIDEMENT OF FOOT Left 4/4/2023    Procedure: DEBRIDEMENT, FOOT;  Surgeon: Alona Pierce DPM;  Location: [facility] OR;  Service: Podiatry;  Laterality: Left;    DEBRIDEMENT OF FOOT Left 4/13/2023    Procedure: DEBRIDEMENT, FOOT;  Surgeon: Alona DAILY  ABIMBOLA Pierce;  Location: [facility] OR;  Service: Podiatry;  Laterality: Left;    ESOPHAGOGASTRODUODENOSCOPY N/A 4/17/2023    Procedure: EGD (ESOPHAGOGASTRODUODENOSCOPY);  Surgeon: Otto Villarreal MD;  Location: [facility] ENDO;  Service: Endoscopy;  Laterality: N/A;    HERNIA REPAIR      inguinal    IVUS (INTRAVASCULAR ULTRASOUND) Left 4/12/2023    Procedure: ULTRASOUND, INTRAVASCULAR;  Surgeon: Michael Giraldo III, MD;  Location: [facility] CATH LAB;  Service: Cardiology;  Laterality: Left;    LEFT HEART CATHETERIZATION Left 9/13/2019    Procedure: Left heart cath;  Surgeon: Edwin Azul MD;  Location: [facility] CATH LAB/EP;  Service: Cardiology;  Laterality: Left;    LEFT HEART CATHETERIZATION N/A 10/3/2019    Procedure: Left heart cath;  Surgeon: Edwin Azul MD;  Location: [facility] CATH LAB/EP;  Service: Cardiology;  Laterality: N/A;    OSTEOTOMY OF METATARSAL BONE Right 4/4/2023    Procedure: OSTEOTOMY, METATARSAL BONE;  Surgeon: Alona Pierce DPM;  Location: [facility] OR;  Service: Podiatry;  Laterality: Right;    PERCUTANEOUS TRANSLUMINAL ANGIOPLASTY Left 4/12/2023    Procedure: PTA (ANGIOPLASTY, PERCUTANEOUS, TRANSLUMINAL);  Surgeon: Michael Giraldo III, MD;  Location: [facility] CATH LAB;  Service: Cardiology;  Laterality: Left;    SURGICAL REMOVAL OF BUNION WITH OSTEOTOMY OF METATARSAL BONE Right 4/4/2023    Procedure: BUNIONECTOMY, WITH METATARSAL OSTEOTOMY;  Surgeon: Alona Pierce DPM;  Location: [facility] OR;  Service: Podiatry;  Laterality: Right;    TOE AMPUTATION Left 4/4/2023    Procedure: AMPUTATION, TOE;  Surgeon: Alona Pierce DPM;  Location: [facility] OR;  Service: Podiatry;  Laterality: Left;  1st ray       Family History   Problem Relation Age of Onset    Heart disease Mother     Stroke Mother     Heart disease Sister     Heart disease Brother     Heart disease Maternal Uncle     Heart disease Brother     Heart disease Sister     Heart disease Maternal Uncle     Heart disease Maternal Uncle     Heart disease Maternal Uncle         Social History     Socioeconomic History    Marital status:    Tobacco Use    Smoking status: Never    Smokeless tobacco: Never   Substance and Sexual Activity    Alcohol use: No    Drug use: No     Social Determinants of Health     Financial Resource Strain: Low Risk     Difficulty of Paying Living Expenses: Not hard at all   Food Insecurity: No Food Insecurity    Worried About Running Out of Food in the Last Year: Never true    Ran Out of Food in the Last Year: Never true   Transportation Needs: No Transportation Needs    Lack of Transportation (Medical): No    Lack of Transportation (Non-Medical): No   Physical Activity: Inactive    Days of Exercise per Week: 0 days    Minutes of Exercise per Session: 0 min   Stress: Stress Concern Present    Feeling of Stress : Very much   Social Connections: Moderately Integrated    Frequency of Communication with Friends and Family: More than three times a week    Frequency of Social Gatherings with Friends and Family: More than three times a week    Attends Yarsanism Services: More than 4 times per year    Active Member of Clubs or Organizations: No    Attends Club or Organization Meetings: Never    Marital Status:    Housing Stability: Low Risk     Unable to Pay for Housing in the Last Year: No    Number of Places Lived in the Last Year: 1    Unstable Housing in the Last Year: No       No current facility-administered medications for this visit.     No current outpatient medications on file.     Facility-Administered Medications Ordered in Other Visits   Medication Dose Route Frequency Provider Last Rate Last Admin    0.9%  NaCl infusion   Intravenous Continuous Pallavi Sunkara, MD 75 mL/hr at 06/01/23 2129 Rate Verify at 06/01/23 2129    acetaminophen tablet 650 mg  650 mg Oral Q6H PRN Pallavi Sunkara, MD        apixaban tablet 5 mg  5 mg Oral BID Pallavi Sunkara, MD   5 mg at 06/01/23 2016    atorvastatin tablet 40 mg  40 mg Oral QHS Pallavi Sunkara, MD    40 mg at 06/01/23 2016    clopidogreL tablet 75 mg  75 mg Oral Daily Pallavi Sunkara, MD   75 mg at 06/01/23 0850    dextrose 10% bolus 125 mL 125 mL  12.5 g Intravenous PRN Pallavi Sunkara, MD        dextrose 10% bolus 250 mL 250 mL  25 g Intravenous PRN Pallavi Sunkara, MD        dextrose 40 % gel 15,000 mg  15 g Oral PRN Pallavi Sunkara, MD        dextrose 40 % gel 30,000 mg  30 g Oral PRN Pallavi Sunkara, MD        ferrous sulfate tablet 1 each  1 tablet Oral Daily Pallavi Sunkara, MD   1 each at 06/01/23 0849    FLUoxetine capsule 40 mg  40 mg Oral Daily Pallavi Sunkara, MD   40 mg at 06/01/23 0849    glucagon (human recombinant) injection 1 mg  1 mg Intramuscular PRN Pallavi Sunkara, MD        insulin aspart U-100 pen 1-10 Units  1-10 Units Subcutaneous QID (AC + HS) PRN Pallavi Sunkara, MD   2 Units at 06/01/23 2019    insulin detemir U-100 (Levemir) pen 18 Units  18 Units Subcutaneous QHS Pallavi Sunkara, MD   18 Units at 06/01/23 2018    melatonin tablet 6 mg  6 mg Oral Nightly Pallavi Sunkara, MD   6 mg at 06/01/23 2015    metoprolol succinate (TOPROL-XL) 24 hr tablet 25 mg  25 mg Oral Daily Pallavi Sunkara, MD   25 mg at 06/01/23 0850    mupirocin 2 % ointment   Nasal BID Pallavi Sunkara, MD   Given at 06/01/23 2016    nitroGLYCERIN SL tablet 0.4 mg  0.4 mg Sublingual Q5 Min PRN Pallavi Sunkara, MD        OLANZapine injection 5 mg  5 mg Intramuscular BID PRN Pallavi Sunkara, MD        ondansetron injection 4 mg  4 mg Intravenous Q6H PRN Pallavi Sunkara, MD        pantoprazole EC tablet 40 mg  40 mg Oral BID Pallavi Sunkara, MD   40 mg at 06/01/23 2015    piperacillin-tazobactam (ZOSYN) 4.5 g in dextrose 5 % in water (D5W) 5 % 100 mL IVPB (MB+)  4.5 g Intravenous Q8H Pallavi Sunkara, MD   Stopped at 06/01/23 1907    ranolazine 12 hr tablet 1,000 mg  1,000 mg Oral BID Pallavi Sunkara, MD   1,000 mg at 06/01/23 2015    sucralfate 100 mg/mL suspension 1 g  1 g Oral Q6H Pallavi Sunkara, MD   1 g at 06/01/23  "1806    traMADoL tablet 50 mg  50 mg Oral Q6H PRN Pallavi Sunkara, MD   50 mg at 06/01/23 1502    traZODone tablet 150 mg  150 mg Oral Nightly Pallavi Sunkara, MD   150 mg at 06/01/23 2015    vancomycin (VANCOCIN) 1,000 mg in dextrose 5 % (D5W) 250 mL IVPB (Vial-Mate)  1,000 mg Intravenous Q12H Pallavi Sunkara, MD   Stopped at 06/01/23 1451    vancomycin - pharmacy to dose   Intravenous pharmacy to manage frequency Pallavi Sunkara, MD           Review of patient's allergies indicates:   Allergen Reactions    Nystatin      Other reaction(s): Unknown       Vitals:    05/31/23 1127   Weight: 95.7 kg (211 lb)   Height: 6' 1" (1.854 m)   PainSc:   9   PainLoc: Foot         Objective:      Physical Exam  Constitutional:       General: He is not in acute distress.     Appearance: He is well-developed.   HENT:      Nose: Nose normal.   Eyes:      Conjunctiva/sclera: Conjunctivae normal.   Pulmonary:      Effort: Pulmonary effort is normal.   Chest:      Chest wall: No tenderness.   Abdominal:      Tenderness: There is no abdominal tenderness.   Musculoskeletal:      Cervical back: Normal range of motion.   Neurological:      Mental Status: He is alert and oriented to person, place, and time.   Psychiatric:         Behavior: Behavior normal.       Vascular: Distal DP/PT pulses palpable 0/4. No vericosities noted to LEs.  warm to touch LE, + edema noted to LE.    Dermatologic:   L foot: 8cm x 3cm wound noted. S/p partial 1st ray amputation. Fibrotic wound base. No pus. Mild rubor, no erythema, serosanguinous drainage. No pus. No crepitus. Part of medial cuneiform visible.   R foot: 1cm x 1cm skin necrosis on medial aspect of 1st met. Rubor with erythema noted dorsal foot and extending to anterior leg. No drainage, no fluctuance. No crepitus.     Musculoskeletal: . No calf tenderness LE, Compartments soft/compressible.    Neurological: Light touch, proprioception, and sharp/dull sensation are all intact. Protective threshold " with the [place]ienstein monofilament is intact. Vibratory sensation intact.                Assessment:       Encounter Diagnoses   Name Primary?    Cellulitis of right lower extremity Yes    PAD (peripheral artery disease)     Diabetic polyneuropathy associated with type 2 diabetes mellitus     Non-healing non-surgical wound     PVD (peripheral vascular disease)            Plan:       Julien was seen today for foot problem.    Diagnoses and all orders for this visit:    Cellulitis of right lower extremity    PAD (peripheral artery disease)    Diabetic polyneuropathy associated with type 2 diabetes mellitus    Non-healing non-surgical wound    PVD (peripheral vascular disease)        I counseled the patient on his conditions, their implications and medical management.    82 y.o. 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     -RLE cellulitis. Non healing of L foot partial 1st ray. Recommend IP admission for IV abx for RLE cellulitis. We discussed possible OR debridement for L foot non/delayed healing of wound/ulcer during this admission.     -I reviewed imaging, clinical history, and diagnosis as above with the patient. I attempted to use layman's terms to educate the patient as well as utilize foot models and/or pictures. I personally went through imaging with the patient.    -The nature of the condition, options for management, as well as potential risks and complications were discussed in detail with patient. Patient was amenable to my recommendations and left my office fully informed and will follow up as instructed or sooner if necessary.    -f/u as IP    Note dictated with voice recognition software, please excuse any grammatical errors.

## 2023-06-05 PROBLEM — K92.2 UGIB (UPPER GASTROINTESTINAL BLEED): Status: RESOLVED | Noted: 2023-04-16 | Resolved: 2023-06-05

## 2023-06-06 PROBLEM — L03.90 CELLULITIS: Status: RESOLVED | Noted: 2023-05-31 | Resolved: 2023-06-06

## 2023-06-10 ENCOUNTER — HOSPITAL ENCOUNTER (EMERGENCY)
Facility: HOSPITAL | Age: 83
Discharge: HOME | End: 2023-06-10
Attending: EMERGENCY MEDICINE
Payer: MEDICARE

## 2023-06-10 VITALS
DIASTOLIC BLOOD PRESSURE: 74 MMHG | OXYGEN SATURATION: 98 % | TEMPERATURE: 99 F | RESPIRATION RATE: 19 BRPM | SYSTOLIC BLOOD PRESSURE: 125 MMHG | HEART RATE: 93 BPM
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
## 2023-06-10 DIAGNOSIS — M25.511 RIGHT SHOULDER PAIN, UNSPECIFIED CHRONICITY: Primary | ICD-10-CM

## 2023-06-10 PROCEDURE — 99284 EMERGENCY DEPT VISIT MOD MDM: CPT | Mod: 25,ER

## 2023-06-10 NOTE — ED NOTES
Sandra from [facility] states that transport is on their way

## 2023-06-10 NOTE — ED PROVIDER NOTES
Encounter Date: 6/10/2023       History     Chief Complaint   Patient presents with    Fall     Pt presents via EMS from [facility] with report of unwitnessed fall from bed.  Pt C/O L elbow pain.      82-year-old male with a history of diabetes, hypertension, coronary artery disease presenting from [facility] for evaluation after unwitnessed fall out of bed.  Patient says he lost his balance.  Only complaint is right shoulder pain.  EMS reports Accu-Chek 309.  Baseline mental status.    Review of patient's allergies indicates:   Allergen Reactions    Nystatin      Other reaction(s): Unknown     Past Medical History:   Diagnosis Date    Diabetes mellitus     Hiatal hernia     under Dr Saenz' care    Hyperlipidemia     Hypertension     MI (myocardial infarction)     Sleep apnea      Past Surgical History:   Procedure Laterality Date    ABDOMINAL HERNIA REPAIR      ANGIOGRAM, EXTREMITY, BILATERAL Bilateral 4/5/2023    Procedure: ANGIOGRAM, EXTREMITY, BILATERAL;  Surgeon: Michael Giraldo III, MD;  Location: [facility] CATH LAB;  Service: Cardiology;  Laterality: Bilateral;    ANGIOGRAPHY OF LOWER EXTREMITY Left 4/12/2023    Procedure: Angiogram Extremity Unilateral;  Surgeon: Michael Giraldo III, MD;  Location: [facility] CATH LAB;  Service: Cardiology;  Laterality: Left;    AORTOGRAPHY WITH SERIALOGRAPHY Bilateral 4/5/2023    Procedure: AORTOGRAM, WITH SERIALOGRAPHY;  Surgeon: Michael Giraldo III, MD;  Location: [facility] CATH LAB;  Service: Cardiology;  Laterality: Bilateral;    APPLICATION OF WOUND VACUUM-ASSISTED CLOSURE DEVICE Left 3/29/2023    Procedure: APPLICATION, WOUND VAC;  Surgeon: Alona Pierce DPM;  Location: [facility] OR;  Service: Podiatry;  Laterality: Left;    APPLICATION OF WOUND VACUUM-ASSISTED CLOSURE DEVICE Left 4/4/2023    Procedure: APPLICATION, WOUND VAC;  Surgeon: Alona Pierce DPM;  Location: [facility] OR;  Service: Podiatry;  Laterality: Left;    APPLICATION OF WOUND VACUUM-ASSISTED  CLOSURE DEVICE Left 4/13/2023    Procedure: APPLICATION, WOUND VAC;  Surgeon: Alona Pierce DPM;  Location: [facility] OR;  Service: Podiatry;  Laterality: Left;    BONE BIOPSY Left 3/29/2023    Procedure: BIOPSY, BONE;  Surgeon: Alona Pierce DPM;  Location: [facility] OR;  Service: Podiatry;  Laterality: Left;  1st met    CARDIAC CATHETERIZATION      3 stents placed    CATARACT EXTRACTION, BILATERAL      CLOSURE DEVICE Left 4/12/2023    Procedure: Placement of Closure Device;  Surgeon: Michael Giraldo III, MD;  Location: [facility] CATH LAB;  Service: Cardiology;  Laterality: Left;    CORONARY ANGIOGRAPHY N/A 10/3/2019    Procedure: ANGIOGRAM, CORONARY ARTERY;  Surgeon: Edwin Azul MD;  Location: [facility] CATH LAB/EP;  Service: Cardiology;  Laterality: N/A;    DEBRIDEMENT OF FOOT Left 3/29/2023    Procedure: DEBRIDEMENT, FOOT;  Surgeon: Alona Pierce DPM;  Location: [facility] OR;  Service: Podiatry;  Laterality: Left;    DEBRIDEMENT OF FOOT Left 4/4/2023    Procedure: DEBRIDEMENT, FOOT;  Surgeon: Alona Pierce DPM;  Location: [facility] OR;  Service: Podiatry;  Laterality: Left;    DEBRIDEMENT OF FOOT Left 4/13/2023    Procedure: DEBRIDEMENT, FOOT;  Surgeon: Alona Pierce DPM;  Location: [facility] OR;  Service: Podiatry;  Laterality: Left;    ESOPHAGOGASTRODUODENOSCOPY N/A 4/17/2023    Procedure: EGD (ESOPHAGOGASTRODUODENOSCOPY);  Surgeon: Otto Villarreal MD;  Location: [facility] ENDO;  Service: Endoscopy;  Laterality: N/A;    HERNIA REPAIR      inguinal    IVUS (INTRAVASCULAR ULTRASOUND) Left 4/12/2023    Procedure: ULTRASOUND, INTRAVASCULAR;  Surgeon: Michael Giraldo III, MD;  Location: [facility] CATH LAB;  Service: Cardiology;  Laterality: Left;    LEFT HEART CATHETERIZATION Left 9/13/2019    Procedure: Left heart cath;  Surgeon: Edwin Azul MD;  Location: [facility] CATH LAB/EP;  Service: Cardiology;  Laterality: Left;    LEFT HEART CATHETERIZATION N/A 10/3/2019    Procedure: Left heart cath;  Surgeon: Edwin Azul MD;  Location: [facility] CATH  LAB/EP;  Service: Cardiology;  Laterality: N/A;    OSTEOTOMY Left 6/7/2023    Procedure: OSTEOTOMY;  Surgeon: Alona Pierce DPM;  Location: [facility] OR;  Service: Podiatry;  Laterality: Left;    OSTEOTOMY OF METATARSAL BONE Right 4/4/2023    Procedure: OSTEOTOMY, METATARSAL BONE;  Surgeon: Alona Pierce DPM;  Location: [facility] OR;  Service: Podiatry;  Laterality: Right;    PERCUTANEOUS TRANSLUMINAL ANGIOPLASTY Left 4/12/2023    Procedure: PTA (ANGIOPLASTY, PERCUTANEOUS, TRANSLUMINAL);  Surgeon: Michael Giraldo III, MD;  Location: [facility] CATH LAB;  Service: Cardiology;  Laterality: Left;    SURGICAL REMOVAL OF BUNION WITH OSTEOTOMY OF METATARSAL BONE Right 4/4/2023    Procedure: BUNIONECTOMY, WITH METATARSAL OSTEOTOMY;  Surgeon: Alona Pierce DPM;  Location: [facility] OR;  Service: Podiatry;  Laterality: Right;    TOE AMPUTATION Left 4/4/2023    Procedure: AMPUTATION, TOE;  Surgeon: Alona Pierce DPM;  Location: [facility] OR;  Service: Podiatry;  Laterality: Left;  1st ray    WASHOUT Left 6/7/2023    Procedure: WASHOUT;  Surgeon: Alona Pierce DPM;  Location: [facility] OR;  Service: Podiatry;  Laterality: Left;    WOUND DEBRIDEMENT Left 6/7/2023    Procedure: DEBRIDEMENT, WOUND;  Surgeon: Alona Pierce DPM;  Location: [facility] OR;  Service: Podiatry;  Laterality: Left;     Family History   Problem Relation Age of Onset    Heart disease Mother     Stroke Mother     Heart disease Sister     Heart disease Brother     Heart disease Maternal Uncle     Heart disease Brother     Heart disease Sister     Heart disease Maternal Uncle     Heart disease Maternal Uncle     Heart disease Maternal Uncle      Social History     Tobacco Use    Smoking status: Never    Smokeless tobacco: Never   Substance Use Topics    Alcohol use: No    Drug use: No     Review of Systems   Constitutional:  Negative for appetite change.   Eyes:  Negative for pain.   Respiratory:  Negative for shortness of breath.    Cardiovascular:  Negative for chest pain.    Gastrointestinal:  Negative for abdominal pain, nausea and vomiting.   Genitourinary:  Negative for frequency.   Musculoskeletal:  Positive for arthralgias. Negative for neck pain.   Neurological:  Negative for headaches.   Psychiatric/Behavioral:  Negative for confusion.      Physical Exam     Initial Vitals   BP Pulse Resp Temp SpO2   06/10/23 1243 06/10/23 1242 06/10/23 1243 06/10/23 1243 06/10/23 1242   125/74 105 19 98.5 °F (36.9 °C) 97 %      MAP       --                Physical Exam    Nursing note and vitals reviewed.  HENT:   Head: Atraumatic.   Eyes: Conjunctivae and EOM are normal.   Neck:   Normal range of motion.  Cardiovascular:      Exam reveals no gallop and no friction rub.       No murmur heard.  Pulmonary/Chest: Breath sounds normal. No respiratory distress. He has no wheezes. He has no rales.   Abdominal: Abdomen is soft. Bowel sounds are normal. He exhibits no distension. There is no abdominal tenderness.   Musculoskeletal:         General: No edema. Normal range of motion.      Cervical back: Normal range of motion.      Comments: Mild tenderness to the right shoulder without deformity     Neurological: He is alert and oriented to person, place, and time.   Skin: Skin is warm and dry.   Chronic ulcerations to the right foot and postoperative changes   Psychiatric: He has a normal mood and affect.       ED Course   Procedures  Labs Reviewed - No data to display       Imaging Results              X-Ray Shoulder Complete 2 View Right (Final result)  Result time 06/10/23 13:26:20      Final result by Sahil Trivedi MD (06/10/23 13:26:20)                   Impression:      Marked subacromial space narrowing likely secondary to a chronic rotator cuff tear.  No acute fracture.      Electronically signed by: Sahil Trivedi MD  Date:    06/10/2023  Time:    13:26               Narrative:    EXAMINATION:  XR SHOULDER COMPLETE 2 OR MORE VIEWS RIGHT    CLINICAL HISTORY:  - Unspecified fall, initial encounter.   Right-sided shoulder pain    COMPARISON:  07/14/2015    FINDINGS:  Marked subacromial space narrowing with mild acromioclavicular and glenohumeral arthrosis.  No evidence of dislocation.  Negative for fracture.                                       CT Head Without Contrast (Final result)  Result time 06/10/23 13:25:30      Final result by Sahil Trivedi MD (06/10/23 13:25:30)                   Impression:      No CT evidence of acute intracranial abnormality.    All CT scans at this facility are performed  using dose modulation techniques as appropriate to performed exam including the following:  automated exposure control; adjustment of mA and/or kV according to the patients size (this includes techniques or standardized protocols for targeted exams where dose is matched to indication/reason for exam: i.e. extremities or head);  iterative reconstruction technique.      Electronically signed by: Sahil Trivedi MD  Date:    06/10/2023  Time:    13:25               Narrative:    EXAMINATION:  CT HEAD WITHOUT CONTRAST    CLINICAL HISTORY:  Head trauma, minor (Age >= 65y); headache    TECHNIQUE:  Axial CT imaging was performed through the head without intravenous contrast.    COMPARISON:  09/30/2022    FINDINGS:  No hydrocephalus, midline shift, mass effect, or acute intracranial hemorrhage. Age advanced diffuse cerebral atrophy and chronic deep white matter microangiopathy.  Basilar cisterns and posterior fossa are normal. The visualized paranasal sinuses and mastoid air cells are clear. The skull is intact.                                       Medications - No data to display  Medical Decision Making:   Initial Assessment:   82-year-old male presenting after unwitnessed fall from bed at nursing home.  Patient says he lost his balance and fell.  He complains of right shoulder pain only.  Baseline mental status.  Patient denies chest pain or shortness of breath.  I do not think that labs are warranted.  Plan for CT head  and right shoulder x-ray.           ED Course as of 06/10/23 1330   Sat Eric 10, 2023   1330 X-Ray Shoulder Complete 2 View Right  No fracture or dislocation [SN]   1330 CT Head Without Contrast  No acute intracranial process. [SN]      ED Course User Index  [SN] Kelvin Norman MD                 Clinical Impression:   Final diagnoses:  [W19.XXXA] Fall  [M25.511] Right shoulder pain, unspecified chronicity (Primary)        ED Disposition Condition    Discharge Stable          ED Prescriptions    None       Follow-up Information       Follow up With Specialties Details Why Contact Info    Kelvin Wayne MD Internal Medicine Schedule an appointment as soon as possible for a visit in 3 days  [address] [facility]  [address]  [facility] [id number] [phone]               Kelvin Norman MD  06/10/23 1330

## 2023-06-13 ENCOUNTER — OUTSIDE PLACE OF SERVICE (OUTPATIENT)
Dept: ADMINISTRATIVE | Facility: OTHER | Age: 83
End: 2023-06-13
Payer: MEDICARE

## 2023-06-13 PROCEDURE — 99305 1ST NF CARE MODERATE MDM 35: CPT | Mod: ,,, | Performed by: FAMILY MEDICINE

## 2023-06-13 PROCEDURE — 99305 PR NURSING FACILITY CARE, INIT, MOD SEVERITY: ICD-10-PCS | Mod: ,,, | Performed by: FAMILY MEDICINE

## 2023-06-19 ENCOUNTER — TELEPHONE (OUTPATIENT)
Dept: PODIATRY | Facility: CLINIC | Age: 83
End: 2023-06-19
Payer: MEDICARE

## 2023-06-19 NOTE — TELEPHONE ENCOUNTER
----- Message from Keiry Toscano sent at 6/19/2023  2:31 PM CDT -----  .Type:  Needs Medical Advice    Who Called: [facility]  Would the patient rather a call back or a response via [facility]? call  Best Call Back Number: [phone]  Additional Information:    Caller requested a call back and said it is about the pt's upcoming appt on 6/21.

## 2023-06-19 NOTE — TELEPHONE ENCOUNTER
Informed nurse that this is a post op appointment and he will need to attend. She gave verbal understanding.

## 2023-06-21 ENCOUNTER — OFFICE VISIT (OUTPATIENT)
Dept: PODIATRY | Facility: CLINIC | Age: 83
End: 2023-06-21
Payer: MEDICARE

## 2023-06-21 VITALS — BODY MASS INDEX: 25.05 KG/M2 | HEIGHT: 73 IN | WEIGHT: 189 LBS

## 2023-06-21 DIAGNOSIS — I73.9 PVD (PERIPHERAL VASCULAR DISEASE): ICD-10-CM

## 2023-06-21 DIAGNOSIS — T87.81 DEHISCENCE OF AMPUTATION STUMP: ICD-10-CM

## 2023-06-21 DIAGNOSIS — L03.115 CELLULITIS AND ABSCESS OF RIGHT LEG: Primary | ICD-10-CM

## 2023-06-21 DIAGNOSIS — Z09 FOLLOW-UP EXAMINATION FOLLOWING SURGERY: ICD-10-CM

## 2023-06-21 DIAGNOSIS — L02.415 CELLULITIS AND ABSCESS OF RIGHT LEG: Primary | ICD-10-CM

## 2023-06-21 DIAGNOSIS — I73.9 PAD (PERIPHERAL ARTERY DISEASE): ICD-10-CM

## 2023-06-21 PROCEDURE — 1159F PR MEDICATION LIST DOCUMENTED IN MEDICAL RECORD: ICD-10-PCS | Mod: CPTII,S$GLB,, | Performed by: PODIATRIST

## 2023-06-21 PROCEDURE — 99999 PR PBB SHADOW E&M-EST. PATIENT-LVL IV: CPT | Mod: PBBFAC,,, | Performed by: PODIATRIST

## 2023-06-21 PROCEDURE — 99024 PR POST-OP FOLLOW-UP VISIT: ICD-10-PCS | Mod: S$GLB,,, | Performed by: PODIATRIST

## 2023-06-21 PROCEDURE — 1160F RVW MEDS BY RX/DR IN RCRD: CPT | Mod: CPTII,S$GLB,, | Performed by: PODIATRIST

## 2023-06-21 PROCEDURE — 3288F FALL RISK ASSESSMENT DOCD: CPT | Mod: CPTII,S$GLB,, | Performed by: PODIATRIST

## 2023-06-21 PROCEDURE — 99999 PR PBB SHADOW E&M-EST. PATIENT-LVL IV: ICD-10-PCS | Mod: PBBFAC,,, | Performed by: PODIATRIST

## 2023-06-21 PROCEDURE — 1160F PR REVIEW ALL MEDS BY PRESCRIBER/CLIN PHARMACIST DOCUMENTED: ICD-10-PCS | Mod: CPTII,S$GLB,, | Performed by: PODIATRIST

## 2023-06-21 PROCEDURE — 99024 POSTOP FOLLOW-UP VISIT: CPT | Mod: S$GLB,,, | Performed by: PODIATRIST

## 2023-06-21 PROCEDURE — 3288F PR FALLS RISK ASSESSMENT DOCUMENTED: ICD-10-PCS | Mod: CPTII,S$GLB,, | Performed by: PODIATRIST

## 2023-06-21 PROCEDURE — 1101F PR PT FALLS ASSESS DOC 0-1 FALLS W/OUT INJ PAST YR: ICD-10-PCS | Mod: CPTII,S$GLB,, | Performed by: PODIATRIST

## 2023-06-21 PROCEDURE — 1159F MED LIST DOCD IN RCRD: CPT | Mod: CPTII,S$GLB,, | Performed by: PODIATRIST

## 2023-06-21 PROCEDURE — 1101F PT FALLS ASSESS-DOCD LE1/YR: CPT | Mod: CPTII,S$GLB,, | Performed by: PODIATRIST

## 2023-06-21 PROCEDURE — 1125F PR PAIN SEVERITY QUANTIFIED, PAIN PRESENT: ICD-10-PCS | Mod: CPTII,S$GLB,, | Performed by: PODIATRIST

## 2023-06-21 PROCEDURE — 1125F AMNT PAIN NOTED PAIN PRSNT: CPT | Mod: CPTII,S$GLB,, | Performed by: PODIATRIST

## 2023-06-21 NOTE — PROGRESS NOTES
Subjective:      Patient ID: Julien Meléndez is a 82 y.o. male.    Chief Complaint: Post-op Evaluation (SX 06/06/2023)      82 y.o. 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 With his family member today. He is also s/p angiogram/angioplasty by Dr. Giraldo (on LLE only). Complains of pain on left foot. VAC has been changed by nurse at rehab. He is on WC today. Recently discharged from hospital after having vomiting and Afib.     5/31/23 b/l feet ulcer. L foot non healing ulcer s/p partial 1st ray amputation. R foot ulcer on medial 1st met head. With his wife today. Dressing gets changed by home health. Recently VAC was stopped due to soft tissue issue. Presenting with redness/cellulitis on LLE today. Pain noted along anterior ankle of L foot. On WC today.     6/21/23 s/p L foot 1st ray amputation (6/7/23). He was recently discharged from hospital for LLE cellulitis as well. Not currently on oral abx. With his wife today. He is currently in rehab. Complaints of pain on LLE.     Level of ambulation/Occupation:   Smoking status:   Samira-D Def:   DM:  Other:     Review of Systems   Constitutional: Negative for decreased appetite and malaise/fatigue.   Cardiovascular:  Positive for leg swelling. Negative for claudication.   Skin:  Positive for poor wound healing.   Musculoskeletal:  Positive for arthritis. Negative for joint pain, joint swelling and muscle weakness.   Neurological:  Positive for weakness. Negative for numbness.   Psychiatric/Behavioral:  Negative for altered mental status.            Past Medical History:   Diagnosis Date    Diabetes mellitus     Hiatal hernia     under Dr Saenz' care    Hyperlipidemia     Hypertension     MI (myocardial infarction)     Sleep apnea        Past Surgical History:   Procedure Laterality Date    ABDOMINAL HERNIA REPAIR      ANGIOGRAM, EXTREMITY, BILATERAL  Bilateral 4/5/2023    Procedure: ANGIOGRAM, EXTREMITY, BILATERAL;  Surgeon: Michael Giraldo III, MD;  Location: [facility] CATH LAB;  Service: Cardiology;  Laterality: Bilateral;    ANGIOGRAPHY OF LOWER EXTREMITY Left 4/12/2023    Procedure: Angiogram Extremity Unilateral;  Surgeon: Michael Giraldo III, MD;  Location: [facility] CATH LAB;  Service: Cardiology;  Laterality: Left;    AORTOGRAPHY WITH SERIALOGRAPHY Bilateral 4/5/2023    Procedure: AORTOGRAM, WITH SERIALOGRAPHY;  Surgeon: Michael Giraldo III, MD;  Location: [facility] CATH LAB;  Service: Cardiology;  Laterality: Bilateral;    APPLICATION OF WOUND VACUUM-ASSISTED CLOSURE DEVICE Left 3/29/2023    Procedure: APPLICATION, WOUND VAC;  Surgeon: Alona Pierce DPM;  Location: [facility] OR;  Service: Podiatry;  Laterality: Left;    APPLICATION OF WOUND VACUUM-ASSISTED CLOSURE DEVICE Left 4/4/2023    Procedure: APPLICATION, WOUND VAC;  Surgeon: Alona Pierce DPM;  Location: [facility] OR;  Service: Podiatry;  Laterality: Left;    APPLICATION OF WOUND VACUUM-ASSISTED CLOSURE DEVICE Left 4/13/2023    Procedure: APPLICATION, WOUND VAC;  Surgeon: Alona Pierce DPM;  Location: [facility] OR;  Service: Podiatry;  Laterality: Left;    BONE BIOPSY Left 3/29/2023    Procedure: BIOPSY, BONE;  Surgeon: Alona Pierce DPM;  Location: [facility] OR;  Service: Podiatry;  Laterality: Left;  1st met    CARDIAC CATHETERIZATION      3 stents placed    CATARACT EXTRACTION, BILATERAL      CLOSURE DEVICE Left 4/12/2023    Procedure: Placement of Closure Device;  Surgeon: Michael Giraldo III, MD;  Location: [facility] CATH LAB;  Service: Cardiology;  Laterality: Left;    CORONARY ANGIOGRAPHY N/A 10/3/2019    Procedure: ANGIOGRAM, CORONARY ARTERY;  Surgeon: Edwin Azul MD;  Location: [facility] CATH LAB/EP;  Service: Cardiology;  Laterality: N/A;    DEBRIDEMENT OF FOOT Left 3/29/2023    Procedure: DEBRIDEMENT, FOOT;  Surgeon: Alona Pierce DPM;  Location: [facility] OR;  Service: Podiatry;  Laterality: Left;    DEBRIDEMENT OF  FOOT Left 4/4/2023    Procedure: DEBRIDEMENT, FOOT;  Surgeon: Alona Pierce DPM;  Location: [facility] OR;  Service: Podiatry;  Laterality: Left;    DEBRIDEMENT OF FOOT Left 4/13/2023    Procedure: DEBRIDEMENT, FOOT;  Surgeon: Alona Pierce DPM;  Location: [facility] OR;  Service: Podiatry;  Laterality: Left;    ESOPHAGOGASTRODUODENOSCOPY N/A 4/17/2023    Procedure: EGD (ESOPHAGOGASTRODUODENOSCOPY);  Surgeon: Otto Villarreal MD;  Location: [facility] ENDO;  Service: Endoscopy;  Laterality: N/A;    HERNIA REPAIR      inguinal    IVUS (INTRAVASCULAR ULTRASOUND) Left 4/12/2023    Procedure: ULTRASOUND, INTRAVASCULAR;  Surgeon: Michael Giraldo III, MD;  Location: [facility] CATH LAB;  Service: Cardiology;  Laterality: Left;    LEFT HEART CATHETERIZATION Left 9/13/2019    Procedure: Left heart cath;  Surgeon: Edwin Azul MD;  Location: [facility] CATH LAB/EP;  Service: Cardiology;  Laterality: Left;    LEFT HEART CATHETERIZATION N/A 10/3/2019    Procedure: Left heart cath;  Surgeon: Edwin Azul MD;  Location: [facility] CATH LAB/EP;  Service: Cardiology;  Laterality: N/A;    OSTEOTOMY Left 6/7/2023    Procedure: OSTEOTOMY;  Surgeon: Aloan Pierce DPM;  Location: [facility] OR;  Service: Podiatry;  Laterality: Left;    OSTEOTOMY OF METATARSAL BONE Right 4/4/2023    Procedure: OSTEOTOMY, METATARSAL BONE;  Surgeon: Alona Pierce DPM;  Location: [facility] OR;  Service: Podiatry;  Laterality: Right;    PERCUTANEOUS TRANSLUMINAL ANGIOPLASTY Left 4/12/2023    Procedure: PTA (ANGIOPLASTY, PERCUTANEOUS, TRANSLUMINAL);  Surgeon: Michael Giraldo III, MD;  Location: [facility] CATH LAB;  Service: Cardiology;  Laterality: Left;    SURGICAL REMOVAL OF BUNION WITH OSTEOTOMY OF METATARSAL BONE Right 4/4/2023    Procedure: BUNIONECTOMY, WITH METATARSAL OSTEOTOMY;  Surgeon: Alona Pierce DPM;  Location: [facility] OR;  Service: Podiatry;  Laterality: Right;    TOE AMPUTATION Left 4/4/2023    Procedure: AMPUTATION, TOE;  Surgeon: Alona Pierce DPM;  Location: [facility] OR;  Service:  Podiatry;  Laterality: Left;  1st ray    WASHOUT Left 6/7/2023    Procedure: WASHOUT;  Surgeon: Alona Pierce DPM;  Location: [facility] OR;  Service: Podiatry;  Laterality: Left;    WOUND DEBRIDEMENT Left 6/7/2023    Procedure: DEBRIDEMENT, WOUND;  Surgeon: Alona Pierce DPM;  Location: [facility] OR;  Service: Podiatry;  Laterality: Left;       Family History   Problem Relation Age of Onset    Heart disease Mother     Stroke Mother     Heart disease Sister     Heart disease Brother     Heart disease Maternal Uncle     Heart disease Brother     Heart disease Sister     Heart disease Maternal Uncle     Heart disease Maternal Uncle     Heart disease Maternal Uncle        Social History     Socioeconomic History    Marital status:    Tobacco Use    Smoking status: Never    Smokeless tobacco: Never   Substance and Sexual Activity    Alcohol use: No    Drug use: No     Social Determinants of Health     Financial Resource Strain: Low Risk     Difficulty of Paying Living Expenses: Not hard at all   Food Insecurity: No Food Insecurity    Worried About Running Out of Food in the Last Year: Never true    Ran Out of Food in the Last Year: Never true   Transportation Needs: No Transportation Needs    Lack of Transportation (Medical): No    Lack of Transportation (Non-Medical): No   Physical Activity: Inactive    Days of Exercise per Week: 0 days    Minutes of Exercise per Session: 0 min   Stress: Stress Concern Present    Feeling of Stress : Very much   Social Connections: Moderately Integrated    Frequency of Communication with Friends and Family: More than three times a week    Frequency of Social Gatherings with Friends and Family: More than three times a week    Attends Gnosticism Services: More than 4 times per year    Active Member of Clubs or Organizations: No    Attends Club or Organization Meetings: Never    Marital Status:    Housing Stability: Low Risk     Unable to Pay for Housing in the Last Year: No    Number of  Places Lived in the Last Year: 1    Unstable Housing in the Last Year: No       Current Outpatient Medications   Medication Sig Dispense Refill    atorvastatin (LIPITOR) 40 MG tablet Take 1 tablet (40 mg total) by mouth once daily. (Patient taking differently: Take 40 mg by mouth every evening.) 90 tablet 3    clopidogreL (PLAVIX) 75 mg tablet Take 1 tablet (75 mg total) by mouth once daily.      doxycycline (VIBRAMYCIN) 100 MG Cap Take 1 capsule (100 mg total) by mouth every 12 (twelve) hours. FOR ONE MORE WEEK      ELIQUIS 5 mg Tab Take 5 mg by mouth 2 (two) times daily.      ferrous sulfate (FEOSOL) 325 mg (65 mg iron) Tab tablet Take 325 mg by mouth daily with breakfast.      FLUoxetine 40 MG capsule Take 40 mg by mouth once daily.      glipiZIDE (GLUCOTROL) 5 MG tablet TAKE 1 TABLET TWICE DAILY (Patient taking differently: Take 5 mg by mouth 2 (two) times a day.) 180 tablet 3    insulin detemir U-100, Levemir, 100 unit/mL (3 mL) SubQ InPn pen Inject 20 Units into the skin every evening.  0    isosorbide mononitrate (IMDUR) 30 MG 24 hr tablet TAKE 1 TABLET EVERY DAY (Patient taking differently: Take 30 mg by mouth once daily.) 90 tablet 3    metoprolol succinate (TOPROL-XL) 25 MG 24 hr tablet TAKE 1 TABLET EVERY DAY (Patient taking differently: Take 25 mg by mouth once daily.) 90 tablet 3    nitroGLYCERIN (NITROSTAT) 0.4 MG SL tablet Place 0.4 mg under the tongue every 5 (five) minutes as needed for Chest pain.      ondansetron (ZOFRAN) 8 MG tablet Take 1 tablet (8 mg total) by mouth every 12 (twelve) hours as needed for Nausea. 8 tablet 1    oxyCODONE-acetaminophen (PERCOCET) 5-325 mg per tablet Take 1 tablet by mouth every 6 (six) hours as needed for Pain. 10 tablet 0    pantoprazole (PROTONIX) 40 MG tablet Take 1 tablet (40 mg total) by mouth 2 (two) times daily. 60 tablet 11    ranolazine (RANEXA) 1,000 mg Tb12 Take 1,000 mg by mouth 2 (two) times daily.      SITagliptan-metformin (JANUMET XR) 100-1,000 mg  "TM24 Take 1 tablet by mouth once daily. 30 tablet 11    sucralfate (CARAFATE) 1 gram tablet Take 1 tablet (1 g total) by mouth 4 (four) times daily before meals and nightly.      traZODone (DESYREL) 150 MG tablet TAKE 1 TABLET (150 MG TOTAL) BY MOUTH NIGHTLY. 90 tablet 3    sulfamethoxazole-trimethoprim 800-160mg (BACTRIM DS) 800-160 mg Tab Take 1 tablet by mouth 2 (two) times daily. for 7 days 14 tablet 0     No current facility-administered medications for this visit.       Review of patient's allergies indicates:   Allergen Reactions    Nystatin      Other reaction(s): Unknown       Vitals:    06/21/23 1321   Weight: 85.7 kg (189 lb)   Height: 6' 1" (1.854 m)   PainSc:   6   PainLoc: Foot         Objective:      Physical Exam  Constitutional:       General: He is not in acute distress.     Appearance: He is well-developed.   HENT:      Nose: Nose normal.   Eyes:      Conjunctiva/sclera: Conjunctivae normal.   Pulmonary:      Effort: Pulmonary effort is normal.   Chest:      Chest wall: No tenderness.   Abdominal:      Tenderness: There is no abdominal tenderness.   Musculoskeletal:      Cervical back: Normal range of motion.   Neurological:      Mental Status: He is alert and oriented to person, place, and time.   Psychiatric:         Behavior: Behavior normal.       Vascular: Distal DP/PT pulses palpable 0/4. No vericosities noted to LEs.  warm to touch LE, + edema noted to LE.    Dermatologic:   L foot: s/p partial 1st ray amputation with wound dehiscence along the proximal aspect of the incision with fibrotic/necrotic wound base. No pus. Mild rubor, no erythema, serosanguinous drainage. No crepitus.   R foot: 1cm x 1cm skin necrosis on medial aspect of 1st met. Rubor with erythema noted anterior tibia. No drainage, no fluctuance. No crepitus. Limited ROM of ankle.     Musculoskeletal: . No calf tenderness LE, Compartments soft/compressible.    Neurological: Light touch, proprioception, and sharp/dull sensation " are all intact. Protective threshold with the Monson-Wienstein monofilament is intact. Vibratory sensation intact.                      Assessment:       Encounter Diagnoses   Name Primary?    Cellulitis and abscess of right leg - Right Foot Yes    Dehiscence of amputation stump     Follow-up examination following surgery     PAD (peripheral artery disease)     PVD (peripheral vascular disease)              Plan:       Julien was seen today for post-op evaluation.    Diagnoses and all orders for this visit:    Cellulitis and abscess of right leg - Right Foot  -     MRI Ankle Without Contrast Right; Future  -     MRI Tibia Fibula Without Contrast Right; Future    Dehiscence of amputation stump    Follow-up examination following surgery    PAD (peripheral artery disease)    PVD (peripheral vascular disease)    Other orders  -     sulfamethoxazole-trimethoprim 800-160mg (BACTRIM DS) 800-160 mg Tab; Take 1 tablet by mouth 2 (two) times daily. for 7 days          I counseled the patient on his conditions, their implications and medical management.    82 y.o. male with s/p right foot 1st and 5th metatarsal osteotomy, L foot 1st ray amputation.     -rx. Bactrim  -rx. MRI RLE   -LLE cellulitis noted on distal tibia. History of hospitalization for IV abx. Recommend bactrim.  Recommend MRI.   -right foot 1st ray amputation with wound dehiscence proximal incision. Removed part of sutures. Local wound care per rehab nurse.     -I reviewed imaging, clinical history, and diagnosis as above with the patient. I attempted to use layman's terms to educate the patient as well as utilize foot models and/or pictures. I personally went through imaging with the patient.    -The nature of the condition, options for management, as well as potential risks and complications were discussed in detail with patient. Patient was amenable to my recommendations and left my office fully informed and will follow up as instructed or sooner if  necessary.    -f/u 1-2 wks     Note dictated with voice recognition software, please excuse any grammatical errors.

## 2023-06-22 RX ORDER — SULFAMETHOXAZOLE AND TRIMETHOPRIM 800; 160 MG/1; MG/1
1 TABLET ORAL 2 TIMES DAILY
Qty: 14 TABLET | Refills: 0 | Status: SHIPPED | OUTPATIENT
Start: 2023-06-22 | End: 2023-06-29

## 2023-06-27 ENCOUNTER — OUTPATIENT CASE MANAGEMENT (OUTPATIENT)
Dept: ADMINISTRATIVE | Facility: OTHER | Age: 83
End: 2023-06-27
Payer: MEDICARE

## 2023-06-27 NOTE — PROGRESS NOTES
06/27/23- Per [facility] notes patient has been dc to [facility]. Closing episode.

## 2023-07-06 RX ORDER — SULFAMETHOXAZOLE AND TRIMETHOPRIM 800; 160 MG/1; MG/1
1 TABLET ORAL 2 TIMES DAILY
COMMUNITY
End: 2023-07-06 | Stop reason: SDUPTHER

## 2023-07-07 RX ORDER — SULFAMETHOXAZOLE AND TRIMETHOPRIM 800; 160 MG/1; MG/1
1 TABLET ORAL 2 TIMES DAILY
Qty: 14 TABLET | Refills: 0 | Status: SHIPPED | OUTPATIENT
Start: 2023-07-07 | End: 2023-07-14

## 2023-07-14 ENCOUNTER — OFFICE VISIT (OUTPATIENT)
Dept: PODIATRY | Facility: CLINIC | Age: 83
End: 2023-07-14
Payer: MEDICARE

## 2023-07-14 VITALS
HEIGHT: 73 IN | HEART RATE: 77 BPM | BODY MASS INDEX: 24.94 KG/M2 | DIASTOLIC BLOOD PRESSURE: 69 MMHG | SYSTOLIC BLOOD PRESSURE: 143 MMHG

## 2023-07-14 DIAGNOSIS — L97.519 ULCER OF BOTH FEET, UNSPECIFIED ULCER STAGE: ICD-10-CM

## 2023-07-14 DIAGNOSIS — I73.9 PAD (PERIPHERAL ARTERY DISEASE): Primary | ICD-10-CM

## 2023-07-14 DIAGNOSIS — L97.529 ULCER OF BOTH FEET, UNSPECIFIED ULCER STAGE: ICD-10-CM

## 2023-07-14 PROCEDURE — 3077F PR MOST RECENT SYSTOLIC BLOOD PRESSURE >= 140 MM HG: ICD-10-PCS | Mod: CPTII,S$GLB,, | Performed by: STUDENT IN AN ORGANIZED HEALTH CARE EDUCATION/TRAINING PROGRAM

## 2023-07-14 PROCEDURE — 1125F PR PAIN SEVERITY QUANTIFIED, PAIN PRESENT: ICD-10-PCS | Mod: CPTII,S$GLB,, | Performed by: STUDENT IN AN ORGANIZED HEALTH CARE EDUCATION/TRAINING PROGRAM

## 2023-07-14 PROCEDURE — 99999 PR PBB SHADOW E&M-EST. PATIENT-LVL IV: ICD-10-PCS | Mod: PBBFAC,,, | Performed by: STUDENT IN AN ORGANIZED HEALTH CARE EDUCATION/TRAINING PROGRAM

## 2023-07-14 PROCEDURE — 1159F MED LIST DOCD IN RCRD: CPT | Mod: CPTII,S$GLB,, | Performed by: STUDENT IN AN ORGANIZED HEALTH CARE EDUCATION/TRAINING PROGRAM

## 2023-07-14 PROCEDURE — 3078F PR MOST RECENT DIASTOLIC BLOOD PRESSURE < 80 MM HG: ICD-10-PCS | Mod: CPTII,S$GLB,, | Performed by: STUDENT IN AN ORGANIZED HEALTH CARE EDUCATION/TRAINING PROGRAM

## 2023-07-14 PROCEDURE — 3078F DIAST BP <80 MM HG: CPT | Mod: CPTII,S$GLB,, | Performed by: STUDENT IN AN ORGANIZED HEALTH CARE EDUCATION/TRAINING PROGRAM

## 2023-07-14 PROCEDURE — 99214 PR OFFICE/OUTPT VISIT, EST, LEVL IV, 30-39 MIN: ICD-10-PCS | Mod: 24,S$GLB,, | Performed by: STUDENT IN AN ORGANIZED HEALTH CARE EDUCATION/TRAINING PROGRAM

## 2023-07-14 PROCEDURE — 3077F SYST BP >= 140 MM HG: CPT | Mod: CPTII,S$GLB,, | Performed by: STUDENT IN AN ORGANIZED HEALTH CARE EDUCATION/TRAINING PROGRAM

## 2023-07-14 PROCEDURE — 99999 PR PBB SHADOW E&M-EST. PATIENT-LVL IV: CPT | Mod: PBBFAC,,, | Performed by: STUDENT IN AN ORGANIZED HEALTH CARE EDUCATION/TRAINING PROGRAM

## 2023-07-14 PROCEDURE — 1159F PR MEDICATION LIST DOCUMENTED IN MEDICAL RECORD: ICD-10-PCS | Mod: CPTII,S$GLB,, | Performed by: STUDENT IN AN ORGANIZED HEALTH CARE EDUCATION/TRAINING PROGRAM

## 2023-07-14 PROCEDURE — 1125F AMNT PAIN NOTED PAIN PRSNT: CPT | Mod: CPTII,S$GLB,, | Performed by: STUDENT IN AN ORGANIZED HEALTH CARE EDUCATION/TRAINING PROGRAM

## 2023-07-14 PROCEDURE — 99214 OFFICE O/P EST MOD 30 MIN: CPT | Mod: 24,S$GLB,, | Performed by: STUDENT IN AN ORGANIZED HEALTH CARE EDUCATION/TRAINING PROGRAM

## 2023-07-14 RX ORDER — COLLAGENASE SANTYL 250 [ARB'U]/G
OINTMENT TOPICAL DAILY
Qty: 30 G | Refills: 1 | Status: ON HOLD | OUTPATIENT
Start: 2023-07-14 | End: 2023-11-14 | Stop reason: HOSPADM

## 2023-07-14 NOTE — PROGRESS NOTES
Subjective:     Patient ID: Julien Meléndez is a 82 y.o. male.    Chief Complaint: Foot Ulcer (Bilateral wounds, ), Wound Care, and Dressing Change    Julien is a 82 y.o. male who presents to the clinic for evaluation and treatment of high risk feet. Julien has a past medical history of Diabetes mellitus, Hiatal hernia, Hyperlipidemia, Hypertension, MI (myocardial infarction), and Sleep apnea. The patient's chief complaint is foot ulcer, both feet. This patient has documented high risk feet requiring routine maintenance secondary to diabetes mellitis and those secondary complications of diabetes, as mentioned..    PCP: Kelvin Wayne MD    Date Last Seen by PCP:     Current shoe gear:    History of Trauma: negative  Sign of Infection: none    Hemoglobin A1C   Date Value Ref Range Status   03/25/2023 7.0 (H) 4.0 - 5.6 % Final     Comment:     ADA Screening Guidelines:  5.7-6.4%  Consistent with prediabetes  >or=6.5%  Consistent with diabetes    High levels of fetal hemoglobin interfere with the HbA1C  assay. Heterozygous hemoglobin variants (HbS, HgC, etc)do  not significantly interfere with this assay.   However, presence of multiple variants may affect accuracy.     10/06/2022 7.2 (H) 4.0 - 5.6 % Final     Comment:     ADA Screening Guidelines:  5.7-6.4%  Consistent with prediabetes  >or=6.5%  Consistent with diabetes    High levels of fetal hemoglobin interfere with the HbA1C  assay. Heterozygous hemoglobin variants (HbS, HgC, etc)do  not significantly interfere with this assay.   However, presence of multiple variants may affect accuracy.     08/03/2022 7.8 (H) 4.0 - 5.6 % Final     Comment:     ADA Screening Guidelines:  5.7-6.4%  Consistent with prediabetes  >or=6.5%  Consistent with diabetes    High levels of fetal hemoglobin interfere with the HbA1C  assay. Heterozygous hemoglobin variants (HbS, HgC, etc)do  not significantly interfere with this assay.   However, presence of multiple variants may affect  accuracy.         Review of Systems   Constitutional: Negative for chills, decreased appetite, diaphoresis and fever.   HENT:  Negative for congestion and hearing loss.    Cardiovascular:  Negative for chest pain, claudication, leg swelling and syncope.   Respiratory:  Negative for cough and shortness of breath.    Skin:  Positive for dry skin, nail changes and poor wound healing. Negative for color change, flushing, itching and rash.   Musculoskeletal:  Negative for arthritis, back pain, joint pain and joint swelling.   Gastrointestinal:  Negative for nausea and vomiting.   Neurological:  Positive for numbness and paresthesias. Negative for focal weakness and weakness.   Psychiatric/Behavioral:  Negative for altered mental status. The patient is not nervous/anxious.       Objective:     Physical Exam  Constitutional:       General: He is not in acute distress.     Appearance: Normal appearance. He is well-developed. He is not diaphoretic.   Cardiovascular:      Comments: Dorsalis pedis and posterior tibial pulses are diminished. Skin temperature is within normal limits. Toes are cool to touch and feet are warm proximally. Hair growth is diminished. Skin is atrophic and with mild hyperpigmentation. Mild edema noted, bilaterally. Telangiectasias to medial ankle, bilaterally   Musculoskeletal:         General: Tenderness present.      Comments: Adequate joint range of motion without pain, limitation, nor crepitation to foot and ankle joints. Muscle strength is 5/5 in all groups bilaterally.    Pes planus with HAV deformity, bilaterally, R>L with localized erythema and tenderness to right foot dorsomedial bony exostosis at 1st MTP       Feet:      Right foot:      Skin integrity: Dry skin present. No ulcer, blister, erythema or warmth.      Left foot:      Skin integrity: Dry skin present. No ulcer, blister, erythema or warmth.   Skin:     General: Skin is warm and dry.      Capillary Refill: Capillary refill takes  less than 2 seconds.      Comments: Skin is warm and dry, no acute signs of infection noted bilaterally      Toenails are thickened by 2-4 mm's, dystrophic, and are darkened in coloration with subungual fungal debris.     See wound description below    Otherwise, no open wounds, macerations or hyperkeratotic lesions, bilaterally            Neurological:      Mental Status: He is alert and oriented to person, place, and time.      Sensory: Sensory deficit present.      Motor: No abnormal muscle tone.      Comments: Light touch within normal limits. [place] 5.07 monofilamant testing is diminished. Vibratory sensation is diminished bilaterally.   Psychiatric:         Mood and Affect: Mood normal.         Behavior: Behavior normal.         Thought Content: Thought content normal.     S/p TMA to left forefoot, ulcer with dry ischemic 4.0x1.5x0.2cm with fibrotic base, no drainage, intact wound edges, no signs of infection. Does not probe to bone. Appears stable.     Ulcer to medial 1st MTP, right foot, measures 1.5x1.5x0.1cm, with fibronecrotic base, no signs of infection or drainage. Does not probe to bone    PREVIOUS PICTURES            Assessment:      Encounter Diagnoses   Name Primary?    PAD (peripheral artery disease) Yes    Ulcer of both feet, unspecified ulcer stage      Plan:     Julien was seen today for foot ulcer, wound care and dressing change.    Diagnoses and all orders for this visit:    PAD (peripheral artery disease)  -     Ambulatory referral/consult to Wound Clinic; Future    Ulcer of both feet, unspecified ulcer stage  -     Ambulatory referral/consult to Wound Clinic; Future    Other orders  -     collagenase (SANTYL) ointment; Apply topically once daily.      I counseled the patient on his conditions, their implications and medical management.    Nursing to change dressings as follows:  Remove foot dressings.   Cleanse wound with normal saline or wound . Dry well.    Apply gentian  violet to kelsi wound maceration if needed.   Apply santyl followed by hydrafera to bilateral foot wound(s).   Next apply foam pad x 2 directly on top of wound bed   Place cast padding between toes to prevent pressure ulcers .  Top with football dressing consisting 3 rolls of cast padding. Top with flexinet  Change 3  times per week     Dressing application as per above. Darco shoe applied to offload the area. Advised patient that this should be worn on the affected foot at all times when ambulating. Short-term goals include maintaining good offloading and minimizing bioburden, promoting granulation and epithelialization to healing.  Long-term goals include keeping the wound healed by good offloading and medical management under the direction of internist.      Shoe inspection. Diabetic Foot Education. Patient reminded of the importance of good nutrition and blood sugar control to help prevent podiatric complications of diabetes.  discussed with the  patient  signs and symptoms of infection including redness, drainage, purulence, odor, pain, elevated BS, streaking, fever, chills, etc . Patient is to seek medical attention (ER or urgent care) if these symptoms occur      Continue follow up with Cardiology, Dr. Martin, for PAD    Follow up in wound clinic

## 2023-07-28 ENCOUNTER — PATIENT MESSAGE (OUTPATIENT)
Dept: CARDIOLOGY | Facility: CLINIC | Age: 83
End: 2023-07-28
Payer: MEDICARE

## 2023-08-01 ENCOUNTER — HOSPITAL ENCOUNTER (OUTPATIENT)
Dept: WOUND CARE | Facility: HOSPITAL | Age: 83
Discharge: HOME | End: 2023-08-01
Attending: STUDENT IN AN ORGANIZED HEALTH CARE EDUCATION/TRAINING PROGRAM
Payer: MEDICARE

## 2023-08-01 VITALS
TEMPERATURE: 97 F | HEIGHT: 73 IN | BODY MASS INDEX: 25.05 KG/M2 | HEART RATE: 77 BPM | SYSTOLIC BLOOD PRESSURE: 135 MMHG | WEIGHT: 189 LBS | DIASTOLIC BLOOD PRESSURE: 70 MMHG

## 2023-08-01 DIAGNOSIS — L97.519 ULCER OF BOTH FEET, UNSPECIFIED ULCER STAGE: ICD-10-CM

## 2023-08-01 DIAGNOSIS — I73.9 PAD (PERIPHERAL ARTERY DISEASE): ICD-10-CM

## 2023-08-01 DIAGNOSIS — L97.529 ULCER OF BOTH FEET, UNSPECIFIED ULCER STAGE: ICD-10-CM

## 2023-08-01 PROCEDURE — 11043 DEBRIDEMENT: ICD-10-PCS | Mod: 58,,, | Performed by: STUDENT IN AN ORGANIZED HEALTH CARE EDUCATION/TRAINING PROGRAM

## 2023-08-01 PROCEDURE — 99214 PR OFFICE/OUTPT VISIT, EST, LEVL IV, 30-39 MIN: ICD-10-PCS | Mod: 25,,, | Performed by: STUDENT IN AN ORGANIZED HEALTH CARE EDUCATION/TRAINING PROGRAM

## 2023-08-01 PROCEDURE — 97597 DBRDMT OPN WND 1ST 20 CM/<: CPT

## 2023-08-01 PROCEDURE — 11042 DEBRIDEMENT: ICD-10-PCS | Mod: 79,59,, | Performed by: STUDENT IN AN ORGANIZED HEALTH CARE EDUCATION/TRAINING PROGRAM

## 2023-08-01 PROCEDURE — 11042 DBRDMT SUBQ TIS 1ST 20SQCM/<: CPT | Mod: 79,59,, | Performed by: STUDENT IN AN ORGANIZED HEALTH CARE EDUCATION/TRAINING PROGRAM

## 2023-08-01 PROCEDURE — 11043 DBRDMT MUSC&/FSCA 1ST 20/<: CPT | Mod: 58,,, | Performed by: STUDENT IN AN ORGANIZED HEALTH CARE EDUCATION/TRAINING PROGRAM

## 2023-08-01 PROCEDURE — 99214 OFFICE O/P EST MOD 30 MIN: CPT | Mod: 25,,, | Performed by: STUDENT IN AN ORGANIZED HEALTH CARE EDUCATION/TRAINING PROGRAM

## 2023-08-01 PROCEDURE — 11042 DBRDMT SUBQ TIS 1ST 20SQCM/<: CPT

## 2023-08-01 PROCEDURE — 11043 DBRDMT MUSC&/FSCA 1ST 20/<: CPT

## 2023-08-01 NOTE — PROCEDURES
Debridement    Date/Time: 8/1/2023 1:00 PM    Performed by: Diya Leung DPM  Authorized by: Diya Leung DPM    Consent Done?:  Yes (Verbal)  Local anesthesia used?: No      Wound Details:    Location:  Right foot    Location:  Right 1st Toe    Type of Debridement:  Non-excisional       Length (cm):  1       Area (sq cm):  1.2       Width (cm):  1.2       Percent Debrided (%):  100       Depth (cm):  0.2       Total Area Debrided (sq cm):  1.2    Depth of debridement:  Subcutaneous tissue    Tissue debrided:  Subcutaneous and Other    Devitalized tissue debrided:  Biofilm, Callus and Fibrin    Instruments:  Blade and Curette  Patient tolerance:  Patient tolerated the procedure well with no immediate complications     No cultures were taken during this visit   Debridement    Date/Time: 8/1/2023 1:00 PM    Performed by: Diya Leung DPM  Authorized by: Diya Leung DPM    Consent Done?:  Yes (Verbal)  Local anesthesia used?: No      Wound Details:    Location:  Left foot    Location:  Left Midfoot    Type of Debridement:  Excisional       Length (cm):  2.3       Area (sq cm):  8.51       Width (cm):  3.7       Percent Debrided (%):  100       Depth (cm):  0.3       Total Area Debrided (sq cm):  8.51    Depth of debridement:  Muscle/fascia/tendon    Tissue debrided:  Subcutaneous, Other and Muscle    Devitalized tissue debrided:  Biofilm, Callus and Fibrin    Instruments:  Blade and Curette  Patient tolerance:  Patient tolerated the procedure well with no immediate complications     No cultures were taken during this visit

## 2023-08-01 NOTE — PROGRESS NOTES
[facility]    Subjective:       Patient ID: Julien Meléndez is a 82 y.o. male.    Chief Complaint: Diabetic Foot Ulcer    8/1/23 Patient seen for left foot surgical site and right medial foot wounds.  Left foot debrided, selective debridement to right foot also done.  Tolerated well.  New orders sent with patient to nursing home.    Hemoglobin A1C       Date                     Value               Ref Range           Status                03/25/2023               7.0 (H)             4.0 - 5.6 %         Final              Comment:    ADA Screening Guidelines:  5.7-6.4%  Consistent with prediabetes  >or=6.5%  Consistent with diabetes    High levels of fetal hemoglobin interfere with the HbA1C  assay. Heterozygous hemoglobin variants (HbS, HgC, etc)do  not significantly interfere with this assay.   However, presence of multiple variants may affect accuracy.         10/06/2022               7.2 (H)             4.0 - 5.6 %         Final              Comment:    ADA Screening Guidelines:  5.7-6.4%  Consistent with prediabetes  >or=6.5%  Consistent with diabetes    High levels of fetal hemoglobin interfere with the HbA1C  assay. Heterozygous hemoglobin variants (HbS, HgC, etc)do  not significantly interfere with this assay.   However, presence of multiple variants may affect accuracy.         08/03/2022               7.8 (H)             4.0 - 5.6 %         Final              Comment:    ADA Screening Guidelines:  5.7-6.4%  Consistent with prediabetes  >or=6.5%  Consistent with diabetes    High levels of fetal hemoglobin interfere with the HbA1C  assay. Heterozygous hemoglobin variants (HbS, HgC, etc)do  not significantly interfere with this assay.   However, presence of multiple variants may affect accuracy.    ----------     Review of Systems   Constitutional:  Negative for activity change, chills, diaphoresis and fever.   HENT:  Negative for congestion.    Respiratory:   Negative for cough and shortness of breath.    Cardiovascular:  Negative for leg swelling.   Gastrointestinal:  Negative for nausea and vomiting.   Skin:  Positive for color change and wound. Negative for pallor and rash.   Neurological:  Positive for numbness. Negative for tremors and speech difficulty.   Psychiatric/Behavioral:  Negative for agitation and confusion. The patient is not nervous/anxious.          Objective:     Vitals:    08/01/23 1323   BP: 135/70   Pulse: 77   Temp: 97 °F (36.1 °C)         Physical Exam  Constitutional:       General: He is not in acute distress.     Appearance: Normal appearance. He is well-developed. He is not diaphoretic.   Cardiovascular:      Comments: Dorsalis pedis and posterior tibial pulses are mildly diminished. Skin temperature is within normal limits. Toes are cool to touch and feet are warm proximally. Hair growth is diminished. Skin is atrophic and with mild hyperpigmentation. Mild edema noted, bilaterally. Telangiectasias to medial ankle, bilaterally   Musculoskeletal:         General: No tenderness.      Comments: Adequate joint range of motion without pain, limitation, nor crepitation to foot and ankle joints.      Feet:      Right foot:      Skin integrity: Dry skin present. No ulcer, blister, erythema or warmth.      Left foot:      Skin integrity: Dry skin present. No ulcer, blister, erythema or warmth.   Skin:     General: Skin is warm and dry.      Capillary Refill: Capillary refill takes less than 2 seconds.      Comments: Skin is warm and dry, no acute signs of infection noted bilaterally      See wound description below    Toenails are thickened by 2-4 mm's, dystrophic, and are darkened in coloration with subungual fungal debris.     Otherwise, no open wounds, macerations or hyperkeratotic lesions, bilaterally            Neurological:      Mental Status: He is alert and oriented to person, place, and time.      Sensory: Sensory deficit present.      Motor:  No abnormal muscle tone.      Comments: Light touch within normal limits. [place]-Lashay 5.07 monofilamant testing is diminished. Vibratory sensation is diminished bilaterally.   Psychiatric:         Mood and Affect: Mood normal.         Behavior: Behavior normal.         Thought Content: Thought content normal.            Altered Skin Integrity 03/31/23 2227 Right lateral;medial Foot #2 Other (comment) Full thickness tissue loss. Subcutaneous fat may be visible but bone, tendon or muscle are not exposed (Active)   03/31/23 2227   Altered Skin Integrity Present on Admission - Did Patient arrive to the hospital with altered skin?:    Side: Right   Orientation: lateral;medial   Location: Foot   Wound Number: #2   Is this injury device related?: No   Primary Wound Type: Other   Description of Altered Skin Integrity: Full thickness tissue loss. Subcutaneous fat may be visible but bone, tendon or muscle are not exposed   Ankle-Brachial Index:    Pulses:    Removal Indication and Assessment:    Wound Outcome: Other   (Retired) Wound Length (cm):    (Retired) Wound Width (cm):    (Retired) Depth (cm):    Wound Description (Comments):    Removal Indications:    Wound Image   08/01/23 1336   Description of Altered Skin Integrity Full thickness tissue loss. Base is covered by slough and/or eschar in the wound bed 08/01/23 1336   Dressing Appearance Moist drainage 08/01/23 1336   Drainage Amount Scant 08/01/23 1336   Drainage Characteristics/Odor Serous 08/01/23 1336   Appearance Slough;Black 08/01/23 1336   Tissue loss description Full thickness 08/01/23 1336   Black (%), Wound Tissue Color 100 % 08/01/23 1336   Periwound Area Intact;Dry 08/01/23 1336   Wound Edges Defined;Open 08/01/23 1336   Wound Length (cm) 1 cm 08/01/23 1336   Wound Width (cm) 1.2 cm 08/01/23 1336   Wound Depth (cm) 0.2 cm 08/01/23 1336   Wound Volume (cm^3) 0.24 cm^3 08/01/23 1336   Wound Surface Area (cm^2) 1.2 cm^2 08/01/23 1336   Undermining (depth  (cm)/location) 12-6=0.3 08/01/23 1336   Care Cleansed with:;Soap and water;Sterile normal saline;Debrided 08/01/23 1336   Dressing Applied;Other (comment);Hydrofiber;Absorptive Pad 08/01/23 1336   Dressing Change Due 08/03/23 08/01/23 1336            Incision/Site 06/07/23 1138 Left Foot (Active)   06/07/23 1138   Present Prior to Hospital Arrival?:    Side: Left   Location: Foot   Orientation:    Incision Type:    Closure Method:    Additional Comments:    Removal Indication and Assessment:    Wound Outcome:    Removal Indications:    Wound Image   08/01/23 1336   Dressing Appearance Moist drainage 08/01/23 1336   Drainage Amount Moderate 08/01/23 1336   Drainage Characteristics/Odor Serosanguineous 08/01/23 1336   Appearance Red;Slough;Yellow;Black 08/01/23 1336   Black (%), Wound Tissue Color 10 % 08/01/23 1336   Red (%), Wound Tissue Color 60 % 08/01/23 1336   Yellow (%), Wound Tissue Color 30 % 08/01/23 1336   Periwound Area Intact;Dry 08/01/23 1336   Wound Edges Defined;Open 08/01/23 1336   Wound Length (cm) 2.3 cm 08/01/23 1336   Wound Width (cm) 3.7 cm 08/01/23 1336   Wound Depth (cm) 0.3 cm 08/01/23 1336   Wound Volume (cm^3) 2.553 cm^3 08/01/23 1336   Wound Surface Area (cm^2) 8.51 cm^2 08/01/23 1336   Care Cleansed with:;Soap and water;Sterile normal saline;Debrided 08/01/23 1336   Dressing Applied;Hydrofiber;Other (comment);Absorptive Pad 08/01/23 1336   Dressing Change Due 08/03/23 08/01/23 1336         Assessment/Plan:         ICD-10-CM ICD-9-CM   1. PAD (peripheral artery disease)  I73.9 443.9   2. Ulcer of both feet, unspecified ulcer stage  L97.519 707.15    L97.529            Tissue pathology and/or culture taken:  [] Yes [x] No   Sharp debridement performed:   [x] Yes [] No   Labs ordered this visit:   [] Yes [x] No   Imaging ordered this visit:   [] Yes [x] No           Orders Placed This Encounter   Procedures    Debridement     This order was created via procedure documentation     Standing  Status:   Standing     Number of Occurrences:   1    Debridement     This order was created via procedure documentation     Standing Status:   Standing     Number of Occurrences:   1    Ambulatory referral/consult to Wound Clinic     Standing Status:   Standing     Number of Occurrences:   1     Referral Priority:   Routine     Referral Type:   Consultation     Referral Reason:   Specialty Services Required     Requested Specialty:   Wound Care     Number of Visits Requested:   1    Change dressing     Left foot and right medial 1st MTH wounds    Cleanse wound with:NS  Lidocaine:prn  Silver nitrate:prn  Periwound care:prn  Primary dressing:Santyl+moist Hydrofera Classic cut to size  Secondary dressing: ABD+cast padding or Kerlix wrap    Frequency: 3 x week  Follow-up: 3 weeks Dr Leung    Other Orders: Tcom RLE with next visit        No follow-ups on file.        Debridement per attached note, wound care per above.  No signs of infection noted, continue plan of care  Follow up with Cardiology for management of PAD  Shoe inspection. Diabetic Foot Education. Patient reminded of the importance of good nutrition and blood sugar control to help prevent podiatric complications of diabetes. I discussed with the  patient  signs and symptoms of infection including redness, drainage, purulence, odor, pain, elevated BS, streaking, fever, chills, etc . Patient is to seek medical attention (ER or urgent care) if these symptoms occur

## 2023-08-09 PROCEDURE — G0180 MD CERTIFICATION HHA PATIENT: HCPCS | Mod: ,,, | Performed by: FAMILY MEDICINE

## 2023-08-09 PROCEDURE — G0180 PR HOME HEALTH MD CERTIFICATION: ICD-10-PCS | Mod: ,,, | Performed by: FAMILY MEDICINE

## 2023-08-10 PROBLEM — L97.419: Status: ACTIVE | Noted: 2023-08-10

## 2023-08-15 ENCOUNTER — DOCUMENT SCAN (OUTPATIENT)
Dept: HOME HEALTH SERVICES | Facility: HOSPITAL | Age: 83
End: 2023-08-15
Payer: MEDICARE

## 2023-08-22 ENCOUNTER — HOSPITAL ENCOUNTER (OUTPATIENT)
Dept: WOUND CARE | Facility: HOSPITAL | Age: 83
Discharge: HOME | End: 2023-08-22
Attending: SURGERY
Payer: MEDICARE

## 2023-08-22 ENCOUNTER — HOSPITAL ENCOUNTER (OUTPATIENT)
Dept: WOUND CARE | Facility: HOSPITAL | Age: 83
Discharge: HOME | End: 2023-08-22
Attending: STUDENT IN AN ORGANIZED HEALTH CARE EDUCATION/TRAINING PROGRAM
Payer: MEDICARE

## 2023-08-22 ENCOUNTER — HOSPITAL ENCOUNTER (OUTPATIENT)
Dept: RADIOLOGY | Facility: HOSPITAL | Age: 83
Discharge: HOME | End: 2023-08-22
Attending: STUDENT IN AN ORGANIZED HEALTH CARE EDUCATION/TRAINING PROGRAM
Payer: MEDICARE

## 2023-08-22 VITALS
TEMPERATURE: 97 F | DIASTOLIC BLOOD PRESSURE: 97 MMHG | HEART RATE: 97 BPM | BODY MASS INDEX: 26.51 KG/M2 | HEIGHT: 73 IN | SYSTOLIC BLOOD PRESSURE: 123 MMHG | WEIGHT: 200 LBS

## 2023-08-22 DIAGNOSIS — E11.621 DIABETIC ULCER OF RIGHT MIDFOOT ASSOCIATED WITH TYPE 2 DIABETES MELLITUS: ICD-10-CM

## 2023-08-22 DIAGNOSIS — L97.514: Primary | ICD-10-CM

## 2023-08-22 DIAGNOSIS — L97.419 DIABETIC ULCER OF RIGHT MIDFOOT ASSOCIATED WITH TYPE 2 DIABETES MELLITUS: ICD-10-CM

## 2023-08-22 DIAGNOSIS — L97.514: ICD-10-CM

## 2023-08-22 DIAGNOSIS — I73.9 PERIPHERAL VASCULAR DISEASE, UNSPECIFIED: Primary | ICD-10-CM

## 2023-08-22 PROCEDURE — 87075 CULTR BACTERIA EXCEPT BLOOD: CPT | Performed by: STUDENT IN AN ORGANIZED HEALTH CARE EDUCATION/TRAINING PROGRAM

## 2023-08-22 PROCEDURE — 73630 X-RAY EXAM OF FOOT: CPT | Mod: 26,RT,, | Performed by: RADIOLOGY

## 2023-08-22 PROCEDURE — 87077 CULTURE AEROBIC IDENTIFY: CPT | Performed by: STUDENT IN AN ORGANIZED HEALTH CARE EDUCATION/TRAINING PROGRAM

## 2023-08-22 PROCEDURE — 87186 SC STD MICRODIL/AGAR DIL: CPT | Mod: 59 | Performed by: STUDENT IN AN ORGANIZED HEALTH CARE EDUCATION/TRAINING PROGRAM

## 2023-08-22 PROCEDURE — 87070 CULTURE OTHR SPECIMN AEROBIC: CPT | Performed by: STUDENT IN AN ORGANIZED HEALTH CARE EDUCATION/TRAINING PROGRAM

## 2023-08-22 PROCEDURE — 93923 UPR/LXTR ART STDY 3+ LVLS: CPT | Mod: CCAT

## 2023-08-22 PROCEDURE — 99214 PR OFFICE/OUTPT VISIT, EST, LEVL IV, 30-39 MIN: ICD-10-PCS | Mod: 25,,, | Performed by: STUDENT IN AN ORGANIZED HEALTH CARE EDUCATION/TRAINING PROGRAM

## 2023-08-22 PROCEDURE — 11044 DBRDMT BONE 1ST 20 SQ CM/<: CPT

## 2023-08-22 PROCEDURE — 73630 XR FOOT COMPLETE 3 VIEW RIGHT: ICD-10-PCS | Mod: 26,RT,, | Performed by: RADIOLOGY

## 2023-08-22 PROCEDURE — 11044 DBRDMT BONE 1ST 20 SQ CM/<: CPT | Mod: 58,,, | Performed by: STUDENT IN AN ORGANIZED HEALTH CARE EDUCATION/TRAINING PROGRAM

## 2023-08-22 PROCEDURE — 73630 X-RAY EXAM OF FOOT: CPT | Mod: TC,FY,RT

## 2023-08-22 PROCEDURE — 11044 DEBRIDEMENT: ICD-10-PCS | Mod: 58,,, | Performed by: STUDENT IN AN ORGANIZED HEALTH CARE EDUCATION/TRAINING PROGRAM

## 2023-08-22 PROCEDURE — 99214 OFFICE O/P EST MOD 30 MIN: CPT | Mod: 25,,, | Performed by: STUDENT IN AN ORGANIZED HEALTH CARE EDUCATION/TRAINING PROGRAM

## 2023-08-22 NOTE — PROCEDURES
Debridement    Date/Time: 8/22/2023 2:00 PM    Performed by: Diya Leung DPM  Authorized by: Diya Leung DPM    Consent Done?:  Yes (Verbal)  Local anesthesia used?: No      Wound Details:    Location:  Right foot    Location:  Right 1st Toe    Type of Debridement:  Excisional       Length (cm):  1.2       Area (sq cm):  1.44       Width (cm):  1.2       Percent Debrided (%):  100       Depth (cm):  0.4       Total Area Debrided (sq cm):  1.44    Depth of debridement:  Bone    Tissue debrided:  Subcutaneous, Other and Bone    Devitalized tissue debrided:  Biofilm, Callus, Fibrin and Necrotic/Eschar    Instruments:  Blade and Curette  Patient tolerance:  Patient tolerated the procedure well with no immediate complications     cultures were taken during this visit

## 2023-08-22 NOTE — PROGRESS NOTES
[facility]          Hyperbaric and Wound Medicine Department    TCOMS:     Technician comments:  Transcutaneous oximetry was performed in the supine position with the head of the bed elevated to 30 degrees. Mild edema noted.  Patient tolerated procedure well.  Results forwarded to  and Dr. Marcial.              Location   1 ERNIE Air 1 ERNIE 100% O2     Lead 1  Left anterior Chest  40 mmhg 223 mmhg           Lead 2  R med ft   46 mmhg 165 mmhg                   Lead 3  R petra Ft   31 mmhg 142 mmhg    Lead 4  L Med foot   25 mmhg 86 mmhg    Lead 5  L petra ft    32 mmhg 84 mmhg                      Impression: good response , good candidate for HBO.

## 2023-08-23 ENCOUNTER — DOCUMENT SCAN (OUTPATIENT)
Dept: HOME HEALTH SERVICES | Facility: HOSPITAL | Age: 83
End: 2023-08-23
Payer: MEDICARE

## 2023-08-25 LAB
BACTERIA SPEC AEROBE CULT: ABNORMAL
BACTERIA SPEC AEROBE CULT: ABNORMAL

## 2023-08-28 LAB — BACTERIA SPEC ANAEROBE CULT: NORMAL

## 2023-08-28 NOTE — PROGRESS NOTES
Wound Care [facility]    Subjective:       Patient ID: Julien Meléndez is a 82 y.o. male.    Chief Complaint: Wound Care    HPI  TCOM's done today.  Start iodosorb to both wounds today.  Patient to return to clinic in 3 weeks to follow up with Dr. Yeh  Review of Systems   Constitutional:  Negative for activity change, chills, diaphoresis and fever.   HENT:  Negative for congestion.    Respiratory:  Negative for cough and shortness of breath.    Cardiovascular:  Negative for leg swelling.   Gastrointestinal:  Negative for nausea and vomiting.   Skin:  Positive for color change and wound. Negative for pallor and rash.   Neurological:  Positive for numbness. Negative for tremors and speech difficulty.   Psychiatric/Behavioral:  Negative for agitation and confusion. The patient is not nervous/anxious.          Objective:   There were no vitals filed for this visit.      Physical Exam  Constitutional:       General: He is not in acute distress.     Appearance: Normal appearance. He is well-developed. He is not diaphoretic.   Cardiovascular:      Comments: Dorsalis pedis and posterior tibial pulses are mildly diminished. Skin temperature is within normal limits. Toes are cool to touch and feet are warm proximally. Hair growth is diminished. Skin is atrophic and with mild hyperpigmentation. Mild edema noted, bilaterally. Telangiectasias to medial ankle, bilaterally   Musculoskeletal:         General: No tenderness.      Comments: Adequate joint range of motion without pain, limitation, nor crepitation to foot and ankle joints.      Feet:      Right foot:      Skin integrity: Dry skin present. No ulcer, blister, erythema or warmth.      Left foot:      Skin integrity: Dry skin present. No ulcer, blister, erythema or warmth.   Skin:     General: Skin is warm and dry.      Capillary Refill: Capillary refill takes less than 2 seconds.      Comments: Skin is warm and dry, no acute signs  of infection noted bilaterally      See wound description below    Toenails are thickened by 2-4 mm's, dystrophic, and are darkened in coloration with subungual fungal debris.     Otherwise, no open wounds, macerations or hyperkeratotic lesions, bilaterally            Neurological:      Mental Status: He is alert and oriented to person, place, and time.      Sensory: Sensory deficit present.      Motor: No abnormal muscle tone.      Comments: Light touch within normal limits. [place]-Lashay 5.07 monofilamant testing is diminished. Vibratory sensation is diminished bilaterally.   Psychiatric:         Mood and Affect: Mood normal.         Behavior: Behavior normal.         Thought Content: Thought content normal.              Assessment/Plan:         ICD-10-CM ICD-9-CM   1. Peripheral vascular disease, unspecified  I73.9 443.9           Tissue pathology and/or culture taken:  [x] Yes [] No   Sharp debridement performed:   [x] Yes [] No   Labs ordered this visit:   [] Yes [x] No   Imaging ordered this visit:   [x] Yes [] No           Left foot and right medial foot wounds       Cleanse wound with:NS   Lidocaine:prn   Silver nitrate:prn   Periwound care:prn   Primary dressing: Iodosorb gel   Secondary dressing: ABD+cast padding or Kerlix wrap       Frequency: 3 x week   Follow-up: 3 weeks Dr Leung   Follow up with infectious disease in 1 week     [facility] to perform dressing changes  twice weekly (Tues and Fridays) except days seen in wound clinic.               Debridement per attached note, wound care per above.  Cultures taken today of right foot wound, site with worsening appearance. Recommend follow up with Infectious Disease  Follow up with Cardiology for management of PAD  Shoe inspection. Diabetic Foot Education. Patient reminded of the importance of good nutrition and blood sugar control to help prevent podiatric complications of diabetes. I discussed with the  patient  signs and symptoms of  infection including redness, drainage, purulence, odor, pain, elevated BS, streaking, fever, chills, etc . Patient is to seek medical attention (ER or urgent care) if these symptoms occur      This includes face to face time and non-face to face time preparing to see the patient (eg, review of tests), obtaining and/or reviewing separately obtained history, documenting clinical information in the electronic or other health record, independently interpreting results and communicating results to the patient/family/caregiver, or care coordinator.

## 2023-08-30 PROBLEM — I20.89 EXERCISE-INDUCED ANGINA: Status: RESOLVED | Noted: 2017-03-23 | Resolved: 2023-08-30

## 2023-09-05 ENCOUNTER — LAB VISIT (OUTPATIENT)
Dept: LAB | Facility: HOSPITAL | Age: 83
End: 2023-09-05
Attending: INTERNAL MEDICINE
Payer: MEDICARE

## 2023-09-05 ENCOUNTER — DOCUMENT SCAN (OUTPATIENT)
Dept: HOME HEALTH SERVICES | Facility: HOSPITAL | Age: 83
End: 2023-09-05
Payer: MEDICARE

## 2023-09-05 ENCOUNTER — EXTERNAL HOME HEALTH (OUTPATIENT)
Dept: HOME HEALTH SERVICES | Facility: HOSPITAL | Age: 83
End: 2023-09-05
Payer: MEDICARE

## 2023-09-05 DIAGNOSIS — I25.10 CAD (CORONARY ARTERY DISEASE): Primary | ICD-10-CM

## 2023-09-05 DIAGNOSIS — E11.9 DM (DIABETES MELLITUS): ICD-10-CM

## 2023-09-05 DIAGNOSIS — N18.9 CKD (CHRONIC KIDNEY DISEASE): ICD-10-CM

## 2023-09-05 LAB
ANION GAP SERPL CALC-SCNC: 11 MMOL/L (ref 8–16)
BASOPHILS # BLD AUTO: 0.03 K/UL (ref 0–0.2)
BASOPHILS NFR BLD: 0.4 % (ref 0–1.9)
BUN SERPL-MCNC: 17 MG/DL (ref 8–23)
CALCIUM SERPL-MCNC: 9.3 MG/DL (ref 8.7–10.5)
CHLORIDE SERPL-SCNC: 100 MMOL/L (ref 95–110)
CO2 SERPL-SCNC: 25 MMOL/L (ref 23–29)
CREAT SERPL-MCNC: 1.1 MG/DL (ref 0.5–1.4)
DIFFERENTIAL METHOD: ABNORMAL
EOSINOPHIL # BLD AUTO: 0.1 K/UL (ref 0–0.5)
EOSINOPHIL NFR BLD: 1.3 % (ref 0–8)
ERYTHROCYTE [DISTWIDTH] IN BLOOD BY AUTOMATED COUNT: 20.2 % (ref 11.5–14.5)
EST. GFR  (NO RACE VARIABLE): >60 ML/MIN/1.73 M^2
GLUCOSE SERPL-MCNC: 339 MG/DL (ref 70–110)
HCT VFR BLD AUTO: 32.5 % (ref 40–54)
HGB BLD-MCNC: 10 G/DL (ref 14–18)
IMM GRANULOCYTES # BLD AUTO: 0.16 K/UL (ref 0–0.04)
IMM GRANULOCYTES NFR BLD AUTO: 2.2 % (ref 0–0.5)
LYMPHOCYTES # BLD AUTO: 1.4 K/UL (ref 1–4.8)
LYMPHOCYTES NFR BLD: 19.2 % (ref 18–48)
MCH RBC QN AUTO: 24.4 PG (ref 27–31)
MCHC RBC AUTO-ENTMCNC: 30.8 G/DL (ref 32–36)
MCV RBC AUTO: 79 FL (ref 82–98)
MONOCYTES # BLD AUTO: 0.7 K/UL (ref 0.3–1)
MONOCYTES NFR BLD: 10.3 % (ref 4–15)
NEUTROPHILS # BLD AUTO: 4.8 K/UL (ref 1.8–7.7)
NEUTROPHILS NFR BLD: 66.6 % (ref 38–73)
NRBC BLD-RTO: 0 /100 WBC
PLATELET # BLD AUTO: 374 K/UL (ref 150–450)
PMV BLD AUTO: 11.3 FL (ref 9.2–12.9)
POTASSIUM SERPL-SCNC: 4.5 MMOL/L (ref 3.5–5.1)
RBC # BLD AUTO: 4.1 M/UL (ref 4.6–6.2)
SODIUM SERPL-SCNC: 136 MMOL/L (ref 136–145)
WBC # BLD AUTO: 7.2 K/UL (ref 3.9–12.7)

## 2023-09-05 PROCEDURE — 80048 BASIC METABOLIC PNL TOTAL CA: CPT

## 2023-09-05 PROCEDURE — 82728 ASSAY OF FERRITIN: CPT

## 2023-09-05 PROCEDURE — 82607 VITAMIN B-12: CPT

## 2023-09-05 PROCEDURE — 84466 ASSAY OF TRANSFERRIN: CPT

## 2023-09-05 PROCEDURE — 85025 COMPLETE CBC W/AUTO DIFF WBC: CPT

## 2023-09-05 PROCEDURE — 83540 ASSAY OF IRON: CPT

## 2023-09-05 PROCEDURE — 82746 ASSAY OF FOLIC ACID SERUM: CPT

## 2023-09-06 ENCOUNTER — TELEPHONE (OUTPATIENT)
Dept: WOUND CARE | Facility: HOSPITAL | Age: 83
End: 2023-09-06
Payer: MEDICARE

## 2023-09-06 LAB
FERRITIN SERPL-MCNC: 92 NG/ML (ref 20–300)
FOLATE SERPL-MCNC: 7.2 NG/ML (ref 4–24)
IRON SERPL-MCNC: 31 UG/DL (ref 45–160)
SATURATED IRON: 9 % (ref 20–50)
TOTAL IRON BINDING CAPACITY: 339 UG/DL (ref 250–450)
TRANSFERRIN SERPL-MCNC: 229 MG/DL (ref 200–375)
VIT B12 SERPL-MCNC: 641 PG/ML (ref 210–950)

## 2023-09-06 RX ORDER — SULFAMETHOXAZOLE AND TRIMETHOPRIM 800; 160 MG/1; MG/1
1 TABLET ORAL 2 TIMES DAILY
Qty: 20 TABLET | Refills: 0 | Status: SHIPPED | OUTPATIENT
Start: 2023-09-06 | End: 2023-10-03 | Stop reason: SDUPTHER

## 2023-09-08 ENCOUNTER — DOCUMENT SCAN (OUTPATIENT)
Dept: HOME HEALTH SERVICES | Facility: HOSPITAL | Age: 83
End: 2023-09-08
Payer: MEDICARE

## 2023-09-12 ENCOUNTER — HOSPITAL ENCOUNTER (OUTPATIENT)
Dept: WOUND CARE | Facility: HOSPITAL | Age: 83
Discharge: HOME | End: 2023-09-12
Attending: STUDENT IN AN ORGANIZED HEALTH CARE EDUCATION/TRAINING PROGRAM
Payer: MEDICARE

## 2023-09-12 VITALS
SYSTOLIC BLOOD PRESSURE: 126 MMHG | DIASTOLIC BLOOD PRESSURE: 60 MMHG | WEIGHT: 190 LBS | HEIGHT: 73 IN | BODY MASS INDEX: 25.18 KG/M2 | TEMPERATURE: 97 F | HEART RATE: 63 BPM

## 2023-09-12 DIAGNOSIS — B35.1 ONYCHOMYCOSIS: ICD-10-CM

## 2023-09-12 DIAGNOSIS — L97.514: ICD-10-CM

## 2023-09-12 DIAGNOSIS — Z89.419 HISTORY OF AMPUTATION OF GREAT TOE: ICD-10-CM

## 2023-09-12 DIAGNOSIS — M86.60 CHRONIC OSTEOMYELITIS: Primary | ICD-10-CM

## 2023-09-12 DIAGNOSIS — I73.9 PAD (PERIPHERAL ARTERY DISEASE): ICD-10-CM

## 2023-09-12 DIAGNOSIS — S98.112A AMPUTATION OF LEFT GREAT TOE: ICD-10-CM

## 2023-09-12 PROCEDURE — 11721 DEBRIDE NAIL 6 OR MORE: CPT

## 2023-09-12 PROCEDURE — 99213 OFFICE O/P EST LOW 20 MIN: CPT | Mod: 25

## 2023-09-12 PROCEDURE — 11721 DEBRIDE NAIL 6 OR MORE: CPT | Mod: Q7,,, | Performed by: STUDENT IN AN ORGANIZED HEALTH CARE EDUCATION/TRAINING PROGRAM

## 2023-09-12 PROCEDURE — 99214 PR OFFICE/OUTPT VISIT, EST, LEVL IV, 30-39 MIN: ICD-10-PCS | Mod: 25,,, | Performed by: STUDENT IN AN ORGANIZED HEALTH CARE EDUCATION/TRAINING PROGRAM

## 2023-09-12 PROCEDURE — 99214 OFFICE O/P EST MOD 30 MIN: CPT | Mod: 25,,, | Performed by: STUDENT IN AN ORGANIZED HEALTH CARE EDUCATION/TRAINING PROGRAM

## 2023-09-12 PROCEDURE — 11721 ROUTINE FOOT CARE: ICD-10-PCS | Mod: Q7,,, | Performed by: STUDENT IN AN ORGANIZED HEALTH CARE EDUCATION/TRAINING PROGRAM

## 2023-09-12 NOTE — PROCEDURES
"Routine Foot Care    Date/Time: 9/12/2023 2:58 PM    Performed by: Diya Leung DPM  Authorized by: Diya Leung DPM    Time out: Immediately prior to procedure a "time out" was called to verify the correct patient, procedure, equipment, support staff and site/side marked as required.    Consent Done?:  Yes (Verbal)  Hyperkeratotic Skin Lesions?: No      Nail Care Type:  Debride(Left 3rd Toe, Left 2nd Toe, Left 4th Toe, Left 5th Toe, Right 1st Toe, Right 2nd Toe, Right 3rd Toe, Right 4th Toe and Right 5th Toe)  Patient tolerance:  Patient tolerated the procedure well with no immediate complications    "

## 2023-09-12 NOTE — PROGRESS NOTES
[facility]    Subjective:       Patient ID: Julien Meléndez is a 83 y.o. male.    Chief Complaint: Wound Care    Follow up for bilateral foot wounds that are progressing well. No new concerns.   Continue with the same plan of care. MRI ordered this visit. Nails debrided today    PCP Dr Wayne  Date last seen 8/30/23    Review of Systems   Constitutional:  Negative for activity change, chills, diaphoresis and fever.   HENT:  Negative for congestion.    Respiratory:  Negative for cough and shortness of breath.    Cardiovascular:  Negative for leg swelling.   Gastrointestinal:  Negative for nausea and vomiting.   Skin:  Positive for color change and wound. Negative for pallor and rash.   Neurological:  Positive for numbness. Negative for tremors and speech difficulty.   Psychiatric/Behavioral:  Negative for agitation and confusion. The patient is not nervous/anxious.          Objective:     Vitals:    09/12/23 1532   BP: 126/60   Pulse: 63   Temp: 96.6 °F (35.9 °C)         Physical Exam  Constitutional:       General: He is not in acute distress.     Appearance: Normal appearance. He is well-developed. He is not diaphoretic.   Cardiovascular:      Comments: Dorsalis pedis and posterior tibial pulses are mildly diminished. Skin temperature is within normal limits. Toes are cool to touch and feet are warm proximally. Hair growth is diminished. Skin is atrophic and with mild hyperpigmentation. Mild edema noted, bilaterally. Telangiectasias to medial ankle, bilaterally   Musculoskeletal:         General: No tenderness.      Comments: Adequate joint range of motion without pain, limitation, nor crepitation to foot and ankle joints.      Feet:      Right foot:      Skin integrity: Dry skin present. No ulcer, blister, erythema or warmth.      Left foot:      Skin integrity: Dry skin present. No ulcer, blister, erythema or warmth.   Skin:     General: Skin is warm and dry.       Capillary Refill: Capillary refill takes less than 2 seconds.      Comments: Skin is warm and dry, no acute signs of infection noted bilaterally      See wound description below    Toenails are thickened by 2-4 mm's, dystrophic, and are darkened in coloration with subungual fungal debris.     Otherwise, no open wounds, macerations or hyperkeratotic lesions, bilaterally            Neurological:      Mental Status: He is alert and oriented to person, place, and time.      Sensory: Sensory deficit present.      Motor: No abnormal muscle tone.      Comments: Light touch within normal limits. [place]ashay 5.07 monofilamant testing is diminished. Vibratory sensation is diminished bilaterally.   Psychiatric:         Mood and Affect: Mood normal.         Behavior: Behavior normal.         Thought Content: Thought content normal.            Altered Skin Integrity 03/31/23 2227 Right lateral;medial Foot #2 Other (comment) Full thickness tissue loss. Subcutaneous fat may be visible but bone, tendon or muscle are not exposed (Active)   03/31/23 2227   Altered Skin Integrity Present on Admission - Did Patient arrive to the hospital with altered skin?:    Side: Right   Orientation: lateral;medial   Location: Foot   Wound Number: #2   Is this injury device related?: No   Primary Wound Type: Other   Description of Altered Skin Integrity: Full thickness tissue loss. Subcutaneous fat may be visible but bone, tendon or muscle are not exposed   Ankle-Brachial Index:    Pulses:    Removal Indication and Assessment:    Wound Outcome: Other   (Retired) Wound Length (cm):    (Retired) Wound Width (cm):    (Retired) Depth (cm):    Wound Description (Comments):    Removal Indications:    Wound Image   09/12/23 1620   Dressing Appearance Intact;Moist drainage 09/12/23 1620   Drainage Amount Moderate 09/12/23 1620   Drainage Characteristics/Odor Serosanguineous 09/12/23 1620   Appearance Intact;Slough 09/12/23 1620   Tissue loss  description Full thickness 09/12/23 1620   Yellow (%), Wound Tissue Color 100 % 09/12/23 1620   Periwound Area Intact;Moist 09/12/23 1620   Wound Edges Defined 09/12/23 1620   Wound Length (cm) 1 cm 09/12/23 1620   Wound Width (cm) 1.2 cm 09/12/23 1620   Wound Depth (cm) 0.3 cm 09/12/23 1620   Wound Volume (cm^3) 0.36 cm^3 09/12/23 1620   Wound Surface Area (cm^2) 1.2 cm^2 09/12/23 1620   Care Cleansed with:;Antimicrobial agent;Soap and water;Sterile normal saline 09/12/23 1620   Dressing Applied;Changed;Foam;Absorptive Pad;Cast padding 09/12/23 1620   Periwound Care Absorptive dressing applied 09/12/23 1620   Off Loading Football dressing;Off loading shoe 09/12/23 1620   Dressing Change Due 09/13/23 09/12/23 1620            Incision/Site 06/07/23 1138 Left Foot (Active)   06/07/23 1138   Present Prior to Hospital Arrival?:    Side: Left   Location: Foot   Orientation:    Incision Type:    Closure Method:    Additional Comments:    Removal Indication and Assessment:    Wound Outcome:    Removal Indications:    Wound Image    09/12/23 1620   Dressing Appearance Intact;Moist drainage 09/12/23 1620   Drainage Amount Moderate 09/12/23 1620   Drainage Characteristics/Odor Serosanguineous 09/12/23 1620   Appearance Intact;Red;Slough 09/12/23 1620   Red (%), Wound Tissue Color 60 % 09/12/23 1620   Yellow (%), Wound Tissue Color 40 % 09/12/23 1620   Periwound Area Intact 09/12/23 1620   Wound Length (cm) 2.5 cm 09/12/23 1620   Wound Width (cm) 3 cm 09/12/23 1620   Wound Depth (cm) 0.3 cm 09/12/23 1620   Wound Volume (cm^3) 2.25 cm^3 09/12/23 1620   Wound Surface Area (cm^2) 7.5 cm^2 09/12/23 1620   Care Cleansed with:;Antimicrobial agent;Soap and water 09/12/23 1620   Dressing Applied;Changed;Foam;Absorptive Pad;Cast padding 09/12/23 1620   Periwound Care Absorptive dressing applied 09/12/23 1620   Off Loading Football dressing;Off loading shoe 09/12/23 1620   Dressing Change Due 09/13/23 09/12/23 1620          Assessment/Plan:         ICD-10-CM ICD-9-CM   1. Chronic osteomyelitis  M86.60 730.10   2. PAD (peripheral artery disease)  I73.9 443.9   3. History of amputation of great toe  Z89.419 V49.71   4. Amputation of left great toe  S98.112A 895.0   5. Onychomycosis  B35.1 110.1   6. Chronic toe ulcer, right, with necrosis of bone  L97.514 707.15     730.17           Tissue pathology and/or culture taken:  [] Yes [x] No   Sharp debridement performed:   [] Yes [x] No   Labs ordered this visit:   [] Yes [x] No   Imaging ordered this visit:   [x] Yes [] No           Orders Placed This Encounter   Procedures    Foot Care     This order was created via procedure documentation     Standing Status:   Standing     Number of Occurrences:   1    MRI Foot (Forefoot) Right Without Contrast     Standing Status:   Future     Standing Expiration Date:   9/12/2024     Order Specific Question:   Does the patient have a pacemaker or a defibrillator (Note: Some facilities may not be able to schedule an MRI for patients with pacemakers and defibrillators. You should contact your local radiology department to determine if this is the case.)?     Answer:   No     Order Specific Question:   Does the patient have an aneurysm or surgical clip, pump, nerve/brain stimulator, middle/inner ear prosthesis, or other metal implant or foreign object (bullet, shrapnel)? If they have a card related to their implant, ask them to bring it. Issues related to the implant may cause the MRI to be delayed.     Answer:   No     Order Specific Question:   Is the patient claustrophobic?     Answer:   No     Order Specific Question:   Will the patient require sedation?     Answer:   No     Order Specific Question:   Does the patient have any of the following conditions? Diabetes, History of Renal Disease or Hypertension requiring medical therapy?     Answer:   Yes     Order Specific Question:   May the Radiologist modify the order per protocol to meet the clinical  needs of the patient?     Answer:   Yes     Order Specific Question:   Is this part of a Research Study?     Answer:   No     Order Specific Question:   Does the patient have on a skin patch for medication with aluminized backing?     Answer:   No    Ambulatory referral/consult to Infectious Disease     Standing Status:   Future     Standing Expiration Date:   10/12/2024     Referral Priority:   Urgent     Referral Type:   Consultation     Referral Reason:   Specialty Services Required     Referred to Provider:   Hardy Hayes MD     Requested Specialty:   Infectious Diseases     Number of Visits Requested:   1    Change dressing     Left foot and right medial foot wounds       Cleanse wound with:NS   Lidocaine:prn   Silver nitrate:prn   Periwound care:prn   Primary dressing: Iodosorb gel   Secondary dressing: ABD+cast padding or Kerlix       Frequency: 2 x week   Follow-up: 2 weeks Dr Leung   Follow up with infectious disease next visit    [facility] to perform dressing changes  twice weekly (Tues and Fridays) except days seen in wound clinic.        Follow up in about 2 weeks (around 9/26/2023).        Wound care per above. Routine foot care per attached note  Cultures reviewed, continue antibiotics to completion  Xray independently reviewed with patient with possible osteomyelitis. MRI ordered.  Recommend follow up with Infectious Disease  Follow up with Cardiology for management of PAD  Patient reminded of the importance of good nutrition and blood sugar control to help prevent podiatric complications of diabetes. I discussed with the  patient  signs and symptoms of infection including redness, drainage, purulence, odor, pain, elevated BS, streaking, fever, chills, etc . Patient is to seek medical attention (ER or urgent care) if these symptoms occur      This includes face to face time and non-face to face time preparing to see the patient (eg, review of tests), obtaining and/or reviewing  separately obtained history, documenting clinical information in the electronic or other health record, independently interpreting results and communicating results to the patient/family/caregiver, or care coordinator.

## 2023-09-26 ENCOUNTER — HOSPITAL ENCOUNTER (OUTPATIENT)
Dept: WOUND CARE | Facility: HOSPITAL | Age: 83
Discharge: HOME | End: 2023-09-26
Attending: STUDENT IN AN ORGANIZED HEALTH CARE EDUCATION/TRAINING PROGRAM
Payer: MEDICARE

## 2023-09-26 VITALS
HEART RATE: 85 BPM | SYSTOLIC BLOOD PRESSURE: 103 MMHG | DIASTOLIC BLOOD PRESSURE: 59 MMHG | HEIGHT: 73 IN | TEMPERATURE: 98 F | WEIGHT: 190 LBS | BODY MASS INDEX: 25.18 KG/M2

## 2023-09-26 DIAGNOSIS — L97.519 ULCER OF BOTH FEET, UNSPECIFIED ULCER STAGE: ICD-10-CM

## 2023-09-26 DIAGNOSIS — L97.529 ULCER OF BOTH FEET, UNSPECIFIED ULCER STAGE: ICD-10-CM

## 2023-09-26 DIAGNOSIS — I73.9 PAD (PERIPHERAL ARTERY DISEASE): Primary | ICD-10-CM

## 2023-09-26 DIAGNOSIS — S80.819A ABRASION OF ANTERIOR LOWER LEG, UNSPECIFIED LATERALITY, INITIAL ENCOUNTER: ICD-10-CM

## 2023-09-26 PROCEDURE — 99214 OFFICE O/P EST MOD 30 MIN: CPT | Mod: 25,,, | Performed by: STUDENT IN AN ORGANIZED HEALTH CARE EDUCATION/TRAINING PROGRAM

## 2023-09-26 PROCEDURE — 99214 PR OFFICE/OUTPT VISIT, EST, LEVL IV, 30-39 MIN: ICD-10-PCS | Mod: 25,,, | Performed by: STUDENT IN AN ORGANIZED HEALTH CARE EDUCATION/TRAINING PROGRAM

## 2023-09-26 PROCEDURE — 11042 DBRDMT SUBQ TIS 1ST 20SQCM/<: CPT

## 2023-09-26 PROCEDURE — 11044 WOUND DEBRIDEMENT: ICD-10-PCS | Mod: ,,, | Performed by: STUDENT IN AN ORGANIZED HEALTH CARE EDUCATION/TRAINING PROGRAM

## 2023-09-26 PROCEDURE — 11043 WOUND DEBRIDEMENT: ICD-10-PCS | Mod: 59,,, | Performed by: STUDENT IN AN ORGANIZED HEALTH CARE EDUCATION/TRAINING PROGRAM

## 2023-09-26 PROCEDURE — 11043 DBRDMT MUSC&/FSCA 1ST 20/<: CPT

## 2023-09-26 PROCEDURE — 11044 DBRDMT BONE 1ST 20 SQ CM/<: CPT

## 2023-09-26 PROCEDURE — 11043 DBRDMT MUSC&/FSCA 1ST 20/<: CPT | Mod: 59,,, | Performed by: STUDENT IN AN ORGANIZED HEALTH CARE EDUCATION/TRAINING PROGRAM

## 2023-09-26 PROCEDURE — 11044 DBRDMT BONE 1ST 20 SQ CM/<: CPT | Mod: ,,, | Performed by: STUDENT IN AN ORGANIZED HEALTH CARE EDUCATION/TRAINING PROGRAM

## 2023-09-26 RX ORDER — MUPIROCIN 20 MG/G
OINTMENT TOPICAL 3 TIMES DAILY
Qty: 15 G | Refills: 1 | Status: ON HOLD | OUTPATIENT
Start: 2023-09-26 | End: 2023-11-14 | Stop reason: SDUPTHER

## 2023-09-26 NOTE — PROGRESS NOTES
[facility]    Subjective:       Patient ID: Julien Meléndez is a 83 y.o. male.    Chief Complaint: Non-healing Wound Follow Up and Arterial Wound Follow Up    9/26/23 Seen for non-healing wounds to bilateral feet.  Left foot wound with improvement.  Right foot with persistent slough.  Unable to obtain right pp with Doppler.  Debridement done.  New orders routed to home health.  Wife and patient made aware of need to follow up with ID and cardiology.  MRI is scheduled for 10/2/23.   Review of Systems   Constitutional:  Negative for activity change, chills, diaphoresis and fever.   HENT:  Negative for congestion.    Respiratory:  Negative for cough and shortness of breath.    Cardiovascular:  Negative for leg swelling.   Gastrointestinal:  Negative for nausea and vomiting.   Skin:  Positive for color change and wound. Negative for pallor and rash.   Neurological:  Positive for numbness. Negative for tremors and speech difficulty.   Psychiatric/Behavioral:  Negative for agitation and confusion. The patient is not nervous/anxious.          Objective:     Vitals:    09/26/23 1420   BP: (!) 103/59   Pulse: 85   Temp: 98.2 °F (36.8 °C)         Physical Exam  Constitutional:       General: He is not in acute distress.     Appearance: Normal appearance. He is well-developed. He is not diaphoretic.   Cardiovascular:      Pulses:           Dorsalis pedis pulses are detected w/ Doppler on the right side and detected w/ Doppler on the left side.        Posterior tibial pulses are 0 on the right side and detected w/ Doppler on the left side.      Comments: Dorsalis pedis and posterior tibial pulses are mildly diminished. Skin temperature is within normal limits. Toes are cool to touch and feet are warm proximally. Hair growth is diminished. Skin is atrophic and with mild hyperpigmentation. Mild edema noted, bilaterally. Telangiectasias to medial ankle, bilaterally    Musculoskeletal:         General: No tenderness.      Comments: Adequate joint range of motion without pain, limitation, nor crepitation to foot and ankle joints.      Feet:      Right foot:      Skin integrity: Dry skin present. No ulcer, blister, erythema or warmth.      Left foot:      Skin integrity: Dry skin present. No ulcer, blister, erythema or warmth.   Skin:     General: Skin is warm and dry.      Capillary Refill: Capillary refill takes less than 2 seconds.      Comments: Skin is warm and dry, no acute signs of infection noted bilaterally      See wound description below    Toenails are thickened by 2-4 mm's, dystrophic, and are darkened in coloration with subungual fungal debris.     Otherwise, no open wounds, macerations or hyperkeratotic lesions, bilaterally            Neurological:      Mental Status: He is alert and oriented to person, place, and time.      Sensory: Sensory deficit present.      Motor: No abnormal muscle tone.      Comments: Light touch within normal limits. [place]ash[place] 5.07 monofilamant testing is diminished. Vibratory sensation is diminished bilaterally.   Psychiatric:         Mood and Affect: Mood normal.         Behavior: Behavior normal.         Thought Content: Thought content normal.            Altered Skin Integrity 03/31/23 2227 Right lateral;medial Foot #2 Other (comment) Full thickness tissue loss. Subcutaneous fat may be visible but bone, tendon or muscle are not exposed (Active)   03/31/23 2227   Altered Skin Integrity Present on Admission - Did Patient arrive to the hospital with altered skin?:    Side: Right   Orientation: lateral;medial   Location: Foot   Wound Number: #2   Is this injury device related?: No   Primary Wound Type: Other   Description of Altered Skin Integrity: Full thickness tissue loss. Subcutaneous fat may be visible but bone, tendon or muscle are not exposed   Ankle-Brachial Index:    Pulses:    Removal Indication and Assessment:    Wound  Outcome: Other   (Retired) Wound Length (cm):    (Retired) Wound Width (cm):    (Retired) Depth (cm):    Wound Description (Comments):    Removal Indications:    Wound Image   09/26/23 1450   Description of Altered Skin Integrity Full thickness tissue loss. Base is covered by slough and/or eschar in the wound bed 09/26/23 1450   Dressing Appearance Moist drainage 09/26/23 1450   Drainage Amount Moderate 09/26/23 1450   Drainage Characteristics/Odor Serous 09/26/23 1450   Appearance Slough;[place];Red 09/26/23 1450   Tissue loss description Full thickness 09/26/23 1450   Black (%), Wound Tissue Color 70 % 09/26/23 1450   Red (%), Wound Tissue Color 30 % 09/26/23 1450   Periwound Area Intact;Dry 09/26/23 1450   Wound Edges Defined;Open 09/26/23 1450   Wound Length (cm) 0.9 cm 09/26/23 1450   Wound Width (cm) 0.7 cm 09/26/23 1450   Wound Depth (cm) 0.4 cm 09/26/23 1450   Wound Volume (cm^3) 0.252 cm^3 09/26/23 1450   Wound Surface Area (cm^2) 0.63 cm^2 09/26/23 1450   Undermining (depth (cm)/location) 0.6cm @3 09/26/23 1450   Care Cleansed with:;Soap and water;Sterile normal saline;Debrided 09/26/23 1450   Dressing Applied;Other (comment) 09/26/23 1450   Periwound Care Absorptive dressing applied 09/26/23 1450   Dressing Change Due 09/29/23 09/26/23 1450            Incision/Site 06/07/23 1138 Left Foot (Active)   06/07/23 1138   Present Prior to Hospital Arrival?:    Side: Left   Location: Foot   Orientation:    Incision Type:    Closure Method:    Additional Comments:    Removal Indication and Assessment:    Wound Outcome:    Removal Indications:    Wound Image   09/26/23 1450   Dressing Appearance Moist drainage 09/26/23 1450   Drainage Amount Small 09/26/23 1450   Drainage Characteristics/Odor Serosanguineous 09/26/23 1450   Appearance Red;Fibrin 09/26/23 1450   Red (%), Wound Tissue Color 100 % 09/26/23 1450   Periwound Area Intact;Dry 09/26/23 1450   Wound Edges Defined;Open 09/26/23 1450   Wound Length (cm) 2 cm  09/26/23 1450   Wound Width (cm) 1.7 cm 09/26/23 1450   Wound Depth (cm) 0.2 cm 09/26/23 1450   Wound Volume (cm^3) 0.68 cm^3 09/26/23 1450   Wound Surface Area (cm^2) 3.4 cm^2 09/26/23 1450   Care Cleansed with:;Soap and water;Sterile normal saline;Debrided 09/26/23 1450   Dressing Applied;Collagen;Absorptive Pad 09/26/23 1450   Periwound Care Absorptive dressing applied 09/26/23 1450   Dressing Change Due 09/29/23 09/26/23 1450         Assessment/Plan:         ICD-10-CM ICD-9-CM   1. PAD (peripheral artery disease)  I73.9 443.9   2. Ulcer of both feet, unspecified ulcer stage  L97.519 707.15    L97.529    3. Abrasion of anterior lower leg, unspecified laterality, initial encounter  S80.819A 916.0           Tissue pathology and/or culture taken:  [] Yes [x] No   Sharp debridement performed:   [x] Yes [] No   Labs ordered this visit:   [] Yes [x] No   Imaging ordered this visit:   [x] Yes [] No           Orders Placed This Encounter   Procedures    Debridement     This order was created via procedure documentation     Standing Status:   Standing     Number of Occurrences:   1    Debridement     This order was created via procedure documentation     Standing Status:   Standing     Number of Occurrences:   1    Change dressing     Left foot and right medial foot wounds       Cleanse wound with:NS   Lidocaine:prn   Silver nitrate:prn   Periwound care:prn   Primary dressing: Iodosorb gel to right foot; [place] to left foot  Secondary dressing: ABD+cast padding+flexinet+shoe covers       Frequency: 2 x week   Follow-up: 1 week Dr Leung   Other: Bacitracin+band aids to BLE abrasions    Follow up with infectious disease; cardiology; MRI right foot schedule for 10/2/23  [facility] to perform dressing changes  twice weekly (Tues and Fridays) except days seen in wound clinic.        Follow up in about 1 week (around 10/3/2023) for Dr Leung.        Wound care per above. Routine foot care per attached note  Cultures  reviewed, continue antibiotics to completion  Xray with possible osteomyelitis. MRI ordered.  Recommend follow up with Infectious Disease  Follow up with Cardiology for management of PAD  Patient reminded of the importance of good nutrition and blood sugar control to help prevent podiatric complications of diabetes. I discussed with the  patient  signs and symptoms of infection including redness, drainage, purulence, odor, pain, elevated BS, streaking, fever, chills, etc . Patient is to seek medical attention (ER or urgent care) if these symptoms occur        This includes face to face time and non-face to face time preparing to see the patient (eg, review of tests), obtaining and/or reviewing separately obtained history, documenting clinical information in the electronic or other health record, independently interpreting results and communicating results to the patient/family/caregiver, or care coordinator.

## 2023-09-26 NOTE — PROCEDURES
Wound Debridement    Date/Time: 9/26/2023 1:56 PM    Performed by: Diya Leung DPM  Authorized by: Diya Leung DPM    Consent Done?:  Yes (Verbal)  Local anesthesia used?: No      Wound Details:    Location:  Right foot    Location:  Right 1st Metatarsal Head    Type of Debridement:  Excisional       Length (cm):  0.9       Area (sq cm):  0.72       Width (cm):  0.8       Percent Debrided (%):  100       Depth (cm):  0.4       Total Area Debrided (sq cm):  0.72    Depth of debridement:  Bone    Tissue debrided:  Subcutaneous, Other and Bone    Devitalized tissue debrided:  Biofilm, Callus and Fibrin    Instruments:  Blade and Curette  Patient tolerance:  Patient tolerated the procedure well with no immediate complications     No cultures were taken during this visit   Wound Debridement    Date/Time: 9/26/2023 1:56 PM    Performed by: Diya Leung DPM  Authorized by: Diya Leung DPM    Consent Done?:  Yes (Verbal)  Local anesthesia used?: No      Wound Details:    Location:  Left foot    Location:  Left Dorsal Foot    Type of Debridement:  Excisional       Length (cm):  2       Area (sq cm):  3.4       Width (cm):  1.7       Percent Debrided (%):  100       Depth (cm):  0.2       Total Area Debrided (sq cm):  3.4    Depth of debridement:  Muscle/fascia/tendon    Tissue debrided:  Subcutaneous, Other and Muscle    Devitalized tissue debrided:  Biofilm, Callus and Fibrin    Instruments:  Blade and Curette  Patient tolerance:  Patient tolerated the procedure well with no immediate complications     No cultures were taken during this visit

## 2023-10-03 ENCOUNTER — CLINICAL SUPPORT (OUTPATIENT)
Dept: INFECTIOUS DISEASES | Facility: CLINIC | Age: 83
End: 2023-10-03
Payer: MEDICARE

## 2023-10-03 ENCOUNTER — HOSPITAL ENCOUNTER (OUTPATIENT)
Dept: WOUND CARE | Facility: HOSPITAL | Age: 83
Discharge: HOME | End: 2023-10-03
Attending: STUDENT IN AN ORGANIZED HEALTH CARE EDUCATION/TRAINING PROGRAM
Payer: MEDICARE

## 2023-10-03 VITALS
BODY MASS INDEX: 25.18 KG/M2 | SYSTOLIC BLOOD PRESSURE: 133 MMHG | DIASTOLIC BLOOD PRESSURE: 67 MMHG | WEIGHT: 190 LBS | HEIGHT: 73 IN | HEART RATE: 87 BPM

## 2023-10-03 DIAGNOSIS — L97.519 ULCER OF BOTH FEET, UNSPECIFIED ULCER STAGE: Primary | ICD-10-CM

## 2023-10-03 DIAGNOSIS — M86.60 CHRONIC OSTEOMYELITIS: ICD-10-CM

## 2023-10-03 DIAGNOSIS — L97.514: ICD-10-CM

## 2023-10-03 DIAGNOSIS — L97.529 ULCER OF BOTH FEET, UNSPECIFIED ULCER STAGE: Primary | ICD-10-CM

## 2023-10-03 DIAGNOSIS — I73.9 PAD (PERIPHERAL ARTERY DISEASE): ICD-10-CM

## 2023-10-03 PROCEDURE — 99999 PR PBB SHADOW E&M-EST. PATIENT-LVL I: ICD-10-PCS | Mod: PBBFAC,,,

## 2023-10-03 PROCEDURE — 99214 PR OFFICE/OUTPT VISIT, EST, LEVL IV, 30-39 MIN: ICD-10-PCS | Mod: ,,, | Performed by: STUDENT IN AN ORGANIZED HEALTH CARE EDUCATION/TRAINING PROGRAM

## 2023-10-03 PROCEDURE — 99214 OFFICE O/P EST MOD 30 MIN: CPT | Mod: ,,, | Performed by: STUDENT IN AN ORGANIZED HEALTH CARE EDUCATION/TRAINING PROGRAM

## 2023-10-03 PROCEDURE — 99214 OFFICE O/P EST MOD 30 MIN: CPT

## 2023-10-03 PROCEDURE — 99214 PR OFFICE/OUTPT VISIT, EST, LEVL IV, 30-39 MIN: ICD-10-PCS | Mod: S$GLB,,, | Performed by: INTERNAL MEDICINE

## 2023-10-03 PROCEDURE — 99999 PR PBB SHADOW E&M-EST. PATIENT-LVL I: CPT | Mod: PBBFAC,,,

## 2023-10-03 PROCEDURE — 99214 OFFICE O/P EST MOD 30 MIN: CPT | Mod: S$GLB,,, | Performed by: INTERNAL MEDICINE

## 2023-10-03 RX ORDER — SULFAMETHOXAZOLE AND TRIMETHOPRIM 800; 160 MG/1; MG/1
1 TABLET ORAL 2 TIMES DAILY
Qty: 60 TABLET | Refills: 1 | Status: SHIPPED | OUTPATIENT
Start: 2023-10-03 | End: 2023-11-06 | Stop reason: ALTCHOICE

## 2023-10-03 NOTE — PLAN OF CARE
[facility] - Telemetry  Discharge Final Note    Primary Care Provider: Kelvin Wayne MD    Expected Discharge Date: 4/21/2023    Final Discharge Note (most recent)       Final Note - 04/24/23 0731          Final Note    Assessment Type Final Discharge Note (P)      Anticipated Discharge Disposition Skilled Nursing Facility (P)    [facility]    Hospital Resources/Appts/Education Provided Appointments scheduled and added to AVS (P)         Post-Acute Status    Post-Acute Authorization Placement (P)      Post-Acute Placement Status Set-up Complete/Auth obtained (P)    [facility]    Discharge Delays PFC Arranged Transportation (P)                        Contact Info       Alona Pierce DPM   Specialty: Podiatry    [facility] [place] [address]   Phone: [phone]       Next Steps: Follow up on 4/28/2023    Instructions: at 10:15 AM; previously scheduled podiatry appointment             06-Jan-2023 06-Jan-2023

## 2023-10-03 NOTE — PROGRESS NOTES
/82 yo male with DM. HTN, and PAD who presents with non-healing right foot wound. On review, he had bunionectomy of the right first metatarsal on April 4, then multiple episodes of cellulitis in the right foot requiring hospitalizations.   He has a non-healing wound on the right 1st metatarsal area. Cultures from 8/22 showed ESBL E.coli and Providencia stuartii: All organisms sensitive to Bactrim - started Bactrim on 9/9. Wound probes to exposed bone of 1st metatarsal.     Exam - open wound with no erythema, no drainage, no granulation tissue        April 4:   Procedure(s):  BUNIONECTOMY, WITH METATARSAL OSTEOTOMY (RIGHT)  OSTEOTOMY, METATARSAL BONE (RIGHT)  DEBRIDEMENT, FOOT (LEFT)  AMPUTATION, PARTIAL 1ST RAY (LEFT)  APPLICATION, WOUND VAC (LEFT)    APRIL 5, 2023:    There was severe infrapopliteal disease bilaterally.    The Ost L ERNIE to Prox L ERNIE lesion was 85% stenosed.    The Prox L Peroneal to Dist L Peroneal lesion was 100% stenosed.    The Ost R ERNIE to Prox R ERNIE lesion was 100% stenosed.    The Ost R PTA to Prox R PTA lesion was 100% stenosed.    The estimated blood loss was <50 mL.    Plan for staged PTA of bilateral lower extremities due to critical limb threatening ishcemia.    APRIL 12, 2023:    There was severe left infrapopliteal disease status post successful PTA of proximal peroneal artery with 3.5x80 mm IVL balloon and distal peroneal/posterior tibial artery with 2.5x40 mm PTA balloon with excellent angiographic results.    The Ost L ERNIE to Dist L ERNIE lesion was 100% stenosed.    The Ost L PTA to Dist L PTA lesion was 100% stenosed.    The Dist L Peroneal lesion was 99% stenosed with 10% stenosis post-intervention.    The Prox L Peroneal lesion was 99% stenosed with 10% stenosis post-intervention.    The estimated blood loss was <50 mL.    APRIL 13, 2023  Procedure(s) (LRB):  DEBRIDEMENT, FOOT (Left)  APPLICATION, WOUND VAC (Left)  PARTIAL 1ST RAY AMPUTATION (LEFT)    Note from MAY 31 - admitted  to hospital for recurrent cellulitis:  sent from Podiatry office today for cellulitis of right foot again. Will need IV ABx and surgical intervention as per Podiatry.     Note from JUNE 5 - Dr. Pierce  83 y/o male with RLE cellulitis, L foot non healing ulcer.     -to OR 6/6 L revision amputation with wound debridement.  -c/w IV abx. Rle cellulitis improving.  -pain control      JUNE 7, 2023   Post-Operative Diagnosis: Post-Op Diagnosis Codes:     * Amputation of left great toe [S98.112A]     * Critical limb ischemia of both lower extremities [I70.223]     * Wound dehiscence [T81.30XA]  PROCEDURE: Procedure(s):  DEBRIDEMENT, WOUND  OSTEOTOMY  WASHOUT     Waiting to go to vascular Thursday, 10/5 - Dr. Hensley    1. Chronic osteomyelitis    2. PAD (peripheral artery disease)      Discussed with Dr. Leung at bedside.   Will await revascularization, then discuss bone biopsy, IV antibiotics, and wound care, if successful.  If not successful, will pursue oral therapy and HBOT  Continue Bactrim for now.   Follow up in 1 month    I spent over 30 minutes on this encounter today - with chart review, clinical discussion, and patient discussion at the bedside.

## 2023-10-03 NOTE — PROGRESS NOTES
[facility]    Subjective:       Patient ID: Julien Meléndez is a 83 y.o. male.    Chief Complaint: Non-healing Wound Follow Up    Wound care follow up for bilateral foot ulcers. Reports finished bactrim and has follow up with cardiologist  -  on 10/5/23. Right foot with rubor and cool to touch. Right foot MRI results reviewed.ID present at visit. Discussed need for vascular intervention then bone biopsy, IV antibiotics and HBO - will obtain HBO consult. Continue local wound care and bactrim, return to clinic in 2 weeks.  Review of Systems   Constitutional:  Negative for activity change, chills, diaphoresis and fever.   HENT:  Negative for congestion.    Respiratory:  Negative for cough and shortness of breath.    Cardiovascular:  Negative for leg swelling.   Gastrointestinal:  Negative for nausea and vomiting.   Skin:  Positive for color change and wound. Negative for pallor and rash.   Neurological:  Positive for numbness. Negative for tremors and speech difficulty.   Psychiatric/Behavioral:  Negative for agitation and confusion. The patient is not nervous/anxious.          Objective:     Vitals:    10/03/23 1433   BP: 133/67   Pulse: 87         Physical Exam  Constitutional:       General: He is not in acute distress.     Appearance: Normal appearance. He is well-developed. He is not diaphoretic.   Cardiovascular:      Pulses:           Dorsalis pedis pulses are detected w/ Doppler on the right side and detected w/ Doppler on the left side.        Posterior tibial pulses are 0 on the right side and detected w/ Doppler on the left side.      Comments: Dorsalis pedis and posterior tibial pulses are mildly diminished. Skin temperature is within normal limits. Toes are cool to touch and feet are warm proximally. Hair growth is diminished. Skin is atrophic and with mild hyperpigmentation. Mild edema noted, bilaterally. Telangiectasias to medial ankle,  bilaterally   Musculoskeletal:         General: No tenderness.      Comments: Adequate joint range of motion without pain, limitation, nor crepitation to foot and ankle joints.      Feet:      Right foot:      Skin integrity: Dry skin present. No ulcer, blister, erythema or warmth.      Left foot:      Skin integrity: Dry skin present. No ulcer, blister, erythema or warmth.   Skin:     General: Skin is warm and dry.      Capillary Refill: Capillary refill takes less than 2 seconds.      Comments: Skin is warm and dry, no acute signs of infection noted bilaterally      See wound description below    Toenails are thickened by 2-4 mm's, dystrophic, and are darkened in coloration with subungual fungal debris.     Otherwise, no open wounds, macerations or hyperkeratotic lesions, bilaterally            Neurological:      Mental Status: He is alert and oriented to person, place, and time.      Sensory: Sensory deficit present.      Motor: No abnormal muscle tone.      Comments: Light touch within normal limits. [place]-Lashay 5.07 monofilamant testing is diminished. Vibratory sensation is diminished bilaterally.   Psychiatric:         Mood and Affect: Mood normal.         Behavior: Behavior normal.         Thought Content: Thought content normal.            Altered Skin Integrity 03/31/23 2227 Right medial Foot #2 Other (comment) Full thickness tissue loss. Subcutaneous fat may be visible but bone, tendon or muscle are not exposed (Active)   03/31/23 2227   Altered Skin Integrity Present on Admission - Did Patient arrive to the hospital with altered skin?:    Side: Right   Orientation: medial   Location: Foot   Wound Number: #2   Is this injury device related?: No   Primary Wound Type: Other   Description of Altered Skin Integrity: Full thickness tissue loss. Subcutaneous fat may be visible but bone, tendon or muscle are not exposed   Ankle-Brachial Index:    Pulses:    Removal Indication and Assessment:    Wound  Outcome: Other   (Retired) Wound Length (cm):    (Retired) Wound Width (cm):    (Retired) Depth (cm):    Wound Description (Comments):    Removal Indications:    Wound Image   10/03/23 1418   Description of Altered Skin Integrity Full thickness tissue loss. Base is covered by slough and/or eschar in the wound bed 10/03/23 1418   Dressing Appearance Moist drainage 10/03/23 1418   Drainage Amount Small 10/03/23 1418   Drainage Characteristics/Odor Serosanguineous 10/03/23 1418   Appearance Slough;Red 10/03/23 1418   Red (%), Wound Tissue Color 10 % 10/03/23 1418   Yellow (%), Wound Tissue Color 90 % 10/03/23 1418   Periwound Area Intact;Dry 10/03/23 1418   Wound Edges Defined 10/03/23 1418   Wound Length (cm) 0.9 cm 10/03/23 1418   Wound Width (cm) 1 cm 10/03/23 1418   Wound Depth (cm) 0.4 cm 10/03/23 1418   Wound Volume (cm^3) 0.36 cm^3 10/03/23 1418   Wound Surface Area (cm^2) 0.9 cm^2 10/03/23 1418   Care Cleansed with:;Sterile normal saline 10/03/23 1418   Dressing Applied;Other (comment);Absorptive Pad;Cast padding 10/03/23 1418   Periwound Care Absorptive dressing applied 10/03/23 1418   Off Loading Football dressing 10/03/23 1418   Dressing Change Due 10/06/23 10/03/23 1418            Altered Skin Integrity 10/03/23 1419 Left lateral Foot (Active)   10/03/23 1419   Altered Skin Integrity Present on Admission - Did Patient arrive to the hospital with altered skin?: yes   Side: Left   Orientation: lateral   Location: Foot   Wound Number:    Is this injury device related?:    Primary Wound Type:    Description of Altered Skin Integrity:    Ankle-Brachial Index:    Pulses:    Removal Indication and Assessment:    Wound Outcome:    (Retired) Wound Length (cm):    (Retired) Wound Width (cm):    (Retired) Depth (cm):    Wound Description (Comments):    Removal Indications:    Wound Image   10/03/23 1418   Description of Altered Skin Integrity Full thickness tissue loss. Subcutaneous fat may be visible but bone, tendon  or muscle are not exposed 10/03/23 1418   Dressing Appearance Moist drainage 10/03/23 1418   Drainage Amount Scant 10/03/23 1418   Drainage Characteristics/Odor Serosanguineous 10/03/23 1418   Appearance Red 10/03/23 1418   Red (%), Wound Tissue Color 100 % 10/03/23 1418   Periwound Area Intact 10/03/23 1418   Wound Edges Defined 10/03/23 1418   Wound Length (cm) 0.5 cm 10/03/23 1418   Wound Width (cm) 1.2 cm 10/03/23 1418   Wound Depth (cm) 0.2 cm 10/03/23 1418   Wound Volume (cm^3) 0.12 cm^3 10/03/23 1418   Wound Surface Area (cm^2) 0.6 cm^2 10/03/23 1418   Care Cleansed with:;Sterile normal saline 10/03/23 1418   Dressing Applied;Silver;Collagen;Absorptive Pad;Cast padding 10/03/23 1418   Periwound Care Moisturizer applied 10/03/23 1418   Off Loading Football dressing 10/03/23 1418   Dressing Change Due 10/06/23 10/03/23 1418            Incision/Site 06/07/23 1138 Left Foot dorsal (Active)   06/07/23 1138   Present Prior to Hospital Arrival?:    Side: Left   Location: Foot   Orientation: dorsal   Incision Type:    Closure Method:    Additional Comments:    Removal Indication and Assessment:    Wound Outcome:    Removal Indications:    Wound Image   10/03/23 1418   Dressing Appearance Moist drainage 10/03/23 1418   Drainage Amount Small 10/03/23 1418   Drainage Characteristics/Odor Serosanguineous 10/03/23 1418   Appearance Red;Fibrin 10/03/23 1418   Red (%), Wound Tissue Color 90 % 10/03/23 1418   Yellow (%), Wound Tissue Color 10 % 10/03/23 1418   Periwound Area Intact;Dry 10/03/23 1418   Wound Edges Defined 10/03/23 1418   Wound Length (cm) 1.7 cm 10/03/23 1418   Wound Width (cm) 1.5 cm 10/03/23 1418   Wound Depth (cm) 0.2 cm 10/03/23 1418   Wound Volume (cm^3) 0.51 cm^3 10/03/23 1418   Wound Surface Area (cm^2) 2.55 cm^2 10/03/23 1418   Care Cleansed with:;Sterile normal saline 10/03/23 1418   Dressing Applied;Silver;Collagen 10/03/23 1418   Periwound Care Absorptive dressing applied 10/03/23 1418   Off  Loading Football dressing 10/03/23 1418   Dressing Change Due 10/06/23 10/03/23 1418         Assessment/Plan:         ICD-10-CM ICD-9-CM   1. Ulcer of both feet, unspecified ulcer stage  L97.519 707.15    L97.529    2. PAD (peripheral artery disease)  I73.9 443.9   3. Chronic toe ulcer, right, with necrosis of bone  L97.514 707.15     730.17   4. Chronic osteomyelitis  M86.60 730.10           Tissue pathology and/or culture taken:  [] Yes [x] No   Sharp debridement performed:   [] Yes [x] No   Labs ordered this visit:   [] Yes [x] No   Imaging ordered this visit:   [] Yes [x] No           Orders Placed This Encounter   Procedures    Change dressing     Left dorsal and lateral foot ulcers:      Cleanse wound with:NS   Lidocaine:prn   Silver nitrate:prn   Periwound care:prn   Primary dressing: saline moist Zahira   Secondary dressing: ABD, cast padding,flexinet,shoe covers      Right medial foot ulcers:  Cleanse wound with:NS   Lidocaine:prn   Silver nitrate:prn   Periwound care:prn   Primary dressing: iodosorb  Secondary dressing: ABD,cast padding,flexinet,shoe covers     Frequency: 2 x week   Follow-up: 1 week Dr Leung   Other: Bacitracin and band aids to BLE abrasions daily by family, 10/3/23 Rx Bactrim for 30 days. Follow up with  on 10/5/23.     Home health: [facility] to perform dressing changes twice weekly (Tues and Fridays) except days seen in wound clinic.    Change dressing     Left dorsal and lateral foot ulcers:       Cleanse wound with:NS   Lidocaine:prn   Silver nitrate:prn   Periwound care:prn   Primary dressing: saline moist Zahira   Secondary dressing: ABD, cast padding,flexinet,shoe covers       Right medial foot ulcers:   Cleanse wound with:NS   Lidocaine:prn   Silver nitrate:prn   Periwound care:prn   Primary dressing: iodosorb   Secondary dressing: ABD,cast padding,flexinet,shoe covers     Frequency: 2 x week   Follow-up: 1 week Dr Leung   Other: Bacitracin and band aids to  BLE abrasions daily by family, 10/3/23 Rx Bactrim for 30 days. Follow up with  on 10/5/23.   HBO evaluation for right foot chronic osteomyelitis      Home health: [facility] to perform dressing changes twice weekly (Tues and Fridays) except days seen in wound clinic.        Follow up in about 2 weeks (around 10/17/2023) for .        Wound care per above.   MRI reviewed with osteomyelitis noted. Antibiotic management per Infectious Disease, appreciate recommendations  Follow up with Cardiology for management of PAD. Plan for HBOT  Patient reminded of the importance of good nutrition and blood sugar control to help prevent podiatric complications of diabetes. I discussed with the  patient  signs and symptoms of infection including redness, drainage, purulence, odor, pain, elevated BS, streaking, fever, chills, etc . Patient is to seek medical attention (ER or urgent care) if these symptoms occur      This includes face to face time and non-face to face time preparing to see the patient (eg, review of tests), obtaining and/or reviewing separately obtained history, documenting clinical information in the electronic or other health record, independently interpreting results and communicating results to the patient/family/caregiver, or care coordinator.

## 2023-10-08 PROCEDURE — G0179 PR HOME HEALTH MD RECERTIFICATION: ICD-10-PCS | Mod: ,,, | Performed by: FAMILY MEDICINE

## 2023-10-08 PROCEDURE — G0179 MD RECERTIFICATION HHA PT: HCPCS | Mod: ,,, | Performed by: FAMILY MEDICINE

## 2023-10-17 ENCOUNTER — HOSPITAL ENCOUNTER (OUTPATIENT)
Dept: RADIOLOGY | Facility: HOSPITAL | Age: 83
Discharge: HOME | End: 2023-10-17
Attending: STUDENT IN AN ORGANIZED HEALTH CARE EDUCATION/TRAINING PROGRAM
Payer: MEDICARE

## 2023-10-17 ENCOUNTER — HOSPITAL ENCOUNTER (OUTPATIENT)
Dept: WOUND CARE | Facility: HOSPITAL | Age: 83
Discharge: HOME | End: 2023-10-17
Attending: STUDENT IN AN ORGANIZED HEALTH CARE EDUCATION/TRAINING PROGRAM
Payer: MEDICARE

## 2023-10-17 VITALS
HEIGHT: 73 IN | HEART RATE: 92 BPM | DIASTOLIC BLOOD PRESSURE: 74 MMHG | BODY MASS INDEX: 25.18 KG/M2 | SYSTOLIC BLOOD PRESSURE: 124 MMHG | TEMPERATURE: 97 F | WEIGHT: 190 LBS

## 2023-10-17 DIAGNOSIS — S99.921A INJURY OF TOE ON RIGHT FOOT, INITIAL ENCOUNTER: ICD-10-CM

## 2023-10-17 DIAGNOSIS — L97.519 ULCER OF BOTH FEET, UNSPECIFIED ULCER STAGE: Primary | ICD-10-CM

## 2023-10-17 DIAGNOSIS — L97.514 SKIN ULCER OF TOE OF RIGHT FOOT WITH NECROSIS OF BONE: ICD-10-CM

## 2023-10-17 DIAGNOSIS — I73.9 PAD (PERIPHERAL ARTERY DISEASE): ICD-10-CM

## 2023-10-17 DIAGNOSIS — L97.529 ULCER OF BOTH FEET, UNSPECIFIED ULCER STAGE: Primary | ICD-10-CM

## 2023-10-17 PROCEDURE — 11044 DBRDMT BONE 1ST 20 SQ CM/<: CPT | Mod: ,,, | Performed by: STUDENT IN AN ORGANIZED HEALTH CARE EDUCATION/TRAINING PROGRAM

## 2023-10-17 PROCEDURE — 73630 X-RAY EXAM OF FOOT: CPT | Mod: TC,FY,RT

## 2023-10-17 PROCEDURE — 99214 OFFICE O/P EST MOD 30 MIN: CPT | Mod: 25,,, | Performed by: STUDENT IN AN ORGANIZED HEALTH CARE EDUCATION/TRAINING PROGRAM

## 2023-10-17 PROCEDURE — 11043 DBRDMT MUSC&/FSCA 1ST 20/<: CPT

## 2023-10-17 PROCEDURE — 11042 DEBRIDEMENT: ICD-10-PCS | Mod: 59,,, | Performed by: STUDENT IN AN ORGANIZED HEALTH CARE EDUCATION/TRAINING PROGRAM

## 2023-10-17 PROCEDURE — 73660 X-RAY EXAM OF TOE(S): CPT | Mod: 26,RT,, | Performed by: RADIOLOGY

## 2023-10-17 PROCEDURE — 73630 XR FOOT COMPLETE 3 VIEW RIGHT: ICD-10-PCS | Mod: 26,RT,, | Performed by: RADIOLOGY

## 2023-10-17 PROCEDURE — 11043 DBRDMT MUSC&/FSCA 1ST 20/<: CPT | Mod: 59,,, | Performed by: STUDENT IN AN ORGANIZED HEALTH CARE EDUCATION/TRAINING PROGRAM

## 2023-10-17 PROCEDURE — 99214 PR OFFICE/OUTPT VISIT, EST, LEVL IV, 30-39 MIN: ICD-10-PCS | Mod: 25,,, | Performed by: STUDENT IN AN ORGANIZED HEALTH CARE EDUCATION/TRAINING PROGRAM

## 2023-10-17 PROCEDURE — 73660 X-RAY EXAM OF TOE(S): CPT | Mod: 59,TC,FY,RT

## 2023-10-17 PROCEDURE — 11043 DEBRIDEMENT: ICD-10-PCS | Mod: 59,,, | Performed by: STUDENT IN AN ORGANIZED HEALTH CARE EDUCATION/TRAINING PROGRAM

## 2023-10-17 PROCEDURE — 11044 DEBRIDEMENT: ICD-10-PCS | Mod: ,,, | Performed by: STUDENT IN AN ORGANIZED HEALTH CARE EDUCATION/TRAINING PROGRAM

## 2023-10-17 PROCEDURE — 11044 DBRDMT BONE 1ST 20 SQ CM/<: CPT

## 2023-10-17 PROCEDURE — 11042 DBRDMT SUBQ TIS 1ST 20SQCM/<: CPT | Mod: 59,,, | Performed by: STUDENT IN AN ORGANIZED HEALTH CARE EDUCATION/TRAINING PROGRAM

## 2023-10-17 PROCEDURE — 73630 X-RAY EXAM OF FOOT: CPT | Mod: 26,RT,, | Performed by: RADIOLOGY

## 2023-10-17 PROCEDURE — 11042 DBRDMT SUBQ TIS 1ST 20SQCM/<: CPT

## 2023-10-17 PROCEDURE — 73660 XR TOE 2 OR MORE VIEWS RIGHT: ICD-10-PCS | Mod: 26,RT,, | Performed by: RADIOLOGY

## 2023-10-17 NOTE — PROGRESS NOTES
[facility]    Subjective:       Patient ID: Julien Meléndez is a 83 y.o. male.    Chief Complaint: Diabetic Foot Ulcer (Both feet)    Wounds improved. Debrided. Patient stubbed his right 1st toe last week. Right 1st toe debrided and toenail trimmed. Right foot xray ordered today. Orders sent to .  Review of Systems   Constitutional:  Negative for activity change, chills, diaphoresis and fever.   HENT:  Negative for congestion.    Respiratory:  Negative for cough and shortness of breath.    Cardiovascular:  Negative for leg swelling.   Gastrointestinal:  Negative for nausea and vomiting.   Skin:  Positive for color change and wound. Negative for pallor and rash.   Neurological:  Positive for numbness. Negative for tremors and speech difficulty.   Psychiatric/Behavioral:  Negative for agitation and confusion. The patient is not nervous/anxious.          Objective:     Vitals:    10/17/23 1423   BP: 124/74   Pulse: 92   Temp: 97.4 °F (36.3 °C)         Physical Exam  Constitutional:       General: He is not in acute distress.     Appearance: Normal appearance. He is well-developed. He is not diaphoretic.   Cardiovascular:      Pulses:           Dorsalis pedis pulses are detected w/ Doppler on the right side and detected w/ Doppler on the left side.        Posterior tibial pulses are 0 on the right side and detected w/ Doppler on the left side.      Comments: Dorsalis pedis and posterior tibial pulses are mildly diminished. Skin temperature is within normal limits. Toes are cool to touch and feet are warm proximally. Hair growth is diminished. Skin is atrophic and with mild hyperpigmentation. Mild edema noted, bilaterally. Telangiectasias to medial ankle, bilaterally   Musculoskeletal:         General: No tenderness.      Comments: Adequate joint range of motion without pain, limitation, nor crepitation to foot and ankle joints.      Feet:      Right foot:      Skin  integrity: Dry skin present. No ulcer, blister, erythema or warmth.      Left foot:      Skin integrity: Dry skin present. No ulcer, blister, erythema or warmth.   Skin:     General: Skin is warm and dry.      Capillary Refill: Capillary refill takes less than 2 seconds.      Comments: Skin is warm and dry, no acute signs of infection noted bilaterally      See wound description below    Toenails are thickened by 2-4 mm's, dystrophic, and are darkened in coloration with subungual fungal debris.     Otherwise, no open wounds, macerations or hyperkeratotic lesions, bilaterally            Neurological:      Mental Status: He is alert and oriented to person, place, and time.      Sensory: Sensory deficit present.      Motor: No abnormal muscle tone.      Comments: Light touch within normal limits. Snowflake-Lashay 5.07 monofilamant testing is diminished. Vibratory sensation is diminished bilaterally.   Psychiatric:         Mood and Affect: Mood normal.         Behavior: Behavior normal.         Thought Content: Thought content normal.            Altered Skin Integrity 03/31/23 2227 Right medial Foot #2 Other (comment) Full thickness tissue loss. Subcutaneous fat may be visible but bone, tendon or muscle are not exposed (Active)   03/31/23 2227   Altered Skin Integrity Present on Admission - Did Patient arrive to the hospital with altered skin?:    Side: Right   Orientation: medial   Location: Foot   Wound Number: #2   Is this injury device related?: No   Primary Wound Type: Other   Description of Altered Skin Integrity: Full thickness tissue loss. Subcutaneous fat may be visible but bone, tendon or muscle are not exposed   Ankle-Brachial Index:    Pulses:    Removal Indication and Assessment:    Wound Outcome: Other   (Retired) Wound Length (cm):    (Retired) Wound Width (cm):    (Retired) Depth (cm):    Wound Description (Comments):    Removal Indications:    Wound Image   10/17/23 1876   Dressing Appearance Moist  drainage 10/17/23 1438   Drainage Amount Small 10/17/23 1438   Drainage Characteristics/Odor Serosanguineous 10/17/23 1438   Appearance Red;Moist 10/17/23 1438   Tissue loss description Full thickness 10/17/23 1438   Red (%), Wound Tissue Color 100 % 10/17/23 1438   Periwound Area Dry 10/17/23 1438   Wound Edges Defined 10/17/23 1438   Wound Length (cm) 0.8 cm 10/17/23 1438   Wound Width (cm) 1.2 cm 10/17/23 1438   Wound Depth (cm) 0.3 cm 10/17/23 1438   Wound Volume (cm^3) 0.288 cm^3 10/17/23 1438   Wound Surface Area (cm^2) 0.96 cm^2 10/17/23 1438   Care Cleansed with:;Sterile normal saline;Debrided 10/17/23 1438   Dressing Applied;Collagen;Silver;Foam;Absorptive Pad;Cast padding 10/17/23 1438   Periwound Care Absorptive dressing applied 10/17/23 1438   Off Loading Football dressing;Off loading shoe 10/17/23 1438   Dressing Change Due 10/20/23 10/17/23 1438            Altered Skin Integrity 10/03/23 1419 Left lateral Foot (Active)   10/03/23 1419   Altered Skin Integrity Present on Admission - Did Patient arrive to the hospital with altered skin?: yes   Side: Left   Orientation: lateral   Location: Foot   Wound Number:    Is this injury device related?:    Primary Wound Type:    Description of Altered Skin Integrity:    Ankle-Brachial Index:    Pulses:    Removal Indication and Assessment:    Wound Outcome:    (Retired) Wound Length (cm):    (Retired) Wound Width (cm):    (Retired) Depth (cm):    Wound Description (Comments):    Removal Indications:    Wound Image   10/17/23 1438   Dressing Appearance Moist drainage 10/17/23 1438   Drainage Amount Scant 10/17/23 1438   Drainage Characteristics/Odor Serosanguineous 10/17/23 1438   Appearance Pink;Moist 10/17/23 1438   Tissue loss description Full thickness 10/17/23 1438   Red (%), Wound Tissue Color 100 % 10/17/23 1438   Periwound Area Dry 10/17/23 1438   Wound Edges Irregular 10/17/23 1438   Wound Length (cm) 0.3 cm 10/17/23 1438   Wound Width (cm) 0.7 cm 10/17/23  1438   Wound Depth (cm) 0.1 cm 10/17/23 1438   Wound Volume (cm^3) 0.021 cm^3 10/17/23 1438   Wound Surface Area (cm^2) 0.21 cm^2 10/17/23 1438   Care Cleansed with:;Sterile normal saline;Debrided 10/17/23 1438   Dressing Applied;Collagen;Silver;Absorptive Pad;Cast padding;Foam 10/17/23 1438   Periwound Care Absorptive dressing applied 10/17/23 1438   Off Loading Football dressing;Off loading shoe 10/17/23 1438   Dressing Change Due 10/20/23 10/17/23 1438            Altered Skin Integrity 10/17/23 Right Toe, first Traumatic (Active)   10/17/23    Altered Skin Integrity Present on Admission - Did Patient arrive to the hospital with altered skin?: yes   Side: Right   Orientation:    Location: Toe, first   Wound Number:    Is this injury device related?: No   Primary Wound Type: Traumatic   Description of Altered Skin Integrity:    Ankle-Brachial Index:    Pulses: palpable   Removal Indication and Assessment:    Wound Outcome:    (Retired) Wound Length (cm):    (Retired) Wound Width (cm):    (Retired) Depth (cm):    Wound Description (Comments):    Removal Indications:    Wound Image   10/17/23 1438   Dressing Appearance Moist drainage 10/17/23 1438   Drainage Amount Small 10/17/23 1438   Drainage Characteristics/Odor Serosanguineous 10/17/23 1438   Appearance Red;Moist 10/17/23 1438   Tissue loss description Full thickness 10/17/23 1438   Red (%), Wound Tissue Color 100 % 10/17/23 1438   Periwound Area Dry 10/17/23 1438   Wound Edges Open 10/17/23 1438   Wound Length (cm) 0.5 cm 10/17/23 1438   Wound Width (cm) 3.5 cm 10/17/23 1438   Wound Depth (cm) 0.3 cm 10/17/23 1438   Wound Volume (cm^3) 0.525 cm^3 10/17/23 1438   Wound Surface Area (cm^2) 1.75 cm^2 10/17/23 1438   Care Cleansed with:;Sterile normal saline;Debrided 10/17/23 1438   Dressing Applied;Calcium alginate;Foam;Absorptive Pad;Cast padding;Other (comment) 10/17/23 1438   Periwound Care Absorptive dressing applied 10/17/23 1438   Off Loading Football  dressing;Off loading shoe 10/17/23 1438   Dressing Change Due 10/20/23 10/17/23 1438            Incision/Site 06/07/23 1138 Left Foot dorsal (Active)   06/07/23 1138   Present Prior to Hospital Arrival?:    Side: Left   Location: Foot   Orientation: dorsal   Incision Type:    Closure Method:    Additional Comments:    Removal Indication and Assessment:    Wound Outcome:    Removal Indications:    Wound Image   10/17/23 1438   Dressing Appearance Moist drainage 10/17/23 1438   Drainage Amount Small 10/17/23 1438   Drainage Characteristics/Odor Serosanguineous 10/17/23 1438   Appearance Pink;Moist 10/17/23 1438   Red (%), Wound Tissue Color 100 % 10/17/23 1438   Periwound Area Dry 10/17/23 1438   Wound Edges Defined 10/17/23 1438   Wound Length (cm) 1.3 cm 10/17/23 1438   Wound Width (cm) 1.8 cm 10/17/23 1438   Wound Depth (cm) 0.2 cm 10/17/23 1438   Wound Volume (cm^3) 0.468 cm^3 10/17/23 1438   Wound Surface Area (cm^2) 2.34 cm^2 10/17/23 1438   Care Cleansed with:;Sterile normal saline 10/17/23 1438   Dressing Applied;Calcium alginate;Collagen;Foam;Absorptive Pad;Cast padding 10/17/23 1438   Periwound Care Absorptive dressing applied 10/17/23 1438   Off Loading Football dressing;Off loading shoe 10/17/23 1438   Dressing Change Due 10/20/23 10/17/23 1438         Assessment/Plan:         ICD-10-CM ICD-9-CM   1. Ulcer of both feet, unspecified ulcer stage  L97.519 707.15    L97.529    2. PAD (peripheral artery disease)  I73.9 443.9   3. 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 Injury of toe on right foot, initial encounter  S99.921A 959.7           Tissue pathology and/or culture taken:  [] Yes [x] No   Sharp debridement performed:   [x] Yes [] No   Labs ordered this visit:   [] Yes [x] No   Imaging ordered this visit:   [x] Yes [] No           Orders Placed This Encounter   Procedures    Debridement     This order was created via procedure documentation     Standing  Status:   Standing     Number of Occurrences:   1    Debridement     This order was created via procedure documentation     Standing Status:   Standing     Number of Occurrences:   1    Debridement     This order was created via procedure documentation     Standing Status:   Standing     Number of Occurrences:   1    Debridement     This order was created via procedure documentation     Standing Status:   Standing     Number of Occurrences:   1    X-Ray Foot Complete Right     Standing Status:   Future     Number of Occurrences:   1     Standing Expiration Date:   10/17/2024     Order Specific Question:   May the Radiologist modify the order per protocol to meet the clinical needs of the patient?     Answer:   Yes    X-Ray Toe 2 or More Views Right     Standing Status:   Future     Number of Occurrences:   1     Standing Expiration Date:   10/17/2024     Order Specific Question:   May the Radiologist modify the order per protocol to meet the clinical needs of the patient?     Answer:   Yes     Order Specific Question:   Release to patient     Answer:   Immediate    Change dressing     Left dorsal and lateral foot and right medial foot:     Cleanse wound with:NS  Lidocaine:prn  Silver nitrate:prn  Periwound care:prn  Primary dressing: saline moist Zahira  Secondary dressing: ABD, cast padding,flexinet,shoe covers     Right 1st toe:  Cleanse wound with:NS  Lidocaine:prn  Silver nitrate:prn  Periwound care:prn  Primary dressing: iodosorb  Secondary dressing: ABD,cast padding,flexinet,shoe covers    Frequency: 2 x week  Follow-up: Dr. Leung 10/31/23.  Other: Bacitracin and band aids to BLE abrasions daily by family, 10/3/23 Rx Bactrim for 30 days. Endovascular procedure to RLE per Dr. Martin done 10/13/23.  HBO evaluation for right foot chronic osteomyelitis next week.     Home health: [facility] to perform dressing changes twice weekly (Tues and Fridays) except days seen in wound clinic. Please evaluate for  PT / OT for ambulation and strengthening.        Follow up in about 2 weeks (around 10/31/2023).        Wound care per above.   MRI reviewed with osteomyelitis noted. Antibiotic management per Infectious Disease, appreciate recommendations  Follow up with Cardiology for management of PAD. Plan for HBOT  New wound to right hallux, xray ordered.  No signs of infection noted.   Patient reminded of the importance of good nutrition and blood sugar control to help prevent podiatric complications of diabetes. I discussed with the  patient  signs and symptoms of infection including redness, drainage, purulence, odor, pain, elevated BS, streaking, fever, chills, etc . Patient is to seek medical attention (ER or urgent care) if these symptoms occur      This includes face to face time and non-face to face time preparing to see the patient (eg, review of tests), obtaining and/or reviewing separately obtained history, documenting clinical information in the electronic or other health record, independently interpreting results and communicating results to the patient/family/caregiver, or care coordinator.

## 2023-10-17 NOTE — PROCEDURES
Debridement    Date/Time: 10/17/2023 1:57 PM    Performed by: Diya Leung DPM  Authorized by: Diya Leung DPM    Consent Done?:  Yes (Verbal)  Local anesthesia used?: No      Wound Details:    Location:  Right foot    Location:  Right 1st Metatarsal Head    Type of Debridement:  Excisional       Length (cm):  0.8       Area (sq cm):  0.96       Width (cm):  1.2       Percent Debrided (%):  100       Depth (cm):  0.3       Total Area Debrided (sq cm):  0.96    Depth of debridement:  Muscle/fascia/tendon    Tissue debrided:  Subcutaneous and Other    Devitalized tissue debrided:  Biofilm, Callus and Fibrin    Instruments:  Blade and Curette  Patient tolerance:  Patient tolerated the procedure well with no immediate complications     No cultures were taken during this visit   Debridement    Date/Time: 10/17/2023 1:57 PM    Performed by: Diya Leung DPM  Authorized by: Diya Leung DPM    Consent Done?:  Yes (Verbal)  Local anesthesia used?: No      Wound Details:    Location:  Right foot    Location:  Right 1st Toe    Type of Debridement:  Excisional       Length (cm):  0.5       Area (sq cm):  1.75       Width (cm):  3.5       Percent Debrided (%):  100       Depth (cm):  0.3       Total Area Debrided (sq cm):  1.75    Depth of debridement:  Bone    Tissue debrided:  Subcutaneous, Other and Bone    Devitalized tissue debrided:  Biofilm, Callus and Fibrin    Instruments:  Blade, Curette and Nippers  Bleeding:  Minimal  Hemostasis Achieved: Yes  Method Used:  Pressure  Patient tolerance:  Patient tolerated the procedure well with no immediate complications     No cultures were taken during this visit   Debridement    Date/Time: 10/17/2023 1:57 PM    Performed by: Diya Leung DPM  Authorized by: Diya Leung DPM    Consent Done?:  Yes (Verbal)  Local anesthesia used?: No      Wound Details:    Location:  Left foot    Location:  Left Midfoot    Type of Debridement:  Excisional        Length (cm):  0.3       Area (sq cm):  0.21       Width (cm):  0.7       Percent Debrided (%):  100       Depth (cm):  0.1       Total Area Debrided (sq cm):  0.21    Depth of debridement:  Subcutaneous tissue    Tissue debrided:  Subcutaneous and Other    Devitalized tissue debrided:  Biofilm, Callus and Fibrin    Instruments:  Blade and Curette  Patient tolerance:  Patient tolerated the procedure well with no immediate complications     No cultures were taken during this visit   Debridement    Date/Time: 10/17/2023 1:57 PM    Performed by: Diya Leung DPM  Authorized by: Diya Leung DPM    Consent Done?:  Yes (Verbal)  Local anesthesia used?: No      Wound Details:    Location:  Left foot    Location:  Left Dorsal Foot    Type of Debridement:  Excisional       Length (cm):  1.3       Area (sq cm):  2.34       Width (cm):  1.8       Percent Debrided (%):  100       Depth (cm):  0.2       Total Area Debrided (sq cm):  2.34    Depth of debridement:  Subcutaneous tissue    Tissue debrided:  Subcutaneous and Other    Devitalized tissue debrided:  Biofilm, Callus and Fibrin    Instruments:  Blade and Curette  Patient tolerance:  Patient tolerated the procedure well with no immediate complications     No cultures were taken during this visit

## 2023-10-20 ENCOUNTER — DOCUMENT SCAN (OUTPATIENT)
Dept: HOME HEALTH SERVICES | Facility: HOSPITAL | Age: 83
End: 2023-10-20
Payer: MEDICARE

## 2023-10-23 ENCOUNTER — HOSPITAL ENCOUNTER (OUTPATIENT)
Dept: RADIOLOGY | Facility: HOSPITAL | Age: 83
Discharge: HOME | End: 2023-10-23
Attending: PREVENTIVE MEDICINE
Payer: MEDICARE

## 2023-10-23 ENCOUNTER — HOSPITAL ENCOUNTER (OUTPATIENT)
Dept: WOUND CARE | Facility: HOSPITAL | Age: 83
Discharge: HOME | End: 2023-10-23
Attending: PREVENTIVE MEDICINE
Payer: MEDICARE

## 2023-10-23 ENCOUNTER — CLINICAL SUPPORT (OUTPATIENT)
Dept: LAB | Facility: HOSPITAL | Age: 83
End: 2023-10-23
Attending: PREVENTIVE MEDICINE
Payer: MEDICARE

## 2023-10-23 VITALS
HEIGHT: 73 IN | BODY MASS INDEX: 25.18 KG/M2 | HEART RATE: 78 BPM | WEIGHT: 190 LBS | TEMPERATURE: 98 F | DIASTOLIC BLOOD PRESSURE: 58 MMHG | SYSTOLIC BLOOD PRESSURE: 97 MMHG

## 2023-10-23 DIAGNOSIS — M86.60 CHRONIC OSTEOMYELITIS: ICD-10-CM

## 2023-10-23 DIAGNOSIS — M86.60 CHRONIC OSTEOMYELITIS: Primary | ICD-10-CM

## 2023-10-23 DIAGNOSIS — L97.514 SKIN ULCER OF TOE OF RIGHT FOOT WITH NECROSIS OF BONE: ICD-10-CM

## 2023-10-23 DIAGNOSIS — I73.9 PAD (PERIPHERAL ARTERY DISEASE): ICD-10-CM

## 2023-10-23 PROCEDURE — 71045 X-RAY EXAM CHEST 1 VIEW: CPT | Mod: TC,FY

## 2023-10-23 PROCEDURE — 71045 X-RAY EXAM CHEST 1 VIEW: CPT | Mod: 26,,, | Performed by: INTERNAL MEDICINE

## 2023-10-23 PROCEDURE — 93005 ELECTROCARDIOGRAM TRACING: CPT

## 2023-10-23 PROCEDURE — 93010 ELECTROCARDIOGRAM REPORT: CPT | Mod: ,,, | Performed by: INTERNAL MEDICINE

## 2023-10-23 PROCEDURE — 99214 OFFICE O/P EST MOD 30 MIN: CPT

## 2023-10-23 PROCEDURE — 93010 EKG 12-LEAD: ICD-10-PCS | Mod: ,,, | Performed by: INTERNAL MEDICINE

## 2023-10-23 PROCEDURE — 71045 XR CHEST 1 VIEW: ICD-10-PCS | Mod: 26,,, | Performed by: INTERNAL MEDICINE

## 2023-10-23 NOTE — PROGRESS NOTES
[facility] - Wound Care   Hyperbaric Oxygen therapy Consult                  Subjective:      Referring provider: Diya Garcia DPM  PCP: Kelvin Sheldon MD      Chief Complaint/Reason for Admission: HBO consult    Julien Meléndez is a 83 y.o. male with a chronic non healing ulcer to the right GT with underlying osteomyelitis on MRI and xray.  Currently he is following with Dr. Leung for wound care. He recently had an Angiogram with intervention by Dr. Martin this past week with improvement of blood flow to the right foot. 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 Recent TCOMs were done and had minimal response with the application of 100% on surface. He has no issues clearing his ears while flying.         Past Medical History:   Diagnosis Date    Diabetes mellitus     Hiatal hernia     under Dr Saenz' care    Hyperlipidemia     Hypertension     MI (myocardial infarction)     Sleep apnea      Past Surgical History:   Procedure Laterality Date    ABDOMINAL HERNIA REPAIR      ANGIOGRAM, EXTREMITY, BILATERAL Bilateral 4/5/2023    Procedure: ANGIOGRAM, EXTREMITY, BILATERAL;  Surgeon: Michael Giraldo III, MD;  Location: [facility] CATH LAB;  Service: Cardiology;  Laterality: Bilateral;    ANGIOGRAPHY OF LOWER EXTREMITY Left 4/12/2023    Procedure: Angiogram Extremity Unilateral;  Surgeon: Michael Giraldo III, MD;  Location: [facility] CATH LAB;  Service: Cardiology;  Laterality: Left;    AORTOGRAPHY WITH SERIALOGRAPHY Bilateral 4/5/2023    Procedure: AORTOGRAM, WITH SERIALOGRAPHY;  Surgeon: Michael Giraldo III, MD;  Location: [facility] CATH LAB;  Service: Cardiology;  Laterality: Bilateral;    APPLICATION OF WOUND VACUUM-ASSISTED CLOSURE DEVICE Left 3/29/2023    Procedure: APPLICATION, WOUND VAC;  Surgeon: Alona Pierce DPM;  Location: [facility] OR;  Service: Podiatry;  Laterality: Left;    APPLICATION OF WOUND VACUUM-ASSISTED CLOSURE DEVICE Left 4/4/2023    Procedure:  APPLICATION, WOUND VAC;  Surgeon: Alona Pierce DPM;  Location: [facility] OR;  Service: Podiatry;  Laterality: Left;    APPLICATION OF WOUND VACUUM-ASSISTED CLOSURE DEVICE Left 4/13/2023    Procedure: APPLICATION, WOUND VAC;  Surgeon: Alona Pierce DPM;  Location: [facility] OR;  Service: Podiatry;  Laterality: Left;    BONE BIOPSY Left 3/29/2023    Procedure: BIOPSY, BONE;  Surgeon: Alona Pierce DPM;  Location: [facility] OR;  Service: Podiatry;  Laterality: Left;  1st met    CARDIAC CATHETERIZATION      3 stents placed    CATARACT EXTRACTION, BILATERAL      CLOSURE DEVICE Left 4/12/2023    Procedure: Placement of Closure Device;  Surgeon: Michael Giraldo III, MD;  Location: [facility] CATH LAB;  Service: Cardiology;  Laterality: Left;    CORONARY ANGIOGRAPHY N/A 10/3/2019    Procedure: ANGIOGRAM, CORONARY ARTERY;  Surgeon: Edwin Azul MD;  Location: [facility] CATH LAB/EP;  Service: Cardiology;  Laterality: N/A;    DEBRIDEMENT OF FOOT Left 3/29/2023    Procedure: DEBRIDEMENT, FOOT;  Surgeon: Alona Pierce DPM;  Location: [facility] OR;  Service: Podiatry;  Laterality: Left;    DEBRIDEMENT OF FOOT Left 4/4/2023    Procedure: DEBRIDEMENT, FOOT;  Surgeon: Alona Pierce DPM;  Location: [facility] OR;  Service: Podiatry;  Laterality: Left;    DEBRIDEMENT OF FOOT Left 4/13/2023    Procedure: DEBRIDEMENT, FOOT;  Surgeon: Alona Pierce DPM;  Location: [facility] OR;  Service: Podiatry;  Laterality: Left;    ESOPHAGOGASTRODUODENOSCOPY N/A 4/17/2023    Procedure: EGD (ESOPHAGOGASTRODUODENOSCOPY);  Surgeon: Otto Villarreal MD;  Location: [facility] ENDO;  Service: Endoscopy;  Laterality: N/A;    HERNIA REPAIR      inguinal    IVUS (INTRAVASCULAR ULTRASOUND) Left 4/12/2023    Procedure: ULTRASOUND, INTRAVASCULAR;  Surgeon: Michael Giraldo III, MD;  Location: [facility] CATH LAB;  Service: Cardiology;  Laterality: Left;    LEFT HEART CATHETERIZATION Left 9/13/2019    Procedure: Left heart cath;  Surgeon: Edwin Azul MD;  Location: [facility] CATH LAB/EP;  Service:  Cardiology;  Laterality: Left;    LEFT HEART CATHETERIZATION N/A 10/3/2019    Procedure: Left heart cath;  Surgeon: Edwin Azul MD;  Location: [facility] CATH LAB/EP;  Service: Cardiology;  Laterality: N/A;    OSTEOTOMY Left 6/7/2023    Procedure: OSTEOTOMY;  Surgeon: Alona Pierce DPM;  Location: [facility] OR;  Service: Podiatry;  Laterality: Left;    OSTEOTOMY OF METATARSAL BONE Right 4/4/2023    Procedure: OSTEOTOMY, METATARSAL BONE;  Surgeon: Alona Pierce DPM;  Location: [facility] OR;  Service: Podiatry;  Laterality: Right;    PERCUTANEOUS TRANSLUMINAL ANGIOPLASTY Left 4/12/2023    Procedure: PTA (ANGIOPLASTY, PERCUTANEOUS, TRANSLUMINAL);  Surgeon: Michael Giraldo III, MD;  Location: [facility] CATH LAB;  Service: Cardiology;  Laterality: Left;    SURGICAL REMOVAL OF BUNION WITH OSTEOTOMY OF METATARSAL BONE Right 4/4/2023    Procedure: BUNIONECTOMY, WITH METATARSAL OSTEOTOMY;  Surgeon: Alona Pierce DPM;  Location: [facility] OR;  Service: Podiatry;  Laterality: Right;    TOE AMPUTATION Left 4/4/2023    Procedure: AMPUTATION, TOE;  Surgeon: Alona Pierce DPM;  Location: [facility] OR;  Service: Podiatry;  Laterality: Left;  1st ray    WASHOUT Left 6/7/2023    Procedure: WASHOUT;  Surgeon: Alona Pierce DPM;  Location: [facility] OR;  Service: Podiatry;  Laterality: Left;    WOUND DEBRIDEMENT Left 6/7/2023    Procedure: DEBRIDEMENT, WOUND;  Surgeon: Alona Pierce DPM;  Location: [facility] OR;  Service: Podiatry;  Laterality: Left;     Family History   Problem Relation Age of Onset    Heart disease Mother     Stroke Mother     Heart disease Sister     Heart disease Brother     Heart disease Maternal Uncle     Heart disease Brother     Heart disease Sister     Heart disease Maternal Uncle     Heart disease Maternal Uncle     Heart disease Maternal Uncle      Social History     Tobacco Use    Smoking status: Never    Smokeless tobacco: Never   Substance Use Topics    Alcohol use: No    Drug use: No       (Not in a hospital admission)    Review of  patient's allergies indicates:   Allergen Reactions    Nystatin      Other reaction(s): Unknown        Review of Systems   All other systems reviewed and are negative.      Objective:      Vital Signs (Most Recent)  Temp: 97.6 °F (36.4 °C) (10/23/23 1402)  Pulse: 78 (10/23/23 1402)  BP: (!) 97/58 (10/23/23 1402)    Vital Signs Range (Last 24H):  Temp:  [97.6 °F (36.4 °C)]   Pulse:  [78]   BP: (97)/(58)     Physical Exam  Vitals reviewed.   HENT:      Head: Normocephalic and atraumatic.   Cardiovascular:      Rate and Rhythm: Normal rate.      Pulses:           Dorsalis pedis pulses are detected w/ Doppler on the right side.   Musculoskeletal:         General: Swelling and deformity present.   Skin:     General: Skin is warm and dry.   Neurological:      Mental Status: He is alert. Mental status is at baseline.   Psychiatric:         Mood and Affect: Mood normal.           Assessment:      1. Chronic osteomyelitis    2. PAD (peripheral artery disease)    3. 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  -Needs bone biopsy/surgical debridement with bone culture.  -Needs bone culture specific antibiotics.  -Needs Ha1c, CRP, ESR.    Plan to repeat TCOMs next week. (Had recent angiogram with stent placement)    Will reassess after full workup is completed.      Discussed risks and benefits of HBOT with the patient and family.      Shaq Hernandez MD  [facility] & Hyperbaric Medicine  10/23/23

## 2023-10-31 ENCOUNTER — HOSPITAL ENCOUNTER (OUTPATIENT)
Dept: WOUND CARE | Facility: HOSPITAL | Age: 83
Discharge: HOME | End: 2023-10-31
Attending: STUDENT IN AN ORGANIZED HEALTH CARE EDUCATION/TRAINING PROGRAM
Payer: MEDICARE

## 2023-10-31 VITALS
HEIGHT: 73 IN | BODY MASS INDEX: 25.18 KG/M2 | SYSTOLIC BLOOD PRESSURE: 117 MMHG | TEMPERATURE: 97 F | WEIGHT: 190 LBS | HEART RATE: 80 BPM | DIASTOLIC BLOOD PRESSURE: 59 MMHG

## 2023-10-31 DIAGNOSIS — I70.263: ICD-10-CM

## 2023-10-31 DIAGNOSIS — E11.621 DIABETIC ULCER OF RIGHT MIDFOOT ASSOCIATED WITH TYPE 2 DIABETES MELLITUS: ICD-10-CM

## 2023-10-31 DIAGNOSIS — M86.60 CHRONIC OSTEOMYELITIS: ICD-10-CM

## 2023-10-31 DIAGNOSIS — L97.514 ULCER OF TOE OF RIGHT FOOT, WITH NECROSIS OF BONE: Primary | ICD-10-CM

## 2023-10-31 DIAGNOSIS — L97.514 SKIN ULCER OF TOE OF RIGHT FOOT WITH NECROSIS OF BONE: Primary | ICD-10-CM

## 2023-10-31 DIAGNOSIS — L97.419 DIABETIC ULCER OF RIGHT MIDFOOT ASSOCIATED WITH TYPE 2 DIABETES MELLITUS: ICD-10-CM

## 2023-10-31 PROCEDURE — 87070 CULTURE OTHR SPECIMN AEROBIC: CPT | Performed by: STUDENT IN AN ORGANIZED HEALTH CARE EDUCATION/TRAINING PROGRAM

## 2023-10-31 PROCEDURE — 20220 PR BONE BIOPSY,TROCAR/NEEDLE SUPERF: ICD-10-PCS | Mod: 51,,, | Performed by: STUDENT IN AN ORGANIZED HEALTH CARE EDUCATION/TRAINING PROGRAM

## 2023-10-31 PROCEDURE — 87186 SC STD MICRODIL/AGAR DIL: CPT | Performed by: STUDENT IN AN ORGANIZED HEALTH CARE EDUCATION/TRAINING PROGRAM

## 2023-10-31 PROCEDURE — 88305 TISSUE EXAM BY PATHOLOGIST: CPT | Performed by: PATHOLOGY

## 2023-10-31 PROCEDURE — 11044 PR DEBRIDEMENT, SKIN, SUB-Q TISSUE,MUSCLE,BONE,=<20 SQ CM: ICD-10-PCS | Mod: ,,, | Performed by: STUDENT IN AN ORGANIZED HEALTH CARE EDUCATION/TRAINING PROGRAM

## 2023-10-31 PROCEDURE — 93923 UPR/LXTR ART STDY 3+ LVLS: CPT | Mod: CCAT

## 2023-10-31 PROCEDURE — 93923 UPR/LXTR ART STDY 3+ LVLS: CPT | Mod: 26,,, | Performed by: INTERNAL MEDICINE

## 2023-10-31 PROCEDURE — 20220 BONE BIOPSY TROCAR/NDL SUPFC: CPT | Mod: 51,,, | Performed by: STUDENT IN AN ORGANIZED HEALTH CARE EDUCATION/TRAINING PROGRAM

## 2023-10-31 PROCEDURE — 99214 OFFICE O/P EST MOD 30 MIN: CPT | Mod: 25,,, | Performed by: STUDENT IN AN ORGANIZED HEALTH CARE EDUCATION/TRAINING PROGRAM

## 2023-10-31 PROCEDURE — 87176 TISSUE HOMOGENIZATION CULTR: CPT | Performed by: STUDENT IN AN ORGANIZED HEALTH CARE EDUCATION/TRAINING PROGRAM

## 2023-10-31 PROCEDURE — 11044 DBRDMT BONE 1ST 20 SQ CM/<: CPT | Mod: ,,, | Performed by: STUDENT IN AN ORGANIZED HEALTH CARE EDUCATION/TRAINING PROGRAM

## 2023-10-31 PROCEDURE — 88305 TISSUE EXAM BY PATHOLOGIST: CPT | Mod: 26,,, | Performed by: PATHOLOGY

## 2023-10-31 PROCEDURE — 87075 CULTR BACTERIA EXCEPT BLOOD: CPT | Performed by: STUDENT IN AN ORGANIZED HEALTH CARE EDUCATION/TRAINING PROGRAM

## 2023-10-31 PROCEDURE — 88305 TISSUE EXAM BY PATHOLOGIST: ICD-10-PCS | Mod: 26,,, | Performed by: PATHOLOGY

## 2023-10-31 PROCEDURE — 11044 DBRDMT BONE 1ST 20 SQ CM/<: CPT

## 2023-10-31 PROCEDURE — 87077 CULTURE AEROBIC IDENTIFY: CPT | Performed by: STUDENT IN AN ORGANIZED HEALTH CARE EDUCATION/TRAINING PROGRAM

## 2023-10-31 PROCEDURE — 11042 DBRDMT SUBQ TIS 1ST 20SQCM/<: CPT

## 2023-10-31 PROCEDURE — 99214 PR OFFICE/OUTPT VISIT, EST, LEVL IV, 30-39 MIN: ICD-10-PCS | Mod: 25,,, | Performed by: STUDENT IN AN ORGANIZED HEALTH CARE EDUCATION/TRAINING PROGRAM

## 2023-10-31 PROCEDURE — 93923 PR NON-INVASIVE PHYSIOLOGIC STUDY EXTREMITY 3 LEVELS: ICD-10-PCS | Mod: 26,,, | Performed by: INTERNAL MEDICINE

## 2023-10-31 NOTE — PROGRESS NOTES
[facility]          Hyperbaric and Wound Medicine Department    TCOMS:     Technician comments:  Patient in supine position.  Good lead placement noted.  Minimal edema noted.  Patient tolerated procedure well.  Results forwarded to Dr. Leung and Dr. Hernandez.  Interpretation per Dr. Mcnulty.          Location   1 ERNIE Air 1 ERNIE 100% O2     Lead 1  Left anterior Chest  79 mmhg 260 mmhg           Lead 2  R lower leg   63 mmhg 294 mmhg                   Lead 3  R med ankle   59 mmhg 283 mmhg    Lead 4  R dors foot   65 mmhg 238 mmhg                      Impression:

## 2023-10-31 NOTE — PROGRESS NOTES
[facility]          Hyperbaric and Wound Medicine Department    TCOMS:       Technician comments:  Patient in supine position.  Good lead placement noted.  Minimal edema noted.  Patient tolerated procedure well.  Results forwarded to Dr. Leung and Dr. Hernandez.  Interpretation per Dr. Mcnulty.          Location   1 ERNIE Air 1 ERNIE 100% O2     Lead 1  Left anterior Chest  79 mmhg 260 mmhg           Lead 2  R lower leg   63 mmhg 294 mmhg                   Lead 3  R med ankle   59 mmhg 283 mmhg    Lead 4  R dors foot   65 mmhg 238 mmhg                              Impression:  Based on TcPO2 values and RPI 0.82,  wound healing is likely with standard wound care.   However, in the event that progress is not evident, the above results indicate that hyperbaric oxygen therapy is likely to provide significant additional benefit.

## 2023-10-31 NOTE — PROCEDURES
Debridement    Date/Time: 10/31/2023 1:28 PM    Performed by: Diya Leung DPM  Authorized by: Diya Leung DPM    Consent Done?:  Yes (Verbal)  Local anesthesia used?: No      Wound Details:    Location:  Right foot    Location:  Right 1st Metatarsal Head    Type of Debridement:  Excisional       Length (cm):  0.8       Area (sq cm):  0.96       Width (cm):  1.2       Percent Debrided (%):  100       Depth (cm):  0.5       Total Area Debrided (sq cm):  0.96    Depth of debridement:  Bone    Tissue debrided:  Subcutaneous, Other and Bone    Devitalized tissue debrided:  Biofilm, Callus and Fibrin    Instruments:  Blade and Curette  Patient tolerance:  Patient tolerated the procedure well with no immediate complications     Jam shidi needle was used to enter through the wound to obtain bone biopsy of 1st metatarsal head. This was sent for culture and for Patholgoy

## 2023-10-31 NOTE — PROGRESS NOTES
Wound Care & Hyperbaric Medicine Clinic    Subjective:       Patient ID: Julien Meléndez is a 83 y.o. male.    Chief Complaint: Wound Care    HPI  TCOM'S and Bone biopsy and culture done today.  Patient w/ nausea and vomiting today.  Advised to go to the Emergency department.  Patient declined.  Patient states he will follow up with primary care physician.    Review of Systems   Constitutional:  Negative for activity change, chills, diaphoresis and fever.   HENT:  Negative for congestion.    Respiratory:  Negative for cough and shortness of breath.    Cardiovascular:  Negative for leg swelling.   Gastrointestinal:  Negative for nausea and vomiting.   Skin:  Positive for color change and wound. Negative for pallor and rash.   Neurological:  Positive for numbness. Negative for tremors and speech difficulty.   Psychiatric/Behavioral:  Negative for agitation and confusion. The patient is not nervous/anxious.          Objective:     Vitals:    10/31/23 1406   BP: (!) 117/59   Pulse: 80   Temp: 96.7 °F (35.9 °C)         Physical Exam  Constitutional:       General: He is not in acute distress.     Appearance: Normal appearance. He is well-developed. He is not diaphoretic.   Cardiovascular:      Pulses:           Dorsalis pedis pulses are detected w/ Doppler on the right side and detected w/ Doppler on the left side.        Posterior tibial pulses are 0 on the right side and detected w/ Doppler on the left side.      Comments: Dorsalis pedis and posterior tibial pulses are mildly diminished. Skin temperature is within normal limits. Toes are cool to touch and feet are warm proximally. Hair growth is diminished. Skin is atrophic and with mild hyperpigmentation. Mild edema noted, bilaterally. Telangiectasias to medial ankle, bilaterally   Musculoskeletal:         General: No tenderness.      Comments: Adequate joint range of motion without pain, limitation, nor crepitation to foot and ankle joints.       Feet:      Right foot:      Skin integrity: Dry skin present. No ulcer, blister, erythema or warmth.      Left foot:      Skin integrity: Dry skin present. No ulcer, blister, erythema or warmth.   Skin:     General: Skin is warm and dry.      Capillary Refill: Capillary refill takes less than 2 seconds.      Comments: Skin is warm and dry, no acute signs of infection noted bilaterally      See wound description below    Toenails are thickened by 2-4 mm's, dystrophic, and are darkened in coloration with subungual fungal debris.     Otherwise, no open wounds, macerations or hyperkeratotic lesions, bilaterally            Neurological:      Mental Status: He is alert and oriented to person, place, and time.      Sensory: Sensory deficit present.      Motor: No abnormal muscle tone.      Comments: Light touch within normal limits. [place]-Lashay 5.07 monofilamant testing is diminished. Vibratory sensation is diminished bilaterally.   Psychiatric:         Mood and Affect: Mood normal.         Behavior: Behavior normal.         Thought Content: Thought content normal.            Altered Skin Integrity 03/31/23 2227 Right medial Foot #2 Other (comment) Full thickness tissue loss. Subcutaneous fat may be visible but bone, tendon or muscle are not exposed (Active)   03/31/23 2227   Altered Skin Integrity Present on Admission - Did Patient arrive to the hospital with altered skin?:    Side: Right   Orientation: medial   Location: Foot   Wound Number: #2   Is this injury device related?: No   Primary Wound Type: Other   Description of Altered Skin Integrity: Full thickness tissue loss. Subcutaneous fat may be visible but bone, tendon or muscle are not exposed   Ankle-Brachial Index:    Pulses:    Removal Indication and Assessment:    Wound Outcome: Other   (Retired) Wound Length (cm):    (Retired) Wound Width (cm):    (Retired) Depth (cm):    Wound Description (Comments):    Removal Indications:    Wound Image   10/31/23  1559   Dressing Appearance Moist drainage 10/31/23 1559   Drainage Amount Moderate 10/31/23 1559   Drainage Characteristics/Odor Serous 10/31/23 1559   Appearance Pink;Yellow 10/31/23 1559   Tissue loss description Full thickness 10/31/23 1559   Red (%), Wound Tissue Color 40 % 10/31/23 1559   Yellow (%), Wound Tissue Color 60 % 10/31/23 1559   Periwound Area Intact;Dry 10/31/23 1559   Wound Edges Defined;Open 10/31/23 1559   Wound Length (cm) 0.8 cm 10/31/23 1559   Wound Width (cm) 1.2 cm 10/31/23 1559   Wound Depth (cm) 0.5 cm 10/31/23 1559   Wound Volume (cm^3) 0.48 cm^3 10/31/23 1559   Wound Surface Area (cm^2) 0.96 cm^2 10/31/23 1559   Care Cleansed with:;Sterile normal saline 10/31/23 1559   Dressing Applied;Collagen 10/31/23 1559   Periwound Care Absorptive dressing applied 10/31/23 1559   Off Loading Football dressing 10/31/23 1559   Dressing Change Due 11/07/23 10/31/23 1559            Altered Skin Integrity 10/17/23 Right Toe, first Traumatic (Active)   10/17/23    Altered Skin Integrity Present on Admission - Did Patient arrive to the hospital with altered skin?: yes   Side: Right   Orientation:    Location: Toe, first   Wound Number:    Is this injury device related?: No   Primary Wound Type: Traumatic   Description of Altered Skin Integrity:    Ankle-Brachial Index:    Pulses: palpable   Removal Indication and Assessment:    Wound Outcome:    (Retired) Wound Length (cm):    (Retired) Wound Width (cm):    (Retired) Depth (cm):    Wound Description (Comments):    Removal Indications:    Wound Image   10/31/23 1559   Dressing Appearance Moist drainage 10/31/23 1559   Drainage Amount Small 10/31/23 1559   Drainage Characteristics/Odor Serosanguineous 10/31/23 1559   Appearance Red;Yellow 10/31/23 1559   Tissue loss description Full thickness 10/31/23 1559   Red (%), Wound Tissue Color 75 % 10/31/23 1559   Yellow (%), Wound Tissue Color 25 % 10/31/23 1559   Periwound Area Dry 10/31/23 1555   Wound Edges  Defined;Open 10/31/23 1559   Wound Length (cm) 0.5 cm 10/31/23 1559   Wound Width (cm) 3.5 cm 10/31/23 1559   Wound Depth (cm) 0.3 cm 10/31/23 1559   Wound Volume (cm^3) 0.525 cm^3 10/31/23 1559   Wound Surface Area (cm^2) 1.75 cm^2 10/31/23 1559   Care Cleansed with:;Sterile normal saline 10/31/23 1559   Dressing Applied 10/31/23 1559   Periwound Care Absorptive dressing applied 10/31/23 1559   Off Loading Football dressing 10/31/23 1559   Dressing Change Due 11/07/23 10/31/23 1559            Incision/Site 06/07/23 1138 Left Foot dorsal (Active)   06/07/23 1138   Present Prior to Hospital Arrival?:    Side: Left   Location: Foot   Orientation: dorsal   Incision Type:    Closure Method:    Additional Comments:    Removal Indication and Assessment:    Wound Outcome:    Removal Indications:    Wound Image   10/31/23 1559   Dressing Appearance Moist drainage 10/31/23 1559   Drainage Amount Small 10/31/23 1559   Drainage Characteristics/Odor Serous 10/31/23 1559   Appearance Pink 10/31/23 1559   Red (%), Wound Tissue Color 95 % 10/31/23 1559   Yellow (%), Wound Tissue Color 5 % 10/31/23 1559   Periwound Area Intact;Dry 10/31/23 1559   Wound Edges Open;Defined 10/31/23 1559   Wound Length (cm) 1.3 cm 10/31/23 1559   Wound Width (cm) 1.7 cm 10/31/23 1559   Wound Depth (cm) 0.2 cm 10/31/23 1559   Wound Volume (cm^3) 0.442 cm^3 10/31/23 1559   Wound Surface Area (cm^2) 2.21 cm^2 10/31/23 1559   Care Cleansed with:;Sterile normal saline 10/31/23 1559   Dressing Applied;Collagen 10/31/23 1559   Periwound Care Absorptive dressing applied 10/31/23 1559   Off Loading Football dressing 10/31/23 1559   Dressing Change Due 11/07/23 10/31/23 1559         Assessment/Plan:         ICD-10-CM ICD-9-CM   1. 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 Chronic osteomyelitis  M86.60 730.10   3. 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 Diabetic ulcer of  right midfoot associated with type 2 diabetes mellitus  E11.621 250.80    L97.419 707.14           Tissue pathology and/or culture taken:  [x] Yes [] No   Sharp debridement performed:   [x] Yes [] No   Labs ordered this visit:   [] Yes [x] No   Imaging ordered this visit:   [] Yes [x] No           Orders Placed This Encounter   Procedures    Debridement     This order was created via procedure documentation     Standing Status:   Standing     Number of Occurrences:   1    CULTURE, AEROBIC  (SPECIFY SOURCE)          CULTURE, ANAEROBE          Aerobic culture    Culture, Anaerobic    Change dressing     Left dorsal and lateral foot and right medial foot:      Cleanse wound with:NS   Lidocaine:prn   Silver nitrate:prn   Periwound care:prn   Primary dressing: saline moist Zahira   Secondary dressing: ABD, cast padding,flexinet,shoe covers      Right 1st toe:   Cleanse wound with:NS   Lidocaine:prn   Silver nitrate:prn   Periwound care:prn   Primary dressing: iodosorb   Secondary dressing: ABD,cast padding,flexinet,shoe covers     R Medial foot  Cleansed with Normal saline  Primary dressing:  (today only)  Surgicel applied to wound for bleeding control  Resume iodosorb 11/01/23 per Homehealth      Frequency: 2 x week and PRN  Follow-up: Dr. Leung 10/31/23.   Other: HBO evaluation for right foot chronic osteomyelitis next week.      Home health: [facility] to perform dressing changes twice weekly (Tues and Fridays) except days seen in wound clinic.  Please add visit on Wednesday 11/01/23 to apply iodosorb to R medial foot, surgicel applied for bleeding control post bone debridement.        Follow up in about 1 week (around 11/7/2023) for Infectious disease and Dr. Leung.        Wound care per above.   MRI reviewed with osteomyelitis noted. TCOMs WNL. Bone biopsy obtained today, plan for follow up with Infectious Disease   Patient reminded of the importance of good nutrition and blood sugar control to help  prevent podiatric complications of diabetes. I discussed with the  patient  signs and symptoms of infection including redness, drainage, purulence, odor, pain, elevated BS, streaking, fever, chills, etc . Patient is to seek medical attention (ER or urgent care) if these symptoms occur      This includes face to face time and non-face to face time preparing to see the patient (eg, review of tests), obtaining and/or reviewing separately obtained history, documenting clinical information in the electronic or other health record, independently interpreting results and communicating results to the patient/family/caregiver, or care coordinator.

## 2023-11-01 LAB — POCT GLUCOSE: 218 MG/DL (ref 70–110)

## 2023-11-02 ENCOUNTER — EXTERNAL HOME HEALTH (OUTPATIENT)
Dept: HOME HEALTH SERVICES | Facility: HOSPITAL | Age: 83
End: 2023-11-02
Payer: MEDICARE

## 2023-11-02 LAB
FINAL PATHOLOGIC DIAGNOSIS: NORMAL
GROSS: NORMAL
Lab: NORMAL

## 2023-11-03 LAB — BACTERIA SPEC AEROBE CULT: ABNORMAL

## 2023-11-06 PROBLEM — D68.69 OTHER THROMBOPHILIA: Status: ACTIVE | Noted: 2023-11-06

## 2023-11-06 PROBLEM — I25.118 ATHEROSCLEROTIC HEART DISEASE OF NATIVE CORONARY ARTERY WITH OTHER FORMS OF ANGINA PECTORIS: Status: ACTIVE | Noted: 2017-04-25

## 2023-11-06 PROBLEM — L97.514 ULCER OF TOE OF RIGHT FOOT, WITH NECROSIS OF BONE: Status: ACTIVE | Noted: 2023-11-06

## 2023-11-06 LAB — BACTERIA SPEC ANAEROBE CULT: NORMAL

## 2023-11-07 ENCOUNTER — DOCUMENT SCAN (OUTPATIENT)
Dept: HOME HEALTH SERVICES | Facility: HOSPITAL | Age: 83
End: 2023-11-07
Payer: MEDICARE

## 2023-11-07 ENCOUNTER — HOSPITAL ENCOUNTER (INPATIENT)
Facility: HOSPITAL | Age: 83
LOS: 5 days | Discharge: NURSING, REHAB OR LONG-TERM CARE | DRG: 854 | End: 2023-11-14
Attending: EMERGENCY MEDICINE | Admitting: FAMILY MEDICINE
Payer: MEDICARE

## 2023-11-07 DIAGNOSIS — I25.10 CORONARY ARTERY DISEASE: ICD-10-CM

## 2023-11-07 DIAGNOSIS — L08.9 DIABETIC FOOT INFECTION: ICD-10-CM

## 2023-11-07 DIAGNOSIS — E11.628 DIABETIC FOOT INFECTION: ICD-10-CM

## 2023-11-07 DIAGNOSIS — L03.031 CELLULITIS OF GREAT TOE OF RIGHT FOOT: ICD-10-CM

## 2023-11-07 DIAGNOSIS — M86.10 ACUTE OSTEOMYELITIS: Primary | ICD-10-CM

## 2023-11-07 DIAGNOSIS — B95.62 MRSA BACTEREMIA: ICD-10-CM

## 2023-11-07 DIAGNOSIS — L03.119 CELLULITIS OF FOOT: ICD-10-CM

## 2023-11-07 DIAGNOSIS — I70.263: ICD-10-CM

## 2023-11-07 DIAGNOSIS — L08.9 WOUND INFECTION: ICD-10-CM

## 2023-11-07 DIAGNOSIS — R07.9 CHEST PAIN: ICD-10-CM

## 2023-11-07 DIAGNOSIS — T14.8XXA WOUND INFECTION: ICD-10-CM

## 2023-11-07 DIAGNOSIS — R78.81 MRSA BACTEREMIA: ICD-10-CM

## 2023-11-07 DIAGNOSIS — R78.81 BACTEREMIA: ICD-10-CM

## 2023-11-07 LAB
ALBUMIN SERPL BCP-MCNC: 3.1 G/DL (ref 3.5–5.2)
ALP SERPL-CCNC: 96 U/L (ref 55–135)
ALT SERPL W/O P-5'-P-CCNC: 15 U/L (ref 10–44)
ANION GAP SERPL CALC-SCNC: 22 MMOL/L (ref 8–16)
AST SERPL-CCNC: 10 U/L (ref 10–40)
BASOPHILS # BLD AUTO: 0.05 K/UL (ref 0–0.2)
BASOPHILS NFR BLD: 0.4 % (ref 0–1.9)
BILIRUB SERPL-MCNC: 0.8 MG/DL (ref 0.1–1)
BUN SERPL-MCNC: 20 MG/DL (ref 8–23)
CALCIUM SERPL-MCNC: 10.1 MG/DL (ref 8.7–10.5)
CHLORIDE SERPL-SCNC: 95 MMOL/L (ref 95–110)
CO2 SERPL-SCNC: 15 MMOL/L (ref 23–29)
CREAT SERPL-MCNC: 1.3 MG/DL (ref 0.5–1.4)
CRP SERPL-MCNC: 228.9 MG/L (ref 0–8.2)
DIFFERENTIAL METHOD: ABNORMAL
EOSINOPHIL # BLD AUTO: 0 K/UL (ref 0–0.5)
EOSINOPHIL NFR BLD: 0 % (ref 0–8)
ERYTHROCYTE [DISTWIDTH] IN BLOOD BY AUTOMATED COUNT: 19.4 % (ref 11.5–14.5)
EST. GFR  (NO RACE VARIABLE): 55 ML/MIN/1.73 M^2
GLUCOSE SERPL-MCNC: 318 MG/DL (ref 70–110)
HCT VFR BLD AUTO: 39.8 % (ref 40–54)
HGB BLD-MCNC: 13.2 G/DL (ref 14–18)
IMM GRANULOCYTES # BLD AUTO: 0.11 K/UL (ref 0–0.04)
IMM GRANULOCYTES NFR BLD AUTO: 0.9 % (ref 0–0.5)
LACTATE SERPL-SCNC: 2.9 MMOL/L (ref 0.5–2.2)
LYMPHOCYTES # BLD AUTO: 1.3 K/UL (ref 1–4.8)
LYMPHOCYTES NFR BLD: 10.1 % (ref 18–48)
MCH RBC QN AUTO: 27.1 PG (ref 27–31)
MCHC RBC AUTO-ENTMCNC: 33.2 G/DL (ref 32–36)
MCV RBC AUTO: 82 FL (ref 82–98)
MONOCYTES # BLD AUTO: 1.4 K/UL (ref 0.3–1)
MONOCYTES NFR BLD: 10.9 % (ref 4–15)
NEUTROPHILS # BLD AUTO: 10 K/UL (ref 1.8–7.7)
NEUTROPHILS NFR BLD: 78.6 % (ref 38–73)
NRBC BLD-RTO: 0 /100 WBC
PLATELET # BLD AUTO: 294 K/UL (ref 150–450)
PMV BLD AUTO: 11.2 FL (ref 9.2–12.9)
POTASSIUM SERPL-SCNC: 4.4 MMOL/L (ref 3.5–5.1)
PROT SERPL-MCNC: 7.6 G/DL (ref 6–8.4)
RBC # BLD AUTO: 4.87 M/UL (ref 4.6–6.2)
SODIUM SERPL-SCNC: 132 MMOL/L (ref 136–145)
WBC # BLD AUTO: 12.71 K/UL (ref 3.9–12.7)

## 2023-11-07 PROCEDURE — 86140 C-REACTIVE PROTEIN: CPT | Performed by: EMERGENCY MEDICINE

## 2023-11-07 PROCEDURE — 25000003 PHARM REV CODE 250: Performed by: EMERGENCY MEDICINE

## 2023-11-07 PROCEDURE — G0378 HOSPITAL OBSERVATION PER HR: HCPCS

## 2023-11-07 PROCEDURE — 25000003 PHARM REV CODE 250: Performed by: NURSE PRACTITIONER

## 2023-11-07 PROCEDURE — 83605 ASSAY OF LACTIC ACID: CPT | Performed by: NURSE PRACTITIONER

## 2023-11-07 PROCEDURE — 96372 THER/PROPH/DIAG INJ SC/IM: CPT | Performed by: NURSE PRACTITIONER

## 2023-11-07 PROCEDURE — 96374 THER/PROPH/DIAG INJ IV PUSH: CPT

## 2023-11-07 PROCEDURE — 87070 CULTURE OTHR SPECIMN AEROBIC: CPT | Performed by: EMERGENCY MEDICINE

## 2023-11-07 PROCEDURE — 85652 RBC SED RATE AUTOMATED: CPT | Performed by: EMERGENCY MEDICINE

## 2023-11-07 PROCEDURE — 99285 EMERGENCY DEPT VISIT HI MDM: CPT | Mod: 25

## 2023-11-07 PROCEDURE — 85025 COMPLETE CBC W/AUTO DIFF WBC: CPT | Performed by: EMERGENCY MEDICINE

## 2023-11-07 PROCEDURE — 96361 HYDRATE IV INFUSION ADD-ON: CPT

## 2023-11-07 PROCEDURE — 87040 BLOOD CULTURE FOR BACTERIA: CPT | Mod: 59 | Performed by: EMERGENCY MEDICINE

## 2023-11-07 PROCEDURE — 87154 CUL TYP ID BLD PTHGN 6+ TRGT: CPT | Performed by: EMERGENCY MEDICINE

## 2023-11-07 PROCEDURE — 80053 COMPREHEN METABOLIC PANEL: CPT | Performed by: EMERGENCY MEDICINE

## 2023-11-07 PROCEDURE — 87077 CULTURE AEROBIC IDENTIFY: CPT | Mod: 59 | Performed by: EMERGENCY MEDICINE

## 2023-11-07 PROCEDURE — 87186 SC STD MICRODIL/AGAR DIL: CPT | Performed by: EMERGENCY MEDICINE

## 2023-11-07 PROCEDURE — 63600175 PHARM REV CODE 636 W HCPCS

## 2023-11-07 RX ORDER — CLOPIDOGREL BISULFATE 75 MG/1
75 TABLET ORAL DAILY
Status: DISCONTINUED | OUTPATIENT
Start: 2023-11-08 | End: 2023-11-09

## 2023-11-07 RX ORDER — IBUPROFEN 200 MG
16 TABLET ORAL
Status: DISCONTINUED | OUTPATIENT
Start: 2023-11-07 | End: 2023-11-14 | Stop reason: HOSPADM

## 2023-11-07 RX ORDER — IBUPROFEN 200 MG
24 TABLET ORAL
Status: DISCONTINUED | OUTPATIENT
Start: 2023-11-07 | End: 2023-11-14 | Stop reason: HOSPADM

## 2023-11-07 RX ORDER — HYDROCODONE BITARTRATE AND ACETAMINOPHEN 10; 325 MG/1; MG/1
1 TABLET ORAL EVERY 6 HOURS PRN
Status: DISCONTINUED | OUTPATIENT
Start: 2023-11-07 | End: 2023-11-14 | Stop reason: HOSPADM

## 2023-11-07 RX ORDER — AMLODIPINE BESYLATE 5 MG/1
5 TABLET ORAL DAILY
Status: DISCONTINUED | OUTPATIENT
Start: 2023-11-08 | End: 2023-11-14 | Stop reason: HOSPADM

## 2023-11-07 RX ORDER — NALOXONE HCL 0.4 MG/ML
0.02 VIAL (ML) INJECTION
Status: DISCONTINUED | OUTPATIENT
Start: 2023-11-07 | End: 2023-11-14 | Stop reason: HOSPADM

## 2023-11-07 RX ORDER — LANOLIN ALCOHOL/MO/W.PET/CERES
1 CREAM (GRAM) TOPICAL DAILY
Status: DISCONTINUED | OUTPATIENT
Start: 2023-11-08 | End: 2023-11-14 | Stop reason: HOSPADM

## 2023-11-07 RX ORDER — GLUCAGON 1 MG
1 KIT INJECTION
Status: DISCONTINUED | OUTPATIENT
Start: 2023-11-07 | End: 2023-11-14 | Stop reason: HOSPADM

## 2023-11-07 RX ORDER — ISOSORBIDE MONONITRATE 30 MG/1
30 TABLET, EXTENDED RELEASE ORAL DAILY
Status: DISCONTINUED | OUTPATIENT
Start: 2023-11-08 | End: 2023-11-14 | Stop reason: HOSPADM

## 2023-11-07 RX ORDER — INSULIN ASPART 100 [IU]/ML
0-5 INJECTION, SOLUTION INTRAVENOUS; SUBCUTANEOUS
Status: DISCONTINUED | OUTPATIENT
Start: 2023-11-07 | End: 2023-11-14 | Stop reason: HOSPADM

## 2023-11-07 RX ORDER — ONDANSETRON 2 MG/ML
4 INJECTION INTRAMUSCULAR; INTRAVENOUS EVERY 8 HOURS PRN
Status: DISCONTINUED | OUTPATIENT
Start: 2023-11-07 | End: 2023-11-14 | Stop reason: HOSPADM

## 2023-11-07 RX ORDER — SODIUM CHLORIDE 9 MG/ML
1000 INJECTION, SOLUTION INTRAVENOUS
Status: COMPLETED | OUTPATIENT
Start: 2023-11-07 | End: 2023-11-07

## 2023-11-07 RX ORDER — FUROSEMIDE 20 MG/1
20 TABLET ORAL DAILY
Status: DISCONTINUED | OUTPATIENT
Start: 2023-11-08 | End: 2023-11-14 | Stop reason: HOSPADM

## 2023-11-07 RX ORDER — SODIUM CHLORIDE 0.9 % (FLUSH) 0.9 %
3 SYRINGE (ML) INJECTION EVERY 12 HOURS PRN
Status: DISCONTINUED | OUTPATIENT
Start: 2023-11-07 | End: 2023-11-14 | Stop reason: HOSPADM

## 2023-11-07 RX ORDER — PANTOPRAZOLE SODIUM 40 MG/1
40 TABLET, DELAYED RELEASE ORAL 2 TIMES DAILY
Status: DISCONTINUED | OUTPATIENT
Start: 2023-11-07 | End: 2023-11-10

## 2023-11-07 RX ORDER — RANOLAZINE 500 MG/1
1000 TABLET, EXTENDED RELEASE ORAL 2 TIMES DAILY
Status: DISCONTINUED | OUTPATIENT
Start: 2023-11-07 | End: 2023-11-14 | Stop reason: HOSPADM

## 2023-11-07 RX ORDER — TALC
6 POWDER (GRAM) TOPICAL NIGHTLY PRN
Status: DISCONTINUED | OUTPATIENT
Start: 2023-11-07 | End: 2023-11-14 | Stop reason: HOSPADM

## 2023-11-07 RX ORDER — HYDROCODONE BITARTRATE AND ACETAMINOPHEN 5; 325 MG/1; MG/1
1 TABLET ORAL EVERY 6 HOURS PRN
Status: DISCONTINUED | OUTPATIENT
Start: 2023-11-07 | End: 2023-11-14 | Stop reason: HOSPADM

## 2023-11-07 RX ORDER — FLUOXETINE HYDROCHLORIDE 20 MG/1
40 CAPSULE ORAL DAILY
Status: DISCONTINUED | OUTPATIENT
Start: 2023-11-08 | End: 2023-11-14 | Stop reason: HOSPADM

## 2023-11-07 RX ORDER — ATORVASTATIN CALCIUM 40 MG/1
40 TABLET, FILM COATED ORAL NIGHTLY
Status: DISCONTINUED | OUTPATIENT
Start: 2023-11-07 | End: 2023-11-14 | Stop reason: HOSPADM

## 2023-11-07 RX ORDER — ENOXAPARIN SODIUM 100 MG/ML
40 INJECTION SUBCUTANEOUS EVERY 24 HOURS
Status: DISCONTINUED | OUTPATIENT
Start: 2023-11-07 | End: 2023-11-07

## 2023-11-07 RX ORDER — ACETAMINOPHEN 325 MG/1
650 TABLET ORAL EVERY 4 HOURS PRN
Status: DISCONTINUED | OUTPATIENT
Start: 2023-11-07 | End: 2023-11-09

## 2023-11-07 RX ORDER — METOPROLOL SUCCINATE 25 MG/1
25 TABLET, EXTENDED RELEASE ORAL DAILY
Status: DISCONTINUED | OUTPATIENT
Start: 2023-11-08 | End: 2023-11-10

## 2023-11-07 RX ADMIN — ATORVASTATIN CALCIUM 40 MG: 40 TABLET, FILM COATED ORAL at 10:11

## 2023-11-07 RX ADMIN — SODIUM CHLORIDE 1000 ML: 0.9 INJECTION, SOLUTION INTRAVENOUS at 09:11

## 2023-11-07 RX ADMIN — PANTOPRAZOLE SODIUM 40 MG: 40 TABLET, DELAYED RELEASE ORAL at 10:11

## 2023-11-07 RX ADMIN — INSULIN DETEMIR 10 UNITS: 100 INJECTION, SOLUTION SUBCUTANEOUS at 10:11

## 2023-11-07 RX ADMIN — TRAZODONE HYDROCHLORIDE 150 MG: 100 TABLET ORAL at 10:11

## 2023-11-07 RX ADMIN — APIXABAN 5 MG: 5 TABLET, FILM COATED ORAL at 10:11

## 2023-11-07 RX ADMIN — RANOLAZINE 1000 MG: 500 TABLET, FILM COATED, EXTENDED RELEASE ORAL at 10:11

## 2023-11-08 PROBLEM — M86.10 ACUTE OSTEOMYELITIS: Status: ACTIVE | Noted: 2023-11-08

## 2023-11-08 PROBLEM — D64.9 ANEMIA: Status: ACTIVE | Noted: 2023-05-10

## 2023-11-08 PROBLEM — L03.119 CELLULITIS OF FOOT: Chronic | Status: ACTIVE | Noted: 2023-11-07

## 2023-11-08 PROBLEM — E87.1 HYPONATREMIA: Status: ACTIVE | Noted: 2023-11-08

## 2023-11-08 PROBLEM — I70.234 ATHEROSCLEROSIS OF NATIVE ARTERY OF RIGHT LOWER EXTREMITY WITH ULCERATION OF MIDFOOT: Status: ACTIVE | Noted: 2023-04-11

## 2023-11-08 LAB
ACINETOBACTER CALCOACETICUS/BAUMANNII COMPLEX: NOT DETECTED
ANION GAP SERPL CALC-SCNC: 14 MMOL/L (ref 8–16)
BACTERIA #/AREA URNS HPF: ABNORMAL /HPF
BACTEROIDES FRAGILIS: NOT DETECTED
BASOPHILS # BLD AUTO: 0.03 K/UL (ref 0–0.2)
BASOPHILS NFR BLD: 0.3 % (ref 0–1.9)
BILIRUB UR QL STRIP: NEGATIVE
BUN SERPL-MCNC: 16 MG/DL (ref 8–23)
CALCIUM SERPL-MCNC: 9.3 MG/DL (ref 8.7–10.5)
CANDIDA ALBICANS: NOT DETECTED
CANDIDA AURIS: NOT DETECTED
CANDIDA GLABRATA: NOT DETECTED
CANDIDA KRUSEI: NOT DETECTED
CANDIDA PARAPSILOSIS: NOT DETECTED
CANDIDA TROPICALIS: NOT DETECTED
CHLORIDE SERPL-SCNC: 97 MMOL/L (ref 95–110)
CLARITY UR: CLEAR
CO2 SERPL-SCNC: 22 MMOL/L (ref 23–29)
COLOR UR: YELLOW
CREAT SERPL-MCNC: 1 MG/DL (ref 0.5–1.4)
CRYPTOCOCCUS NEOFORMANS/GATTII: NOT DETECTED
CTX-M GENE: ABNORMAL
DIFFERENTIAL METHOD: ABNORMAL
ENTEROBACTER CLOACAE COMPLEX: NOT DETECTED
ENTEROBACTERALES: NOT DETECTED
ENTEROCOCCUS FAECALIS: NOT DETECTED
ENTEROCOCCUS FAECIUM: NOT DETECTED
EOSINOPHIL # BLD AUTO: 0 K/UL (ref 0–0.5)
EOSINOPHIL NFR BLD: 0.1 % (ref 0–8)
ERYTHROCYTE [DISTWIDTH] IN BLOOD BY AUTOMATED COUNT: 18.7 % (ref 11.5–14.5)
ERYTHROCYTE [SEDIMENTATION RATE] IN BLOOD BY PHOTOMETRIC METHOD: 60 MM/HR (ref 0–23)
ESCHERICHIA COLI: NOT DETECTED
EST. GFR  (NO RACE VARIABLE): >60 ML/MIN/1.73 M^2
GLUCOSE SERPL-MCNC: 237 MG/DL (ref 70–110)
GLUCOSE UR QL STRIP: ABNORMAL
HAEMOPHILUS INFLUENZAE: NOT DETECTED
HCT VFR BLD AUTO: 35.8 % (ref 40–54)
HGB BLD-MCNC: 11.9 G/DL (ref 14–18)
HGB UR QL STRIP: ABNORMAL
IMM GRANULOCYTES # BLD AUTO: 0.09 K/UL (ref 0–0.04)
IMM GRANULOCYTES NFR BLD AUTO: 1 % (ref 0–0.5)
IMP GENE: ABNORMAL
KETONES UR QL STRIP: ABNORMAL
KLEBSIELLA AEROGENES: NOT DETECTED
KLEBSIELLA OXYTOCA: NOT DETECTED
KLEBSIELLA PNEUMONIAE GROUP: NOT DETECTED
KPC: ABNORMAL
LACTATE SERPL-SCNC: 1.8 MMOL/L (ref 0.5–2.2)
LEUKOCYTE ESTERASE UR QL STRIP: ABNORMAL
LISTERIA MONOCYTOGENES: NOT DETECTED
LYMPHOCYTES # BLD AUTO: 1.1 K/UL (ref 1–4.8)
LYMPHOCYTES NFR BLD: 11.5 % (ref 18–48)
MCH RBC QN AUTO: 27.1 PG (ref 27–31)
MCHC RBC AUTO-ENTMCNC: 33.2 G/DL (ref 32–36)
MCR-1: ABNORMAL
MCV RBC AUTO: 82 FL (ref 82–98)
MEC A/C AND MREJ (MRSA): DETECTED
MEC A/C: ABNORMAL
MICROSCOPIC COMMENT: ABNORMAL
MONOCYTES # BLD AUTO: 1.3 K/UL (ref 0.3–1)
MONOCYTES NFR BLD: 13.8 % (ref 4–15)
NDM GENE: ABNORMAL
NEISSERIA MENINGITIDIS: NOT DETECTED
NEUTROPHILS # BLD AUTO: 7 K/UL (ref 1.8–7.7)
NEUTROPHILS NFR BLD: 74.3 % (ref 38–73)
NITRITE UR QL STRIP: NEGATIVE
NRBC BLD-RTO: 0 /100 WBC
OXA-48-LIKE: ABNORMAL
PH UR STRIP: 5 [PH] (ref 5–8)
PLATELET # BLD AUTO: 241 K/UL (ref 150–450)
PMV BLD AUTO: 10.4 FL (ref 9.2–12.9)
POCT GLUCOSE: 159 MG/DL (ref 70–110)
POCT GLUCOSE: 167 MG/DL (ref 70–110)
POCT GLUCOSE: 189 MG/DL (ref 70–110)
POCT GLUCOSE: 203 MG/DL (ref 70–110)
POCT GLUCOSE: 280 MG/DL (ref 70–110)
POTASSIUM SERPL-SCNC: 4.3 MMOL/L (ref 3.5–5.1)
PROT UR QL STRIP: ABNORMAL
PROTEUS SPECIES: NOT DETECTED
PSEUDOMONAS AERUGINOSA: NOT DETECTED
RBC # BLD AUTO: 4.39 M/UL (ref 4.6–6.2)
RBC #/AREA URNS HPF: 12 /HPF (ref 0–4)
SALMONELLA SP: NOT DETECTED
SERRATIA MARCESCENS: NOT DETECTED
SODIUM SERPL-SCNC: 133 MMOL/L (ref 136–145)
SP GR UR STRIP: 1.03 (ref 1–1.03)
SQUAMOUS #/AREA URNS HPF: 2 /HPF
STAPHYLOCOCCUS AUREUS: DETECTED
STAPHYLOCOCCUS EPIDERMIDIS: NOT DETECTED
STAPHYLOCOCCUS LUGDUNESIS: NOT DETECTED
STAPHYLOCOCCUS SPECIES: ABNORMAL
STENOTROPHOMONAS MALTOPHILIA: NOT DETECTED
STREPTOCOCCUS AGALACTIAE: NOT DETECTED
STREPTOCOCCUS PNEUMONIAE: NOT DETECTED
STREPTOCOCCUS PYOGENES: NOT DETECTED
STREPTOCOCCUS SPECIES: NOT DETECTED
URN SPEC COLLECT METH UR: ABNORMAL
UROBILINOGEN UR STRIP-ACNC: NEGATIVE EU/DL
VAN A/B: ABNORMAL
VIM GENE: ABNORMAL
WBC # BLD AUTO: 9.42 K/UL (ref 3.9–12.7)
WBC #/AREA URNS HPF: 18 /HPF (ref 0–5)
YEAST URNS QL MICRO: ABNORMAL

## 2023-11-08 PROCEDURE — 25000003 PHARM REV CODE 250: Performed by: FAMILY MEDICINE

## 2023-11-08 PROCEDURE — 87106 FUNGI IDENTIFICATION YEAST: CPT | Performed by: EMERGENCY MEDICINE

## 2023-11-08 PROCEDURE — 25000003 PHARM REV CODE 250: Performed by: HOSPITALIST

## 2023-11-08 PROCEDURE — 99213 OFFICE O/P EST LOW 20 MIN: CPT | Mod: ,,, | Performed by: NURSE PRACTITIONER

## 2023-11-08 PROCEDURE — 25000003 PHARM REV CODE 250: Performed by: STUDENT IN AN ORGANIZED HEALTH CARE EDUCATION/TRAINING PROGRAM

## 2023-11-08 PROCEDURE — 99233 SBSQ HOSP IP/OBS HIGH 50: CPT | Mod: ,,, | Performed by: INTERNAL MEDICINE

## 2023-11-08 PROCEDURE — G0378 HOSPITAL OBSERVATION PER HR: HCPCS

## 2023-11-08 PROCEDURE — 87086 URINE CULTURE/COLONY COUNT: CPT | Performed by: EMERGENCY MEDICINE

## 2023-11-08 PROCEDURE — 96372 THER/PROPH/DIAG INJ SC/IM: CPT | Performed by: NURSE PRACTITIONER

## 2023-11-08 PROCEDURE — 63600175 PHARM REV CODE 636 W HCPCS: Performed by: HOSPITALIST

## 2023-11-08 PROCEDURE — 63600175 PHARM REV CODE 636 W HCPCS: Performed by: FAMILY MEDICINE

## 2023-11-08 PROCEDURE — 99223 1ST HOSP IP/OBS HIGH 75: CPT | Mod: ,,, | Performed by: STUDENT IN AN ORGANIZED HEALTH CARE EDUCATION/TRAINING PROGRAM

## 2023-11-08 PROCEDURE — 63600175 PHARM REV CODE 636 W HCPCS: Performed by: STUDENT IN AN ORGANIZED HEALTH CARE EDUCATION/TRAINING PROGRAM

## 2023-11-08 PROCEDURE — 99213 PR OFFICE/OUTPT VISIT, EST, LEVL III, 20-29 MIN: ICD-10-PCS | Mod: ,,, | Performed by: NURSE PRACTITIONER

## 2023-11-08 PROCEDURE — 81000 URINALYSIS NONAUTO W/SCOPE: CPT | Performed by: EMERGENCY MEDICINE

## 2023-11-08 PROCEDURE — 99223 PR INITIAL HOSPITAL CARE,LEVL III: ICD-10-PCS | Mod: ,,, | Performed by: STUDENT IN AN ORGANIZED HEALTH CARE EDUCATION/TRAINING PROGRAM

## 2023-11-08 PROCEDURE — 25000003 PHARM REV CODE 250: Performed by: NURSE PRACTITIONER

## 2023-11-08 PROCEDURE — 63600175 PHARM REV CODE 636 W HCPCS: Performed by: NURSE PRACTITIONER

## 2023-11-08 PROCEDURE — 85025 COMPLETE CBC W/AUTO DIFF WBC: CPT | Performed by: NURSE PRACTITIONER

## 2023-11-08 PROCEDURE — 83605 ASSAY OF LACTIC ACID: CPT | Performed by: NURSE PRACTITIONER

## 2023-11-08 PROCEDURE — 99233 PR SUBSEQUENT HOSPITAL CARE,LEVL III: ICD-10-PCS | Mod: ,,, | Performed by: INTERNAL MEDICINE

## 2023-11-08 PROCEDURE — 80048 BASIC METABOLIC PNL TOTAL CA: CPT | Performed by: NURSE PRACTITIONER

## 2023-11-08 PROCEDURE — 87088 URINE BACTERIA CULTURE: CPT | Performed by: EMERGENCY MEDICINE

## 2023-11-08 RX ORDER — SODIUM CHLORIDE 9 MG/ML
INJECTION, SOLUTION INTRAVENOUS CONTINUOUS
Status: DISCONTINUED | OUTPATIENT
Start: 2023-11-08 | End: 2023-11-14 | Stop reason: HOSPADM

## 2023-11-08 RX ADMIN — PANTOPRAZOLE SODIUM 40 MG: 40 TABLET, DELAYED RELEASE ORAL at 08:11

## 2023-11-08 RX ADMIN — FUROSEMIDE 20 MG: 20 TABLET ORAL at 08:11

## 2023-11-08 RX ADMIN — VANCOMYCIN HYDROCHLORIDE 1250 MG: 1.25 INJECTION, POWDER, LYOPHILIZED, FOR SOLUTION INTRAVENOUS at 11:11

## 2023-11-08 RX ADMIN — MEROPENEM 1 G: 1 INJECTION, POWDER, FOR SOLUTION INTRAVENOUS at 11:11

## 2023-11-08 RX ADMIN — RANOLAZINE 1000 MG: 500 TABLET, FILM COATED, EXTENDED RELEASE ORAL at 08:11

## 2023-11-08 RX ADMIN — TRAZODONE HYDROCHLORIDE 150 MG: 100 TABLET ORAL at 08:11

## 2023-11-08 RX ADMIN — AMLODIPINE BESYLATE 5 MG: 5 TABLET ORAL at 08:11

## 2023-11-08 RX ADMIN — INSULIN ASPART 3 UNITS: 100 INJECTION, SOLUTION INTRAVENOUS; SUBCUTANEOUS at 06:11

## 2023-11-08 RX ADMIN — MEROPENEM 1 G: 1 INJECTION, POWDER, FOR SOLUTION INTRAVENOUS at 10:11

## 2023-11-08 RX ADMIN — CLOPIDOGREL BISULFATE 75 MG: 75 TABLET ORAL at 08:11

## 2023-11-08 RX ADMIN — PIPERACILLIN AND TAZOBACTAM 4.5 G: 4; .5 INJECTION, POWDER, LYOPHILIZED, FOR SOLUTION INTRAVENOUS; PARENTERAL at 04:11

## 2023-11-08 RX ADMIN — ATORVASTATIN CALCIUM 40 MG: 40 TABLET, FILM COATED ORAL at 08:11

## 2023-11-08 RX ADMIN — INSULIN ASPART 1 UNITS: 100 INJECTION, SOLUTION INTRAVENOUS; SUBCUTANEOUS at 03:11

## 2023-11-08 RX ADMIN — FLUOXETINE 40 MG: 20 CAPSULE ORAL at 08:11

## 2023-11-08 RX ADMIN — APIXABAN 5 MG: 5 TABLET, FILM COATED ORAL at 08:11

## 2023-11-08 RX ADMIN — SODIUM CHLORIDE: 9 INJECTION, SOLUTION INTRAVENOUS at 04:11

## 2023-11-08 RX ADMIN — Medication 6 MG: at 08:11

## 2023-11-08 RX ADMIN — INSULIN DETEMIR 10 UNITS: 100 INJECTION, SOLUTION SUBCUTANEOUS at 08:11

## 2023-11-08 RX ADMIN — METOPROLOL SUCCINATE 25 MG: 25 TABLET, EXTENDED RELEASE ORAL at 08:11

## 2023-11-08 RX ADMIN — ISOSORBIDE MONONITRATE 30 MG: 30 TABLET, EXTENDED RELEASE ORAL at 08:11

## 2023-11-08 RX ADMIN — FERROUS SULFATE TAB 325 MG (65 MG ELEMENTAL FE) 1 EACH: 325 (65 FE) TAB at 08:11

## 2023-11-08 NOTE — ASSESSMENT & PLAN NOTE
- SBP 100s-120s  - on Imdur and Toprol XL  - goal BP less than 130/80  - BP well controlled; continue medication regimen and monitor BP and adjust meds prn

## 2023-11-08 NOTE — PROGRESS NOTES
"Pharmacokinetic Initial Assessment: IV Vancomycin    Assessment/Plan:    Initiate intravenous vancomycin with loading dose of 2250 mg once followed by a maintenance dose of vancomycin 1250 mg IV every 24 hours  Desired empiric serum trough concentration is 10 to 15 mcg/mL  Draw vancomycin trough level 60 min prior to third dose on 11/9/23 at approximately 2100  Pharmacy will continue to follow and monitor vancomycin.      Please contact pharmacy at extension [phone] with any questions regarding this assessment.     Thank you for the consult,   Dave Quevedoon       Patient brief summary:  Julien Meléndez is a 83 y.o. male initiated on antimicrobial therapy with IV Vancomycin for treatment of suspected skin & soft tissue infection    Drug Allergies:   Review of patient's allergies indicates:   Allergen Reactions    Nystatin      Other reaction(s): Unknown       Actual Body Weight:   85.7 kg    Renal Function:   Estimated Creatinine Clearance: 48.7 mL/min (based on SCr of 1.3 mg/dL).,     Dialysis Method (if applicable):  N/A    CBC (last 72 hours):  Recent Labs   Lab Result Units 11/07/23  2022   WBC K/uL 12.71*   Hemoglobin g/dL 13.2*   Hematocrit % 39.8*   Platelets K/uL 294   Gran % % 78.6*   Lymph % % 10.1*   Mono % % 10.9   Eosinophil % % 0.0   Basophil % % 0.4   Differential Method  Automated       Metabolic Panel (last 72 hours):  Recent Labs   Lab Result Units 11/07/23  2044   Sodium mmol/L 132*   Potassium mmol/L 4.4   Chloride mmol/L 95   CO2 mmol/L 15*   Glucose mg/dL 318*   BUN mg/dL 20   Creatinine mg/dL 1.3   Albumin g/dL 3.1*   Total Bilirubin mg/dL 0.8   Alkaline Phosphatase U/L 96   AST U/L 10   ALT U/L 15       Drug levels (last 3 results):  No results for input(s): "VANCOMYCINRA", "VANCORANDOM", "VANCOMYCINPE", "VANCOPEAK", "VANCOMYCINTR", "VANCOTROUGH" in the last 72 hours.    Microbiologic Results:  Microbiology Results (last 7 days)       Procedure Component Value Units Date/Time    Aerobic culture " (Specify Source) **CANNOT BE ORDERED AS STAT** [[id number]] Collected: 11/07/23 2208    Order Status: Sent Specimen: Wound from Toe, Right Foot Updated: 11/07/23 2212    Blood culture [[id number]] Collected: 11/07/23 2021    Order Status: Sent Specimen: Blood from Peripheral, Antecubital, Right Updated: 11/07/23 2022    Blood culture [[id number]] Collected: 11/07/23 2021    Order Status: Sent Specimen: Blood from Peripheral, Antecubital, Right Updated: 11/07/23 2022

## 2023-11-08 NOTE — SUBJECTIVE & OBJECTIVE
Interval History: still have drainage    Review of Systems   All other systems reviewed and are negative.    Objective:     Vital Signs (Most Recent):  Temp: 97.9 °F (36.6 °C) (11/08/23 1521)  Pulse: 88 (11/08/23 1521)  Resp: 18 (11/08/23 1521)  BP: 115/67 (11/08/23 1521)  SpO2: 96 % (11/08/23 1521) Vital Signs (24h Range):  Temp:  [97.9 °F (36.6 °C)-99.2 °F (37.3 °C)] 97.9 °F (36.6 °C)  Pulse:  [] 88  Resp:  [14-33] 18  SpO2:  [94 %-100 %] 96 %  BP: (101-155)/(52-88) 115/67     Weight: 76.3 kg (168 lb 3.4 oz)  Body mass index is 22.19 kg/m².    Intake/Output Summary (Last 24 hours) at 11/8/2023 1652  Last data filed at 11/8/2023 1230  Gross per 24 hour   Intake 1922.89 ml   Output 100 ml   Net 1822.89 ml         Physical Exam  Vitals and nursing note reviewed.   Constitutional:       General: He is not in acute distress.     Appearance: He is well-developed.   HENT:      Head: Normocephalic and atraumatic.      Nose: Nose normal.   Eyes:      Conjunctiva/sclera: Conjunctivae normal.   Cardiovascular:      Rate and Rhythm: Normal rate and regular rhythm.      Heart sounds: Normal heart sounds. No murmur heard.  Pulmonary:      Effort: Pulmonary effort is normal.      Breath sounds: Normal breath sounds. No wheezing.   Abdominal:      General: Bowel sounds are normal.      Palpations: Abdomen is soft. There is no mass.      Tenderness: There is no abdominal tenderness. There is no guarding or rebound.   Musculoskeletal:         General: Normal range of motion.      Cervical back: Normal range of motion and neck supple.      Comments: right hallux ulcer with bone exposure and purulent drainage noted.    Skin:     General: Skin is warm and dry.      Findings: No rash.   Neurological:      Mental Status: He is alert and oriented to person, place, and time.   Psychiatric:         Behavior: Behavior normal.             Significant Labs: All pertinent labs within the past 24 hours have been reviewed.  A1C:   Recent  Labs   Lab 10/23/23  1426   HGBA1C 9.7*     BMP:   Recent Labs   Lab 11/08/23  0245   *   *   K 4.3   CL 97   CO2 22*   BUN 16   CREATININE 1.0   CALCIUM 9.3     CBC:   Recent Labs   Lab 11/07/23 2022 11/08/23  0245   WBC 12.71* 9.42   HGB 13.2* 11.9*   HCT 39.8* 35.8*    241     CMP:   Recent Labs   Lab 11/07/23 2044 11/08/23  0245   * 133*   K 4.4 4.3   CL 95 97   CO2 15* 22*   * 237*   BUN 20 16   CREATININE 1.3 1.0   CALCIUM 10.1 9.3   PROT 7.6  --    ALBUMIN 3.1*  --    BILITOT 0.8  --    ALKPHOS 96  --    AST 10  --    ALT 15  --    ANIONGAP 22* 14       Significant Imaging: I have reviewed all pertinent imaging results/findings within the past 24 hours.  I have reviewed and interpreted all pertinent imaging results/findings within the past 24 hours.

## 2023-11-08 NOTE — ASSESSMENT & PLAN NOTE
Patient presents with worsening right big toe wound with purulence and odor  Elevated CRP, WBC  Xray without signs of new or worsening bone loss or significant change of ulceration and destructive changes of the distal metatarsal head of the 1st toe of the right foot  Elevated lactate    Plan:  Continue IV vancomycin and Zosyn  Consult podiatry  Consider MRI of foot to assess for new onset osteomyelitis

## 2023-11-08 NOTE — SUBJECTIVE & OBJECTIVE
Scheduled Meds:   amLODIPine  5 mg Oral Daily    apixaban  5 mg Oral BID    atorvastatin  40 mg Oral QHS    clopidogreL  75 mg Oral Daily    ferrous sulfate  1 tablet Oral Daily    FLUoxetine  40 mg Oral Daily    furosemide  20 mg Oral Daily    insulin detemir U-100 (Levemir)  10 Units Subcutaneous QHS    isosorbide mononitrate  30 mg Oral Daily    meropenem (MERREM) IVPB  1 g Intravenous Q8H    metoprolol succinate  25 mg Oral Daily    pantoprazole  40 mg Oral BID    ranolazine  1,000 mg Oral BID    traZODone  150 mg Oral QHS    vancomycin (VANCOCIN) IV (PEDS and ADULTS)  1,250 mg Intravenous Q24H     Continuous Infusions:   sodium chloride 0.9% 10 mL/hr at 11/08/23 0404     PRN Meds:acetaminophen, dextrose 10%, dextrose 10%, glucagon (human recombinant), glucose, glucose, HYDROcodone-acetaminophen, HYDROcodone-acetaminophen, influenza 65up-adj, insulin aspart U-100, melatonin, naloxone, ondansetron, sodium chloride 0.9%, Pharmacy to dose Vancomycin consult **AND** vancomycin - pharmacy to dose    Review of patient's allergies indicates:   Allergen Reactions    Nystatin      Other reaction(s): Unknown        Past Medical History:   Diagnosis Date    Diabetes mellitus     Hiatal hernia     under Dr Saenz' care    Hyperlipidemia     Hypertension     MI (myocardial infarction)     Sleep apnea      Past Surgical History:   Procedure Laterality Date    ABDOMINAL HERNIA REPAIR      ANGIOGRAM, EXTREMITY, BILATERAL Bilateral 4/5/2023    Procedure: ANGIOGRAM, EXTREMITY, BILATERAL;  Surgeon: Michael Giraldo III, MD;  Location: [facility] CATH LAB;  Service: Cardiology;  Laterality: Bilateral;    ANGIOGRAPHY OF LOWER EXTREMITY Left 4/12/2023    Procedure: Angiogram Extremity Unilateral;  Surgeon: Michael Giraldo III, MD;  Location: [facility] CATH LAB;  Service: Cardiology;  Laterality: Left;    AORTOGRAPHY WITH SERIALOGRAPHY Bilateral 4/5/2023    Procedure: AORTOGRAM, WITH SERIALOGRAPHY;  Surgeon: Michael Giraldo III, MD;   Location: [facility] CATH LAB;  Service: Cardiology;  Laterality: Bilateral;    APPLICATION OF WOUND VACUUM-ASSISTED CLOSURE DEVICE Left 3/29/2023    Procedure: APPLICATION, WOUND VAC;  Surgeon: Alona iPerce DPM;  Location: [facility] OR;  Service: Podiatry;  Laterality: Left;    APPLICATION OF WOUND VACUUM-ASSISTED CLOSURE DEVICE Left 4/4/2023    Procedure: APPLICATION, WOUND VAC;  Surgeon: Alona Pierce DPM;  Location: [facility] OR;  Service: Podiatry;  Laterality: Left;    APPLICATION OF WOUND VACUUM-ASSISTED CLOSURE DEVICE Left 4/13/2023    Procedure: APPLICATION, WOUND VAC;  Surgeon: Alona Pierce DPM;  Location: [facility] OR;  Service: Podiatry;  Laterality: Left;    BONE BIOPSY Left 3/29/2023    Procedure: BIOPSY, BONE;  Surgeon: Alona Pierce DPM;  Location: [facility] OR;  Service: Podiatry;  Laterality: Left;  1st met    CARDIAC CATHETERIZATION      3 stents placed    CATARACT EXTRACTION, BILATERAL      CLOSURE DEVICE Left 4/12/2023    Procedure: Placement of Closure Device;  Surgeon: Michael Giraldo III, MD;  Location: [facility] CATH LAB;  Service: Cardiology;  Laterality: Left;    CORONARY ANGIOGRAPHY N/A 10/3/2019    Procedure: ANGIOGRAM, CORONARY ARTERY;  Surgeon: Edwin Azul MD;  Location: [facility] CATH LAB/EP;  Service: Cardiology;  Laterality: N/A;    DEBRIDEMENT OF FOOT Left 3/29/2023    Procedure: DEBRIDEMENT, FOOT;  Surgeon: Alona Pierce DPM;  Location: [facility] OR;  Service: Podiatry;  Laterality: Left;    DEBRIDEMENT OF FOOT Left 4/4/2023    Procedure: DEBRIDEMENT, FOOT;  Surgeon: Alona Pierce DPM;  Location: [facility] OR;  Service: Podiatry;  Laterality: Left;    DEBRIDEMENT OF FOOT Left 4/13/2023    Procedure: DEBRIDEMENT, FOOT;  Surgeon: Alona Pierce DPM;  Location: [facility] OR;  Service: Podiatry;  Laterality: Left;    ESOPHAGOGASTRODUODENOSCOPY N/A 4/17/2023    Procedure: EGD (ESOPHAGOGASTRODUODENOSCOPY);  Surgeon: Otto Villarreal MD;  Location: [facility] ENDO;  Service: Endoscopy;  Laterality: N/A;    HERNIA REPAIR      inguinal     IVUS (INTRAVASCULAR ULTRASOUND) Left 4/12/2023    Procedure: ULTRASOUND, INTRAVASCULAR;  Surgeon: Michael Giraldo III, MD;  Location: [facility] CATH LAB;  Service: Cardiology;  Laterality: Left;    LEFT HEART CATHETERIZATION Left 9/13/2019    Procedure: Left heart cath;  Surgeon: Edwin Azul MD;  Location: [facility] CATH LAB/EP;  Service: Cardiology;  Laterality: Left;    LEFT HEART CATHETERIZATION N/A 10/3/2019    Procedure: Left heart cath;  Surgeon: Edwin Azul MD;  Location: [facility] CATH LAB/EP;  Service: Cardiology;  Laterality: N/A;    OSTEOTOMY Left 6/7/2023    Procedure: OSTEOTOMY;  Surgeon: Alona Pierce DPM;  Location: [facility] OR;  Service: Podiatry;  Laterality: Left;    OSTEOTOMY OF METATARSAL BONE Right 4/4/2023    Procedure: OSTEOTOMY, METATARSAL BONE;  Surgeon: Alona Pierce DPM;  Location: [facility] OR;  Service: Podiatry;  Laterality: Right;    PERCUTANEOUS TRANSLUMINAL ANGIOPLASTY Left 4/12/2023    Procedure: PTA (ANGIOPLASTY, PERCUTANEOUS, TRANSLUMINAL);  Surgeon: Michael Giraldo III, MD;  Location: [facility] CATH LAB;  Service: Cardiology;  Laterality: Left;    SURGICAL REMOVAL OF BUNION WITH OSTEOTOMY OF METATARSAL BONE Right 4/4/2023    Procedure: BUNIONECTOMY, WITH METATARSAL OSTEOTOMY;  Surgeon: Alona Pierce DPM;  Location: [facility] OR;  Service: Podiatry;  Laterality: Right;    TOE AMPUTATION Left 4/4/2023    Procedure: AMPUTATION, TOE;  Surgeon: Alona Pierce DPM;  Location: [facility] OR;  Service: Podiatry;  Laterality: Left;  1st ray    WASHOUT Left 6/7/2023    Procedure: WASHOUT;  Surgeon: Alona Pierce DPM;  Location: [facility] OR;  Service: Podiatry;  Laterality: Left;    WOUND DEBRIDEMENT Left 6/7/2023    Procedure: DEBRIDEMENT, WOUND;  Surgeon: Alona Pierce DPM;  Location: [facility] OR;  Service: Podiatry;  Laterality: Left;       Family History       Problem Relation (Age of Onset)    Heart disease Mother, Sister, Brother, Maternal Uncle, Brother, Sister, Maternal Uncle, Maternal Uncle, Maternal Uncle     Stroke Mother          Tobacco Use    Smoking status: Never    Smokeless tobacco: Never   Substance and Sexual Activity    Alcohol use: No    Drug use: No    Sexual activity: Not on file     Review of Systems   Constitutional:  Negative for activity change, chills, diaphoresis and fever.   HENT:  Negative for congestion and hearing loss.    Respiratory:  Negative for cough and shortness of breath.    Cardiovascular:  Negative for leg swelling.   Gastrointestinal:  Positive for nausea and vomiting.   Skin:  Positive for color change and wound. Negative for pallor and rash.   Neurological:  Positive for numbness. Negative for tremors, speech difficulty and weakness.   Psychiatric/Behavioral:  Negative for agitation and confusion. The patient is not nervous/anxious.      Objective:     Vital Signs (Most Recent):  Temp: 97.9 °F (36.6 °C) (11/08/23 1521)  Pulse: 88 (11/08/23 1521)  Resp: 18 (11/08/23 1521)  BP: 115/67 (11/08/23 1521)  SpO2: 96 % (11/08/23 1521) Vital Signs (24h Range):  Temp:  [97.9 °F (36.6 °C)-99.2 °F (37.3 °C)] 97.9 °F (36.6 °C)  Pulse:  [] 88  Resp:  [14-33] 18  SpO2:  [94 %-100 %] 96 %  BP: (101-155)/(52-88) 115/67     Weight: 76.3 kg (168 lb 3.4 oz)  Body mass index is 22.19 kg/m².    Foot Exam    General  General Appearance: appears stated age and healthy   Orientation: alert and oriented to person, place, and time   Affect: appropriate       Right Foot/Ankle     Inspection and Palpation  Ecchymosis: none  Tenderness: none   Swelling: none   Skin Exam: ulcer;     Neurovascular  Dorsalis pedis: 1+  Posterior tibial: 1+  Saphenous nerve sensation: diminished  Tibial nerve sensation: diminished  Superficial peroneal nerve sensation: diminished  Deep peroneal nerve sensation: diminished  Sural nerve sensation: diminished      Left Foot/Ankle      Inspection and Palpation  Ecchymosis: none  Tenderness: none   Swelling: none   Skin Exam: skin intact;     Neurovascular  Dorsalis pedis:  1+  Posterior tibial: 1+  Saphenous nerve sensation: diminished  Tibial nerve sensation: diminished  Superficial peroneal nerve sensation: diminished  Deep peroneal nerve sensation: diminished  Sural nerve sensation: diminished          Laboratory:  A1C:   Recent Labs   Lab 10/23/23  1426   HGBA1C 9.7*     Blood Cultures:   Recent Labs   Lab 11/07/23 2021   LABBLOO No Growth to date  No Growth to date     CBC:   Recent Labs   Lab 11/08/23  0245   WBC 9.42   RBC 4.39*   HGB 11.9*   HCT 35.8*      MCV 82   MCH 27.1   MCHC 33.2     CMP:   Recent Labs   Lab 11/07/23 2044 11/08/23 0245   * 237*   CALCIUM 10.1 9.3   ALBUMIN 3.1*  --    PROT 7.6  --    * 133*   K 4.4 4.3   CO2 15* 22*   CL 95 97   BUN 20 16   CREATININE 1.3 1.0   ALKPHOS 96  --    ALT 15  --    AST 10  --    BILITOT 0.8  --      CRP:   Recent Labs   Lab 11/07/23 2044   .9*     ESR:   Recent Labs   Lab 11/07/23 2022   SEDRATE 60*     Wound Cultures:   Recent Labs   Lab 08/22/23  0210 10/31/23  1500   LABAERO ESCHERICHIA COLI ESBL  Many  *  PROVIDENCIA STUARTII  Many  * METHICILLIN RESISTANT STAPHYLOCOCCUS AUREUS  Moderate  *     Microbiology Results (last 7 days)       Procedure Component Value Units Date/Time    Blood culture [[id number]] Collected: 11/07/23 2021    Order Status: Completed Specimen: Blood from Peripheral, Antecubital, Right Updated: 11/08/23 0715     Blood Culture, Routine No Growth to date    Blood culture [[id number]] Collected: 11/07/23 2021    Order Status: Completed Specimen: Blood from Peripheral, Antecubital, Right Updated: 11/08/23 0715     Blood Culture, Routine No Growth to date    Urine culture [[id number]] Collected: 11/08/23 0410    Order Status: No result Specimen: Urine Updated: 11/08/23 0431    Aerobic culture (Specify Source) **CANNOT BE ORDERED AS STAT** [[id number]] Collected: 11/07/23 2208    Order Status: Sent Specimen: Wound from Toe, Right Foot Updated: 11/08/23 0030           Specimen (24h ago, onward)      None            Diagnostic Results:  MRI: I have reviewed all pertinent results/findings within the past 24 hours.  ordered  X-Ray: I have reviewed all pertinent results/findings within the past 24 hours.  ordered    Clinical Findings:

## 2023-11-08 NOTE — SUBJECTIVE & OBJECTIVE
Past Medical History:   Diagnosis Date    Diabetes mellitus     Hiatal hernia     under Dr Saenz' care    Hyperlipidemia     Hypertension     MI (myocardial infarction)     Sleep apnea        Past Surgical History:   Procedure Laterality Date    ABDOMINAL HERNIA REPAIR      ANGIOGRAM, EXTREMITY, BILATERAL Bilateral 4/5/2023    Procedure: ANGIOGRAM, EXTREMITY, BILATERAL;  Surgeon: Michael Giraldo III, MD;  Location: [facility] CATH LAB;  Service: Cardiology;  Laterality: Bilateral;    ANGIOGRAPHY OF LOWER EXTREMITY Left 4/12/2023    Procedure: Angiogram Extremity Unilateral;  Surgeon: Michael Giraldo III, MD;  Location: [facility] CATH LAB;  Service: Cardiology;  Laterality: Left;    AORTOGRAPHY WITH SERIALOGRAPHY Bilateral 4/5/2023    Procedure: AORTOGRAM, WITH SERIALOGRAPHY;  Surgeon: Michael Giraldo III, MD;  Location: [facility] CATH LAB;  Service: Cardiology;  Laterality: Bilateral;    APPLICATION OF WOUND VACUUM-ASSISTED CLOSURE DEVICE Left 3/29/2023    Procedure: APPLICATION, WOUND VAC;  Surgeon: Alona Pierce DPM;  Location: [facility] OR;  Service: Podiatry;  Laterality: Left;    APPLICATION OF WOUND VACUUM-ASSISTED CLOSURE DEVICE Left 4/4/2023    Procedure: APPLICATION, WOUND VAC;  Surgeon: Alona Pierce DPM;  Location: [facility] OR;  Service: Podiatry;  Laterality: Left;    APPLICATION OF WOUND VACUUM-ASSISTED CLOSURE DEVICE Left 4/13/2023    Procedure: APPLICATION, WOUND VAC;  Surgeon: Alona Pierce DPM;  Location: [facility] OR;  Service: Podiatry;  Laterality: Left;    BONE BIOPSY Left 3/29/2023    Procedure: BIOPSY, BONE;  Surgeon: Alona Pierce DPM;  Location: [facility] OR;  Service: Podiatry;  Laterality: Left;  1st met    CARDIAC CATHETERIZATION      3 stents placed    CATARACT EXTRACTION, BILATERAL      CLOSURE DEVICE Left 4/12/2023    Procedure: Placement of Closure Device;  Surgeon: Michael Giraldo III, MD;  Location: [facility] CATH LAB;  Service: Cardiology;  Laterality: Left;    CORONARY ANGIOGRAPHY N/A 10/3/2019     Procedure: ANGIOGRAM, CORONARY ARTERY;  Surgeon: Edwin Azul MD;  Location: [facility] CATH LAB/EP;  Service: Cardiology;  Laterality: N/A;    DEBRIDEMENT OF FOOT Left 3/29/2023    Procedure: DEBRIDEMENT, FOOT;  Surgeon: Alona Pierce DPM;  Location: [facility] OR;  Service: Podiatry;  Laterality: Left;    DEBRIDEMENT OF FOOT Left 4/4/2023    Procedure: DEBRIDEMENT, FOOT;  Surgeon: Alona Pierce DPM;  Location: [facility] OR;  Service: Podiatry;  Laterality: Left;    DEBRIDEMENT OF FOOT Left 4/13/2023    Procedure: DEBRIDEMENT, FOOT;  Surgeon: Alona Pierce DPM;  Location: [facility] OR;  Service: Podiatry;  Laterality: Left;    ESOPHAGOGASTRODUODENOSCOPY N/A 4/17/2023    Procedure: EGD (ESOPHAGOGASTRODUODENOSCOPY);  Surgeon: Otto Villarreal MD;  Location: [facility];  Service: Endoscopy;  Laterality: N/A;    HERNIA REPAIR      inguinal    IVUS (INTRAVASCULAR ULTRASOUND) Left 4/12/2023    Procedure: ULTRASOUND, INTRAVASCULAR;  Surgeon: Michael Giraldo III, MD;  Location: [facility] CATH LAB;  Service: Cardiology;  Laterality: Left;    LEFT HEART CATHETERIZATION Left 9/13/2019    Procedure: Left heart cath;  Surgeon: Edwin Azul MD;  Location: [facility] CATH LAB/EP;  Service: Cardiology;  Laterality: Left;    LEFT HEART CATHETERIZATION N/A 10/3/2019    Procedure: Left heart cath;  Surgeon: Edwin Azul MD;  Location: [facility] CATH LAB/EP;  Service: Cardiology;  Laterality: N/A;    OSTEOTOMY Left 6/7/2023    Procedure: OSTEOTOMY;  Surgeon: Alona Pierce DPM;  Location: [facility] OR;  Service: Podiatry;  Laterality: Left;    OSTEOTOMY OF METATARSAL BONE Right 4/4/2023    Procedure: OSTEOTOMY, METATARSAL BONE;  Surgeon: Alona Pierce DPM;  Location: [facility] OR;  Service: Podiatry;  Laterality: Right;    PERCUTANEOUS TRANSLUMINAL ANGIOPLASTY Left 4/12/2023    Procedure: PTA (ANGIOPLASTY, PERCUTANEOUS, TRANSLUMINAL);  Surgeon: Michael Giraldo III, MD;  Location: [facility] CATH LAB;  Service: Cardiology;  Laterality: Left;    SURGICAL REMOVAL OF BUNION WITH  OSTEOTOMY OF METATARSAL BONE Right 4/4/2023    Procedure: BUNIONECTOMY, WITH METATARSAL OSTEOTOMY;  Surgeon: Alona Pierce DPM;  Location: [facility] OR;  Service: Podiatry;  Laterality: Right;    TOE AMPUTATION Left 4/4/2023    Procedure: AMPUTATION, TOE;  Surgeon: Alona Pierce DPM;  Location: [facility] OR;  Service: Podiatry;  Laterality: Left;  1st ray    WASHOUT Left 6/7/2023    Procedure: WASHOUT;  Surgeon: Alona Pierce DPM;  Location: [facility] OR;  Service: Podiatry;  Laterality: Left;    WOUND DEBRIDEMENT Left 6/7/2023    Procedure: DEBRIDEMENT, WOUND;  Surgeon: Alona Pierce DPM;  Location: [facility] OR;  Service: Podiatry;  Laterality: Left;       Review of patient's allergies indicates:   Allergen Reactions    Nystatin      Other reaction(s): Unknown       No current facility-administered medications on file prior to encounter.     Current Outpatient Medications on File Prior to Encounter   Medication Sig    amLODIPine (NORVASC) 5 MG tablet     atorvastatin (LIPITOR) 40 MG tablet Take 1 tablet (40 mg total) by mouth once daily. (Patient taking differently: Take 40 mg by mouth every evening.)    collagenase (SANTYL) ointment Apply topically once daily.    ELIQUIS 5 mg Tab Take 5 mg by mouth 2 (two) times daily.    famotidine (PEPCID) 20 MG tablet Take 1 tablet (20 mg total) by mouth 2 (two) times daily.    ferrous sulfate (FEOSOL) 325 mg (65 mg iron) Tab tablet Take 325 mg by mouth daily with breakfast.    furosemide (LASIX) 20 MG tablet Take 1 tablet (20 mg total) by mouth once daily.    isosorbide mononitrate (IMDUR) 30 MG 24 hr tablet TAKE 1 TABLET EVERY DAY (Patient taking differently: Take 30 mg by mouth once daily.)    metoprolol succinate (TOPROL-XL) 25 MG 24 hr tablet TAKE 1 TABLET EVERY DAY    nitroGLYCERIN (NITROSTAT) 0.4 MG SL tablet Place 0.4 mg under the tongue every 5 (five) minutes as needed for Chest pain.    ondansetron (ZOFRAN-ODT) 8 MG TbDL Take 1 tablet (8 mg total) by mouth 3 (three) times daily as  needed (nausea or vomiting).    oxyCODONE-acetaminophen (PERCOCET) 5-325 mg per tablet Take 1 tablet by mouth every 6 (six) hours as needed for Pain.    pantoprazole (PROTONIX) 40 MG tablet Take 1 tablet (40 mg total) by mouth 2 (two) times daily.    ranolazine (RANEXA) 1,000 mg Tb12 Take 1,000 mg by mouth 2 (two) times daily.    SITagliptan-metformin (JANUMET XR) 100-1,000 mg TM24 Take 1 tablet by mouth once daily.    sucralfate (CARAFATE) 1 gram tablet Take 1 tablet (1 g total) by mouth 4 (four) times daily before meals and nightly.    traZODone (DESYREL) 150 MG tablet TAKE 1 TABLET EVERY NIGHT    alcohol swabs PadM Apply 1 each topically as needed (for use with glucose monitoring).    blood sugar diagnostic (TRUE METRIX GLUCOSE TEST STRIP) Strp 1 strip by Misc.(Non-Drug; Combo Route) route 2 (two) times a day.    blood-glucose meter (TRUE METRIX GLUCOSE METER) Misc TEST TWICE A DAY    ciprofloxacin HCl (CIPRO) 500 MG tablet Take 1 tablet (500 mg total) by mouth 2 (two) times daily. for 7 days    clopidogreL (PLAVIX) 75 mg tablet Take 1 tablet (75 mg total) by mouth once daily.    FLUoxetine 40 MG capsule Take 40 mg by mouth once daily.    insulin detemir U-100, Levemir, 100 unit/mL (3 mL) SubQ InPn pen Inject 20 Units into the skin every evening.    lancets 28 gauge Misc 1 lancet  by Misc.(Non-Drug; Combo Route) route 2 (two) times a day.    mupirocin (BACTROBAN) 2 % ointment Apply topically 3 (three) times daily.     Family History       Problem Relation (Age of Onset)    Heart disease Mother, Sister, Brother, Maternal Uncle, Brother, Sister, Maternal Uncle, Maternal Uncle, Maternal Uncle    Stroke Mother          Tobacco Use    Smoking status: Never    Smokeless tobacco: Never   Substance and Sexual Activity    Alcohol use: No    Drug use: No    Sexual activity: Not on file     Review of Systems   Constitutional:  Positive for activity change. Negative for fever.   HENT:  Negative for facial swelling and  hearing loss.    Eyes:  Negative for pain and redness.   Respiratory:  Negative for shortness of breath and stridor.    Cardiovascular:  Negative for chest pain and leg swelling.   Gastrointestinal:  Positive for nausea and vomiting. Negative for constipation and diarrhea.   Endocrine: Negative for polydipsia and polyphagia.   Genitourinary:  Negative for frequency and genital sores.   Musculoskeletal:  Positive for myalgias. Negative for joint swelling.   Skin:  Positive for wound. Negative for rash.   Allergic/Immunologic: Negative for food allergies and immunocompromised state.   Neurological:  Negative for light-headedness and headaches.   Hematological:  Negative for adenopathy. Does not bruise/bleed easily.   Psychiatric/Behavioral:  Negative for self-injury. The patient is not nervous/anxious.      Objective:     Vital Signs (Most Recent):  Temp: 98.3 °F (36.8 °C) (11/08/23 0028)  Pulse: 102 (11/08/23 0028)  Resp: 16 (11/08/23 0028)  BP: (!) 101/52 (11/08/23 0028)  SpO2: 96 % (11/08/23 0028) Vital Signs (24h Range):  Temp:  [98.3 °F (36.8 °C)-99.2 °F (37.3 °C)] 98.3 °F (36.8 °C)  Pulse:  [] 102  Resp:  [14-33] 16  SpO2:  [95 %-100 %] 96 %  BP: (101-155)/(52-88) 101/52     Weight: 78.4 kg (172 lb 13.5 oz)  Body mass index is 22.8 kg/m².     Physical Exam  Constitutional:       General: He is not in acute distress.     Appearance: Normal appearance. He is ill-appearing.   HENT:      Head: Normocephalic and atraumatic.      Right Ear: There is no impacted cerumen.      Left Ear: Tympanic membrane normal. There is no impacted cerumen.      Nose: No congestion or rhinorrhea.      Mouth/Throat:      Pharynx: No oropharyngeal exudate or posterior oropharyngeal erythema.   Eyes:      General:         Right eye: No discharge.         Left eye: No discharge.   Cardiovascular:      Rate and Rhythm: Normal rate.      Heart sounds: No murmur heard.     No gallop.   Abdominal:      Tenderness: There is no abdominal  "tenderness. There is no guarding or rebound.      Hernia: No hernia is present.   Musculoskeletal:         General: No deformity.      Cervical back: No rigidity.      Right lower leg: No edema.      Comments: Right toe/foot wound   Lymphadenopathy:      Cervical: No cervical adenopathy.   Skin:     Findings: No bruising or lesion.   Neurological:      Mental Status: He is alert.      Motor: No weakness.      Gait: Gait normal.   Psychiatric:         Mood and Affect: Mood normal.         Behavior: Behavior normal.                Significant Labs: All pertinent labs within the past 24 hours have been reviewed.  Bilirubin:   Recent Labs   Lab 11/02/23 2159 11/07/23  2044   BILITOT 0.7 0.8     Blood Culture: No results for input(s): "LABBLOO" in the last 48 hours.  BMP:   Recent Labs   Lab 11/07/23 2044   *   *   K 4.4   CL 95   CO2 15*   BUN 20   CREATININE 1.3   CALCIUM 10.1     CBC:   Recent Labs   Lab 11/07/23 2022   WBC 12.71*   HGB 13.2*   HCT 39.8*        CMP:   Recent Labs   Lab 11/07/23 2044   *   K 4.4   CL 95   CO2 15*   *   BUN 20   CREATININE 1.3   CALCIUM 10.1   PROT 7.6   ALBUMIN 3.1*   BILITOT 0.8   ALKPHOS 96   AST 10   ALT 15   ANIONGAP 22*     Respiratory Culture: No results for input(s): "GSRESP", "RESPIRATORYC" in the last 48 hours.  Troponin: No results for input(s): "TROPONINI", "TROPONINIHS" in the last 48 hours.  TSH:   Recent Labs   Lab 05/26/23  1405   TSH 1.280       Significant Imaging: I have reviewed all pertinent imaging results/findings within the past 24 hours.  I have reviewed and interpreted all pertinent imaging results/findings within the past 24 hours.  "

## 2023-11-08 NOTE — HPI
84yo male with CAD s/p RCA and LCX PCI, CKD stage III, cellulitis, HTN, HLP, DMII, afib on Eliquis, anemia, hyponatremia,chronic systolic heart failure and PAD s/p left peroneal PTA 4/2023 (Dr. Giraldo) and RLE intervention 3 weeks ago by Dr. Martin who presented to the ER with right great toe wound. He was seen in wound care and directed to the ER due to concern of worsening right toe wound/biopsy. He denies any fever or chills. His wife is uncertain if the wound is worsening. He complains of pain to the toe when attempting to ambulate but denies any pain at rest. Labs CBC and BMP WNL. Lactic acid 2.8 down to 1.9. Started on IV Vanc and Merrem and admitted to [facility]. Cardiology consulted for evaluation of PAD/CLI. BLE arterial ultrasound pending

## 2023-11-08 NOTE — ASSESSMENT & PLAN NOTE
Patient's FSGs are uncontrolled due to hyperglycemia on current medication regimen.  Last A1c reviewed- May  Lab Results   Component Value Date    HGBA1C 9.7 (H) 10/23/2023     Most recent fingerstick glucose reviewed-   Recent Labs   Lab 11/08/23  0126   POCTGLUCOSE 280*     Current correctional scale  Medium  Maintain anti-hyperglycemic dose as follows-   Antihyperglycemics (From admission, onward)    Start     Stop Route Frequency Ordered    11/07/23 2315  insulin detemir U-100 (Levemir) pen 10 Units         -- SubQ Nightly 11/07/23 2215    11/07/23 2306  insulin aspart U-100 pen 0-5 Units         -- SubQ Before meals & nightly PRN 11/07/23 2208        Hold Oral hypoglycemics while patient is in the hospital.

## 2023-11-08 NOTE — ASSESSMENT & PLAN NOTE
Patient with Paroxysmal (<7 days) atrial fibrillation which is controlled currently with Beta Blocker. Patient is currently in sinus rhythm.[id number] Score: 4. HASBLED Score: 3+. Anticoagulation indicated. Anticoagulation done with Apixaban.  We will hold if patient needs possible procedure.

## 2023-11-08 NOTE — PROGRESS NOTES
11/08/23 0010   Admission   Initial VN Admission Questions Complete   Communication Issues? None   Shift   Virtual Nurse - Patient Verbalized Approval Of Camera Use   Safety/Activity   Patient Rounds bed in low position;placement of personal items at bedside;call light in patient/parent reach;visualized patient;clutter free environment maintained   Safety Promotion/Fall Prevention assistive device/personal item within reach;side rails raised x 2

## 2023-11-08 NOTE — ASSESSMENT & PLAN NOTE
- on Lipitor 40mg  - last FLP in 2022 with LDL 71  - goal LDL   - cholesterol well controlled; continue current medication regimen

## 2023-11-08 NOTE — H&P
[facility] Medicine  History & Physical    Patient Name: Julien Meléndez  MRN: [id number]  Patient Class: OP- Observation  Admission Date: 11/7/2023  Attending Physician: Jamal Pearl,*   Primary Care Provider: Kelvin Wayne MD         Patient information was obtained from patient and ER records.     Subjective:     Principal Problem:Cellulitis of foot    Chief Complaint:   Chief Complaint   Patient presents with    Wound Check     Pt reports to ED after being referred from wound care. Pt reports drainage and odor from wound to right foot. Pt also reports emesis on the way to ER.         HPI: Julien Meléndez is a that is why 83-year-old male with past medical history of diabetes, anxiety/depression, atrial fibrillation on anticoagulation, heart failure who presents with foot wound.    Patient reports a recent biopsy of his right great toe by Podiatry over the past week.  Patient reports worsening drainage and odor from his right foot biopsy site.  He states he has been taking antibiotics for the infection, with minimal relief.  He denies any fevers , chills , nausea, although reports episodes of nonbilious nonbloody emesis shortly after going to the ED after discussing the plan with his wound care nurse.      Triage vitals were significant for slightly elevated blood pressures.  CBC was significant for leukocytosis, normocytic anemia.  CMP was significant for hyponatremia, hyperglycemia, and anion gap of 22.  CRP was elevated at 228.9.  Lactic acid was 2.9.    Blood cultures were collected.    X-rays of the right foot showed no significant change of ulceration and destructive changes of the distal metatarsal head of the 1st toe of the right foot.    Pt given fluids and antibiotics.    Patient admitted for cellulitis of right toe/right foot wound,  with podiatry consult in the a.m.      Past Medical History:   Diagnosis Date    Diabetes mellitus     Hiatal hernia     under Dr Saenz'  care    Hyperlipidemia     Hypertension     MI (myocardial infarction)     Sleep apnea        Past Surgical History:   Procedure Laterality Date    ABDOMINAL HERNIA REPAIR      ANGIOGRAM, EXTREMITY, BILATERAL Bilateral 4/5/2023    Procedure: ANGIOGRAM, EXTREMITY, BILATERAL;  Surgeon: Michael Giraldo III, MD;  Location: [facility] CATH LAB;  Service: Cardiology;  Laterality: Bilateral;    ANGIOGRAPHY OF LOWER EXTREMITY Left 4/12/2023    Procedure: Angiogram Extremity Unilateral;  Surgeon: Michael Giraldo III, MD;  Location: [facility] CATH LAB;  Service: Cardiology;  Laterality: Left;    AORTOGRAPHY WITH SERIALOGRAPHY Bilateral 4/5/2023    Procedure: AORTOGRAM, WITH SERIALOGRAPHY;  Surgeon: Michael Giraldo III, MD;  Location: [facility] CATH LAB;  Service: Cardiology;  Laterality: Bilateral;    APPLICATION OF WOUND VACUUM-ASSISTED CLOSURE DEVICE Left 3/29/2023    Procedure: APPLICATION, WOUND VAC;  Surgeon: Alona Pierce DPM;  Location: [facility] OR;  Service: Podiatry;  Laterality: Left;    APPLICATION OF WOUND VACUUM-ASSISTED CLOSURE DEVICE Left 4/4/2023    Procedure: APPLICATION, WOUND VAC;  Surgeon: Alona Pierce DPM;  Location: [facility] OR;  Service: Podiatry;  Laterality: Left;    APPLICATION OF WOUND VACUUM-ASSISTED CLOSURE DEVICE Left 4/13/2023    Procedure: APPLICATION, WOUND VAC;  Surgeon: Alona Pierce DPM;  Location: [facility] OR;  Service: Podiatry;  Laterality: Left;    BONE BIOPSY Left 3/29/2023    Procedure: BIOPSY, BONE;  Surgeon: Alona Pierce DPM;  Location: [facility] OR;  Service: Podiatry;  Laterality: Left;  1st met    CARDIAC CATHETERIZATION      3 stents placed    CATARACT EXTRACTION, BILATERAL      CLOSURE DEVICE Left 4/12/2023    Procedure: Placement of Closure Device;  Surgeon: Michael Giraldo III, MD;  Location: [facility] CATH LAB;  Service: Cardiology;  Laterality: Left;    CORONARY ANGIOGRAPHY N/A 10/3/2019    Procedure: ANGIOGRAM, CORONARY ARTERY;  Surgeon: Edwin Azul MD;  Location: [facility] CATH  LAB/EP;  Service: Cardiology;  Laterality: N/A;    DEBRIDEMENT OF FOOT Left 3/29/2023    Procedure: DEBRIDEMENT, FOOT;  Surgeon: Alona Pierce DPM;  Location: [facility] OR;  Service: Podiatry;  Laterality: Left;    DEBRIDEMENT OF FOOT Left 4/4/2023    Procedure: DEBRIDEMENT, FOOT;  Surgeon: Alona Pierce DPM;  Location: [facility] OR;  Service: Podiatry;  Laterality: Left;    DEBRIDEMENT OF FOOT Left 4/13/2023    Procedure: DEBRIDEMENT, FOOT;  Surgeon: Alona Pierce DPM;  Location: [facility] OR;  Service: Podiatry;  Laterality: Left;    ESOPHAGOGASTRODUODENOSCOPY N/A 4/17/2023    Procedure: EGD (ESOPHAGOGASTRODUODENOSCOPY);  Surgeon: Otto Villarreal MD;  Location: [facility] ENDO;  Service: Endoscopy;  Laterality: N/A;    HERNIA REPAIR      inguinal    IVUS (INTRAVASCULAR ULTRASOUND) Left 4/12/2023    Procedure: ULTRASOUND, INTRAVASCULAR;  Surgeon: Michael Giraldo III, MD;  Location: [facility] CATH LAB;  Service: Cardiology;  Laterality: Left;    LEFT HEART CATHETERIZATION Left 9/13/2019    Procedure: Left heart cath;  Surgeon: Edwin Azul MD;  Location: [facility] CATH LAB/EP;  Service: Cardiology;  Laterality: Left;    LEFT HEART CATHETERIZATION N/A 10/3/2019    Procedure: Left heart cath;  Surgeon: Edwin Azul MD;  Location: [facility] CATH LAB/EP;  Service: Cardiology;  Laterality: N/A;    OSTEOTOMY Left 6/7/2023    Procedure: OSTEOTOMY;  Surgeon: Alona Pierce DPM;  Location: [facility] OR;  Service: Podiatry;  Laterality: Left;    OSTEOTOMY OF METATARSAL BONE Right 4/4/2023    Procedure: OSTEOTOMY, METATARSAL BONE;  Surgeon: Alona Pierce DPM;  Location: [facility] OR;  Service: Podiatry;  Laterality: Right;    PERCUTANEOUS TRANSLUMINAL ANGIOPLASTY Left 4/12/2023    Procedure: PTA (ANGIOPLASTY, PERCUTANEOUS, TRANSLUMINAL);  Surgeon: Michael Giraldo III, MD;  Location: [facility] CATH LAB;  Service: Cardiology;  Laterality: Left;    SURGICAL REMOVAL OF BUNION WITH OSTEOTOMY OF METATARSAL BONE Right 4/4/2023    Procedure: BUNIONECTOMY, WITH  METATARSAL OSTEOTOMY;  Surgeon: Alona Pierce DPM;  Location: [facility] OR;  Service: Podiatry;  Laterality: Right;    TOE AMPUTATION Left 4/4/2023    Procedure: AMPUTATION, TOE;  Surgeon: Alona Pierce DPM;  Location: [facility] OR;  Service: Podiatry;  Laterality: Left;  1st ray    WASHOUT Left 6/7/2023    Procedure: WASHOUT;  Surgeon: Alona Pierce DPM;  Location: [facility] OR;  Service: Podiatry;  Laterality: Left;    WOUND DEBRIDEMENT Left 6/7/2023    Procedure: DEBRIDEMENT, WOUND;  Surgeon: Alona Pierce DPM;  Location: [facility] OR;  Service: Podiatry;  Laterality: Left;       Review of patient's allergies indicates:   Allergen Reactions    Nystatin      Other reaction(s): Unknown       No current facility-administered medications on file prior to encounter.     Current Outpatient Medications on File Prior to Encounter   Medication Sig    amLODIPine (NORVASC) 5 MG tablet     atorvastatin (LIPITOR) 40 MG tablet Take 1 tablet (40 mg total) by mouth once daily. (Patient taking differently: Take 40 mg by mouth every evening.)    collagenase (SANTYL) ointment Apply topically once daily.    ELIQUIS 5 mg Tab Take 5 mg by mouth 2 (two) times daily.    famotidine (PEPCID) 20 MG tablet Take 1 tablet (20 mg total) by mouth 2 (two) times daily.    ferrous sulfate (FEOSOL) 325 mg (65 mg iron) Tab tablet Take 325 mg by mouth daily with breakfast.    furosemide (LASIX) 20 MG tablet Take 1 tablet (20 mg total) by mouth once daily.    isosorbide mononitrate (IMDUR) 30 MG 24 hr tablet TAKE 1 TABLET EVERY DAY (Patient taking differently: Take 30 mg by mouth once daily.)    metoprolol succinate (TOPROL-XL) 25 MG 24 hr tablet TAKE 1 TABLET EVERY DAY    nitroGLYCERIN (NITROSTAT) 0.4 MG SL tablet Place 0.4 mg under the tongue every 5 (five) minutes as needed for Chest pain.    ondansetron (ZOFRAN-ODT) 8 MG TbDL Take 1 tablet (8 mg total) by mouth 3 (three) times daily as needed (nausea or vomiting).    oxyCODONE-acetaminophen (PERCOCET)  5-325 mg per tablet Take 1 tablet by mouth every 6 (six) hours as needed for Pain.    pantoprazole (PROTONIX) 40 MG tablet Take 1 tablet (40 mg total) by mouth 2 (two) times daily.    ranolazine (RANEXA) 1,000 mg Tb12 Take 1,000 mg by mouth 2 (two) times daily.    SITagliptan-metformin (JANUMET XR) 100-1,000 mg TM24 Take 1 tablet by mouth once daily.    sucralfate (CARAFATE) 1 gram tablet Take 1 tablet (1 g total) by mouth 4 (four) times daily before meals and nightly.    traZODone (DESYREL) 150 MG tablet TAKE 1 TABLET EVERY NIGHT    alcohol swabs PadM Apply 1 each topically as needed (for use with glucose monitoring).    blood sugar diagnostic (TRUE METRIX GLUCOSE TEST STRIP) Strp 1 strip by Misc.(Non-Drug; Combo Route) route 2 (two) times a day.    blood-glucose meter (TRUE METRIX GLUCOSE METER) Misc TEST TWICE A DAY    ciprofloxacin HCl (CIPRO) 500 MG tablet Take 1 tablet (500 mg total) by mouth 2 (two) times daily. for 7 days    clopidogreL (PLAVIX) 75 mg tablet Take 1 tablet (75 mg total) by mouth once daily.    FLUoxetine 40 MG capsule Take 40 mg by mouth once daily.    insulin detemir U-100, Levemir, 100 unit/mL (3 mL) SubQ InPn pen Inject 20 Units into the skin every evening.    lancets 28 gauge Misc 1 lancet  by Misc.(Non-Drug; Combo Route) route 2 (two) times a day.    mupirocin (BACTROBAN) 2 % ointment Apply topically 3 (three) times daily.     Family History       Problem Relation (Age of Onset)    Heart disease Mother, Sister, Brother, Maternal Uncle, Brother, Sister, Maternal Uncle, Maternal Uncle, Maternal Uncle    Stroke Mother          Tobacco Use    Smoking status: Never    Smokeless tobacco: Never   Substance and Sexual Activity    Alcohol use: No    Drug use: No    Sexual activity: Not on file     Review of Systems   Constitutional:  Positive for activity change. Negative for fever.   HENT:  Negative for facial swelling and hearing loss.    Eyes:  Negative for pain and redness.    Respiratory:  Negative for shortness of breath and stridor.    Cardiovascular:  Negative for chest pain and leg swelling.   Gastrointestinal:  Positive for nausea and vomiting. Negative for constipation and diarrhea.   Endocrine: Negative for polydipsia and polyphagia.   Genitourinary:  Negative for frequency and genital sores.   Musculoskeletal:  Positive for myalgias. Negative for joint swelling.   Skin:  Positive for wound. Negative for rash.   Allergic/Immunologic: Negative for food allergies and immunocompromised state.   Neurological:  Negative for light-headedness and headaches.   Hematological:  Negative for adenopathy. Does not bruise/bleed easily.   Psychiatric/Behavioral:  Negative for self-injury. The patient is not nervous/anxious.      Objective:     Vital Signs (Most Recent):  Temp: 98.3 °F (36.8 °C) (11/08/23 0028)  Pulse: 102 (11/08/23 0028)  Resp: 16 (11/08/23 0028)  BP: (!) 101/52 (11/08/23 0028)  SpO2: 96 % (11/08/23 0028) Vital Signs (24h Range):  Temp:  [98.3 °F (36.8 °C)-99.2 °F (37.3 °C)] 98.3 °F (36.8 °C)  Pulse:  [] 102  Resp:  [14-33] 16  SpO2:  [95 %-100 %] 96 %  BP: (101-155)/(52-88) 101/52     Weight: 78.4 kg (172 lb 13.5 oz)  Body mass index is 22.8 kg/m².     Physical Exam  Constitutional:       General: He is not in acute distress.     Appearance: Normal appearance. He is ill-appearing.   HENT:      Head: Normocephalic and atraumatic.      Right Ear: There is no impacted cerumen.      Left Ear: Tympanic membrane normal. There is no impacted cerumen.      Nose: No congestion or rhinorrhea.      Mouth/Throat:      Pharynx: No oropharyngeal exudate or posterior oropharyngeal erythema.   Eyes:      General:         Right eye: No discharge.         Left eye: No discharge.   Cardiovascular:      Rate and Rhythm: Normal rate.      Heart sounds: No murmur heard.     No gallop.   Abdominal:      Tenderness: There is no abdominal tenderness. There is no guarding or rebound.       "Hernia: No hernia is present.   Musculoskeletal:         General: No deformity.      Cervical back: No rigidity.      Right lower leg: No edema.      Comments: Right toe/foot wound   Lymphadenopathy:      Cervical: No cervical adenopathy.   Skin:     Findings: No bruising or lesion.   Neurological:      Mental Status: He is alert.      Motor: No weakness.      Gait: Gait normal.   Psychiatric:         Mood and Affect: Mood normal.         Behavior: Behavior normal.                Significant Labs: All pertinent labs within the past 24 hours have been reviewed.  Bilirubin:   Recent Labs   Lab 11/02/23  2159 11/07/23  2044   BILITOT 0.7 0.8     Blood Culture: No results for input(s): "LABBLOO" in the last 48 hours.  BMP:   Recent Labs   Lab 11/07/23 2044   *   *   K 4.4   CL 95   CO2 15*   BUN 20   CREATININE 1.3   CALCIUM 10.1     CBC:   Recent Labs   Lab 11/07/23 2022   WBC 12.71*   HGB 13.2*   HCT 39.8*        CMP:   Recent Labs   Lab 11/07/23 2044   *   K 4.4   CL 95   CO2 15*   *   BUN 20   CREATININE 1.3   CALCIUM 10.1   PROT 7.6   ALBUMIN 3.1*   BILITOT 0.8   ALKPHOS 96   AST 10   ALT 15   ANIONGAP 22*     Respiratory Culture: No results for input(s): "GSRESP", "RESPIRATORYC" in the last 48 hours.  Troponin: No results for input(s): "TROPONINI", "TROPONINIHS" in the last 48 hours.  TSH:   Recent Labs   Lab 05/26/23  1405   TSH 1.280       Significant Imaging: I have reviewed all pertinent imaging results/findings within the past 24 hours.  I have reviewed and interpreted all pertinent imaging results/findings within the past 24 hours.    Assessment/Plan:     * Cellulitis of foot  Patient presents with worsening right big toe wound with purulence and odor  Elevated CRP, WBC  Xray without signs of new or worsening bone loss or significant change of ulceration and destructive changes of the distal metatarsal head of the 1st toe of the right foot  Elevated " lactate    Plan:  Continue IV vancomycin and Zosyn  Consult podiatry  Consider MRI of foot to assess for new onset osteomyelitis      Hyponatremia  Patient has hyponatremia which is controlled,We will aim to correct the sodium by 4-6mEq in 24 hours. We will monitor sodium Daily. The hyponatremia is due to Dehydration/hypovolemia. We will obtain the following studies:  Hold off on studies at this time. We will treat the hyponatremia with IV fluids as follows:  1 L bolus. The patient's sodium results have been reviewed and are listed below.  Recent Labs   Lab 11/07/23 2044   *       Anemia  Patient's anemia is currently controlled. Has not received any PRBCs to date. Etiology likely d/t chronic disease due to Osteomyelitis,  Current CBC reviewed-   Lab Results   Component Value Date    HGB 13.2 (L) 11/07/2023    HCT 39.8 (L) 11/07/2023     Monitor serial CBC and transfuse if patient becomes hemodynamically unstable, symptomatic or H/H drops below 7/21.    Depressive disorder  Patient has persistent depression which is moderate and is currently controlled. Will Continue anti-depressant medications. We will not consult psychiatry at this time. Patient does not display psychosis at this time. Continue to monitor closely and adjust plan of care as needed.        Atrial fibrillation  Patient with Paroxysmal (<7 days) atrial fibrillation which is controlled currently with Beta Blocker. Patient is currently in sinus rhythm.[id number] Score: 4. HASBLED Score: 3+. Anticoagulation indicated. Anticoagulation done with Apixaban.  We will hold if patient needs possible procedure.    Coronary artery disease due to calcified coronary lesion  Patient with known CAD s/p stent placement and CABG, which is controlled Will continue ASA and Statin and monitor for S/Sx of angina/ACS. Continue to monitor on telemetry.     Diabetes mellitus type 2, noninsulin dependent  Patient's FSGs are uncontrolled due to hyperglycemia on current  medication regimen.  Last A1c reviewed- May  Lab Results   Component Value Date    HGBA1C 9.7 (H) 10/23/2023     Most recent fingerstick glucose reviewed-   Recent Labs   Lab 11/08/23  0126   POCTGLUCOSE 280*     Current correctional scale  Medium  Maintain anti-hyperglycemic dose as follows-   Antihyperglycemics (From admission, onward)    Start     Stop Route Frequency Ordered    11/07/23 2315  insulin detemir U-100 (Levemir) pen 10 Units         -- SubQ Nightly 11/07/23 2215 11/07/23 2306  insulin aspart U-100 pen 0-5 Units         -- SubQ Before meals & nightly PRN 11/07/23 2208        Hold Oral hypoglycemics while patient is in the hospital.    Hypertension  Chronic, controlled. Latest blood pressure and vitals reviewed-     Temp:  [98.3 °F (36.8 °C)-99.2 °F (37.3 °C)]   Pulse:  []   Resp:  [14-33]   BP: (101-155)/(52-88)   SpO2:  [95 %-100 %] .   Home meds for hypertension were reviewed and noted below.   Hypertension Medications             amLODIPine (NORVASC) 5 MG tablet     furosemide (LASIX) 20 MG tablet Take 1 tablet (20 mg total) by mouth once daily.    isosorbide mononitrate (IMDUR) 30 MG 24 hr tablet TAKE 1 TABLET EVERY DAY    metoprolol succinate (TOPROL-XL) 25 MG 24 hr tablet TAKE 1 TABLET EVERY DAY    nitroGLYCERIN (NITROSTAT) 0.4 MG SL tablet Place 0.4 mg under the tongue every 5 (five) minutes as needed for Chest pain.          While in the hospital, will manage blood pressure as follows; Continue home antihypertensive regimen    Will utilize p.r.n. blood pressure medication only if patient's blood pressure greater than 180/110 and he develops symptoms such as worsening chest pain or shortness of breath.    Mixed hyperlipidemia  May continue with home statin      VTE Risk Mitigation (From admission, onward)         Ordered     apixaban tablet 5 mg  2 times daily         11/07/23 2215     IP VTE HIGH RISK PATIENT  Once         11/07/23 2208     Place sequential compression device  Until  discontinued         11/07/23 2208                   On 11/08/2023, patient should be placed in hospital observation services under my care.            Dina Brown MD  Department of Hospital Medicine  [facility] Surg    COMPLETED  Family history is reviewed and has not changed   Pertinent information:

## 2023-11-08 NOTE — ASSESSMENT & PLAN NOTE
- history of RCA and LCX MILES in 2015 & 2019  - no complaints of chest pain   - continue statin, Plavix, BB, Ranexa and Imdur

## 2023-11-08 NOTE — CONSULTS
"  [place] - Med Surg  Adult Nutrition  Consult Note    SUMMARY     Recommendations    Recommendation:  1. Add Boost Glucose Control 1x daily.   2. Encourage intake at meals as tolerated.   3. Monitor weight/labs.   4. RD to follow to monitor po intake    Goals:  Pt will tolerate diet with at least 50-75% intake at meals by RD follow up  Nutrition Goal Status: new  Communication of RD Recs: reviewed with RN    Assessment and Plan  Nutrition Problem  Increased protein needs    Related to (etiology):   Wound healing    Signs and Symptoms (as evidenced by):   Foot wound/cellulitis     Interventions:  Collaboration with other providers  [place]    Nutrition Diagnosis Status:   New     Malnutrition Assessment     Weight Loss (Malnutrition):  (20% x 6 months)       Reason for Assessment  Reason For Assessment: consult (foot wound)  Diagnosis:  (cellulitis of foot)  Relevant Medical History: MI, DM, HLD, HTN, hiatal hernia, sleep apnea, hernia repair, cardiac cath, toe amputation  General Information Comments: Pt on  ADA diet with ~75% intake at meals. Pt on contact isolation for MRSA, ESBL, MDRO. Noted 43lb weight loss x 6 months. Yoni 15- foot wound  Nutrition Discharge Planning: pt to d/c on ADA diet    Nutrition Risk Screen  Nutrition Risk Screen: no indicators present    Nutrition/Diet History  Food Preferences: no Anglican or cultural food prefs identified  Factors Affecting Nutritional Intake: None identified at this time    Anthropometrics  Temp: 98.3 °F (36.8 °C)  Height Method: Stated  Height: 6' 1" (185.4 cm)  Height (inches): 73 in  Weight Method: Bed Scale  Weight: 76.3 kg (168 lb 3.4 oz)  Weight (lb): 168.21 lb  Ideal Body Weight (IBW), Male: 184 lb  % Ideal Body Weight, Male (lb): 91.42 %  BMI (Calculated): 22.2  BMI Grade: 18.5-24.9 - normal  Usual Body Weight (UBW), k.9 kg ()  % Usual Body Weight: 79.73  % Weight Change From Usual Weight: -20.44 %    Lab/Procedures/Meds  Pertinent " Labs Reviewed: reviewed  Pertinent Labs Comments: Na 133L, Glu 237H, Alb 3.1L  Pertinent Medications Reviewed: reviewed  Pertinent Medications Comments: ferrous sulfate, Lasix, insulin, pantoprazole, vancomycin    Estimated/Assessed Needs  Weight Used For Calorie Calculations: 76.3 kg (168 lb 3.4 oz)  Energy Calorie Requirements (kcal): 2289 (30 kcal/kg)  Energy Need Method: Kcal/kg  Protein Requirements: 91g (1.2g/kg)  Weight Used For Protein Calculations: 76.3 kg (168 lb 3.4 oz)  Estimated Fluid Requirement Method: RDA Method  RDA Method (mL): 2289  CHO Requirement: 250g    Nutrition Prescription Ordered  Current Diet Order: 2000 ADA    Evaluation of Received Nutrient/Fluid Intake  I/O: 742/0  Energy Calories Required: meeting needs  Protein Required: meeting needs  Fluid Required: meeting needs  Comments: LBM 11/7  % Intake of Estimated Energy Needs: 75 - 100 %  % Meal Intake: 75 - 100 %    Nutrition Risk  Level of Risk/Frequency of Follow-up:  (2xweekly)     Monitor and Evaluation  Food and Nutrient Intake: food and beverage intake  Food and Nutrient Adminstration: diet order  Physical Activity and Function: nutrition-related ADLs and IADLs  Anthropometric Measurements: weight  Biochemical Data, Medical Tests and Procedures: electrolyte and renal panel  Nutrition-Focused Physical Findings: overall appearance     Nutrition Follow-Up  RD Follow-up?: Yes

## 2023-11-08 NOTE — ED PROVIDER NOTES
Encounter Date: 11/7/2023       History     Chief Complaint   Patient presents with    Wound Check     Pt reports to ED after being referred from wound care. Pt reports drainage and odor from wound to right foot. Pt also reports emesis on the way to ER.      Patient is an 83-year-old male with a past history of diabetes, hypertension, hyperlipidemia, chronic osteomyelitis of the R foot who presents to the ED with wound infection.  Per wife at bedside, patient underwent a biopsy of the right great toe by Podiatry approximately 1 week ago.  Since that time he has reportedly developed worsening redness and swelling to the medial aspect of the right great toe.  Home health nurse came out to evaluate the patient today and noticed that the patient had findings of purulent malodorous drainage from the biopsy site/pre-existing diabetic ulcer, surrounding erythema.  The wife at bedside states the patient has not been having a systemic complaints such as fever chills nausea or vomiting until he started in route to the hospital at which point he had several episodes of nausea and vomiting.  The patient was recently diagnosed with colitis in this ER approximately 5 days ago.      Review of patient's allergies indicates:   Allergen Reactions    Nystatin      Other reaction(s): Unknown     Past Medical History:   Diagnosis Date    Diabetes mellitus     Hiatal hernia     under Dr Saenz' care    Hyperlipidemia     Hypertension     MI (myocardial infarction)     Sleep apnea      Past Surgical History:   Procedure Laterality Date    ABDOMINAL HERNIA REPAIR      ANGIOGRAM, EXTREMITY, BILATERAL Bilateral 4/5/2023    Procedure: ANGIOGRAM, EXTREMITY, BILATERAL;  Surgeon: Michael Giraldo III, MD;  Location: [facility] CATH LAB;  Service: Cardiology;  Laterality: Bilateral;    ANGIOGRAPHY OF LOWER EXTREMITY Left 4/12/2023    Procedure: Angiogram Extremity Unilateral;  Surgeon: Michael Giraldo III, MD;  Location: [facility] CATH LAB;  Service:  Cardiology;  Laterality: Left;    AORTOGRAPHY WITH SERIALOGRAPHY Bilateral 4/5/2023    Procedure: AORTOGRAM, WITH SERIALOGRAPHY;  Surgeon: Michael Giraldo III, MD;  [facility];  Service: Cardiology;  Laterality: Bilateral;    APPLICATION OF WOUND VACUUM-ASSISTED CLOSURE DEVICE Left 3/29/2023    Procedure: APPLICATION, WOUND VAC;  Surgeon: Alona Pierce DPM;  [facility];  Service: Podiatry;  Laterality: Left;    APPLICATION OF WOUND VACUUM-ASSISTED CLOSURE DEVICE Left 4/4/2023    Procedure: APPLICATION, WOUND VAC;  Surgeon: Alona Pierce DPM;  [facility];  Service: Podiatry;  Laterality: Left;    APPLICATION OF WOUND VACUUM-ASSISTED CLOSURE DEVICE Left 4/13/2023    Procedure: APPLICATION, WOUND VAC;  Surgeon: Alona Pierce DPM;  [facility];  Service: Podiatry;  Laterality: Left;    BONE BIOPSY Left 3/29/2023    Procedure: BIOPSY, BONE;  Surgeon: Alona Pierce DPM;  [facility];  Service: Podiatry;  Laterality: Left;  1st met    CARDIAC CATHETERIZATION      3 stents placed    CATARACT EXTRACTION, BILATERAL      CLOSURE DEVICE Left 4/12/2023    Procedure: Placement of Closure Device;  Surgeon: Michael Giraldo III, MD;  [facility];  Service: Cardiology;  Laterality: Left;    CORONARY ANGIOGRAPHY N/A 10/3/2019    Procedure: ANGIOGRAM, CORONARY ARTERY;  Surgeon: Edwin Azul MD;  Location: [facility] CATH LAB/EP;  Service: Cardiology;  Laterality: N/A;    DEBRIDEMENT OF FOOT Left 3/29/2023    Procedure: DEBRIDEMENT, FOOT;  Surgeon: Alona Pierce DPM;  [facility];  Service: Podiatry;  Laterality: Left;    DEBRIDEMENT OF FOOT Left 4/4/2023    Procedure: DEBRIDEMENT, FOOT;  Surgeon: Alona Pierce DPM;  [facility];  Service: Podiatry;  Laterality: Left;    DEBRIDEMENT OF FOOT Left 4/13/2023    Procedure: DEBRIDEMENT, FOOT;  Surgeon: Alona Pierce DPM;  [facility];  Service: Podiatry;  Laterality: Left;    ESOPHAGOGASTRODUODENOSCOPY N/A 4/17/2023     Procedure: EGD (ESOPHAGOGASTRODUODENOSCOPY);  Surgeon: Otto Villarreal MD;  Location: [facility] ENDO;  Service: Endoscopy;  Laterality: N/A;    HERNIA REPAIR      inguinal    IVUS (INTRAVASCULAR ULTRASOUND) Left 4/12/2023    Procedure: ULTRASOUND, INTRAVASCULAR;  Surgeon: Michael Giraldo III, MD;  Location: [facility] CATH LAB;  Service: Cardiology;  Laterality: Left;    LEFT HEART CATHETERIZATION Left 9/13/2019    Procedure: Left heart cath;  Surgeon: Edwin Azul MD;  Location: [facility] CATH LAB/EP;  Service: Cardiology;  Laterality: Left;    LEFT HEART CATHETERIZATION N/A 10/3/2019    Procedure: Left heart cath;  Surgeon: Edwin Azul MD;  Location: [facility] CATH LAB/EP;  Service: Cardiology;  Laterality: N/A;    OSTEOTOMY Left 6/7/2023    Procedure: OSTEOTOMY;  Surgeon: Alona Pierce DPM;  Location: [facility] OR;  Service: Podiatry;  Laterality: Left;    OSTEOTOMY OF METATARSAL BONE Right 4/4/2023    Procedure: OSTEOTOMY, METATARSAL BONE;  Surgeon: Alona Pierce DPM;  Location: [facility] OR;  Service: Podiatry;  Laterality: Right;    PERCUTANEOUS TRANSLUMINAL ANGIOPLASTY Left 4/12/2023    Procedure: PTA (ANGIOPLASTY, PERCUTANEOUS, TRANSLUMINAL);  Surgeon: Michael Giraldo III, MD;  Location: [facility] CATH LAB;  Service: Cardiology;  Laterality: Left;    SURGICAL REMOVAL OF BUNION WITH OSTEOTOMY OF METATARSAL BONE Right 4/4/2023    Procedure: BUNIONECTOMY, WITH METATARSAL OSTEOTOMY;  Surgeon: Alona Pierce DPM;  Location: [facility] OR;  Service: Podiatry;  Laterality: Right;    TOE AMPUTATION Left 4/4/2023    Procedure: AMPUTATION, TOE;  Surgeon: Alona Pierce DPM;  Location: [facility] OR;  Service: Podiatry;  Laterality: Left;  1st ray    WASHOUT Left 6/7/2023    Procedure: WASHOUT;  Surgeon: Alona Pierce DPM;  Location: [facility] OR;  Service: Podiatry;  Laterality: Left;    WOUND DEBRIDEMENT Left 6/7/2023    Procedure: DEBRIDEMENT, WOUND;  Surgeon: Alona Pierce DPM;  Location: [facility] OR;  Service: Podiatry;  Laterality: Left;     Family History    Problem Relation Age of Onset    Heart disease Mother     Stroke Mother     Heart disease Sister     Heart disease Brother     Heart disease Maternal Uncle     Heart disease Brother     Heart disease Sister     Heart disease Maternal Uncle     Heart disease Maternal Uncle     Heart disease Maternal Uncle      Social History     Tobacco Use    Smoking status: Never    Smokeless tobacco: Never   Substance Use Topics    Alcohol use: No    Drug use: No     Review of Systems   Unable to perform ROS: Dementia (Per wife, pt is believed to have dementia and alterations in his mental status.)   Respiratory:  Negative for shortness of breath.    Cardiovascular:  Negative for chest pain.   Gastrointestinal:  Negative for abdominal pain, nausea and vomiting.   Skin:  Negative for pallor and rash.   Neurological:  Negative for dizziness and headaches.       Physical Exam     Initial Vitals   BP Pulse Resp Temp SpO2   11/07/23 1929 11/07/23 1945 11/07/23 1929 11/07/23 2037 11/07/23 1929   134/69 108 16 99.2 °F (37.3 °C) 95 %      MAP       --                Physical Exam    Constitutional: He appears well-developed and well-nourished.   HENT:   Head: Normocephalic and atraumatic.   Right Ear: External ear normal.   Left Ear: External ear normal.   Eyes: Pupils are equal, round, and reactive to light.   Neck:   Normal range of motion.  Cardiovascular:  Normal rate.           R DP pulse dopplerable   Pulmonary/Chest: Breath sounds normal.   Abdominal: Abdomen is soft. Bowel sounds are normal.   Musculoskeletal:         General: Edema present.      Cervical back: Normal range of motion.      Comments: Purulent malodorous discharge emanating from wound to lateral R great toe      Neurological: GCS score is 15. GCS eye subscore is 4. GCS verbal subscore is 5. GCS motor subscore is 6.   Skin: Skin is warm. There is erythema.   Psychiatric: He has a normal mood and affect.               ED Course   Procedures  Labs Reviewed   CBC W/  AUTO DIFFERENTIAL - Abnormal; Notable for the following components:       Result Value    WBC 12.71 (*)     Hemoglobin 13.2 (*)     Hematocrit 39.8 (*)     RDW 19.4 (*)     Immature Granulocytes 0.9 (*)     Gran # (ANC) 10.0 (*)     Immature Grans (Abs) 0.11 (*)     Mono # 1.4 (*)     Gran % 78.6 (*)     Lymph % 10.1 (*)     All other components within normal limits   COMPREHENSIVE METABOLIC PANEL - Abnormal; Notable for the following components:    Sodium 132 (*)     CO2 15 (*)     Glucose 318 (*)     Albumin 3.1 (*)     eGFR 55 (*)     Anion Gap 22 (*)     All other components within normal limits   C-REACTIVE PROTEIN - Abnormal; Notable for the following components:    .9 (*)     All other components within normal limits   LACTIC ACID, PLASMA - Abnormal; Notable for the following components:    Lactate (Lactic Acid) 2.9 (*)     All other components within normal limits   CULTURE, BLOOD   CULTURE, BLOOD   CULTURE, AEROBIC  (SPECIFY SOURCE)   SEDIMENTATION RATE   URINALYSIS, REFLEX TO URINE CULTURE          Imaging Results              X-Ray Foot Complete Right (Final result)  Result time 11/07/23 20:30:48      Final result by Sy Reeves MD (11/07/23 20:30:48)                   Impression:      No significant change in the ulceration and destructive changes of the distal metatarsal head of the 1st toe of the right foot.    Fracture deformity of the 1st and 5th metatarsal distal heads again noted.      Electronically signed by: Sy Reeves  Date:    11/07/2023  Time:    20:30               Narrative:    EXAMINATION:  XR FOOT COMPLETE 3 VIEW RIGHT    CLINICAL HISTORY:  . Type 2 diabetes mellitus with other skin complications    TECHNIQUE:  AP, lateral, and oblique views of the right foot were performed.    COMPARISON:  On 10/17/2023    FINDINGS:  Chronic features of the hind and forefoot are again noted.  Ulceration at the medial aspect of the right great toe at the MTP P joint is again noted  with destruction of the distal metatarsal head and chronic appearing fracture.  The chronic fracture at the distal metadiaphysis of the 5th metatarsal is stable.  Degenerative changes with vascular calcifications remain.                                       Medications   vancomycin (VANCOCIN) 2,250 mg in dextrose 5 % (D5W) 500 mL IVPB (2,250 mg Intravenous New Bag 11/7/23 2214)   sodium chloride 0.9% flush 3 mL (has no administration in time range)   melatonin tablet 6 mg (has no administration in time range)   ondansetron injection 4 mg (has no administration in time range)   naloxone 0.4 mg/mL injection 0.02 mg (has no administration in time range)   glucose chewable tablet 16 g (has no administration in time range)   glucose chewable tablet 24 g (has no administration in time range)   glucagon (human recombinant) injection 1 mg (has no administration in time range)   acetaminophen tablet 650 mg (has no administration in time range)   HYDROcodone-acetaminophen 5-325 mg per tablet 1 tablet (has no administration in time range)   HYDROcodone-acetaminophen  mg per tablet 1 tablet (has no administration in time range)   insulin aspart U-100 pen 0-5 Units (has no administration in time range)   dextrose 10% bolus 125 mL 125 mL (has no administration in time range)   dextrose 10% bolus 250 mL 250 mL (has no administration in time range)   vancomycin - pharmacy to dose (has no administration in time range)   amLODIPine tablet 5 mg (has no administration in time range)   atorvastatin tablet 40 mg (40 mg Oral Given 11/7/23 2244)   clopidogreL tablet 75 mg (has no administration in time range)   apixaban tablet 5 mg (5 mg Oral Given 11/7/23 2244)   ferrous sulfate tablet 1 each (has no administration in time range)   FLUoxetine capsule 40 mg (has no administration in time range)   furosemide tablet 20 mg (has no administration in time range)   insulin detemir U-100 (Levemir) pen 10 Units (10 Units Subcutaneous Given  11/7/23 2244)   isosorbide mononitrate 24 hr tablet 30 mg (has no administration in time range)   metoprolol succinate (TOPROL-XL) 24 hr tablet 25 mg (has no administration in time range)   pantoprazole EC tablet 40 mg (40 mg Oral Given 11/7/23 2243)   ranolazine 12 hr tablet 1,000 mg (1,000 mg Oral Given 11/7/23 2244)   trazodone split tablet 150 mg (150 mg Oral Given 11/7/23 2244)   vancomycin 1,250 mg in dextrose 5 % (D5W) 250 mL IVPB (Vial-Mate) (1,250 mg Intravenous Trough Due As Scheduled Before Dose 11/9/23 2100)   0.9%  NaCl infusion (0 mLs Intravenous Stopped 11/7/23 2217)     Medical Decision Making  See HPI     Amount and/or Complexity of Data Reviewed  Independent Historian: spouse     Details: See HPI   Labs: ordered. Decision-making details documented in ED Course.  Radiology: ordered. Decision-making details documented in ED Course.    Risk  Prescription drug management.  Decision regarding hospitalization.               ED Course as of 11/07/23 2300   Tue Nov 07, 2023 2057 WBC(!): 12.71  Reviewed by self - mildly elevated above normal range.  [LC]   2058 X-Ray Foot Complete Right  No significant change in the ulceration and destructive changes of the distal metatarsal head of the 1st toe of the right foot.     Fracture deformity of the 1st and 5th metatarsal distal heads again noted.      [LC]   2149 Discussed case with [facility] provider who will admit patient for IV abx, management of hyperglycemia, likely podiatry consult  [LC]      ED Course User Index  [LC] Jg Warren MD                 Medical Decision Making:   Initial Assessment:   See HPI   Differential Diagnosis:   Cellulitis, wet gangrene, dry gangrene, diabetic foot wound infection, cutaneous abscess, necrotizing infection   ED Management:  - concern for developing soft tissue infection with surrounding cellulitic change - will admit to the [facility] service for further evaluation, antibiotic regimen, podiatry  consultation  - pt and wife at bedside both in agreement with plan for admission   - case discussed with [facility] who will admit patient       Clinical Impression:   Final diagnoses:  [L03.031] Cellulitis of great toe of right foot  [E11.628, L08.9] Diabetic foot infection  [T14.8XXA, L08.9] Wound infection (Primary)        ED Disposition Condition    Observation Stable                Jg Warren MD  11/07/23 1031

## 2023-11-08 NOTE — HPI
Pt is a 82 y/o male  has a past medical history of Diabetes mellitus, Hiatal hernia, Hyperlipidemia, Hypertension, MI (myocardial infarction), and Sleep apnea.  Admitted and consulted to Podiatry for right foot cellulitis. Denies pain. No further pedal complaints.

## 2023-11-08 NOTE — NURSING
Pt arrived to unit oriented only to self with wife and daughter at bedside. Condom catheter placed in order to obtain urine sample. Sample obtained and sent to lab. Plan of care and safety protocols reviewed with pt and family. Wife (Kylee) requests that MD call her with update if she is not back before MD rounds. Safety maintained throughout shift with bed in low/locked position, with 3 side rails raised and call light within reach.

## 2023-11-08 NOTE — PROGRESS NOTES
Pharmacist Renal Dose Adjustment Note    Julien Meléndez is a 83 y.o. male being treated with the medication Zosyn    Patient Data:    Vital Signs (Most Recent):  Temp: 98.3 °F (36.8 °C) (11/08/23 0028)  Pulse: 102 (11/08/23 0028)  Resp: 16 (11/08/23 0028)  BP: (!) 101/52 (11/08/23 0028)  SpO2: 96 % (11/08/23 0028) Vital Signs (72h Range):  Temp:  [97.8 °F (36.6 °C)-99.2 °F (37.3 °C)]   Pulse:  []   Resp:  [14-33]   BP: (100-155)/(52-88)   SpO2:  [95 %-100 %]      Recent Labs   Lab 11/02/23 2159 11/07/23 2044   CREATININE 0.99 1.3     Serum creatinine: 1.3 mg/dL 11/07/23 2044  Estimated creatinine clearance: 47.7 mL/min    Medication:Zosyn dose: 4.5 g frequency q12h will be changed to medication:Zosyn dose:4.5 g frequency:q8h    Pharmacist's Name: Dave Mayfield  Pharmacist's Extension: [phone]

## 2023-11-08 NOTE — HOSPITAL COURSE
"11/8: wound cx growing out gm positive. Pt on vanc and meropenem. Patient with clinical acute osteomyelitis of right hallux ulcer with bone exposure and purulent drainage noted. Xray, MRI ordered. Surgery plans for Friday by podiatry  11/9: MRI confirms osteo. Pt voices no complaints.NPO after midnight for tentative surgery   11/10:Pt taken to OR for a partial 1st ray amputation of right great toe. Clean margins taken, sent for Culture and Pathology plan for 2-6 weeks of antibiotics pending results upon d/c, antibiotic management per Infectious Disease  11/11: Echo ordered. Repeat blood cx on 11/10 NGTD. Blood cx upon admission growing out MRSA, sensitive to clinda and vanco. D/c meropenem. ID recs 4-6 weeks of iV abx. CM notified of duration and set up  11/12: U/S of LUE shows nonocclusive thormbus in the left internal jugular vein.Will do lovenox. Pt was on eliquis and was held for procedure.  Restarted pt on plavix. echo normal. No issues voiced. Awaiting home infusion approval   .    BP (!) 118/56   Pulse 75   Temp 97.8 °F (36.6 °C)   Resp 20   Ht 6' 1" (1.854 m)   Wt 86.6 kg (190 lb 14.7 oz)   SpO2 96%   BMI 25.19 kg/m²     Physical Exam  Vitals and nursing note reviewed.   Constitutional:       General: He is not in acute distress.     Appearance: He is well-developed.   HENT:      Head: Normocephalic and atraumatic.      Nose: Nose normal.   Eyes:      Conjunctiva/sclera: Conjunctivae normal.   Cardiovascular:      Rate and Rhythm: Normal rate and regular rhythm.      Heart sounds: Normal heart sounds. No murmur heard.  Pulmonary:      Effort: Pulmonary effort is normal.      Breath sounds: Normal breath sounds. No wheezing.   Abdominal:      General: Bowel sounds are normal.      Palpations: Abdomen is soft. There is no mass.      Tenderness: There is no abdominal tenderness. There is no guarding or rebound.   Musculoskeletal:         General: Normal range of motion.      Cervical back: Normal range " of motion and neck supple.      Comments: right hallux s/p ampuation with dressings intact  Left shoulder tenderness with Limited ROM   Skin:     General: Skin is warm and dry.      Findings: No rash.   Neurological:      Mental Status: He is alert and oriented to person, place, and time.   Psychiatric:         Behavior: Behavior normal.

## 2023-11-08 NOTE — ASSESSMENT & PLAN NOTE
- wound to right great toe with erythema to surrounding tissues  - WBCs 9k; on Vanc and Merrem  - further management per primary team and Podiatry

## 2023-11-08 NOTE — ASSESSMENT & PLAN NOTE
Podiatry consult:  Patient with clinical acute osteomyelitis of right hallux ulcer with bone exposure and purulent drainage noted.   Xray, MRI ordered  Discussed surgical vs conservative treatment options. Recommend partial 1st ray amputation, case request placed for Friday.   Antibiotic management per ID, appreciate recommendations  Nursing orders in for dressing changes  WBAT in Darco shoe. Z-flex boots while in bed, keep heels offloaded  Cardiology on board, pt with history of PAD  Will follow

## 2023-11-08 NOTE — ASSESSMENT & PLAN NOTE
Patient with clinical acute osteomyelitis of right hallux ulcer with bone exposure and purulent drainage noted.   Xray, MRI ordered  Discussed surgical vs conservative treatment options. Recommend partial 1st ray amputation, case request placed for Friday.   Antibiotic management per ID, appreciate recommendations  Nursing orders in for dressing changes  WBAT in Darco shoe. Z-flex boots while in bed, keep heels offloaded  Cardiology on board, pt with history of PAD  Will follow

## 2023-11-08 NOTE — CONSULTS
[place] [facility]  Cardiology  Consult Note    Patient Name: Julien Meléndez  MRN: [id number]  Admission Date: 11/7/2023  Hospital Length of Stay: 0 days  Code Status: Full Code   Attending Provider: Tarsha Styles MD   Consulting Provider: BEATRICE Robison ANP  Primary Care Physician: Kelvin Wayne MD  Principal Problem:Cellulitis of foot    Patient information was obtained from patient, spouse/SO, past medical records and ER records.     Inpatient consult to Cardiology[facility]  Consult performed by: Carmen Sandoval APRN, AMITA  Consult ordered by: Diya Leung DPM  Reason for consult: CLI        Subjective:     Chief Complaint:  Right great toe wound      HPI:   84yo male with CAD s/p RCA and LCX PCI, CKD stage III, cellulitis, HTN, HLP, DMII, afib on Eliquis, anemia, hyponatremia,chronic systolic heart failure and PAD s/p left peroneal PTA 4/2023 (Dr. Giraldo) and RLE intervention 3 weeks ago by Dr. Martin who presented to the ER with right great toe wound. He was seen in wound care and directed to the ER due to concern of worsening right toe wound/biopsy. He denies any fever or chills. His wife is uncertain if the wound is worsening. He complains of pain to the toe when attempting to ambulate but denies any pain at rest. Labs CBC and BMP WNL. Lactic acid 2.8 down to 1.9. Started on IV Vanc and Merrem and admitted to [facility]. Cardiology consulted for evaluation of PAD/CLI. BLE arterial ultrasound pending      Past Medical History:   Diagnosis Date    Diabetes mellitus     Hiatal hernia     under Dr Saenz' care    Hyperlipidemia     Hypertension     MI (myocardial infarction)     Sleep apnea        Past Surgical History:   Procedure Laterality Date    ABDOMINAL HERNIA REPAIR      ANGIOGRAM, EXTREMITY, BILATERAL Bilateral 4/5/2023    Procedure: ANGIOGRAM, EXTREMITY, BILATERAL;  Surgeon: Michael Giraldo III, MD;  Location: [facility] CATH LAB;  Service: Cardiology;   Laterality: Bilateral;    ANGIOGRAPHY OF LOWER EXTREMITY Left 4/12/2023    Procedure: Angiogram Extremity Unilateral;  Surgeon: Michael Giraldo III, MD;  Location: [facility] CATH LAB;  Service: Cardiology;  Laterality: Left;    AORTOGRAPHY WITH SERIALOGRAPHY Bilateral 4/5/2023    Procedure: AORTOGRAM, WITH SERIALOGRAPHY;  Surgeon: Michael Giraldo III, MD;  Location: [facility] CATH LAB;  Service: Cardiology;  Laterality: Bilateral;    APPLICATION OF WOUND VACUUM-ASSISTED CLOSURE DEVICE Left 3/29/2023    Procedure: APPLICATION, WOUND VAC;  Surgeon: Alona Pierce DPM;  Location: [facility] OR;  Service: Podiatry;  Laterality: Left;    APPLICATION OF WOUND VACUUM-ASSISTED CLOSURE DEVICE Left 4/4/2023    Procedure: APPLICATION, WOUND VAC;  Surgeon: Alona Pierce DPM;  Location: [facility] OR;  Service: Podiatry;  Laterality: Left;    APPLICATION OF WOUND VACUUM-ASSISTED CLOSURE DEVICE Left 4/13/2023    Procedure: APPLICATION, WOUND VAC;  Surgeon: Alona Pierce DPM;  Location: [facility] OR;  Service: Podiatry;  Laterality: Left;    BONE BIOPSY Left 3/29/2023    Procedure: BIOPSY, BONE;  Surgeon: Alona Pierce DPM;  Location: [facility] OR;  Service: Podiatry;  Laterality: Left;  1st met    CARDIAC CATHETERIZATION      3 stents placed    CATARACT EXTRACTION, BILATERAL      CLOSURE DEVICE Left 4/12/2023    Procedure: Placement of Closure Device;  Surgeon: Michael Giraldo III, MD;  Location: [facility] CATH LAB;  Service: Cardiology;  Laterality: Left;    CORONARY ANGIOGRAPHY N/A 10/3/2019    Procedure: ANGIOGRAM, CORONARY ARTERY;  Surgeon: Edwin Azul MD;  Location: [facility] CATH LAB/EP;  Service: Cardiology;  Laterality: N/A;    DEBRIDEMENT OF FOOT Left 3/29/2023    Procedure: DEBRIDEMENT, FOOT;  Surgeon: Alona Pierce DPM;  Location: [facility] OR;  Service: Podiatry;  Laterality: Left;    DEBRIDEMENT OF FOOT Left 4/4/2023    Procedure: DEBRIDEMENT, FOOT;  Surgeon: Alona Pierec DPM;  Location: [facility] OR;  Service: Podiatry;  Laterality: Left;     DEBRIDEMENT OF FOOT Left 4/13/2023    Procedure: DEBRIDEMENT, FOOT;  Surgeon: Alona Pierce DPM;  Location: [facility] OR;  Service: Podiatry;  Laterality: Left;    ESOPHAGOGASTRODUODENOSCOPY N/A 4/17/2023    Procedure: EGD (ESOPHAGOGASTRODUODENOSCOPY);  Surgeon: Otto Villarreal MD;  Location: [facility] ENDO;  Service: Endoscopy;  Laterality: N/A;    HERNIA REPAIR      inguinal    IVUS (INTRAVASCULAR ULTRASOUND) Left 4/12/2023    Procedure: ULTRASOUND, INTRAVASCULAR;  Surgeon: Michael Giraldo III, MD;  Location: [facility] CATH LAB;  Service: Cardiology;  Laterality: Left;    LEFT HEART CATHETERIZATION Left 9/13/2019    Procedure: Left heart cath;  Surgeon: Edwin Azul MD;  Location: [facility] CATH LAB/EP;  Service: Cardiology;  Laterality: Left;    LEFT HEART CATHETERIZATION N/A 10/3/2019    Procedure: Left heart cath;  Surgeon: Edwin Azul MD;  Location: [facility] CATH LAB/EP;  Service: Cardiology;  Laterality: N/A;    OSTEOTOMY Left 6/7/2023    Procedure: OSTEOTOMY;  Surgeon: Alona Pierce DPM;  Location: [facility] OR;  Service: Podiatry;  Laterality: Left;    OSTEOTOMY OF METATARSAL BONE Right 4/4/2023    Procedure: OSTEOTOMY, METATARSAL BONE;  Surgeon: Alona Pierce DPM;  Location: [facility] OR;  Service: Podiatry;  Laterality: Right;    PERCUTANEOUS TRANSLUMINAL ANGIOPLASTY Left 4/12/2023    Procedure: PTA (ANGIOPLASTY, PERCUTANEOUS, TRANSLUMINAL);  Surgeon: Michael Giraldo III, MD;  Location: [facility] CATH LAB;  Service: Cardiology;  Laterality: Left;    SURGICAL REMOVAL OF BUNION WITH OSTEOTOMY OF METATARSAL BONE Right 4/4/2023    Procedure: BUNIONECTOMY, WITH METATARSAL OSTEOTOMY;  Surgeon: Alona Pierce DPM;  Location: [facility] OR;  Service: Podiatry;  Laterality: Right;    TOE AMPUTATION Left 4/4/2023    Procedure: AMPUTATION, TOE;  Surgeon: Alona Pierce DPM;  Location: [facility] OR;  Service: Podiatry;  Laterality: Left;  1st ray    WASHOUT Left 6/7/2023    Procedure: WASHOUT;  Surgeon: Alona Pierce DPM;  Location: [facility];   Service: Podiatry;  Laterality: Left;    WOUND DEBRIDEMENT Left 6/7/2023    Procedure: DEBRIDEMENT, WOUND;  Surgeon: Alona Pierce DPM;  Location: [facility] OR;  Service: Podiatry;  Laterality: Left;       Review of patient's allergies indicates:   Allergen Reactions    Nystatin      Other reaction(s): Unknown       No current facility-administered medications on file prior to encounter.     Current Outpatient Medications on File Prior to Encounter   Medication Sig    amLODIPine (NORVASC) 5 MG tablet     atorvastatin (LIPITOR) 40 MG tablet Take 1 tablet (40 mg total) by mouth once daily. (Patient taking differently: Take 40 mg by mouth every evening.)    collagenase (SANTYL) ointment Apply topically once daily.    ELIQUIS 5 mg Tab Take 5 mg by mouth 2 (two) times daily.    famotidine (PEPCID) 20 MG tablet Take 1 tablet (20 mg total) by mouth 2 (two) times daily.    ferrous sulfate (FEOSOL) 325 mg (65 mg iron) Tab tablet Take 325 mg by mouth daily with breakfast.    furosemide (LASIX) 20 MG tablet Take 1 tablet (20 mg total) by mouth once daily.    isosorbide mononitrate (IMDUR) 30 MG 24 hr tablet TAKE 1 TABLET EVERY DAY (Patient taking differently: Take 30 mg by mouth once daily.)    metoprolol succinate (TOPROL-XL) 25 MG 24 hr tablet TAKE 1 TABLET EVERY DAY    nitroGLYCERIN (NITROSTAT) 0.4 MG SL tablet Place 0.4 mg under the tongue every 5 (five) minutes as needed for Chest pain.    ondansetron (ZOFRAN-ODT) 8 MG TbDL Take 1 tablet (8 mg total) by mouth 3 (three) times daily as needed (nausea or vomiting).    oxyCODONE-acetaminophen (PERCOCET) 5-325 mg per tablet Take 1 tablet by mouth every 6 (six) hours as needed for Pain.    pantoprazole (PROTONIX) 40 MG tablet Take 1 tablet (40 mg total) by mouth 2 (two) times daily.    ranolazine (RANEXA) 1,000 mg Tb12 Take 1,000 mg by mouth 2 (two) times daily.    SITagliptan-metformin (JANUMET XR) 100-1,000 mg TM24 Take 1 tablet by mouth once daily.    sucralfate  (CARAFATE) 1 gram tablet Take 1 tablet (1 g total) by mouth 4 (four) times daily before meals and nightly.    traZODone (DESYREL) 150 MG tablet TAKE 1 TABLET EVERY NIGHT    alcohol swabs PadM Apply 1 each topically as needed (for use with glucose monitoring).    blood sugar diagnostic (TRUE METRIX GLUCOSE TEST STRIP) Strp 1 strip by Misc.(Non-Drug; Combo Route) route 2 (two) times a day.    blood-glucose meter (TRUE METRIX GLUCOSE METER) Misc TEST TWICE A DAY    ciprofloxacin HCl (CIPRO) 500 MG tablet Take 1 tablet (500 mg total) by mouth 2 (two) times daily. for 7 days    clopidogreL (PLAVIX) 75 mg tablet Take 1 tablet (75 mg total) by mouth once daily.    FLUoxetine 40 MG capsule Take 40 mg by mouth once daily.    insulin detemir U-100, Levemir, 100 unit/mL (3 mL) SubQ InPn pen Inject 20 Units into the skin every evening.    lancets 28 gauge Misc 1 lancet  by Misc.(Non-Drug; Combo Route) route 2 (two) times a day.    mupirocin (BACTROBAN) 2 % ointment Apply topically 3 (three) times daily.     Family History       Problem Relation (Age of Onset)    Heart disease Mother, Sister, Brother, Maternal Uncle, Brother, Sister, Maternal Uncle, Maternal Uncle, Maternal Uncle    Stroke Mother          Tobacco Use    Smoking status: Never    Smokeless tobacco: Never   Substance and Sexual Activity    Alcohol use: No    Drug use: No    Sexual activity: Not on file     Review of Systems   Constitutional: Negative for chills, decreased appetite, diaphoresis and fever.   Cardiovascular:  Negative for chest pain, claudication, cyanosis, dyspnea on exertion, irregular heartbeat, leg swelling, near-syncope, orthopnea, palpitations, paroxysmal nocturnal dyspnea and syncope.   Respiratory:  Negative for cough, hemoptysis, shortness of breath and wheezing.    Skin:  Positive for poor wound healing.   Gastrointestinal:  Negative for bloating, abdominal pain, constipation, diarrhea, melena, nausea and vomiting.    Neurological:  Negative for dizziness and weakness.     Objective:     Vital Signs (Most Recent):  Temp: 98.3 °F (36.8 °C) (11/08/23 1110)  Pulse: 83 (11/08/23 1110)  Resp: 16 (11/08/23 1110)  BP: (!) 126/59 (11/08/23 1110)  SpO2: (!) 94 % (11/08/23 1110) Vital Signs (24h Range):  Temp:  [97.9 °F (36.6 °C)-99.2 °F (37.3 °C)] 98.3 °F (36.8 °C)  Pulse:  [] 83  Resp:  [14-33] 16  SpO2:  [94 %-100 %] 94 %  BP: (101-155)/(52-88) 126/59     Weight: 76.3 kg (168 lb 3.4 oz)  Body mass index is 22.19 kg/m².    SpO2: (!) 94 %         Intake/Output Summary (Last 24 hours) at 11/8/2023 1456  Last data filed at 11/8/2023 1230  Gross per 24 hour   Intake 1922.89 ml   Output 100 ml   Net 1822.89 ml       Lines/Drains/Airways       Drain  Duration             Male External Urinary Catheter 11/07/23 2030 Small <1 day              Peripheral Intravenous Line  Duration                  Peripheral IV - Single Lumen 11/07/23 2015 20 G Anterior;Right Forearm <1 day                     Physical Exam  Constitutional:       General: He is not in acute distress.     Appearance: He is well-developed.   Cardiovascular:      Rate and Rhythm: Normal rate and regular rhythm.      Heart sounds: No murmur heard.     No gallop.      Comments: Biphasic dopplerable right DP and PT pulses present   Pulmonary:      Effort: Pulmonary effort is normal. No respiratory distress.      Breath sounds: Normal breath sounds. No wheezing.   Abdominal:      General: Bowel sounds are normal. There is no distension.      Palpations: Abdomen is soft.      Tenderness: There is no abdominal tenderness.   Skin:     General: Skin is warm and dry.   Neurological:      Mental Status: He is alert and oriented to person, place, and time.          Significant Labs: BMP:   Recent Labs   Lab 11/07/23 2044 11/08/23  0245   * 237*   * 133*   K 4.4 4.3   CL 95 97   CO2 15* 22*   BUN 20 16   CREATININE 1.3 1.0   CALCIUM 10.1 9.3    and CBC   Recent Labs   Lab  "11/07/23 2022 11/08/23  0245   WBC 12.71* 9.42   HGB 13.2* 11.9*   HCT 39.8* 35.8*    241       Significant Imaging: Echocardiogram: Transthoracic echo (TTE) complete (Cupid Only):   Results for orders placed or performed during the hospital encounter of 07/31/19   Transthoracic echo (TTE) 2D with Color Flow   Result Value Ref Range    LA WIDTH 3.90 cm    AORTIC VALVE CUSP SEPERATION 1.81 cm    PV PEAK VELOCITY 0.96 cm/s    LVIDd 4.20 3.5 - 6.0 cm    IVS 0.87 0.6 - 1.1 cm    Posterior Wall 1.18 (A) 0.6 - 1.1 cm    Ao root annulus 2.99 cm    LVIDs 2.98 2.1 - 4.0 cm    FS 29 28 - 44 %    IVC proximal 0.8 cm    LA volume 59.70 cm3    Sinus 2.55 cm    LV mass 142.08 g    LA size 3.32 cm    TAPSE 2.14 cm    Left Ventricle Relative Wall Thickness 0.56 cm    AV mean gradient 4 mmHg    AV valve area 2.87 cm2    AV Velocity Ratio 0.74     AV index (prosthetic) 0.74     MV valve area p 1/2 method 3.44 cm2    MV valve area by continuity eq 2.32 cm2    E/A ratio 0.56     E wave deceleration time 219.57 msec    MV "A" wave duration 143.00 msec    LVOT diameter 2.22 cm    LVOT area 3.9 cm2    LVOT peak brody 0.93 m/s    LVOT peak VTI 20.35 cm    Ao peak brody 1.26 m/s    Ao VTI 27.43 cm    LVOT stroke volume 78.73 cm3    AV peak gradient 6 mmHg    MV Peak E Brody 0.83 m/s    MV VTI 34 cm    MV stenosis pressure 1/2 time 64 ms    MV Peak A Brody 1.49 m/s    LV Systolic Volume 34.46 mL    LV Diastolic Volume 78.68 mL    RA Major Axis 4.55 cm    Left Atrium Minor Axis 5.60 cm    Left Atrium Major Axis 5.26 cm    RA Width 2.55 cm    Right Atrial Pressure (from IVC) 3 mmHg    Narrative    · Mildly decreased left ventricular systolic function. The estimated   ejection fraction is 45-50%  · Local segmental wall motion abnormalities: LV apex is hypokinetic.   (unchanged from prior)  · Concentric left ventricular hypertrophy.  · Grade I (mild) left ventricular diastolic dysfunction consistent with   impaired relaxation.  · Normal right " ventricular systolic function.  · Normal central venous pressure (3 mm Hg).        Assessment and Plan:     * Cellulitis of foot  - wound to right great toe with erythema to surrounding tissues  - WBCs 9k; on Vanc and Merrem  - further management per primary team and Podiatry     Atherosclerosis of native artery of right lower extremity with ulceration of midfoot  - history of PAD with left peroneal PTA in 4/2023- full run off at that time with right AT disease; reports recent RLE revascularization by Dr. Galvez 3 weeks ago- no report available  - biphasic dopplerable right DP and PT pulses present; no recent arterial ultrasound- ordered and pending; will review results when available  - continue Plavix and statin therapy; no ASA likely due to Plavix and Eliquis use  - will update Podiatry and primary team  after ultrasound resulted     Atrial fibrillation  - paroxysmal; EKG from 11/2/2023 with NSR  - on BB and Eliquis as an outpatient  - rhythm regular on exam; continue current medication regimen; if Eliquis needs to be held for surgical intervention okay with holding     Coronary artery disease due to calcified coronary lesion  - history of RCA and LCX MILES in 2015 & 2019  - no complaints of chest pain   - continue statin, Plavix, BB, Ranexa and Imdur      Hypertension  - SBP 100s-120s  - on Imdur and Toprol XL  - goal BP less than 130/80  - BP well controlled; continue medication regimen and monitor BP and adjust meds prn     Mixed hyperlipidemia  - on Lipitor 40mg  - last FLP in 2022 with LDL 71  - goal LDL   - cholesterol well controlled; continue current medication regimen         VTE Risk Mitigation (From admission, onward)         Ordered     apixaban tablet 5 mg  2 times daily         11/07/23 2215     IP VTE HIGH RISK PATIENT  Once         11/07/23 2208     Place sequential compression device  Until discontinued         11/07/23 2208                Thank you for your consult. I will follow-up with  patient. Please contact us if you have any additional questions.    BEATRICE Robison, ANP  Cardiology   [facility]

## 2023-11-08 NOTE — PLAN OF CARE
Recommendation:  1. Add Boost Glucose Control 1x daily.   2. Encourage intake at meals as tolerated.   3. Monitor weight/labs.   4. RD to follow to monitor po intake    Goals:  Pt will tolerate diet with at least 50-75% intake at meals by RD follow up  Nutrition Goal Status: new

## 2023-11-08 NOTE — ASSESSMENT & PLAN NOTE
Patient with known CAD s/p stent placement and CABG, which is controlled Will continue ASA and Statin and monitor for S/Sx of angina/ACS. Continue to monitor on telemetry.

## 2023-11-08 NOTE — HPI
Julien Meléndez is a that is why 83-year-old male with past medical history of diabetes, anxiety/depression, atrial fibrillation on anticoagulation, heart failure who presents with foot wound.    Patient reports a recent biopsy of his right great toe by Podiatry over the past week.  Patient reports worsening drainage and odor from his right foot biopsy site.  He states he has been taking antibiotics for the infection, with minimal relief.  He denies any fevers , chills , nausea, although reports episodes of nonbilious nonbloody emesis shortly after going to the ED after discussing the plan with his wound care nurse.      Triage vitals were significant for slightly elevated blood pressures.  CBC was significant for leukocytosis, normocytic anemia.  CMP was significant for hyponatremia, hyperglycemia, and anion gap of 22.  CRP was elevated at 228.9.  Lactic acid was 2.9.    Blood cultures were collected.    X-rays of the right foot showed no significant change of ulceration and destructive changes of the distal metatarsal head of the 1st toe of the right foot.    Pt given fluids and antibiotics.    Patient admitted for cellulitis of right toe/right foot wound,  with podiatry consult in the a.m.

## 2023-11-08 NOTE — ASSESSMENT & PLAN NOTE
Chronic, controlled. Latest blood pressure and vitals reviewed-     Temp:  [98.3 °F (36.8 °C)-99.2 °F (37.3 °C)]   Pulse:  []   Resp:  [14-33]   BP: (101-155)/(52-88)   SpO2:  [95 %-100 %] .   Home meds for hypertension were reviewed and noted below.   Hypertension Medications             amLODIPine (NORVASC) 5 MG tablet     furosemide (LASIX) 20 MG tablet Take 1 tablet (20 mg total) by mouth once daily.    isosorbide mononitrate (IMDUR) 30 MG 24 hr tablet TAKE 1 TABLET EVERY DAY    metoprolol succinate (TOPROL-XL) 25 MG 24 hr tablet TAKE 1 TABLET EVERY DAY    nitroGLYCERIN (NITROSTAT) 0.4 MG SL tablet Place 0.4 mg under the tongue every 5 (five) minutes as needed for Chest pain.          While in the hospital, will manage blood pressure as follows; Continue home antihypertensive regimen    Will utilize p.r.n. blood pressure medication only if patient's blood pressure greater than 180/110 and he develops symptoms such as worsening chest pain or shortness of breath.

## 2023-11-08 NOTE — PROGRESS NOTES
[facility] Medicine  Progress Note    Patient Name: Julien Meléndez  MRN: [id number]  Patient Class: OP- Observation   Admission Date: 11/7/2023  Length of Stay: 0 days  Attending Physician: Tarsha Styles MD  Primary Care Provider: Kelvin Wayne MD        Subjective:     Principal Problem:Cellulitis of foot        HPI:  Julien Meléndez is a that is why 83-year-old male with past medical history of diabetes, anxiety/depression, atrial fibrillation on anticoagulation, heart failure who presents with foot wound.    Patient reports a recent biopsy of his right great toe by Podiatry over the past week.  Patient reports worsening drainage and odor from his right foot biopsy site.  He states he has been taking antibiotics for the infection, with minimal relief.  He denies any fevers , chills , nausea, although reports episodes of nonbilious nonbloody emesis shortly after going to the ED after discussing the plan with his wound care nurse.      Triage vitals were significant for slightly elevated blood pressures.  CBC was significant for leukocytosis, normocytic anemia.  CMP was significant for hyponatremia, hyperglycemia, and anion gap of 22.  CRP was elevated at 228.9.  Lactic acid was 2.9.    Blood cultures were collected.    X-rays of the right foot showed no significant change of ulceration and destructive changes of the distal metatarsal head of the 1st toe of the right foot.    Pt given fluids and antibiotics.    Patient admitted for cellulitis of right toe/right foot wound,  with podiatry consult in the a.m.      Overview/Hospital Course:  11/8: wound cx growing out gm positive. Pt on vanc and meropenem. Patient with clinical acute osteomyelitis of right hallux ulcer with bone exposure and purulent drainage noted. Xray, MRI ordered. Surgery plans for Friday by podiatry      Interval History: still have drainage    Review of Systems   All other systems reviewed and are negative.    Objective:      Vital Signs (Most Recent):  Temp: 97.9 °F (36.6 °C) (11/08/23 1521)  Pulse: 88 (11/08/23 1521)  Resp: 18 (11/08/23 1521)  BP: 115/67 (11/08/23 1521)  SpO2: 96 % (11/08/23 1521) Vital Signs (24h Range):  Temp:  [97.9 °F (36.6 °C)-99.2 °F (37.3 °C)] 97.9 °F (36.6 °C)  Pulse:  [] 88  Resp:  [14-33] 18  SpO2:  [94 %-100 %] 96 %  BP: (101-155)/(52-88) 115/67     Weight: 76.3 kg (168 lb 3.4 oz)  Body mass index is 22.19 kg/m².    Intake/Output Summary (Last 24 hours) at 11/8/2023 1652  Last data filed at 11/8/2023 1230  Gross per 24 hour   Intake 1922.89 ml   Output 100 ml   Net 1822.89 ml         Physical Exam  Vitals and nursing note reviewed.   Constitutional:       General: He is not in acute distress.     Appearance: He is well-developed.   HENT:      Head: Normocephalic and atraumatic.      Nose: Nose normal.   Eyes:      Conjunctiva/sclera: Conjunctivae normal.   Cardiovascular:      Rate and Rhythm: Normal rate and regular rhythm.      Heart sounds: Normal heart sounds. No murmur heard.  Pulmonary:      Effort: Pulmonary effort is normal.      Breath sounds: Normal breath sounds. No wheezing.   Abdominal:      General: Bowel sounds are normal.      Palpations: Abdomen is soft. There is no mass.      Tenderness: There is no abdominal tenderness. There is no guarding or rebound.   Musculoskeletal:         General: Normal range of motion.      Cervical back: Normal range of motion and neck supple.      Comments: right hallux ulcer with bone exposure and purulent drainage noted.    Skin:     General: Skin is warm and dry.      Findings: No rash.   Neurological:      Mental Status: He is alert and oriented to person, place, and time.   Psychiatric:         Behavior: Behavior normal.             Significant Labs: All pertinent labs within the past 24 hours have been reviewed.  A1C:   Recent Labs   Lab 10/23/23  1426   HGBA1C 9.7*     BMP:   Recent Labs   Lab 11/08/23  0245   *   *   K 4.3   CL 97    CO2 22*   BUN 16   CREATININE 1.0   CALCIUM 9.3     CBC:   Recent Labs   Lab 11/07/23 2022 11/08/23  0245   WBC 12.71* 9.42   HGB 13.2* 11.9*   HCT 39.8* 35.8*    241     CMP:   Recent Labs   Lab 11/07/23 2044 11/08/23  0245   * 133*   K 4.4 4.3   CL 95 97   CO2 15* 22*   * 237*   BUN 20 16   CREATININE 1.3 1.0   CALCIUM 10.1 9.3   PROT 7.6  --    ALBUMIN 3.1*  --    BILITOT 0.8  --    ALKPHOS 96  --    AST 10  --    ALT 15  --    ANIONGAP 22* 14       Significant Imaging: I have reviewed all pertinent imaging results/findings within the past 24 hours.  I have reviewed and interpreted all pertinent imaging results/findings within the past 24 hours.      Assessment/Plan:      * Cellulitis of foot  Patient presents with worsening right big toe wound with purulence and odor  Elevated CRP, WBC  Xray without signs of new or worsening bone loss or significant change of ulceration and destructive changes of the distal metatarsal head of the 1st toe of the right foot  Elevated lactate    Plan:  Continue IV vancomycin and Zosyn  Consult podiatry  Consider MRI of foot to assess for new onset osteomyelitis      Acute osteomyelitis  Podiatry consult:  Patient with clinical acute osteomyelitis of right hallux ulcer with bone exposure and purulent drainage noted.   Xray, MRI ordered  Discussed surgical vs conservative treatment options. Recommend partial 1st ray amputation, case request placed for Friday.   Antibiotic management per ID, appreciate recommendations  Nursing orders in for dressing changes  WBAT in Darco shoe. Z-flex boots while in bed, keep heels offloaded  Cardiology on board, pt with history of PAD  Will follow    Hyponatremia  Patient has hyponatremia which is controlled,We will aim to correct the sodium by 4-6mEq in 24 hours. We will monitor sodium Daily. The hyponatremia is due to Dehydration/hypovolemia. We will obtain the following studies:  Hold off on studies at this time. We will  treat the hyponatremia with IV fluids as follows:  1 L bolus. The patient's sodium results have been reviewed and are listed below.  Recent Labs   Lab 11/07/23  2044   *       Anemia  Patient's anemia is currently controlled. Has not received any PRBCs to date. Etiology likely d/t chronic disease due to Osteomyelitis,  Current CBC reviewed-   Lab Results   Component Value Date    HGB 13.2 (L) 11/07/2023    HCT 39.8 (L) 11/07/2023     Monitor serial CBC and transfuse if patient becomes hemodynamically unstable, symptomatic or H/H drops below 7/21.    Depressive disorder  Patient has persistent depression which is moderate and is currently controlled. Will Continue anti-depressant medications. We will not consult psychiatry at this time. Patient does not display psychosis at this time. Continue to monitor closely and adjust plan of care as needed.        Atrial fibrillation  Patient with Paroxysmal (<7 days) atrial fibrillation which is controlled currently with Beta Blocker. Patient is currently in sinus rhythm.[id number] Score: 4. HASBLED Score: 3+. Anticoagulation indicated. Anticoagulation done with Apixaban.  We will hold if patient needs possible procedure.    Coronary artery disease due to calcified coronary lesion  Patient with known CAD s/p stent placement and CABG, which is controlled Will continue ASA and Statin and monitor for S/Sx of angina/ACS. Continue to monitor on telemetry.     Diabetes mellitus type 2, noninsulin dependent  Patient's FSGs are uncontrolled due to hyperglycemia on current medication regimen.  Last A1c reviewed- May  Lab Results   Component Value Date    HGBA1C 9.7 (H) 10/23/2023     Most recent fingerstick glucose reviewed-   Recent Labs   Lab 11/08/23  0126   POCTGLUCOSE 280*     Current correctional scale  Medium  Maintain anti-hyperglycemic dose as follows-   Antihyperglycemics (From admission, onward)    Start     Stop Route Frequency Ordered    11/07/23 8115  insulin  detemir U-100 (Levemir) pen 10 Units         -- SubQ Nightly 11/07/23 2215 11/07/23 2306  insulin aspart U-100 pen 0-5 Units         -- SubQ Before meals & nightly PRN 11/07/23 2208        Hold Oral hypoglycemics while patient is in the hospital.    Hypertension  Chronic, controlled. Latest blood pressure and vitals reviewed-     Temp:  [98.3 °F (36.8 °C)-99.2 °F (37.3 °C)]   Pulse:  []   Resp:  [14-33]   BP: (101-155)/(52-88)   SpO2:  [95 %-100 %] .   Home meds for hypertension were reviewed and noted below.   Hypertension Medications             amLODIPine (NORVASC) 5 MG tablet     furosemide (LASIX) 20 MG tablet Take 1 tablet (20 mg total) by mouth once daily.    isosorbide mononitrate (IMDUR) 30 MG 24 hr tablet TAKE 1 TABLET EVERY DAY    metoprolol succinate (TOPROL-XL) 25 MG 24 hr tablet TAKE 1 TABLET EVERY DAY    nitroGLYCERIN (NITROSTAT) 0.4 MG SL tablet Place 0.4 mg under the tongue every 5 (five) minutes as needed for Chest pain.          While in the hospital, will manage blood pressure as follows; Continue home antihypertensive regimen    Will utilize p.r.n. blood pressure medication only if patient's blood pressure greater than 180/110 and he develops symptoms such as worsening chest pain or shortness of breath.    Mixed hyperlipidemia  May continue with home statin        VTE Risk Mitigation (From admission, onward)         Ordered     apixaban tablet 5 mg  2 times daily         11/07/23 2215     IP VTE HIGH RISK PATIENT  Once         11/07/23 2208     Place sequential compression device  Until discontinued         11/07/23 2208                Discharge Planning   AURELIA:      Code Status: Full Code   Is the patient medically ready for discharge?:     Reason for patient still in hospital (select all that apply): Treatment  Discharge Plan A: Home with family, Home Health                  Tarsha Styles MD  Department of Hospital Medicine   [facility]

## 2023-11-08 NOTE — ASSESSMENT & PLAN NOTE
Patient has hyponatremia which is controlled,We will aim to correct the sodium by 4-6mEq in 24 hours. We will monitor sodium Daily. The hyponatremia is due to Dehydration/hypovolemia. We will obtain the following studies:  Hold off on studies at this time. We will treat the hyponatremia with IV fluids as follows:  1 L bolus. The patient's sodium results have been reviewed and are listed below.  Recent Labs   Lab 11/07/23  2044   *

## 2023-11-08 NOTE — ASSESSMENT & PLAN NOTE
- history of PAD with left peroneal PTA in 4/2023- full run off at that time with right AT disease; reports recent RLE revascularization by Dr. Galvez 3 weeks ago- no report available  - biphasic dopplerable right DP and PT pulses present; no recent arterial ultrasound- ordered and pending; will review results when available  - continue Plavix and statin therapy; no ASA likely due to Plavix and Eliquis use  - will update Podiatry and primary team  after ultrasound resulted

## 2023-11-08 NOTE — ASSESSMENT & PLAN NOTE
Patient's anemia is currently controlled. Has not received any PRBCs to date. Etiology likely d/t chronic disease due to Osteomyelitis,  Current CBC reviewed-   Lab Results   Component Value Date    HGB 13.2 (L) 11/07/2023    HCT 39.8 (L) 11/07/2023     Monitor serial CBC and transfuse if patient becomes hemodynamically unstable, symptomatic or H/H drops below 7/21.

## 2023-11-08 NOTE — CONSULTS
Wound care consult received for R toe wound- pt established with podiatry with consult in place.  Performed skin assessment with nurse noting no other skin concerns other than foot wounds. Will place orders for moisture barrier to buttocks as a preventative measure and notified Dr. Styles.

## 2023-11-08 NOTE — SUBJECTIVE & OBJECTIVE
Past Medical History:   Diagnosis Date    Diabetes mellitus     Hiatal hernia     under Dr Saenz' care    Hyperlipidemia     Hypertension     MI (myocardial infarction)     Sleep apnea        Past Surgical History:   Procedure Laterality Date    ABDOMINAL HERNIA REPAIR      ANGIOGRAM, EXTREMITY, BILATERAL Bilateral 4/5/2023    Procedure: ANGIOGRAM, EXTREMITY, BILATERAL;  Surgeon: Michael Giraldo III, MD;  Location: [facility] CATH LAB;  Service: Cardiology;  Laterality: Bilateral;    ANGIOGRAPHY OF LOWER EXTREMITY Left 4/12/2023    Procedure: Angiogram Extremity Unilateral;  Surgeon: Michael Giraldo III, MD;  Location: [facility] CATH LAB;  Service: Cardiology;  Laterality: Left;    AORTOGRAPHY WITH SERIALOGRAPHY Bilateral 4/5/2023    Procedure: AORTOGRAM, WITH SERIALOGRAPHY;  Surgeon: Michael Giraldo III, MD;  Location: [facility] CATH LAB;  Service: Cardiology;  Laterality: Bilateral;    APPLICATION OF WOUND VACUUM-ASSISTED CLOSURE DEVICE Left 3/29/2023    Procedure: APPLICATION, WOUND VAC;  Surgeon: Alona Pierce DPM;  Location: [facility] OR;  Service: Podiatry;  Laterality: Left;    APPLICATION OF WOUND VACUUM-ASSISTED CLOSURE DEVICE Left 4/4/2023    Procedure: APPLICATION, WOUND VAC;  Surgeon: Alona Pierce DPM;  Location: [facility] OR;  Service: Podiatry;  Laterality: Left;    APPLICATION OF WOUND VACUUM-ASSISTED CLOSURE DEVICE Left 4/13/2023    Procedure: APPLICATION, WOUND VAC;  Surgeon: Alona Pierce DPM;  Location: [facility] OR;  Service: Podiatry;  Laterality: Left;    BONE BIOPSY Left 3/29/2023    Procedure: BIOPSY, BONE;  Surgeon: Alona Pierce DPM;  Location: [facility] OR;  Service: Podiatry;  Laterality: Left;  1st met    CARDIAC CATHETERIZATION      3 stents placed    CATARACT EXTRACTION, BILATERAL      CLOSURE DEVICE Left 4/12/2023    Procedure: Placement of Closure Device;  Surgeon: Michael Giraldo III, MD;  Location: [facility] CATH LAB;  Service: Cardiology;  Laterality: Left;    CORONARY ANGIOGRAPHY N/A 10/3/2019     Procedure: ANGIOGRAM, CORONARY ARTERY;  Surgeon: Edwin Azul MD;  Location: [facility] CATH LAB/EP;  Service: Cardiology;  Laterality: N/A;    DEBRIDEMENT OF FOOT Left 3/29/2023    Procedure: DEBRIDEMENT, FOOT;  Surgeon: Alona Pierce DPM;  Location: [facility] OR;  Service: Podiatry;  Laterality: Left;    DEBRIDEMENT OF FOOT Left 4/4/2023    Procedure: DEBRIDEMENT, FOOT;  Surgeon: Alona Pierce DPM;  Location: [facility] OR;  Service: Podiatry;  Laterality: Left;    DEBRIDEMENT OF FOOT Left 4/13/2023    Procedure: DEBRIDEMENT, FOOT;  Surgeon: Alona Pierce DPM;  Location: [facility] OR;  Service: Podiatry;  Laterality: Left;    ESOPHAGOGASTRODUODENOSCOPY N/A 4/17/2023    Procedure: EGD (ESOPHAGOGASTRODUODENOSCOPY);  Surgeon: Otto Villarreal MD;  Location: [facility];  Service: Endoscopy;  Laterality: N/A;    HERNIA REPAIR      inguinal    IVUS (INTRAVASCULAR ULTRASOUND) Left 4/12/2023    Procedure: ULTRASOUND, INTRAVASCULAR;  Surgeon: Michael Giraldo III, MD;  Location: [facility] CATH LAB;  Service: Cardiology;  Laterality: Left;    LEFT HEART CATHETERIZATION Left 9/13/2019    Procedure: Left heart cath;  Surgeon: Edwin Azul MD;  Location: [facility] CATH LAB/EP;  Service: Cardiology;  Laterality: Left;    LEFT HEART CATHETERIZATION N/A 10/3/2019    Procedure: Left heart cath;  Surgeon: Edwin Azul MD;  Location: [facility] CATH LAB/EP;  Service: Cardiology;  Laterality: N/A;    OSTEOTOMY Left 6/7/2023    Procedure: OSTEOTOMY;  Surgeon: Alona Pierce DPM;  Location: [facility] OR;  Service: Podiatry;  Laterality: Left;    OSTEOTOMY OF METATARSAL BONE Right 4/4/2023    Procedure: OSTEOTOMY, METATARSAL BONE;  Surgeon: Alona Pierce DPM;  Location: [facility] OR;  Service: Podiatry;  Laterality: Right;    PERCUTANEOUS TRANSLUMINAL ANGIOPLASTY Left 4/12/2023    Procedure: PTA (ANGIOPLASTY, PERCUTANEOUS, TRANSLUMINAL);  Surgeon: Michael Giraldo III, MD;  Location: [facility] CATH LAB;  Service: Cardiology;  Laterality: Left;    SURGICAL REMOVAL OF BUNION WITH  OSTEOTOMY OF METATARSAL BONE Right 4/4/2023    Procedure: BUNIONECTOMY, WITH METATARSAL OSTEOTOMY;  Surgeon: Alona Pierce DPM;  Location: [facility] OR;  Service: Podiatry;  Laterality: Right;    TOE AMPUTATION Left 4/4/2023    Procedure: AMPUTATION, TOE;  Surgeon: Alona Pierce DPM;  Location: [facility] OR;  Service: Podiatry;  Laterality: Left;  1st ray    WASHOUT Left 6/7/2023    Procedure: WASHOUT;  Surgeon: Alona Pierce DPM;  Location: [facility] OR;  Service: Podiatry;  Laterality: Left;    WOUND DEBRIDEMENT Left 6/7/2023    Procedure: DEBRIDEMENT, WOUND;  Surgeon: Alona Pierce DPM;  Location: [facility] OR;  Service: Podiatry;  Laterality: Left;       Review of patient's allergies indicates:   Allergen Reactions    Nystatin      Other reaction(s): Unknown       No current facility-administered medications on file prior to encounter.     Current Outpatient Medications on File Prior to Encounter   Medication Sig    amLODIPine (NORVASC) 5 MG tablet     atorvastatin (LIPITOR) 40 MG tablet Take 1 tablet (40 mg total) by mouth once daily. (Patient taking differently: Take 40 mg by mouth every evening.)    collagenase (SANTYL) ointment Apply topically once daily.    ELIQUIS 5 mg Tab Take 5 mg by mouth 2 (two) times daily.    famotidine (PEPCID) 20 MG tablet Take 1 tablet (20 mg total) by mouth 2 (two) times daily.    ferrous sulfate (FEOSOL) 325 mg (65 mg iron) Tab tablet Take 325 mg by mouth daily with breakfast.    furosemide (LASIX) 20 MG tablet Take 1 tablet (20 mg total) by mouth once daily.    isosorbide mononitrate (IMDUR) 30 MG 24 hr tablet TAKE 1 TABLET EVERY DAY (Patient taking differently: Take 30 mg by mouth once daily.)    metoprolol succinate (TOPROL-XL) 25 MG 24 hr tablet TAKE 1 TABLET EVERY DAY    nitroGLYCERIN (NITROSTAT) 0.4 MG SL tablet Place 0.4 mg under the tongue every 5 (five) minutes as needed for Chest pain.    ondansetron (ZOFRAN-ODT) 8 MG TbDL Take 1 tablet (8 mg total) by mouth 3 (three) times daily as  needed (nausea or vomiting).    oxyCODONE-acetaminophen (PERCOCET) 5-325 mg per tablet Take 1 tablet by mouth every 6 (six) hours as needed for Pain.    pantoprazole (PROTONIX) 40 MG tablet Take 1 tablet (40 mg total) by mouth 2 (two) times daily.    ranolazine (RANEXA) 1,000 mg Tb12 Take 1,000 mg by mouth 2 (two) times daily.    SITagliptan-metformin (JANUMET XR) 100-1,000 mg TM24 Take 1 tablet by mouth once daily.    sucralfate (CARAFATE) 1 gram tablet Take 1 tablet (1 g total) by mouth 4 (four) times daily before meals and nightly.    traZODone (DESYREL) 150 MG tablet TAKE 1 TABLET EVERY NIGHT    alcohol swabs PadM Apply 1 each topically as needed (for use with glucose monitoring).    blood sugar diagnostic (TRUE METRIX GLUCOSE TEST STRIP) Strp 1 strip by Misc.(Non-Drug; Combo Route) route 2 (two) times a day.    blood-glucose meter (TRUE METRIX GLUCOSE METER) Misc TEST TWICE A DAY    ciprofloxacin HCl (CIPRO) 500 MG tablet Take 1 tablet (500 mg total) by mouth 2 (two) times daily. for 7 days    clopidogreL (PLAVIX) 75 mg tablet Take 1 tablet (75 mg total) by mouth once daily.    FLUoxetine 40 MG capsule Take 40 mg by mouth once daily.    insulin detemir U-100, Levemir, 100 unit/mL (3 mL) SubQ InPn pen Inject 20 Units into the skin every evening.    lancets 28 gauge Misc 1 lancet  by Misc.(Non-Drug; Combo Route) route 2 (two) times a day.    mupirocin (BACTROBAN) 2 % ointment Apply topically 3 (three) times daily.     Family History       Problem Relation (Age of Onset)    Heart disease Mother, Sister, Brother, Maternal Uncle, Brother, Sister, Maternal Uncle, Maternal Uncle, Maternal Uncle    Stroke Mother          Tobacco Use    Smoking status: Never    Smokeless tobacco: Never   Substance and Sexual Activity    Alcohol use: No    Drug use: No    Sexual activity: Not on file     Review of Systems   Constitutional: Negative for chills, decreased appetite, diaphoresis and fever.   Cardiovascular:  Negative for  chest pain, claudication, cyanosis, dyspnea on exertion, irregular heartbeat, leg swelling, near-syncope, orthopnea, palpitations, paroxysmal nocturnal dyspnea and syncope.   Respiratory:  Negative for cough, hemoptysis, shortness of breath and wheezing.    Skin:  Positive for poor wound healing.   Gastrointestinal:  Negative for bloating, abdominal pain, constipation, diarrhea, melena, nausea and vomiting.   Neurological:  Negative for dizziness and weakness.     Objective:     Vital Signs (Most Recent):  Temp: 98.3 °F (36.8 °C) (11/08/23 1110)  Pulse: 83 (11/08/23 1110)  Resp: 16 (11/08/23 1110)  BP: (!) 126/59 (11/08/23 1110)  SpO2: (!) 94 % (11/08/23 1110) Vital Signs (24h Range):  Temp:  [97.9 °F (36.6 °C)-99.2 °F (37.3 °C)] 98.3 °F (36.8 °C)  Pulse:  [] 83  Resp:  [14-33] 16  SpO2:  [94 %-100 %] 94 %  BP: (101-155)/(52-88) 126/59     Weight: 76.3 kg (168 lb 3.4 oz)  Body mass index is 22.19 kg/m².    SpO2: (!) 94 %         Intake/Output Summary (Last 24 hours) at 11/8/2023 1456  Last data filed at 11/8/2023 1230  Gross per 24 hour   Intake 1922.89 ml   Output 100 ml   Net 1822.89 ml       Lines/Drains/Airways       Drain  Duration             Male External Urinary Catheter 11/07/23 2030 Small <1 day              Peripheral Intravenous Line  Duration                  Peripheral IV - Single Lumen 11/07/23 2015 20 G Anterior;Right Forearm <1 day                     Physical Exam  Constitutional:       General: He is not in acute distress.     Appearance: He is well-developed.   Cardiovascular:      Rate and Rhythm: Normal rate and regular rhythm.      Heart sounds: No murmur heard.     No gallop.      Comments: Biphasic dopplerable right DP and PT pulses present   Pulmonary:      Effort: Pulmonary effort is normal. No respiratory distress.      Breath sounds: Normal breath sounds. No wheezing.   Abdominal:      General: Bowel sounds are normal. There is no distension.      Palpations: Abdomen is soft.       "Tenderness: There is no abdominal tenderness.   Skin:     General: Skin is warm and dry.   Neurological:      Mental Status: He is alert and oriented to person, place, and time.          Significant Labs: BMP:   Recent Labs   Lab 11/07/23 2044 11/08/23  0245   * 237*   * 133*   K 4.4 4.3   CL 95 97   CO2 15* 22*   BUN 20 16   CREATININE 1.3 1.0   CALCIUM 10.1 9.3    and CBC   Recent Labs   Lab 11/07/23 2022 11/08/23  0245   WBC 12.71* 9.42   HGB 13.2* 11.9*   HCT 39.8* 35.8*    241       Significant Imaging: Echocardiogram: Transthoracic echo (TTE) complete (Cupid Only):   Results for orders placed or performed during the hospital encounter of 07/31/19   Transthoracic echo (TTE) 2D with Color Flow   Result Value Ref Range    LA WIDTH 3.90 cm    AORTIC VALVE CUSP SEPERATION 1.81 cm    PV PEAK VELOCITY 0.96 cm/s    LVIDd 4.20 3.5 - 6.0 cm    IVS 0.87 0.6 - 1.1 cm    Posterior Wall 1.18 (A) 0.6 - 1.1 cm    Ao root annulus 2.99 cm    LVIDs 2.98 2.1 - 4.0 cm    FS 29 28 - 44 %    IVC proximal 0.8 cm    LA volume 59.70 cm3    Sinus 2.55 cm    LV mass 142.08 g    LA size 3.32 cm    TAPSE 2.14 cm    Left Ventricle Relative Wall Thickness 0.56 cm    AV mean gradient 4 mmHg    AV valve area 2.87 cm2    AV Velocity Ratio 0.74     AV index (prosthetic) 0.74     MV valve area p 1/2 method 3.44 cm2    MV valve area by continuity eq 2.32 cm2    E/A ratio 0.56     E wave deceleration time 219.57 msec    MV "A" wave duration 143.00 msec    LVOT diameter 2.22 cm    LVOT area 3.9 cm2    LVOT peak brody 0.93 m/s    LVOT peak VTI 20.35 cm    Ao peak brody 1.26 m/s    Ao VTI 27.43 cm    LVOT stroke volume 78.73 cm3    AV peak gradient 6 mmHg    MV Peak E Brody 0.83 m/s    MV VTI 34 cm    MV stenosis pressure 1/2 time 64 ms    MV Peak A Brody 1.49 m/s    LV Systolic Volume 34.46 mL    LV Diastolic Volume 78.68 mL    RA Major Axis 4.55 cm    Left Atrium Minor Axis 5.60 cm    Left Atrium Major Axis 5.26 cm    RA Width 2.55 cm "    Right Atrial Pressure (from IVC) 3 mmHg    Narrative    · Mildly decreased left ventricular systolic function. The estimated   ejection fraction is 45-50%  · Local segmental wall motion abnormalities: LV apex is hypokinetic.   (unchanged from prior)  · Concentric left ventricular hypertrophy.  · Grade I (mild) left ventricular diastolic dysfunction consistent with   impaired relaxation.  · Normal right ventricular systolic function.  · Normal central venous pressure (3 mm Hg).

## 2023-11-08 NOTE — PLAN OF CARE
went to meet with patient. Patient reports he lives at home with his wife. He has a wheelchair and BSC at home. Patient does not drive, but his wife transports to appointments.  also contacted wife Kylee. She confirmed patient is current with [facility] (referral sent via [employer]). Patient also goes to wound care in [place] (appointment noted to be scheduled).  will request follow-up appointments closer to discharge. Patient encouraged to call with any questions or concerns.  will continue to follow patient through transitions of care and assist with any discharge needs.     Patient Contacts    Name Relation Home Work Mobile   Kylee Meléndez Spouse [phone]  [phone]     Future Appointments   Date Time Provider Department Center   11/14/2023  2:00 PM Diya Leung DPM [facility] WOUND [place] [facility]   11/14/2023  2:00 PM INFECTIOUS DISEASE, [facility] INFECT [facility]   2/8/2024  2:15 PM Kelvin Wayne MD KPA IRA KPA [facility] [phone] [facility] [address] [address]      Next Steps: Follow up  Appointment:   Instructions: DialMyApp        11/08/23 1354   Discharge Assessment   Assessment Type Discharge Planning Assessment   Confirmed/corrected address, phone number and insurance Yes   Confirmed Demographics Correct on Facesheet   Source of Information patient   People in Home significant other;spouse   Facility Arrived From: Home   Do you expect to return to your current living situation? Yes   Do you have help at home or someone to help you manage your care at home? Yes   Who are your caregiver(s) and their phone number(s)? Kylee (Spouse) Phone#[phone]   Walking or Climbing Stairs ambulation difficulty, requires equipment;ambulation difficulty, dependent   Dressing/Bathing bathing difficulty,  assistance 1 person   Equipment Currently Used at Home wheelchair;commode   Do you take prescription medications? Yes   Do you have prescription coverage? Yes   Do you have any problems affording any of your prescribed medications? No   Is the patient taking medications as prescribed? yes   Who is going to help you get home at discharge? Spouse   How do you get to doctors appointments? family or friend will provide   Are you on dialysis? No   Do you take coumadin? No   DME Needed Upon Discharge  other (see comments)  (TBD)   Discharge Plan discussed with: Patient   Transition of Care Barriers None   Discharge Plan A Home with family;Home Health   Discharge Plan B Home   Physical Activity   On average, how many days per week do you engage in moderate to strenuous exercise (like a brisk walk)? Patient refu   On average, how many minutes do you engage in exercise at this level? Patient refu   Financial Resource Strain   How hard is it for you to pay for the very basics like food, housing, medical care, and heating? Patient refu   Housing Stability   In the last 12 months, was there a time when you were not able to pay the mortgage or rent on time? N   In the last 12 months, was there a time when you did not have a steady place to sleep or slept in a shelter (including now)? N   Transportation Needs   In the past 12 months, has lack of transportation kept you from medical appointments or from getting medications? no   In the past 12 months, has lack of transportation kept you from meetings, work, or from getting things needed for daily living? No   Food Insecurity   Within the past 12 months, you worried that your food would run out before you got the money to buy more. Never true   Within the past 12 months, the food you bought just didn't last and you didn't have money to get more. Never true   Stress   Do you feel stress - tense, restless, nervous, or anxious, or unable to sleep at night because your mind is troubled  all the time - these days? Patient refu   Social Connections   In a typical week, how many times do you talk on the phone with family, friends, or neighbors? More than 3   How often do you get together with friends or relatives? More than 3   How often do you attend Worship or Restoration services? Patient refu   Do you belong to any clubs or organizations such as Worship groups, unions, fraternal or athletic groups, or school groups? Patient refu   How often do you attend meetings of the clubs or organizations you belong to? Patient refu   Are you , , , , never , or living with a partner?    Alcohol Use   Q1: How often do you have a drink containing alcohol? Patient refu   Q2: How many drinks containing alcohol do you have on a typical day when you are drinking? Patient refu   Q3: How often do you have six or more drinks on one occasion? Patient refu     Adriana Fernández RN    [phone]

## 2023-11-08 NOTE — ASSESSMENT & PLAN NOTE
- paroxysmal; EKG from 11/2/2023 with NSR  - on BB and Eliquis as an outpatient  - rhythm regular on exam; continue current medication regimen; if Eliquis needs to be held for surgical intervention okay with holding

## 2023-11-08 NOTE — CONSULTS
[facility]  Podiatry  Consult Note    Patient Name: Julien Meléndez  MRN: [id number]  Admission Date: 11/7/2023  Hospital Length of Stay: 0 days  Attending Physician: Tarsha Styles MD  Primary Care Provider: Kelvin Wayne MD     Inpatient consult to Podiatry  Consult performed by: Diya Leung DPM  Consult ordered by: Dina Brown MD        Subjective:     History of Present Illness:  Pt is a 82 y/o male  has a past medical history of Diabetes mellitus, Hiatal hernia, Hyperlipidemia, Hypertension, MI (myocardial infarction), and Sleep apnea.  Admitted and consulted to Podiatry for right foot cellulitis. Denies pain. No further pedal complaints.       Scheduled Meds:   amLODIPine  5 mg Oral Daily    apixaban  5 mg Oral BID    atorvastatin  40 mg Oral QHS    clopidogreL  75 mg Oral Daily    ferrous sulfate  1 tablet Oral Daily    FLUoxetine  40 mg Oral Daily    furosemide  20 mg Oral Daily    insulin detemir U-100 (Levemir)  10 Units Subcutaneous QHS    isosorbide mononitrate  30 mg Oral Daily    meropenem (MERREM) IVPB  1 g Intravenous Q8H    metoprolol succinate  25 mg Oral Daily    pantoprazole  40 mg Oral BID    ranolazine  1,000 mg Oral BID    traZODone  150 mg Oral QHS    vancomycin (VANCOCIN) IV (PEDS and ADULTS)  1,250 mg Intravenous Q24H     Continuous Infusions:   sodium chloride 0.9% 10 mL/hr at 11/08/23 0404     PRN Meds:acetaminophen, dextrose 10%, dextrose 10%, glucagon (human recombinant), glucose, glucose, HYDROcodone-acetaminophen, HYDROcodone-acetaminophen, influenza 65up-adj, insulin aspart U-100, melatonin, naloxone, ondansetron, sodium chloride 0.9%, Pharmacy to dose Vancomycin consult **AND** vancomycin - pharmacy to dose    Review of patient's allergies indicates:   Allergen Reactions    Nystatin      Other reaction(s): Unknown        Past Medical History:   Diagnosis Date    Diabetes mellitus     Hiatal hernia     under Dr Saenz' care     Hyperlipidemia     Hypertension     MI (myocardial infarction)     Sleep apnea      Past Surgical History:   Procedure Laterality Date    ABDOMINAL HERNIA REPAIR      ANGIOGRAM, EXTREMITY, BILATERAL Bilateral 4/5/2023    Procedure: ANGIOGRAM, EXTREMITY, BILATERAL;  Surgeon: Michael Giraldo III, MD;  Location: [facility] CATH LAB;  Service: Cardiology;  Laterality: Bilateral;    ANGIOGRAPHY OF LOWER EXTREMITY Left 4/12/2023    Procedure: Angiogram Extremity Unilateral;  Surgeon: Michael Giraldo III, MD;  Location: [facility] CATH LAB;  Service: Cardiology;  Laterality: Left;    AORTOGRAPHY WITH SERIALOGRAPHY Bilateral 4/5/2023    Procedure: AORTOGRAM, WITH SERIALOGRAPHY;  Surgeon: Michael Giraldo III, MD;  Location: [facility] CATH LAB;  Service: Cardiology;  Laterality: Bilateral;    APPLICATION OF WOUND VACUUM-ASSISTED CLOSURE DEVICE Left 3/29/2023    Procedure: APPLICATION, WOUND VAC;  Surgeon: Alona Pierce DPM;  Location: [facility] OR;  Service: Podiatry;  Laterality: Left;    APPLICATION OF WOUND VACUUM-ASSISTED CLOSURE DEVICE Left 4/4/2023    Procedure: APPLICATION, WOUND VAC;  Surgeon: Alona Pierce DPM;  Location: [facility] OR;  Service: Podiatry;  Laterality: Left;    APPLICATION OF WOUND VACUUM-ASSISTED CLOSURE DEVICE Left 4/13/2023    Procedure: APPLICATION, WOUND VAC;  Surgeon: Alona Pierce DPM;  Location: [facility] OR;  Service: Podiatry;  Laterality: Left;    BONE BIOPSY Left 3/29/2023    Procedure: BIOPSY, BONE;  Surgeon: Alona Pierce DPM;  Location: [facility] OR;  Service: Podiatry;  Laterality: Left;  1st met    CARDIAC CATHETERIZATION      3 stents placed    CATARACT EXTRACTION, BILATERAL      CLOSURE DEVICE Left 4/12/2023    Procedure: Placement of Closure Device;  Surgeon: Michael Giraldo III, MD;  Location: [facility] CATH LAB;  Service: Cardiology;  Laterality: Left;    CORONARY ANGIOGRAPHY N/A 10/3/2019    Procedure: ANGIOGRAM, CORONARY ARTERY;  Surgeon: Edwin Azul MD;  Location: [facility] CATH LAB/EP;   Service: Cardiology;  Laterality: N/A;    DEBRIDEMENT OF FOOT Left 3/29/2023    Procedure: DEBRIDEMENT, FOOT;  Surgeon: Alona Pierce DPM;  Location: [facility] OR;  Service: Podiatry;  Laterality: Left;    DEBRIDEMENT OF FOOT Left 4/4/2023    Procedure: DEBRIDEMENT, FOOT;  Surgeon: Alona Pierce DPM;  Location: [facility] OR;  Service: Podiatry;  Laterality: Left;    DEBRIDEMENT OF FOOT Left 4/13/2023    Procedure: DEBRIDEMENT, FOOT;  Surgeon: Alona Pierce DPM;  Location: [facility] OR;  Service: Podiatry;  Laterality: Left;    ESOPHAGOGASTRODUODENOSCOPY N/A 4/17/2023    Procedure: EGD (ESOPHAGOGASTRODUODENOSCOPY);  Surgeon: Otto Villarreal MD;  Location: [facility] ENDO;  Service: Endoscopy;  Laterality: N/A;    HERNIA REPAIR      inguinal    IVUS (INTRAVASCULAR ULTRASOUND) Left 4/12/2023    Procedure: ULTRASOUND, INTRAVASCULAR;  Surgeon: Michael Giraldo III, MD;  Location: [facility] CATH LAB;  Service: Cardiology;  Laterality: Left;    LEFT HEART CATHETERIZATION Left 9/13/2019    Procedure: Left heart cath;  Surgeon: Edwin Azul MD;  Location: [facility] CATH LAB/EP;  Service: Cardiology;  Laterality: Left;    LEFT HEART CATHETERIZATION N/A 10/3/2019    Procedure: Left heart cath;  Surgeon: Edwin Azul MD;  Location: [facility] CATH LAB/EP;  Service: Cardiology;  Laterality: N/A;    OSTEOTOMY Left 6/7/2023    Procedure: OSTEOTOMY;  Surgeon: Alona Pierce DPM;  Location: [facility] OR;  Service: Podiatry;  Laterality: Left;    OSTEOTOMY OF METATARSAL BONE Right 4/4/2023    Procedure: OSTEOTOMY, METATARSAL BONE;  Surgeon: Alona Pierce DPM;  Location: [facility] OR;  Service: Podiatry;  Laterality: Right;    PERCUTANEOUS TRANSLUMINAL ANGIOPLASTY Left 4/12/2023    Procedure: PTA (ANGIOPLASTY, PERCUTANEOUS, TRANSLUMINAL);  Surgeon: Michael Giraldo III, MD;  Location: [facility] CATH LAB;  Service: Cardiology;  Laterality: Left;    SURGICAL REMOVAL OF BUNION WITH OSTEOTOMY OF METATARSAL BONE Right 4/4/2023    Procedure: BUNIONECTOMY, WITH  METATARSAL OSTEOTOMY;  Surgeon: Alona Pierce DPM;  Location: [facility] OR;  Service: Podiatry;  Laterality: Right;    TOE AMPUTATION Left 4/4/2023    Procedure: AMPUTATION, TOE;  Surgeon: Alona Pierce DPM;  Location: [facility] OR;  Service: Podiatry;  Laterality: Left;  1st ray    WASHOUT Left 6/7/2023    Procedure: WASHOUT;  Surgeon: Alona Pierce DPM;  Location: [facility] OR;  Service: Podiatry;  Laterality: Left;    WOUND DEBRIDEMENT Left 6/7/2023    Procedure: DEBRIDEMENT, WOUND;  Surgeon: Alona Pierce DPM;  Location: [facility] OR;  Service: Podiatry;  Laterality: Left;       Family History       Problem Relation (Age of Onset)    Heart disease Mother, Sister, Brother, Maternal Uncle, Brother, Sister, Maternal Uncle, Maternal Uncle, Maternal Uncle    Stroke Mother          Tobacco Use    Smoking status: Never    Smokeless tobacco: Never   Substance and Sexual Activity    Alcohol use: No    Drug use: No    Sexual activity: Not on file     Review of Systems   Constitutional:  Negative for activity change, chills, diaphoresis and fever.   HENT:  Negative for congestion and hearing loss.    Respiratory:  Negative for cough and shortness of breath.    Cardiovascular:  Negative for leg swelling.   Gastrointestinal:  Positive for nausea and vomiting.   Skin:  Positive for color change and wound. Negative for pallor and rash.   Neurological:  Positive for numbness. Negative for tremors, speech difficulty and weakness.   Psychiatric/Behavioral:  Negative for agitation and confusion. The patient is not nervous/anxious.      Objective:     Vital Signs (Most Recent):  Temp: 97.9 °F (36.6 °C) (11/08/23 1521)  Pulse: 88 (11/08/23 1521)  Resp: 18 (11/08/23 1521)  BP: 115/67 (11/08/23 1521)  SpO2: 96 % (11/08/23 1521) Vital Signs (24h Range):  Temp:  [97.9 °F (36.6 °C)-99.2 °F (37.3 °C)] 97.9 °F (36.6 °C)  Pulse:  [] 88  Resp:  [14-33] 18  SpO2:  [94 %-100 %] 96 %  BP: (101-155)/(52-88) 115/67     Weight: 76.3 kg (168 lb 3.4  oz)  Body mass index is 22.19 kg/m².    Foot Exam    General  General Appearance: appears stated age and healthy   Orientation: alert and oriented to person, place, and time   Affect: appropriate       Right Foot/Ankle     Inspection and Palpation  Ecchymosis: none  Tenderness: none   Swelling: none   Skin Exam: ulcer;     Neurovascular  Dorsalis pedis: 1+  Posterior tibial: 1+  Saphenous nerve sensation: diminished  Tibial nerve sensation: diminished  Superficial peroneal nerve sensation: diminished  Deep peroneal nerve sensation: diminished  Sural nerve sensation: diminished      Left Foot/Ankle      Inspection and Palpation  Ecchymosis: none  Tenderness: none   Swelling: none   Skin Exam: skin intact;     Neurovascular  Dorsalis pedis: 1+  Posterior tibial: 1+  Saphenous nerve sensation: diminished  Tibial nerve sensation: diminished  Superficial peroneal nerve sensation: diminished  Deep peroneal nerve sensation: diminished  Sural nerve sensation: diminished          Laboratory:  A1C:   Recent Labs   Lab 10/23/23  1426   HGBA1C 9.7*     Blood Cultures:   Recent Labs   Lab 11/07/23 2021   LABBLOO No Growth to date  No Growth to date     CBC:   Recent Labs   Lab 11/08/23  0245   WBC 9.42   RBC 4.39*   HGB 11.9*   HCT 35.8*      MCV 82   MCH 27.1   MCHC 33.2     CMP:   Recent Labs   Lab 11/07/23 2044 11/08/23  0245   * 237*   CALCIUM 10.1 9.3   ALBUMIN 3.1*  --    PROT 7.6  --    * 133*   K 4.4 4.3   CO2 15* 22*   CL 95 97   BUN 20 16   CREATININE 1.3 1.0   ALKPHOS 96  --    ALT 15  --    AST 10  --    BILITOT 0.8  --      CRP:   Recent Labs   Lab 11/07/23 2044   .9*     ESR:   Recent Labs   Lab 11/07/23 2022   SEDRATE 60*     Wound Cultures:   Recent Labs   Lab 08/22/23  0210 10/31/23  1500   LABAERO ESCHERICHIA COLI ESBL  Many  *  PROVIDENCIA STUARTII  Many  * METHICILLIN RESISTANT STAPHYLOCOCCUS AUREUS  Moderate  *     Microbiology Results (last 7 days)       Procedure Component  Value Units Date/Time    Blood culture [[id number]] Collected: 11/07/23 2021    Order Status: Completed Specimen: Blood from Peripheral, Antecubital, Right Updated: 11/08/23 0715     Blood Culture, Routine No Growth to date    Blood culture [[id number]] Collected: 11/07/23 2021    Order Status: Completed Specimen: Blood from Peripheral, Antecubital, Right Updated: 11/08/23 0715     Blood Culture, Routine No Growth to date    Urine culture [[id number]] Collected: 11/08/23 0410    Order Status: No result Specimen: Urine Updated: 11/08/23 0431    Aerobic culture (Specify Source) **CANNOT BE ORDERED AS STAT** [[id number]] Collected: 11/07/23 2208    Order Status: Sent Specimen: Wound from Toe, Right Foot Updated: 11/08/23 0030          Specimen (24h ago, onward)      None            Diagnostic Results:  MRI: I have reviewed all pertinent results/findings within the past 24 hours.  ordered  X-Ray: I have reviewed all pertinent results/findings within the past 24 hours.  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 TCOMS previously done    Atrial fibrillation  Managed per Hospital Medicine    Coronary artery disease due to calcified coronary lesion  Managed per Hospital Medicine    Hypertension  Managed per Hospital Medicine    Mixed hyperlipidemia  Managed per Hospital Medicine    Renal/  Hyponatremia  Managed per Hospital Medicine    Stage 3 chronic kidney disease  Managed per Hospital Medicine    ID  * Cellulitis of foot  Managed per Hospital Medicine, ID consult placed, concern for osteomyelitis     Acute osteomyelitis  Patient with clinical acute osteomyelitis of right hallux ulcer with bone exposure and purulent drainage noted.   Xray, MRI ordered  Discussed surgical vs  conservative treatment options. Recommend partial 1st ray amputation, case request placed for Friday.   Antibiotic management per ID, appreciate recommendations  Nursing orders in for dressing changes  WBAT in Darco shoe. Z-flex boots while in bed, keep heels offloaded  Cardiology on board, pt with history of PAD  Will follow       Oncology  Anemia  Managed per Hospital Medicine    Endocrine  Diabetes mellitus type 2, noninsulin dependent  Managed per Hospital Medicine        Thank you for your consult. I will follow-up with patient. Please contact us if you have any additional questions.    Diya Leung DPM  Podiatry  [facility]

## 2023-11-09 ENCOUNTER — DOCUMENT SCAN (OUTPATIENT)
Dept: HOME HEALTH SERVICES | Facility: HOSPITAL | Age: 83
End: 2023-11-09
Payer: MEDICARE

## 2023-11-09 LAB
ANION GAP SERPL CALC-SCNC: 15 MMOL/L (ref 8–16)
BACTERIA UR CULT: ABNORMAL
BASOPHILS # BLD AUTO: 0.02 K/UL (ref 0–0.2)
BASOPHILS NFR BLD: 0.3 % (ref 0–1.9)
BUN SERPL-MCNC: 16 MG/DL (ref 8–23)
CALCIUM SERPL-MCNC: 8.9 MG/DL (ref 8.7–10.5)
CHLORIDE SERPL-SCNC: 100 MMOL/L (ref 95–110)
CO2 SERPL-SCNC: 18 MMOL/L (ref 23–29)
CREAT SERPL-MCNC: 0.9 MG/DL (ref 0.5–1.4)
DIFFERENTIAL METHOD: ABNORMAL
EOSINOPHIL # BLD AUTO: 0 K/UL (ref 0–0.5)
EOSINOPHIL NFR BLD: 0.6 % (ref 0–8)
ERYTHROCYTE [DISTWIDTH] IN BLOOD BY AUTOMATED COUNT: 19.1 % (ref 11.5–14.5)
EST. GFR  (NO RACE VARIABLE): >60 ML/MIN/1.73 M^2
GLUCOSE SERPL-MCNC: 181 MG/DL (ref 70–110)
HCT VFR BLD AUTO: 32 % (ref 40–54)
HGB BLD-MCNC: 10.7 G/DL (ref 14–18)
IMM GRANULOCYTES # BLD AUTO: 0.07 K/UL (ref 0–0.04)
IMM GRANULOCYTES NFR BLD AUTO: 1.1 % (ref 0–0.5)
LYMPHOCYTES # BLD AUTO: 0.9 K/UL (ref 1–4.8)
LYMPHOCYTES NFR BLD: 13.8 % (ref 18–48)
MCH RBC QN AUTO: 27.2 PG (ref 27–31)
MCHC RBC AUTO-ENTMCNC: 33.4 G/DL (ref 32–36)
MCV RBC AUTO: 81 FL (ref 82–98)
MONOCYTES # BLD AUTO: 0.8 K/UL (ref 0.3–1)
MONOCYTES NFR BLD: 11.4 % (ref 4–15)
NEUTROPHILS # BLD AUTO: 4.9 K/UL (ref 1.8–7.7)
NEUTROPHILS NFR BLD: 73.9 % (ref 38–73)
NRBC BLD-RTO: 0 /100 WBC
PLATELET # BLD AUTO: 245 K/UL (ref 150–450)
PMV BLD AUTO: 11 FL (ref 9.2–12.9)
POCT GLUCOSE: 179 MG/DL (ref 70–110)
POCT GLUCOSE: 229 MG/DL (ref 70–110)
POCT GLUCOSE: 238 MG/DL (ref 70–110)
POCT GLUCOSE: 255 MG/DL (ref 70–110)
POTASSIUM SERPL-SCNC: 3.8 MMOL/L (ref 3.5–5.1)
RBC # BLD AUTO: 3.94 M/UL (ref 4.6–6.2)
SODIUM SERPL-SCNC: 133 MMOL/L (ref 136–145)
VANCOMYCIN SERPL-MCNC: 14.8 UG/ML
WBC # BLD AUTO: 6.6 K/UL (ref 3.9–12.7)

## 2023-11-09 PROCEDURE — 99222 PR INITIAL HOSPITAL CARE,LEVL II: ICD-10-PCS | Mod: ,,, | Performed by: INTERNAL MEDICINE

## 2023-11-09 PROCEDURE — 36415 COLL VENOUS BLD VENIPUNCTURE: CPT | Performed by: NURSE PRACTITIONER

## 2023-11-09 PROCEDURE — 25000003 PHARM REV CODE 250: Performed by: NURSE PRACTITIONER

## 2023-11-09 PROCEDURE — 80202 ASSAY OF VANCOMYCIN: CPT | Performed by: FAMILY MEDICINE

## 2023-11-09 PROCEDURE — 36415 COLL VENOUS BLD VENIPUNCTURE: CPT | Performed by: FAMILY MEDICINE

## 2023-11-09 PROCEDURE — 63600175 PHARM REV CODE 636 W HCPCS: Performed by: NURSE PRACTITIONER

## 2023-11-09 PROCEDURE — 63600175 PHARM REV CODE 636 W HCPCS: Performed by: FAMILY MEDICINE

## 2023-11-09 PROCEDURE — 11000001 HC ACUTE MED/SURG PRIVATE ROOM

## 2023-11-09 PROCEDURE — 27000207 HC ISOLATION

## 2023-11-09 PROCEDURE — 85025 COMPLETE CBC W/AUTO DIFF WBC: CPT | Performed by: NURSE PRACTITIONER

## 2023-11-09 PROCEDURE — 99222 1ST HOSP IP/OBS MODERATE 55: CPT | Mod: ,,, | Performed by: INTERNAL MEDICINE

## 2023-11-09 PROCEDURE — 25000003 PHARM REV CODE 250: Performed by: FAMILY MEDICINE

## 2023-11-09 PROCEDURE — 80048 BASIC METABOLIC PNL TOTAL CA: CPT | Performed by: NURSE PRACTITIONER

## 2023-11-09 RX ORDER — ACETAMINOPHEN 325 MG/1
650 TABLET ORAL EVERY 4 HOURS PRN
Status: DISCONTINUED | OUTPATIENT
Start: 2023-11-09 | End: 2023-11-14 | Stop reason: HOSPADM

## 2023-11-09 RX ADMIN — ISOSORBIDE MONONITRATE 30 MG: 30 TABLET, EXTENDED RELEASE ORAL at 09:11

## 2023-11-09 RX ADMIN — PANTOPRAZOLE SODIUM 40 MG: 40 TABLET, DELAYED RELEASE ORAL at 09:11

## 2023-11-09 RX ADMIN — VANCOMYCIN HYDROCHLORIDE 1000 MG: 1 INJECTION, POWDER, LYOPHILIZED, FOR SOLUTION INTRAVENOUS at 03:11

## 2023-11-09 RX ADMIN — FUROSEMIDE 20 MG: 20 TABLET ORAL at 09:11

## 2023-11-09 RX ADMIN — MEROPENEM 1 G: 1 INJECTION, POWDER, FOR SOLUTION INTRAVENOUS at 02:11

## 2023-11-09 RX ADMIN — INSULIN ASPART 3 UNITS: 100 INJECTION, SOLUTION INTRAVENOUS; SUBCUTANEOUS at 05:11

## 2023-11-09 RX ADMIN — METOPROLOL SUCCINATE 25 MG: 25 TABLET, EXTENDED RELEASE ORAL at 09:11

## 2023-11-09 RX ADMIN — TRAZODONE HYDROCHLORIDE 150 MG: 100 TABLET ORAL at 09:11

## 2023-11-09 RX ADMIN — ATORVASTATIN CALCIUM 40 MG: 40 TABLET, FILM COATED ORAL at 09:11

## 2023-11-09 RX ADMIN — RANOLAZINE 1000 MG: 500 TABLET, FILM COATED, EXTENDED RELEASE ORAL at 09:11

## 2023-11-09 RX ADMIN — MEROPENEM 1 G: 1 INJECTION, POWDER, FOR SOLUTION INTRAVENOUS at 09:11

## 2023-11-09 RX ADMIN — CLOPIDOGREL BISULFATE 75 MG: 75 TABLET ORAL at 09:11

## 2023-11-09 RX ADMIN — INSULIN ASPART 1 UNITS: 100 INJECTION, SOLUTION INTRAVENOUS; SUBCUTANEOUS at 09:11

## 2023-11-09 RX ADMIN — FERROUS SULFATE TAB 325 MG (65 MG ELEMENTAL FE) 1 EACH: 325 (65 FE) TAB at 09:11

## 2023-11-09 RX ADMIN — MEROPENEM 1 G: 1 INJECTION, POWDER, FOR SOLUTION INTRAVENOUS at 06:11

## 2023-11-09 RX ADMIN — ONDANSETRON 4 MG: 2 INJECTION INTRAMUSCULAR; INTRAVENOUS at 11:11

## 2023-11-09 RX ADMIN — FLUOXETINE 40 MG: 20 CAPSULE ORAL at 09:11

## 2023-11-09 RX ADMIN — INSULIN DETEMIR 10 UNITS: 100 INJECTION, SOLUTION SUBCUTANEOUS at 09:11

## 2023-11-09 RX ADMIN — AMLODIPINE BESYLATE 5 MG: 5 TABLET ORAL at 09:11

## 2023-11-09 RX ADMIN — APIXABAN 5 MG: 5 TABLET, FILM COATED ORAL at 09:11

## 2023-11-09 RX ADMIN — INSULIN ASPART 2 UNITS: 100 INJECTION, SOLUTION INTRAVENOUS; SUBCUTANEOUS at 12:11

## 2023-11-09 RX ADMIN — ACETAMINOPHEN 650 MG: 325 TABLET ORAL at 12:11

## 2023-11-09 NOTE — SUBJECTIVE & OBJECTIVE
Interval History: still have drainage    Review of Systems   All other systems reviewed and are negative.    Objective:     Vital Signs (Most Recent):  Temp: 99.7 °F (37.6 °C) (11/09/23 1152)  Pulse: 85 (11/09/23 1152)  Resp: 18 (11/09/23 1152)  BP: (!) 90/45 (11/09/23 1152)  SpO2: (!) 92 % (11/09/23 1152) Vital Signs (24h Range):  Temp:  [98 °F (36.7 °C)-100.7 °F (38.2 °C)] 99.7 °F (37.6 °C)  Pulse:  [83-92] 85  Resp:  [18-19] 18  SpO2:  [92 %-98 %] 92 %  BP: ()/(45-69) 90/45     Weight: 77 kg (169 lb 12.1 oz)  Body mass index is 22.4 kg/m².    Intake/Output Summary (Last 24 hours) at 11/9/2023 1613  Last data filed at 11/9/2023 0726  Gross per 24 hour   Intake 531.17 ml   Output --   Net 531.17 ml           Physical Exam  Vitals and nursing note reviewed.   Constitutional:       General: He is not in acute distress.     Appearance: He is well-developed.   HENT:      Head: Normocephalic and atraumatic.      Nose: Nose normal.   Eyes:      Conjunctiva/sclera: Conjunctivae normal.   Cardiovascular:      Rate and Rhythm: Normal rate and regular rhythm.      Heart sounds: Normal heart sounds. No murmur heard.  Pulmonary:      Effort: Pulmonary effort is normal.      Breath sounds: Normal breath sounds. No wheezing.   Abdominal:      General: Bowel sounds are normal.      Palpations: Abdomen is soft. There is no mass.      Tenderness: There is no abdominal tenderness. There is no guarding or rebound.   Musculoskeletal:         General: Normal range of motion.      Cervical back: Normal range of motion and neck supple.      Comments: right hallux ulcer with bone exposure and purulent drainage noted.    Skin:     General: Skin is warm and dry.      Findings: No rash.   Neurological:      Mental Status: He is alert and oriented to person, place, and time.   Psychiatric:         Behavior: Behavior normal.             Significant Labs: All pertinent labs within the past 24 hours have been reviewed.  A1C:   Recent Labs    Lab 10/23/23  1426   HGBA1C 9.7*       BMP:   Recent Labs   Lab 11/09/23  0455   *   *   K 3.8      CO2 18*   BUN 16   CREATININE 0.9   CALCIUM 8.9       CBC:   Recent Labs   Lab 11/07/23 2022 11/08/23 0245 11/09/23  0455   WBC 12.71* 9.42 6.60   HGB 13.2* 11.9* 10.7*   HCT 39.8* 35.8* 32.0*    241 245       CMP:   Recent Labs   Lab 11/07/23 2044 11/08/23 0245 11/09/23  0455   * 133* 133*   K 4.4 4.3 3.8   CL 95 97 100   CO2 15* 22* 18*   * 237* 181*   BUN 20 16 16   CREATININE 1.3 1.0 0.9   CALCIUM 10.1 9.3 8.9   PROT 7.6  --   --    ALBUMIN 3.1*  --   --    BILITOT 0.8  --   --    ALKPHOS 96  --   --    AST 10  --   --    ALT 15  --   --    ANIONGAP 22* 14 15         Significant Imaging: I have reviewed all pertinent imaging results/findings within the past 24 hours.  I have reviewed and interpreted all pertinent imaging results/findings within the past 24 hours.

## 2023-11-09 NOTE — ASSESSMENT & PLAN NOTE
Patient presents with worsening right big toe wound with purulence and odor  Elevated CRP, WBC  Xray without signs of new or worsening bone loss or significant change of ulceration and destructive changes of the distal metatarsal head of the 1st toe of the right foot  Elevated lactate    Plan:  Continue IV vancomycin and Zosyn  Consulted podiatry

## 2023-11-09 NOTE — PLAN OF CARE
AAOx2. Pt confused. Contact precautions initiated per order. Blood cultures resulted Gram positive cocci. MD Styles notified. Medications administered per MAR. Safety maintained. HANNY in room. Pt went down for MRI of right foot. Glucose monitored. Pt encouraged to call for assistance.

## 2023-11-09 NOTE — ASSESSMENT & PLAN NOTE
Podiatry consult:  Patient with clinical acute osteomyelitis of right hallux ulcer with bone exposure and purulent drainage noted.   Discussed surgical vs conservative treatment options. Recommend partial 1st ray amputation, case request placed for Friday.   Antibiotic management per ID, appreciate recommendations  Nursing orders in for dressing changes  WBAT in Darco shoe. Z-flex boots while in bed, keep heels offloaded  Cardiology on board, pt with history of PAD  Will follow    MRI confirms osteomyelitis

## 2023-11-09 NOTE — PROGRESS NOTES
[facility] Medicine  Progress Note    Patient Name: Julien Meléndez  MRN: [id number]  Patient Class: IP- Inpatient   Admission Date: 11/7/2023  Length of Stay: 0 days  Attending Physician: Tarsha Styles MD  Primary Care Provider: Kelvin Wayne MD        Subjective:     Principal Problem:Cellulitis of foot        HPI:  Julien Meléndez is a that is why 83-year-old male with past medical history of diabetes, anxiety/depression, atrial fibrillation on anticoagulation, heart failure who presents with foot wound.    Patient reports a recent biopsy of his right great toe by Podiatry over the past week.  Patient reports worsening drainage and odor from his right foot biopsy site.  He states he has been taking antibiotics for the infection, with minimal relief.  He denies any fevers , chills , nausea, although reports episodes of nonbilious nonbloody emesis shortly after going to the ED after discussing the plan with his wound care nurse.      Triage vitals were significant for slightly elevated blood pressures.  CBC was significant for leukocytosis, normocytic anemia.  CMP was significant for hyponatremia, hyperglycemia, and anion gap of 22.  CRP was elevated at 228.9.  Lactic acid was 2.9.    Blood cultures were collected.    X-rays of the right foot showed no significant change of ulceration and destructive changes of the distal metatarsal head of the 1st toe of the right foot.    Pt given fluids and antibiotics.    Patient admitted for cellulitis of right toe/right foot wound,  with podiatry consult in the a.m.      Overview/Hospital Course:  11/8: wound cx growing out gm positive. Pt on vanc and meropenem. Patient with clinical acute osteomyelitis of right hallux ulcer with bone exposure and purulent drainage noted. Xray, MRI ordered. Surgery plans for Friday by podiatry  11/9: MRI confirms osteo. Pt voices no complaints.NPO after midnight for tentative surgery       Interval History: still have  drainage    Review of Systems   All other systems reviewed and are negative.    Objective:     Vital Signs (Most Recent):  Temp: 99.7 °F (37.6 °C) (11/09/23 1152)  Pulse: 85 (11/09/23 1152)  Resp: 18 (11/09/23 1152)  BP: (!) 90/45 (11/09/23 1152)  SpO2: (!) 92 % (11/09/23 1152) Vital Signs (24h Range):  Temp:  [98 °F (36.7 °C)-100.7 °F (38.2 °C)] 99.7 °F (37.6 °C)  Pulse:  [83-92] 85  Resp:  [18-19] 18  SpO2:  [92 %-98 %] 92 %  BP: ()/(45-69) 90/45     Weight: 77 kg (169 lb 12.1 oz)  Body mass index is 22.4 kg/m².    Intake/Output Summary (Last 24 hours) at 11/9/2023 1613  Last data filed at 11/9/2023 0726  Gross per 24 hour   Intake 531.17 ml   Output --   Net 531.17 ml           Physical Exam  Vitals and nursing note reviewed.   Constitutional:       General: He is not in acute distress.     Appearance: He is well-developed.   HENT:      Head: Normocephalic and atraumatic.      Nose: Nose normal.   Eyes:      Conjunctiva/sclera: Conjunctivae normal.   Cardiovascular:      Rate and Rhythm: Normal rate and regular rhythm.      Heart sounds: Normal heart sounds. No murmur heard.  Pulmonary:      Effort: Pulmonary effort is normal.      Breath sounds: Normal breath sounds. No wheezing.   Abdominal:      General: Bowel sounds are normal.      Palpations: Abdomen is soft. There is no mass.      Tenderness: There is no abdominal tenderness. There is no guarding or rebound.   Musculoskeletal:         General: Normal range of motion.      Cervical back: Normal range of motion and neck supple.      Comments: right hallux ulcer with bone exposure and purulent drainage noted.    Skin:     General: Skin is warm and dry.      Findings: No rash.   Neurological:      Mental Status: He is alert and oriented to person, place, and time.   Psychiatric:         Behavior: Behavior normal.             Significant Labs: All pertinent labs within the past 24 hours have been reviewed.  A1C:   Recent Labs   Lab 10/23/23  1426   HGBA1C  9.7*       BMP:   Recent Labs   Lab 11/09/23  0455   *   *   K 3.8      CO2 18*   BUN 16   CREATININE 0.9   CALCIUM 8.9       CBC:   Recent Labs   Lab 11/07/23 2022 11/08/23 0245 11/09/23 0455   WBC 12.71* 9.42 6.60   HGB 13.2* 11.9* 10.7*   HCT 39.8* 35.8* 32.0*    241 245       CMP:   Recent Labs   Lab 11/07/23 2044 11/08/23 0245 11/09/23 0455   * 133* 133*   K 4.4 4.3 3.8   CL 95 97 100   CO2 15* 22* 18*   * 237* 181*   BUN 20 16 16   CREATININE 1.3 1.0 0.9   CALCIUM 10.1 9.3 8.9   PROT 7.6  --   --    ALBUMIN 3.1*  --   --    BILITOT 0.8  --   --    ALKPHOS 96  --   --    AST 10  --   --    ALT 15  --   --    ANIONGAP 22* 14 15         Significant Imaging: I have reviewed all pertinent imaging results/findings within the past 24 hours.  I have reviewed and interpreted all pertinent imaging results/findings within the past 24 hours.      Assessment/Plan:      * Cellulitis of foot  Patient presents with worsening right big toe wound with purulence and odor  Elevated CRP, WBC  Xray without signs of new or worsening bone loss or significant change of ulceration and destructive changes of the distal metatarsal head of the 1st toe of the right foot  Elevated lactate    Plan:  Continue IV vancomycin and Zosyn  Consulted podiatry        Acute osteomyelitis  Podiatry consult:  Patient with clinical acute osteomyelitis of right hallux ulcer with bone exposure and purulent drainage noted.   Discussed surgical vs conservative treatment options. Recommend partial 1st ray amputation, case request placed for Friday.   Antibiotic management per ID, appreciate recommendations  Nursing orders in for dressing changes  WBAT in Darco shoe. Z-flex boots while in bed, keep heels offloaded  Cardiology on board, pt with history of PAD  Will follow    MRI confirms osteomyelitis    Hyponatremia  Patient has hyponatremia which is controlled,We will aim to correct the sodium by 4-6mEq in 24  hours. We will monitor sodium Daily. The hyponatremia is due to Dehydration/hypovolemia. We will obtain the following studies:  Hold off on studies at this time. We will treat the hyponatremia with IV fluids as follows:  1 L bolus. The patient's sodium results have been reviewed and are listed below.  Recent Labs   Lab 11/07/23 2044   *       Anemia  Patient's anemia is currently controlled. Has not received any PRBCs to date. Etiology likely d/t chronic disease due to Osteomyelitis,  Current CBC reviewed-   Lab Results   Component Value Date    HGB 13.2 (L) 11/07/2023    HCT 39.8 (L) 11/07/2023     Monitor serial CBC and transfuse if patient becomes hemodynamically unstable, symptomatic or H/H drops below 7/21.    Depressive disorder  Patient has persistent depression which is moderate and is currently controlled. Will Continue anti-depressant medications. We will not consult psychiatry at this time. Patient does not display psychosis at this time. Continue to monitor closely and adjust plan of care as needed.        Atrial fibrillation  Patient with Paroxysmal (<7 days) atrial fibrillation which is controlled currently with Beta Blocker. Patient is currently in sinus rhythm.[id number] Score: 4. HASBLED Score: 3+. Anticoagulation indicated. Anticoagulation done with Apixaban.  We will hold if patient needs possible procedure.    Holding eliquis and plavix for procedure on friday    Coronary artery disease due to calcified coronary lesion  Patient with known CAD s/p stent placement and CABG, which is controlled Will continue ASA and Statin and monitor for S/Sx of angina/ACS. Continue to monitor on telemetry.     Diabetes mellitus type 2, noninsulin dependent  Patient's FSGs are uncontrolled due to hyperglycemia on current medication regimen.  Last A1c reviewed- May  Lab Results   Component Value Date    HGBA1C 9.7 (H) 10/23/2023     Most recent fingerstick glucose reviewed-   Recent Labs   Lab 11/08/23  0126    POCTGLUCOSE 280*     Current correctional scale  Medium  Maintain anti-hyperglycemic dose as follows-   Antihyperglycemics (From admission, onward)      Start     Stop Route Frequency Ordered    11/07/23 2315  insulin detemir U-100 (Levemir) pen 10 Units         -- SubQ Nightly 11/07/23 2215 11/07/23 2306  insulin aspart U-100 pen 0-5 Units         -- SubQ Before meals & nightly PRN 11/07/23 2208          Hold Oral hypoglycemics while patient is in the hospital.    Hypertension  Chronic, controlled. Latest blood pressure and vitals reviewed-     Temp:  [98.3 °F (36.8 °C)-99.2 °F (37.3 °C)]   Pulse:  []   Resp:  [14-33]   BP: (101-155)/(52-88)   SpO2:  [95 %-100 %] .   Home meds for hypertension were reviewed and noted below.   Hypertension Medications               amLODIPine (NORVASC) 5 MG tablet     furosemide (LASIX) 20 MG tablet Take 1 tablet (20 mg total) by mouth once daily.    isosorbide mononitrate (IMDUR) 30 MG 24 hr tablet TAKE 1 TABLET EVERY DAY    metoprolol succinate (TOPROL-XL) 25 MG 24 hr tablet TAKE 1 TABLET EVERY DAY    nitroGLYCERIN (NITROSTAT) 0.4 MG SL tablet Place 0.4 mg under the tongue every 5 (five) minutes as needed for Chest pain.            While in the hospital, will manage blood pressure as follows; Continue home antihypertensive regimen    Will utilize p.r.n. blood pressure medication only if patient's blood pressure greater than 180/110 and he develops symptoms such as worsening chest pain or shortness of breath.    Mixed hyperlipidemia  May continue with home statin      VTE Risk Mitigation (From admission, onward)           Ordered     apixaban tablet 5 mg  2 times daily         11/07/23 2215     IP VTE HIGH RISK PATIENT  Once         11/07/23 2208     Place sequential compression device  Until discontinued         11/07/23 2208                    Discharge Planning   AURELIA:      Code Status: Full Code   Is the patient medically ready for discharge?:     Reason for patient  still in hospital (select all that apply): Treatment  Discharge Plan A: Home with family, Home Health                  Tarsha Styles MD  Department of Hospital Medicine   [facility]

## 2023-11-09 NOTE — PROGRESS NOTES
Pharmacokinetic Assessment Follow Up: IV Vancomycin    Vancomycin serum concentration assessment(s):    The random level was drawn correctly and can be used to guide therapy at this time. The measurement is below the desired definitive target range of 15 to 20 mcg/mL.    Vancomycin Regimen Plan:    Change regimen to Vancomycin 1000 mg IV every 12 hours with next serum trough concentration measured at 1300 prior to 4th dose on 11/10    Drug levels (last 3 results):  Recent Labs   Lab Result Units 11/09/23  1058   Vancomycin, Random ug/mL 14.8       Pharmacy will continue to follow and monitor vancomycin.    Please contact pharmacy at extension [phone] for questions regarding this assessment.    Thank you for the consult,   Holly Barraza       Patient brief summary:  Julien Meléndez is a 83 y.o. male initiated on antimicrobial therapy with IV Vancomycin for treatment of bacteremia    The patient's current regimen is vancomycin 1250 mg IV q 12 horus    Drug Allergies:   Review of patient's allergies indicates:   Allergen Reactions    Nystatin      Other reaction(s): Unknown       Actual Body Weight:   77 kg    Renal Function:   Estimated Creatinine Clearance: 67.7 mL/min (based on SCr of 0.9 mg/dL).,     Dialysis Method (if applicable):  N/A    CBC (last 72 hours):  Recent Labs   Lab Result Units 11/07/23 2022 11/08/23 0245 11/09/23  0455   WBC K/uL 12.71* 9.42 6.60   Hemoglobin g/dL 13.2* 11.9* 10.7*   Hematocrit % 39.8* 35.8* 32.0*   Platelets K/uL 294 241 245   Gran % % 78.6* 74.3* 73.9*   Lymph % % 10.1* 11.5* 13.8*   Mono % % 10.9 13.8 11.4   Eosinophil % % 0.0 0.1 0.6   Basophil % % 0.4 0.3 0.3   Differential Method  Automated Automated Automated       Metabolic Panel (last 72 hours):  Recent Labs   Lab Result Units 11/07/23 2044 11/08/23 0245 11/08/23  0410 11/09/23  0455   Sodium mmol/L 132* 133*  --  133*   Potassium mmol/L 4.4 4.3  --  3.8   Chloride mmol/L 95 97  --  100   CO2 mmol/L 15* 22*  --  18*    Glucose mg/dL 318* 237*  --  181*   Glucose, UA   --   --  4+*  --    BUN mg/dL 20 16  --  16   Creatinine mg/dL 1.3 1.0  --  0.9   Albumin g/dL 3.1*  --   --   --    Total Bilirubin mg/dL 0.8  --   --   --    Alkaline Phosphatase U/L 96  --   --   --    AST U/L 10  --   --   --    ALT U/L 15  --   --   --        Vancomycin Administrations:  vancomycin given in the last 96 hours                     vancomycin 1,250 mg in dextrose 5 % (D5W) 250 mL IVPB (Vial-Mate) (mg) 1,250 mg New Bag 11/08/23 2329    vancomycin (VANCOCIN) 2,250 mg in dextrose 5 % (D5W) 500 mL IVPB ()  Restarted 11/08/23 0006     2,250 mg New Bag 11/07/23 2214                    Microbiologic Results:  Microbiology Results (last 7 days)       Procedure Component Value Units Date/Time    Urine culture [[id number]]  (Abnormal) Collected: 11/08/23 0410    Order Status: Completed Specimen: Urine Updated: 11/09/23 1206     Urine Culture, Routine DEREJE ALBICANS  50,000 - 99,999 cfu/ml  Treatment of asymptomatic candiduria is not recommended (except for   specific populations). Candida isolated in the urine typically   represents colonization. If an indwelling urinary catheter is present  it should be removed or replaced.      Narrative:      Specimen Source->Urine    Aerobic culture (Specify Source) **CANNOT BE ORDERED AS STAT** [[id number]]  (Abnormal) Collected: 11/07/23 2208    Order Status: Completed Specimen: Wound from Toe, Right Foot Updated: 11/09/23 0923     Aerobic Bacterial Culture STAPHYLOCOCCUS AUREUS  Many  Susceptibility pending  Skin amy also present      Blood culture [[id number]]  (Abnormal) Collected: 11/07/23 2021    Order Status: Completed Specimen: Blood from Peripheral, Antecubital, Right Updated: 11/09/23 0826     Blood Culture, Routine Gram stain earl bottle: Gram positive cocci in clusters resembling Staph      Results called to and read back by:Grisel Cole RN 11/08/2023  16:26      Gram stain aer bottle: Gram positive  cocci in clusters resembling Staph      STAPHYLOCOCCUS AUREUS  For susceptibility see order #[id number]      Blood culture [[id number]]  (Abnormal) Collected: 11/07/23 2021    Order Status: Completed Specimen: Blood from Peripheral, Antecubital, Right Updated: 11/09/23 0824     Blood Culture, Routine Gram stain earl bottle: Gram positive cocci in clusters resembling Staph      Results called to and read back by:Grisel Cole RN 11/08/2023  16:27      Gram stain aer bottle: Gram positive cocci in clusters resembling Staph      STAPHYLOCOCCUS AUREUS  Susceptibility pending      Rapid Organism ID by PCR (from Blood culture) [[id number]]  (Abnormal) Collected: 11/07/23 2021    Order Status: Completed Updated: 11/08/23 1804     Enterococcus faecalis Not Detected     Enterococcus faecium Not Detected     Listeria monocytogenes Not Detected     Staphylococcus spp. See species for ID     Staphylococcus aureus Detected     Staphylococcus epidermidis Not Detected     Staphylococcus lugdunensis Not Detected     Streptococcus species Not Detected     Streptococcus agalactiae Not Detected     Streptococcus pneumoniae Not Detected     Streptococcus pyogenes Not Detected     Acinetobacter calcoaceticus/baumannii complex Not Detected     Bacteroides fragilis Not Detected     Enterobacterales Not Detected     Enterobacter cloacae complex Not Detected     Escherichia coli Not Detected     Klebsiella aerogenes Not Detected     Klebsiella oxytoca Not Detected     Klebsiella pneumoniae group Not Detected     Proteus Not Detected     Salmonella sp Not Detected     Serratia marcescens Not Detected     Haemophilus influenzae Not Detected     Neisseria meningtidis Not Detected     Pseudomonas aeruginosa Not Detected     Stenotrophomonas maltophilia Not Detected     Candida albicans Not Detected     Candida auris Not Detected     Candida glabrata Not Detected     Candida krusei Not Detected     Candida parapsilosis Not Detected     Candida  tropicalis Not Detected     Cryptococcus neoformans/gattii Not Detected     CTX-M (ESBL ) Test Not Applicable     IMP (Carbapenem resistant) Test Not Applicable     KPC resistance gene (Carbapenem resistant) Test Not Applicable     mcr-1  Test Not Applicable     mec A/C  Test Not Applicable     mec A/C and MREJ (MRSA) gene Detected     NDM (Carbapenem resistant) Test Not Applicable     OXA-48-like (Carbapenem resistant) Test Not Applicable     van A/B (VRE gene) Test Not Applicable     VIM (Carbapenem resistant) Test Not Applicable

## 2023-11-09 NOTE — CARE UPDATE
RLE arterial ultrasound completed and reviewed with staff. No doubling of velocities noted. Monophasic waveforms to BTK but collateral circulation noted. Biphasic dopplerable pulses on exam yesterday. Recently revascularized by Dr. Lenny Martin. No need for repeat revascularization. Recommend aggressive wound care per Podiatry recommendations and abx per ID. Recommend follow up with Dr. Lenny Martin upon discharge. Cardiology to sign off for now

## 2023-11-09 NOTE — PLAN OF CARE
VIRTUAL NURSE:  Labs, notes, orders, and careplan reviewed. VN to be available as needed.    Problem: Adult Inpatient Plan of Care  Goal: Plan of Care Review  Outcome: Ongoing, Progressing  Goal: Patient-Specific Goal (Individualized)  Outcome: Ongoing, Progressing  Goal: Absence of Hospital-Acquired Illness or Injury  Outcome: Ongoing, Progressing  Goal: Optimal Comfort and Wellbeing  Outcome: Ongoing, Progressing  Goal: Readiness for Transition of Care  Outcome: Ongoing, Progressing     Problem: Diabetes Comorbidity  Goal: Blood Glucose Level Within Targeted Range  Outcome: Ongoing, Progressing     Problem: Impaired Wound Healing  Goal: Optimal Wound Healing  Outcome: Ongoing, Progressing     Problem: Fall Injury Risk  Goal: Absence of Fall and Fall-Related Injury  Outcome: Ongoing, Progressing     Problem: Infection  Goal: Absence of Infection Signs and Symptoms  Outcome: Ongoing, Progressing     Problem: Hypertension Comorbidity  Goal: Blood Pressure in Desired Range  Outcome: Ongoing, Progressing     Problem: Skin or Soft Tissue Infection  Goal: Absence of Infection Signs and Symptoms  Outcome: Ongoing, Progressing     Problem: Skin Injury Risk Increased  Goal: Skin Health and Integrity  Outcome: Ongoing, Progressing

## 2023-11-09 NOTE — NURSING
"Pt disoriented to place, time, and situation. Restless, confused and agitated at beginning of shift b/c he was "disoriented and worried about Kylee (his wife)". Called wife for pt and wife calmed pt down a bit. Reassured pt that t hat he was safe and that we would be looking after him. Also reassured pt that Kylee was safe at home and would be here in the morning. Pt seemed to relax and settle down after hearing from Kylee. Remainder of shift was uneventful. Pt slept peacefully with no indications of pain or discomfort. Safety maintained with video monitor in room, bed in low/locked position, 3 side rails raised and call light within pt's reach.  "

## 2023-11-09 NOTE — PLAN OF CARE
Patient is oriented to self, place and disoriented to time and situation. Reorientation measures provided. IV abx continued. Tolerated diabetic diet. Triad cream applied to sacrum and inner thighs. Glucose monitoring continued. Z-flex boots on. Turning assistance provided. Call light within reach. Urinal within reach.

## 2023-11-09 NOTE — CONSULTS
Infectious Diseases Consult Note    Primary Attending Physician: Dr. Styles  Consultant Attending: Dr. Jaramillo      Reason for Consult:     Osteomyelitis     Assessment/Plan:      ID  *Osteomyelitis   Julien Meléndez is a 83 y.o. male with PMHx of diabetes, anxiety/depression, afib on anticoagulation, HF, who presented with cellulitis of right foot, found to have acute Osteomyelitis and Bcx +staph aureus, sensitivities pending.    - Wcx 11/7 +staph aureus, sensitivities pending   - Bcx 11/7+staph aureus, +mec A/C and MREJ  - continue Vanc and meropenem while awaiting sensitivities   - clinical acute osteomyelitis of right hallux - bone exposure and purulence  - MRI 11/9 with findings consistent of osteo   - scheduled for surgery with podiatry on 11/10       Thank you for allowing us to participate in the care of this patient. Please contact me if you have any questions regarding this consult.    Mounika Curiel, DO  [facility]/Peds PGY-1      Subjective:      History of Present Illness:  Julien Meléndez is a 83 y.o. male with a PMHx of diabetes, anxiety/depression, afib on anticoagulation, HF who presented on 11/7 with foot wound. Patient reports recent biopsy of right great toe over last week, with worsening drainage and odor from the site. Denies fevers, chills, pain, nausea, vomiting, diarrhea. In ED patient with CRP elevated at 228.9. Bcx collected and pending. XR R foot showed no significant change of ulceration and destructive changes of distal metatarsal head of the 1st toe of R foot. Wcx growing gram positive. Given fluids, started on Vanc and Meropenem and admitted for further workup and evaluation.       Past Medical History:  Past Medical History:   Diagnosis Date    Diabetes mellitus     Hiatal hernia     under Dr Saenz' care    Hyperlipidemia     Hypertension     MI (myocardial infarction)     Sleep apnea        Past Surgical History:  Past Surgical History:   Procedure Laterality Date    ABDOMINAL HERNIA  REPAIR      ANGIOGRAM, EXTREMITY, BILATERAL Bilateral 4/5/2023    Procedure: ANGIOGRAM, EXTREMITY, BILATERAL;  Surgeon: Michael Giraldo III, MD;  Location: [facility] CATH LAB;  Service: Cardiology;  Laterality: Bilateral;    ANGIOGRAPHY OF LOWER EXTREMITY Left 4/12/2023    Procedure: Angiogram Extremity Unilateral;  Surgeon: Michael Giraldo III, MD;  Location: [facility] CATH LAB;  Service: Cardiology;  Laterality: Left;    AORTOGRAPHY WITH SERIALOGRAPHY Bilateral 4/5/2023    Procedure: AORTOGRAM, WITH SERIALOGRAPHY;  Surgeon: Michael Giraldo III, MD;  Location: [facility] CATH LAB;  Service: Cardiology;  Laterality: Bilateral;    APPLICATION OF WOUND VACUUM-ASSISTED CLOSURE DEVICE Left 3/29/2023    Procedure: APPLICATION, WOUND VAC;  Surgeon: Alona Pierce DPM;  Location: [facility] OR;  Service: Podiatry;  Laterality: Left;    APPLICATION OF WOUND VACUUM-ASSISTED CLOSURE DEVICE Left 4/4/2023    Procedure: APPLICATION, WOUND VAC;  Surgeon: Alona Pierce DPM;  Location: [facility] OR;  Service: Podiatry;  Laterality: Left;    APPLICATION OF WOUND VACUUM-ASSISTED CLOSURE DEVICE Left 4/13/2023    Procedure: APPLICATION, WOUND VAC;  Surgeon: Alona Peirce DPM;  Location: [facility] OR;  Service: Podiatry;  Laterality: Left;    BONE BIOPSY Left 3/29/2023    Procedure: BIOPSY, BONE;  Surgeon: Alona Pierce DPM;  Location: [facility] OR;  Service: Podiatry;  Laterality: Left;  1st met    CARDIAC CATHETERIZATION      3 stents placed    CATARACT EXTRACTION, BILATERAL      CLOSURE DEVICE Left 4/12/2023    Procedure: Placement of Closure Device;  Surgeon: Michael Giraldo III, MD;  Location: [facility] CATH LAB;  Service: Cardiology;  Laterality: Left;    CORONARY ANGIOGRAPHY N/A 10/3/2019    Procedure: ANGIOGRAM, CORONARY ARTERY;  Surgeon: Edwin Azul MD;  Location: [facility] CATH LAB/EP;  Service: Cardiology;  Laterality: N/A;    DEBRIDEMENT OF FOOT Left 3/29/2023    Procedure: DEBRIDEMENT, FOOT;  Surgeon: Alona Pierce DPM;  Location: [facility] OR;  Service:  Podiatry;  Laterality: Left;    DEBRIDEMENT OF FOOT Left 4/4/2023    Procedure: DEBRIDEMENT, FOOT;  Surgeon: Alona Pierce DPM;  Location: [facility] OR;  Service: Podiatry;  Laterality: Left;    DEBRIDEMENT OF FOOT Left 4/13/2023    Procedure: DEBRIDEMENT, FOOT;  Surgeon: Alona Pierce DPM;  Location: [facility] OR;  Service: Podiatry;  Laterality: Left;    ESOPHAGOGASTRODUODENOSCOPY N/A 4/17/2023    Procedure: EGD (ESOPHAGOGASTRODUODENOSCOPY);  Surgeon: Otto Villarreal MD;  Location: [facility] ENDO;  Service: Endoscopy;  Laterality: N/A;    HERNIA REPAIR      inguinal    IVUS (INTRAVASCULAR ULTRASOUND) Left 4/12/2023    Procedure: ULTRASOUND, INTRAVASCULAR;  Surgeon: Michael Giraldo III, MD;  Location: [facility] CATH LAB;  Service: Cardiology;  Laterality: Left;    LEFT HEART CATHETERIZATION Left 9/13/2019    Procedure: Left heart cath;  Surgeon: Edwin Azul MD;  Location: [facility] CATH LAB/EP;  Service: Cardiology;  Laterality: Left;    LEFT HEART CATHETERIZATION N/A 10/3/2019    Procedure: Left heart cath;  Surgeon: Edwin Azul MD;  Location: [facility] CATH LAB/EP;  Service: Cardiology;  Laterality: N/A;    OSTEOTOMY Left 6/7/2023    Procedure: OSTEOTOMY;  Surgeon: Alona Pierce DPM;  Location: [facility] OR;  Service: Podiatry;  Laterality: Left;    OSTEOTOMY OF METATARSAL BONE Right 4/4/2023    Procedure: OSTEOTOMY, METATARSAL BONE;  Surgeon: Alona Pierce DPM;  Location: [facility] OR;  Service: Podiatry;  Laterality: Right;    PERCUTANEOUS TRANSLUMINAL ANGIOPLASTY Left 4/12/2023    Procedure: PTA (ANGIOPLASTY, PERCUTANEOUS, TRANSLUMINAL);  Surgeon: Michael Giraldo III, MD;  Location: [facility] CATH LAB;  Service: Cardiology;  Laterality: Left;    SURGICAL REMOVAL OF BUNION WITH OSTEOTOMY OF METATARSAL BONE Right 4/4/2023    Procedure: BUNIONECTOMY, WITH METATARSAL OSTEOTOMY;  Surgeon: Alona Pierce DPM;  Location: [facility] OR;  Service: Podiatry;  Laterality: Right;    TOE AMPUTATION Left 4/4/2023    Procedure: AMPUTATION, TOE;  Surgeon: Alona  KILLIAN Pierce DPM;  Location: [facility] OR;  Service: Podiatry;  Laterality: Left;  1st ray    WASHOUT Left 6/7/2023    Procedure: WASHOUT;  Surgeon: Alona Pierce DPM;  Location: [facility] OR;  Service: Podiatry;  Laterality: Left;    WOUND DEBRIDEMENT Left 6/7/2023    Procedure: DEBRIDEMENT, WOUND;  Surgeon: Alona Pierce DPM;  Location: [facility] OR;  Service: Podiatry;  Laterality: Left;       Allergies:  Review of patient's allergies indicates:   Allergen Reactions    Nystatin      Other reaction(s): Unknown       Medications:   In-Hospital Scheduled Medications:   amLODIPine  5 mg Oral Daily    apixaban  5 mg Oral BID    atorvastatin  40 mg Oral QHS    clopidogreL  75 mg Oral Daily    ferrous sulfate  1 tablet Oral Daily    FLUoxetine  40 mg Oral Daily    furosemide  20 mg Oral Daily    insulin detemir U-100 (Levemir)  10 Units Subcutaneous QHS    isosorbide mononitrate  30 mg Oral Daily    meropenem (MERREM) IVPB  1 g Intravenous Q8H    metoprolol succinate  25 mg Oral Daily    pantoprazole  40 mg Oral BID    ranolazine  1,000 mg Oral BID    traZODone  150 mg Oral QHS    vancomycin (VANCOCIN) IV (PEDS and ADULTS)  1,250 mg Intravenous Q24H      In-Hospital PRN Medications:  acetaminophen, dextrose 10%, dextrose 10%, glucagon (human recombinant), glucose, glucose, HYDROcodone-acetaminophen, HYDROcodone-acetaminophen, influenza 65up-adj, insulin aspart U-100, melatonin, naloxone, ondansetron, sodium chloride 0.9%, Pharmacy to dose Vancomycin consult **AND** vancomycin - pharmacy to dose   In-Hospital IV Infusion Medications:   sodium chloride 0.9% Stopped (11/08/23 0845)      Home Medications:  Prior to Admission medications    Medication Sig Start Date End Date Taking? Authorizing Provider   amLODIPine (NORVASC) 5 MG tablet  8/17/23  Yes Provider, Historical   atorvastatin (LIPITOR) 40 MG tablet Take 1 tablet (40 mg total) by mouth once daily.  Patient taking differently: Take 40 mg by mouth every evening. 4/19/22  Yes Tone  Kelvin FOX MD   collagenase (SANTYL) ointment Apply topically once daily. 7/14/23  Yes Diya Leung DPM   ELIQUIS 5 mg Tab Take 5 mg by mouth 2 (two) times daily. 5/26/23  Yes Provider, Historical   famotidine (PEPCID) 20 MG tablet Take 1 tablet (20 mg total) by mouth 2 (two) times daily. 11/6/23 6/3/24 Yes Kelvin Wayne MD   ferrous sulfate (FEOSOL) 325 mg (65 mg iron) Tab tablet Take 325 mg by mouth daily with breakfast.   Yes Provider, Historical   furosemide (LASIX) 20 MG tablet Take 1 tablet (20 mg total) by mouth once daily. 8/30/23 8/29/24 Yes Kelvin Wayne MD   isosorbide mononitrate (IMDUR) 30 MG 24 hr tablet TAKE 1 TABLET EVERY DAY  Patient taking differently: Take 30 mg by mouth once daily. 11/28/22  Yes Edwin Azul MD   metoprolol succinate (TOPROL-XL) 25 MG 24 hr tablet TAKE 1 TABLET EVERY DAY 7/3/23  Yes Kelvin Wayne MD   nitroGLYCERIN (NITROSTAT) 0.4 MG SL tablet Place 0.4 mg under the tongue every 5 (five) minutes as needed for Chest pain.   Yes Provider, Historical   ondansetron (ZOFRAN-ODT) 8 MG TbDL Take 1 tablet (8 mg total) by mouth 3 (three) times daily as needed (nausea or vomiting). 11/3/23  Yes Adrian Anaya MD   oxyCODONE-acetaminophen (PERCOCET) 5-325 mg per tablet Take 1 tablet by mouth every 6 (six) hours as needed for Pain. 6/9/23  Yes Sunkara, Pallavi, MD   pantoprazole (PROTONIX) 40 MG tablet Take 1 tablet (40 mg total) by mouth 2 (two) times daily. 4/21/23 4/20/24 Yes Cynthia Sanchez MD   ranolazine (RANEXA) 1,000 mg Tb12 Take 1,000 mg by mouth 2 (two) times daily. 1/27/23  Yes Provider, Historical   SITagliptan-metformin (JANUMET XR) 100-1,000 mg TM24 Take 1 tablet by mouth once daily. 3/14/22  Yes Kelvin Wayne MD   sucralfate (CARAFATE) 1 gram tablet Take 1 tablet (1 g total) by mouth 4 (four) times daily before meals and nightly. 4/21/23  Yes Innocent-Cynthia cMpherson MD   traZODone (DESYREL) 150 MG tablet TAKE 1 TABLET EVERY NIGHT 7/3/23   Yes Kelvin Wayne MD   alcohol swabs PadM Apply 1 each topically as needed (for use with glucose monitoring). 23   Kelvin Wayne MD   blood sugar diagnostic (TRUE METRIX GLUCOSE TEST STRIP) Strp 1 strip by Misc.(Non-Drug; Combo Route) route 2 (two) times a day. 23   Kelvin Wayne MD   blood-glucose meter (TRUE METRIX GLUCOSE METER) [facility] [place] TWICE A DAY 23   Kelvin Wayne MD   ciprofloxacin HCl (CIPRO) 500 MG tablet Take 1 tablet (500 mg total) by mouth 2 (two) times daily. for 7 days 23  Kelvin Wayne MD   clopidogreL (PLAVIX) 75 mg tablet Take 1 tablet (75 mg total) by mouth once daily. 23   Cynthia Sanchez MD   FLUoxetine 40 MG capsule Take 40 mg by mouth once daily. 22   Provider, Historical   insulin detemir U-100, Levemir, 100 unit/mL (3 mL) SubQ InPn pen Inject 20 Units into the skin every evening. 23  Sunkara, Pallavi, MD   lancets 28 gauge Misc 1 lancet  by Misc.(Non-Drug; Combo Route) route 2 (two) times a day. 23   Kelvin Wayne MD   mupirocin (BACTROBAN) 2 % ointment Apply topically 3 (three) times daily. 23   Diya Leung DPM       Family History:  Family History   Problem Relation Age of Onset    Heart disease Mother     Stroke Mother     Heart disease Sister     Heart disease Brother     Heart disease Maternal Uncle     Heart disease Brother     Heart disease Sister     Heart disease Maternal Uncle     Heart disease Maternal Uncle     Heart disease Maternal Uncle        Social History:  Social History     Tobacco Use    Smoking status: Never    Smokeless tobacco: Never   Substance Use Topics    Alcohol use: No    Drug use: No       ROS:  As listed above in HPI.        Objective:   Last 24 Hour Vital Signs:  BP  Min: 94/53  Max: 142/69  Temp  Av.1 °F (37.3 °C)  Min: 97.9 °F (36.6 °C)  Max: 100.7 °F (38.2 °C)  Pulse  Av.8  Min: 83  Max: 92  Resp  Av.2  Min: 16  Max: 19  SpO2  Av %  Min: 94  "%  Max: 98 %  Height  Av' 1" (185.4 cm)  Min: 6' 1" (185.4 cm)  Max: 6' 1" (185.4 cm)  Weight  Av.2 kg (170 lb 3.1 oz)  Min: 76.3 kg (168 lb 3.4 oz)  Max: 78.3 kg (172 lb 9.9 oz)  I/O last 3 completed shifts:  In: 1922.9 [P.O.:360; I.V.:1000; IV Piggyback:562.9]  Out: 100 [Urine:100]    Physical Exam  Vitals reviewed.   Constitutional:       General: He is not in acute distress.     Appearance: Normal appearance.   HENT:      Head: Normocephalic.      Right Ear: External ear normal.      Left Ear: External ear normal.      Nose: Nose normal.      Mouth/Throat:      Mouth: Mucous membranes are moist.   Eyes:      Conjunctiva/sclera: Conjunctivae normal.   Cardiovascular:      Rate and Rhythm: Normal rate and regular rhythm.      Heart sounds: Normal heart sounds.   Pulmonary:      Effort: Pulmonary effort is normal. No respiratory distress.      Breath sounds: Normal breath sounds.   Abdominal:      General: Abdomen is flat. Bowel sounds are normal. There is no distension.      Palpations: Abdomen is soft.      Tenderness: There is no abdominal tenderness.   Musculoskeletal:      Comments: RLE with dressing in place    Skin:     General: Skin is warm.   Neurological:      General: No focal deficit present.      Comments: Oriented to person and place          Laboratory Results:  Most Recent Data:  CBC:   Lab Results   Component Value Date    WBC 6.60 2023    HGB 10.7 (L) 2023    HCT 32.0 (L) 2023     2023    MCV 81 (L) 2023    RDW 19.1 (H) 2023     BMP:   Lab Results   Component Value Date     (L) 2023    K 3.8 2023     2023    CO2 18 (L) 2023    BUN 16 2023     (H) 2023    CALCIUM 8.9 2023    MG 1.9 2023    PHOS 3.0 2010     LFTs:   Lab Results   Component Value Date    PROT 7.6 2023    ALBUMIN 3.1 (L) 2023    BILITOT 0.8 2023    AST 10 2023    ALKPHOS 96 2023    " ALT 15 11/07/2023     Coags:   Lab Results   Component Value Date    INR 1.3 (H) 05/13/2023     FLP:   Lab Results   Component Value Date    CHOL 130 10/06/2022    HDL 40 10/06/2022    LDLCALC 71.6 10/06/2022    TRIG 92 10/06/2022    CHOLHDL 30.8 10/06/2022     DM:   Lab Results   Component Value Date    HGBA1C 9.7 (H) 10/23/2023    HGBA1C 7.0 (H) 03/25/2023    HGBA1C 7.2 (H) 10/06/2022    LDLCALC 71.6 10/06/2022    CREATININE 0.9 11/09/2023     Thyroid:   Lab Results   Component Value Date    TSH 1.280 05/26/2023    FREET4 1.24 05/26/2023     Anemia:   Lab Results   Component Value Date    IRON 31 (L) 09/05/2023    TIBC 339 09/05/2023    FERRITIN 92 09/05/2023    [id number] 641 09/05/2023    FOLATE 7.2 09/05/2023     Cardiac:   Lab Results   Component Value Date    TROPONINI <0.006 11/02/2023     Urinalysis:   Lab Results   Component Value Date    COLORU Yellow 11/08/2023    SPECGRAV 1.030 11/08/2023    NITRITE Negative 11/08/2023    KETONESU 2+ (A) 11/08/2023    UROBILINOGEN Negative 11/08/2023       Trended Lab Data:  Recent Labs   Lab 11/02/23 2159 11/07/23 2022 11/07/23  2044 11/08/23  0245 11/09/23  0455   WBC 9.10 12.71*  --  9.42 6.60   HGB 12.8* 13.2*  --  11.9* 10.7*   HCT 39.0* 39.8*  --  35.8* 32.0*    294  --  241 245   MCV 84 82  --  82 81*   RDW 19.2* 19.4*  --  18.7* 19.1*   *  --  132* 133* 133*   K 5.1  --  4.4 4.3 3.8   CL 96  --  95 97 100   CO2 22*  --  15* 22* 18*   BUN 20  --  20 16 16   *  --  318* 237* 181*   PROT 7.4  --  7.6  --   --    ALBUMIN 4.0  --  3.1*  --   --    BILITOT 0.7  --  0.8  --   --    AST 16  --  10  --   --    ALKPHOS 98  --  96  --   --    ALT 18  --  15  --   --          Microbiology Data:  Microbiology Results (last 7 days)       Procedure Component Value Units Date/Time    Urine culture [[id number]]  (Abnormal) Collected: 11/08/23 0410    Order Status: Completed Specimen: Urine Updated: 11/09/23 1206     Urine Culture, Routine CANDIDA  ALBICANS  50,000 - 99,999 cfu/ml  Treatment of asymptomatic candiduria is not recommended (except for   specific populations). Candida isolated in the urine typically   represents colonization. If an indwelling urinary catheter is present  it should be removed or replaced.      Narrative:      Specimen Source->Urine    Aerobic culture (Specify Source) **CANNOT BE ORDERED AS STAT** [[id number]]  (Abnormal) Collected: 11/07/23 2208    Order Status: Completed Specimen: Wound from Toe, Right Foot Updated: 11/09/23 0923     Aerobic Bacterial Culture STAPHYLOCOCCUS AUREUS  Many  Susceptibility pending  Skin amy also present      Blood culture [[id number]]  (Abnormal) Collected: 11/07/23 2021    Order Status: Completed Specimen: Blood from Peripheral, Antecubital, Right Updated: 11/09/23 0826     Blood Culture, Routine Gram stain earl bottle: Gram positive cocci in clusters resembling Staph      Results called to and read back by:Grisel Cole RN 11/08/2023  16:26      Gram stain aer bottle: Gram positive cocci in clusters resembling Staph      STAPHYLOCOCCUS AUREUS  For susceptibility see order #[id number]      Blood culture [[id number]]  (Abnormal) Collected: 11/07/23 2021    Order Status: Completed Specimen: Blood from Peripheral, Antecubital, Right Updated: 11/09/23 0824     Blood Culture, Routine Gram stain earl bottle: Gram positive cocci in clusters resembling Staph      Results called to and read back by:Grisel Cole RN 11/08/2023  16:27      Gram stain aer bottle: Gram positive cocci in clusters resembling Staph      STAPHYLOCOCCUS AUREUS  Susceptibility pending      Rapid Organism ID by PCR (from Blood culture) [[id number]]  (Abnormal) Collected: 11/07/23 2021    Order Status: Completed Updated: 11/08/23 1804     Enterococcus faecalis Not Detected     Enterococcus faecium Not Detected     Listeria monocytogenes Not Detected     Staphylococcus spp. See species for ID     Staphylococcus aureus Detected      Staphylococcus epidermidis Not Detected     Staphylococcus lugdunensis Not Detected     Streptococcus species Not Detected     Streptococcus agalactiae Not Detected     Streptococcus pneumoniae Not Detected     Streptococcus pyogenes Not Detected     Acinetobacter calcoaceticus/baumannii complex Not Detected     Bacteroides fragilis Not Detected     Enterobacterales Not Detected     Enterobacter cloacae complex Not Detected     Escherichia coli Not Detected     Klebsiella aerogenes Not Detected     Klebsiella oxytoca Not Detected     Klebsiella pneumoniae group Not Detected     Proteus Not Detected     Salmonella sp Not Detected     Serratia marcescens Not Detected     Haemophilus influenzae Not Detected     Neisseria meningtidis Not Detected     Pseudomonas aeruginosa Not Detected     Stenotrophomonas maltophilia Not Detected     Candida albicans Not Detected     Candida auris Not Detected     Candida glabrata Not Detected     Candida krusei Not Detected     Candida parapsilosis Not Detected     Candida tropicalis Not Detected     Cryptococcus neoformans/gattii Not Detected     CTX-M (ESBL ) Test Not Applicable     IMP (Carbapenem resistant) Test Not Applicable     KPC resistance gene (Carbapenem resistant) Test Not Applicable     mcr-1  Test Not Applicable     mec A/C  Test Not Applicable     mec A/C and MREJ (MRSA) gene Detected     NDM (Carbapenem resistant) Test Not Applicable     OXA-48-like (Carbapenem resistant) Test Not Applicable     van A/B (VRE gene) Test Not Applicable     VIM (Carbapenem resistant) Test Not Applicable              Other Results:    Radiology:  US Lower Extremity Arteries Right    Result Date: 11/9/2023  EXAMINATION: US LOWER EXTREMITY ARTERIES RIGHT CLINICAL HISTORY: PAD; TECHNIQUE: Right lower extremity arterial duplex ultrasound examination performed. Multiple gray scale and color doppler images were obtained in addition to waveform analysis. COMPARISON: Bilateral lower  extremity arteries dated 03/28/2023 FINDINGS: The peak systolic velocities on the right are as follows, in centimeters/second: Common femoral artery: 164, triphasic Deep profundus: 198, biphasic Superficial femoral artery, proximal: 65, triphasic Superficial femoral artery, mid portion: 77, triphasic Superficial femoral artery, distal: 89, monophasic Popliteal artery: 119, monophasic Distal popliteal artery: 112, monophasic Anterior tibial artery: 52, monophasic with nearby collaterals Posterior tibial artery: 61, monophasic with nearby collaterals.     Right lower extremity peripheral vascular disease with sampled velocities as above.  No abnormal velocity doubling to suggest hemodynamically significant stenosis.  Nearby collaterals noted at the calf vasculature. Electronically signed by: Cameron Bauman Date:    11/09/2023 Time:    08:32    X-Ray Foot Complete Right    Result Date: 11/7/2023  EXAMINATION: XR FOOT COMPLETE 3 VIEW RIGHT CLINICAL HISTORY: . Type 2 diabetes mellitus with other skin complications TECHNIQUE: AP, lateral, and oblique views of the right foot were performed. COMPARISON: On 10/17/2023 FINDINGS: Chronic features of the hind and forefoot are again noted.  Ulceration at the medial aspect of the right great toe at the MTP P joint is again noted with destruction of the distal metatarsal head and chronic appearing fracture.  The chronic fracture at the distal metadiaphysis of the 5th metatarsal is stable.  Degenerative changes with vascular calcifications remain.     No significant change in the ulceration and destructive changes of the distal metatarsal head of the 1st toe of the right foot. Fracture deformity of the 1st and 5th metatarsal distal heads again noted. Electronically signed by: Sy Reeves Date:    11/07/2023 Time:    20:30    CT Abdomen Pelvis With IV Contrast    Result Date: 11/2/2023  EXAMINATION: CT ABDOMEN PELVIS WITH IV CONTRAST CLINICAL HISTORY: Nausea/vomiting;  TECHNIQUE: Low dose axial images, sagittal and coronal reformations were obtained from the lung bases to the pubic symphysis following the IV administration of 100 mL of Omnipaque 350 oral contrast was not utilized.  Single phase postcontrast CT examination of the abdomen and pelvis is submitted. COMPARISON: CT examination of the abdomen May 2, 2019, CT examination of the abdomen and pelvis November 2, 2012 FINDINGS: There is mild motion artifact present, this diminishes image quality and diminishes the sensitivity of the examination. The visualized lung bases demonstrate atelectatic change.  Coronary arterial atherosclerotic change noted.  The stomach demonstrates nonspecific appearance of mild distention with fluid, air and ingested material.  Mild wall and fold prominence greater than expected for the degree of gastric distention is noted, this is nonspecific, can be seen with an infiltrating process, or gastritis or gastric ulcer disease.  Clinical correlation is needed to determine need for additional follow-up. When accounting for limitations of the exam, there is no evidence for acute process of the liver, gallbladder, pancreas, spleen or adrenal glands.  There is no evidence for hydronephrosis or obstructive uropathy or ureteral calculus or perinephric inflammatory change bilaterally.  The arterial vascular structures including the aorta demonstrate atherosclerotic change.  The abdominal aorta appears normal in caliber and demonstrates appropriate opacification.  There is appearance of narrowing at the origin of the celiac artery.  The urinary bladder appears unremarkable for degree of distention.  The prostate is prominent with calcifications/mineralization noted. There is no evidence for small bowel obstructive process.  The appendix is identified, difficult to definitively delineate in its entirety however the visualized aspects do not appear inflamed.  There is mild-to-moderate prominence of the colon  throughout its course with air and stool, and there are diverticula of the colon noted without focal inflammatory change to suggest acute diverticulitis, suggestion of mild wall thickening along the rectosigmoid colon correlation for mild rectosigmoid colitis is needed, there is no additional evidence for colonic inflammatory change.  There is no evidence for free intraperitoneal air. The visualized osseous structures demonstrate chronic change.     Mild wall thickening along the rectosigmoid colon, correlation for mild rectosigmoid colitis is needed. Mild-to-moderate prominence of the colon with air and stool, without obstructive pattern. Diverticula of the colon are noted without focal inflammatory change to suggest acute diverticulitis. Mild wall thickening of the stomach mildly greater than expected for degree of distention, nonspecific, clinical and historical correlation is needed to determine need for additional follow-up, as discussed above. Additional findings as above. This report was flagged in Epic as abnormal. Electronically signed by: Julio Oseguera Date:    11/02/2023 Time:    23:51    X-Ray Chest AP Single View    Result Date: 10/23/2023  EXAMINATION: XR CHEST 1 VIEW CLINICAL HISTORY: Other chronic osteomyelitis, unspecified site TECHNIQUE: Single frontal view of the chest was performed. COMPARISON: May 15, 2023 FINDINGS: Heart size is not enlarged.  Left hemidiaphragm is elevated, with some mild atelectasis at the left lung base.  No significant volume of pleural fluid is seen on this single view.  There is degenerative change within osseous structures.  The lungs are clear.     As above Electronically signed by: Dang Thrasher MD Date:    10/23/2023 Time:    15:22    X-Ray Toe 2 or More Views Right    Result Date: 10/17/2023  EXAMINATION: XR FOOT COMPLETE 3 VIEW RIGHT; XR TOE 2 OR MORE VIEWS RIGHT CLINICAL HISTORY: . Unspecified injury of right foot, initial encounter TECHNIQUE: AP, lateral, and  oblique views of the right foot were performed.  Three views of the toes were obtained. COMPARISON: 08/22/2023 FINDINGS: Persistent osteolytic change at the 1st metatarsal head and base of the proximal phalanx, findings which remain concerning for osteomyelitis as seen on recent MRI.  Adjacent skin ulceration and diffuse soft tissue edema. Prior 5th metatarsal osteotomy.  Plantar calcaneal spur. Vascular calcifications.     Abnormal osteolytic change centered at the 1st MTP joint most concerning for osteomyelitis.  Septic arthritis not excluded as this is a joint centered process. Electronically signed by: Yesi Gallardo Date:    10/17/2023 Time:    16:18    X-Ray Foot Complete Right    Result Date: 10/17/2023  EXAMINATION: XR FOOT COMPLETE 3 VIEW RIGHT; XR TOE 2 OR MORE VIEWS RIGHT CLINICAL HISTORY: . Unspecified injury of right foot, initial encounter TECHNIQUE: AP, lateral, and oblique views of the right foot were performed.  Three views of the toes were obtained. COMPARISON: 08/22/2023 FINDINGS: Persistent osteolytic change at the 1st metatarsal head and base of the proximal phalanx, findings which remain concerning for osteomyelitis as seen on recent MRI.  Adjacent skin ulceration and diffuse soft tissue edema. Prior 5th metatarsal osteotomy.  Plantar calcaneal spur. Vascular calcifications.     Abnormal osteolytic change centered at the 1st MTP joint most concerning for osteomyelitis.  Septic arthritis not excluded as this is a joint centered process. Electronically signed by: Yesi Gallardo Date:    10/17/2023 Time:    16:18

## 2023-11-10 ENCOUNTER — DOCUMENT SCAN (OUTPATIENT)
Dept: HOME HEALTH SERVICES | Facility: HOSPITAL | Age: 83
End: 2023-11-10
Payer: MEDICARE

## 2023-11-10 ENCOUNTER — ANESTHESIA EVENT (OUTPATIENT)
Dept: SURGERY | Facility: HOSPITAL | Age: 83
DRG: 854 | End: 2023-11-10
Payer: MEDICARE

## 2023-11-10 ENCOUNTER — ANESTHESIA (OUTPATIENT)
Dept: SURGERY | Facility: HOSPITAL | Age: 83
DRG: 854 | End: 2023-11-10
Payer: MEDICARE

## 2023-11-10 LAB
ANION GAP SERPL CALC-SCNC: 10 MMOL/L (ref 8–16)
BACTERIA BLD CULT: ABNORMAL
BACTERIA SPEC AEROBE CULT: ABNORMAL
BASOPHILS # BLD AUTO: 0.02 K/UL (ref 0–0.2)
BASOPHILS NFR BLD: 0.3 % (ref 0–1.9)
BUN SERPL-MCNC: 16 MG/DL (ref 8–23)
CALCIUM SERPL-MCNC: 8.8 MG/DL (ref 8.7–10.5)
CHLORIDE SERPL-SCNC: 98 MMOL/L (ref 95–110)
CO2 SERPL-SCNC: 23 MMOL/L (ref 23–29)
CREAT SERPL-MCNC: 0.9 MG/DL (ref 0.5–1.4)
DIFFERENTIAL METHOD: ABNORMAL
EOSINOPHIL # BLD AUTO: 0.1 K/UL (ref 0–0.5)
EOSINOPHIL NFR BLD: 0.8 % (ref 0–8)
ERYTHROCYTE [DISTWIDTH] IN BLOOD BY AUTOMATED COUNT: 19.2 % (ref 11.5–14.5)
EST. GFR  (NO RACE VARIABLE): >60 ML/MIN/1.73 M^2
GLUCOSE SERPL-MCNC: 207 MG/DL (ref 70–110)
HCT VFR BLD AUTO: 31.1 % (ref 40–54)
HGB BLD-MCNC: 10.5 G/DL (ref 14–18)
IMM GRANULOCYTES # BLD AUTO: 0.1 K/UL (ref 0–0.04)
IMM GRANULOCYTES NFR BLD AUTO: 1.5 % (ref 0–0.5)
LYMPHOCYTES # BLD AUTO: 1.2 K/UL (ref 1–4.8)
LYMPHOCYTES NFR BLD: 17.9 % (ref 18–48)
MCH RBC QN AUTO: 27.3 PG (ref 27–31)
MCHC RBC AUTO-ENTMCNC: 33.8 G/DL (ref 32–36)
MCV RBC AUTO: 81 FL (ref 82–98)
MONOCYTES # BLD AUTO: 0.8 K/UL (ref 0.3–1)
MONOCYTES NFR BLD: 12.3 % (ref 4–15)
NEUTROPHILS # BLD AUTO: 4.5 K/UL (ref 1.8–7.7)
NEUTROPHILS NFR BLD: 68.7 % (ref 38–73)
NRBC BLD-RTO: 0 /100 WBC
PLATELET # BLD AUTO: 280 K/UL (ref 150–450)
PMV BLD AUTO: 10.2 FL (ref 9.2–12.9)
POCT GLUCOSE: 186 MG/DL (ref 70–110)
POCT GLUCOSE: 194 MG/DL (ref 70–110)
POCT GLUCOSE: 210 MG/DL (ref 70–110)
POCT GLUCOSE: 261 MG/DL (ref 70–110)
POTASSIUM SERPL-SCNC: 3.8 MMOL/L (ref 3.5–5.1)
RBC # BLD AUTO: 3.84 M/UL (ref 4.6–6.2)
SODIUM SERPL-SCNC: 131 MMOL/L (ref 136–145)
VANCOMYCIN TROUGH SERPL-MCNC: 16.1 UG/ML (ref 10–22)
WBC # BLD AUTO: 6.59 K/UL (ref 3.9–12.7)

## 2023-11-10 PROCEDURE — 87077 CULTURE AEROBIC IDENTIFY: CPT | Performed by: FAMILY MEDICINE

## 2023-11-10 PROCEDURE — D9220A PRA ANESTHESIA: ICD-10-PCS | Mod: ANES,,, | Performed by: STUDENT IN AN ORGANIZED HEALTH CARE EDUCATION/TRAINING PROGRAM

## 2023-11-10 PROCEDURE — 36000707: Performed by: STUDENT IN AN ORGANIZED HEALTH CARE EDUCATION/TRAINING PROGRAM

## 2023-11-10 PROCEDURE — 37000009 HC ANESTHESIA EA ADD 15 MINS: Performed by: STUDENT IN AN ORGANIZED HEALTH CARE EDUCATION/TRAINING PROGRAM

## 2023-11-10 PROCEDURE — 28820 AMPUTATION OF TOE: CPT | Mod: T5,,, | Performed by: STUDENT IN AN ORGANIZED HEALTH CARE EDUCATION/TRAINING PROGRAM

## 2023-11-10 PROCEDURE — 87206 SMEAR FLUORESCENT/ACID STAI: CPT | Performed by: FAMILY MEDICINE

## 2023-11-10 PROCEDURE — 87186 SC STD MICRODIL/AGAR DIL: CPT | Mod: 59

## 2023-11-10 PROCEDURE — 11000001 HC ACUTE MED/SURG PRIVATE ROOM

## 2023-11-10 PROCEDURE — 27000207 HC ISOLATION

## 2023-11-10 PROCEDURE — 80048 BASIC METABOLIC PNL TOTAL CA: CPT | Performed by: NURSE PRACTITIONER

## 2023-11-10 PROCEDURE — 25000003 PHARM REV CODE 250: Performed by: NURSE ANESTHETIST, CERTIFIED REGISTERED

## 2023-11-10 PROCEDURE — 88305 TISSUE EXAM BY PATHOLOGIST: ICD-10-PCS | Mod: 26,,, | Performed by: PATHOLOGY

## 2023-11-10 PROCEDURE — D9220A PRA ANESTHESIA: Mod: ANES,,, | Performed by: STUDENT IN AN ORGANIZED HEALTH CARE EDUCATION/TRAINING PROGRAM

## 2023-11-10 PROCEDURE — 80202 ASSAY OF VANCOMYCIN: CPT | Performed by: FAMILY MEDICINE

## 2023-11-10 PROCEDURE — 87186 SC STD MICRODIL/AGAR DIL: CPT | Performed by: FAMILY MEDICINE

## 2023-11-10 PROCEDURE — 87077 CULTURE AEROBIC IDENTIFY: CPT | Mod: 59

## 2023-11-10 PROCEDURE — 36415 COLL VENOUS BLD VENIPUNCTURE: CPT | Performed by: NURSE PRACTITIONER

## 2023-11-10 PROCEDURE — 63600175 PHARM REV CODE 636 W HCPCS: Performed by: NURSE ANESTHETIST, CERTIFIED REGISTERED

## 2023-11-10 PROCEDURE — 88311 DECALCIFY TISSUE: CPT | Mod: 26,,, | Performed by: PATHOLOGY

## 2023-11-10 PROCEDURE — 88311 PR  DECALCIFY TISSUE: ICD-10-PCS | Mod: 26,,, | Performed by: PATHOLOGY

## 2023-11-10 PROCEDURE — 87015 SPECIMEN INFECT AGNT CONCNTJ: CPT | Performed by: FAMILY MEDICINE

## 2023-11-10 PROCEDURE — 63600175 PHARM REV CODE 636 W HCPCS: Performed by: FAMILY MEDICINE

## 2023-11-10 PROCEDURE — 88305 TISSUE EXAM BY PATHOLOGIST: CPT | Mod: 26,,, | Performed by: PATHOLOGY

## 2023-11-10 PROCEDURE — D9220A PRA ANESTHESIA: ICD-10-PCS | Mod: CRNA,,, | Performed by: NURSE ANESTHETIST, CERTIFIED REGISTERED

## 2023-11-10 PROCEDURE — 25000003 PHARM REV CODE 250: Performed by: FAMILY MEDICINE

## 2023-11-10 PROCEDURE — 87040 BLOOD CULTURE FOR BACTERIA: CPT | Mod: 59

## 2023-11-10 PROCEDURE — 87070 CULTURE OTHR SPECIMN AEROBIC: CPT | Performed by: FAMILY MEDICINE

## 2023-11-10 PROCEDURE — 88305 TISSUE EXAM BY PATHOLOGIST: CPT | Performed by: PATHOLOGY

## 2023-11-10 PROCEDURE — 28820 PR AMPUTATION TOE,MT-P JT: ICD-10-PCS | Mod: T5,,, | Performed by: STUDENT IN AN ORGANIZED HEALTH CARE EDUCATION/TRAINING PROGRAM

## 2023-11-10 PROCEDURE — 37000008 HC ANESTHESIA 1ST 15 MINUTES: Performed by: STUDENT IN AN ORGANIZED HEALTH CARE EDUCATION/TRAINING PROGRAM

## 2023-11-10 PROCEDURE — 25000003 PHARM REV CODE 250: Performed by: STUDENT IN AN ORGANIZED HEALTH CARE EDUCATION/TRAINING PROGRAM

## 2023-11-10 PROCEDURE — 87116 MYCOBACTERIA CULTURE: CPT | Performed by: FAMILY MEDICINE

## 2023-11-10 PROCEDURE — 85025 COMPLETE CBC W/AUTO DIFF WBC: CPT | Performed by: NURSE PRACTITIONER

## 2023-11-10 PROCEDURE — 25000003 PHARM REV CODE 250: Performed by: NURSE PRACTITIONER

## 2023-11-10 PROCEDURE — 36000706: Performed by: STUDENT IN AN ORGANIZED HEALTH CARE EDUCATION/TRAINING PROGRAM

## 2023-11-10 PROCEDURE — C9113 INJ PANTOPRAZOLE SODIUM, VIA: HCPCS | Performed by: FAMILY MEDICINE

## 2023-11-10 PROCEDURE — 71000033 HC RECOVERY, INTIAL HOUR: Performed by: STUDENT IN AN ORGANIZED HEALTH CARE EDUCATION/TRAINING PROGRAM

## 2023-11-10 PROCEDURE — 36415 COLL VENOUS BLD VENIPUNCTURE: CPT | Performed by: FAMILY MEDICINE

## 2023-11-10 PROCEDURE — 63600175 PHARM REV CODE 636 W HCPCS: Performed by: STUDENT IN AN ORGANIZED HEALTH CARE EDUCATION/TRAINING PROGRAM

## 2023-11-10 PROCEDURE — 88311 DECALCIFY TISSUE: CPT | Mod: 59 | Performed by: PATHOLOGY

## 2023-11-10 PROCEDURE — 87106 FUNGI IDENTIFICATION YEAST: CPT | Performed by: FAMILY MEDICINE

## 2023-11-10 PROCEDURE — 87102 FUNGUS ISOLATION CULTURE: CPT | Performed by: FAMILY MEDICINE

## 2023-11-10 PROCEDURE — 87075 CULTR BACTERIA EXCEPT BLOOD: CPT | Performed by: FAMILY MEDICINE

## 2023-11-10 PROCEDURE — D9220A PRA ANESTHESIA: Mod: CRNA,,, | Performed by: NURSE ANESTHETIST, CERTIFIED REGISTERED

## 2023-11-10 RX ORDER — PANTOPRAZOLE SODIUM 40 MG/10ML
40 INJECTION, POWDER, LYOPHILIZED, FOR SOLUTION INTRAVENOUS 2 TIMES DAILY
Status: DISCONTINUED | OUTPATIENT
Start: 2023-11-10 | End: 2023-11-14 | Stop reason: HOSPADM

## 2023-11-10 RX ORDER — ONDANSETRON 2 MG/ML
4 INJECTION INTRAMUSCULAR; INTRAVENOUS DAILY PRN
Status: DISCONTINUED | OUTPATIENT
Start: 2023-11-10 | End: 2023-11-14 | Stop reason: HOSPADM

## 2023-11-10 RX ORDER — LIDOCAINE HYDROCHLORIDE 10 MG/ML
INJECTION INFILTRATION; PERINEURAL
Status: DISCONTINUED | OUTPATIENT
Start: 2023-11-10 | End: 2023-11-10 | Stop reason: HOSPADM

## 2023-11-10 RX ORDER — METOPROLOL TARTRATE 25 MG/1
12.5 TABLET ORAL 2 TIMES DAILY
Status: DISCONTINUED | OUTPATIENT
Start: 2023-11-11 | End: 2023-11-10

## 2023-11-10 RX ORDER — MUPIROCIN 20 MG/G
OINTMENT TOPICAL 2 TIMES DAILY
Status: DISCONTINUED | OUTPATIENT
Start: 2023-11-10 | End: 2023-11-14 | Stop reason: HOSPADM

## 2023-11-10 RX ORDER — PHENYLEPHRINE HYDROCHLORIDE 10 MG/ML
INJECTION INTRAVENOUS
Status: DISCONTINUED | OUTPATIENT
Start: 2023-11-10 | End: 2023-11-10

## 2023-11-10 RX ORDER — ACETAMINOPHEN 10 MG/ML
INJECTION, SOLUTION INTRAVENOUS
Status: DISCONTINUED | OUTPATIENT
Start: 2023-11-10 | End: 2023-11-10

## 2023-11-10 RX ORDER — BUPIVACAINE HYDROCHLORIDE 2.5 MG/ML
INJECTION, SOLUTION EPIDURAL; INFILTRATION; INTRACAUDAL
Status: DISCONTINUED | OUTPATIENT
Start: 2023-11-10 | End: 2023-11-10 | Stop reason: HOSPADM

## 2023-11-10 RX ORDER — SODIUM CHLORIDE 9 MG/ML
INJECTION, SOLUTION INTRAVENOUS CONTINUOUS
Status: DISCONTINUED | OUTPATIENT
Start: 2023-11-10 | End: 2023-11-11

## 2023-11-10 RX ORDER — LIDOCAINE HYDROCHLORIDE 20 MG/ML
INJECTION INTRAVENOUS
Status: DISCONTINUED | OUTPATIENT
Start: 2023-11-10 | End: 2023-11-10

## 2023-11-10 RX ORDER — PANTOPRAZOLE SODIUM 40 MG/10ML
40 INJECTION, POWDER, LYOPHILIZED, FOR SOLUTION INTRAVENOUS 2 TIMES DAILY
Status: CANCELLED | OUTPATIENT
Start: 2023-11-10

## 2023-11-10 RX ORDER — OXYCODONE HYDROCHLORIDE 5 MG/1
5 TABLET ORAL
Status: DISCONTINUED | OUTPATIENT
Start: 2023-11-10 | End: 2023-11-14 | Stop reason: HOSPADM

## 2023-11-10 RX ORDER — HYDROMORPHONE HYDROCHLORIDE 2 MG/ML
0.2 INJECTION, SOLUTION INTRAMUSCULAR; INTRAVENOUS; SUBCUTANEOUS EVERY 5 MIN PRN
Status: DISCONTINUED | OUTPATIENT
Start: 2023-11-10 | End: 2023-11-14 | Stop reason: HOSPADM

## 2023-11-10 RX ORDER — PROPOFOL 10 MG/ML
VIAL (ML) INTRAVENOUS CONTINUOUS PRN
Status: DISCONTINUED | OUTPATIENT
Start: 2023-11-10 | End: 2023-11-10

## 2023-11-10 RX ORDER — METOPROLOL TARTRATE 25 MG/1
12.5 TABLET ORAL 2 TIMES DAILY
Status: DISCONTINUED | OUTPATIENT
Start: 2023-11-10 | End: 2023-11-14 | Stop reason: HOSPADM

## 2023-11-10 RX ADMIN — MEROPENEM 1 G: 1 INJECTION, POWDER, FOR SOLUTION INTRAVENOUS at 09:11

## 2023-11-10 RX ADMIN — FERROUS SULFATE TAB 325 MG (65 MG ELEMENTAL FE) 1 EACH: 325 (65 FE) TAB at 09:11

## 2023-11-10 RX ADMIN — PROPOFOL 100 MCG/KG/MIN: 10 INJECTION, EMULSION INTRAVENOUS at 11:11

## 2023-11-10 RX ADMIN — MEROPENEM 1 G: 1 INJECTION, POWDER, FOR SOLUTION INTRAVENOUS at 05:11

## 2023-11-10 RX ADMIN — TRAZODONE HYDROCHLORIDE 150 MG: 100 TABLET ORAL at 09:11

## 2023-11-10 RX ADMIN — METOPROLOL TARTRATE 12.5 MG: 25 TABLET, FILM COATED ORAL at 09:11

## 2023-11-10 RX ADMIN — AMLODIPINE BESYLATE 5 MG: 5 TABLET ORAL at 05:11

## 2023-11-10 RX ADMIN — LIDOCAINE HYDROCHLORIDE 50 MG: 20 INJECTION, SOLUTION INTRAVENOUS at 11:11

## 2023-11-10 RX ADMIN — ACETAMINOPHEN 1000 MG: 10 INJECTION, SOLUTION INTRAVENOUS at 12:11

## 2023-11-10 RX ADMIN — INSULIN DETEMIR 10 UNITS: 100 INJECTION, SOLUTION SUBCUTANEOUS at 09:11

## 2023-11-10 RX ADMIN — PANTOPRAZOLE SODIUM 40 MG: 40 INJECTION, POWDER, LYOPHILIZED, FOR SOLUTION INTRAVENOUS at 09:11

## 2023-11-10 RX ADMIN — FUROSEMIDE 20 MG: 20 TABLET ORAL at 09:11

## 2023-11-10 RX ADMIN — VANCOMYCIN HYDROCHLORIDE 1000 MG: 1 INJECTION, POWDER, LYOPHILIZED, FOR SOLUTION INTRAVENOUS at 04:11

## 2023-11-10 RX ADMIN — ATORVASTATIN CALCIUM 40 MG: 40 TABLET, FILM COATED ORAL at 09:11

## 2023-11-10 RX ADMIN — VANCOMYCIN HYDROCHLORIDE 1000 MG: 1 INJECTION, POWDER, LYOPHILIZED, FOR SOLUTION INTRAVENOUS at 02:11

## 2023-11-10 RX ADMIN — INSULIN ASPART 2 UNITS: 100 INJECTION, SOLUTION INTRAVENOUS; SUBCUTANEOUS at 05:11

## 2023-11-10 RX ADMIN — MEROPENEM 1 G: 1 INJECTION, POWDER, FOR SOLUTION INTRAVENOUS at 02:11

## 2023-11-10 RX ADMIN — FLUOXETINE 40 MG: 20 CAPSULE ORAL at 05:11

## 2023-11-10 RX ADMIN — SODIUM CHLORIDE: 0.9 INJECTION, SOLUTION INTRAVENOUS at 11:11

## 2023-11-10 RX ADMIN — PHENYLEPHRINE HYDROCHLORIDE 100 MCG: 10 INJECTION INTRAVENOUS at 12:11

## 2023-11-10 RX ADMIN — INSULIN ASPART 1 UNITS: 100 INJECTION, SOLUTION INTRAVENOUS; SUBCUTANEOUS at 09:11

## 2023-11-10 RX ADMIN — SODIUM CHLORIDE: 9 INJECTION, SOLUTION INTRAVENOUS at 02:11

## 2023-11-10 NOTE — ASSESSMENT & PLAN NOTE
Podiatry consult:  MRI confirms osteomyelitis     s/p partial 1st ray amputation of right great toe. Clean margins taken, sent for Culture and Pathology plan for 2-6 weeks of antibiotics pending results upon d/c, antibiotic management per Infectious Disease    1. Keep dressing dry and intact, if strike-through noted, please reinforce dressing with kerlix and ACE  2. Patient non-weightbearing to surgical extremity for next two days  3. Elevate surgical foot when at rest, Z-flex boots to bilateral lower extremity

## 2023-11-10 NOTE — NURSING
Assumed care of patient from SHEELA Turk, patient is AAOX2, d/o to time and place, wife at bedside, vitals WNL, room air, diaper in place, foam on right great toe (amputation sx in the AM), and foam on top of left foot, NPO at midnight for AM sx, tele-sitter at bedside, receiving IV antibiotics, also on contact precautions for ESBL, MDRO and MRSA, all safety precautions are in place, call bell and tray table are within reach of the patient and no additional questions from the patient at this time.

## 2023-11-10 NOTE — PLAN OF CARE
Problem: Adult Inpatient Plan of Care  Goal: Plan of Care Review  Outcome: Ongoing, Progressing  Goal: Patient-Specific Goal (Individualized)  Outcome: Ongoing, Progressing  Goal: Absence of Hospital-Acquired Illness or Injury  Outcome: Ongoing, Progressing  Goal: Optimal Comfort and Wellbeing  Outcome: Ongoing, Progressing  Goal: Readiness for Transition of Care  Outcome: Ongoing, Progressing     Problem: Diabetes Comorbidity  Goal: Blood Glucose Level Within Targeted Range  Outcome: Ongoing, Progressing     Problem: Impaired Wound Healing  Goal: Optimal Wound Healing  Outcome: Ongoing, Progressing     Problem: Fall Injury Risk  Goal: Absence of Fall and Fall-Related Injury  Outcome: Ongoing, Progressing     Problem: Infection  Goal: Absence of Infection Signs and Symptoms  Outcome: Ongoing, Progressing     Problem: Hypertension Comorbidity  Goal: Blood Pressure in Desired Range  Outcome: Ongoing, Progressing     Problem: Skin or Soft Tissue Infection  Goal: Absence of Infection Signs and Symptoms  Outcome: Ongoing, Progressing     Problem: Skin Injury Risk Increased  Goal: Skin Health and Integrity  Outcome: Ongoing, Progressing

## 2023-11-10 NOTE — SUBJECTIVE & OBJECTIVE
Interval History: awaiting for surgery this morning     Review of Systems   All other systems reviewed and are negative.    Objective:     Vital Signs (Most Recent):  Temp: 97.5 °F (36.4 °C) (11/10/23 1429)  Pulse: 70 (11/10/23 1429)  Resp: 18 (11/10/23 1429)  BP: (!) 102/59 (11/10/23 1429)  SpO2: 95 % (11/10/23 1429) Vital Signs (24h Range):  Temp:  [97.5 °F (36.4 °C)-99 °F (37.2 °C)] 97.5 °F (36.4 °C)  Pulse:  [70-91] 70  Resp:  [16-20] 18  SpO2:  [90 %-95 %] 95 %  BP: ()/(54-64) 102/59     Weight: 78.5 kg (173 lb 1 oz)  Body mass index is 22.83 kg/m².    Intake/Output Summary (Last 24 hours) at 11/10/2023 1617  Last data filed at 11/10/2023 1306  Gross per 24 hour   Intake 980 ml   Output 0 ml   Net 980 ml           Physical Exam  Vitals and nursing note reviewed.   Constitutional:       General: He is not in acute distress.     Appearance: He is well-developed.   HENT:      Head: Normocephalic and atraumatic.      Nose: Nose normal.   Eyes:      Conjunctiva/sclera: Conjunctivae normal.   Cardiovascular:      Rate and Rhythm: Normal rate and regular rhythm.      Heart sounds: Normal heart sounds. No murmur heard.  Pulmonary:      Effort: Pulmonary effort is normal.      Breath sounds: Normal breath sounds. No wheezing.   Abdominal:      General: Bowel sounds are normal.      Palpations: Abdomen is soft. There is no mass.      Tenderness: There is no abdominal tenderness. There is no guarding or rebound.   Musculoskeletal:         General: Normal range of motion.      Cervical back: Normal range of motion and neck supple.      Comments: right hallux ulcer with bone exposure and purulent drainage noted.    Skin:     General: Skin is warm and dry.      Findings: No rash.   Neurological:      Mental Status: He is alert and oriented to person, place, and time.   Psychiatric:         Behavior: Behavior normal.             Significant Labs: All pertinent labs within the past 24 hours have been reviewed.  A1C:    Recent Labs   Lab 10/23/23  1426   HGBA1C 9.7*       BMP:   Recent Labs   Lab 11/10/23  0443   *   *   K 3.8   CL 98   CO2 23   BUN 16   CREATININE 0.9   CALCIUM 8.8       CBC:   Recent Labs   Lab 11/09/23  0455 11/10/23  0443   WBC 6.60 6.59   HGB 10.7* 10.5*   HCT 32.0* 31.1*    280       CMP:   Recent Labs   Lab 11/09/23  0455 11/10/23  0443   * 131*   K 3.8 3.8    98   CO2 18* 23   * 207*   BUN 16 16   CREATININE 0.9 0.9   CALCIUM 8.9 8.8   ANIONGAP 15 10         Significant Imaging: I have reviewed all pertinent imaging results/findings within the past 24 hours.  I have reviewed and interpreted all pertinent imaging results/findings within the past 24 hours.

## 2023-11-10 NOTE — PLAN OF CARE
Plan for right foot partial 1st ray amputation today with all procedures deemed medically necessary. Case discussed with patient and his wife who are in agreement with the plan.  Long discussion with patient regarding the procedure in detail. Patient understands all risks, potential complications, and alternatives, including, but not limited to those listed on the consent form. Risks include but are not limited to recurrence, infection, delayed wound healing, wound dehiscence, scar, poor cosmesis, neuropathic changes/nerve pain/pain, syndromes (RSD), numbness, infection, bleeding, hematoma, gangrenous changes, further surgery, loss of function, healing in a poor position, new or different ulceration or pre ulcerative callus, difficulty walking, gait changes, limb loss, blood clots of leg, lung, heart, brain, and death. All questions were answered. No guarantees given or implied as to outcome. Informed verbal and written consent was obtained. Consent forms read, signed, witnessed. Patient for surgery

## 2023-11-10 NOTE — BRIEF OP NOTE
[facility]  [facility]rief [facility]    Surgery Date: 11/10/2023     Surgeon(s) and Role:     * Diya Solitario, DPM - Primary    Assisting Surgeon: None    Pre-op Diagnosis:  Diabetic foot infection [E11.628, L08.9]    Post-op Diagnosis:  Post-Op Diagnosis Codes:     * Diabetic foot infection [E11.628, L08.9]    Procedure(s) (LRB):  AMPUTATION, TOE (Right)    Anesthesia: Local MAC    Operative Findings: s/p partial 1st ray amputation of right great toe. Clean margins taken, sent for Culture and Pathology plan for 2-6 weeks of antibiotics pending results upon d/c, antibiotic management per Infectious Disease    Estimated Blood Loss: * No values recorded between 11/10/2023 12:09 PM and 11/10/2023 12:52 PM *         Specimens:   Specimen (24h ago, onward)       Start     Ordered    11/10/23 1226  Specimen to Pathology, Surgery General Surgery  Once        Comments: Pre-op Diagnosis: Diabetic foot infection [E11.628, L08.9]Procedure(s):AMPUTATION, TOE Number of specimens: 2Name of specimens: 1 right great toe, right first metatarsal head - perm2 - right first metatarsal clean margin - perm     References:    Click here for ordering Quick Tip   Question Answer Comment   Procedure Type: General Surgery    Which provider would you like to cc? DIYA SOLITARIO    Release to patient Immediate        11/10/23 1249    11/10/23 1219  Specimen to Pathology, Surgery General Surgery  Once,   Status:  Canceled        Comments: Pre-op Diagnosis: Diabetic foot infection [E11.628, L08.9]Procedure(s):AMPUTATION, TOE Number of specimens: 1Name of specimens: 1 - right great toe - perm     References:    Click here for ordering Quick Tip   Question Answer Comment   Procedure Type: General Surgery    Which provider would you like to cc? DIYA SOLITAROI    Release to patient Immediate        11/10/23 1219                  The patient tolerated the procedure and anesthesia well. Patient was transferred to the recovery room with  vital signs stable and vascular status intact to the distal foot. This is part of a staged procedure, wounds will likely require serial debridements in the future.     Following a period of post op monitoring, the patient will be transferred to room on the following written and oral post op instructions:     1. Keep dressing dry and intact, if strike-through noted, please reinforce dressing with kerlix and ACE  2. Patient non-weightbearing to surgical extremity for next two days  3. Elevate surgical foot when at rest, Z-flex boots to bilateral lower extremity     Contact podiatry for all post op follow up care and if any problems arise.

## 2023-11-10 NOTE — PLAN OF CARE
Patient is oriented to self and disoriented to time, place and situation. Reorientation measures provided. IV abx continued. Patient came back from surgery. Diabetic diet provided. Triad cream applied to sacrum and inner thighs. Glucose monitoring continued. Z-flex boots on. SCDs on the non-operated left leg. Dressing clean dry and intact.Turning assistance provided. Incontinence pad changed. Call light within reach. Urinal within reach.

## 2023-11-10 NOTE — PROGRESS NOTES
Progress Note  [facility] Infectious Disease    ASSESSMENT:     MRSA Bacteremia + Osteomyelitis   Julien Meléndez is a 83 y.o. male with PMHx of diabetes, anxiety/depression, afib on anticoagulation, HF, who presented with cellulitis of right foot, found to have Osteomyelitis confirmed with MRI and MRSA bacteremia. S/p partial 1st ray amputation of R great toe, cultures and path pending.     RECOMMENDATIONS:     - Wcx 11/7 +MRSA - sensitive to vanc   - Bcx 11/7 +MRSA - sensitive to vanc    - repeat Bcx x2   - Recommend TTE in setting of MRSA bacteremia to evaluate for possible vegetations   - s/p partial 1st ray amputation of R great toe with Podiatry, cultures and path pending   - Continue Vanc  - Continue Meropenem pending intra- op culture results, will likely discontinue tomorrow      Thanks for this consult! Please call if you have any questions.  [phone] pager    Mounika Curiel, DO  [facility] IM/Peds PGY-1      INTERVAL HISTORY:     Patient NPO over night for OR today with Podiatry. Now s/p procedure, resting comfortably. Family at bedside states that patient has been nauseous prior to admission but denies any other concerns at this time.     Medications reviewed; current antibiotics:  Vancomycin and Meropenem      OBJECTIVE:     Vital Signs (Most Recent)  Temp: 97.5 °F (36.4 °C) (11/10/23 1429)  Pulse: 70 (11/10/23 1429)  Resp: 18 (11/10/23 1429)  BP: (!) 102/59 (11/10/23 1429)  SpO2: 95 % (11/10/23 1429)    Temperature Range Min/Max (Last 24H):  Temp:  [97.5 °F (36.4 °C)-99 °F (37.2 °C)]     Physical Exam:  Physical Exam  Vitals reviewed.   Constitutional:       General: He is not in acute distress.     Appearance: He is not ill-appearing.      Comments: Resting on exam s/p procedure   HENT:      Head: Normocephalic.      Right Ear: External ear normal.      Left Ear: External ear normal.   Cardiovascular:      Rate and Rhythm: Normal rate and regular rhythm.      Heart sounds: Normal heart sounds.   Pulmonary:       Effort: Pulmonary effort is normal. No respiratory distress.      Breath sounds: Normal breath sounds. No wheezing.   Abdominal:      General: Abdomen is flat. Bowel sounds are normal. There is no distension.      Palpations: Abdomen is soft.      Tenderness: There is no abdominal tenderness.   Musculoskeletal:      Comments: Z-flex boots in place bilaterally    Skin:     General: Skin is warm.         Laboratory:  Recent Labs   Lab 11/07/23  2044 11/08/23  0245 11/09/23  0455 11/10/23  0443   WBC  --  9.42 6.60 6.59   HGB  --  11.9* 10.7* 10.5*   HCT  --  35.8* 32.0* 31.1*   PLT  --  241 245 280   * 133* 133* 131*   K 4.4 4.3 3.8 3.8   CL 95 97 100 98   CO2 15* 22* 18* 23   BUN 20 16 16 16   CREATININE 1.3 1.0 0.9 0.9   AST 10  --   --   --    ALT 15  --   --   --    ALKPHOS 96  --   --   --    BILITOT 0.8  --   --   --      Labs: Reviewed    Microbiology:  Wound culture and blood cultures +MRSA. Intra-op cultures and repeat blood cultures pending.     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 Hernandez        LM patent  LAD patent except for distal 75%  LCX patent  OM1   RCA 90%      PCI of RCA 2.75 x 18 mm MILES  Unsuccessful PCI of OM1         LVEDP 11 mmHg  3.5 x 18 mm       Left hearth cath 3/1/2010 -Dr. Hernandez   RCA PCI with 3.5 x 15 mm  BMS    Left heart cath 1/31/2010-Dr. Hernandez   LAD 3.0 x 12 and 3.5 x 24 mm  BMS         [facility] 11/12/2019      S/p LCX   PCI     Bilateral CFA access 8 fr       LM patent   Distal LAD 80% stenosis   Patent RCA   LCX  with R to L collaterals     LVEDP 8 mmHg       Crossed LCX  with antegrade Filder XT + LP [place]   PCI with 3.0 x 26 and 3.0 x 12 mm Resolute MILES post dilated with 3.5 NC           Diabetes mellitus type 2, noninsulin dependent [E11.9]  Yes    Mixed hyperlipidemia [E78.2]  Yes    Hypertension [I10]  Yes      Resolved Hospital Problems   No resolved problems to display.

## 2023-11-10 NOTE — PLAN OF CARE
met with patient and spouse at bedside. Patient scheduled for Podiatry procedure today. Follow-Up noted.  will request PCP follow-up closer to discharge. Patient was current with [facility] prior to admissions. Patient encouraged to call with any questions or concerns.  will continue to follow patient through transitions of care and assist with any discharge needs.     Patient Contacts    Name Relation Home Work Mobile   Kylee Meléndez Spouse [phone]     Future Appointments   Date Time Provider Department Center   11/14/2023  2:00 PM Diya Leung DPM [facility] WOUND [facility]   11/14/2023  2:00 PM [facility]FECTI[facility] [place]   2/8/2024  2:15 PM Kelvin Wayne MD [facility]      [facility] [phone] [facility] [address]      Next Steps: Follow up  Appointment:   Instructions: [address]        11/10/23 1113   Discharge Reassessment   Assessment Type Discharge Planning Reassessment   Did the patient's condition or plan change since previous assessment? No   Discharge Plan discussed with: Patient;Spouse/sig other   Discharge Plan A   (TBD)   Discharge Plan B Home with family;Home Health   DME Needed Upon Discharge  other (see comments)  (TBD)   Transition of Care Barriers None   Why the patient remains in the hospital Requires continued medical care   Post-Acute Status   Post-Acute Authorization Home [facility]   Home Health Status Referrals Sent   Discharge Delays None known at this time     Adriana Fernández RN    [phone]

## 2023-11-10 NOTE — ANESTHESIA POSTPROCEDURE EVALUATION
Anesthesia Post Evaluation    Patient: Julien Meléndez    Procedure(s) Performed: Procedure(s) (LRB):  AMPUTATION, TOE (Right)    Final Anesthesia Type: general      Patient location during evaluation: PACU  Patient participation: Yes- Able to Participate  Level of consciousness: awake and alert  Post-procedure vital signs: reviewed and stable  Pain management: adequate  Airway patency: patent    PONV status at discharge: No PONV  Anesthetic complications: no      Cardiovascular status: stable  Respiratory status: room air  Hydration status: euvolemic  Follow-up not needed.          Vitals Value Taken Time   /57 11/10/23 1410   Temp 36.5 °C (97.7 °F) 11/10/23 1320   Pulse 70 11/10/23 1412   Resp 16 11/10/23 1320   SpO2 93 % 11/10/23 1412   Vitals shown include unvalidated device data.      Event Time   Out of Recovery 11/10/2023 14:12:16         Pain/Rangel Score: Pain Rating Prior to Med Admin: 0 (11/10/2023  3:59 AM)  Pain Rating Post Med Admin: 0 (11/10/2023  3:59 AM)  Rangel Score: 10 (11/10/2023  1:20 PM)

## 2023-11-10 NOTE — PROGRESS NOTES
Pharmacokinetic Assessment Follow Up: IV Vancomycin    Vancomycin serum concentration assessment(s):    The trough level was drawn correctly and can be used to guide therapy at this time. The measurement is within the desired definitive target range of 15 to 20 mcg/mL.    Vancomycin Regimen Plan:    Continue regimen to Vancomycin 1000 mg IV every 12 hours with next serum trough concentration measured at 1300 prior to 4th dose on 11/12    Drug levels (last 3 results):  Recent Labs   Lab Result Units 11/09/23  1058 11/10/23  1513   Vancomycin, Random ug/mL 14.8  --    Vancomycin-Trough ug/mL  --  16.1       Pharmacy will continue to follow and monitor vancomycin.    Please contact pharmacy at extension [phone] for questions regarding this assessment.    Thank you for the consult,   Holly Barraza       Patient brief summary:  Julien Meléndez is a 83 y.o. male initiated on antimicrobial therapy with IV Vancomycin for treatment of bacteremia    The patient's current regimen is vancomycin 1000 mg IV q 12 hours    Drug Allergies:   Review of patient's allergies indicates:   Allergen Reactions    Nystatin      Other reaction(s): Unknown       Actual Body Weight:   78.05 kg    Renal Function:   Estimated Creatinine Clearance: 69.1 mL/min (based on SCr of 0.9 mg/dL).,     Dialysis Method (if applicable):  N/A    CBC (last 72 hours):  Recent Labs   Lab Result Units 11/07/23 2022 11/08/23 0245 11/09/23 0455 11/10/23  0443   WBC K/uL 12.71* 9.42 6.60 6.59   Hemoglobin g/dL 13.2* 11.9* 10.7* 10.5*   Hematocrit % 39.8* 35.8* 32.0* 31.1*   Platelets K/uL 294 241 245 280   Gran % % 78.6* 74.3* 73.9* 68.7   Lymph % % 10.1* 11.5* 13.8* 17.9*   Mono % % 10.9 13.8 11.4 12.3   Eosinophil % % 0.0 0.1 0.6 0.8   Basophil % % 0.4 0.3 0.3 0.3   Differential Method  Automated Automated Automated Automated       Metabolic Panel (last 72 hours):  Recent Labs   Lab Result Units 11/07/23 2044 11/08/23 0245 11/08/23  0410 11/09/23 0455  11/10/23  0443   Sodium mmol/L 132* 133*  --  133* 131*   Potassium mmol/L 4.4 4.3  --  3.8 3.8   Chloride mmol/L 95 97  --  100 98   CO2 mmol/L 15* 22*  --  18* 23   Glucose mg/dL 318* 237*  --  181* 207*   Glucose, UA   --   --  4+*  --   --    BUN mg/dL 20 16  --  16 16   Creatinine mg/dL 1.3 1.0  --  0.9 0.9   Albumin g/dL 3.1*  --   --   --   --    Total Bilirubin mg/dL 0.8  --   --   --   --    Alkaline Phosphatase U/L 96  --   --   --   --    AST U/L 10  --   --   --   --    ALT U/L 15  --   --   --   --        Vancomycin Administrations:  vancomycin given in the last 96 hours                     vancomycin (VANCOCIN) 1,000 mg in dextrose 5 % (D5W) 250 mL IVPB (Vial-Mate) (mg) 1,000 mg New Bag 11/10/23 0216     1,000 mg New Bag 11/09/23 1553    vancomycin 1,250 mg in dextrose 5 % (D5W) 250 mL IVPB (Vial-Mate) (mg) 1,250 mg New Bag 11/08/23 2329    vancomycin (VANCOCIN) 2,250 mg in dextrose 5 % (D5W) 500 mL IVPB ()  Restarted 11/08/23 0006     2,250 mg New Bag 11/07/23 2214                    Microbiologic Results:  Microbiology Results (last 7 days)       Procedure Component Value Units Date/Time    Blood culture [[id number]]  (Abnormal) Collected: 11/07/23 2021    Order Status: Completed Specimen: Blood from Peripheral, Antecubital, Right Updated: 11/10/23 1426     Blood Culture, Routine Gram stain earl bottle: Gram positive cocci in clusters resembling Staph      Results called to and read back by:Grisel Cole RN 11/08/2023  16:26      Gram stain aer bottle: Gram positive cocci in clusters resembling Staph      METHICILLIN RESISTANT STAPHYLOCOCCUS AUREUS  For susceptibility see order #[id number]  ID consult required at [facility] and [facility].  [facility].      Blood culture [[id number]]  (Abnormal)  (Susceptibility) Collected: 11/07/23 2021    Order Status: Completed Specimen: Blood from Peripheral, Antecubital, Right Updated:  11/10/23 1424     Blood Culture, Routine Gram stain earl bottle: Gram positive cocci in clusters resembling Staph      Results called to and read back by:Grisel Cole RN 11/08/2023  16:27      Gram stain aer bottle: Gram positive cocci in clusters resembling Staph      METHICILLIN RESISTANT STAPHYLOCOCCUS AUREUS  ID consult required at [facility] and [facility] locations.      AFB Culture & Smear [[id number]] Collected: 11/10/23 1249    Order Status: Sent Specimen: Incision site from Foot, Right Updated: 11/10/23 1358    Aerobic culture [[id number]] Collected: 11/10/23 1249    Order Status: Sent Specimen: Incision site from Foot, Right Updated: 11/10/23 1358    Aerobic culture [[id number]] Collected: 11/10/23 1249    Order Status: Sent Specimen: Incision site from Foot, Right Updated: 11/10/23 1357    Fungus culture [[id number]] Collected: 11/10/23 1249    Order Status: Sent Specimen: Incision site from Foot, Right Updated: 11/10/23 1357    Culture, Anaerobe [[id number]] Collected: 11/10/23 1249    Order Status: Sent Specimen: Incision site from Foot, Right Updated: 11/10/23 1356    Aerobic culture (Specify Source) **CANNOT BE ORDERED AS STAT** [[id number]]  (Abnormal)  (Susceptibility) Collected: 11/07/23 2208    Order Status: Completed Specimen: Wound from Toe, Right Foot Updated: 11/10/23 1118     Aerobic Bacterial Culture METHICILLIN RESISTANT STAPHYLOCOCCUS AUREUS  Many  Skin amy also present      Urine culture [[id number]]  (Abnormal) Collected: 11/08/23 0410    Order Status: Completed Specimen: Urine Updated: 11/09/23 1206     Urine Culture, Routine DEREJE ALBICANS  50,000 - 99,999 cfu/ml  Treatment of asymptomatic candiduria is not recommended (except for   specific populations). Candida isolated in the urine typically   represents colonization. If an indwelling urinary catheter is present  it should be removed or replaced.      Narrative:      Specimen Source->Urine    Rapid Organism ID by PCR  (from Blood culture) [[id number]]  (Abnormal) Collected: 11/07/23 2021    Order Status: Completed Updated: 11/08/23 1804     Enterococcus faecalis Not Detected     Enterococcus faecium Not Detected     Listeria monocytogenes Not Detected     Staphylococcus spp. See species for ID     Staphylococcus aureus Detected     Staphylococcus epidermidis Not Detected     Staphylococcus lugdunensis Not Detected     Streptococcus species Not Detected     Streptococcus agalactiae Not Detected     Streptococcus pneumoniae Not Detected     Streptococcus pyogenes Not Detected     Acinetobacter calcoaceticus/baumannii complex Not Detected     Bacteroides fragilis Not Detected     Enterobacterales Not Detected     Enterobacter cloacae complex Not Detected     Escherichia coli Not Detected     Klebsiella aerogenes Not Detected     Klebsiella oxytoca Not Detected     Klebsiella pneumoniae group Not Detected     Proteus Not Detected     Salmonella sp Not Detected     Serratia marcescens Not Detected     Haemophilus influenzae Not Detected     Neisseria meningtidis Not Detected     Pseudomonas aeruginosa Not Detected     Stenotrophomonas maltophilia Not Detected     Candida albicans Not Detected     Candida auris Not Detected     Candida glabrata Not Detected     Candida krusei Not Detected     Candida parapsilosis Not Detected     Candida tropicalis Not Detected     Cryptococcus neoformans/gattii Not Detected     CTX-M (ESBL ) Test Not Applicable     IMP (Carbapenem resistant) Test Not Applicable     KPC resistance gene (Carbapenem resistant) Test Not Applicable     mcr-1  Test Not Applicable     mec A/C  Test Not Applicable     mec A/C and MREJ (MRSA) gene Detected     NDM (Carbapenem resistant) Test Not Applicable     OXA-48-like (Carbapenem resistant) Test Not Applicable     van A/B (VRE gene) Test Not Applicable     VIM (Carbapenem resistant) Test Not Applicable

## 2023-11-10 NOTE — NURSING
Patient chews all his tablet meds every single time and he has scheduled medications that cannot be crushed or chewed like ranolazine, protonix, isosorbide and metoprolol. Convinced patient to swallow the whole pill but he is unable to do so. MD informed. No new orders placed.

## 2023-11-10 NOTE — PROGRESS NOTES
[facility] Medicine  Progress Note    Patient Name: Julien Meléndez  MRN: [id number]  Patient Class: IP- Inpatient   Admission Date: 11/7/2023  Length of Stay: 1 days  Attending Physician: Tarsha Styles MD  Primary Care Provider: Kelvin Wayne MD        Subjective:     Principal Problem:Cellulitis of foot        HPI:  Julien Meléndez is a that is why 83-year-old male with past medical history of diabetes, anxiety/depression, atrial fibrillation on anticoagulation, heart failure who presents with foot wound.    Patient reports a recent biopsy of his right great toe by Podiatry over the past week.  Patient reports worsening drainage and odor from his right foot biopsy site.  He states he has been taking antibiotics for the infection, with minimal relief.  He denies any fevers , chills , nausea, although reports episodes of nonbilious nonbloody emesis shortly after going to the ED after discussing the plan with his wound care nurse.      Triage vitals were significant for slightly elevated blood pressures.  CBC was significant for leukocytosis, normocytic anemia.  CMP was significant for hyponatremia, hyperglycemia, and anion gap of 22.  CRP was elevated at 228.9.  Lactic acid was 2.9.    Blood cultures were collected.    X-rays of the right foot showed no significant change of ulceration and destructive changes of the distal metatarsal head of the 1st toe of the right foot.    Pt given fluids and antibiotics.    Patient admitted for cellulitis of right toe/right foot wound,  with podiatry consult in the a.m.      Overview/Hospital Course:  11/8: wound cx growing out gm positive. Pt on vanc and meropenem. Patient with clinical acute osteomyelitis of right hallux ulcer with bone exposure and purulent drainage noted. Xray, MRI ordered. Surgery plans for Friday by podiatry  11/9: MRI confirms osteo. Pt voices no complaints.NPO after midnight for tentative surgery   11/10:Pt taken to OR for a  partial 1st ray amputation of right great toe. Clean margins taken, sent for Culture and Pathology plan for 2-6 weeks of antibiotics pending results upon d/c, antibiotic management per Infectious Disease      Interval History: awaiting for surgery this morning     Review of Systems   All other systems reviewed and are negative.    Objective:     Vital Signs (Most Recent):  Temp: 97.5 °F (36.4 °C) (11/10/23 1429)  Pulse: 70 (11/10/23 1429)  Resp: 18 (11/10/23 1429)  BP: (!) 102/59 (11/10/23 1429)  SpO2: 95 % (11/10/23 1429) Vital Signs (24h Range):  Temp:  [97.5 °F (36.4 °C)-99 °F (37.2 °C)] 97.5 °F (36.4 °C)  Pulse:  [70-91] 70  Resp:  [16-20] 18  SpO2:  [90 %-95 %] 95 %  BP: ()/(54-64) 102/59     Weight: 78.5 kg (173 lb 1 oz)  Body mass index is 22.83 kg/m².    Intake/Output Summary (Last 24 hours) at 11/10/2023 1617  Last data filed at 11/10/2023 1306  Gross per 24 hour   Intake 980 ml   Output 0 ml   Net 980 ml           Physical Exam  Vitals and nursing note reviewed.   Constitutional:       General: He is not in acute distress.     Appearance: He is well-developed.   HENT:      Head: Normocephalic and atraumatic.      Nose: Nose normal.   Eyes:      Conjunctiva/sclera: Conjunctivae normal.   Cardiovascular:      Rate and Rhythm: Normal rate and regular rhythm.      Heart sounds: Normal heart sounds. No murmur heard.  Pulmonary:      Effort: Pulmonary effort is normal.      Breath sounds: Normal breath sounds. No wheezing.   Abdominal:      General: Bowel sounds are normal.      Palpations: Abdomen is soft. There is no mass.      Tenderness: There is no abdominal tenderness. There is no guarding or rebound.   Musculoskeletal:         General: Normal range of motion.      Cervical back: Normal range of motion and neck supple.      Comments: right hallux ulcer with bone exposure and purulent drainage noted.    Skin:     General: Skin is warm and dry.      Findings: No rash.   Neurological:      Mental  Status: He is alert and oriented to person, place, and time.   Psychiatric:         Behavior: Behavior normal.             Significant Labs: All pertinent labs within the past 24 hours have been reviewed.  A1C:   Recent Labs   Lab 10/23/23  1426   HGBA1C 9.7*       BMP:   Recent Labs   Lab 11/10/23  0443   *   *   K 3.8   CL 98   CO2 23   BUN 16   CREATININE 0.9   CALCIUM 8.8       CBC:   Recent Labs   Lab 11/09/23  0455 11/10/23  0443   WBC 6.60 6.59   HGB 10.7* 10.5*   HCT 32.0* 31.1*    280       CMP:   Recent Labs   Lab 11/09/23  0455 11/10/23  0443   * 131*   K 3.8 3.8    98   CO2 18* 23   * 207*   BUN 16 16   CREATININE 0.9 0.9   CALCIUM 8.9 8.8   ANIONGAP 15 10         Significant Imaging: I have reviewed all pertinent imaging results/findings within the past 24 hours.  I have reviewed and interpreted all pertinent imaging results/findings within the past 24 hours.      Assessment/Plan:      * Cellulitis of foot  Patient presents with worsening right big toe wound with purulence and odor  Elevated CRP, WBC  Xray without signs of new or worsening bone loss or significant change of ulceration and destructive changes of the distal metatarsal head of the 1st toe of the right foot  Elevated lactate    Plan:  Continue IV vancomycin and Zosyn  Consulted podiatry        Acute osteomyelitis  Podiatry consult:  MRI confirms osteomyelitis     s/p partial 1st ray amputation of right great toe. Clean margins taken, sent for Culture and Pathology plan for 2-6 weeks of antibiotics pending results upon d/c, antibiotic management per Infectious Disease    1. Keep dressing dry and intact, if strike-through noted, please reinforce dressing with kerlix and ACE  2. Patient non-weightbearing to surgical extremity for next two days  3. Elevate surgical foot when at rest, Z-flex boots to bilateral lower extremity        Hyponatremia  Patient has hyponatremia which is controlled,We will aim to  correct the sodium by 4-6mEq in 24 hours. We will monitor sodium Daily. The hyponatremia is due to Dehydration/hypovolemia. We will obtain the following studies:  Hold off on studies at this time. We will treat the hyponatremia with IV fluids as follows:  1 L bolus. The patient's sodium results have been reviewed and are listed below.  Recent Labs   Lab 11/07/23 2044   *       Anemia  Patient's anemia is currently controlled. Has not received any PRBCs to date. Etiology likely d/t chronic disease due to Osteomyelitis,  Current CBC reviewed-   Lab Results   Component Value Date    HGB 13.2 (L) 11/07/2023    HCT 39.8 (L) 11/07/2023     Monitor serial CBC and transfuse if patient becomes hemodynamically unstable, symptomatic or H/H drops below 7/21.    Depressive disorder  Patient has persistent depression which is moderate and is currently controlled. Will Continue anti-depressant medications. We will not consult psychiatry at this time. Patient does not display psychosis at this time. Continue to monitor closely and adjust plan of care as needed.        Atrial fibrillation  Patient with Paroxysmal (<7 days) atrial fibrillation which is controlled currently with Beta Blocker. Patient is currently in sinus rhythm.[id number] Score: 4. HASBLED Score: 3+. Anticoagulation indicated. Anticoagulation done with Apixaban.  We will hold if patient needs possible procedure.    Coronary artery disease due to calcified coronary lesion  Patient with known CAD s/p stent placement and CABG, which is controlled Will continue ASA and Statin and monitor for S/Sx of angina/ACS. Continue to monitor on telemetry.     Diabetes mellitus type 2, noninsulin dependent  Patient's FSGs are uncontrolled due to hyperglycemia on current medication regimen.  Last A1c reviewed- May  Lab Results   Component Value Date    HGBA1C 9.7 (H) 10/23/2023     Most recent fingerstick glucose reviewed-   Recent Labs   Lab 11/08/23  0126   POCTGLUCOSE 280*      Current correctional scale  Medium  Maintain anti-hyperglycemic dose as follows-   Antihyperglycemics (From admission, onward)    Start     Stop Route Frequency Ordered    11/07/23 2315  insulin detemir U-100 (Levemir) pen 10 Units         -- SubQ Nightly 11/07/23 2215 11/07/23 2306  insulin aspart U-100 pen 0-5 Units         -- SubQ Before meals & nightly PRN 11/07/23 2208        Hold Oral hypoglycemics while patient is in the hospital.    Hypertension  Chronic, controlled. Latest blood pressure and vitals reviewed-     Temp:  [98.3 °F (36.8 °C)-99.2 °F (37.3 °C)]   Pulse:  []   Resp:  [14-33]   BP: (101-155)/(52-88)   SpO2:  [95 %-100 %] .   Home meds for hypertension were reviewed and noted below.   Hypertension Medications             amLODIPine (NORVASC) 5 MG tablet     furosemide (LASIX) 20 MG tablet Take 1 tablet (20 mg total) by mouth once daily.    isosorbide mononitrate (IMDUR) 30 MG 24 hr tablet TAKE 1 TABLET EVERY DAY    metoprolol succinate (TOPROL-XL) 25 MG 24 hr tablet TAKE 1 TABLET EVERY DAY    nitroGLYCERIN (NITROSTAT) 0.4 MG SL tablet Place 0.4 mg under the tongue every 5 (five) minutes as needed for Chest pain.          While in the hospital, will manage blood pressure as follows; Continue home antihypertensive regimen    Will utilize p.r.n. blood pressure medication only if patient's blood pressure greater than 180/110 and he develops symptoms such as worsening chest pain or shortness of breath.    Mixed hyperlipidemia  May continue with home statin        VTE Risk Mitigation (From admission, onward)         Ordered     IP VTE HIGH RISK PATIENT  Once         11/07/23 2208     Place sequential compression device  Until discontinued         11/07/23 2208                Discharge Planning   AURELIA:      Code Status: Full Code   Is the patient medically ready for discharge?:     Reason for patient still in hospital (select all that apply): Treatment  Discharge Plan A:  (TBD)   Discharge  Delays: None known at this time              Tarsha Styles MD  Department of Hospital Medicine   [facility]

## 2023-11-10 NOTE — TRANSFER OF CARE
"Anesthesia Transfer of Care Note    Patient: Julien Meléndez    Procedure(s) Performed: Procedure(s) (LRB):  AMPUTATION, TOE (Right)    Patient location: PACU    Anesthesia Type: general    Transport from OR: Transported from OR on 6-10 L/min O2 by face mask with adequate spontaneous ventilation    Post pain: adequate analgesia    Post assessment: no apparent anesthetic complications and tolerated procedure well    Post vital signs: stable    Level of consciousness: awake    Nausea/Vomiting: no nausea/vomiting    Complications: none    Transfer of care protocol was followed      Last vitals: Visit Vitals  BP (!) 114/59   Pulse 74   Temp 36.5 °C (97.7 °F) (Temporal)   Resp 16   Ht 6' 1" (1.854 m)   Wt 78.5 kg (173 lb 1 oz)   SpO2 (!) 93%   BMI 22.83 kg/m²     "

## 2023-11-10 NOTE — ANESTHESIA PREPROCEDURE EVALUATION
11/10/2023  Julien Meléndez is a 83 y.o., male.      Pre-op Assessment    I have reviewed the Patient Summary Reports.    I have reviewed the NPO Status.   I have reviewed the Medications.     Review of Systems  Anesthesia Hx:  No problems with previous Anesthesia               Denies Personal Hx of Anesthesia complications.                    Hematology/Oncology:       -- Anemia:                                  Cardiovascular:  Exercise tolerance: poor   Hypertension  Past MI CAD    Dysrhythmias   CHF   PVD hyperlipidemia   ECG has been reviewed. Last stent placed in July of this year. Denies cardiac symptoms since that time                          Pulmonary:        Sleep Apnea                Renal/:  Chronic Renal Disease, CKD                Hepatic/GI:    Hiatal Hernia,              Musculoskeletal:  Arthritis               Neurological:    Neuromuscular Disease,                                   Endocrine:  Diabetes               Physical Exam  General: Well nourished and Cooperative    Airway:  Mallampati: II   Mouth Opening: Normal  TM Distance: Normal  Tongue: Normal  Neck ROM: Normal ROM    Dental:  Intact        Anesthesia Plan  Type of Anesthesia, risks & benefits discussed:    Anesthesia Type: Gen Natural Airway  Intra-op Monitoring Plan: Standard ASA Monitors  Post Op Pain Control Plan: multimodal analgesia and IV/PO Opioids PRN  Induction:  IV  Informed Consent: Informed consent signed with the Patient and all parties understand the risks and agree with anesthesia plan.  All questions answered.   ASA Score: 3  Day of Surgery Review of History & Physical: H&P Update referred to the surgeon/provider.    Ready For Surgery From Anesthesia Perspective.     .

## 2023-11-11 PROBLEM — R78.81 BACTEREMIA: Status: ACTIVE | Noted: 2023-11-11

## 2023-11-11 LAB
POCT GLUCOSE: 271 MG/DL (ref 70–110)
POCT GLUCOSE: 291 MG/DL (ref 70–110)
POCT GLUCOSE: 309 MG/DL (ref 70–110)

## 2023-11-11 PROCEDURE — 94761 N-INVAS EAR/PLS OXIMETRY MLT: CPT

## 2023-11-11 PROCEDURE — 25000003 PHARM REV CODE 250: Performed by: NURSE PRACTITIONER

## 2023-11-11 PROCEDURE — 27000207 HC ISOLATION

## 2023-11-11 PROCEDURE — C1751 CATH, INF, PER/CENT/MIDLINE: HCPCS

## 2023-11-11 PROCEDURE — 36569 INSJ PICC 5 YR+ W/O IMAGING: CPT

## 2023-11-11 PROCEDURE — C9113 INJ PANTOPRAZOLE SODIUM, VIA: HCPCS | Performed by: FAMILY MEDICINE

## 2023-11-11 PROCEDURE — 63600175 PHARM REV CODE 636 W HCPCS: Performed by: FAMILY MEDICINE

## 2023-11-11 PROCEDURE — 11000001 HC ACUTE MED/SURG PRIVATE ROOM

## 2023-11-11 PROCEDURE — 25000003 PHARM REV CODE 250: Performed by: FAMILY MEDICINE

## 2023-11-11 RX ORDER — SODIUM CHLORIDE 0.9 % (FLUSH) 0.9 %
10 SYRINGE (ML) INJECTION
Status: DISCONTINUED | OUTPATIENT
Start: 2023-11-11 | End: 2023-11-14 | Stop reason: HOSPADM

## 2023-11-11 RX ORDER — SODIUM CHLORIDE 0.9 % (FLUSH) 0.9 %
10 SYRINGE (ML) INJECTION EVERY 6 HOURS
Status: DISCONTINUED | OUTPATIENT
Start: 2023-11-12 | End: 2023-11-14 | Stop reason: HOSPADM

## 2023-11-11 RX ADMIN — ISOSORBIDE MONONITRATE 30 MG: 30 TABLET, EXTENDED RELEASE ORAL at 09:11

## 2023-11-11 RX ADMIN — METOPROLOL TARTRATE 12.5 MG: 25 TABLET, FILM COATED ORAL at 08:11

## 2023-11-11 RX ADMIN — FERROUS SULFATE TAB 325 MG (65 MG ELEMENTAL FE) 1 EACH: 325 (65 FE) TAB at 09:11

## 2023-11-11 RX ADMIN — FLUOXETINE 40 MG: 20 CAPSULE ORAL at 09:11

## 2023-11-11 RX ADMIN — RANOLAZINE 1000 MG: 500 TABLET, FILM COATED, EXTENDED RELEASE ORAL at 08:11

## 2023-11-11 RX ADMIN — INSULIN ASPART 4 UNITS: 100 INJECTION, SOLUTION INTRAVENOUS; SUBCUTANEOUS at 05:11

## 2023-11-11 RX ADMIN — TRAZODONE HYDROCHLORIDE 150 MG: 100 TABLET ORAL at 08:11

## 2023-11-11 RX ADMIN — INSULIN ASPART 1 UNITS: 100 INJECTION, SOLUTION INTRAVENOUS; SUBCUTANEOUS at 08:11

## 2023-11-11 RX ADMIN — INSULIN DETEMIR 10 UNITS: 100 INJECTION, SOLUTION SUBCUTANEOUS at 08:11

## 2023-11-11 RX ADMIN — INSULIN ASPART 3 UNITS: 100 INJECTION, SOLUTION INTRAVENOUS; SUBCUTANEOUS at 06:11

## 2023-11-11 RX ADMIN — HYPROMELLOSE 2910 1 DROP: 5 SOLUTION/ DROPS OPHTHALMIC at 02:11

## 2023-11-11 RX ADMIN — HYDROCODONE BITARTRATE AND ACETAMINOPHEN 1 TABLET: 10; 325 TABLET ORAL at 09:11

## 2023-11-11 RX ADMIN — MUPIROCIN: 20 OINTMENT TOPICAL at 08:11

## 2023-11-11 RX ADMIN — PANTOPRAZOLE SODIUM 40 MG: 40 INJECTION, POWDER, LYOPHILIZED, FOR SOLUTION INTRAVENOUS at 08:11

## 2023-11-11 RX ADMIN — METOPROLOL TARTRATE 12.5 MG: 25 TABLET, FILM COATED ORAL at 09:11

## 2023-11-11 RX ADMIN — FUROSEMIDE 20 MG: 20 TABLET ORAL at 09:11

## 2023-11-11 RX ADMIN — VANCOMYCIN HYDROCHLORIDE 1000 MG: 1 INJECTION, POWDER, LYOPHILIZED, FOR SOLUTION INTRAVENOUS at 02:11

## 2023-11-11 RX ADMIN — INSULIN ASPART 3 UNITS: 100 INJECTION, SOLUTION INTRAVENOUS; SUBCUTANEOUS at 11:11

## 2023-11-11 RX ADMIN — ATORVASTATIN CALCIUM 40 MG: 40 TABLET, FILM COATED ORAL at 08:11

## 2023-11-11 RX ADMIN — VANCOMYCIN HYDROCHLORIDE 1000 MG: 1 INJECTION, POWDER, LYOPHILIZED, FOR SOLUTION INTRAVENOUS at 06:11

## 2023-11-11 RX ADMIN — AMLODIPINE BESYLATE 5 MG: 5 TABLET ORAL at 09:11

## 2023-11-11 RX ADMIN — ACETAMINOPHEN 650 MG: 325 TABLET ORAL at 08:11

## 2023-11-11 RX ADMIN — MUPIROCIN: 20 OINTMENT TOPICAL at 02:11

## 2023-11-11 RX ADMIN — MICONAZOLE NITRATE: 20 OINTMENT TOPICAL at 08:11

## 2023-11-11 RX ADMIN — PANTOPRAZOLE SODIUM 40 MG: 40 INJECTION, POWDER, LYOPHILIZED, FOR SOLUTION INTRAVENOUS at 09:11

## 2023-11-11 RX ADMIN — RANOLAZINE 1000 MG: 500 TABLET, FILM COATED, EXTENDED RELEASE ORAL at 09:11

## 2023-11-11 RX ADMIN — MUPIROCIN: 20 OINTMENT TOPICAL at 09:11

## 2023-11-11 RX ADMIN — MEROPENEM 1 G: 1 INJECTION, POWDER, FOR SOLUTION INTRAVENOUS at 05:11

## 2023-11-11 NOTE — SUBJECTIVE & OBJECTIVE
Subjective:     Interval History:  Patient is seen and evaluated bedside, states he is feeling fine and NAEON.  States negligible pain.  Minor strike through to dressings.     Follow-up For: Procedure(s) (LRB):  AMPUTATION, TOE (Right)    Post-Operative Day: 1 Day Post-Op    Scheduled Meds:   amLODIPine  5 mg Oral Daily    atorvastatin  40 mg Oral QHS    ferrous sulfate  1 tablet Oral Daily    FLUoxetine  40 mg Oral Daily    furosemide  20 mg Oral Daily    insulin detemir U-100 (Levemir)  10 Units Subcutaneous QHS    isosorbide mononitrate  30 mg Oral Daily    meropenem (MERREM) IVPB  1 g Intravenous Q8H    metoprolol tartrate  12.5 mg Oral BID    miconazole nitrate 2%   Topical (Top) BID    mupirocin   Nasal BID    pantoprazole  40 mg Intravenous BID    ranolazine  1,000 mg Oral BID    traZODone  150 mg Oral QHS    vancomycin (VANCOCIN) IV (PEDS and ADULTS)  1,000 mg Intravenous Q12H     Continuous Infusions:   sodium chloride 0.9% Stopped (11/08/23 0845)    sodium chloride 0.9% 75 mL/hr at 11/11/23 0732     PRN Meds:acetaminophen, artificial tears, dextrose 10%, dextrose 10%, glucagon (human recombinant), glucose, glucose, HYDROcodone-acetaminophen, HYDROcodone-acetaminophen, HYDROmorphone, influenza 65up-adj, insulin aspart U-100, melatonin, naloxone, ondansetron, ondansetron, oxyCODONE, sodium chloride 0.9%, Pharmacy to dose Vancomycin consult **AND** vancomycin - pharmacy to dose    Review of Systems   Constitutional:  Negative for activity change, chills, diaphoresis and fever.   HENT:  Negative for congestion and hearing loss.    Respiratory:  Negative for cough and shortness of breath.    Cardiovascular:  Negative for leg swelling.   Gastrointestinal:  Negative for nausea and vomiting.   Skin:  Positive for color change and wound. Negative for pallor and rash.   Neurological:  Positive for numbness. Negative for tremors, speech difficulty and weakness.   Psychiatric/Behavioral:  Negative for agitation and  confusion. The patient is not nervous/anxious.      Objective:     Vital Signs (Most Recent):  Temp: 98.2 °F (36.8 °C) (11/11/23 1131)  Pulse: 84 (11/11/23 1131)  Resp: 18 (11/11/23 1131)  BP: (!) 89/50 (11/11/23 1131)  SpO2: (!) 93 % (11/11/23 1131) Vital Signs (24h Range):  Temp:  [97.5 °F (36.4 °C)-99.6 °F (37.6 °C)] 98.2 °F (36.8 °C)  Pulse:  [67-96] 84  Resp:  [16-22] 18  SpO2:  [93 %-98 %] 93 %  BP: ()/(50-67) 89/50     Weight: 81.2 kg (179 lb 0.2 oz)  Body mass index is 23.62 kg/m².    Foot Exam    General  General Appearance: appears stated age and healthy   Orientation: alert and oriented to person, place, and time   Affect: appropriate       Right Foot/Ankle     Inspection and Palpation  Ecchymosis: none  Tenderness: none   Swelling: none   Skin Exam: drainage and erythema; no ulcer     Neurovascular  Dorsalis pedis: 1+  Posterior tibial: 1+  Saphenous nerve sensation: diminished  Tibial nerve sensation: diminished  Superficial peroneal nerve sensation: diminished  Deep peroneal nerve sensation: diminished  Sural nerve sensation: diminished    Comments  Surgical site along right medial forefoot with intact sutures, minor bleeding to note but otherwise minimal dehiscence to skin. Erythematous around surgical site.     Left Foot/Ankle      Inspection and Palpation  Ecchymosis: none  Tenderness: none   Swelling: none   Skin Exam: skin not intact     Neurovascular  Dorsalis pedis: 1+  Posterior tibial: 1+  Saphenous nerve sensation: diminished  Tibial nerve sensation: diminished  Superficial peroneal nerve sensation: diminished  Deep peroneal nerve sensation: diminished  Sural nerve sensation: diminished          Laboratory:  All pertinent labs reviewed within the last 24 hours.    Diagnostic Results:  I have reviewed all pertinent imaging results/findings within the past 24 hours.    Clinical Findings:

## 2023-11-11 NOTE — ASSESSMENT & PLAN NOTE
Blood cx upon admission +MRSA, sensitive to clinda and vanco  Cont vanc  Echo pending  Blood cx repeated on 11/10 NGTD    Will need 4-6 weeks of iv abx  HH vs snf

## 2023-11-11 NOTE — PROGRESS NOTES
[facility]  Podiatry  Progress Note    Patient Name: Julien Meléndez  MRN: [id number]  Admission Date: 11/7/2023  Hospital Length of Stay: 2 days  Attending Physician: Tarsha Styles MD  Primary Care Provider: Kelvin Wayne MD     Subjective:     Interval History:  Patient is seen and evaluated bedside, states he is feeling fine and NAEON.  States negligible pain.  Minor strike through to dressings.     Follow-up For: Procedure(s) (LRB):  AMPUTATION, TOE (Right)    Post-Operative Day: 1 Day Post-Op    Scheduled Meds:   amLODIPine  5 mg Oral Daily    atorvastatin  40 mg Oral QHS    ferrous sulfate  1 tablet Oral Daily    FLUoxetine  40 mg Oral Daily    furosemide  20 mg Oral Daily    insulin detemir U-100 (Levemir)  10 Units Subcutaneous QHS    isosorbide mononitrate  30 mg Oral Daily    meropenem (MERREM) IVPB  1 g Intravenous Q8H    metoprolol tartrate  12.5 mg Oral BID    miconazole nitrate 2%   Topical (Top) BID    mupirocin   Nasal BID    pantoprazole  40 mg Intravenous BID    ranolazine  1,000 mg Oral BID    traZODone  150 mg Oral QHS    vancomycin (VANCOCIN) IV (PEDS and ADULTS)  1,000 mg Intravenous Q12H     Continuous Infusions:   sodium chloride 0.9% Stopped (11/08/23 0845)    sodium chloride 0.9% 75 mL/hr at 11/11/23 0732     PRN Meds:acetaminophen, artificial tears, dextrose 10%, dextrose 10%, glucagon (human recombinant), glucose, glucose, HYDROcodone-acetaminophen, HYDROcodone-acetaminophen, HYDROmorphone, influenza 65up-adj, insulin aspart U-100, melatonin, naloxone, ondansetron, ondansetron, oxyCODONE, sodium chloride 0.9%, Pharmacy to dose Vancomycin consult **AND** vancomycin - pharmacy to dose    Review of Systems   Constitutional:  Negative for activity change, chills, diaphoresis and fever.   HENT:  Negative for congestion and hearing loss.    Respiratory:  Negative for cough and shortness of breath.    Cardiovascular:  Negative for leg swelling.   Gastrointestinal:  Negative for  nausea and vomiting.   Skin:  Positive for color change and wound. Negative for pallor and rash.   Neurological:  Positive for numbness. Negative for tremors, speech difficulty and weakness.   Psychiatric/Behavioral:  Negative for agitation and confusion. The patient is not nervous/anxious.      Objective:     Vital Signs (Most Recent):  Temp: 98.2 °F (36.8 °C) (11/11/23 1131)  Pulse: 84 (11/11/23 1131)  Resp: 18 (11/11/23 1131)  BP: (!) 89/50 (11/11/23 1131)  SpO2: (!) 93 % (11/11/23 1131) Vital Signs (24h Range):  Temp:  [97.5 °F (36.4 °C)-99.6 °F (37.6 °C)] 98.2 °F (36.8 °C)  Pulse:  [67-96] 84  Resp:  [16-22] 18  SpO2:  [93 %-98 %] 93 %  BP: ()/(50-67) 89/50     Weight: 81.2 kg (179 lb 0.2 oz)  Body mass index is 23.62 kg/m².    Foot Exam    General  General Appearance: appears stated age and healthy   Orientation: alert and oriented to person, place, and time   Affect: appropriate       Right Foot/Ankle     Inspection and Palpation  Ecchymosis: none  Tenderness: none   Swelling: none   Skin Exam: drainage and erythema; no ulcer     Neurovascular  Dorsalis pedis: 1+  Posterior tibial: 1+  Saphenous nerve sensation: diminished  Tibial nerve sensation: diminished  Superficial peroneal nerve sensation: diminished  Deep peroneal nerve sensation: diminished  Sural nerve sensation: diminished    Comments  Surgical site along right medial forefoot with intact sutures, minor bleeding to note but otherwise minimal dehiscence to skin. Erythematous around surgical site.     Left Foot/Ankle      Inspection and Palpation  Ecchymosis: none  Tenderness: none   Swelling: none   Skin Exam: skin not intact     Neurovascular  Dorsalis pedis: 1+  Posterior tibial: 1+  Saphenous nerve sensation: diminished  Tibial nerve sensation: diminished  Superficial peroneal nerve sensation: diminished  Deep peroneal nerve sensation: diminished  Sural nerve sensation: diminished          Laboratory:  All pertinent labs reviewed within the  last 24 hours.    Diagnostic Results:  I have reviewed all pertinent imaging results/findings within the past 24 hours.    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 TCOMS previously done    Atrial fibrillation  Managed per Hospital Medicine    Coronary artery disease due to calcified coronary lesion  Managed per Hospital Medicine    Hypertension  Managed per Hospital Medicine    Mixed hyperlipidemia  Managed per Hospital Medicine    Renal/  Hyponatremia  Managed per Hospital Medicine    Stage 3 chronic kidney disease  Managed per Hospital Medicine    ID  * Acute osteomyelitis  S/p partial 1st ray amputation of right great toe. Clean margins taken intraop, sent for Culture and Pathology plan for 2-6 weeks of antibiotics pending results upon d/c, antibiotic management per Infectious Disease      Tracing Intraoperative cultures taken 11/10/2023; aerobic suggesting MRSA, possible skin amy.   Patient was dressed today with Betadine soaked Xeroform, 4 x 4 gauze, gauze padding, ABD, cast padding, Ace wrap  Nursing dressing change as above, 3x weekly.   WBAT in Darco shoe. Z-flex boots while in bed, keep heels offloaded  Cardiology on board, pt with history of PAD  Will follow     Future discharge recs:  Patient to follow up in Podiatry Clinic outpatient, Podiatry will arrange.  SNF or HH to apply bandaging as described above  Abx plan per ID  Patient to only to transfer in short distances to affected foot with Darco shoe, partial weightbearing to heel  Patient to keep dressings clean dry and intact  Patient explained the importance of daily foot checks       Cellulitis of foot  Managed per Hospital Medicine, ID consult placed, appreciate recs. Tracing cultures, see plan for acute osteomyelitis.      Oncology  Anemia  Managed per Hospital Medicine    Endocrine  Diabetes mellitus type 2, noninsulin dependent  Managed per Hospital Medicine        Erick Tillman MD  Podiatry  [facility]

## 2023-11-11 NOTE — SUBJECTIVE & OBJECTIVE
Interval History: no issues voiced post-op except would like to be home.     Review of Systems   All other systems reviewed and are negative.    Objective:     Vital Signs (Most Recent):  Temp: 97.8 °F (36.6 °C) (11/11/23 1534)  Pulse: 73 (11/11/23 1534)  Resp: 18 (11/11/23 1534)  BP: (!) 103/59 (11/11/23 1534)  SpO2: 96 % (11/11/23 1534) Vital Signs (24h Range):  Temp:  [97.8 °F (36.6 °C)-99.6 °F (37.6 °C)] 97.8 °F (36.6 °C)  Pulse:  [67-96] 73  Resp:  [18-22] 18  SpO2:  [93 %-98 %] 96 %  BP: ()/(50-67) 103/59     Weight: 81.2 kg (179 lb)  Body mass index is 23.62 kg/m².    Intake/Output Summary (Last 24 hours) at 11/11/2023 1600  Last data filed at 11/11/2023 0732  Gross per 24 hour   Intake 2631.95 ml   Output --   Net 2631.95 ml           Physical Exam  Vitals and nursing note reviewed.   Constitutional:       General: He is not in acute distress.     Appearance: He is well-developed.   HENT:      Head: Normocephalic and atraumatic.      Nose: Nose normal.   Eyes:      Conjunctiva/sclera: Conjunctivae normal.   Cardiovascular:      Rate and Rhythm: Normal rate and regular rhythm.      Heart sounds: Normal heart sounds. No murmur heard.  Pulmonary:      Effort: Pulmonary effort is normal.      Breath sounds: Normal breath sounds. No wheezing.   Abdominal:      General: Bowel sounds are normal.      Palpations: Abdomen is soft. There is no mass.      Tenderness: There is no abdominal tenderness. There is no guarding or rebound.   Musculoskeletal:         General: Normal range of motion.      Cervical back: Normal range of motion and neck supple.      Comments: right hallux s/p ampuation with dressings   Skin:     General: Skin is warm and dry.      Findings: No rash.   Neurological:      Mental Status: He is alert and oriented to person, place, and time.   Psychiatric:         Behavior: Behavior normal.             Significant Labs: All pertinent labs within the past 24 hours have been reviewed.  A1C:    Recent Labs   Lab 10/23/23  1426   HGBA1C 9.7*       BMP:   Recent Labs   Lab 11/10/23  0443   *   *   K 3.8   CL 98   CO2 23   BUN 16   CREATININE 0.9   CALCIUM 8.8       CBC:   Recent Labs   Lab 11/10/23  0443   WBC 6.59   HGB 10.5*   HCT 31.1*          CMP:   Recent Labs   Lab 11/10/23  0443   *   K 3.8   CL 98   CO2 23   *   BUN 16   CREATININE 0.9   CALCIUM 8.8   ANIONGAP 10         Significant Imaging: I have reviewed all pertinent imaging results/findings within the past 24 hours.  I have reviewed and interpreted all pertinent imaging results/findings within the past 24 hours.

## 2023-11-11 NOTE — ASSESSMENT & PLAN NOTE
S/p partial 1st ray amputation of right great toe. Clean margins taken intraop, sent for Culture and Pathology plan for 2-6 weeks of antibiotics pending results upon d/c, antibiotic management per Infectious Disease      Tracing Intraoperative cultures taken 11/10/2023; aerobic suggesting MRSA, possible skin amy.   Patient was dressed today with Betadine soaked Xeroform, 4 x 4 gauze, gauze padding, ABD, cast padding, Ace wrap  Nursing dressing change as above, 3x weekly.   WBAT in Darco shoe. Z-flex boots while in bed, keep heels offloaded  Cardiology on board, pt with history of PAD  Will follow     Future discharge recs:  Patient to follow up in Podiatry Clinic outpatient, Podiatry will arrange.  SNF or HH to apply bandaging as described above  Abx plan per ID  Patient to only to transfer in short distances to affected foot with Darco shoe, partial weightbearing to heel  Patient to keep dressings clean dry and intact  Patient explained the importance of daily foot checks

## 2023-11-11 NOTE — ASSESSMENT & PLAN NOTE
Podiatry consult:  MRI confirms osteomyelitis     s/p partial 1st ray amputation of right great toe. Clean margins taken, sent for Culture and Pathology plan for 2-6 weeks of antibiotics pending results upon d/c, antibiotic management per Infectious Disease    1. Keep dressing dry and intact, if strike-through noted, please reinforce dressing with kerlix and ACE  2. Patient non-weightbearing to surgical extremity for next two days  3. Elevate surgical foot when at rest, Z-flex boots to bilateral lower extremity     ? Patient to only to transfer in short distances to affected foot with Darco shoe, partial weightbearing to heel  ? Patient to keep dressings clean dry and intact

## 2023-11-11 NOTE — PROGRESS NOTES
[facility] Medicine  Progress Note    Patient Name: Julien Meléndez  MRN: [id number]  Patient Class: IP- Inpatient   Admission Date: 11/7/2023  Length of Stay: 2 days  Attending Physician: Tarsha Styles MD  Primary Care Provider: Kelvin Wayne MD        Subjective:     Principal Problem:Acute osteomyelitis        HPI:  Julien Meléndez is a that is why 83-year-old male with past medical history of diabetes, anxiety/depression, atrial fibrillation on anticoagulation, heart failure who presents with foot wound.    Patient reports a recent biopsy of his right great toe by Podiatry over the past week.  Patient reports worsening drainage and odor from his right foot biopsy site.  He states he has been taking antibiotics for the infection, with minimal relief.  He denies any fevers , chills , nausea, although reports episodes of nonbilious nonbloody emesis shortly after going to the ED after discussing the plan with his wound care nurse.      Triage vitals were significant for slightly elevated blood pressures.  CBC was significant for leukocytosis, normocytic anemia.  CMP was significant for hyponatremia, hyperglycemia, and anion gap of 22.  CRP was elevated at 228.9.  Lactic acid was 2.9.    Blood cultures were collected.    X-rays of the right foot showed no significant change of ulceration and destructive changes of the distal metatarsal head of the 1st toe of the right foot.    Pt given fluids and antibiotics.    Patient admitted for cellulitis of right toe/right foot wound,  with podiatry consult in the a.m.      Overview/Hospital Course:  11/8: wound cx growing out gm positive. Pt on vanc and meropenem. Patient with clinical acute osteomyelitis of right hallux ulcer with bone exposure and purulent drainage noted. Xray, MRI ordered. Surgery plans for Friday by podiatry  11/9: MRI confirms osteo. Pt voices no complaints.NPO after midnight for tentative surgery   11/10:Pt taken to OR for a  partial 1st ray amputation of right great toe. Clean margins taken, sent for Culture and Pathology plan for 2-6 weeks of antibiotics pending results upon d/c, antibiotic management per Infectious Disease  11/11: Echo ordered. Repeat blood cx on 11/10 NGTD. Blood cx upon admission growing out MRSA, sensitive to clinda and vanco. D/c meropenem. ID recs 4-6 weeks of iV abx. CM notified of duratin and set up      Interval History: no issues voiced post-op except would like to be home.     Review of Systems   All other systems reviewed and are negative.    Objective:     Vital Signs (Most Recent):  Temp: 97.8 °F (36.6 °C) (11/11/23 1534)  Pulse: 73 (11/11/23 1534)  Resp: 18 (11/11/23 1534)  BP: (!) 103/59 (11/11/23 1534)  SpO2: 96 % (11/11/23 1534) Vital Signs (24h Range):  Temp:  [97.8 °F (36.6 °C)-99.6 °F (37.6 °C)] 97.8 °F (36.6 °C)  Pulse:  [67-96] 73  Resp:  [18-22] 18  SpO2:  [93 %-98 %] 96 %  BP: ()/(50-67) 103/59     Weight: 81.2 kg (179 lb)  Body mass index is 23.62 kg/m².    Intake/Output Summary (Last 24 hours) at 11/11/2023 1600  Last data filed at 11/11/2023 0732  Gross per 24 hour   Intake 2631.95 ml   Output --   Net 2631.95 ml           Physical Exam  Vitals and nursing note reviewed.   Constitutional:       General: He is not in acute distress.     Appearance: He is well-developed.   HENT:      Head: Normocephalic and atraumatic.      Nose: Nose normal.   Eyes:      Conjunctiva/sclera: Conjunctivae normal.   Cardiovascular:      Rate and Rhythm: Normal rate and regular rhythm.      Heart sounds: Normal heart sounds. No murmur heard.  Pulmonary:      Effort: Pulmonary effort is normal.      Breath sounds: Normal breath sounds. No wheezing.   Abdominal:      General: Bowel sounds are normal.      Palpations: Abdomen is soft. There is no mass.      Tenderness: There is no abdominal tenderness. There is no guarding or rebound.   Musculoskeletal:         General: Normal range of motion.      Cervical  back: Normal range of motion and neck supple.      Comments: right hallux s/p ampuation with dressings   Skin:     General: Skin is warm and dry.      Findings: No rash.   Neurological:      Mental Status: He is alert and oriented to person, place, and time.   Psychiatric:         Behavior: Behavior normal.             Significant Labs: All pertinent labs within the past 24 hours have been reviewed.  A1C:   Recent Labs   Lab 10/23/23  1426   HGBA1C 9.7*       BMP:   Recent Labs   Lab 11/10/23  0443   *   *   K 3.8   CL 98   CO2 23   BUN 16   CREATININE 0.9   CALCIUM 8.8       CBC:   Recent Labs   Lab 11/10/23  0443   WBC 6.59   HGB 10.5*   HCT 31.1*          CMP:   Recent Labs   Lab 11/10/23  0443   *   K 3.8   CL 98   CO2 23   *   BUN 16   CREATININE 0.9   CALCIUM 8.8   ANIONGAP 10         Significant Imaging: I have reviewed all pertinent imaging results/findings within the past 24 hours.  I have reviewed and interpreted all pertinent imaging results/findings within the past 24 hours.      Assessment/Plan:      * Acute osteomyelitis  Podiatry consult:  MRI confirms osteomyelitis     s/p partial 1st ray amputation of right great toe. Clean margins taken, sent for Culture and Pathology plan for 2-6 weeks of antibiotics pending results upon d/c, antibiotic management per Infectious Disease    1. Keep dressing dry and intact, if strike-through noted, please reinforce dressing with kerlix and ACE  2. Patient non-weightbearing to surgical extremity for next two days  3. Elevate surgical foot when at rest, Z-flex boots to bilateral lower extremity     ? Patient to only to transfer in short distances to affected foot with Darco shoe, partial weightbearing to heel  ? Patient to keep dressings clean dry and intact       Bacteremia  Blood cx upon admission +MRSA, sensitive to clinda and vanco  Cont vanc  Echo pending  Blood cx repeated on 11/10 NGTD    Will need 4-6 weeks of iv abx  HH vs  snf      Hyponatremia  Patient has hyponatremia which is controlled,We will aim to correct the sodium by 4-6mEq in 24 hours. We will monitor sodium Daily. The hyponatremia is due to Dehydration/hypovolemia. We will obtain the following studies:  Hold off on studies at this time. We will treat the hyponatremia with IV fluids as follows:  1 L bolus. The patient's sodium results have been reviewed and are listed below.  Recent Labs   Lab 11/07/23 2044   *       Cellulitis of foot  Patient presents with worsening right big toe wound with purulence and odor  Elevated CRP, WBC  Xray without signs of new or worsening bone loss or significant change of ulceration and destructive changes of the distal metatarsal head of the 1st toe of the right foot  Elevated lactate    Plan:  Continue IV vancomycin and Zosyn  Consulted podiatry        Anemia  Patient's anemia is currently controlled. Has not received any PRBCs to date. Etiology likely d/t chronic disease due to Osteomyelitis,  Current CBC reviewed-   Lab Results   Component Value Date    HGB 13.2 (L) 11/07/2023    HCT 39.8 (L) 11/07/2023     Monitor serial CBC and transfuse if patient becomes hemodynamically unstable, symptomatic or H/H drops below 7/21.    Depressive disorder  Patient has persistent depression which is moderate and is currently controlled. Will Continue anti-depressant medications. We will not consult psychiatry at this time. Patient does not display psychosis at this time. Continue to monitor closely and adjust plan of care as needed.        Atrial fibrillation  Patient with Paroxysmal (<7 days) atrial fibrillation which is controlled currently with Beta Blocker. Patient is currently in sinus rhythm.[id number] Score: 4. HASBLED Score: 3+. Anticoagulation indicated. Anticoagulation done with Apixaban.  We will hold if patient needs possible procedure.    Coronary artery disease due to calcified coronary lesion  Patient with known CAD s/p stent  placement and CABG, which is controlled Will continue ASA and Statin and monitor for S/Sx of angina/ACS. Continue to monitor on telemetry.     Diabetes mellitus type 2, noninsulin dependent  Patient's FSGs are uncontrolled due to hyperglycemia on current medication regimen.  Last A1c reviewed- May  Lab Results   Component Value Date    HGBA1C 9.7 (H) 10/23/2023     Most recent fingerstick glucose reviewed-   Recent Labs   Lab 11/08/23  0126   POCTGLUCOSE 280*     Current correctional scale  Medium  Maintain anti-hyperglycemic dose as follows-   Antihyperglycemics (From admission, onward)    Start     Stop Route Frequency Ordered    11/07/23 2315  insulin detemir U-100 (Levemir) pen 10 Units         -- SubQ Nightly 11/07/23 2215    11/07/23 2306  insulin aspart U-100 pen 0-5 Units         -- SubQ Before meals & nightly PRN 11/07/23 2208        Hold Oral hypoglycemics while patient is in the hospital.    Hypertension  Chronic, controlled. Latest blood pressure and vitals reviewed-     Temp:  [98.3 °F (36.8 °C)-99.2 °F (37.3 °C)]   Pulse:  []   Resp:  [14-33]   BP: (101-155)/(52-88)   SpO2:  [95 %-100 %] .   Home meds for hypertension were reviewed and noted below.   Hypertension Medications             amLODIPine (NORVASC) 5 MG tablet     furosemide (LASIX) 20 MG tablet Take 1 tablet (20 mg total) by mouth once daily.    isosorbide mononitrate (IMDUR) 30 MG 24 hr tablet TAKE 1 TABLET EVERY DAY    metoprolol succinate (TOPROL-XL) 25 MG 24 hr tablet TAKE 1 TABLET EVERY DAY    nitroGLYCERIN (NITROSTAT) 0.4 MG SL tablet Place 0.4 mg under the tongue every 5 (five) minutes as needed for Chest pain.          While in the hospital, will manage blood pressure as follows; Continue home antihypertensive regimen    Will utilize p.r.n. blood pressure medication only if patient's blood pressure greater than 180/110 and he develops symptoms such as worsening chest pain or shortness of breath.    Mixed hyperlipidemia  May  continue with home statin        VTE Risk Mitigation (From admission, onward)         Ordered     IP VTE HIGH RISK PATIENT  Once         11/07/23 2208     Place sequential compression device  Until discontinued         11/07/23 2208                Discharge Planning   AURELIA:      Code Status: Full Code   Is the patient medically ready for discharge?:     Reason for patient still in hospital (select all that apply): Treatment  Discharge Plan A:  (TBD)   Discharge Delays: None known at this time              Tarsha Styles MD  Department of Hospital Medicine   [facility]

## 2023-11-11 NOTE — ASSESSMENT & PLAN NOTE
Managed per Hospital Medicine, ID consult placed, appreciate recs. Tracing cultures, see plan for acute osteomyelitis.    mother

## 2023-11-11 NOTE — OP NOTE
[facility]  Podiatry Surgery Department  Operative Note    SUMMARY     Date of Procedure: 11/10/2023     Procedure: Procedure(s) (LRB):  AMPUTATION, TOE (Right)     Surgeon(s) and Role:     * Diya Leung, ABIMBOLA - Primary     * Mac Allen DPM - 1st assist    Assisting Surgeon: None    Pre-Operative Diagnosis: Diabetic foot infection [E11.628, L08.9]    Post-Operative Diagnosis: Post-Op Diagnosis Codes:     * Diabetic foot infection [E11.628, L08.9]    Anesthesia: Local MAC    Operative Findings (including complications, if any): s/p partial 1st ray amputation of right great toe. Clean margins taken, sent for Culture and Pathology plan for 2-6 weeks of antibiotics pending results upon d/c, antibiotic management per Infectious Disease     Description of Technical Procedures: Long discussion with patient regarding the procedure in detail, Patient understands all risks, potential complications, and alternatives, including, but not limited to those listed on the consent form.  No guarantees given or implied as to outcome. Informed verbal and written consent was obtained. Patient verbalized understanding and would still like to continue with surgery. Consent form discussed, signed and witnessed appropriately. Patient was marked.       The patient was brought to the operating room on a stretcher and placed on the operating table in the supine position. Following induction of MAC, an ankle tourniquet was applied to the right ankle. Following this, local anesthetic of 10 cc of 1:1 1% lidocaine and 0.25% Bupivacaine was used for a mckinley block. The right foot was then scrubbed and prepped in an aseptic manner. The tourniquet was on standby and not used during case. A time out was performed confirming the patients identification, procedure, surgical site, medications, and allergies. All were in agreement.      Attention was directed to the 1st ray of the right foot.  A linear incision circumventing the ulcer  extending to the proximal phalanx on the medial aspect was carried out carefully with a 15 blade.  Overlying tissue and neuro vasculature was carefully reflected.  The ulcer was excised off.  Attention was then directed to the MTPJ joint, the 1st digit was then disarticulated and removed carefully.  Specimens were sent off for microbiology, and pathology.  Attention was then directed at the metatarsal head of the 1st ray.  Using a sagittal saw, the head of the 1st metatarsal was removed along with any nonviable tissue.  A rongeur was also used to remove any nonviable tissue.  After careful inspection and removal of all nonviable tissue, the surgical site was washed with copious amounts of saline via bulb syringe.  A clean margin bone sample was taken of the remaining metatarsal, specimens were sent for microbiology and pathology.    The surgical site was then closed with 3-0 Vicryl and 3-0 nylon.  It was then dressed as follows:  Xeroform soaked in Betadine, several pieces of 4 x 4 gauze, multiple abdominal pads, cast padding, and Ace wrap.    Patient tolerated procedure well. Patient was transferred to the recovery room with vital signs stable and vascular status intact.      Following a period of post op monitoring, the patient will be transferred to room on the following written and oral post op instructions.     Keep dressings dry and intact, if strike- through noted, please reinforce dressings with kerlix and ace.  Patient partial weight bearing to   Right heel  in post op shoe.  Elevate surgical extremity.      Contact podiatry for all post op follow up care and if any problems arise.         Estimated Blood Loss (EBL): 50 mL     Implants: * No implants in log *    Specimens:   Specimen (24h ago, onward)       Start     Ordered    11/10/23 1226  Specimen to Pathology, Surgery General Surgery  Once        Comments: Pre-op Diagnosis: Diabetic foot infection [E11.628, L08.9]Procedure(s):AMPUTATION, TOE Number of  specimens: 2Name of specimens: 1 right great toe, right first metatarsal head - perm2 - right first metatarsal clean margin - perm     References:    Click here for ordering Quick Tip   Question Answer Comment   Procedure Type: General Surgery    Which provider would you like to cc? MADELIN SOLITARIO    Release to patient Immediate        11/10/23 1249    11/10/23 1219  Specimen to Pathology, Surgery General Surgery  Once,   Status:  Canceled        Comments: Pre-op Diagnosis: Diabetic foot infection [E11.628, L08.9]Procedure(s):AMPUTATION, TOE Number of specimens: 1Name of specimens: 1 - right great toe - perm     References:    Click here for ordering Quick Tip   Question Answer Comment   Procedure Type: General Surgery    Which provider would you like to cc? MADELIN SOLITARIO    Release to patient Immediate        11/10/23 1219                            Condition: Good    Disposition: PACU - hemodynamically stable.    Attestation: I was present and scrubbed for the entire procedure.

## 2023-11-11 NOTE — PROGRESS NOTES
Progress Note  [facility]    ASSESSMENT:   MRSA septicemia likely from right foot infection.  Currently on vancomycin.  Status post partial amputation of right toe.  Repeat blood cultures negative so far.  Echo ordered  MRSA foot infection.  Status post amputation and on vancomycin and meropenem because of prior history of ESBL E coli and Providencia from right foot.  No evidence of this at this time  Diabetes, anxiety depression, atrial fibrillation, heart failure    RECOMMENDATIONS:   Continue vancomycin for the moment.  Close monitoring of levels. We will need prolonged antibiotic therapy given bacteremia and likely bone infection with residual.   Could potentially be switched to daptomycin for ease of outpatient antibiotic therapy  Echocardiogram ordered  Follow up Repeat blood cultures  Discontinue meropenem  Ultrasound left upper extremity looking for clots    Thanks for this consult!. Please call if you have any questions.  Rodri Sarmiento  [facility]  [phone] pager    INTERVAL HISTORY:   Patient is status post partial amputation of right toe.  Patient feeling better although complaining of left upper extremity edema.    Medications reviewed; current antibiotics:  Vancomycin  Meropenem    Review of Systems:  Review of Systems   Musculoskeletal:         Left upper extremity edema.  Right foot lesion   All other systems reviewed and are negative.      OBJECTIVE:     Vital Signs (Most Recent)  Temp: 99.4 °F (37.4 °C) (11/11/23 0755)  Pulse: 96 (11/11/23 0827)  Resp: (!) 22 (repositi) (11/11/23 0950)  BP: 118/62 (11/11/23 0755)  SpO2: 95 % (11/11/23 0827)    Temperature Range Min/Max (Last 24H):  Temp:  [97.5 °F (36.4 °C)-99.6 °F (37.6 °C)]     Physical Exam:  Physical Exam  Vitals and nursing note reviewed.   HENT:      Head: Normocephalic and atraumatic.      Nose: Nose normal. No congestion.      Mouth/Throat:      Mouth: Mucous membranes are dry.      Pharynx: Oropharynx is clear. No  oropharyngeal exudate.   Eyes:      Extraocular Movements: Extraocular movements intact.      Conjunctiva/sclera: Conjunctivae normal.      Pupils: Pupils are equal, round, and reactive to light.   Cardiovascular:      Rate and Rhythm: Normal rate and regular rhythm.      Heart sounds: No murmur heard.     No friction rub. No gallop.      Comments: Decreased pedal pulses  Pulmonary:      Comments: Decreased breath sounds at the bases  Abdominal:      General: Bowel sounds are normal. There is no distension.      Palpations: Abdomen is soft.      Tenderness: There is no abdominal tenderness. There is no guarding.   Genitourinary:     Comments: External catheter  Musculoskeletal:      Cervical back: Normal range of motion and neck supple.      Right lower leg: Edema present.      Left lower leg: No edema.      Comments: Right lower extremity with amputation site wrapped.  Some tenderness at amputation site with some edema.  Decreased erythema.  Left upper extremity with old amputation that is healed.  Lesion that is covered in the anterior ankle area.  No evidence of infection.  Right upper without lesions except for ecchymoses.  Left upper extremity with edema and ecchymoses.  No tenderness, warmth noted   Lymphadenopathy:      Cervical: No cervical adenopathy.   Skin:     Findings: Lesion present.   Neurological:      General: No focal deficit present.      Mental Status: He is alert. Mental status is at baseline.         Laboratory:  Recent Labs   Lab 11/07/23  2044 11/08/23  0245 11/09/23  0455 11/10/23  0443   WBC  --  9.42 6.60 6.59   HGB  --  11.9* 10.7* 10.5*   HCT  --  35.8* 32.0* 31.1*   PLT  --  241 245 280   * 133* 133* 131*   K 4.4 4.3 3.8 3.8   CL 95 97 100 98   CO2 15* 22* 18* 23   BUN 20 16 16 16   CREATININE 1.3 1.0 0.9 0.9   AST 10  --   --   --    ALT 15  --   --   --    ALKPHOS 96  --   --   --    BILITOT 0.8  --   --   --      Labs: Reviewed    Microbiology:  Blood cultures 11/10/2023 no  growth so far  Right foot bone cultures 11/10/2023 in process  Urine culture 11/08/2023 Candida albicans  Wound culture right foot 11/07/2023 MRSA  Blood cultures 11/07/2023 MRSA    Other Diagnostic Results:  Reviewed    ASSESSMENT/PLAN:     Active Hospital Problems    Diagnosis  POA    *Cellulitis of foot [L03.119]  Yes     Chronic    Hyponatremia [E87.1]  No    Acute osteomyelitis [M86.10]  Yes    Anemia [D64.9]  Yes    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 Hernandez        LM patent  LAD patent except for distal 75%  LCX patent  OM1   RCA 90%      PCI of RCA 2.75 x 18 mm MILES  Unsuccessful PCI of OM1         LVEDP 11 mmHg  3.5 x 18 mm       Left hearth cath 3/1/2010 -Dr. Hernandez   RCA PCI with 3.5 x 15 mm  BMS    Left heart cath 1/31/2010-Dr. Hernandez   LAD 3.0 x 12 and 3.5 x 24 mm  BMS         [facility] 11/12/2019      S/p LCX  PCI     Bilateral CFA access 8 fr       LM patent   Distal LAD 80% stenosis   Patent RCA   LCX  with R to L collaterals     LVEDP 8 mmHg       Crossed LCX  with antegrade Filder XT + LP Turnpike   PCI with 3.0 x 26 and 3.0 x 12 mm Resolute MILES post dilated with 3.5 NC           Diabetes mellitus type 2, noninsulin dependent [E11.9]  Yes    Mixed hyperlipidemia [E78.2]  Yes    Hypertension [I10]  Yes      Resolved Hospital Problems   No resolved problems to display.

## 2023-11-12 PROBLEM — I82.C19 INTERNAL JUGULAR (IJ) VEIN THROMBOEMBOLISM, ACUTE: Status: ACTIVE | Noted: 2023-11-12

## 2023-11-12 PROBLEM — I82.90 THROMBUS: Status: ACTIVE | Noted: 2023-11-12

## 2023-11-12 LAB
ANION GAP SERPL CALC-SCNC: 7 MMOL/L (ref 8–16)
AV INDEX (PROSTH): 0.76
AV MEAN GRADIENT: 3 MMHG
AV PEAK GRADIENT: 5 MMHG
AV VELOCITY RATIO: 0.76
BASOPHILS # BLD AUTO: 0.03 K/UL (ref 0–0.2)
BASOPHILS NFR BLD: 0.3 % (ref 0–1.9)
BSA FOR ECHO PROCEDURE: 2.04 M2
BUN SERPL-MCNC: 16 MG/DL (ref 8–23)
CALCIUM SERPL-MCNC: 8.5 MG/DL (ref 8.7–10.5)
CHLORIDE SERPL-SCNC: 98 MMOL/L (ref 95–110)
CO2 SERPL-SCNC: 25 MMOL/L (ref 23–29)
CREAT SERPL-MCNC: 0.9 MG/DL (ref 0.5–1.4)
CV ECHO LV RWT: 0.68 CM
DIFFERENTIAL METHOD: ABNORMAL
DOP CALC AO PEAK VEL: 1.12 M/S
DOP CALC AO VTI: 23.9 CM
DOP CALC LVOT PEAK VEL: 0.85 M/S
DOP CALC MV VTI: 23.9 CM
DOP CALCLVOT PEAK VEL VTI: 18.1 CM
E WAVE DECELERATION TIME: 190.59 MSEC
E/A RATIO: 0.59
E/E' RATIO: 9 M/S
ECHO LV POSTERIOR WALL: 1.27 CM (ref 0.6–1.1)
EJECTION FRACTION: 50 %
EOSINOPHIL # BLD AUTO: 0.2 K/UL (ref 0–0.5)
EOSINOPHIL NFR BLD: 1.7 % (ref 0–8)
ERYTHROCYTE [DISTWIDTH] IN BLOOD BY AUTOMATED COUNT: 18.5 % (ref 11.5–14.5)
EST. GFR  (NO RACE VARIABLE): >60 ML/MIN/1.73 M^2
FRACTIONAL SHORTENING: 28 % (ref 28–44)
GLUCOSE SERPL-MCNC: 351 MG/DL (ref 70–110)
HCT VFR BLD AUTO: 30.6 % (ref 40–54)
HGB BLD-MCNC: 10.1 G/DL (ref 14–18)
IMM GRANULOCYTES # BLD AUTO: 0.13 K/UL (ref 0–0.04)
IMM GRANULOCYTES NFR BLD AUTO: 1.5 % (ref 0–0.5)
INTERVENTRICULAR SEPTUM: 0.97 CM (ref 0.6–1.1)
LA MAJOR: 5.04 CM
LA MINOR: 5.94 CM
LA WIDTH: 3.6 CM
LEFT ATRIUM SIZE: 2.85 CM
LEFT ATRIUM VOLUME INDEX MOD: 23.7 ML/M2
LEFT ATRIUM VOLUME INDEX: 23.2 ML/M2
LEFT ATRIUM VOLUME MOD: 48.53 CM3
LEFT ATRIUM VOLUME: 47.56 CM3
LEFT INTERNAL DIMENSION IN SYSTOLE: 2.67 CM (ref 2.1–4)
LEFT VENTRICLE DIASTOLIC VOLUME INDEX: 28.77 ML/M2
LEFT VENTRICLE DIASTOLIC VOLUME: 58.98 ML
LEFT VENTRICLE MASS INDEX: 65 G/M2
LEFT VENTRICLE SYSTOLIC VOLUME INDEX: 12.8 ML/M2
LEFT VENTRICLE SYSTOLIC VOLUME: 26.3 ML
LEFT VENTRICULAR INTERNAL DIMENSION IN DIASTOLE: 3.72 CM (ref 3.5–6)
LEFT VENTRICULAR MASS: 133.91 G
LV LATERAL E/E' RATIO: 10.5 M/S
LV SEPTAL E/E' RATIO: 7.88 M/S
LVOT MG: 1.95 MMHG
LVOT MV: 0.69 CM/S
LYMPHOCYTES # BLD AUTO: 1.3 K/UL (ref 1–4.8)
LYMPHOCYTES NFR BLD: 15.1 % (ref 18–48)
MCH RBC QN AUTO: 27 PG (ref 27–31)
MCHC RBC AUTO-ENTMCNC: 33 G/DL (ref 32–36)
MCV RBC AUTO: 82 FL (ref 82–98)
MONOCYTES # BLD AUTO: 0.7 K/UL (ref 0.3–1)
MONOCYTES NFR BLD: 7.9 % (ref 4–15)
MV A" WAVE DURATION": 131.3 MSEC
MV PEAK A VEL: 1.06 M/S
MV PEAK E VEL: 0.63 M/S
MV PEAK GRADIENT: 4 MMHG
MV STENOSIS PRESSURE HALF TIME: 55.27 MS
MV VALVE AREA P 1/2 METHOD: 3.98 CM2
NEUTROPHILS # BLD AUTO: 6.7 K/UL (ref 1.8–7.7)
NEUTROPHILS NFR BLD: 75 % (ref 38–73)
NRBC BLD-RTO: 0 /100 WBC
PISA TR MAX VEL: 0.96 M/S
PLATELET # BLD AUTO: 315 K/UL (ref 150–450)
PMV BLD AUTO: 10 FL (ref 9.2–12.9)
POCT GLUCOSE: 265 MG/DL (ref 70–110)
POCT GLUCOSE: 289 MG/DL (ref 70–110)
POCT GLUCOSE: 307 MG/DL (ref 70–110)
POCT GLUCOSE: 322 MG/DL (ref 70–110)
POCT GLUCOSE: 325 MG/DL (ref 70–110)
POTASSIUM SERPL-SCNC: 4.1 MMOL/L (ref 3.5–5.1)
PV MV: 0.42 M/S
PV PEAK GRADIENT: 2 MMHG
PV PEAK VELOCITY: 0.65 M/S
RBC # BLD AUTO: 3.74 M/UL (ref 4.6–6.2)
RV TISSUE DOPPLER FREE WALL SYSTOLIC VELOCITY 1 (APICAL 4 CHAMBER VIEW): 10.78 CM/S
SODIUM SERPL-SCNC: 130 MMOL/L (ref 136–145)
TDI LATERAL: 0.06 M/S
TDI SEPTAL: 0.08 M/S
TDI: 0.07 M/S
TR MAX PG: 4 MMHG
VANCOMYCIN TROUGH SERPL-MCNC: 18.9 UG/ML (ref 10–22)
WBC # BLD AUTO: 8.88 K/UL (ref 3.9–12.7)
Z-SCORE OF LEFT VENTRICULAR DIMENSION IN END DIASTOLE: -5.03
Z-SCORE OF LEFT VENTRICULAR DIMENSION IN END SYSTOLE: -2.72

## 2023-11-12 PROCEDURE — 25000003 PHARM REV CODE 250: Performed by: FAMILY MEDICINE

## 2023-11-12 PROCEDURE — 80202 ASSAY OF VANCOMYCIN: CPT | Performed by: FAMILY MEDICINE

## 2023-11-12 PROCEDURE — 94761 N-INVAS EAR/PLS OXIMETRY MLT: CPT

## 2023-11-12 PROCEDURE — 11000001 HC ACUTE MED/SURG PRIVATE ROOM

## 2023-11-12 PROCEDURE — A4216 STERILE WATER/SALINE, 10 ML: HCPCS | Performed by: FAMILY MEDICINE

## 2023-11-12 PROCEDURE — 80048 BASIC METABOLIC PNL TOTAL CA: CPT | Performed by: FAMILY MEDICINE

## 2023-11-12 PROCEDURE — C9113 INJ PANTOPRAZOLE SODIUM, VIA: HCPCS | Performed by: FAMILY MEDICINE

## 2023-11-12 PROCEDURE — 85025 COMPLETE CBC W/AUTO DIFF WBC: CPT | Performed by: FAMILY MEDICINE

## 2023-11-12 PROCEDURE — 99223 PR INITIAL HOSPITAL CARE,LEVL III: ICD-10-PCS | Mod: ,,, | Performed by: INTERNAL MEDICINE

## 2023-11-12 PROCEDURE — 27000207 HC ISOLATION

## 2023-11-12 PROCEDURE — 99223 1ST HOSP IP/OBS HIGH 75: CPT | Mod: ,,, | Performed by: INTERNAL MEDICINE

## 2023-11-12 PROCEDURE — 25000003 PHARM REV CODE 250: Performed by: NURSE PRACTITIONER

## 2023-11-12 PROCEDURE — 63600175 PHARM REV CODE 636 W HCPCS: Performed by: FAMILY MEDICINE

## 2023-11-12 RX ORDER — ENOXAPARIN SODIUM 100 MG/ML
1 INJECTION SUBCUTANEOUS EVERY 12 HOURS
Status: DISCONTINUED | OUTPATIENT
Start: 2023-11-12 | End: 2023-11-13

## 2023-11-12 RX ORDER — POLYETHYLENE GLYCOL 3350 17 G/17G
17 POWDER, FOR SOLUTION ORAL 2 TIMES DAILY
Status: DISCONTINUED | OUTPATIENT
Start: 2023-11-12 | End: 2023-11-14 | Stop reason: HOSPADM

## 2023-11-12 RX ORDER — CLOPIDOGREL BISULFATE 75 MG/1
75 TABLET ORAL DAILY
Status: DISCONTINUED | OUTPATIENT
Start: 2023-11-12 | End: 2023-11-14 | Stop reason: HOSPADM

## 2023-11-12 RX ORDER — AMOXICILLIN 250 MG
1 CAPSULE ORAL DAILY PRN
Status: DISCONTINUED | OUTPATIENT
Start: 2023-11-12 | End: 2023-11-14 | Stop reason: HOSPADM

## 2023-11-12 RX ORDER — ENOXAPARIN SODIUM 100 MG/ML
1 INJECTION SUBCUTANEOUS EVERY 12 HOURS
Status: DISCONTINUED | OUTPATIENT
Start: 2023-11-12 | End: 2023-11-12

## 2023-11-12 RX ORDER — NAPROXEN SODIUM 220 MG/1
81 TABLET, FILM COATED ORAL DAILY
Status: DISCONTINUED | OUTPATIENT
Start: 2023-11-12 | End: 2023-11-14 | Stop reason: HOSPADM

## 2023-11-12 RX ADMIN — AMLODIPINE BESYLATE 5 MG: 5 TABLET ORAL at 09:11

## 2023-11-12 RX ADMIN — SODIUM CHLORIDE, PRESERVATIVE FREE 10 ML: 5 INJECTION INTRAVENOUS at 12:11

## 2023-11-12 RX ADMIN — APIXABAN 5 MG: 5 TABLET, FILM COATED ORAL at 09:11

## 2023-11-12 RX ADMIN — PANTOPRAZOLE SODIUM 40 MG: 40 INJECTION, POWDER, LYOPHILIZED, FOR SOLUTION INTRAVENOUS at 09:11

## 2023-11-12 RX ADMIN — ISOSORBIDE MONONITRATE 30 MG: 30 TABLET, EXTENDED RELEASE ORAL at 09:11

## 2023-11-12 RX ADMIN — ATORVASTATIN CALCIUM 40 MG: 40 TABLET, FILM COATED ORAL at 09:11

## 2023-11-12 RX ADMIN — MICONAZOLE NITRATE: 20 OINTMENT TOPICAL at 09:11

## 2023-11-12 RX ADMIN — ENOXAPARIN SODIUM 80 MG: 80 INJECTION SUBCUTANEOUS at 09:11

## 2023-11-12 RX ADMIN — POLYETHYLENE GLYCOL 3350 17 G: 17 POWDER, FOR SOLUTION ORAL at 09:11

## 2023-11-12 RX ADMIN — INSULIN ASPART 4 UNITS: 100 INJECTION, SOLUTION INTRAVENOUS; SUBCUTANEOUS at 05:11

## 2023-11-12 RX ADMIN — METOPROLOL TARTRATE 12.5 MG: 25 TABLET, FILM COATED ORAL at 09:11

## 2023-11-12 RX ADMIN — RANOLAZINE 1000 MG: 500 TABLET, FILM COATED, EXTENDED RELEASE ORAL at 09:11

## 2023-11-12 RX ADMIN — HYDROCODONE BITARTRATE AND ACETAMINOPHEN 1 TABLET: 10; 325 TABLET ORAL at 08:11

## 2023-11-12 RX ADMIN — DOCUSATE SODIUM AND SENNOSIDES 1 TABLET: 8.6; 5 TABLET, FILM COATED ORAL at 09:11

## 2023-11-12 RX ADMIN — ASPIRIN 81 MG CHEWABLE TABLET 81 MG: 81 TABLET CHEWABLE at 09:11

## 2023-11-12 RX ADMIN — MUPIROCIN: 20 OINTMENT TOPICAL at 09:11

## 2023-11-12 RX ADMIN — CLOPIDOGREL BISULFATE 75 MG: 75 TABLET ORAL at 09:11

## 2023-11-12 RX ADMIN — FERROUS SULFATE TAB 325 MG (65 MG ELEMENTAL FE) 1 EACH: 325 (65 FE) TAB at 09:11

## 2023-11-12 RX ADMIN — VANCOMYCIN HYDROCHLORIDE 1000 MG: 1 INJECTION, POWDER, LYOPHILIZED, FOR SOLUTION INTRAVENOUS at 04:11

## 2023-11-12 RX ADMIN — INSULIN DETEMIR 10 UNITS: 100 INJECTION, SOLUTION SUBCUTANEOUS at 09:11

## 2023-11-12 RX ADMIN — INSULIN ASPART 4 UNITS: 100 INJECTION, SOLUTION INTRAVENOUS; SUBCUTANEOUS at 11:11

## 2023-11-12 RX ADMIN — SODIUM CHLORIDE, PRESERVATIVE FREE 10 ML: 5 INJECTION INTRAVENOUS at 04:11

## 2023-11-12 RX ADMIN — FLUOXETINE 40 MG: 20 CAPSULE ORAL at 09:11

## 2023-11-12 RX ADMIN — TRAZODONE HYDROCHLORIDE 150 MG: 100 TABLET ORAL at 09:11

## 2023-11-12 RX ADMIN — INSULIN ASPART 2 UNITS: 100 INJECTION, SOLUTION INTRAVENOUS; SUBCUTANEOUS at 09:11

## 2023-11-12 RX ADMIN — INSULIN ASPART 3 UNITS: 100 INJECTION, SOLUTION INTRAVENOUS; SUBCUTANEOUS at 04:11

## 2023-11-12 RX ADMIN — FUROSEMIDE 20 MG: 20 TABLET ORAL at 09:11

## 2023-11-12 NOTE — PROGRESS NOTES
Pharmacokinetic Assessment Follow Up: IV Vancomycin    Vancomycin serum concentration assessment(s):    The trough level was drawn correctly and can be used to guide therapy at this time. The measurement is within the desired definitive target range of 15 to 20 mcg/mL.    Vancomycin Regimen Plan:    Continue regimen to Vancomycin 1000 mg IV every 12 hours with next serum trough concentration measured at 0400 prior to the dose on 11/16    Drug levels (last 3 results):  Recent Labs   Lab Result Units 11/10/23  1513 11/12/23  1616   Vancomycin-Trough ug/mL 16.1 18.9       Pharmacy will continue to follow and monitor vancomycin.    Please contact pharmacy at extension [phone] for questions regarding this assessment.    Thank you for the consult,   Ricki Funk, PharmD, BCCCP       Patient brief summary:  Julien Meléndez is a 83 y.o. male initiated on antimicrobial therapy with IV Vancomycin for treatment of bacteremia    The patient's current regimen is vancomycin 1000 mg q12h    Drug Allergies:   Review of patient's allergies indicates:   Allergen Reactions    Nystatin      Other reaction(s): Unknown       Actual Body Weight:   84.2 kg    Renal Function:   Estimated Creatinine Clearance: 70.3 mL/min (based on SCr of 0.9 mg/dL).    Dialysis Method (if applicable):  N/A    CBC (last 72 hours):  Recent Labs   Lab Result Units 11/10/23  0443 11/12/23  1149   WBC K/uL 6.59 8.88   Hemoglobin g/dL 10.5* 10.1*   Hematocrit % 31.1* 30.6*   Platelets K/uL 280 315   Gran % % 68.7 75.0*   Lymph % % 17.9* 15.1*   Mono % % 12.3 7.9   Eosinophil % % 0.8 1.7   Basophil % % 0.3 0.3   Differential Method  Automated Automated       Metabolic Panel (last 72 hours):  Recent Labs   Lab Result Units 11/10/23  0443 11/12/23  1149   Sodium mmol/L 131* 130*   Potassium mmol/L 3.8 4.1   Chloride mmol/L 98 98   CO2 mmol/L 23 25   Glucose mg/dL 207* 351*   BUN mg/dL 16 16   Creatinine mg/dL 0.9 0.9       Vancomycin Administrations:  vancomycin  given in the last 96 hours                     vancomycin (VANCOCIN) 1,000 mg in dextrose 5 % (D5W) 250 mL IVPB (Vial-Mate) (mg) 1,000 mg New Bag 11/12/23 1659     1,000 mg New Bag  0439      Restarted 11/11/23 1922     1,000 mg New Bag  1802     1,000 mg New Bag  0202     1,000 mg New Bag 11/10/23 1610     1,000 mg New Bag  0216     1,000 mg New Bag 11/09/23 1553    vancomycin 1,250 mg in dextrose 5 % (D5W) 250 mL IVPB (Vial-Mate) (mg) 1,250 mg New Bag 11/08/23 2329                    Microbiologic Results:  Microbiology Results (last 7 days)       Procedure Component Value Units Date/Time    Culture, Anaerobe [[id number]] Collected: 11/10/23 1249    Order Status: Completed Specimen: Incision site from Foot, Right Updated: 11/12/23 1226     Anaerobic Culture Culture in progress    Narrative:      Right first metatarsal clean margin for culture    Aerobic culture [[id number]]  (Abnormal) Collected: 11/10/23 1249    Order Status: Completed Specimen: Incision site from Foot, Right Updated: 11/12/23 0739     Aerobic Bacterial Culture STAPHYLOCOCCUS AUREUS  Many  Susceptibility pending      Narrative:      Right first metatarsal clean margin for culture    AFB Culture & Smear [[id number]] Collected: 11/10/23 1249    Order Status: Completed Specimen: Incision site from Foot, Right Updated: 11/11/23 2127     AFB Culture & Smear Culture in progress    Narrative:      Right first metatarsal clean margin for culture    Blood culture [[id number]] Collected: 11/10/23 1701    Order Status: Completed Specimen: Blood Updated: 11/11/23 2012     Blood Culture, Routine No Growth to date      No Growth to date    Blood culture [[id number]] Collected: 11/10/23 1701    Order Status: Completed Specimen: Blood Updated: 11/11/23 2012     Blood Culture, Routine No Growth to date      No Growth to date    Fungus culture [[id number]] Collected: 11/10/23 1249    Order Status: Sent Specimen: Incision site from Foot, Right Updated: 11/10/23  1906    Blood culture [[id number]]  (Abnormal) Collected: 11/07/23 2021    Order Status: Completed Specimen: Blood from Peripheral, Antecubital, Right Updated: 11/10/23 1426     Blood Culture, Routine Gram stain earl bottle: Gram positive cocci in clusters resembling Staph      Results called to and read back by:Grisel Cole RN 11/08/2023  16:26      Gram stain aer bottle: Gram positive cocci in clusters resembling Staph      METHICILLIN RESISTANT STAPHYLOCOCCUS AUREUS  For susceptibility see order #[id number]  ID consult required at [facility].  ID consult required at [facility].      Blood culture [[id number]]  (Abnormal)  (Susceptibility) Collected: 11/07/23 2021    Order Status: Completed Specimen: Blood from Peripheral, Antecubital, Right Updated: 11/10/23 1424     Blood Culture, Routine Gram stain earl bottle: Gram positive cocci in clusters resembling Staph      Results called to and read back by:Grisel Cole RN 11/08/2023  16:27      Gram stain aer bottle: Gram positive cocci in clusters resembling Staph      METHICILLIN RESISTANT STAPHYLOCOCCUS AUREUS  ID consult required at [facility].      Aerobic culture [[id number]] Collected: 11/10/23 1249    Order Status: Canceled Specimen: Incision site from Foot, Right     Aerobic culture (Specify Source) **CANNOT BE ORDERED AS STAT** [[id number]]  (Abnormal)  (Susceptibility) Collected: 11/07/23 2208    Order Status: Completed Specimen: Wound from Toe, Right Foot Updated: 11/10/23 1118     Aerobic Bacterial Culture METHICILLIN RESISTANT STAPHYLOCOCCUS AUREUS  Many  Skin amy also present      Urine culture [[id number]]  (Abnormal) Collected: 11/08/23 0410    Order Status: Completed Specimen: Urine Updated: 11/09/23 1206     Urine Culture, Routine DEREJE ALBICANS  50,000 - 99,999 cfu/ml  Treatment of asymptomatic candiduria is not recommended (except for   specific  populations). Candida isolated in the urine typically   represents colonization. If an indwelling urinary catheter is present  it should be removed or replaced.      Narrative:      Specimen Source->Urine    Rapid Organism ID by PCR (from Blood culture) [[id number]]  (Abnormal) Collected: 11/07/23 2021    Order Status: Completed Updated: 11/08/23 1804     Enterococcus faecalis Not Detected     Enterococcus faecium Not Detected     Listeria monocytogenes Not Detected     Staphylococcus spp. See species for ID     Staphylococcus aureus Detected     Staphylococcus epidermidis Not Detected     Staphylococcus lugdunensis Not Detected     Streptococcus species Not Detected     Streptococcus agalactiae Not Detected     Streptococcus pneumoniae Not Detected     Streptococcus pyogenes Not Detected     Acinetobacter calcoaceticus/baumannii complex Not Detected     Bacteroides fragilis Not Detected     Enterobacterales Not Detected     Enterobacter cloacae complex Not Detected     Escherichia coli Not Detected     Klebsiella aerogenes Not Detected     Klebsiella oxytoca Not Detected     Klebsiella pneumoniae group Not Detected     Proteus Not Detected     Salmonella sp Not Detected     Serratia marcescens Not Detected     Haemophilus influenzae Not Detected     Neisseria meningtidis Not Detected     Pseudomonas aeruginosa Not Detected     Stenotrophomonas maltophilia Not Detected     Candida albicans Not Detected     Candida auris Not Detected     Candida glabrata Not Detected     Candida krusei Not Detected     Candida parapsilosis Not Detected     Candida tropicalis Not Detected     Cryptococcus neoformans/gattii Not Detected     CTX-M (ESBL ) Test Not Applicable     IMP (Carbapenem resistant) Test Not Applicable     KPC resistance gene (Carbapenem resistant) Test Not Applicable     mcr-1  Test Not Applicable     mec A/C  Test Not Applicable     mec A/C and MREJ (MRSA) gene Detected     NDM (Carbapenem resistant)  Test Not Applicable     OXA-48-like (Carbapenem resistant) Test Not Applicable     van A/B (VRE gene) Test Not Applicable     VIM (Carbapenem resistant) Test Not Applicable

## 2023-11-12 NOTE — ASSESSMENT & PLAN NOTE
- seen on ultrasound upper extremities (11/11/23)  - this is likely provoked in the setting of foot infection/recent surgery  - recommend at least 3 months of anticoagulation. Either apixaban or rivaroxaban is a suitable anticoagulant.

## 2023-11-12 NOTE — SUBJECTIVE & OBJECTIVE
Interval History: left shoulder pain with limited ROM since the fall 3 mos ago    Review of Systems   All other systems reviewed and are negative.    Objective:     Vital Signs (Most Recent):  Temp: 98 °F (36.7 °C) (11/13/23 1617)  Pulse: 72 (11/13/23 1617)  Resp: 20 (11/13/23 1617)  BP: (!) 112/58 (11/13/23 1617)  SpO2: 96 % (11/13/23 1617) Vital Signs (24h Range):  Temp:  [97.3 °F (36.3 °C)-99.2 °F (37.3 °C)] 98 °F (36.7 °C)  Pulse:  [62-77] 72  Resp:  [16-20] 20  SpO2:  [96 %-100 %] 96 %  BP: (101-149)/(55-70) 112/58     Weight: 81.6 kg (179 lb 14.3 oz)  Body mass index is 23.73 kg/m².    Intake/Output Summary (Last 24 hours) at 11/13/2023 1617  Last data filed at 11/13/2023 1200  Gross per 24 hour   Intake 836.99 ml   Output 700 ml   Net 136.99 ml           Physical Exam  Vitals and nursing note reviewed.   Constitutional:       General: He is not in acute distress.     Appearance: He is well-developed.   HENT:      Head: Normocephalic and atraumatic.      Nose: Nose normal.   Eyes:      Conjunctiva/sclera: Conjunctivae normal.   Cardiovascular:      Rate and Rhythm: Normal rate and regular rhythm.      Heart sounds: Normal heart sounds. No murmur heard.  Pulmonary:      Effort: Pulmonary effort is normal.      Breath sounds: Normal breath sounds. No wheezing.   Abdominal:      General: Bowel sounds are normal.      Palpations: Abdomen is soft. There is no mass.      Tenderness: There is no abdominal tenderness. There is no guarding or rebound.   Musculoskeletal:         General: Normal range of motion.      Cervical back: Normal range of motion and neck supple.      Comments: right hallux s/p ampuation with dressings  Left shoulder with Limited ROM   Skin:     General: Skin is warm and dry.      Findings: No rash.   Neurological:      Mental Status: He is alert and oriented to person, place, and time.   Psychiatric:         Behavior: Behavior normal.             Significant Labs: All pertinent labs within the  past 24 hours have been reviewed.  A1C:   Recent Labs   Lab 10/23/23  1426   HGBA1C 9.7*       BMP:   Recent Labs   Lab 11/12/23  1149   *   *   K 4.1   CL 98   CO2 25   BUN 16   CREATININE 0.9   CALCIUM 8.5*       CBC:   Recent Labs   Lab 11/12/23  1149   WBC 8.88   HGB 10.1*   HCT 30.6*          CMP:   Recent Labs   Lab 11/12/23  1149   *   K 4.1   CL 98   CO2 25   *   BUN 16   CREATININE 0.9   CALCIUM 8.5*   ANIONGAP 7*         Significant Imaging: I have reviewed all pertinent imaging results/findings within the past 24 hours.  I have reviewed and interpreted all pertinent imaging results/findings within the past 24 hours.

## 2023-11-12 NOTE — SUBJECTIVE & OBJECTIVE
VIRTUAL TELENOTE    Start time:4:30pm  Chief complaint: thrombosis  The patient location is: [facility]  The patient arrived at: 4:30pm  Present with the patient at the time of the telemed/virtual assessment: his wife     End time: 4:40pm    Total time spent with patient: 10 minutes    The attending portion of this evaluation, treatment, and documentation was performed per Chinedu Chilel MD via Telemedicine AudioVisual using the secure [employer] software platform with 2 way audio/video. The provider was located off-site and the patient is located in the hospital. The aforementioned video software was utilized to document the relevant history and physical exam.    - he endorses fatigue, weakness. He denies shortness of breath, chest pain, nausea, vomiting, diarrhea, constipation.        Oncology Treatment Plan:   [No matching plan found]    Medications:  Continuous Infusions:   sodium chloride 0.9% Stopped (11/08/23 0845)     Scheduled Meds:   amLODIPine  5 mg Oral Daily    aspirin  81 mg Oral Daily    atorvastatin  40 mg Oral QHS    clopidogreL  75 mg Oral Daily    enoxparin  1 mg/kg Subcutaneous Q12H (treatment, non-standard time)    ferrous sulfate  1 tablet Oral Daily    FLUoxetine  40 mg Oral Daily    furosemide  20 mg Oral Daily    insulin detemir U-100 (Levemir)  10 Units Subcutaneous QHS    isosorbide mononitrate  30 mg Oral Daily    metoprolol tartrate  12.5 mg Oral BID    miconazole nitrate 2%   Topical (Top) BID    mupirocin   Nasal BID    pantoprazole  40 mg Intravenous BID    ranolazine  1,000 mg Oral BID    sodium chloride 0.9%  10 mL Intravenous Q6H    traZODone  150 mg Oral QHS    vancomycin (VANCOCIN) IV (PEDS and ADULTS)  1,000 mg Intravenous Q12H     PRN Meds:acetaminophen, artificial tears, dextrose 10%, dextrose 10%, glucagon (human recombinant), glucose, glucose, HYDROcodone-acetaminophen, HYDROcodone-acetaminophen, HYDROmorphone, influenza 65up-adj, insulin aspart U-100, melatonin, naloxone,  ondansetron, ondansetron, oxyCODONE, Flushing PICC/Midline Protocol **AND** sodium chloride 0.9% **AND** sodium chloride 0.9%, sodium chloride 0.9%, Pharmacy to dose Vancomycin consult **AND** vancomycin - pharmacy to dose     Review of patient's allergies indicates:   Allergen Reactions    Nystatin      Other reaction(s): Unknown        Past Medical History:   Diagnosis Date    Diabetes mellitus     Hiatal hernia     under Dr Saenz' care    Hyperlipidemia     Hypertension     MI (myocardial infarction)     Sleep apnea      Past Surgical History:   Procedure Laterality Date    ABDOMINAL HERNIA REPAIR      ANGIOGRAM, EXTREMITY, BILATERAL Bilateral 4/5/2023    Procedure: ANGIOGRAM, EXTREMITY, BILATERAL;  Surgeon: Michael Giraldo III, MD;  Location: [facility] CATH LAB;  Service: Cardiology;  Laterality: Bilateral;    ANGIOGRAPHY OF LOWER EXTREMITY Left 4/12/2023    Procedure: Angiogram Extremity Unilateral;  Surgeon: Michael Giraldo III, MD;  Location: [facility] CATH LAB;  Service: Cardiology;  Laterality: Left;    AORTOGRAPHY WITH SERIALOGRAPHY Bilateral 4/5/2023    Procedure: AORTOGRAM, WITH SERIALOGRAPHY;  Surgeon: Michael Giraldo III, MD;  Location: [facility] CATH LAB;  Service: Cardiology;  Laterality: Bilateral;    APPLICATION OF WOUND VACUUM-ASSISTED CLOSURE DEVICE Left 3/29/2023    Procedure: APPLICATION, WOUND VAC;  Surgeon: Alona Pierce DPM;  Location: [facility] OR;  Service: Podiatry;  Laterality: Left;    APPLICATION OF WOUND VACUUM-ASSISTED CLOSURE DEVICE Left 4/4/2023    Procedure: APPLICATION, WOUND VAC;  Surgeon: Alona Pierce DPM;  Location: [facility] OR;  Service: Podiatry;  Laterality: Left;    APPLICATION OF WOUND VACUUM-ASSISTED CLOSURE DEVICE Left 4/13/2023    Procedure: APPLICATION, WOUND VAC;  Surgeon: Alona Pierce DPM;  Location: [facility] OR;  Service: Podiatry;  Laterality: Left;    BONE BIOPSY Left 3/29/2023    Procedure: BIOPSY, BONE;  Surgeon: Alona Pierce DPM;  Location: [facility] OR;  Service: Podiatry;   Laterality: Left;  1st met    CARDIAC CATHETERIZATION      3 stents placed    CATARACT EXTRACTION, BILATERAL      CLOSURE DEVICE Left 4/12/2023    Procedure: Placement of Closure Device;  Surgeon: Michael Giraldo III, MD;  Location: [facility] CATH LAB;  Service: Cardiology;  Laterality: Left;    CORONARY ANGIOGRAPHY N/A 10/3/2019    Procedure: ANGIOGRAM, CORONARY ARTERY;  Surgeon: Edwin Azul MD;  Location: [facility] CATH LAB/EP;  Service: Cardiology;  Laterality: N/A;    DEBRIDEMENT OF FOOT Left 3/29/2023    Procedure: DEBRIDEMENT, FOOT;  Surgeon: Alona Pierce DPM;  Location: [facility] OR;  Service: Podiatry;  Laterality: Left;    DEBRIDEMENT OF FOOT Left 4/4/2023    Procedure: DEBRIDEMENT, FOOT;  Surgeon: Alona Pierce DPM;  Location: [facility] OR;  Service: Podiatry;  Laterality: Left;    DEBRIDEMENT OF FOOT Left 4/13/2023    Procedure: DEBRIDEMENT, FOOT;  Surgeon: Alona Pierce DPM;  Location: [facility] OR;  Service: Podiatry;  Laterality: Left;    ESOPHAGOGASTRODUODENOSCOPY N/A 4/17/2023    Procedure: EGD (ESOPHAGOGASTRODUODENOSCOPY);  Surgeon: Otto Villarreal MD;  Location: [facility];  Service: Endoscopy;  Laterality: N/A;    HERNIA REPAIR      inguinal    IVUS (INTRAVASCULAR ULTRASOUND) Left 4/12/2023    Procedure: ULTRASOUND, INTRAVASCULAR;  Surgeon: Michael Giraldo III, MD;  Location: [facility] CATH LAB;  Service: Cardiology;  Laterality: Left;    LEFT HEART CATHETERIZATION Left 9/13/2019    Procedure: Left heart cath;  Surgeon: Edwin Azul MD;  Location: [facility] CATH LAB/EP;  Service: Cardiology;  Laterality: Left;    LEFT HEART CATHETERIZATION N/A 10/3/2019    Procedure: Left heart cath;  Surgeon: Edwin Azul MD;  Location: [facility] CATH LAB/EP;  Service: Cardiology;  Laterality: N/A;    OSTEOTOMY Left 6/7/2023    Procedure: OSTEOTOMY;  Surgeon: Alona Pierce DPM;  Location: [facility] OR;  Service: Podiatry;  Laterality: Left;    OSTEOTOMY OF METATARSAL BONE Right 4/4/2023    Procedure: OSTEOTOMY, METATARSAL BONE;  Surgeon:  Alona Pierce DPM;  Location: [facility] OR;  Service: Podiatry;  Laterality: Right;    PERCUTANEOUS TRANSLUMINAL ANGIOPLASTY Left 4/12/2023    Procedure: PTA (ANGIOPLASTY, PERCUTANEOUS, TRANSLUMINAL);  Surgeon: Michael Giraldo III, MD;  Location: [facility] CATH LAB;  Service: Cardiology;  Laterality: Left;    SURGICAL REMOVAL OF BUNION WITH OSTEOTOMY OF METATARSAL BONE Right 4/4/2023    Procedure: BUNIONECTOMY, WITH METATARSAL OSTEOTOMY;  Surgeon: Alona Pierce DPM;  Location: [facility] OR;  Service: Podiatry;  Laterality: Right;    TOE AMPUTATION Left 4/4/2023    Procedure: AMPUTATION, TOE;  Surgeon: Alona Pierce DPM;  Location: [facility] OR;  Service: Podiatry;  Laterality: Left;  1st ray    WASHOUT Left 6/7/2023    Procedure: WASHOUT;  Surgeon: Alona Pierce DPM;  Location: [facility] OR;  Service: Podiatry;  Laterality: Left;    WOUND DEBRIDEMENT Left 6/7/2023    Procedure: DEBRIDEMENT, WOUND;  Surgeon: Alona Pierce DPM;  Location: [facility] OR;  Service: Podiatry;  Laterality: Left;     Family History       Problem Relation (Age of Onset)    Heart disease Mother, Sister, Brother, Maternal Uncle, Brother, Sister, Maternal Uncle, Maternal Uncle, Maternal Uncle    Stroke Mother          Tobacco Use    Smoking status: Never    Smokeless tobacco: Never   Substance and Sexual Activity    Alcohol use: No    Drug use: No    Sexual activity: Not on file       Review of Systems   Constitutional:  Positive for fatigue.   HENT:  Negative for sore throat.    Eyes:  Negative for visual disturbance.   Respiratory:  Negative for shortness of breath.    Cardiovascular:  Negative for chest pain.   Gastrointestinal:  Negative for abdominal pain.   Genitourinary:  Negative for dysuria.   Musculoskeletal:  Negative for back pain.   Skin:  Negative for rash.   Neurological:  Positive for weakness. Negative for headaches.   Hematological:  Negative for adenopathy.   Psychiatric/Behavioral:  The patient is not nervous/anxious.      Objective:     Vital Signs  (Most Recent):  Temp: 98.3 °F (36.8 °C) (11/12/23 1555)  Pulse: 73 (11/12/23 1555)  Resp: 18 (11/12/23 1555)  BP: 114/61 (11/12/23 1555)  SpO2: 96 % (11/12/23 1555) Vital Signs (24h Range):  Temp:  [97.7 °F (36.5 °C)-98.3 °F (36.8 °C)] 98.3 °F (36.8 °C)  Pulse:  [64-76] 73  Resp:  [17-19] 18  SpO2:  [95 %-98 %] 96 %  BP: (100-131)/(54-64) 114/61     Weight: 84.2 kg (185 lb 10 oz)  Body mass index is 24.49 kg/m².  Body surface area is 2.08 meters squared.      Intake/Output Summary (Last 24 hours) at 11/12/2023 1639  Last data filed at 11/12/2023 0543  Gross per 24 hour   Intake 1089.6 ml   Output 600 ml   Net 489.6 ml        Physical Exam  Deferred due to telemedicine visit.       Significant Labs:   CBC:   Recent Labs   Lab 11/12/23  1149   WBC 8.88   HGB 10.1*   HCT 30.6*       and CMP:   Recent Labs   Lab 11/12/23  1149   *   K 4.1   CL 98   CO2 25   *   BUN 16   CREATININE 0.9   CALCIUM 8.5*   ANIONGAP 7*       Diagnostic Results:  US Upper Extremity Veins Left (11/11/23):  NONOCCLUSIVE THROMBUS IN THE LEFT INTERNAL JUGULAR VEIN     No thrombus in REMAINING veins of the left upper extremity.

## 2023-11-12 NOTE — PROCEDURES
"Julien Meléndez is a 83 y.o. male patient.    Temp: 97.8 °F (36.6 °C) (11/11/23 1534)  Pulse: 73 (11/11/23 1534)  Resp: 18 (11/11/23 1534)  BP: (!) 103/59 (11/11/23 1534)  SpO2: 95 % (11/11/23 1700)  Weight: 81.2 kg (179 lb) (11/11/23 1341)  Height: 6' 1" (185.4 cm) (11/11/23 1341)    PICC  Date/Time: 11/11/2023 6:05 PM  Performed by: Roosevelt Capellan RN  Consent Done: Yes  Time out: Immediately prior to procedure a time out was called to verify the correct patient, procedure, equipment, support staff and site/side marked as required  Indications: med administration and vascular access  Anesthesia: local infiltration  Local anesthetic: lidocaine 1% without epinephrine  Anesthetic Total (mL): 5  Preparation: skin prepped with ChloraPrep  Skin prep agent dried: skin prep agent completely dried prior to procedure  Sterile barriers: all five maximum sterile barriers used - cap, mask, sterile gown, sterile gloves, and large sterile sheet  Hand hygiene: hand hygiene performed prior to central venous catheter insertion  Location details: right basilic  Catheter type: double lumen  Catheter size: 5 Fr  Catheter Length: 43cm    Ultrasound guidance: yes  Vessel Caliber: large, compressibility normal  Needle advanced into vessel with real time Ultrasound guidance.  Guidewire confirmed in vessel.  Sterile sheath used.  Number of attempts: 1  Post-procedure: blood return through all ports, chlorhexidine patch and sterile dressing applied            Name roosevelt capellan  11/11/2023    "

## 2023-11-12 NOTE — PROGRESS NOTES
[facility]  Progress Note    Patient Name: Julien Meléndez  MRN: [id number]  Patient Class: IP- Inpatient   Admission Date: 11/7/2023  Length of Stay: 3 days  Attending Physician: Tarsha Styles MD  Primary Care Provider: Kelvin Wayne MD        Subjective:     Principal Problem:Acute osteomyelitis        HPI:  Julien Meléndez is a that is why 83-year-old male with past medical history of diabetes, anxiety/depression, atrial fibrillation on anticoagulation, heart failure who presents with foot wound.    Patient reports a recent biopsy of his right great toe by Podiatry over the past week.  Patient reports worsening drainage and odor from his right foot biopsy site.  He states he has been taking antibiotics for the infection, with minimal relief.  He denies any fevers , chills , nausea, although reports episodes of nonbilious nonbloody emesis shortly after going to the ED after discussing the plan with his wound care nurse.      Triage vitals were significant for slightly elevated blood pressures.  CBC was significant for leukocytosis, normocytic anemia.  CMP was significant for hyponatremia, hyperglycemia, and anion gap of 22.  CRP was elevated at 228.9.  Lactic acid was 2.9.    Blood cultures were collected.    X-rays of the right foot showed no significant change of ulceration and destructive changes of the distal metatarsal head of the 1st toe of the right foot.    Pt given fluids and antibiotics.    Patient admitted for cellulitis of right toe/right foot wound,  with podiatry consult in the a.m.      Overview/Hospital Course:  11/8: wound cx growing out gm positive. Pt on vanc and meropenem. Patient with clinical acute osteomyelitis of right hallux ulcer with bone exposure and purulent drainage noted. Xray, MRI ordered. Surgery plans for Friday by podiatry  11/9: MRI confirms osteo. Pt voices no complaints.NPO after midnight for tentative surgery   11/10:Pt taken to OR for a  partial 1st ray amputation of right great toe. Clean margins taken, sent for Culture and Pathology plan for 2-6 weeks of antibiotics pending results upon d/c, antibiotic management per Infectious Disease  11/11: Echo ordered. Repeat blood cx on 11/10 NGTD. Blood cx upon admission growing out MRSA, sensitive to clinda and vanco. D/c meropenem. ID recs 4-6 weeks of iV abx. CM notified of duration and set up  11/12: echo normal. No issues voiced. Awaiting home infusion approval       Interval History: no issues voiced post-op except would like to be home.     Review of Systems   All other systems reviewed and are negative.    Objective:     Vital Signs (Most Recent):  Temp: 98.1 °F (36.7 °C) (11/12/23 1126)  Pulse: 71 (11/12/23 1126)  Resp: 17 (11/12/23 1126)  BP: (!) 100/55 (11/12/23 1126)  SpO2: 96 % (11/12/23 1126) Vital Signs (24h Range):  Temp:  [97.7 °F (36.5 °C)-98.1 °F (36.7 °C)] 98.1 °F (36.7 °C)  Pulse:  [64-76] 71  Resp:  [17-19] 17  SpO2:  [95 %-98 %] 96 %  BP: (100-131)/(54-64) 100/55     Weight: 84.2 kg (185 lb 10 oz)  Body mass index is 24.49 kg/m².    Intake/Output Summary (Last 24 hours) at 11/12/2023 1532  Last data filed at 11/12/2023 0543  Gross per 24 hour   Intake 1089.6 ml   Output 600 ml   Net 489.6 ml           Physical Exam  Vitals and nursing note reviewed.   Constitutional:       General: He is not in acute distress.     Appearance: He is well-developed.   HENT:      Head: Normocephalic and atraumatic.      Nose: Nose normal.   Eyes:      Conjunctiva/sclera: Conjunctivae normal.   Cardiovascular:      Rate and Rhythm: Normal rate and regular rhythm.      Heart sounds: Normal heart sounds. No murmur heard.  Pulmonary:      Effort: Pulmonary effort is normal.      Breath sounds: Normal breath sounds. No wheezing.   Abdominal:      General: Bowel sounds are normal.      Palpations: Abdomen is soft. There is no mass.      Tenderness: There is no abdominal tenderness. There is no guarding or  rebound.   Musculoskeletal:         General: Normal range of motion.      Cervical back: Normal range of motion and neck supple.      Comments: right hallux s/p ampuation with dressings   Skin:     General: Skin is warm and dry.      Findings: No rash.   Neurological:      Mental Status: He is alert and oriented to person, place, and time.   Psychiatric:         Behavior: Behavior normal.             Significant Labs: All pertinent labs within the past 24 hours have been reviewed.  A1C:   Recent Labs   Lab 10/23/23  1426   HGBA1C 9.7*       BMP:   Recent Labs   Lab 11/12/23  1149   *   *   K 4.1   CL 98   CO2 25   BUN 16   CREATININE 0.9   CALCIUM 8.5*       CBC:   Recent Labs   Lab 11/12/23  1149   WBC 8.88   HGB 10.1*   HCT 30.6*          CMP:   Recent Labs   Lab 11/12/23  1149   *   K 4.1   CL 98   CO2 25   *   BUN 16   CREATININE 0.9   CALCIUM 8.5*   ANIONGAP 7*         Significant Imaging: I have reviewed all pertinent imaging results/findings within the past 24 hours.  I have reviewed and interpreted all pertinent imaging results/findings within the past 24 hours.      Assessment/Plan:      * Acute osteomyelitis  Podiatry consult:  MRI confirms osteomyelitis     s/p partial 1st ray amputation of right great toe. Clean margins taken, sent for Culture and Pathology plan for 2-6 weeks of antibiotics pending results upon d/c, antibiotic management per Infectious Disease    1. Keep dressing dry and intact, if strike-through noted, please reinforce dressing with kerlix and ACE  2. Patient non-weightbearing to surgical extremity for next two days  3. Elevate surgical foot when at rest, Z-flex boots to bilateral lower extremity     ? Patient to only to transfer in short distances to affected foot with Darco shoe, partial weightbearing to heel  ? Patient to keep dressings clean dry and intact       Bacteremia  Blood cx upon admission +MRSA, sensitive to clinda and vanco  Cont vanc  Echo  wnl  Blood cx repeated on 11/10 NGTD    Will need 4-6 weeks of iv abx  CM notified      Hyponatremia  Patient has hyponatremia which is controlled,We will aim to correct the sodium by 4-6mEq in 24 hours. We will monitor sodium Daily. The hyponatremia is due to Dehydration/hypovolemia. We will obtain the following studies:  Hold off on studies at this time. We will treat the hyponatremia with IV fluids as follows:  1 L bolus. The patient's sodium results have been reviewed and are listed below.  Recent Labs   Lab 11/07/23 2044   *       Cellulitis of foot  Patient presents with worsening right big toe wound with purulence and odor  Elevated CRP, WBC  Xray without signs of new or worsening bone loss or significant change of ulceration and destructive changes of the distal metatarsal head of the 1st toe of the right foot  Elevated lactate    Plan:  Continue IV vancomycin and Zosyn  Consulted podiatry        Anemia  Patient's anemia is currently controlled. Has not received any PRBCs to date. Etiology likely d/t chronic disease due to Osteomyelitis,  Current CBC reviewed-   Lab Results   Component Value Date    HGB 13.2 (L) 11/07/2023    HCT 39.8 (L) 11/07/2023     Monitor serial CBC and transfuse if patient becomes hemodynamically unstable, symptomatic or H/H drops below 7/21.    Depressive disorder  Patient has persistent depression which is moderate and is currently controlled. Will Continue anti-depressant medications. We will not consult psychiatry at this time. Patient does not display psychosis at this time. Continue to monitor closely and adjust plan of care as needed.        Atrial fibrillation  Patient with Paroxysmal (<7 days) atrial fibrillation which is controlled currently with Beta Blocker. Patient is currently in sinus rhythm.[id number] Score: 4. HASBLED Score: 3+. Anticoagulation indicated. Anticoagulation done with Apixaban.  We will hold if patient needs possible procedure.    Coronary artery  disease due to calcified coronary lesion  Patient with known CAD s/p stent placement and CABG, which is controlled Will continue ASA and Statin and monitor for S/Sx of angina/ACS. Continue to monitor on telemetry.     Diabetes mellitus type 2, noninsulin dependent  Patient's FSGs are uncontrolled due to hyperglycemia on current medication regimen.  Last A1c reviewed- May  Lab Results   Component Value Date    HGBA1C 9.7 (H) 10/23/2023     Most recent fingerstick glucose reviewed-   Recent Labs   Lab 11/08/23  0126   POCTGLUCOSE 280*     Current correctional scale  Medium  Maintain anti-hyperglycemic dose as follows-   Antihyperglycemics (From admission, onward)    Start     Stop Route Frequency Ordered    11/07/23 2315  insulin detemir U-100 (Levemir) pen 10 Units         -- SubQ Nightly 11/07/23 2215 11/07/23 2306  insulin aspart U-100 pen 0-5 Units         -- SubQ Before meals & nightly PRN 11/07/23 2208        Hold Oral hypoglycemics while patient is in the hospital.    Hypertension  Chronic, controlled. Latest blood pressure and vitals reviewed-     Temp:  [98.3 °F (36.8 °C)-99.2 °F (37.3 °C)]   Pulse:  []   Resp:  [14-33]   BP: (101-155)/(52-88)   SpO2:  [95 %-100 %] .   Home meds for hypertension were reviewed and noted below.   Hypertension Medications             amLODIPine (NORVASC) 5 MG tablet     furosemide (LASIX) 20 MG tablet Take 1 tablet (20 mg total) by mouth once daily.    isosorbide mononitrate (IMDUR) 30 MG 24 hr tablet TAKE 1 TABLET EVERY DAY    metoprolol succinate (TOPROL-XL) 25 MG 24 hr tablet TAKE 1 TABLET EVERY DAY    nitroGLYCERIN (NITROSTAT) 0.4 MG SL tablet Place 0.4 mg under the tongue every 5 (five) minutes as needed for Chest pain.          While in the hospital, will manage blood pressure as follows; Continue home antihypertensive regimen    Will utilize p.r.n. blood pressure medication only if patient's blood pressure greater than 180/110 and he develops symptoms such as  worsening chest pain or shortness of breath.    Mixed hyperlipidemia  May continue with home statin      VTE Risk Mitigation (From admission, onward)         Ordered     apixaban tablet 5 mg  2 times daily         11/12/23 0837     IP VTE HIGH RISK PATIENT  Once         11/07/23 2208     Place sequential compression device  Until discontinued         11/07/23 2208                Discharge Planning   AURELIA:      Code Status: Full Code   Is the patient medically ready for discharge?:     Reason for patient still in hospital (select all that apply): Treatment  Discharge Plan A:  (TBD)   Discharge Delays: None known at this time              Tarsha Styles MD  Department of Hospital Medicine   [facility]

## 2023-11-12 NOTE — PROGRESS NOTES
Progress Note  [facility]    ASSESSMENT:   MRSA septicemia likely from right foot infection.  Currently on vancomycin.  Status post partial amputation of right toe.  Repeat blood cultures negative so far.  Echo without evidence of vegetations  MRSA foot infection.  Status post amputation and on vancomycin and meropenem because of prior history of ESBL E coli and Providencia from right foot.  No evidence of this at this time  Left shoulder pain after traumatic injury in the face of MRSA bacteremia.  Given the propensity of MRSA to go to damage tissues, would consider evaluation of left shoulder in case patient has localized infection there.    Left arm swelling.  Ultrasound of the left upper extremity without evidence clots.  Nonocclusive thrombus in the IJ  Diabetes, anxiety depression, atrial fibrillation, heart failure      RECOMMENDATIONS:   Continue vancomycin with monitoring levels.  We will need prolonged antibiotic therapy  Could be switched to daptomycin  Evaluate left shoulder by orthopedics and possible imaging  Monitor Patient progress      Thanks for this consult!. Please call if you have any questions.  Rodri Sarmiento  [facility] ID  [phone] pager    INTERVAL HISTORY:   Patient is still with left arm swelling.  Also related that left shoulder is painful after a fall several months prior.  Some pain on movement now.  Echocardiogram performed yesterday without evidence of vegetations.    Medications reviewed; current antibiotics:  Vancomycin    Review of Systems:  Review of Systems   Musculoskeletal:         Left shoulder pain for several months.  Left arm swelling   All other systems reviewed and are negative.      OBJECTIVE:     Vital Signs (Most Recent)  Temp: 98.1 °F (36.7 °C) (11/12/23 1126)  Pulse: 71 (11/12/23 1126)  Resp: 17 (11/12/23 1126)  BP: (!) 100/55 (11/12/23 1126)  SpO2: 96 % (11/12/23 1126)    Temperature Range Min/Max (Last 24H):  Temp:  [97.7 °F (36.5 °C)-98.1 °F (36.7 °C)]      Physical Exam:  Physical Exam  Vitals and nursing note reviewed.   HENT:      Head: Normocephalic and atraumatic.      Nose: Nose normal. No congestion or rhinorrhea.      Mouth/Throat:      Mouth: Mucous membranes are moist.      Pharynx: Oropharynx is clear. No oropharyngeal exudate.   Eyes:      Extraocular Movements: Extraocular movements intact.      Conjunctiva/sclera: Conjunctivae normal.      Pupils: Pupils are equal, round, and reactive to light.   Cardiovascular:      Rate and Rhythm: Normal rate.      Heart sounds: No murmur heard.     No friction rub. No gallop.      Comments: Decreased pedal pulses bilaterally  Pulmonary:      Comments: Decreased breath sounds at the bases  Abdominal:      General: Bowel sounds are normal. There is no distension.      Palpations: Abdomen is soft.      Tenderness: There is no abdominal tenderness. There is no guarding.   Genitourinary:     Comments: No Doherty  Musculoskeletal:      Cervical back: Neck supple.      Right lower leg: Edema present.      Comments: Left upper extremity with continued edema.  Pain on movement left shoulder.  Unable to lift arm due to pain.  Right upper extremity without lesions.  Left lower extremity without lesions except for old amputation site which is without evidence of infection.  Right foot amputation site wrapped.  Some tenderness and edema   Lymphadenopathy:      Cervical: No cervical adenopathy.   Skin:     Findings: Erythema and lesion present.   Neurological:      General: No focal deficit present.      Mental Status: He is alert and oriented to person, place, and time. Mental status is at baseline.   Psychiatric:         Mood and Affect: Mood normal.         Behavior: Behavior normal.         Laboratory:  Recent Labs   Lab 11/07/23  2044 11/08/23  0245 11/09/23  0455 11/10/23  0443 11/12/23  1149   WBC  --    < > 6.60 6.59 8.88   HGB  --    < > 10.7* 10.5* 10.1*   HCT  --    < > 32.0* 31.1* 30.6*   PLT  --    < > 245 280 315    *   < > 133* 131* 130*   K 4.4   < > 3.8 3.8 4.1   CL 95   < > 100 98 98   CO2 15*   < > 18* 23 25   BUN 20   < > 16 16 16   CREATININE 1.3   < > 0.9 0.9 0.9   AST 10  --   --   --   --    ALT 15  --   --   --   --    ALKPHOS 96  --   --   --   --    BILITOT 0.8  --   --   --   --     < > = values in this interval not displayed.     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 Hernandez        LM patent  LAD patent except for distal 75%  LCX patent  OM1   RCA 90%      PCI of RCA 2.75 x 18 mm MILES  Unsuccessful PCI of OM1         LVEDP 11 mmHg  3.5 x 18 mm       Left hearth cath 3/1/2010 -Dr. Hernandez   RCA PCI with 3.5 x 15 mm  BMS    Left heart cath 1/31/2010-Dr. Hernandez   LAD 3.0 x 12 and 3.5 x 24 mm  BMS         [facility] 11/12/2019      S/p LCX  PCI     Bilateral CFA access 8 fr       LM patent   Distal LAD 80% stenosis   Patent RCA   LCX  with R to L collaterals     LVEDP 8 mmHg       Crossed LCX  with antegrade Filder XT + LP Turnpike   PCI with 3.0 x 26 and 3.0 x 12 mm Resolute MILES post dilated with 3.5 NC           Diabetes mellitus type 2, noninsulin dependent [E11.9]  Yes    Mixed hyperlipidemia [E78.2]  Yes    Hypertension [I10]  Yes      Resolved Hospital Problems   No resolved problems to display.

## 2023-11-12 NOTE — PLAN OF CARE
VIRTUAL NURSE:  Labs, notes, orders, and careplan reviewed. VN to be available as needed.    Problem: Adult Inpatient Plan of Care  Goal: Plan of Care Review  Outcome: Ongoing, Progressing  Goal: Patient-Specific Goal (Individualized)  Outcome: Ongoing, Progressing  Goal: Absence of Hospital-Acquired Illness or Injury  Outcome: Ongoing, Progressing  Goal: Optimal Comfort and Wellbeing  Outcome: Ongoing, Progressing  Goal: Readiness for Transition of Care  Outcome: Ongoing, Progressing     Problem: Diabetes Comorbidity  Goal: Blood Glucose Level Within Targeted Range  Outcome: Ongoing, Progressing     Problem: Impaired Wound Healing  Goal: Optimal Wound Healing  Outcome: Ongoing, Progressing     Problem: Fall Injury Risk  Goal: Absence of Fall and Fall-Related Injury  Outcome: Ongoing, Progressing     Problem: Infection  Goal: Absence of Infection Signs and Symptoms  Outcome: Ongoing, Progressing     Problem: Hypertension Comorbidity  Goal: Blood Pressure in Desired Range  Outcome: Ongoing, Progressing     Problem: Skin or Soft Tissue Infection  Goal: Absence of Infection Signs and Symptoms  Outcome: Ongoing, Progressing     Problem: Skin Injury Risk Increased  Goal: Skin Health and Integrity  Outcome: Ongoing, Progressing     Problem: VTE (Venous Thromboembolism)  Goal: VTE (Venous Thromboembolism) Symptom Resolution  Outcome: Ongoing, Progressing

## 2023-11-12 NOTE — PLAN OF CARE
Problem: Adult Inpatient Plan of Care  Goal: Plan of Care Review  Outcome: Ongoing, Progressing   Patient AAO to self, not to time or place. Left arm swelling. Picc line to right arm . Abx  infusing.  Dressing to right foot. Z flex boots on.  Excoriation to penis and scrotum . Curdlike discharge from meatus , male external cath changed x2 overnight. Patient turned throughout the night.  this AM. Insulin coverage given. Safety ensured.

## 2023-11-12 NOTE — ASSESSMENT & PLAN NOTE
Blood cx upon admission +MRSA, sensitive to clinda and vanco  Cont vanc  Echo wnl  Blood cx repeated on 11/10 NGTD    Will need 4-6 weeks of iv abx  CM notified

## 2023-11-12 NOTE — ASSESSMENT & PLAN NOTE
Patient with Paroxysmal (<7 days) atrial fibrillation which is controlled currently with Beta Blocker. Patient is currently in sinus rhythm.[id number] Score: 4. HASBLED Score: 3+. Anticoagulation indicated. Anticoagulation done with Apixaban.  We will hold if patient needs possible procedure.\    Resume eliquis

## 2023-11-12 NOTE — ASSESSMENT & PLAN NOTE
U/s of LUE: NONOCCLUSIVE THROMBUS IN THE LEFT INTERNAL JUGULAR VEIN     eliquis was on hold for procedure  Will do lovenox for now until further input from heme/onc  Heme/onc consulted and rec'd eliquis  Resume eliquis

## 2023-11-12 NOTE — NURSING
Patient turned . Mepilex to sacrum intact. Redness to sacrum , yeast present. Krystin care given. Condom cath removed do to leaking urine and irritation to penis. Male external catheter placed, gown changed and linen changed. Night time medications given. Safety ensured. Bed alarm on

## 2023-11-12 NOTE — CONSULTS
[facility]  Hematology/Oncology  Consult Note    Patient Name: Julien Meléndez  MRN: [id number]  Admission Date: 11/7/2023  Hospital Length of Stay: 3 days  Code Status: Full Code   Attending Provider: Tarsha Styles MD  Consulting Provider: Chinedu Chilel MD  Primary Care Physician: Kelvin [facility] MD  Principal Problem:Acute osteomyelitis    Inpatient consult to Telemedicine - Oncology  Consult performed by: Chinedu Chilel MD  Consult ordered by: Chinedu Chilel MD  Reason for consult: thrombosis        Subjective:     HPI:  83 year-old male was admitted on 11/7/23 for cellulitis of foot. Consult is for thrombosis of internal jugular vein.      VIRTUAL TELENOTE    Start time:4:30pm  Chief complaint: thrombosis  The patient location is: [facility]  The patient arrived at: 4:30pm  Present with the patient at the time of the telemed/virtual assessment: his wife     End time: 4:40pm    Total time spent with patient: 10 minutes    The attending portion of this evaluation, treatment, and documentation was performed per Chinedu Chilel MD via Telemedicine AudioVisual using the secure [employer] software platform with 2 way audio/video. The provider was located off-site and the patient is located in the hospital. The aforementioned video software was utilized to document the relevant history and physical exam.    - he endorses fatigue, weakness. He denies shortness of breath, chest pain, nausea, vomiting, diarrhea, constipation.        Oncology Treatment Plan:   [No matching plan found]    Medications:  Continuous Infusions:   sodium chloride 0.9% Stopped (11/08/23 0845)     Scheduled Meds:   amLODIPine  5 mg Oral Daily    aspirin  81 mg Oral Daily    atorvastatin  40 mg Oral QHS    clopidogreL  75 mg Oral Daily    enoxparin  1 mg/kg Subcutaneous Q12H (treatment, non-standard time)    ferrous sulfate  1 tablet Oral Daily    FLUoxetine  40 mg Oral Daily    furosemide  20 mg Oral Daily    insulin detemir  U-100 (Levemir)  10 Units Subcutaneous QHS    isosorbide mononitrate  30 mg Oral Daily    metoprolol tartrate  12.5 mg Oral BID    miconazole nitrate 2%   Topical (Top) BID    mupirocin   Nasal BID    pantoprazole  40 mg Intravenous BID    ranolazine  1,000 mg Oral BID    sodium chloride 0.9%  10 mL Intravenous Q6H    traZODone  150 mg Oral QHS    vancomycin (VANCOCIN) IV (PEDS and ADULTS)  1,000 mg Intravenous Q12H     PRN Meds:acetaminophen, artificial tears, dextrose 10%, dextrose 10%, glucagon (human recombinant), glucose, glucose, HYDROcodone-acetaminophen, HYDROcodone-acetaminophen, HYDROmorphone, influenza 65up-adj, insulin aspart U-100, melatonin, naloxone, ondansetron, ondansetron, oxyCODONE, Flushing PICC/Midline Protocol **AND** sodium chloride 0.9% **AND** sodium chloride 0.9%, sodium chloride 0.9%, Pharmacy to dose Vancomycin consult **AND** vancomycin - pharmacy to dose     Review of patient's allergies indicates:   Allergen Reactions    Nystatin      Other reaction(s): Unknown        Past Medical History:   Diagnosis Date    Diabetes mellitus     Hiatal hernia     under Dr Saenz' care    Hyperlipidemia     Hypertension     MI (myocardial infarction)     Sleep apnea      Past Surgical History:   Procedure Laterality Date    ABDOMINAL HERNIA REPAIR      ANGIOGRAM, EXTREMITY, BILATERAL Bilateral 4/5/2023    Procedure: ANGIOGRAM, EXTREMITY, BILATERAL;  Surgeon: Michael Giraldo III, MD;  Location: [facility] CATH LAB;  Service: Cardiology;  Laterality: Bilateral;    ANGIOGRAPHY OF LOWER EXTREMITY Left 4/12/2023    Procedure: Angiogram Extremity Unilateral;  Surgeon: Michael Giraldo III, MD;  Location: [facility] CATH LAB;  Service: Cardiology;  Laterality: Left;    AORTOGRAPHY WITH SERIALOGRAPHY Bilateral 4/5/2023    Procedure: AORTOGRAM, WITH SERIALOGRAPHY;  Surgeon: Michael Giraldo III, MD;  Location: [facility] CATH LAB;  Service: Cardiology;  Laterality: Bilateral;    APPLICATION OF WOUND  VACUUM-ASSISTED CLOSURE DEVICE Left 3/29/2023    Procedure: APPLICATION, WOUND VAC;  Surgeon: Alona Pierce DPM;  Location: [facility] OR;  Service: Podiatry;  Laterality: Left;    APPLICATION OF WOUND VACUUM-ASSISTED CLOSURE DEVICE Left 4/4/2023    Procedure: APPLICATION, WOUND VAC;  Surgeon: Alona Pierce DPM;  Location: [facility] OR;  Service: Podiatry;  Laterality: Left;    APPLICATION OF WOUND VACUUM-ASSISTED CLOSURE DEVICE Left 4/13/2023    Procedure: APPLICATION, WOUND VAC;  Surgeon: Alona Pierce DPM;  Location: [facility] OR;  Service: Podiatry;  Laterality: Left;    BONE BIOPSY Left 3/29/2023    Procedure: BIOPSY, BONE;  Surgeon: Alona Pierce DPM;  Location: [facility] OR;  Service: Podiatry;  Laterality: Left;  1st met    CARDIAC CATHETERIZATION      3 stents placed    CATARACT EXTRACTION, BILATERAL      CLOSURE DEVICE Left 4/12/2023    Procedure: Placement of Closure Device;  Surgeon: Michael Giraldo III, MD;  Location: [facility] CATH LAB;  Service: Cardiology;  Laterality: Left;    CORONARY ANGIOGRAPHY N/A 10/3/2019    Procedure: ANGIOGRAM, CORONARY ARTERY;  Surgeon: Edwin Azul MD;  Location: [facility] CATH LAB/EP;  Service: Cardiology;  Laterality: N/A;    DEBRIDEMENT OF FOOT Left 3/29/2023    Procedure: DEBRIDEMENT, FOOT;  Surgeon: Alona Pierce DPM;  Location: [facility] OR;  Service: Podiatry;  Laterality: Left;    DEBRIDEMENT OF FOOT Left 4/4/2023    Procedure: DEBRIDEMENT, FOOT;  Surgeon: Alona Pierce DPM;  Location: [facility] OR;  Service: Podiatry;  Laterality: Left;    DEBRIDEMENT OF FOOT Left 4/13/2023    Procedure: DEBRIDEMENT, FOOT;  Surgeon: Alona Pierce DPM;  Location: [facility] OR;  Service: Podiatry;  Laterality: Left;    ESOPHAGOGASTRODUODENOSCOPY N/A 4/17/2023    Procedure: EGD (ESOPHAGOGASTRODUODENOSCOPY);  Surgeon: Otto Villarreal MD;  Location: [facility] ENDO;  Service: Endoscopy;  Laterality: N/A;    HERNIA REPAIR      inguinal    IVUS (INTRAVASCULAR ULTRASOUND) Left 4/12/2023    Procedure: ULTRASOUND,  INTRAVASCULAR;  Surgeon: Michael Giraldo III, MD;  Location: [facility] CATH LAB;  Service: Cardiology;  Laterality: Left;    LEFT HEART CATHETERIZATION Left 9/13/2019    Procedure: Left heart cath;  Surgeon: Edwin Azul MD;  Location: [facility] CATH LAB/EP;  Service: Cardiology;  Laterality: Left;    LEFT HEART CATHETERIZATION N/A 10/3/2019    Procedure: Left heart cath;  Surgeon: Edwin Azul MD;  Location: [facility] CATH LAB/EP;  Service: Cardiology;  Laterality: N/A;    OSTEOTOMY Left 6/7/2023    Procedure: OSTEOTOMY;  Surgeon: Alona Pierce DPM;  Location: [facility] OR;  Service: Podiatry;  Laterality: Left;    OSTEOTOMY OF METATARSAL BONE Right 4/4/2023    Procedure: OSTEOTOMY, METATARSAL BONE;  Surgeon: Alona Pierce DPM;  Location: [facility] OR;  Service: Podiatry;  Laterality: Right;    PERCUTANEOUS TRANSLUMINAL ANGIOPLASTY Left 4/12/2023    Procedure: PTA (ANGIOPLASTY, PERCUTANEOUS, TRANSLUMINAL);  Surgeon: Michael Giraldo III, MD;  Location: [facility] CATH LAB;  Service: Cardiology;  Laterality: Left;    SURGICAL REMOVAL OF BUNION WITH OSTEOTOMY OF METATARSAL BONE Right 4/4/2023    Procedure: BUNIONECTOMY, WITH METATARSAL OSTEOTOMY;  Surgeon: Alona Pierce DPM;  Location: [facility] OR;  Service: Podiatry;  Laterality: Right;    TOE AMPUTATION Left 4/4/2023    Procedure: AMPUTATION, TOE;  Surgeon: Alona Pierce DPM;  Location: [facility] OR;  Service: Podiatry;  Laterality: Left;  1st ray    WASHOUT Left 6/7/2023    Procedure: WASHOUT;  Surgeon: Alona Pierce DPM;  Location: [facility] OR;  Service: Podiatry;  Laterality: Left;    WOUND DEBRIDEMENT Left 6/7/2023    Procedure: DEBRIDEMENT, WOUND;  Surgeon: Alona Pierce DPM;  Location: [facility] OR;  Service: Podiatry;  Laterality: Left;     Family History       Problem Relation (Age of Onset)    Heart disease Mother, Sister, Brother, Maternal Uncle, Brother, Sister, Maternal Uncle, Maternal Uncle, Maternal Uncle    Stroke Mother          Tobacco Use    Smoking status: Never     Smokeless tobacco: Never   Substance and Sexual Activity    Alcohol use: No    Drug use: No    Sexual activity: Not on file       Review of Systems   Constitutional:  Positive for fatigue.   HENT:  Negative for sore throat.    Eyes:  Negative for visual disturbance.   Respiratory:  Negative for shortness of breath.    Cardiovascular:  Negative for chest pain.   Gastrointestinal:  Negative for abdominal pain.   Genitourinary:  Negative for dysuria.   Musculoskeletal:  Negative for back pain.   Skin:  Negative for rash.   Neurological:  Positive for weakness. Negative for headaches.   Hematological:  Negative for adenopathy.   Psychiatric/Behavioral:  The patient is not nervous/anxious.      Objective:     Vital Signs (Most Recent):  Temp: 98.3 °F (36.8 °C) (11/12/23 1555)  Pulse: 73 (11/12/23 1555)  Resp: 18 (11/12/23 1555)  BP: 114/61 (11/12/23 1555)  SpO2: 96 % (11/12/23 1555) Vital Signs (24h Range):  Temp:  [97.7 °F (36.5 °C)-98.3 °F (36.8 °C)] 98.3 °F (36.8 °C)  Pulse:  [64-76] 73  Resp:  [17-19] 18  SpO2:  [95 %-98 %] 96 %  BP: (100-131)/(54-64) 114/61     Weight: 84.2 kg (185 lb 10 oz)  Body mass index is 24.49 kg/m².  Body surface area is 2.08 meters squared.      Intake/Output Summary (Last 24 hours) at 11/12/2023 1639  Last data filed at 11/12/2023 0543  Gross per 24 hour   Intake 1089.6 ml   Output 600 ml   Net 489.6 ml        Physical Exam  Deferred due to telemedicine visit.       Significant Labs:   CBC:   Recent Labs   Lab 11/12/23  1149   WBC 8.88   HGB 10.1*   HCT 30.6*       and CMP:   Recent Labs   Lab 11/12/23  1149   *   K 4.1   CL 98   CO2 25   *   BUN 16   CREATININE 0.9   CALCIUM 8.5*   ANIONGAP 7*       Diagnostic Results:  US Upper Extremity Veins Left (11/11/23):  NONOCCLUSIVE THROMBUS IN THE LEFT INTERNAL JUGULAR VEIN     No thrombus in REMAINING veins of the left upper extremity.      Assessment/Plan:     Internal jugular (IJ) vein thromboembolism, acute  - seen on  ultrasound upper extremities (11/11/23)  - this is likely provoked in the setting of foot infection/recent surgery  - recommend at least 3 months of anticoagulation. Either apixaban or rivaroxaban is a suitable anticoagulant.        Thank you for your consult.     Chinedu Chilel MD  Hematology/Oncology  [facility]

## 2023-11-12 NOTE — NURSING
Report received from Petrona COKER. Patient with left shoulder pain. 5/10. No foot pain. AAo. Dressing to left foot C/D/I. Z flex boots on. Condom cath on . Picc Line placed to right upper arm. . Safety ensured

## 2023-11-12 NOTE — HPI
83 year-old male was admitted on 11/7/23 for cellulitis of foot. Consult is for thrombosis of internal jugular vein.

## 2023-11-12 NOTE — NURSING
Male external catheter changed. Krystin care given. Diaper placed. Bed linen changed.  Bed alarm on

## 2023-11-13 PROBLEM — M25.512 LEFT SHOULDER PAIN: Status: ACTIVE | Noted: 2023-11-13

## 2023-11-13 LAB
BACTERIA SPEC AEROBE CULT: ABNORMAL
POCT GLUCOSE: 205 MG/DL (ref 70–110)
POCT GLUCOSE: 308 MG/DL (ref 70–110)
POCT GLUCOSE: 313 MG/DL (ref 70–110)
POCT GLUCOSE: 315 MG/DL (ref 70–110)
SARS-COV-2 RDRP RESP QL NAA+PROBE: NEGATIVE

## 2023-11-13 PROCEDURE — 97165 OT EVAL LOW COMPLEX 30 MIN: CPT

## 2023-11-13 PROCEDURE — 11000001 HC ACUTE MED/SURG PRIVATE ROOM

## 2023-11-13 PROCEDURE — 97530 THERAPEUTIC ACTIVITIES: CPT

## 2023-11-13 PROCEDURE — 25000003 PHARM REV CODE 250: Performed by: NURSE PRACTITIONER

## 2023-11-13 PROCEDURE — U0002 COVID-19 LAB TEST NON-CDC: HCPCS | Performed by: FAMILY MEDICINE

## 2023-11-13 PROCEDURE — 27000207 HC ISOLATION

## 2023-11-13 PROCEDURE — 63600175 PHARM REV CODE 636 W HCPCS: Performed by: FAMILY MEDICINE

## 2023-11-13 PROCEDURE — C9113 INJ PANTOPRAZOLE SODIUM, VIA: HCPCS | Performed by: FAMILY MEDICINE

## 2023-11-13 PROCEDURE — 97161 PT EVAL LOW COMPLEX 20 MIN: CPT

## 2023-11-13 PROCEDURE — A4216 STERILE WATER/SALINE, 10 ML: HCPCS | Performed by: FAMILY MEDICINE

## 2023-11-13 PROCEDURE — 86580 TB INTRADERMAL TEST: CPT | Performed by: FAMILY MEDICINE

## 2023-11-13 PROCEDURE — 30200315 PPD INTRADERMAL TEST REV CODE 302: Performed by: FAMILY MEDICINE

## 2023-11-13 PROCEDURE — 25000003 PHARM REV CODE 250: Performed by: FAMILY MEDICINE

## 2023-11-13 RX ADMIN — RANOLAZINE 1000 MG: 500 TABLET, FILM COATED, EXTENDED RELEASE ORAL at 08:11

## 2023-11-13 RX ADMIN — MICONAZOLE NITRATE: 20 OINTMENT TOPICAL at 09:11

## 2023-11-13 RX ADMIN — AMLODIPINE BESYLATE 5 MG: 5 TABLET ORAL at 09:11

## 2023-11-13 RX ADMIN — METOPROLOL TARTRATE 12.5 MG: 25 TABLET, FILM COATED ORAL at 09:11

## 2023-11-13 RX ADMIN — ISOSORBIDE MONONITRATE 30 MG: 30 TABLET, EXTENDED RELEASE ORAL at 09:11

## 2023-11-13 RX ADMIN — ATORVASTATIN CALCIUM 40 MG: 40 TABLET, FILM COATED ORAL at 08:11

## 2023-11-13 RX ADMIN — TUBERCULIN PURIFIED PROTEIN DERIVATIVE 5 UNITS: 5 INJECTION, SOLUTION INTRADERMAL at 04:11

## 2023-11-13 RX ADMIN — MUPIROCIN: 20 OINTMENT TOPICAL at 08:11

## 2023-11-13 RX ADMIN — MUPIROCIN: 20 OINTMENT TOPICAL at 09:11

## 2023-11-13 RX ADMIN — VANCOMYCIN HYDROCHLORIDE 1000 MG: 1 INJECTION, POWDER, LYOPHILIZED, FOR SOLUTION INTRAVENOUS at 04:11

## 2023-11-13 RX ADMIN — INSULIN ASPART 2 UNITS: 100 INJECTION, SOLUTION INTRAVENOUS; SUBCUTANEOUS at 09:11

## 2023-11-13 RX ADMIN — FUROSEMIDE 20 MG: 20 TABLET ORAL at 09:11

## 2023-11-13 RX ADMIN — SODIUM CHLORIDE, PRESERVATIVE FREE 10 ML: 5 INJECTION INTRAVENOUS at 12:11

## 2023-11-13 RX ADMIN — ENOXAPARIN SODIUM 80 MG: 80 INJECTION SUBCUTANEOUS at 09:11

## 2023-11-13 RX ADMIN — FERROUS SULFATE TAB 325 MG (65 MG ELEMENTAL FE) 1 EACH: 325 (65 FE) TAB at 09:11

## 2023-11-13 RX ADMIN — DOCUSATE SODIUM AND SENNOSIDES 1 TABLET: 8.6; 5 TABLET, FILM COATED ORAL at 02:11

## 2023-11-13 RX ADMIN — ASPIRIN 81 MG CHEWABLE TABLET 81 MG: 81 TABLET CHEWABLE at 09:11

## 2023-11-13 RX ADMIN — VANCOMYCIN HYDROCHLORIDE 1000 MG: 1 INJECTION, POWDER, LYOPHILIZED, FOR SOLUTION INTRAVENOUS at 05:11

## 2023-11-13 RX ADMIN — HYDROCODONE BITARTRATE AND ACETAMINOPHEN 1 TABLET: 5; 325 TABLET ORAL at 09:11

## 2023-11-13 RX ADMIN — SODIUM CHLORIDE, PRESERVATIVE FREE 10 ML: 5 INJECTION INTRAVENOUS at 06:11

## 2023-11-13 RX ADMIN — RANOLAZINE 1000 MG: 500 TABLET, FILM COATED, EXTENDED RELEASE ORAL at 09:11

## 2023-11-13 RX ADMIN — PANTOPRAZOLE SODIUM 40 MG: 40 INJECTION, POWDER, LYOPHILIZED, FOR SOLUTION INTRAVENOUS at 08:11

## 2023-11-13 RX ADMIN — PANTOPRAZOLE SODIUM 40 MG: 40 INJECTION, POWDER, LYOPHILIZED, FOR SOLUTION INTRAVENOUS at 09:11

## 2023-11-13 RX ADMIN — APIXABAN 5 MG: 5 TABLET, FILM COATED ORAL at 08:11

## 2023-11-13 RX ADMIN — POLYETHYLENE GLYCOL 3350 17 G: 17 POWDER, FOR SOLUTION ORAL at 09:11

## 2023-11-13 RX ADMIN — METOPROLOL TARTRATE 12.5 MG: 25 TABLET, FILM COATED ORAL at 08:11

## 2023-11-13 RX ADMIN — POLYETHYLENE GLYCOL 3350 17 G: 17 POWDER, FOR SOLUTION ORAL at 08:11

## 2023-11-13 RX ADMIN — TRAZODONE HYDROCHLORIDE 150 MG: 100 TABLET ORAL at 08:11

## 2023-11-13 RX ADMIN — INSULIN ASPART 4 UNITS: 100 INJECTION, SOLUTION INTRAVENOUS; SUBCUTANEOUS at 12:11

## 2023-11-13 RX ADMIN — CLOPIDOGREL BISULFATE 75 MG: 75 TABLET ORAL at 09:11

## 2023-11-13 RX ADMIN — INSULIN DETEMIR 10 UNITS: 100 INJECTION, SOLUTION SUBCUTANEOUS at 09:11

## 2023-11-13 RX ADMIN — INSULIN ASPART 4 UNITS: 100 INJECTION, SOLUTION INTRAVENOUS; SUBCUTANEOUS at 04:11

## 2023-11-13 RX ADMIN — FLUOXETINE 40 MG: 20 CAPSULE ORAL at 09:11

## 2023-11-13 NOTE — PROGRESS NOTES
Progress Note  [facility] Infectious Disease    ASSESSMENT:   MRSA septicemia likely from right foot infection. Currently on vancomycin.  Status post partial amputation of right toe.  Repeat blood cultures negative so far.  Echo without evidence of vegetations  MRSA foot infection. Status post amputation and on vancomycin. Previously also on meropenem because of prior history of ESBL E coli and Providencia from right foot. No evidence of this at this time  Left shoulder pain after traumatic injury in the face of MRSA bacteremia. Given the propensity of MRSA to go to damage tissues, would consider evaluation of left shoulder in case patient has localized infection there.    Left arm swelling. Ultrasound of the left upper extremity without evidence clots.  Nonocclusive thrombus in the IJ.  Diabetes, anxiety depression, atrial fibrillation, heart failure      RECOMMENDATIONS:   Continue vancomycin with monitoring levels.  We will need prolonged antibiotic therapy  Could be switched to daptomycin  Evaluate left shoulder by orthopedics and possible imaging  Monitor patient progress       Thanks for this consult! Please call if you have any questions.  Mounika Curiel, DO  [facility] IM/Peds PGY-1    [phone] pager    INTERVAL HISTORY:     Patient still with left arm swelling. Reports continued left shoulder pain and limited ROM 2/2 pain. Denies worsening of pain or changes in quality of pain during this hospitalization. Denies any pain to lower extremities. Afebrile.     Medications reviewed; current antibiotics:  Vancomycin     Review of Systems:  Review of Systems   Constitutional: Negative.    Gastrointestinal: Negative.    Musculoskeletal:         Chronic L shoulder pain for months. L arm swelling       OBJECTIVE:     Vital Signs (Most Recent)  Temp: 97.3 °F (36.3 °C) (11/13/23 0309)  Pulse: 62 (11/13/23 0309)  Resp: 18 (11/13/23 0309)  BP: 130/60 (11/13/23 0309)  SpO2: 100 % (11/13/23 0309)    Temperature Range Min/Max (Last  24H):  Temp:  [97.3 °F (36.3 °C)-99.2 °F (37.3 °C)]     Physical Exam:  Physical Exam  Vitals reviewed.   Constitutional:       General: He is not in acute distress.     Appearance: Normal appearance.   HENT:      Head: Normocephalic.      Right Ear: External ear normal.      Left Ear: External ear normal.      Nose: Nose normal.      Mouth/Throat:      Mouth: Mucous membranes are moist.   Eyes:      Extraocular Movements: Extraocular movements intact.      Conjunctiva/sclera: Conjunctivae normal.   Cardiovascular:      Rate and Rhythm: Normal rate and regular rhythm.      Heart sounds: Normal heart sounds.   Pulmonary:      Effort: Pulmonary effort is normal. No respiratory distress.      Breath sounds: Normal breath sounds. No wheezing.   Abdominal:      General: Abdomen is flat. Bowel sounds are normal. There is no distension.      Palpations: Abdomen is soft.      Tenderness: There is no abdominal tenderness.   Musculoskeletal:         General: Swelling present.      Comments: Left UE with continued edema. ROM limited 2/2 pain.   Lower extremities with wound care boots in place. RLE with dressing intact.    Skin:     General: Skin is warm.   Neurological:      General: No focal deficit present.      Mental Status: He is alert and oriented to person, place, and time.   Psychiatric:         Mood and Affect: Mood normal.         Behavior: Behavior normal.         Laboratory:  Recent Labs   Lab 11/07/23  2044 11/08/23  0245 11/09/23  0455 11/10/23  0443 11/12/23  1149   WBC  --    < > 6.60 6.59 8.88   HGB  --    < > 10.7* 10.5* 10.1*   HCT  --    < > 32.0* 31.1* 30.6*   PLT  --    < > 245 280 315   *   < > 133* 131* 130*   K 4.4   < > 3.8 3.8 4.1   CL 95   < > 100 98 98   CO2 15*   < > 18* 23 25   BUN 20   < > 16 16 16   CREATININE 1.3   < > 0.9 0.9 0.9   AST 10  --   --   --   --    ALT 15  --   --   --   --    ALKPHOS 96  --   --   --   --    BILITOT 0.8  --   --   --   --     < > = values in this interval  not displayed.     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 Hernandez        LM patent  LAD patent except for distal 75%  LCX patent  OM1   RCA 90%      PCI of RCA 2.75 x 18 mm MILES  Unsuccessful PCI of OM1         LVEDP 11 mmHg  3.5 x 18 mm       Left hearth cath 3/1/2010 -Dr. Hernandez   RCA PCI with 3.5 x 15 mm  BMS    Left heart cath 1/31/2010-Dr. Hernandez   LAD 3.0 x 12 and 3.5 x 24 mm  BMS         [facility] 11/12/2019      S/p LCX  PCI     Bilateral CFA access 8 fr       LM patent   Distal LAD 80% stenosis   Patent RCA   LCX  with R to L collaterals     LVEDP 8 mmHg       Crossed LCX  with antegrade Filder XT + LP Turnpike   PCI with 3.0 x 26 and 3.0 x 12 mm Resolute MILES post dilated with 3.5 NC           Diabetes mellitus type 2, noninsulin dependent [E11.9]  Yes    Mixed hyperlipidemia [E78.2]  Yes    Hypertension [I10]  Yes      Resolved Hospital Problems   No resolved problems to display.

## 2023-11-13 NOTE — ASSESSMENT & PLAN NOTE
Podiatry consult:  MRI confirms osteomyelitis     s/p partial 1st ray amputation of right great toe. Clean margins taken, sent for Culture and Pathology plan for 2-6 weeks of antibiotics pending results upon d/c, antibiotic management per Infectious Disease    1. Keep dressing dry and intact, if strike-through noted, please reinforce dressing with kerlix and ACE  2. Patient non-weightbearing to surgical extremity for next two days  3. Elevate surgical foot when at rest, Z-flex boots to bilateral lower extremity     Patient to only to transfer in short distances to affected foot with Darco shoe, partial weightbearing to heel  Patient to keep dressings clean dry and intact     6 weeks of IV abx total  SNF pending

## 2023-11-13 NOTE — PLAN OF CARE
11/13/23 1233   Post-Acute Status   Post-Acute Authorization Placement   Post-Acute Placement Status Referrals Sent  (Referals sent at attending alerted that family would not be able to safely manage IV ABX at home. PENDING ACCEPTING FACILITY.)   Discharge Plan   Discharge Plan A Skilled Nursing Facility

## 2023-11-13 NOTE — PLAN OF CARE
Problem: Physical Therapy  Goal: Physical Therapy Goal  Description: Goals to be met by: 23     Patient will increase functional independence with mobility by performin. Supine to sit with Moderate Assistance  2. Sit to supine with Moderate Assistance  3. Rolling to Left and Right with Minimal Assistance.  4. Sit to stand transfer with Moderate Assistance  5. Bed to chair transfer with Moderate Assistance using least restrictive AD  6. Wheelchair propulsion x50 feet with Minimal Assistance using bilateral upper extremities    Outcome: Ongoing, Progressing     PT Eval completed, note to follow. Pt educated on PWB to R heel precautions per podiatry and fitted pt for forefoot offloading darco shoe. Orthopedic MD reports no LUE restrictions/precautions. Pt required MaxA x 2 for sit>stand and pt unable to achieve full upright position. Recommending moderate intensity therapy.

## 2023-11-13 NOTE — PLAN OF CARE
AAO x4. Diabetic diet continued. Complaints of pain in left shoulder. Pt denies N/V, SOB, distress. Medications given per MAR. Vital signs stable throughout the night. PICC dressing CDI. Weight shifting assistance provided. Safety and contact precautions maintained. Bed alarm set. Call bell within reach. Patient encouraged to call for assistance.      Problem: Adult Inpatient Plan of Care  Goal: Patient-Specific Goal (Individualized)  Outcome: Ongoing, Progressing  Goal: Absence of Hospital-Acquired Illness or Injury  Outcome: Ongoing, Progressing  Goal: Optimal Comfort and Wellbeing  Outcome: Ongoing, Progressing

## 2023-11-13 NOTE — PT/OT/SLP EVAL
Physical Therapy Evaluation    Patient Name:  Julien Meléndez   MRN:  [id number]    Recommendations:     Discharge Recommendations: Moderate Intensity Therapy   Discharge Equipment Recommendations: to be determined by next level of care   Barriers to discharge:  increased assist required    Assessment:     Julien Meléndez is a 83 y.o. male admitted with a medical diagnosis of Acute osteomyelitis.  He presents with the following impairments/functional limitations: weakness, gait instability, impaired balance, impaired endurance, impaired self care skills, impaired functional mobility, decreased lower extremity function, decreased upper extremity function, decreased ROM, impaired sensation, pain, impaired skin, edema, orthopedic precautions, impaired cognition, impaired joint extensibility . Pt educated on PWB to R heel precautions per podiatry and fitted pt for forefoot offloading darco shoe. Orthopedic MD reports no LUE restrictions/precautions. Pt required MaxA x 2 for sit>stand and pt unable to achieve full upright position. Recommending moderate intensity therapy.     Rehab Prognosis: Good; patient would benefit from acute skilled PT services to address these deficits and reach maximum level of function.    Recent Surgery: Procedure(s) (LRB):  AMPUTATION, TOE (Right) 3 Days Post-Op    Plan:     During this hospitalization, patient to be seen 5 x/week to address the identified rehab impairments via therapeutic activities, therapeutic exercises, neuromuscular re-education, wheelchair management/training and progress toward the following goals:    Plan of Care Expires:  12/13/23    Subjective     Chief Complaint: L shoulder pain and R foot pain  Patient/Family Comments/goals: pt reports increased weakness  Pain/Comfort:  Pain Rating 1:  (no pain at rest, increased pain with L shld movement)  Location - Side 1: Left  Location 1: shoulder  Pain Addressed 1: Reposition, Distraction, Cessation of Activity, Nurse  "notified  Pain Rating Post-Intervention 1:  (comfortable at rest)    Patients cultural, spiritual, Rastafarian conflicts given the current situation: no    Living Environment:  Pt lives w/spouse, SSH, 0 JUSTIN, WIS w/SC   Prior to admission, patients level of function was requiting increased assistance for stand pivot t/f to WC (spouse reports varying assist levels) spouse sponge bathes pt, pt requires assist dressing & toileting from BSC in room, sleeps in hospital bed, able to feed self. Pt able to self propel WC. Pt reports no falls in the past 2 months.  Equipment used at home: wheelchair, hospital bed, 3-in-1 commode, walker, rolling.  DME owned (not currently used): rolling walker.  Upon discharge, patient will have assistance from family.    Objective:     Communicated with nsg prior to session.  Patient found HOB elevated with peripheral IV, SCD, bed alarm  upon PT entry to room.    General Precautions: Standard, fall, contact  Orthopedic Precautions:LUE weight bearing as tolerated, RLE partial weight bearing (PWB to R heel)   Braces:  (forefoot offloading R darco)  Respiratory Status: Room air    Exams:  Cognitive Exam:  Patient is oriented to Person, Place, and  confused to month "February" & year "1975"; impaired memory  Postural Exam:  Patient presented with the following abnormalities:    -       Rounded shoulders  -       Forward head  Sensation:    -       Impaired  B feet decreased sensation  Skin Integrity/Edema:      -       Skin integrity: Wound R foot s/p partial 1st ray R great toe amputation with dressing intact and old scars and scabs noted on L foot with toe amputations and deformity   -       Edema: Moderate R foot and L hand  RLE ROM: WFL  RLE Strength: knee WFL, hip flexion 4-/5, ankle NT  LLE ROM: WFL  LLE Strength: WFL except 4-/5 hip flexion    Functional Mobility:  Bed Mobility:     Rolling Left:  maximal assistance  Rolling Right: maximal assistance  Scooting: maximal assistance and of 2 " persons  Supine to Sit: moderate assistance and of 2 persons  Sit to Supine: maximal assistance and of 2 persons  Transfers:     Sit to Stand:  maximal assistance and of 2 persons with hand-held assist; bed height elevated and pt unable to achieve full upright stance      AM-PAC 6 CLICK MOBILITY  Total Score:8       Treatment & Education:  Pt/family educated on PWB to R heel precautions per podiatry and fitted pt for forefoot offloading darco shoe.   Orthopedic MD reports no LUE restrictions/precautions and activity as tolerated.  Assist required for donning/doffing darco shoes; pt with regular diabetic / post-op shoes in room  Pt transitioned sitting EOB and is SBA for sitting balance with occasional volitional R lateral lean due to pt reporting L shoulder pain  Attempted 1 stand as reported above; pt unable to achieve full stand and/or perform t/f to chair  Pt reporting pain in LUE and BLE weakness limiting functional mobility   Pt unable tolerate further activity; pt performed seated lateral scoots with MaxA x 2 then returned supine  B rolling performed to adjust bed linens  Pt able to perform SLR and educated on UE/LE therex  RLE and LUE elevated on pillow  Spoke with nsg about ice/heat modalities for L shoulder pain      Patient left HOB elevated with all lines intact, call button in reach, bed alarm on, nsg notified, and family and AVAsys present.    GOALS:   Multidisciplinary Problems       Physical Therapy Goals          Problem: Physical Therapy    Goal Priority Disciplines Outcome Goal Variances Interventions   Physical Therapy Goal     PT, PT/OT Ongoing, Progressing     Description: Goals to be met by: 23     Patient will increase functional independence with mobility by performin. Supine to sit with Moderate Assistance  2. Sit to supine with Moderate Assistance  3. Rolling to Left and Right with Minimal Assistance.  4. Sit to stand transfer with Moderate Assistance  5. Bed to chair transfer  with Moderate Assistance using least restrictive AD  6. Wheelchair propulsion x50 feet with Minimal Assistance using bilateral upper extremities                         History:     Past Medical History:   Diagnosis Date    Diabetes mellitus     Hiatal hernia     under Dr Saenz' care    Hyperlipidemia     Hypertension     MI (myocardial infarction)     Sleep apnea        Past Surgical History:   Procedure Laterality Date    ABDOMINAL HERNIA REPAIR      ANGIOGRAM, EXTREMITY, BILATERAL Bilateral 4/5/2023    Procedure: ANGIOGRAM, EXTREMITY, BILATERAL;  Surgeon: Michael Giraldo III, MD;  [facility];  Service: Cardiology;  Laterality: Bilateral;    ANGIOGRAPHY OF LOWER EXTREMITY Left 4/12/2023    Procedure: Angiogram Extremity Unilateral;  Surgeon: Michael Giraldo III, MD;  [facility];  Service: Cardiology;  Laterality: Left;    AORTOGRAPHY WITH SERIALOGRAPHY Bilateral 4/5/2023    Procedure: AORTOGRAM, WITH SERIALOGRAPHY;  Surgeon: Michael Giraldo III, MD;  [facility];  Service: Cardiology;  Laterality: Bilateral;    APPLICATION OF WOUND VACUUM-ASSISTED CLOSURE DEVICE Left 3/29/2023    Procedure: APPLICATION, WOUND VAC;  Surgeon: Alona Pierce DPM;  [facility];  Service: Podiatry;  Laterality: Left;    APPLICATION OF WOUND VACUUM-ASSISTED CLOSURE DEVICE Left 4/4/2023    Procedure: APPLICATION, WOUND VAC;  Surgeon: Alona Pierce DPM;  [facility];  Service: Podiatry;  Laterality: Left;    APPLICATION OF WOUND VACUUM-ASSISTED CLOSURE DEVICE Left 4/13/2023    Procedure: APPLICATION, WOUND VAC;  Surgeon: Alona Pierce DPM;  [facility];  Service: Podiatry;  Laterality: Left;    BONE BIOPSY Left 3/29/2023    Procedure: BIOPSY, BONE;  Surgeon: Alona Pierce DPM;  [facility];  Service: Podiatry;  Laterality: Left;  1st met    CARDIAC CATHETERIZATION      3 stents placed    CATARACT EXTRACTION, BILATERAL      CLOSURE DEVICE Left 4/12/2023    Procedure:  Placement of Closure Device;  Surgeon: Michael Giraldo III, MD;  Location: [facility] CATH LAB;  Service: Cardiology;  Laterality: Left;    CORONARY ANGIOGRAPHY N/A 10/3/2019    Procedure: ANGIOGRAM, CORONARY ARTERY;  Surgeon: Edwin Azul MD;  Location: [facility] CATH LAB/EP;  Service: Cardiology;  Laterality: N/A;    DEBRIDEMENT OF FOOT Left 3/29/2023    Procedure: DEBRIDEMENT, FOOT;  Surgeon: Alona Pierce DPM;  Location: [facility] OR;  Service: Podiatry;  Laterality: Left;    DEBRIDEMENT OF FOOT Left 4/4/2023    Procedure: DEBRIDEMENT, FOOT;  Surgeon: Alona Pierce DPM;  Location: [facility] OR;  Service: Podiatry;  Laterality: Left;    DEBRIDEMENT OF FOOT Left 4/13/2023    Procedure: DEBRIDEMENT, FOOT;  Surgeon: Alona Pierce DPM;  Location: [facility] OR;  Service: Podiatry;  Laterality: Left;    ESOPHAGOGASTRODUODENOSCOPY N/A 4/17/2023    Procedure: EGD (ESOPHAGOGASTRODUODENOSCOPY);  Surgeon: Otto Villarreal MD;  Location: [facility] ENDO;  Service: Endoscopy;  Laterality: N/A;    HERNIA REPAIR      inguinal    IVUS (INTRAVASCULAR ULTRASOUND) Left 4/12/2023    Procedure: ULTRASOUND, INTRAVASCULAR;  Surgeon: Michael Giraldo III, MD;  Location: [facility] CATH LAB;  Service: Cardiology;  Laterality: Left;    LEFT HEART CATHETERIZATION Left 9/13/2019    Procedure: Left heart cath;  Surgeon: Edwin Azul MD;  Location: [facility] CATH LAB/EP;  Service: Cardiology;  Laterality: Left;    LEFT HEART CATHETERIZATION N/A 10/3/2019    Procedure: Left heart cath;  Surgeon: Edwin Azul MD;  Location: [facility] CATH LAB/EP;  Service: Cardiology;  Laterality: N/A;    OSTEOTOMY Left 6/7/2023    Procedure: OSTEOTOMY;  Surgeon: Alona Pierce DPM;  Location: [facility] OR;  Service: Podiatry;  Laterality: Left;    OSTEOTOMY OF METATARSAL BONE Right 4/4/2023    Procedure: OSTEOTOMY, METATARSAL BONE;  Surgeon: Alona Pierce DPM;  Location: [facility] OR;  Service: Podiatry;  Laterality: Right;    PERCUTANEOUS TRANSLUMINAL ANGIOPLASTY Left 4/12/2023    Procedure: PTA  (ANGIOPLASTY, PERCUTANEOUS, TRANSLUMINAL);  Surgeon: Michael Giraldo III, MD;  Location: [facility] CATH LAB;  Service: Cardiology;  Laterality: Left;    SURGICAL REMOVAL OF BUNION WITH OSTEOTOMY OF METATARSAL BONE Right 4/4/2023    Procedure: BUNIONECTOMY, WITH METATARSAL OSTEOTOMY;  Surgeon: Alona Pierce DPM;  Location: [facility] OR;  Service: Podiatry;  Laterality: Right;    TOE AMPUTATION Left 4/4/2023    Procedure: AMPUTATION, TOE;  Surgeon: Alona Pierce DPM;  Location: [facility] OR;  Service: Podiatry;  Laterality: Left;  1st ray    TOE AMPUTATION Right 11/10/2023    Procedure: AMPUTATION, TOE;  Surgeon: Diya Leung DPM;  Location: [facility] OR;  Service: Podiatry;  Laterality: Right;    WASHOUT Left 6/7/2023    Procedure: WASHOUT;  Surgeon: Alona Pierce DPM;  Location: [facility] OR;  Service: Podiatry;  Laterality: Left;    WOUND DEBRIDEMENT Left 6/7/2023    Procedure: DEBRIDEMENT, WOUND;  Surgeon: Alona Pierce DPM;  Location: [facility] OR;  Service: Podiatry;  Laterality: Left;       Time Tracking:     PT Received On: 11/13/23  PT Start Time: 1524     PT Stop Time: 1555  PT Total Time (min): 31 min with OT    Billable Minutes: Evaluation 8 and Therapeutic Activity 23 11/13/2023

## 2023-11-13 NOTE — PLAN OF CARE
11/13/23 1407   Post-Acute Status   Post-Acute Authorization Placement   Post-Acute Placement Status Pending payor review/awaiting authorization (if required)  (Accepted by [facility]. Spouse reports pt was there previously and they are familiar for him to return. [facility] for bed tmrw. Auth to be submitted today. PPD/Covid ordered. [employer] called and [employer] faxed.)   Discharge Delays (!) Post-Acute Set-up  (Pending ins auth/intake forms/142 for DC on tmrw.)   Discharge Plan   Discharge Plan A Skilled Nursing Facility     1421 pm - Therapy alerted to orders for eval for SNF.

## 2023-11-13 NOTE — NURSING
11/12/23 2018   Nurse Notification   Bedside Nurse Notified? Yes   Name of Bedside Nurse ELIZABETH Kilgore   Nurse Notfication Method Secure Chat   Nurse Notified Of Other  (patient hasn't had bowel movement since11/8 and has nothing ordered scheduled or prn)   Provider Notification   Provider Notified? Yes   Name of Provider ZONIA Persaud   Provider Notification Method Secure Chat   Provider Notified Of Other (See Comment)  (patient hasn't had bowel movement since11/8 and has nothing ordered scheduled or prn)

## 2023-11-13 NOTE — CONSULTS
[facility] Consult Note    Consult: left shoulder pain    HPI:  83 y.o. right hand dominant with history of diabetes, AFib on anticoagulation, heart failure who presented to the emergency room on 11/07/2023 for a foot wound.  He was found to have osteomyelitis of his right great toe and underwent amputation by podiatry.  [facility] was consulted for left shoulder pain.  Patient reports that his shoulder pain occurred after a fall 2 months ago.  He reports that prior to his fall he was able to perform overhead activities.  After his fall he was not able to perform overhead activities.  He saw his orthopedic surgeon who gave him a shot to his left shoulder.  This provided no relief.  He is not done any physical therapy.  Patient reports that his pain is better and no worse than it was 2 months ago.  He has not use the left shoulder since the fall because of the pain.  He reports his shoulder feels stiff.  He confirms that there has been absolutely no acute worsening of his left shoulder pain.  It is at the baseline it has been at for the last 2 months.  He is no pain in his right shoulder, bilateral elbows, bilateral wrists, bilateral hips, for bilateral knees.  He has been constantly in and out of the hospital since June of 2022.  He reports he is had significant weight loss and weakness because all of these admissions.  He denies neck pain, radiculopathy, and numbness in his upper extremities.    PMH:   Past Medical History:   Diagnosis Date    Diabetes mellitus     Hiatal hernia     under Dr Saenz' care    Hyperlipidemia     Hypertension     MI (myocardial infarction)     Sleep apnea        PSH:    has a past surgical history that includes Hernia repair; Abdominal hernia repair; Cataract extraction, bilateral; Cardiac catheterization; Left heart catheterization (Left, 9/13/2019); Left heart catheterization (N/A, 10/3/2019); Coronary angiography (N/A, 10/3/2019); Debridement of foot (Left,  3/29/2023); Application of wound vacuum-assisted closure device (Left, 3/29/2023); Bone biopsy (Left, 3/29/2023); Surgical removal of bunion with osteotomy of metatarsal bone (Right, 4/4/2023); Osteotomy of metatarsal bone (Right, 4/4/2023); Debridement of foot (Left, 4/4/2023); Toe amputation (Left, 4/4/2023); Application of wound vacuum-assisted closure device (Left, 4/4/2023); Aortography with serialography (Bilateral, 4/5/2023); angiogram, extremity, bilateral (Bilateral, 4/5/2023); Angiography of lower extremity (Left, 4/12/2023); ivus (intravascular ultrasound) (Left, 4/12/2023); Percutaneous transluminal angioplasty (Left, 4/12/2023); closure device (Left, 4/12/2023); Debridement of foot (Left, 4/13/2023); Application of wound vacuum-assisted closure device (Left, 4/13/2023); Esophagogastroduodenoscopy (N/A, 4/17/2023); Wound debridement (Left, 6/7/2023); Osteotomy (Left, 6/7/2023); washout (Left, 6/7/2023); and Toe amputation (Right, 11/10/2023).    SOCIAL:    reports that he has never smoked. He has never used smokeless tobacco. He reports that he does not drink alcohol and does not use drugs.    FAMILY:  Family History   Problem Relation Age of Onset    Heart disease Mother     Stroke Mother     Heart disease Sister     Heart disease Brother     Heart disease Maternal Uncle     Heart disease Brother     Heart disease Sister     Heart disease Maternal Uncle     Heart disease Maternal Uncle     Heart disease Maternal Uncle        MEDS:   No current facility-administered medications on file prior to encounter.     Current Outpatient Medications on File Prior to Encounter   Medication Sig Dispense Refill    amLODIPine (NORVASC) 5 MG tablet       atorvastatin (LIPITOR) 40 MG tablet Take 1 tablet (40 mg total) by mouth once daily. (Patient taking differently: Take 40 mg by mouth every evening.) 90 tablet 3    collagenase (SANTYL) ointment Apply topically once daily. 30 g 1    ELIQUIS 5 mg Tab Take 5 mg by mouth  2 (two) times daily.      famotidine (PEPCID) 20 MG tablet Take 1 tablet (20 mg total) by mouth 2 (two) times daily. 60 tablet 6    ferrous sulfate (FEOSOL) 325 mg (65 mg iron) Tab tablet Take 325 mg by mouth daily with breakfast.      furosemide (LASIX) 20 MG tablet Take 1 tablet (20 mg total) by mouth once daily. 90 tablet 3    isosorbide mononitrate (IMDUR) 30 MG 24 hr tablet TAKE 1 TABLET EVERY DAY (Patient taking differently: Take 30 mg by mouth once daily.) 90 tablet 3    metoprolol succinate (TOPROL-XL) 25 MG 24 hr tablet TAKE 1 TABLET EVERY DAY 90 tablet 3    nitroGLYCERIN (NITROSTAT) 0.4 MG SL tablet Place 0.4 mg under the tongue every 5 (five) minutes as needed for Chest pain.      ondansetron (ZOFRAN-ODT) 8 MG TbDL Take 1 tablet (8 mg total) by mouth 3 (three) times daily as needed (nausea or vomiting). 20 tablet 0    oxyCODONE-acetaminophen (PERCOCET) 5-325 mg per tablet Take 1 tablet by mouth every 6 (six) hours as needed for Pain. 10 tablet 0    pantoprazole (PROTONIX) 40 MG tablet Take 1 tablet (40 mg total) by mouth 2 (two) times daily. 60 tablet 11    ranolazine (RANEXA) 1,000 mg Tb12 Take 1,000 mg by mouth 2 (two) times daily.      SITagliptan-metformin (JANUMET XR) 100-1,000 mg TM24 Take 1 tablet by mouth once daily. 30 tablet 11    sucralfate (CARAFATE) 1 gram tablet Take 1 tablet (1 g total) by mouth 4 (four) times daily before meals and nightly.      traZODone (DESYREL) 150 MG tablet TAKE 1 TABLET EVERY NIGHT 90 tablet 3    alcohol swabs PadM Apply 1 each topically as needed (for use with glucose monitoring). 200 each 3    blood sugar diagnostic (TRUE METRIX GLUCOSE TEST STRIP) Strp 1 strip by Misc.(Non-Drug; Combo Route) route 2 (two) times a day. 100 each 6    blood-glucose meter (TRUE METRIX GLUCOSE METER) Misc TEST TWICE A DAY 1 each 0    ciprofloxacin HCl (CIPRO) 500 MG tablet Take 1 tablet (500 mg total) by mouth 2 (two) times daily. for 7 days 14 tablet 0    clopidogreL (PLAVIX) 75 mg  tablet Take 1 tablet (75 mg total) by mouth once daily.      FLUoxetine 40 MG capsule Take 40 mg by mouth once daily.      insulin detemir U-100, Levemir, 100 unit/mL (3 mL) SubQ InPn pen Inject 20 Units into the skin every evening.  0    lancets 28 gauge Misc 1 lancet  by Misc.(Non-Drug; Combo Route) route 2 (two) times a day. 200 each 3    mupirocin (BACTROBAN) 2 % ointment Apply topically 3 (three) times daily. 15 g 1       ALLERGY:   Allergies as of 11/07/2023 - Reviewed 11/07/2023   Allergen Reaction Noted    Nystatin  03/30/2020     Vitals:    Body mass index is 23.73 kg/m².    Vitals:    11/13/23 1249   BP: (!) 101/55   Pulse: 68   Resp: 20   Temp: 97.6 °F (36.4 °C)       Labs:  Recent Labs   Lab 11/07/23  2044 11/08/23  0245 11/09/23  0455 11/10/23  0443 11/12/23  1149   WBC  --    < > 6.60 6.59 8.88   HGB  --    < > 10.7* 10.5* 10.1*   HCT  --    < > 32.0* 31.1* 30.6*   PLT  --    < > 245 280 315   MCV  --    < > 81* 81* 82   RDW  --    < > 19.1* 19.2* 18.5*   *   < > 133* 131* 130*   K 4.4   < > 3.8 3.8 4.1   CL 95   < > 100 98 98   CO2 15*   < > 18* 23 25   BUN 20   < > 16 16 16   CREATININE 1.3   < > 0.9 0.9 0.9   *   < > 181* 207* 351*   PROT 7.6  --   --   --   --    ALBUMIN 3.1*  --   --   --   --    BILITOT 0.8  --   --   --   --    AST 10  --   --   --   --    ALKPHOS 96  --   --   --   --    ALT 15  --   --   --   --     < > = values in this interval not displayed.       Coags:   Lab Results   Component Value Date    INR 1.3 (H) 05/13/2023    APTT 25.5 12/28/2015       Infection:  Recent Labs   Lab 11/07/23 2022 11/07/23  2044 11/08/23  0245 11/09/23  0455 11/10/23  0443 11/12/23  1149   WBC 12.71*  --  9.42 6.60 6.59 8.88   CRP  --  228.9*  --   --   --   --        Physical Exam:    Gen: A/Ox3, NAD  Card: RRR by RP  Lungs: nonlabored breathing, symmetric chest rise  Abd: S/NT/ND    RUE  No gross deformity  No pain with ROM of shoulder, elbow, wrist, hand    LUE  Significant atrophy  of RUE, loose skin consistent with age and significant recent weightloss. Mild swelling over the subcutaneous elbow, forearm wrist.  No erythema.  No open wounds or abrasions.  Tenderness to palpation over the subacromial space.  No tenderness to palpation over the anterior shoulder joint, posterior shoulder joint, bicipital groove, AC joint   Motor intact:  AIN/PIN/M/U/R/A  Firing deltoid   Unable to flex or abduct shoulder secondary to weakness and pain  SILT: Me/U/R/Mu/A  Passive shoulder ROM: 45 degrees abduction with pain, 80 degrees forward flexion, 30 degrees external rotation  Negative cross body, negative speeds, negative yergasons  Significant pain with attempting other provocative maneuvers including: borrego, jobes, external rotation lag, belly press    BLE  Post surgical dressings on right foot by managed this inpatient stay by podiatry  No pain with ROM of hips or knees  No pain with log roll    Radiology:  Xr left shoulder: no fracture or dislocation                     Assessment/Plan:  83 y.o. right hand dominant with history of diabetes, AFib on anticoagulation, heart failure who presented to the emergency room on 11/07/2023 for a foot wound. Ortho was consulted for left shoulder pain.  Patient reports that this pain has been present for 2 months after a traumatic fall.  He is had no worsening of his pain since the incident.he likely has a massive rotator cuff tear and adhesive capsulitis    - offered left shoulder aspiration to confirm no septic joint. Patient and I came to an agreement to defer aspiration  - no acute surgical intervention  - activity as tolerated BUE  - feet per podiatry  - patient would benefit from an outpatient MRI and follow up with an orthopaedic surgeon.    Bulmaro Jon MD  [facility], PGY-3

## 2023-11-13 NOTE — PLAN OF CARE
"   11/13/23 1058   Post-Acute Status   Post-Acute Authorization Home Health;IV Infusion   Home Health Status Pending medical clearance/testing   IV Infusion Status Referral(s) sent     Alerted [facility].    Future Appointments   Date Time Provider Department Center   11/14/2023  2:00 PM Diya Leung DPM [facility] WOUND [facility]   11/14/2023  2:00 PM INFECTIOUS DISEASE, [facility] INFECT [facility]   2/8/2024  2:15 PM Kelvin Wayne MD [facility]     BP (!) 149/70   Pulse 70   Temp 97.8 °F (36.6 °C)   Resp 20   Ht 6' 1" (1.854 m)   Wt 81.6 kg (179 lb 14.3 oz)   SpO2 99%   BMI 23.73 kg/m²      amLODIPine  5 mg Oral Daily    aspirin  81 mg Oral Daily    atorvastatin  40 mg Oral QHS    clopidogreL  75 mg Oral Daily    enoxparin  1 mg/kg Subcutaneous Q12H (treatment, non-standard time)    ferrous sulfate  1 tablet Oral Daily    FLUoxetine  40 mg Oral Daily    furosemide  20 mg Oral Daily    insulin detemir U-100 (Levemir)  10 Units Subcutaneous QHS    isosorbide mononitrate  30 mg Oral Daily    metoprolol tartrate  12.5 mg Oral BID    miconazole nitrate 2%   Topical (Top) BID    mupirocin   Nasal BID    pantoprazole  40 mg Intravenous BID    polyethylene glycol  17 g Oral BID    ranolazine  1,000 mg Oral BID    sodium chloride 0.9%  10 mL Intravenous Q6H    traZODone  150 mg Oral QHS    vancomycin (VANCOCIN) IV (PEDS and ADULTS)  1,000 mg Intravenous Q12H       "

## 2023-11-13 NOTE — PLAN OF CARE
"Pt would benefit from cont OT services in order to maximize functional independence. Recommending moderate intensity upon d/c. Pt agreeable to therapy this date. Per ortho in room activity as tolerated LUE. Per podiatry RLE "Patient to only to transfer in short distances to affected foot with Darco shoe, partial weightbearing to heel ". Pt educated on WB precautions, issued forefoot offloading DARCO shoe RLE. Pt unable to tolerate full stand this date with MaxA x2 & B HHA 2/2 increased LUE pain & BLE weakness. Will progress as able.     Problem: Occupational Therapy  Goal: Occupational Therapy Goal  Description: Goals to be met by: 12/13/2023     Patient will increase functional independence with ADLs by performing:    Feeding with Set-up Assistance.  Grooming while seated with Set-up Assistance.  Toileting from bedside commode with Minimal Assistance for hygiene and clothing management.   Rolling to Bilateral with Stand-by Assistance.   Supine to sit with Minimal Assistance.  Stand pivot transfer with Moderate Assistance  Toilet transfer to bedside commode with Moderate Assistance.    Outcome: Ongoing, Progressing     "

## 2023-11-13 NOTE — PLAN OF CARE
VIRTUAL NURSE:  Labs, notes, orders, and careplan reviewed. VN to be available as needed.    Problem: Adult Inpatient Plan of Care  Goal: Plan of Care Review  Outcome: Ongoing, Progressing     Problem: Adult Inpatient Plan of Care  Goal: Patient-Specific Goal (Individualized)  Outcome: Ongoing, Progressing     Problem: Adult Inpatient Plan of Care  Goal: Absence of Hospital-Acquired Illness or Injury  Outcome: Ongoing, Progressing     Problem: Adult Inpatient Plan of Care  Goal: Optimal Comfort and Wellbeing  Outcome: Ongoing, Progressing     Problem: Adult Inpatient Plan of Care  Goal: Readiness for Transition of Care  Outcome: Ongoing, Progressing

## 2023-11-13 NOTE — PROGRESS NOTES
[facility]  Progress Note    Patient Name: Julien Meléndez  MRN: [id number]  Patient Class: IP- Inpatient   Admission Date: 11/7/2023  Length of Stay: 4 days  Attending Physician: Tarsha Styles MD  Primary Care Provider: Kelvin Wayne MD        Subjective:     Principal Problem:Acute osteomyelitis        HPI:  Julien Meléndez is a that is why 83-year-old male with past medical history of diabetes, anxiety/depression, atrial fibrillation on anticoagulation, heart failure who presents with foot wound.    Patient reports a recent biopsy of his right great toe by Podiatry over the past week.  Patient reports worsening drainage and odor from his right foot biopsy site.  He states he has been taking antibiotics for the infection, with minimal relief.  He denies any fevers , chills , nausea, although reports episodes of nonbilious nonbloody emesis shortly after going to the ED after discussing the plan with his wound care nurse.      Triage vitals were significant for slightly elevated blood pressures.  CBC was significant for leukocytosis, normocytic anemia.  CMP was significant for hyponatremia, hyperglycemia, and anion gap of 22.  CRP was elevated at 228.9.  Lactic acid was 2.9.    Blood cultures were collected.    X-rays of the right foot showed no significant change of ulceration and destructive changes of the distal metatarsal head of the 1st toe of the right foot.    Pt given fluids and antibiotics.    Patient admitted for cellulitis of right toe/right foot wound,  with podiatry consult in the a.m.    Overview/Hospital Course:  11/8: wound cx growing out gm positive. Pt on vanc and meropenem. Patient with clinical acute osteomyelitis of right hallux ulcer with bone exposure and purulent drainage noted. Xray, MRI ordered. Surgery plans for Friday by podiatry  11/9: MRI confirms osteo. Pt voices no complaints.NPO after midnight for tentative surgery   11/10:Pt taken to OR for a partial  1st ray amputation of right great toe. Clean margins taken, sent for Culture and Pathology plan for 2-6 weeks of antibiotics pending results upon d/c, antibiotic management per Infectious Disease  11/11: Echo ordered. Repeat blood cx on 11/10 NGTD. Blood cx upon admission growing out MRSA, sensitive to clinda and vanco. D/c meropenem. ID recs 4-6 weeks of iV abx. CM notified of duration and set up  11/12: U/S of LUE shows nonocclusive thormbus in the left internal jugular vein.Will do lovenox. Pt was on eliquis and was held for procedure.  Restarted pt on plavix. echo normal. No issues voiced. Awaiting home infusion approval   11/13: heme/onc consulted and rec'd eliquis. Pt restarted on eliquis for thrombus of Left internal jugular vein. Ortho consulted for left shoulder pain where pt had a fall 3 mos ago. MRI as outpt. SNF pending     Interval History: left shoulder pain with limited ROM since the fall 3 mos ago    Review of Systems   All other systems reviewed and are negative.    Objective:     Vital Signs (Most Recent):  Temp: 98 °F (36.7 °C) (11/13/23 1617)  Pulse: 72 (11/13/23 1617)  Resp: 20 (11/13/23 1617)  BP: (!) 112/58 (11/13/23 1617)  SpO2: 96 % (11/13/23 1617) Vital Signs (24h Range):  Temp:  [97.3 °F (36.3 °C)-99.2 °F (37.3 °C)] 98 °F (36.7 °C)  Pulse:  [62-77] 72  Resp:  [16-20] 20  SpO2:  [96 %-100 %] 96 %  BP: (101-149)/(55-70) 112/58     Weight: 81.6 kg (179 lb 14.3 oz)  Body mass index is 23.73 kg/m².    Intake/Output Summary (Last 24 hours) at 11/13/2023 1617  Last data filed at 11/13/2023 1200  Gross per 24 hour   Intake 836.99 ml   Output 700 ml   Net 136.99 ml           Physical Exam  Vitals and nursing note reviewed.   Constitutional:       General: He is not in acute distress.     Appearance: He is well-developed.   HENT:      Head: Normocephalic and atraumatic.      Nose: Nose normal.   Eyes:      Conjunctiva/sclera: Conjunctivae normal.   Cardiovascular:      Rate and Rhythm: Normal rate  and regular rhythm.      Heart sounds: Normal heart sounds. No murmur heard.  Pulmonary:      Effort: Pulmonary effort is normal.      Breath sounds: Normal breath sounds. No wheezing.   Abdominal:      General: Bowel sounds are normal.      Palpations: Abdomen is soft. There is no mass.      Tenderness: There is no abdominal tenderness. There is no guarding or rebound.   Musculoskeletal:         General: Normal range of motion.      Cervical back: Normal range of motion and neck supple.      Comments: right hallux s/p ampuation with dressings  Left shoulder with Limited ROM   Skin:     General: Skin is warm and dry.      Findings: No rash.   Neurological:      Mental Status: He is alert and oriented to person, place, and time.   Psychiatric:         Behavior: Behavior normal.             Significant Labs: All pertinent labs within the past 24 hours have been reviewed.  A1C:   Recent Labs   Lab 10/23/23  1426   HGBA1C 9.7*       BMP:   Recent Labs   Lab 11/12/23  1149   *   *   K 4.1   CL 98   CO2 25   BUN 16   CREATININE 0.9   CALCIUM 8.5*       CBC:   Recent Labs   Lab 11/12/23  1149   WBC 8.88   HGB 10.1*   HCT 30.6*          CMP:   Recent Labs   Lab 11/12/23  1149   *   K 4.1   CL 98   CO2 25   *   BUN 16   CREATININE 0.9   CALCIUM 8.5*   ANIONGAP 7*         Significant Imaging: I have reviewed all pertinent imaging results/findings within the past 24 hours.  I have reviewed and interpreted all pertinent imaging results/findings within the past 24 hours.    Assessment/Plan:      * Acute osteomyelitis  Podiatry consult:  MRI confirms osteomyelitis     s/p partial 1st ray amputation of right great toe. Clean margins taken, sent for Culture and Pathology plan for 2-6 weeks of antibiotics pending results upon d/c, antibiotic management per Infectious Disease    1. Keep dressing dry and intact, if strike-through noted, please reinforce dressing with kerlix and ACE  2. Patient  non-weightbearing to surgical extremity for next two days  3. Elevate surgical foot when at rest, Z-flex boots to bilateral lower extremity     Patient to only to transfer in short distances to affected foot with Darco shoe, partial weightbearing to heel  Patient to keep dressings clean dry and intact     6 weeks of IV abx total  SNF pending    Left shoulder pain  Limited ROM  Had it since the fall 3 mos ago  Ortho consulted and rec'd MRI outpt       Internal jugular (IJ) vein thromboembolism, acute  U/s of LUE: NONOCCLUSIVE THROMBUS IN THE LEFT INTERNAL JUGULAR VEIN     eliquis was on hold for procedure  Will do lovenox for now until further input from heme/onc  Heme/onc consulted and rec'd eliquis  Resume eliquis      Bacteremia  Blood cx upon admission +MRSA, sensitive to clinda and vanco  Cont vanc  Echo wnl  Blood cx repeated on 11/10 NGTD    Will need 4-6 weeks of iv abx  CM notified      Hyponatremia  Patient has hyponatremia which is controlled,We will aim to correct the sodium by 4-6mEq in 24 hours. We will monitor sodium Daily. The hyponatremia is due to Dehydration/hypovolemia. We will obtain the following studies:  Hold off on studies at this time. We will treat the hyponatremia with IV fluids as follows:  1 L bolus. The patient's sodium results have been reviewed and are listed below.  Recent Labs   Lab 11/07/23 2044   *       Cellulitis of foot  Patient presents with worsening right big toe wound with purulence and odor  Elevated CRP, WBC  Xray without signs of new or worsening bone loss or significant change of ulceration and destructive changes of the distal metatarsal head of the 1st toe of the right foot  Elevated lactate    Plan:  Continue IV vancomycin and Zosyn  Consulted podiatry        Anemia  Patient's anemia is currently controlled. Has not received any PRBCs to date. Etiology likely d/t chronic disease due to Osteomyelitis,  Current CBC reviewed-   Lab Results   Component Value Date     HGB 13.2 (L) 11/07/2023    HCT 39.8 (L) 11/07/2023     Monitor serial CBC and transfuse if patient becomes hemodynamically unstable, symptomatic or H/H drops below 7/21.    Depressive disorder  Patient has persistent depression which is moderate and is currently controlled. Will Continue anti-depressant medications. We will not consult psychiatry at this time. Patient does not display psychosis at this time. Continue to monitor closely and adjust plan of care as needed.        Atrial fibrillation  Patient with Paroxysmal (<7 days) atrial fibrillation which is controlled currently with Beta Blocker. Patient is currently in sinus rhythm.[id number] Score: 4. HASBLED Score: 3+. Anticoagulation indicated. Anticoagulation done with Apixaban.  We will hold if patient needs possible procedure.\    Resume eliquis    Coronary artery disease due to calcified coronary lesion  Patient with known CAD s/p stent placement and CABG, which is controlled Will continue ASA and Statin and monitor for S/Sx of angina/ACS. Continue to monitor on telemetry.     Diabetes mellitus type 2, noninsulin dependent  Patient's FSGs are uncontrolled due to hyperglycemia on current medication regimen.  Last A1c reviewed- May  Lab Results   Component Value Date    HGBA1C 9.7 (H) 10/23/2023     Most recent fingerstick glucose reviewed-   Recent Labs   Lab 11/08/23  0126   POCTGLUCOSE 280*     Current correctional scale  Medium  Maintain anti-hyperglycemic dose as follows-   Antihyperglycemics (From admission, onward)      Start     Stop Route Frequency Ordered    11/07/23 2315  insulin detemir U-100 (Levemir) pen 10 Units         -- SubQ Nightly 11/07/23 2215    11/07/23 2306  insulin aspart U-100 pen 0-5 Units         -- SubQ Before meals & nightly PRN 11/07/23 2208          Hold Oral hypoglycemics while patient is in the hospital.    Hypertension  Chronic, controlled. Latest blood pressure and vitals reviewed-     Temp:  [98.3 °F (36.8 °C)-99.2 °F  (37.3 °C)]   Pulse:  []   Resp:  [14-33]   BP: (101-155)/(52-88)   SpO2:  [95 %-100 %] .   Home meds for hypertension were reviewed and noted below.   Hypertension Medications               amLODIPine (NORVASC) 5 MG tablet     furosemide (LASIX) 20 MG tablet Take 1 tablet (20 mg total) by mouth once daily.    isosorbide mononitrate (IMDUR) 30 MG 24 hr tablet TAKE 1 TABLET EVERY DAY    metoprolol succinate (TOPROL-XL) 25 MG 24 hr tablet TAKE 1 TABLET EVERY DAY    nitroGLYCERIN (NITROSTAT) 0.4 MG SL tablet Place 0.4 mg under the tongue every 5 (five) minutes as needed for Chest pain.            While in the hospital, will manage blood pressure as follows; Continue home antihypertensive regimen    Will utilize p.r.n. blood pressure medication only if patient's blood pressure greater than 180/110 and he develops symptoms such as worsening chest pain or shortness of breath.    Mixed hyperlipidemia  May continue with home statin        VTE Risk Mitigation (From admission, onward)           Ordered     apixaban tablet 5 mg  2 times daily         11/13/23 1616     IP VTE HIGH RISK PATIENT  Once         11/07/23 2208     Place sequential compression device  Until discontinued         11/07/23 2208                    Discharge Planning   AURELIA:      Code Status: Full Code   Is the patient medically ready for discharge?:     Reason for patient still in hospital (select all that apply): Pending disposition    Discharge Plan A: Skilled Nursing Facility   Discharge Delays: (!) Post-Acute Set-up (Pending ins auth/intake forms/142 for DC on tmrw.)              Tarsha Styles MD  Department of Hospital Medicine   [facility]

## 2023-11-14 VITALS
OXYGEN SATURATION: 96 % | DIASTOLIC BLOOD PRESSURE: 56 MMHG | HEART RATE: 75 BPM | WEIGHT: 190.94 LBS | SYSTOLIC BLOOD PRESSURE: 118 MMHG | TEMPERATURE: 98 F | RESPIRATION RATE: 20 BRPM | HEIGHT: 73 IN | BODY MASS INDEX: 25.31 KG/M2

## 2023-11-14 DIAGNOSIS — M67.912 ROTATOR CUFF DISORDER, LEFT: Primary | ICD-10-CM

## 2023-11-14 DIAGNOSIS — M25.512 ACUTE PAIN OF LEFT SHOULDER: ICD-10-CM

## 2023-11-14 LAB
ANION GAP SERPL CALC-SCNC: 8 MMOL/L (ref 8–16)
BASOPHILS # BLD AUTO: 0.03 K/UL (ref 0–0.2)
BASOPHILS NFR BLD: 0.4 % (ref 0–1.9)
BUN SERPL-MCNC: 15 MG/DL (ref 8–23)
CALCIUM SERPL-MCNC: 8.5 MG/DL (ref 8.7–10.5)
CHLORIDE SERPL-SCNC: 96 MMOL/L (ref 95–110)
CO2 SERPL-SCNC: 27 MMOL/L (ref 23–29)
CREAT SERPL-MCNC: 0.8 MG/DL (ref 0.5–1.4)
DIFFERENTIAL METHOD: ABNORMAL
EOSINOPHIL # BLD AUTO: 0.1 K/UL (ref 0–0.5)
EOSINOPHIL NFR BLD: 1.3 % (ref 0–8)
ERYTHROCYTE [DISTWIDTH] IN BLOOD BY AUTOMATED COUNT: 18.2 % (ref 11.5–14.5)
EST. GFR  (NO RACE VARIABLE): >60 ML/MIN/1.73 M^2
GLUCOSE SERPL-MCNC: 361 MG/DL (ref 70–110)
HCT VFR BLD AUTO: 31.1 % (ref 40–54)
HGB BLD-MCNC: 10.1 G/DL (ref 14–18)
IMM GRANULOCYTES # BLD AUTO: 0.1 K/UL (ref 0–0.04)
IMM GRANULOCYTES NFR BLD AUTO: 1.2 % (ref 0–0.5)
LYMPHOCYTES # BLD AUTO: 1.2 K/UL (ref 1–4.8)
LYMPHOCYTES NFR BLD: 14 % (ref 18–48)
MCH RBC QN AUTO: 26.9 PG (ref 27–31)
MCHC RBC AUTO-ENTMCNC: 32.5 G/DL (ref 32–36)
MCV RBC AUTO: 83 FL (ref 82–98)
MONOCYTES # BLD AUTO: 0.5 K/UL (ref 0.3–1)
MONOCYTES NFR BLD: 6.6 % (ref 4–15)
NEUTROPHILS # BLD AUTO: 6.4 K/UL (ref 1.8–7.7)
NEUTROPHILS NFR BLD: 77.7 % (ref 38–73)
NRBC BLD-RTO: 0 /100 WBC
PLATELET # BLD AUTO: 362 K/UL (ref 150–450)
PMV BLD AUTO: 9.7 FL (ref 9.2–12.9)
POCT GLUCOSE: 288 MG/DL (ref 70–110)
POCT GLUCOSE: 313 MG/DL (ref 70–110)
POCT GLUCOSE: 343 MG/DL (ref 70–110)
POTASSIUM SERPL-SCNC: 4.2 MMOL/L (ref 3.5–5.1)
RBC # BLD AUTO: 3.75 M/UL (ref 4.6–6.2)
SODIUM SERPL-SCNC: 131 MMOL/L (ref 136–145)
WBC # BLD AUTO: 8.19 K/UL (ref 3.9–12.7)

## 2023-11-14 PROCEDURE — 80048 BASIC METABOLIC PNL TOTAL CA: CPT | Performed by: STUDENT IN AN ORGANIZED HEALTH CARE EDUCATION/TRAINING PROGRAM

## 2023-11-14 PROCEDURE — A4216 STERILE WATER/SALINE, 10 ML: HCPCS | Performed by: FAMILY MEDICINE

## 2023-11-14 PROCEDURE — C9113 INJ PANTOPRAZOLE SODIUM, VIA: HCPCS | Performed by: FAMILY MEDICINE

## 2023-11-14 PROCEDURE — 63600175 PHARM REV CODE 636 W HCPCS: Performed by: FAMILY MEDICINE

## 2023-11-14 PROCEDURE — 25000003 PHARM REV CODE 250: Performed by: FAMILY MEDICINE

## 2023-11-14 PROCEDURE — 25000003 PHARM REV CODE 250: Performed by: NURSE PRACTITIONER

## 2023-11-14 PROCEDURE — 97530 THERAPEUTIC ACTIVITIES: CPT | Mod: CQ

## 2023-11-14 PROCEDURE — 85025 COMPLETE CBC W/AUTO DIFF WBC: CPT | Performed by: STUDENT IN AN ORGANIZED HEALTH CARE EDUCATION/TRAINING PROGRAM

## 2023-11-14 PROCEDURE — 97530 THERAPEUTIC ACTIVITIES: CPT

## 2023-11-14 RX ORDER — ISOSORBIDE MONONITRATE 30 MG/1
30 TABLET, EXTENDED RELEASE ORAL DAILY
Qty: 90 TABLET | Refills: 3 | Status: SHIPPED | OUTPATIENT
Start: 2023-11-14

## 2023-11-14 RX ORDER — AMLODIPINE BESYLATE 5 MG/1
5 TABLET ORAL DAILY
Status: ON HOLD
Start: 2023-11-14 | End: 2024-04-02 | Stop reason: HOSPADM

## 2023-11-14 RX ORDER — METOPROLOL SUCCINATE 25 MG/1
25 TABLET, EXTENDED RELEASE ORAL DAILY
Qty: 90 TABLET | Refills: 3 | Status: ON HOLD
Start: 2023-11-14 | End: 2024-04-02 | Stop reason: HOSPADM

## 2023-11-14 RX ORDER — NAPROXEN SODIUM 220 MG/1
81 TABLET, FILM COATED ORAL DAILY
Refills: 0
Start: 2023-11-15 | End: 2024-03-21

## 2023-11-14 RX ORDER — HYDROCODONE BITARTRATE AND ACETAMINOPHEN 10; 325 MG/1; MG/1
1 TABLET ORAL EVERY 6 HOURS PRN
Qty: 5 TABLET | Refills: 0 | Status: ON HOLD | OUTPATIENT
Start: 2023-11-14 | End: 2024-04-02

## 2023-11-14 RX ORDER — MUPIROCIN 20 MG/G
OINTMENT TOPICAL 3 TIMES DAILY
Qty: 15 G | Refills: 1
Start: 2023-11-14 | End: 2024-03-21

## 2023-11-14 RX ADMIN — FLUOXETINE 40 MG: 20 CAPSULE ORAL at 08:11

## 2023-11-14 RX ADMIN — AMLODIPINE BESYLATE 5 MG: 5 TABLET ORAL at 08:11

## 2023-11-14 RX ADMIN — PANTOPRAZOLE SODIUM 40 MG: 40 INJECTION, POWDER, LYOPHILIZED, FOR SOLUTION INTRAVENOUS at 08:11

## 2023-11-14 RX ADMIN — RANOLAZINE 1000 MG: 500 TABLET, FILM COATED, EXTENDED RELEASE ORAL at 08:11

## 2023-11-14 RX ADMIN — MUPIROCIN: 20 OINTMENT TOPICAL at 08:11

## 2023-11-14 RX ADMIN — POLYETHYLENE GLYCOL 3350 17 G: 17 POWDER, FOR SOLUTION ORAL at 08:11

## 2023-11-14 RX ADMIN — MICONAZOLE NITRATE: 20 OINTMENT TOPICAL at 08:11

## 2023-11-14 RX ADMIN — SODIUM CHLORIDE, PRESERVATIVE FREE 10 ML: 5 INJECTION INTRAVENOUS at 05:11

## 2023-11-14 RX ADMIN — SODIUM CHLORIDE, PRESERVATIVE FREE 10 ML: 5 INJECTION INTRAVENOUS at 11:11

## 2023-11-14 RX ADMIN — VANCOMYCIN HYDROCHLORIDE 1000 MG: 1 INJECTION, POWDER, LYOPHILIZED, FOR SOLUTION INTRAVENOUS at 05:11

## 2023-11-14 RX ADMIN — INSULIN ASPART 3 UNITS: 100 INJECTION, SOLUTION INTRAVENOUS; SUBCUTANEOUS at 11:11

## 2023-11-14 RX ADMIN — FUROSEMIDE 20 MG: 20 TABLET ORAL at 08:11

## 2023-11-14 RX ADMIN — ISOSORBIDE MONONITRATE 30 MG: 30 TABLET, EXTENDED RELEASE ORAL at 08:11

## 2023-11-14 RX ADMIN — SODIUM CHLORIDE, PRESERVATIVE FREE 10 ML: 5 INJECTION INTRAVENOUS at 12:11

## 2023-11-14 RX ADMIN — METOPROLOL TARTRATE 12.5 MG: 25 TABLET, FILM COATED ORAL at 08:11

## 2023-11-14 RX ADMIN — FERROUS SULFATE TAB 325 MG (65 MG ELEMENTAL FE) 1 EACH: 325 (65 FE) TAB at 08:11

## 2023-11-14 RX ADMIN — INSULIN ASPART 4 UNITS: 100 INJECTION, SOLUTION INTRAVENOUS; SUBCUTANEOUS at 03:11

## 2023-11-14 RX ADMIN — INSULIN ASPART 3 UNITS: 100 INJECTION, SOLUTION INTRAVENOUS; SUBCUTANEOUS at 06:11

## 2023-11-14 RX ADMIN — APIXABAN 5 MG: 5 TABLET, FILM COATED ORAL at 08:11

## 2023-11-14 RX ADMIN — CLOPIDOGREL BISULFATE 75 MG: 75 TABLET ORAL at 08:11

## 2023-11-14 RX ADMIN — ASPIRIN 81 MG CHEWABLE TABLET 81 MG: 81 TABLET CHEWABLE at 08:11

## 2023-11-14 RX ADMIN — SODIUM CHLORIDE, PRESERVATIVE FREE 10 ML: 5 INJECTION INTRAVENOUS at 06:11

## 2023-11-14 NOTE — PLAN OF CARE
Problem: Physical Therapy  Goal: Physical Therapy Goal  Description: Goals to be met by: 23     Patient will increase functional independence with mobility by performin. Supine to sit with Moderate Assistance  2. Sit to supine with Moderate Assistance  3. Rolling to Left and Right with Minimal Assistance.  4. Sit to stand transfer with Moderate Assistance  5. Bed to chair transfer with Moderate Assistance using least restrictive AD  6. Wheelchair propulsion x50 feet with Minimal Assistance using bilateral upper extremities    Outcome: Ongoing, Progressing

## 2023-11-14 NOTE — PLAN OF CARE
"   11/14/23 1216   Post-Acute Status   Post-Acute Authorization Placement   Post-Acute Placement Status Pending payor review/awaiting authorization (if required)  (Pending ins auth for SNF. Pending final ID recs for Vancomycin. Wife to complete intake forms today within the next few hours per facility. AWAITING ORDERS.)   Discharge Delays (!) Post-Acute Set-up   Discharge Plan   Discharge Plan A Skilled Nursing Facility       Future Appointments   Date Time Provider Department Center   11/21/2023  1:00 PM Diya Leung DPM [facility] WOUND [facility]   1/22/2024 11:00 AM Ishan Jaramillo MD [facility] ID [facility]   2/8/2024  2:15 PM Kelvin Wayne MD [facility]     BP (!) 117/59   Pulse 73   Temp 98.3 °F (36.8 °C)   Resp 18   Ht 6' 1" (1.854 m)   Wt 86.6 kg (190 lb 14.7 oz)   SpO2 96%   BMI 25.19 kg/m²      amLODIPine  5 mg Oral Daily    apixaban  5 mg Oral BID    aspirin  81 mg Oral Daily    atorvastatin  40 mg Oral QHS    clopidogreL  75 mg Oral Daily    ferrous sulfate  1 tablet Oral Daily    FLUoxetine  40 mg Oral Daily    furosemide  20 mg Oral Daily    insulin detemir U-100 (Levemir)  10 Units Subcutaneous QHS    isosorbide mononitrate  30 mg Oral Daily    metoprolol tartrate  12.5 mg Oral BID    miconazole nitrate 2%   Topical (Top) BID    mupirocin   Nasal BID    pantoprazole  40 mg Intravenous BID    polyethylene glycol  17 g Oral BID    ranolazine  1,000 mg Oral BID    sodium chloride 0.9%  10 mL Intravenous Q6H    traZODone  150 mg Oral QHS    vancomycin (VANCOCIN) IV (PEDS and ADULTS)  1,000 mg Intravenous Q12H       "

## 2023-11-14 NOTE — PLAN OF CARE
[facility]    FACILITY TRANSFER ORDERS      Patient Name: Julien Meléndez  YOB: 1940    PCP: Kelvin Wayne MD   PCP Address: [address] / [place] [address]  PCP Phone Number: [phone]  PCP Fax: [phone]    Encounter Date: 11/14/2023    Admit to: [facility]    Vital Signs:  Routine    Diagnoses:   Active Hospital Problems    Diagnosis  POA    *Acute osteomyelitis [M86.10]  Yes    Left shoulder pain [M25.512]  Yes    Internal jugular (IJ) vein thromboembolism, acute [I82.C19]  No    Bacteremia [R78.81]  Yes    Hyponatremia [E87.1]  No    Cellulitis of foot [L03.119]  Yes     Chronic    Anemia [D64.9]  Yes    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 Hernandez        LM patent  LAD patent except for distal 75%  LCX patent  OM1   RCA 90%      PCI of RCA 2.75 x 18 mm MILES  Unsuccessful PCI of OM1         LVEDP 11 mmHg  3.5 x 18 mm       Left hearth cath 3/1/2010 -Dr. Hernandez   RCA PCI with 3.5 x 15 mm  BMS    Left heart cath 1/31/2010-Dr. Hernandez   LAD 3.0 x 12 and 3.5 x 24 mm  BMS         [facility] 11/12/2019      S/p LCX  PCI     Bilateral CFA access 8 fr       LM patent   Distal LAD 80% stenosis   Patent RCA   LCX  with R to L collaterals     LVEDP 8 mmHg       Crossed LCX  with antegrade Filder XT + LP Turnpike   PCI with 3.0 x 26 and 3.0 x 12 mm Resolute MILES post dilated with 3.5 NC           Diabetes mellitus type 2, noninsulin dependent [E11.9]  Yes    Mixed hyperlipidemia [E78.2]  Yes    Hypertension [I10]  Yes      Resolved Hospital Problems   No resolved problems to display.       Allergies:  Review of patient's allergies indicates:  "  Allergen Reactions    Nystatin      Other reaction(s): Unknown       Diet: diabetic diet: 2800 Calorie    Activities: Activity as tolerated    Goals of Care Treatment Preferences:  Code Status: Full Code          What is most important right now is to focus on remaining as independent as possible.  Accordingly, we have decided that the best plan to meet the patient's goals includes continuing with treatment.      Nursing:    Medication: IV vancomycin daily, end date 12/22/2023    Labs: CBC and CMP Vancomycin trough Once weekly     CONSULTS:    Physical Therapy to evaluate and treat.  and Occupational Therapy to evaluate and treat.    MISCELLANEOUS CARE:  Routine Skin for Bedridden Patients: Apply moisture barrier cream to all skin folds and wet areas in perineal area daily and after baths and all bowel movements.    crub the Hub: Prior to accessing the line, always perform a 30 second alcohol scrub  Each lumen of the central line is to be flushed at least daily with 10 mL Normal Saline and 3 mL Heparin flush (10 units/mL)  Skilled Nurse (SN) may draw blood from IV access  Blood Draw Procedure:   - Aspirate at least 5 mL of blood   - Discard   - Obtain specimen   - Change injection cap   - Flush with 20 mL Normal Saline followed by a                 3-5 mL Heparin flush (10 units/mL)  Central :   - Sterile dressing changes are done weekly and as needed.   - Use chlor-hexadine scrub to cleanse site, apply Biopatch to insertion site,       apply securement device dressing   - Injection caps are changed weekly and after EVERY lab draw.   - If sterile gauze is under dressing to control oozing,                 dressing change must be performed every 24 hours until gauze is not needed.     Remove PICC line at the completion of Abx    WOUND CARE ORDERS     Wound care recs:  "Patient was dressed today with Betadine soaked Xeroform, 4 x 4 gauze, gauze padding, ABD, cast padding, Ace wrap Nursing dressing " "change as above, 3x weekly. WBAT in Darco shoe. Z-flex boots while in bed, keep heels offloaded · Patient to only to transfer in short distances to affected foot with Darco shoe, partial weightbearing to heel · Patient to keep dressings clean dry and intact · Patient explained the importance of daily foot checks"     and Surgical Wound:  Location: Right foot    Consult ET nurse        Apply the following to wound: Right foot    Follow ups:  Podiatry weekly  Infectious disease Dr. Jaramillo in 4-5 weeks       Medications: Review discharge medications with patient and family and provide education.      Current Discharge Medication List        START taking these medications    Details   aspirin 81 MG Chew Take 1 tablet (81 mg total) by mouth once daily.  Refills: 0      dextrose 5 % (D5W) SolP 250 mL with vancomycin 1,000 mg SolR 1,000 mg Inject 1,000 mg into the vein every 12 (twelve) hours. End date 12/22/2023  Qty: 1000 mg, Refills: 0      HYDROcodone-acetaminophen (NORCO)  mg per tablet Take 1 tablet by mouth every 6 (six) hours as needed for Pain.  Qty: 5 tablet, Refills: 0    Comments: Quantity prescribed more than 7 day supply? No           CONTINUE these medications which have CHANGED    Details   amLODIPine (NORVASC) 5 MG tablet Take 1 tablet (5 mg total) by mouth once daily.    Comments: .      insulin detemir U-100, Levemir, 100 unit/mL (3 mL) SubQ InPn pen Inject 10 Units into the skin every evening.  Refills: 0      isosorbide mononitrate (IMDUR) 30 MG 24 hr tablet Take 1 tablet (30 mg total) by mouth once daily.  Qty: 90 tablet, Refills: 3    Associated Diagnoses: Coronary artery disease      metoprolol succinate (TOPROL-XL) 25 MG 24 hr tablet Take 1 tablet (25 mg total) by mouth once daily.  Qty: 90 tablet, Refills: 3    Comments: .      mupirocin (BACTROBAN) 2 % ointment Apply topically 3 (three) times daily. Apply to nasal area  Qty: 15 g, Refills: 1           CONTINUE these medications which have " NOT CHANGED    Details   atorvastatin (LIPITOR) 40 MG tablet Take 1 tablet (40 mg total) by mouth once daily.  Qty: 90 tablet, Refills: 3    Associated Diagnoses: Mixed hyperlipidemia      ELIQUIS 5 mg Tab Take 5 mg by mouth 2 (two) times daily.      ferrous sulfate (FEOSOL) 325 mg (65 mg iron) Tab tablet Take 325 mg by mouth daily with breakfast.      furosemide (LASIX) 20 MG tablet Take 1 tablet (20 mg total) by mouth once daily.  Qty: 90 tablet, Refills: 3    Associated Diagnoses: Congestive heart failure, unspecified HF chronicity, unspecified heart failure type      nitroGLYCERIN (NITROSTAT) 0.4 MG SL tablet Place 0.4 mg under the tongue every 5 (five) minutes as needed for Chest pain.      ondansetron (ZOFRAN-ODT) 8 MG TbDL Take 1 tablet (8 mg total) by mouth 3 (three) times daily as needed (nausea or vomiting).  Qty: 20 tablet, Refills: 0      pantoprazole (PROTONIX) 40 MG tablet Take 1 tablet (40 mg total) by mouth 2 (two) times daily.  Qty: 60 tablet, Refills: 11      ranolazine (RANEXA) 1,000 mg Tb12 Take 1,000 mg by mouth 2 (two) times daily.      SITagliptan-metformin (JANUMET XR) 100-1,000 mg TM24 Take 1 tablet by mouth once daily.  Qty: 30 tablet, Refills: 11    Associated Diagnoses: Type 2 diabetes mellitus without complication, without long-term current use of insulin      sucralfate (CARAFATE) 1 gram tablet Take 1 tablet (1 g total) by mouth 4 (four) times daily before meals and nightly.      traZODone (DESYREL) 150 MG tablet TAKE 1 TABLET EVERY NIGHT  Qty: 90 tablet, Refills: 3      alcohol swabs PadM Apply 1 each topically as needed (for use with glucose monitoring).  Qty: 200 each, Refills: 3    Associated Diagnoses: Type 2 diabetes mellitus with diabetic peripheral angiopathy without gangrene, without long-term current use of insulin      blood sugar diagnostic (TRUE METRIX GLUCOSE TEST STRIP) Strp 1 strip by Misc.(Non-Drug; Combo Route) route 2 (two) times a day.  Qty: 100 each, Refills: 6     Associated Diagnoses: Type 2 diabetes mellitus with diabetic peripheral angiopathy without gangrene, without long-term current use of insulin      blood-glucose meter (TRUE METRIX GLUCOSE METER) Misc TEST TWICE A DAY  Qty: 1 each, Refills: 0    Associated Diagnoses: Type 2 diabetes mellitus with diabetic peripheral angiopathy without gangrene, without long-term current use of insulin      clopidogreL (PLAVIX) 75 mg tablet Take 1 tablet (75 mg total) by mouth once daily.      FLUoxetine 40 MG capsule Take 40 mg by mouth once daily.      lancets 28 gauge Misc 1 lancet  by Misc.(Non-Drug; Combo Route) route 2 (two) times a day.  Qty: 200 each, Refills: 3    Associated Diagnoses: Type 2 diabetes mellitus with diabetic peripheral angiopathy without gangrene, without long-term current use of insulin           STOP taking these medications       collagenase (SANTYL) ointment Comments:   Reason for Stopping:         famotidine (PEPCID) 20 MG tablet Comments:   Reason for Stopping:         oxyCODONE-acetaminophen (PERCOCET) 5-325 mg per tablet Comments:   Reason for Stopping:         ciprofloxacin HCl (CIPRO) 500 MG tablet Comments:   Reason for Stopping:                  Immunizations Administered as of 11/14/2023       No immunizations on file.            Unknown    Some patients may experience side effects after vaccination.  These may include fever, headache, muscle or joint aches.  Most symptoms resolve with 24-48 hours and do not require urgent medical evaluation unless they persist for more than 72 hours or symptoms are concerning for an unrelated medical condition.          _________________________________  Luis Salas MD  11/14/2023

## 2023-11-14 NOTE — PT/OT/SLP PROGRESS
Physical Therapy Treatment    Patient Name:  Julien Meléndez   MRN:  [id number]    Recommendations:     Discharge Recommendations: Moderate Intensity Therapy  Discharge Equipment Recommendations: bedside commode, hospital bed (TBD)  Barriers to discharge:  decreased mobility,strength and endurance    Assessment:     Julien Meléndez is a 83 y.o. male admitted with a medical diagnosis of Acute osteomyelitis.  He presents with the following impairments/functional limitations: weakness, impaired endurance, impaired functional mobility, impaired balance, decreased upper extremity function, decreased lower extremity function, decreased safety awareness, decreased ROM, impaired coordination, impaired skin, impaired joint extensibility, orthopedic precautions,pt with good participation and requires assistance with all mobility at this time,pt will benefit from moderate intensity therapy upon discharge.    Rehab Prognosis: Fair; patient would benefit from acute skilled PT services to address these deficits and reach maximum level of function.    Recent Surgery: Procedure(s) (LRB):  AMPUTATION, TOE (Right) 4 Days Post-Op    Plan:     During this hospitalization, patient to be seen 5 x/week to address the identified rehab impairments via therapeutic activities, therapeutic exercises, neuromuscular re-education, wheelchair management/training and progress toward the following goals:    Plan of Care Expires:  12/13/23    Subjective     Chief Complaint: n/a  Patient/Family Comments/goals: pt agreeable to rx.  Pain/Comfort:         Objective:     Communicated with nsg prior to session.  Patient found supine with bed alarm, PureWick, peripheral IV, SCD upon PT entry to room.     General Precautions: Standard, contact, fall  Orthopedic Precautions: LUE weight bearing as tolerated, RLE partial weight bearing (PWB to R heel)  Braces:  (forefoot offloading R darco)  Respiratory Status: Room air     Functional Mobility:  Bed Mobility:      Supine to Sit: moderate assistance  Sit to Supine: maximal assistance and of 2 persons  Transfers:     Sit to Stand:  maximal assistance, of 2 persons, and X 3 trials with hand-held assist  Balance: fair sitting balance      AM-PAC 6 CLICK MOBILITY  Turning over in bed (including adjusting bedclothes, sheets and blankets)?: 2  Sitting down on and standing up from a chair with arms (e.g., wheelchair, bedside commode, etc.): 2  Moving from lying on back to sitting on the side of the bed?: 2  Moving to and from a bed to a chair (including a wheelchair)?: 1  Need to walk in hospital room?: 1       Treatment & Education:       Patient left supine with all lines intact, call button in reach, and bed alarm on..    GOALS: see general POC  Multidisciplinary Problems       Physical Therapy Goals          Problem: Physical Therapy    Goal Priority Disciplines Outcome Goal Variances Interventions   Physical Therapy Goal     PT, PT/OT Ongoing, Progressing     Description: Goals to be met by: 23     Patient will increase functional independence with mobility by performin. Supine to sit with Moderate Assistance  2. Sit to supine with Moderate Assistance  3. Rolling to Left and Right with Minimal Assistance.  4. Sit to stand transfer with Moderate Assistance  5. Bed to chair transfer with Moderate Assistance using least restrictive AD  6. Wheelchair propulsion x50 feet with Minimal Assistance using bilateral upper extremities                         Time Tracking:     PT Received On: 23  PT Start Time: 1327     PT Stop Time: 1350  PT Total Time (min): 23 min     Billable Minutes: Therapeutic Activity 23       PT/PTA: PTA     Number of PTA visits since last PT visit: 1     2023

## 2023-11-14 NOTE — PLAN OF CARE
SN to perform Infusion Therapy/Central Line Care.  Review Central Line Care & Central Line Flush with patient.    Administer (drug and dose): Vancomycin 1000mg IV BID       Scrub the Hub: Prior to accessing the line, always perform a 30 second alcohol scrub  Each lumen of the central line is to be flushed at least daily with 10 mL Normal Saline and 3 mL Heparin flush (10 units/mL)  Skilled Nurse (SN) may draw blood from IV access  Blood Draw Procedure:  - Aspirate at least 5 mL of blood  - Discard  - Obtain specimen  - Change injection cap  - Flush with 20 mL Normal Saline followed by a   3-5 mL Heparin flush (10 units/mL)  Central :  - Sterile dressing changes are done weekly and as needed.  - Use chlor-hexadine scrub to cleanse site, apply Biopatch to insertion site,   apply securement device dressing  - Injection caps are changed weekly and after EVERY lab draw.  - If sterile gauze is under dressing to control oozing,   dressing change must be performed every 24 hours until gauze is not needed.       Telephone order verbally read back;  /Aida LESTER RN  11/14/2023 at 1543 pm

## 2023-11-14 NOTE — PLAN OF CARE
Orders accepted by facility. Narcotic RX copy placed on chart. Original in packet and emailed to facility to send to pharmacy. PICC line care info received by facility. Orders noted with required labs. Med changes addressed via telephone order verbally read back. PFC requested for  within the hour via stretcher. VM left for spouse as non emergent call.  Report info communicated to nursing.

## 2023-11-14 NOTE — PROGRESS NOTES
Progress Note  [facility]    ASSESSMENT:   MRSA septicemia likely from right foot infection. Currently on vancomycin.  Status post partial amputation of right toe.  Repeat blood cultures negative so far.  Echo without evidence of vegetations  MRSA foot infection. Status post amputation and on vancomycin. Previously also on meropenem because of prior history of ESBL E coli and Providencia from right foot. No evidence of this at this time  Left shoulder pain after traumatic injury in the face of MRSA bacteremia. MRSA with a propensity to go to damage tissues, ortho evaluated pt . Deferred joint aspiration at this time.  Left arm swelling. Ultrasound of the left upper extremity without evidence clots.  Nonocclusive thrombus in the IJ, on Eliquis.  Diabetes, anxiety depression, atrial fibrillation, heart failure    RECOMMENDATIONS:   Continue vancomycin with monitoring levels.  Will need prolonged antibiotic therapy, target 6 weeks of abx therapy from surgery.   Weekly CBC, CMP, Vancomycin trough.   Outpatient MRI of Left shoulder and ortho follow up  Will need ID follow up in 4 weeks with Dr. Jaramillo and weekly podiatry follow up.        Thanks for this consult! Please call if you have any questions.  Mounika Curiel, DO  [facility] PGY-1    [phone] pager    INTERVAL HISTORY:     Seen by ortho yesterday regarding shoulder pain. Deferred aspiration at this time. This morning reports continued shoulder pain, limited ROM 2/2 pain, and continued LUE swelling. Was started on eliquis yesterday for thrombus of left IJ. Denies any pain in b/l LE. Afebrile, awaiting SNF placement.     Medications reviewed; current antibiotics:  Vancomycin     Review of Systems:  Review of Systems   Constitutional: Negative.    Gastrointestinal: Negative.    Musculoskeletal:         Chronic L shoulder pain for months. L arm swelling       OBJECTIVE:     Vital Signs (Most Recent)  Temp: 99.1 °F (37.3 °C) (11/14/23 0731)  Pulse: 76  (11/14/23 0731)  Resp: 18 (11/14/23 0731)  BP: (!) 122/56 (11/14/23 0731)  SpO2: 95 % (11/14/23 0731)    Temperature Range Min/Max (Last 24H):  Temp:  [97.6 °F (36.4 °C)-99.1 °F (37.3 °C)]     Physical Exam:  Physical Exam  Vitals reviewed.   Constitutional:       General: He is not in acute distress.     Appearance: Normal appearance.   HENT:      Head: Normocephalic.      Right Ear: External ear normal.      Left Ear: External ear normal.      Nose: Nose normal.      Mouth/Throat:      Mouth: Mucous membranes are moist.   Eyes:      Extraocular Movements: Extraocular movements intact.      Conjunctiva/sclera: Conjunctivae normal.   Cardiovascular:      Rate and Rhythm: Normal rate and regular rhythm.      Heart sounds: Normal heart sounds.   Pulmonary:      Effort: Pulmonary effort is normal. No respiratory distress.      Breath sounds: Normal breath sounds. No wheezing.   Abdominal:      General: Abdomen is flat. Bowel sounds are normal. There is no distension.      Palpations: Abdomen is soft.      Tenderness: There is no abdominal tenderness.   Musculoskeletal:         General: Swelling present.      Comments: Left UE with continued edema. R shoulder w limited ROM 2/2 pain.   Lower extremities with wound care boots in place. RLE with dressing intact.    Skin:     General: Skin is warm.   Neurological:      General: No focal deficit present.      Mental Status: He is alert and oriented to person, place, and time.   Psychiatric:         Mood and Affect: Mood normal.         Behavior: Behavior normal.         Laboratory:  Recent Labs   Lab 11/07/23  2044 11/08/23  0245 11/10/23  0443 11/12/23  1149 11/14/23  0937   WBC  --    < > 6.59 8.88 8.19   HGB  --    < > 10.5* 10.1* 10.1*   HCT  --    < > 31.1* 30.6* 31.1*   PLT  --    < > 280 315 362   *   < > 131* 130* 131*   K 4.4   < > 3.8 4.1 4.2   CL 95   < > 98 98 96   CO2 15*   < > 23 25 27   BUN 20   < > 16 16 15   CREATININE 1.3   < > 0.9 0.9 0.8   AST 10   --   --   --   --    ALT 15  --   --   --   --    ALKPHOS 96  --   --   --   --    BILITOT 0.8  --   --   --   --     < > = values in this interval not displayed.       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 Hernandez        LM patent  LAD patent except for distal 75%  LCX patent  OM1   RCA 90%      PCI of RCA 2.75 x 18 mm MILES  Unsuccessful PCI of OM1         LVEDP 11 mmHg  3.5 x 18 mm       Left hearth cath 3/1/2010 -Dr. Hernandez   RCA PCI with 3.5 x 15 mm  BMS    Left heart cath 1/31/2010-Dr. Hernandez   LAD 3.0 x 12 and 3.5 x 24 mm  BMS         [facility] 11/12/2019      S/p LCX  PCI     Bilateral CFA access 8 fr       LM patent   Distal LAD 80% stenosis   Patent RCA   LCX  with R to L collaterals     LVEDP 8 mmHg       Crossed LCX  with antegrade Filder XT + LP Turnpike   PCI with 3.0 x 26 and 3.0 x 12 mm Resolute MILES post dilated with 3.5 NC           Diabetes mellitus type 2, noninsulin dependent [E11.9]  Yes    Mixed hyperlipidemia [E78.2]  Yes    Hypertension [I10]  Yes      Resolved Hospital Problems   No resolved problems to display.

## 2023-11-14 NOTE — PLAN OF CARE
Patient resting in bed, AAOX4. Medications administered as ordered. No complaints of pain or discomfort. Wound care to foot completed. Encouraged to call with needs or concerns. Will continue to monitor.

## 2023-11-14 NOTE — PLAN OF CARE
Recommendation:  1. Add Boost Glucose Control 1x daily.   2. Encourage intake at meals as tolerated.   3. Monitor weight/labs.   4. RD to follow to monitor po intake    Goals:  Pt will tolerate diet with at least 50-75% intake at meals by RD follow up  Nutrition Goal Status: progressing towards goal

## 2023-11-14 NOTE — PLAN OF CARE
"   11/14/23 1442   Final Note   Assessment Type Final Discharge Note   Anticipated Discharge Disposition SNF   Post-Acute Status   Post-Acute Authorization Placement   Post-Acute Placement Status Set-up Complete/Auth obtained  (Facility has ins auth. Intake forms done. 142 received and sent via [facility]. Pending orders from attending to send to facility. Facility is requesting orders prior to 330 pm.)   Discharge Delays (!) Post-Acute Set-up       Future Appointments   Date Time Provider Department Center   11/21/2023  1:00 PM Diya Leung DPM [facility] W[facility]   1/22/2024 11:00 AM Ishan Jaramillo MD [facility]   2/8/2024  2:15 PM Kelvin Wayne MD [facility]     BP (!) 117/59   Pulse 73   Temp 98.3 °F (36.8 °C)   Resp 18   Ht 6' 1" (1.854 m)   Wt 86.6 kg (190 lb 14.7 oz)   SpO2 96%   BMI 25.19 kg/m²        Medication List        ASK your doctor about these medications      alcohol swabs Padm  Apply 1 each topically as needed (for use with glucose monitoring).     amLODIPine 5 MG tablet  Commonly known as: NORVASC     atorvastatin 40 MG tablet  Commonly known as: LIPITOR  Take 1 tablet (40 mg total) by mouth once daily.     blood sugar diagnostic Strp  Commonly known as: TRUE METRIX GLUCOSE TEST STRIP  1 strip by Misc.(Non-Drug; Combo Route) route 2 (two) times a day.     blood-glucose meter Misc  Commonly known as: TRUE METRIX GLUCOSE METER  TEST TWICE A DAY     ciprofloxacin HCl 500 MG tablet  Commonly known as: CIPRO  Take 1 tablet (500 mg total) by mouth 2 (two) times daily. for 7 days  Ask about: Should I take this medication?     clopidogreL 75 mg tablet  Commonly known as: PLAVIX  Take 1 tablet (75 mg total) by mouth once daily.     ELIQUIS 5 mg Tab  Generic drug: apixaban     famotidine 20 MG tablet  Commonly known as: PEPCID  Take 1 tablet (20 mg total) by mouth 2 (two) times daily.     ferrous sulfate 325 mg (65 mg iron) Tab tablet  Commonly known as: FEOSOL   "   FLUoxetine 40 MG capsule     furosemide 20 MG tablet  Commonly known as: LASIX  Take 1 tablet (20 mg total) by mouth once daily.     insulin detemir U-100 (Levemir) 100 unit/mL (3 mL) Inpn pen  Inject 20 Units into the skin every evening.     isosorbide mononitrate 30 MG 24 hr tablet  Commonly known as: IMDUR  TAKE 1 TABLET EVERY DAY     JANUMET -1,000 mg Tm24  Generic drug: SITagliptan-metformin  Take 1 tablet by mouth once daily.     lancets 28 gauge Misc  1 lancet  by Misc.(Non-Drug; Combo Route) route 2 (two) times a day.     metoprolol succinate 25 MG 24 hr tablet  Commonly known as: TOPROL-XL  TAKE 1 TABLET EVERY DAY     mupirocin 2 % ointment  Commonly known as: BACTROBAN  Apply topically 3 (three) times daily.     nitroGLYCERIN 0.4 MG SL tablet  Commonly known as: NITROSTAT     ondansetron 8 MG Tbdl  Commonly known as: ZOFRAN-ODT  Take 1 tablet (8 mg total) by mouth 3 (three) times daily as needed (nausea or vomiting).     oxyCODONE-acetaminophen 5-325 mg per tablet  Commonly known as: PERCOCET  Take 1 tablet by mouth every 6 (six) hours as needed for Pain.     pantoprazole 40 MG tablet  Commonly known as: PROTONIX  Take 1 tablet (40 mg total) by mouth 2 (two) times daily.     ranolazine 1,000 mg Tb12  Commonly known as: RANEXA     SantyL ointment  Generic drug: collagenase  Apply topically once daily.     sucralfate 1 gram tablet  Commonly known as: CARAFATE  Take 1 tablet (1 g total) by mouth 4 (four) times daily before meals and nightly.     traZODone 150 MG tablet  Commonly known as: DESYREL  TAKE 1 TABLET EVERY NIGHT              .

## 2023-11-14 NOTE — PLAN OF CARE
Problem: Adult Inpatient Plan of Care  Goal: Patient-Specific Goal (Individualized)  Outcome: Ongoing, Progressing   Chart and care plan reviewed

## 2023-11-14 NOTE — PROGRESS NOTES
"[facility] - Med Surg  Adult Nutrition  Progress Note    SUMMARY       Recommendations    Recommendation:  1. Add Boost Glucose Control 1x daily.   2. Encourage intake at meals as tolerated.   3. Monitor weight/labs.   4. RD to follow to monitor po intake    Goals:  Pt will tolerate diet with at least 50-75% intake at meals by RD follow up  Nutrition Goal Status: progressing towards goal  Communication of RD Recs: reviewed with RN    Assessment and Plan  Nutrition Problem  Increased protein needs     Related to (etiology):   Wound healing     Signs and Symptoms (as evidenced by):   Foot wound/cellulitis      Interventions:  Collaboration with other providers  [facility]     Nutrition Diagnosis Status:   Continues     Malnutrition Assessment     Weight Loss (Malnutrition):  (14% x 6 months)       Reason for Assessment  Reason For Assessment: RD follow-up  Diagnosis:  (cellulitis of foot)  Relevant Medical History: MI, DM, HLD, HTN, hiatal hernia, sleep apnea, hernia repair, cardiac cath, toe amputation  General Information Comments: Pt on 2800cal ADA diet with ~25% intake at meals. 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 Yoni 16- R foot incision, L foot incision, R heel wounds. Noted 32lb weight loss x 6 months. Unable to assess NFPE 2/2 pt on contact isolation  Nutrition Discharge Planning: pt to d/c on ADA diet    Nutrition Risk Screen  Nutrition Risk Screen: no indicators present    Nutrition/Diet History  Food Preferences: no Religion or cultural food prefs identified  Spiritual, Cultural Beliefs, Catholic Practices, Values that Affect Care: no  Factors Affecting Nutritional Intake: None identified at this time    Anthropometrics  Temp: 98.2 °F (36.8 °C)  Height Method: Stated  Height: 6' 1" (185.4 cm)  Height (inches): 73 in  Weight Method: Bed Scale  Weight: 81.6 kg (179 lb 14.3 oz)  Weight (lb): 179.9 lb  Ideal Body Weight (IBW), Male: 184 lb  % Ideal Body Weight, Male (lb): 97.77 %  BMI (Calculated): 23.7  BMI " Grade: 18.5-24.9 - normal  Usual Body Weight (UBW), k.9 kg  % Usual Body Weight: 85.27  % Weight Change From Usual Weight: -14.91 %     Lab/Procedures/Meds  Pertinent Labs Reviewed: reviewed  Pertinent Labs Comments: Na 130L, Glu 351H, Ca 8.5L, Alb 3.1L  Pertinent Medications Reviewed: reviewed  Pertinent Medications Comments: aspirin, ferrous sulfate, Lasix, insulin, pantoprazole, vancomycin    Estimated/Assessed Needs  Weight Used For Calorie Calculations: 81.6 kg (179 lb 14.3 oz)  Energy Calorie Requirements (kcal): 2284 (28 kcal/kg)  Energy Need Method: Kcal/kg  Protein Requirements: 97g (1.2g/kg)  Weight Used For Protein Calculations: 81.6 kg (179 lb 14.3 oz)  Estimated Fluid Requirement Method: RDA Method  RDA Method (mL): 2284  CHO Requirement: 250g    Nutrition Prescription Ordered  Current Diet Order: 2800cal ADA    Evaluation of Received Nutrient/Fluid Intake  I/O: 357/700  Energy Calories Required: meeting needs  Protein Required: meeting needs  Fluid Required: meeting needs  Comments: LBM 11/8  % Intake of Estimated Energy Needs: 25 - 50 %  % Meal Intake: 25 - 50 %    Nutrition Risk  Level of Risk/Frequency of Follow-up:  (2xweekly)     Monitor and Evaluation  Food and Nutrient Intake: food and beverage intake  Food and Nutrient Adminstration: diet order  Physical Activity and Function: nutrition-related ADLs and IADLs  Anthropometric Measurements: weight  Biochemical Data, Medical Tests and Procedures: electrolyte and renal panel  Nutrition-Focused Physical Findings: overall appearance     Nutrition Follow-Up  RD Follow-up?: Yes

## 2023-11-14 NOTE — PT/OT/SLP PROGRESS
Occupational Therapy   Treatment    Name: Julien Meléndez  MRN: [id number]  Admitting Diagnosis:  Acute osteomyelitis  4 Days Post-Op    Recommendations:     Discharge Recommendations: Moderate Intensity Therapy  Discharge Equipment Recommendations:  to be determined by next level of care, bedside commode  Barriers to discharge:   (Pt requires increased level of assistance)    Assessment:     Julien Meléndez is a 83 y.o. male with a medical diagnosis of Acute osteomyelitis.  He presents with The primary encounter diagnosis was Acute osteomyelitis. 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 Performance deficits affecting function are edema, pain, decreased lower extremity function, decreased upper extremity function, impaired functional mobility, gait instability, impaired endurance, weakness, impaired self care skills, impaired balance, decreased coordination, decreased safety awareness, decreased ROM, impaired skin, impaired joint extensibility, impaired coordination, orthopedic precautions.     Pt progressing towards goals this date, agreeable to therapy. Continues with c/o pain to LUE; increased edema noted. Pt educated on AROM as able & elevation. Pt perfomring sit<>stand x3 trials from EOB with MaxA x2 & RW; pt unable to achieve full stand first two attempts, full stand for ~30 seconds on third attempt. Pt scooting laterally along EOB with MaxA x2. Will progress as able.     Rehab Prognosis:  Good and Fair; patient would benefit from acute skilled OT services to address these deficits and reach maximum level of function.       Plan:     Patient to be seen 5 x/week to address the above listed problems via self-care/home management, therapeutic activities, therapeutic exercises  Plan of Care Expires:  "12/13/23  Plan of Care Reviewed with: patient    Subjective     Chief Complaint: Pain LUE  Patient/Family Comments/goals: Agreeable to therapy  Pain/Comfort:  Pain Rating 1: other (see comments) ("not too bad" at rest)  Location - Side 1: Right  Location 1: shoulder  Pain Addressed 1: Reposition, Distraction, Cessation of Activity, Nurse notified  Pain Rating Post-Intervention 1: other (see comments) (not rated)    Objective:     Communicated with: nsg prior to session.  Patient found HOB elevated with bed alarm, PureWick, peripheral IV, SCD, pressure relief boots upon OT entry to room.    General Precautions: Standard, fall, contact    Orthopedic Precautions:LUE weight bearing as tolerated, RLE partial weight bearing (Patient to only to transfer in short distances to affected foot with Darco shoe, partial weightbearing to heel)  Braces:  (forefoot offloading R darco)  Respiratory Status: Room air     Occupational Performance:     Bed Mobility:    Patient completed Scooting/Bridging with maximal assistance and 2 persons to scoot laterally along EOB  Patient completed Supine to Sit with moderate assistance  Patient completed Sit to Supine with maximal assistance and 2 persons     Functional Mobility/Transfers:  Patient completed Sit <> Stand Transfer with maximal assistance and of 2 persons  with  rolling walker x3 trials from EOB; minimal clearance of buttocks first two attempts; able to achieve full stand upon third attempt  Functional Mobility: Pt unable to take steps at this time    Activities of Daily Living:  Lower Body Dressing: maximal assistance to david R DARCO shoe & L post op shoe seated EOB      AMPAC 6 Click ADL: 14    Treatment & Education:  Pt progressing towards goals this date, agreeable to therapy.   Continues with c/o pain to LUE; increased edema noted.   Pt educated on AROM as able & elevation.   Pt perfomring sit<>stand x3 trials from EOB with MaxA x2 & RW; pt unable to achieve full stand first " two attempts, full stand for ~30 seconds on third attempt.   Pt scooting laterally along EOB with MaxA x2.   Gentle PROM LUE to tolerance performed.  Waffle overlay placed on mattress 2/2 pt with c/o pain to buttocks & reports of breakdown; nsg notified.   Will progress as able.     Patient left HOB elevated with all lines intact, call button in reach, bed alarm on, nsg notified, and LUE elevated & B pressure relief boots donned.    GOALS:   Multidisciplinary Problems       Occupational Therapy Goals          Problem: Occupational Therapy    Goal Priority Disciplines Outcome Interventions   Occupational Therapy Goal     OT, PT/OT Ongoing, Progressing    Description: Goals to be met by: 12/13/2023     Patient will increase functional independence with ADLs by performing:    Feeding with Set-up Assistance.  Grooming while seated with Set-up Assistance.  Toileting from bedside commode with Minimal Assistance for hygiene and clothing management.   Rolling to Bilateral with Stand-by Assistance.   Supine to sit with Minimal Assistance.  Stand pivot transfer with Moderate Assistance  Toilet transfer to bedside commode with Moderate Assistance.                         Time Tracking:     OT Date of Treatment: 11/14/23  OT Start Time: 1327  OT Stop Time: 1350  OT Total Time (min): 23 min    Billable Minutes:Therapeutic Activity 23 cotx with PT    OT/GENA: OT     Number of GENA visits since last OT visit: 0    11/14/2023

## 2023-11-15 ENCOUNTER — HOSPITAL ENCOUNTER (EMERGENCY)
Facility: HOSPITAL | Age: 83
Discharge: HOME | End: 2023-11-15
Attending: EMERGENCY MEDICINE
Payer: MEDICARE

## 2023-11-15 VITALS
RESPIRATION RATE: 18 BRPM | SYSTOLIC BLOOD PRESSURE: 156 MMHG | DIASTOLIC BLOOD PRESSURE: 73 MMHG | HEART RATE: 92 BPM | TEMPERATURE: 98 F | OXYGEN SATURATION: 99 %

## 2023-11-15 DIAGNOSIS — Z95.828 STATUS POST PICC CENTRAL LINE PLACEMENT: ICD-10-CM

## 2023-11-15 DIAGNOSIS — T82.9XXA COMPLICATION ASSOCIATED WITH PERIPHERALLY INSERTED CENTRAL CATHETER (PICC), INITIAL ENCOUNTER: ICD-10-CM

## 2023-11-15 DIAGNOSIS — Z78.9 PROBLEM WITH VASCULAR ACCESS: Primary | ICD-10-CM

## 2023-11-15 LAB
BACTERIA BLD CULT: NORMAL
BACTERIA SPEC ANAEROBE CULT: NORMAL

## 2023-11-15 PROCEDURE — 36556 INSERT NON-TUNNEL CV CATH: CPT

## 2023-11-15 PROCEDURE — C1751 CATH, INF, PER/CENT/MIDLINE: HCPCS

## 2023-11-15 PROCEDURE — 99283 EMERGENCY DEPT VISIT LOW MDM: CPT

## 2023-11-15 PROCEDURE — 36569 INSJ PICC 5 YR+ W/O IMAGING: CPT

## 2023-11-15 NOTE — ED NOTES
Pt's son at bedside. PICC completed without difficulty. Transport requested via [place]. [place] [facility] called at [phone] to provide report on pt.

## 2023-11-15 NOTE — DISCHARGE INSTRUCTIONS
Thank you for choosing [facility]!     Our goal in the Emergency Department is to always provide outstanding medical care. You may receive a survey by mail or e-mail in the next week regarding your experience today. We would greatly appreciate you completing and returning the survey. Your feedback provides us with a way to recognize our staff who provide very good care, and it helps us learn how to improve when your experience was below our aspiration of excellence.      It is important to remember that some problems are difficult to diagnose and may not be found during your first visit. Be sure to follow up with your primary care doctor and review any labs/imaging that was performed during your visit with them. If you do not have a primary care doctor, you may contact the one listed on your discharge paperwork, or you may also call the [facility] Appointment Desk at [phone] to schedule an appointment.     All medications may potentially have side effects and it is impossible to predict which medications may give you side effects. If you feel that you are having a negative effect of any medication you should immediately stop taking them and seek medical attention.  Do not drive or make any important decisions for 24 hours if you have received any pain medications, sedatives or mood altering drugs during your ER visit.    We appreciate you trusting us with your medical care. We will be happy to take care of you for all of your future medical needs. You may return to the ER at any time for any new/concerning symptoms, worsening condition, or failure to improve. We hope you feel better soon.     Sincerely,    Kelvin Mast Jr., MD  Board-Certified Emergency Medicine Physician  [facility]

## 2023-11-15 NOTE — ED PROVIDER NOTES
Emergency Department Encounter  Provider Note    Julien Meléndez  [id number]  11/15/2023    Evaluation:    History Acquisition:     Chief Complaint   Patient presents with    Vascular Access Problem     Patient arrived from [facility] with [facility], patient was sent bc he pulled his picc line out of right arm. Catheter was intact, bleeding was controlled       History of Present Illness:  Julien Meléndez is a 83 y.o. male who has a past medical history of Diabetes mellitus, Hiatal hernia, Hyperlipidemia, Hypertension, MI (myocardial infarction), and Sleep apnea.    The patient presents to the ED due to PICC removal.   Patient presents from [facility] via EMS after patient inadvertently pulled out his PICC line.  He was recently admitted and is on long-term ABX.    On arrival he is awake and alert and has no complaints.    Additional historians utilized:  EMS report, see HPI    Prior medical records were reviewed:   Admitted 11/7-11/15 for MRSA bacteremia from foot infection, underwent partial amputation. ID recommendations for vancomycin x 6 weeks after surgery.   IM visit 11/6 for N/V.    The patient's list of active medical problems, social history, medications, and allergies as documented has been reviewed.     Past Medical History:   Diagnosis Date    Diabetes mellitus     Hiatal hernia     under Dr Saenz' care    Hyperlipidemia     Hypertension     MI (myocardial infarction)     Sleep apnea      Past Surgical History:   Procedure Laterality Date    ABDOMINAL HERNIA REPAIR      ANGIOGRAM, EXTREMITY, BILATERAL Bilateral 4/5/2023    Procedure: ANGIOGRAM, EXTREMITY, BILATERAL;  Surgeon: Michael Giraldo III, MD;  Location: [facility] CATH LAB;  Service: Cardiology;  Laterality: Bilateral;    ANGIOGRAPHY OF LOWER EXTREMITY Left 4/12/2023    Procedure: Angiogram Extremity Unilateral;  Surgeon: Michael Giraldo III, MD;  Location: [facility] CATH LAB;  Service: Cardiology;  Laterality: Left;    AORTOGRAPHY  WITH SERIALOGRAPHY Bilateral 4/5/2023    Procedure: AORTOGRAM, WITH SERIALOGRAPHY;  Surgeon: Michael Giraldo III, MD;  Location: [facility] CATH LAB;  Service: Cardiology;  Laterality: Bilateral;    APPLICATION OF WOUND VACUUM-ASSISTED CLOSURE DEVICE Left 3/29/2023    Procedure: APPLICATION, WOUND VAC;  Surgeon: Alona Pierce DPM;  Location: [facility] OR;  Service: Podiatry;  Laterality: Left;    APPLICATION OF WOUND VACUUM-ASSISTED CLOSURE DEVICE Left 4/4/2023    Procedure: APPLICATION, WOUND VAC;  Surgeon: Alona Pierce DPM;  Location: [facility] OR;  Service: Podiatry;  Laterality: Left;    APPLICATION OF WOUND VACUUM-ASSISTED CLOSURE DEVICE Left 4/13/2023    Procedure: APPLICATION, WOUND VAC;  Surgeon: Alona Pierce DPM;  Location: [facility] OR;  Service: Podiatry;  Laterality: Left;    BONE BIOPSY Left 3/29/2023    Procedure: BIOPSY, BONE;  Surgeon: Alona Pierce DPM;  Location: [facility] OR;  Service: Podiatry;  Laterality: Left;  1st met    CARDIAC CATHETERIZATION      3 stents placed    CATARACT EXTRACTION, BILATERAL      CLOSURE DEVICE Left 4/12/2023    Procedure: Placement of Closure Device;  Surgeon: Michael Giraldo III, MD;  Location: [facility] CATH LAB;  Service: Cardiology;  Laterality: Left;    CORONARY ANGIOGRAPHY N/A 10/3/2019    Procedure: ANGIOGRAM, CORONARY ARTERY;  Surgeon: Edwin Azul MD;  Location: [facility] CATH LAB/EP;  Service: Cardiology;  Laterality: N/A;    DEBRIDEMENT OF FOOT Left 3/29/2023    Procedure: DEBRIDEMENT, FOOT;  Surgeon: Alona Pierce DPM;  Location: [facility] OR;  Service: Podiatry;  Laterality: Left;    DEBRIDEMENT OF FOOT Left 4/4/2023    Procedure: DEBRIDEMENT, FOOT;  Surgeon: Alona Pierce DPM;  Location: [facility] OR;  Service: Podiatry;  Laterality: Left;    DEBRIDEMENT OF FOOT Left 4/13/2023    Procedure: DEBRIDEMENT, FOOT;  Surgeon: Alona Pierce DPM;  Location: [facility] OR;  Service: Podiatry;  Laterality: Left;    ESOPHAGOGASTRODUODENOSCOPY N/A 4/17/2023    Procedure: EGD (ESOPHAGOGASTRODUODENOSCOPY);   Surgeon: Otto Villarreal MD;  Location: [facility] ENDO;  Service: Endoscopy;  Laterality: N/A;    HERNIA REPAIR      inguinal    IVUS (INTRAVASCULAR ULTRASOUND) Left 4/12/2023    Procedure: ULTRASOUND, INTRAVASCULAR;  Surgeon: Michael Giraldo III, MD;  Location: [facility] CATH LAB;  Service: Cardiology;  Laterality: Left;    LEFT HEART CATHETERIZATION Left 9/13/2019    Procedure: Left heart cath;  Surgeon: Edwin Azul MD;  Location: [facility] CATH LAB/EP;  Service: Cardiology;  Laterality: Left;    LEFT HEART CATHETERIZATION N/A 10/3/2019    Procedure: Left heart cath;  Surgeon: Edwin Azul MD;  Location: [facility] CATH LAB/EP;  Service: Cardiology;  Laterality: N/A;    OSTEOTOMY Left 6/7/2023    Procedure: OSTEOTOMY;  Surgeon: lAona Pierce DPM;  Location: [facility] OR;  Service: Podiatry;  Laterality: Left;    OSTEOTOMY OF METATARSAL BONE Right 4/4/2023    Procedure: OSTEOTOMY, METATARSAL BONE;  Surgeon: Alona Pierce DPM;  Location: [facility] OR;  Service: Podiatry;  Laterality: Right;    PERCUTANEOUS TRANSLUMINAL ANGIOPLASTY Left 4/12/2023    Procedure: PTA (ANGIOPLASTY, PERCUTANEOUS, TRANSLUMINAL);  Surgeon: Michael Giraldo III, MD;  Location: [facility] CATH LAB;  Service: Cardiology;  Laterality: Left;    SURGICAL REMOVAL OF BUNION WITH OSTEOTOMY OF METATARSAL BONE Right 4/4/2023    Procedure: BUNIONECTOMY, WITH METATARSAL OSTEOTOMY;  Surgeon: Alona Pierce DPM;  Location: [facility] OR;  Service: Podiatry;  Laterality: Right;    TOE AMPUTATION Left 4/4/2023    Procedure: AMPUTATION, TOE;  Surgeon: Alona Pierce DPM;  Location: [facility] OR;  Service: Podiatry;  Laterality: Left;  1st ray    TOE AMPUTATION Right 11/10/2023    Procedure: AMPUTATION, TOE;  Surgeon: Diya Leung DPM;  Location: [facility] OR;  Service: Podiatry;  Laterality: Right;    WASHOUT Left 6/7/2023    Procedure: WASHOUT;  Surgeon: Alona Pierce DPM;  Location: [facility] OR;  Service: Podiatry;  Laterality: Left;    WOUND DEBRIDEMENT Left 6/7/2023    Procedure: DEBRIDEMENT,  WOUND;  Surgeon: Alona Pierce DPM;  Location: [facility];  Service: Podiatry;  Laterality: Left;     Family History   Problem Relation Age of Onset    Heart disease Mother     Stroke Mother     Heart disease Sister     Heart disease Brother     Heart disease Maternal Uncle     Heart disease Brother     Heart disease Sister     Heart disease Maternal Uncle     Heart disease Maternal Uncle     Heart disease Maternal Uncle      Social History     Socioeconomic History    Marital status:    Tobacco Use    Smoking status: Never    Smokeless tobacco: Never   Substance and Sexual Activity    Alcohol use: No    Drug use: No     Social Determinants of Health     Financial Resource Strain: Unknown (11/8/2023)    Overall Financial Resource Strain (CARDIA)     Difficulty of Paying Living Expenses: Patient refused   Food Insecurity: No Food Insecurity (11/8/2023)    Hunger Vital Sign     Worried About Running Out of Food in the Last Year: Never true     Ran Out of Food in the Last Year: Never true   Transportation Needs: No Transportation Needs (11/8/2023)    PRAPARE - Transportation     Lack of Transportation (Medical): No     Lack of Transportation (Non-Medical): No   Physical Activity: Unknown (11/8/2023)    Exercise Vital Sign     Days of Exercise per Week: Patient refused     Minutes of Exercise per Session: Patient refused   Stress: Unknown (11/8/2023)    [employer] [place] [place] [employer] - Occupational Stress Questionnaire     Feeling of Stress : Patient refused   Social Connections: Unknown (11/8/2023)    Social Connection and Isolation Panel [NHANES]     Frequency of Communication with Friends and Family: More than three times a week     Frequency of Social Gatherings with Friends and Family: More than three times a week     Attends Faith Services: Patient refused     Active Member of Clubs or Organizations: Patient refused     Attends Club or Organization Meetings: Patient refused     Marital Status:     Housing Stability: Low Risk  (11/8/2023)    Housing Stability Vital Sign     Unable to Pay for Housing in the Last Year: No     Number of Places Lived in the Last Year: 1     Unstable Housing in the Last Year: No     Review of patient's allergies indicates:   Allergen Reactions    Nystatin      Other reaction(s): Unknown       Review of Systems   Constitutional:  Negative for fever.         Physical Exam:     Initial Vitals [11/15/23 1050]   BP Pulse Resp Temp SpO2   136/78 85 18 98 °F (36.7 °C) 96 %      MAP       --         Physical Exam    Nursing note and vitals reviewed.  Constitutional: He appears well-developed and well-nourished. He is not diaphoretic. No distress.   HENT:   Head: Normocephalic and atraumatic.   Mouth/Throat: Oropharynx is clear and moist.   Eyes: EOM are normal. Pupils are equal, round, and reactive to light.   Neck: No tracheal deviation present.   Cardiovascular:  Normal rate, regular rhythm, normal heart sounds and intact distal pulses.           Pulmonary/Chest: Breath sounds normal. No stridor. No respiratory distress.   Abdominal: Abdomen is soft. He exhibits no distension and no mass. There is no abdominal tenderness.   Musculoskeletal:         General: No edema. Normal range of motion.      Comments: RUE without bruising, swelling, or active bleeding.     Neurological: He is alert and oriented to person, place, and time. No cranial nerve deficit or sensory deficit.   Skin: Skin is warm and dry. Capillary refill takes less than 2 seconds. No rash noted.   Psychiatric: He has a normal mood and affect. His behavior is normal. Thought content normal.         ED Course:   Procedures    Orders Placed This Encounter    X-Ray Chest 1 View S/P PICC Line by Nursing    Central line insertion (PICC LINE)    Implement nursing PICC line care order set (#1724) after PICC is placed    [facility] Facilitated Transportation Request    Anesthesia US Guide Vascular Access        ED Course as of  11/16/23 0941   Wed Nov 15, 2023   1634 X-Ray Chest 1 View S/P PICC Line by Nursing  CXR independently interpreted: PICC in place to SVC. No focal infiltrate, effusion, edema, free air, or other acute process  Agree with radiologist interpretation.    [SS]      ED Course User Index  [SS] Kelvin Mast MD       Differential Diagnoses:   Based on available information and initial assessment, Differential Diagnosis includes, but is not limited to:  PICC complication, blood loss, cellulitis, abscess, DVT        EKG:       Labs:   Labs Reviewed - No data to display  Independent review of the labs ordered include:       Imaging:     Imaging Results              X-Ray Chest 1 View S/P PICC Line by Nursing (Final result)  Result time 11/15/23 16:26:34      Final result by Herson Rodriguez MD (11/15/23 16:26:34)                   Impression:      As above      Electronically signed by: Herson Rodriguez MD  Date:    11/15/2023  Time:    16:26               Narrative:    EXAMINATION:  XR CHEST 1 VIEW S/P PICC LINE BY NURSING    CLINICAL HISTORY:  picc placement;    COMPARISON:  11/11/2023    FINDINGS:  Right PICC catheter tip projects over the distal SVC.  There is no pneumothorax or significant interval detrimental change in the cardiopulmonary status since the previous exam.                                         Medications Given:   Medications - No data to display     Medical Decision Making:    Additional Consideration:   Additional testing considered during clinical course: none    Social determinants of health considered during development of treatment plan include: poor access to care    Current co-morbidities considered which impacted clinical decision making: HTN, DM, HLD, hiatal hernia, KELSEY    Case discussed with additional provider: none         Medical Decision Making  84 yo M presents after accidentally removing his PICC.  Vitals stale.   PICC replaced in ED. CXR confirmed appropriate placement.   Patient  stable for D/C back to NH.       Problems Addressed:  Complication associated with peripherally inserted central catheter (PICC), initial encounter: acute illness or injury  Problem with vascular access: acute illness or injury  Status post PICC central line placement: acute illness or injury    Amount and/or Complexity of Data Reviewed  External Data Reviewed: notes.  Radiology: ordered and independent interpretation performed. Decision-making details documented in ED Course.    Risk  Minor surgery with no identified risk factors.        Clinical Impression:       ICD-10-CM ICD-9-CM   1. Problem with vascular access  Z78.9 V49.9   2. Complication associated with peripherally inserted central catheter (PICC), initial encounter  T82.9XXA 996.74   3. Status post PICC central line placement  Z95.828 V45.89         Follow-up Information       Follow up With Specialties Details Why Contact Info    Your Primary Care Provider  Schedule an appointment as soon as possible for a visit   as soon as able             ED Disposition Condition    Discharge Stable              On re-evaluation, the patient's status has improved.  After complete ED evaluation, clinical impression is most consistent with PICC replacement.  PCP follow-up as soon as possible was recommended.    After taking into careful account the patient's history, physical exam findings, as well as empirical and objective data obtained throughout ED workup, I feel no emergent medical condition has been identified. No further evaluation or admission was felt to be required, and the patient is stable for discharge from the ED. The patient and any additional family present were updated with test results, overall clinical impression, and recommended further plan of care, including discharge instructions as provided and outpatient follow-up for continued evaluation and management as needed. All questions were answered. The patient expressed understanding and agreed with  current plan for discharge and follow-up plan of care. Strict ED return precautions were provided, including return/worsening of current symptoms, new symptoms, or any other concerns.       Kelvin Mast MD  11/16/23 [id number]

## 2023-11-15 NOTE — PROCEDURES
Julien Meléndez is a 83 y.o. male patient.    Temp: 98 °F (36.7 °C) (11/15/23 1050)  Pulse: 81 (11/15/23 1503)  Resp: 18 (11/15/23 1050)  BP: (!) 143/69 (11/15/23 1503)  SpO2: 99 % (11/15/23 1503)    PICC  Date/Time: 11/15/2023 3:35 PM  Performed by: Jackson Rodriguez RN  Consent Done: Yes  Time out: Immediately prior to procedure a time out was called to verify the correct patient, procedure, equipment, support staff and site/side marked as required  Indications: med administration and vascular access  Anesthesia: local infiltration  Local anesthetic: lidocaine 1% without epinephrine  Anesthetic Total (mL): 3  Preparation: skin prepped with ChloraPrep  Skin prep agent dried: skin prep agent completely dried prior to procedure  Sterile barriers: all five maximum sterile barriers used - cap, mask, sterile gown, sterile gloves, and large sterile sheet  Hand hygiene: hand hygiene performed prior to central venous catheter insertion  Location details: right basilic  Catheter type: double lumen  Catheter size: 5 Fr  Catheter Length: 40cm    Ultrasound guidance: yes  Vessel Caliber: large, compressibility normal  Needle advanced into vessel with real time Ultrasound guidance.  Guidewire confirmed in vessel.  Sterile sheath used.  Number of attempts: 1  Post-procedure: blood return through all ports, chlorhexidine patch and sterile dressing applied    Assessment: placement verified by x-ray, tip termination and successful placement  Comments: GENNA HAYWOOD PICC placed for long-term IV abx.        Name jackson rodriguez RN  11/15/2023

## 2023-11-15 NOTE — PHYSICIAN QUERY
"PT Name: Julien Meléndez  MR #: [id number]     DOCUMENTATION CLARIFICATION      CDI : Kacie Weiner RN, CDS              Contact information: [email or web address]  This form is a permanent document in the medical record.     Query Date: November 15, 2023    By submitting this query, we are merely seeking further clarification of documentation.  Please utilize your independent clinical judgment when addressing the question(s) below.  The Medical Record contains the following:  Indicators Supporting Clinical Findings Location in Medical Record   x HR         RR          BP        Temp T 98.3-99.2   HR      RR  14-33    BP: (101-155)/(52-88)   T  97.9-99.2  HR      RR  14-33    BP (101-155)/(52-88)   T  .7   HR  83-92      RR  18-19    BP: ()/(45-69)   T  97.5- 99    HR  70-91      RR  16-20   BP ()/(54-64)  Hospital Medicine 11/7  [facility] 11/8  [facility] 11/9  [facility] 11/10   x Lactic Acid          Procalcitonin Lactate 2.9  lactate 1,8 Labs 11/7  Labs 11/8   x WBC           Bands          CRP  WBC 12.71  .9  WBC 9.42 Labs 11/7  Labs 11/8   x Culture(s) Cultures x 2: MRSA  Cultures x 2: No growth to date  Aerobic culture R foot toe wound: MRSA Blood cultures 11/7  Blood cultures 11/10  Wound culture 11/7 and 11/10     x AMS, Confusion, LOC, etc.  Negative for agitation and confusion   oriented to Person, Place, and  confused to month "February" & year "1975"; impaired memory  Podiatry 11/8  PT 11/13    Organ Dysfunction/Failure     x Bacteremia or Sepsis / Septic MRSA septicemia likely from right foot infection    MRSA bacteremia + Osteomyelitis  Infectious disease 11/11  Infectious disease 11/10     x Known or Suspected Source of Infection documented from right foot infection  Infectious disease 11/11    (Failed) Outpatient Treatment     x Medication Vancomycin 2250 mg IVPB  Vancomycin 1250 mg IV every 24 hr  Vancomycin 1g IVPB every 12 " hr  Meropenem 1g IVPB every 8hr  Piperacillin-tazobactam 4.5 g IVPB every 8hr MAR 11/7  MAR 11/8  MAR 11/9-14  MAR 11/8-11  MAR 11/8   x Treatment Status post partial amputation of right toe.  Infectious disease 11/11    Other          Provider, due to conflicting documentation, please specify diagnosis or diagnoses associated with above clinical findings.  [  x ] Sepsis due to MRSA   [   ] Severe Sepsis with Acute Organ Dysfunction/Failure (please specify organ dysfunction/failure): _______   [   ] Bacteremia without Sepsis   [  x ] Bacteremia with Sepsis   [   ] Other Infectious Disease (please specify): __________       Please document in your progress notes daily for the duration of treatment until resolved and include in your discharge summary.

## 2023-11-15 NOTE — PHYSICIAN QUERY
PT Name: Julien Meléndez  MR #: [id number]    DOCUMENTATION CLARIFICATION      CDI : Kacie Weiner RN, CDS              Contact information: [email or web address]    This form is a permanent document in the medical record.     Query Date: November 15, 2023    By submitting this query, we are merely seeking further clarification of documentation.. Please utilize your independent clinical judgment when addressing the question(s) below.    The medical record contains the following:   Indicators  Supporting Clinical Findings Location in Medical Record    Registered Dietician Diagnosis     x Energy Intake 2000 ADA diet with ~75% intake at meals   2800cal ADA diet with ~25% intake at meals    Nutrition note   Nutrition note    x Weight Loss Weight Loss (Malnutrition):  (20% x 6 months)    43lb weight loss x 6 months.     32lb weight loss x 6 months   % Weight Change From Usual Weight: -14.91 % Nutrition note       Nutrition note     Fat Loss      Muscle Loss     x Edema/Fluid Accumulation  Left UE with continued edema.  Infectious disease     Reduced  Strength (by dynamometer)     x Weight, BMI, Usual Body Weight Height (inches): 73 in  Weight Method: Bed Scale  Weight: 76.3 kg (168 lb 3.4 oz)  BMI (Calculated): 22.2  Usual Body Weight (UBW), k.9 kg ()    Weight Method: Bed Scale  Weight: 81.6 kg (179 lb 14.3 oz)  BMI (Calculated): 23.7  Usual Body Weight (UBW), k.9 kg   Nutrition note             Nutrition note    x Delayed Wound Healing Yoni 15- foot wound  Nutrition note    x Acute or Chronic Illness Acute osteomyelitis, DM2, cad, atrial fib, anxiety depression, cellulitis of right toe/right foot wound , Left shoulder with Limited ROM  , bacteremia, Hyponatremia, anemia Hospital Medicine     Social or Environmental Circumstances     x Treatment Recommendation: add boost glucose control 1x daily Nutrition note     Other       Academy of Nutrition and  Dietetics (Academy) and the American Society for Parenteral and Enteral Nutrition (A.S.P.E.N.) Clinical Characteristics to support Malnutrition      Criteria for mild malnutrition is defined as 1 characteristic outlined above within the established moderate or severe parameters.  A minimum of 2 out of the 6 characteristics noted above are recommended for a diagnosis of moderate or severe malnutrition.  Chronic illness/injury is a disease/condition lasting 3 months or longer.    The noted clinical guidelines are only system guidelines and do not replace the providers clinical judgment.    Provider, please specify diagnosis or diagnoses associated with above clinical findings.    [x  ] Moderate Malnutrition - a minimum of 2 of the 6 moderate malnutrition characteristics noted above    [  ] Severe Malnutrition - a minimum of 2 of the 6 severe malnutrition characteristics noted above    [  ] Malnutrition, Unspecified degree   [  ] Abnormal Weight Loss   [  ] Other Nutritional Diagnosis (please specify): _______       Please document in your progress notes daily for the duration of treatment until resolved and  include in your discharge summary.      References:    JEFF Hayden, & RENETTA Landrum (2022, April). Assessment and management of anorexia and cachexia in palliative care. Retrieved May 23, 2022, from [email or web address]     ABDIRASHID Salazar, PhD, RD, Shellie BOWIE P., PhD, RN, IRENE Garcia MD, PhD, Marc DELEON A., MS, RD, [facility], KISHORE Tavarez, MS, RD, The Academy Malnutrition Work Group, The A.S.P.E.N. Board of Directors. (2012). Consensus Statement: Academy of Nutrition and Dietetics and American Society for Parenteral and Enteral Nutrition: Characteristics Recommended for the Identification and Documentation of Adult Malnutrition (Undernutrition). Journal of Parenteral and Enteral Nutrition, 36(3), 275-283.  doi:10.1177/1055441414665718     Form No. 11917

## 2023-11-15 NOTE — ED NOTES
[facility] Pt arrived from [facility] after his picc line came out this morning. PICC was sent with pt and he reports he doesn't know how it came out. Pt is awake and alert, plan of care reviewed, call bell within reach.

## 2023-11-15 NOTE — ED NOTES
Pt ate dinner and diaper was changed. Pt sitting up in bed with a tilt to right side for comfort. Transport back to [place] pending. Call bell within reach.

## 2023-11-16 ENCOUNTER — HOSPITAL ENCOUNTER (EMERGENCY)
Facility: HOSPITAL | Age: 83
Discharge: NURSING, REHAB OR LONG-TERM CARE | End: 2023-11-16
Attending: EMERGENCY MEDICINE
Payer: MEDICARE

## 2023-11-16 ENCOUNTER — PATIENT OUTREACH (OUTPATIENT)
Dept: EMERGENCY MEDICINE | Facility: HOSPITAL | Age: 83
End: 2023-11-16

## 2023-11-16 VITALS
DIASTOLIC BLOOD PRESSURE: 82 MMHG | OXYGEN SATURATION: 97 % | RESPIRATION RATE: 20 BRPM | BODY MASS INDEX: 26.68 KG/M2 | WEIGHT: 197 LBS | TEMPERATURE: 98 F | HEIGHT: 72 IN | SYSTOLIC BLOOD PRESSURE: 147 MMHG | HEART RATE: 86 BPM
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
## 2023-11-16 PROCEDURE — 99283 EMERGENCY DEPT VISIT LOW MDM: CPT | Mod: ER

## 2023-11-16 NOTE — DISCHARGE INSTRUCTIONS
Your exam today shows no indication for imaging or labwork. Please be careful in the future to prevent falls.

## 2023-11-16 NOTE — ED PROVIDER NOTES
Emergency Department Encounter  Provider Note  Encounter Date: 11/16/2023    Patient Name: Julien Meléndez  MRN: [id number]    History of Present Illness   HPI  History of Present Illness:    Chief Complaint:   Chief Complaint   Patient presents with    Fall     Pt C/O R hip pain following fall from bed to floor.  No obvious abnormalities.  Per EMS, pt was able to stand following fall. Pt AAOX4.       83-year-old male presenting with slipping out of his bed and landing on his left side.  Patient is on Eliquis.  The bed is about 3 in off the ground.  No head strike.  Patient denies any pain currently.    The following PMH/PSH/SocHx/FamHx has been reviewed by myself:    Past Medical History:   Diagnosis Date    Diabetes mellitus     Hiatal hernia     under Dr Saenz' care    Hyperlipidemia     Hypertension     MI (myocardial infarction)     Sleep apnea      Past Surgical History:   Procedure Laterality Date    ABDOMINAL HERNIA REPAIR      ANGIOGRAM, EXTREMITY, BILATERAL Bilateral 4/5/2023    Procedure: ANGIOGRAM, EXTREMITY, BILATERAL;  Surgeon: Michael Giraldo III, MD;  Location: [facility] CATH LAB;  Service: Cardiology;  Laterality: Bilateral;    ANGIOGRAPHY OF LOWER EXTREMITY Left 4/12/2023    Procedure: Angiogram Extremity Unilateral;  Surgeon: Michael Giarldo III, MD;  Location: [facility] CATH LAB;  Service: Cardiology;  Laterality: Left;    AORTOGRAPHY WITH SERIALOGRAPHY Bilateral 4/5/2023    Procedure: AORTOGRAM, WITH SERIALOGRAPHY;  Surgeon: Mcihael Giraldo III, MD;  Location: [facility] CATH LAB;  Service: Cardiology;  Laterality: Bilateral;    APPLICATION OF WOUND VACUUM-ASSISTED CLOSURE DEVICE Left 3/29/2023    Procedure: APPLICATION, WOUND VAC;  Surgeon: Alona Pierce DPM;  Location: [facility] OR;  Service: Podiatry;  Laterality: Left;    APPLICATION OF WOUND VACUUM-ASSISTED CLOSURE DEVICE Left 4/4/2023    Procedure: APPLICATION, WOUND VAC;  Surgeon: Alona Pierce DPM;  Location: [facility] OR;  Service: Podiatry;  Laterality:  Left;    APPLICATION OF WOUND VACUUM-ASSISTED CLOSURE DEVICE Left 4/13/2023    Procedure: APPLICATION, WOUND VAC;  Surgeon: Alona Pierce DPM;  Location: [facility] OR;  Service: Podiatry;  Laterality: Left;    BONE BIOPSY Left 3/29/2023    Procedure: BIOPSY, BONE;  Surgeon: Alona Pierce DPM;  Location: [facility] OR;  Service: Podiatry;  Laterality: Left;  1st met    CARDIAC CATHETERIZATION      3 stents placed    CATARACT EXTRACTION, BILATERAL      CLOSURE DEVICE Left 4/12/2023    Procedure: Placement of Closure Device;  Surgeon: Michael Giraldo III, MD;  Location: [facility] CATH LAB;  Service: Cardiology;  Laterality: Left;    CORONARY ANGIOGRAPHY N/A 10/3/2019    Procedure: ANGIOGRAM, CORONARY ARTERY;  Surgeon: Edwin Azul MD;  Location: [facility] CATH LAB/EP;  Service: Cardiology;  Laterality: N/A;    DEBRIDEMENT OF FOOT Left 3/29/2023    Procedure: DEBRIDEMENT, FOOT;  Surgeon: Alona Pierce DPM;  Location: [facility] OR;  Service: Podiatry;  Laterality: Left;    DEBRIDEMENT OF FOOT Left 4/4/2023    Procedure: DEBRIDEMENT, FOOT;  Surgeon: Alona Pierce DPM;  Location: [facility] OR;  Service: Podiatry;  Laterality: Left;    DEBRIDEMENT OF FOOT Left 4/13/2023    Procedure: DEBRIDEMENT, FOOT;  Surgeon: Alona Pierce DPM;  Location: [facility] OR;  Service: Podiatry;  Laterality: Left;    ESOPHAGOGASTRODUODENOSCOPY N/A 4/17/2023    Procedure: EGD (ESOPHAGOGASTRODUODENOSCOPY);  Surgeon: Otto Villarreal MD;  Location: [facility] ENDO;  Service: Endoscopy;  Laterality: N/A;    HERNIA REPAIR      inguinal    IVUS (INTRAVASCULAR ULTRASOUND) Left 4/12/2023    Procedure: ULTRASOUND, INTRAVASCULAR;  Surgeon: Michael Giraldo III, MD;  Location: [facility] CATH LAB;  Service: Cardiology;  Laterality: Left;    LEFT HEART CATHETERIZATION Left 9/13/2019    Procedure: Left heart cath;  Surgeon: Edwin Azul MD;  Location: [facility] CATH LAB/EP;  Service: Cardiology;  Laterality: Left;    LEFT HEART CATHETERIZATION N/A 10/3/2019    Procedure: Left heart cath;  Surgeon:  Edwin Auzl MD;  Location: [facility] CATH LAB/EP;  Service: Cardiology;  Laterality: N/A;    OSTEOTOMY Left 6/7/2023    Procedure: OSTEOTOMY;  Surgeon: Alona Pierce DPM;  Location: [facility] OR;  Service: Podiatry;  Laterality: Left;    OSTEOTOMY OF METATARSAL BONE Right 4/4/2023    Procedure: OSTEOTOMY, METATARSAL BONE;  Surgeon: Alona Pierce DPM;  Location: [facility] OR;  Service: Podiatry;  Laterality: Right;    PERCUTANEOUS TRANSLUMINAL ANGIOPLASTY Left 4/12/2023    Procedure: PTA (ANGIOPLASTY, PERCUTANEOUS, TRANSLUMINAL);  Surgeon: Michael Giraldo III, MD;  Location: [facility] CATH LAB;  Service: Cardiology;  Laterality: Left;    SURGICAL REMOVAL OF BUNION WITH OSTEOTOMY OF METATARSAL BONE Right 4/4/2023    Procedure: BUNIONECTOMY, WITH METATARSAL OSTEOTOMY;  Surgeon: Alona Pierce DPM;  Location: [facility] OR;  Service: Podiatry;  Laterality: Right;    TOE AMPUTATION Left 4/4/2023    Procedure: AMPUTATION, TOE;  Surgeon: Alona Pierce DPM;  Location: [facility] OR;  Service: Podiatry;  Laterality: Left;  1st ray    TOE AMPUTATION Right 11/10/2023    Procedure: AMPUTATION, TOE;  Surgeon: Diya Leung DPM;  Location: [facility] OR;  Service: Podiatry;  Laterality: Right;    WASHOUT Left 6/7/2023    Procedure: WASHOUT;  Surgeon: Alona Pierce DPM;  Location: [facility] OR;  Service: Podiatry;  Laterality: Left;    WOUND DEBRIDEMENT Left 6/7/2023    Procedure: DEBRIDEMENT, WOUND;  Surgeon: Alona Pierce DPM;  Location: [facility] OR;  Service: Podiatry;  Laterality: Left;     Social History     Tobacco Use    Smoking status: Never    Smokeless tobacco: Never   Substance Use Topics    Alcohol use: No    Drug use: No     Family History   Problem Relation Age of Onset    Heart disease Mother     Stroke Mother     Heart disease Sister     Heart disease Brother     Heart disease Maternal Uncle     Heart disease Brother     Heart disease Sister     Heart disease Maternal Uncle     Heart disease Maternal Uncle     Heart disease Maternal Uncle         Allergies reviewed:  Review of patient's allergies indicates:   Allergen Reactions    Nystatin      Other reaction(s): Unknown       Medications reviewed:  Medication List with Changes/Refills   Current Medications    ALCOHOL SWABS PADM    Apply 1 each topically as needed (for use with glucose monitoring).    AMLODIPINE (NORVASC) 5 MG TABLET    Take 1 tablet (5 mg total) by mouth once daily.    ASPIRIN 81 MG CHEW    Take 1 tablet (81 mg total) by mouth once daily.    ATORVASTATIN (LIPITOR) 40 MG TABLET    Take 1 tablet (40 mg total) by mouth once daily.    BLOOD SUGAR DIAGNOSTIC (TRUE METRIX GLUCOSE TEST STRIP) STRP    1 strip by Misc.(Non-Drug; Combo Route) route 2 (two) times a day.    BLOOD-GLUCOSE METER (TRUE METRIX GLUCOSE METER) MISC    TEST TWICE A DAY    CLOPIDOGREL (PLAVIX) 75 MG TABLET    Take 1 tablet (75 mg total) by mouth once daily.    DEXTROSE 5 % (D5W) SOLP 250 ML WITH VANCOMYCIN 1,000 MG SOLR 1,000 MG    Inject 1,000 mg into the vein every 12 (twelve) hours. End date 12/22/2023    ELIQUIS 5 MG TAB    Take 5 mg by mouth 2 (two) times daily.    FERROUS SULFATE (FEOSOL) 325 MG (65 MG IRON) TAB TABLET    Take 325 mg by mouth daily with breakfast.    FLUOXETINE 40 MG CAPSULE    Take 40 mg by mouth once daily.    FUROSEMIDE (LASIX) 20 MG TABLET    Take 1 tablet (20 mg total) by mouth once daily.    HYDROCODONE-ACETAMINOPHEN (NORCO)  MG PER TABLET    Take 1 tablet by mouth every 6 (six) hours as needed for Pain.    INSULIN DETEMIR U-100, LEVEMIR, 100 UNIT/ML (3 ML) SUBQ INPN PEN    Inject 10 Units into the skin every evening.    ISOSORBIDE MONONITRATE (IMDUR) 30 MG 24 HR TABLET    Take 1 tablet (30 mg total) by mouth once daily.    LANCETS 28 GAUGE MISC    1 lancet  by Misc.(Non-Drug; Combo Route) route 2 (two) times a day.    METOPROLOL SUCCINATE (TOPROL-XL) 25 MG 24 HR TABLET    Take 1 tablet (25 mg total) by mouth once daily.    MUPIROCIN (BACTROBAN) 2 % OINTMENT    Apply topically 3 (three)  times daily. Apply to nasal area    NITROGLYCERIN (NITROSTAT) 0.4 MG SL TABLET    Place 0.4 mg under the tongue every 5 (five) minutes as needed for Chest pain.    ONDANSETRON (ZOFRAN-ODT) 8 MG TBDL    Take 1 tablet (8 mg total) by mouth 3 (three) times daily as needed (nausea or vomiting).    PANTOPRAZOLE (PROTONIX) 40 MG TABLET    Take 1 tablet (40 mg total) by mouth 2 (two) times daily.    RANOLAZINE (RANEXA) 1,000 MG TB12    Take 1,000 mg by mouth 2 (two) times daily.    SITAGLIPTAN-METFORMIN (JANUMET XR) 100-1,000 MG TM24    Take 1 tablet by mouth once daily.    SUCRALFATE (CARAFATE) 1 GRAM TABLET    Take 1 tablet (1 g total) by mouth 4 (four) times daily before meals and nightly.    TRAZODONE (DESYREL) 150 MG TABLET    TAKE 1 TABLET EVERY NIGHT       ROS  Review of Systems:    Constitutional:  Negative for fever.   HENT:  Negative for sore throat.    Eyes:  Negative for redness.   Respiratory:  Negative for shortness of breath.    Cardiovascular:  Negative for chest pain.   Gastrointestinal:  Negative for nausea.   Genitourinary:  Negative for dysuria.   Musculoskeletal:  Negative for back pain.   Skin:  Negative for rash.   Neurological:  Negative for weakness.   Hematological:  Does not bruise/bleed easily.   Psychiatric/Behavioral:  The patient is not nervous/anxious.      Physical Exam   Physical Exam    Initial Vitals [11/16/23 0656]   BP Pulse Resp Temp SpO2   (!) 147/82 86 20 97.9 °F (36.6 °C) 97 %      MAP       --           Triage vital signs reviewed.    Constitutional: Well-nourished, well-developed. Not in acute distress.  HENT: Normocephalic, atraumatic. Moist mucous membranes.  Eyes: No conjunctival injection.  Resp: Normal respiratory effort, breathing unlabored.  Cardio: Regular rate and rhythm.  GI: Abdomen non-distended.   MSK:  Bilateral lower extremities in boots.  No pain with palpation of the left or right hip.  No instability.  No pain with palpation of the left or right chest.  No  abdominal pain with palpation.  No pain with palpation of the left shoulder, elbow, wrist.  Abrasion of the left 2nd digit.  Right upper extremity PICC line clean dry and intact.  Skin: Warm and dry.  Neuro: Awake and alert.  Alert and oriented.    ED Course   Procedures    Medical Decision Making    History Acquisition     The history is provided by the patient.   Assessment requiring an independent historian and why historian was needed:  EMS at bedside indicating how high the bed was off the ground, patient was found lying on his left side.    Review of prior external/non ED notes:  Patient seen 11/13 for left shoulder pain, thought to be rotator cuff tear versus adhesive capsulitis    Differential Diagnoses   Based on available information and initial assessment, the working Differential Diagnosis includes, but is not limited to:  Fracture, dislocation, compartment syndrome, nerve injury/palsy, vascular injury, DVT, rhabdomyolysis, hemarthrosis, septic joint, cellulitis, bursitis, muscle strain, ligament tear/sprain, laceration, foreign body, abrasion, soft tissue contusion, osteoarthritis.  .    EKG   EKG ordered and independently reviewed by me:       Labs   Lab tests ordered and independently reviewed by me:    Labs Reviewed - No data to display    Imaging   Imaging ordered and independently reviewed by me:   Imaging Results    None            Additional Consideration   Julien Meléndez  presents to the Emergency Department today with fall from a few inches onto his left side.  On exam, patient has no open wounds except for an abrasion on his left 2nd finger, no pain with palpation of hip, chest, shoulder..  No indication for imaging, no head strike.  He is an alert and oriented x4.    Additional testing considered but not indicated during the course of this workup: further imaging and labwork, not indicated  Co-morbidities/chronic illness/exacerbation of chronic illness considered which impacted clinical  decision making:  Advanced age, deconditioned, on Eliquis  Procedures done in the ED or plan for the OR: No  Social determinants of care considered during development of treatment plan include: Decreased medical literacy  Discussion of management or test interpretation with external provider: No  DNR discussion: No    The patient's list of active medications and allergies as documented per RN staff has been reviewed.  Medications given in the ED and/or prescribed: Medications - No data to display               Explanation of disposition: Discharge    Clinical Impression:     1. Fall, initial encounter         All results from the workup were reviewed with the patient/family in detail. I discussed with the patient and/or the family/caregiver that today's evaluation in the ED does not suggest any emergent or life-threatening medical conditions that would require hospitalization or immediate intervention beyond what was provided in the ED. I believe the patient is safe for discharge.  However, a reassuring evaluation in the ED does not preclude the presence or development of a serious or life-threatening condition. As such, strict return precautions were discussed with the patient expressing understanding of instructions, and all questions answered. The patient/family communicates understanding of all this information and all remaining questions and concerns were addressed at this time.    The patient/family has been provided with verbal and printed direction regarding our final diagnosis(es) as well as instructions regarding use of OTC and/or Rx medications intended to manage the patient's aforementioned conditions including:  ED Prescriptions    None       The patient's condition does not warrant review of the  and prescription of controlled substances.      ED Disposition Condition    Discharge Stable               Remedios Zheng MD  11/16/23 0801

## 2023-11-17 LAB
BACTERIA BLD CULT: ABNORMAL
FINAL PATHOLOGIC DIAGNOSIS: NORMAL
Lab: NORMAL

## 2023-11-18 NOTE — DISCHARGE SUMMARY
[facility] Medicine  Discharge Summary      Patient Name: Julien Meléndez  MRN: [id number]  GIFTY: [id number]  Patient Class: IP- Inpatient  Admission Date: 11/7/2023  Hospital Length of Stay: 5 days  Discharge Date and Time: 11/14/2023  7:00 PM  Attending Physician: Nicolasa att. providers found   Discharging Provider: Luis Salas MD  Primary Care Provider: Nicolasa, Primary Doctor    Primary Care Team: Networked reference to record PCT     HPI:   Julien Meléndez is a that is why 83-year-old male with past medical history of diabetes, anxiety/depression, atrial fibrillation on anticoagulation, heart failure who presents with foot wound.    Patient reports a recent biopsy of his right great toe by Podiatry over the past week.  Patient reports worsening drainage and odor from his right foot biopsy site.  He states he has been taking antibiotics for the infection, with minimal relief.  He denies any fevers , chills , nausea, although reports episodes of nonbilious nonbloody emesis shortly after going to the ED after discussing the plan with his wound care nurse.      Triage vitals were significant for slightly elevated blood pressures.  CBC was significant for leukocytosis, normocytic anemia.  CMP was significant for hyponatremia, hyperglycemia, and anion gap of 22.  CRP was elevated at 228.9.  Lactic acid was 2.9.    Blood cultures were collected.    X-rays of the right foot showed no significant change of ulceration and destructive changes of the distal metatarsal head of the 1st toe of the right foot.    Pt given fluids and antibiotics.    Patient admitted for cellulitis of right toe/right foot wound,  with podiatry consult in the a.m.    Procedure(s) (LRB):  AMPUTATION, TOE (Right)      Hospital Course:   11/8: wound cx growing out gm positive. Pt on vanc and meropenem. Patient with clinical acute osteomyelitis of right hallux ulcer with bone exposure and purulent drainage noted. Xray, MRI ordered. Surgery  "plans for Friday by podiatry  11/9: MRI confirms osteo. Pt voices no complaints.NPO after midnight for tentative surgery   11/10:Pt taken to OR for a partial 1st ray amputation of right great toe. Clean margins taken, sent for Culture and Pathology plan for 2-6 weeks of antibiotics pending results upon d/c, antibiotic management per Infectious Disease  11/11: Echo ordered. Repeat blood cx on 11/10 NGTD. Blood cx upon admission growing out MRSA, sensitive to clinda and vanco. D/c meropenem. ID recs 4-6 weeks of iV abx. CM notified of duration and set up  11/12: U/S of LUE shows nonocclusive thormbus in the left internal jugular vein.Will do lovenox. Pt was on eliquis and was held for procedure.  Restarted pt on plavix. echo normal. No issues voiced. Awaiting home infusion approval   11/13: heme/onc consulted and rec'd eliquis. Pt restarted on eliquis for thrombus of Left internal jugular vein. Ortho consulted for left shoulder pain where pt had a fall 3 mos ago. MRI as outpt. SNF pending   11/14:  Infectious Disease recommended 6 week course of vancomycin on discharge with weekly CBC, CMP and vancomycin trough.  Patient to follow up with Infectious Disease Dr. Jaramillo and podiatry weekly.  Patient is seen and evaluated at bedside.  No acute complaints.  Patient was clinically stable.    BP (!) 118/56   Pulse 75   Temp 97.8 °F (36.6 °C)   Resp 20   Ht 6' 1" (1.854 m)   Wt 86.6 kg (190 lb 14.7 oz)   SpO2 96%   BMI 25.19 kg/m²     Physical Exam  Vitals and nursing note reviewed.   Constitutional:       General: He is not in acute distress.     Appearance: He is well-developed.   HENT:      Head: Normocephalic and atraumatic.      Nose: Nose normal.   Eyes:      Conjunctiva/sclera: Conjunctivae normal.   Cardiovascular:      Rate and Rhythm: Normal rate and regular rhythm.      Heart sounds: Normal heart sounds. No murmur heard.  Pulmonary:      Effort: Pulmonary effort is normal.      Breath sounds: Normal breath " sounds. No wheezing.   Abdominal:      General: Bowel sounds are normal.      Palpations: Abdomen is soft. There is no mass.      Tenderness: There is no abdominal tenderness. There is no guarding or rebound.   Musculoskeletal:         General: Normal range of motion.      Cervical back: Normal range of motion and neck supple.      Comments: right hallux s/p ampuation with dressings intact  Left shoulder tenderness with Limited ROM   Skin:     General: Skin is warm and dry.      Findings: No rash.   Neurological:      Mental Status: He is alert and oriented to person, place, and time.   Psychiatric:         Behavior: Behavior normal.      Goals of Care Treatment Preferences:  Code Status: Full Code          What is most important right now is to focus on remaining as independent as possible.  Accordingly, we have decided that the best plan to meet the patient's goals includes continuing with treatment.      Consults:   Consults (From admission, onward)          Status Ordering Provider     Inpatient consult to Orthopedic Surgery  Once        Provider:  (Not yet assigned)    Completed BARBARA MALCOLM     Inpatient consult to Telemedicine - Oncology  Once        Provider:  Akilah Chilel MD    Completed AKILAH CHILEL     Inpatient consult to PICC team ([facility])  Once        Provider:  (Not yet assigned)    Completed BARBARA MALCOLM     Inpatient consult to Cardiology-[facility]  Once        Provider:  (Not yet assigned)    Completed MADELIN SOLITARIO     Inpatient consult to Infectious Diseases  Once        Provider:  (Not yet assigned)    Completed MADELIN SOLITARIO     Inpatient consult to Registered Dietitian/Nutritionist  Once        Provider:  (Not yet assigned)    Completed MARLEN OATES     Inpatient consult to Podiatry  Once        Provider:  (Not yet assigned)    Completed EDELMIRA GODOY     IP consult to case management  Once        Provider:  (Not yet assigned)    Completed  MARLEN OATES            No new Assessment & Plan notes have been filed under this hospital service since the last note was generated.  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 [I70.234] 04/11/2023 Yes    Depressive disorder [F32.A] 04/20/2022 Yes    Atrial fibrillation [I48.91] 11/02/2020 Yes    Stage 3 chronic kidney disease [N18.30] 10/03/2019 Yes    Coronary artery disease due to calcified coronary lesion [I25.10, I25.84] 12/28/2015 Yes    Diabetes mellitus type 2, noninsulin dependent [E11.9] 09/17/2013 Yes    Mixed hyperlipidemia [E78.2]  Yes    Hypertension [I10]  Yes      Problems Resolved During this Admission:       Discharged Condition: poor    Disposition: Skilled Nursing Facility    Follow Up:   Follow-up Information       [facility] Follow up.    Specialty: Skilled Nursing Facility  Contact information:  [address] [address]  [phone]             Diya Leung DPM .    Specialties: Podiatry, Wound Care  Contact information:  [address]  [address]  [facility] [address]  [phone]               [facility] - Infectious Disease .    Specialty: Infectious Diseases  Contact information:  [address]  [address]  [facility] [id number] [phone]  Additional information:  [address]                         Patient Instructions:      Ambulatory referral/consult to Outpatient Case Management   Referral Priority: Routine Referral Type: Consultation   Referral  Reason: Specialty Services Required   Number of Visits Requested: 1       Significant Diagnostic Studies: N/A    Pending Diagnostic Studies:       None           Medications:  Reconciled Home Medications:      Medication List        START taking these medications      aspirin 81 MG Chew  Take 1 tablet (81 mg total) by mouth once daily.     dextrose 5 % (D5W) SolP 250 mL with vancomycin 1,000 mg SolR 1,000 mg  Inject 1,000 mg into the vein every 12 (twelve) hours. End date 12/22/2023     HYDROcodone-acetaminophen  mg per tablet  Commonly known as: NORCO  Take 1 tablet by mouth every 6 (six) hours as needed for Pain.            CHANGE how you take these medications      amLODIPine 5 MG tablet  Commonly known as: NORVASC  Take 1 tablet (5 mg total) by mouth once daily.  What changed:   how much to take  how to take this  when to take this     atorvastatin 40 MG tablet  Commonly known as: LIPITOR  Take 1 tablet (40 mg total) by mouth once daily.  What changed: when to take this     insulin detemir U-100 (Levemir) 100 unit/mL (3 mL) Inpn pen  Inject 10 Units into the skin every evening.  What changed: how much to take     metoprolol succinate 25 MG 24 hr tablet  Commonly known as: TOPROL-XL  Take 1 tablet (25 mg total) by mouth once daily.  What changed: when to take this     mupirocin 2 % ointment  Commonly known as: BACTROBAN  Apply topically 3 (three) times daily. Apply to nasal area  What changed: additional instructions            CONTINUE taking these medications      alcohol swabs Padm  Apply 1 each topically as needed (for use with glucose monitoring).     blood sugar diagnostic Strp  Commonly known as: TRUE METRIX GLUCOSE TEST STRIP  1 strip by Misc.(Non-Drug; Combo Route) route 2 (two) times a day.     blood-glucose meter Misc  Commonly known as: TRUE METRIX GLUCOSE METER  TEST TWICE A DAY     clopidogreL 75 mg tablet  Commonly known as: PLAVIX  Take 1 tablet (75 mg total) by mouth once daily.     ELIQUIS  5 mg Tab  Generic drug: apixaban  Take 5 mg by mouth 2 (two) times daily.     ferrous sulfate 325 mg (65 mg iron) Tab tablet  Commonly known as: FEOSOL  Take 325 mg by mouth daily with breakfast.     FLUoxetine 40 MG capsule  Take 40 mg by mouth once daily.     furosemide 20 MG tablet  Commonly known as: LASIX  Take 1 tablet (20 mg total) by mouth once daily.     isosorbide mononitrate 30 MG 24 hr tablet  Commonly known as: IMDUR  Take 1 tablet (30 mg total) by mouth once daily.     JANUMET -1,000 mg Tm24  Generic drug: SITagliptan-metformin  Take 1 tablet by mouth once daily.     lancets 28 gauge Misc  1 lancet  by Misc.(Non-Drug; Combo Route) route 2 (two) times a day.     nitroGLYCERIN 0.4 MG SL tablet  Commonly known as: NITROSTAT  Place 0.4 mg under the tongue every 5 (five) minutes as needed for Chest pain.     ondansetron 8 MG Tbdl  Commonly known as: ZOFRAN-ODT  Take 1 tablet (8 mg total) by mouth 3 (three) times daily as needed (nausea or vomiting).     pantoprazole 40 MG tablet  Commonly known as: PROTONIX  Take 1 tablet (40 mg total) by mouth 2 (two) times daily.     ranolazine 1,000 mg Tb12  Commonly known as: RANEXA  Take 1,000 mg by mouth 2 (two) times daily.     sucralfate 1 gram tablet  Commonly known as: CARAFATE  Take 1 tablet (1 g total) by mouth 4 (four) times daily before meals and nightly.     traZODone 150 MG tablet  Commonly known as: DESYREL  TAKE 1 TABLET EVERY NIGHT            STOP taking these medications      ciprofloxacin HCl 500 MG tablet  Commonly known as: CIPRO     famotidine 20 MG tablet  Commonly known as: PEPCID     oxyCODONE-acetaminophen 5-325 mg per tablet  Commonly known as: PERCOCET     SantyL ointment  Generic drug: collagenase              Indwelling Lines/Drains at time of discharge:   Lines/Drains/Airways       None                   Time spent on the discharge of patient: 36 minutes         Luis Salas MD  Department of Hospital Medicine  [facility]

## 2023-11-20 NOTE — PHYSICIAN QUERY
PT Name: Julien Meléndez  MR #: [id number]    DOCUMENTATION CLARIFICATION    CDI : Kacie Weiner RN, CDS              Contact information: [email or web address]  This form is a permanent document in the medical record.     Query Date: November 20, 2023    By submitting this query, we are merely seeking further clarification of documentation.  Please utilize your independent clinical judgment when addressing the question(s) below.    The medical record contains the following:  Pathology Findings Location in Medical Record   RELIAPATH DIAGNOSIS:  A.   RIGHT GREAT TOE AND RIGHT 1ST METATARSAL HEAD, AMPUTATION:  - Acute osteomyelitis.  - Ulceration and necrosis extending to deep soft tissues and the skin and soft tissue margin of resection.  - Separately received portion of bone showing acute osteomyelitis extending to the inked and presumed resection margin.    B.   RIGHT 1ST METATARSAL CLEAN MARGIN, EXCISION:  - Benign bone and soft tissue with no evidence of osteomyelitis, acute inflammation or necrosis.     -X-rays of the right foot showed no significant change of ulceration and destructive changes of the distal metatarsal head of the 1st toe of the right foot.   -wound cx growing out gm positive. Pt on vanc and meropenem. Patient with clinical acute osteomyelitis of right hallux ulcer with bone exposure and purulent drainage noted.   -MRI confirms osteo.     -R foot 1st ray R foot: A linear incision circumventing the ulcer extending to the proximal phalanx on the medial aspect . The ulcer was excised off .  the head of the 1st metatarsal was removed along with any nonviable tissue.  Final pathology results from 11/10 [facility] Medicine discharge summary              OP note 11/10       Please clarify the pathology findings regarding R great toe / 1st metatarsal head ulceration and necrosis.  [  x] Pathology findings noted above are ruled in/confirmed as diagnoses   [  ] Pathology  findings noted above are not confirmed as diagnoses   [  ] Other diagnosis (please specify): ___________     Please document in your progress notes daily for the duration of treatment until resolved and include in your discharge summary.    Form No. 13264

## 2023-11-21 ENCOUNTER — HOSPITAL ENCOUNTER (OUTPATIENT)
Dept: WOUND CARE | Facility: HOSPITAL | Age: 83
Discharge: HOME | End: 2023-11-21
Attending: STUDENT IN AN ORGANIZED HEALTH CARE EDUCATION/TRAINING PROGRAM
Payer: MEDICARE

## 2023-11-21 VITALS
BODY MASS INDEX: 26.68 KG/M2 | DIASTOLIC BLOOD PRESSURE: 56 MMHG | HEIGHT: 72 IN | HEART RATE: 77 BPM | SYSTOLIC BLOOD PRESSURE: 116 MMHG | TEMPERATURE: 99 F | WEIGHT: 197 LBS

## 2023-11-21 DIAGNOSIS — Z98.890 POST-OPERATIVE STATE: Primary | ICD-10-CM

## 2023-11-21 DIAGNOSIS — L97.511 SKIN ULCER OF TOE OF RIGHT FOOT, LIMITED TO BREAKDOWN OF SKIN: ICD-10-CM

## 2023-11-21 PROCEDURE — 99213 PR OFFICE/OUTPT VISIT, EST, LEVL III, 20-29 MIN: ICD-10-PCS | Mod: 25,,, | Performed by: STUDENT IN AN ORGANIZED HEALTH CARE EDUCATION/TRAINING PROGRAM

## 2023-11-21 PROCEDURE — 99214 OFFICE O/P EST MOD 30 MIN: CPT

## 2023-11-21 PROCEDURE — 99213 OFFICE O/P EST LOW 20 MIN: CPT | Mod: 25,,, | Performed by: STUDENT IN AN ORGANIZED HEALTH CARE EDUCATION/TRAINING PROGRAM

## 2023-11-21 NOTE — PROGRESS NOTES
[facility]    Subjective:       Patient ID: Julien Meléndez is a 83 y.o. male.    Chief Complaint: Diabetic Foot Ulcer    Left foot resolved. Right foot s/p 1st toe amputation 11/10/23. Sutures intact. Right 5th medial toe to site. Hydrofera ready to both sites with football dressing. Patient receiving IV Vanc via PICC x 6 weeks. Patient residing at [facility] for Skilled care. Orders sent to [facility].  Review of Systems   Constitutional:  Negative for activity change, chills, diaphoresis and fever.   HENT:  Negative for congestion.    Respiratory:  Negative for cough and shortness of breath.    Cardiovascular:  Negative for leg swelling.   Gastrointestinal:  Negative for nausea and vomiting.   Skin:  Positive for color change and wound. Negative for pallor and rash.   Neurological:  Positive for numbness. Negative for tremors and speech difficulty.   Psychiatric/Behavioral:  Negative for agitation and confusion. The patient is not nervous/anxious.          Objective:     Vitals:    11/21/23 1327   BP: (!) 116/56   Pulse: 77   Temp: 98.9 °F (37.2 °C)         Physical Exam  Constitutional:       General: He is not in acute distress.     Appearance: Normal appearance. He is well-developed. He is not diaphoretic.   Cardiovascular:      Pulses:           Dorsalis pedis pulses are detected w/ Doppler on the right side and detected w/ Doppler on the left side.        Posterior tibial pulses are 0 on the right side and detected w/ Doppler on the left side.      Comments: Dorsalis pedis and posterior tibial pulses are mildly diminished. Skin temperature is within normal limits. Toes are cool to touch and feet are warm proximally. Hair growth is diminished. Skin is atrophic and with mild hyperpigmentation. Mild edema noted, bilaterally. Telangiectasias to medial ankle, bilaterally   Musculoskeletal:         General: No tenderness.      Comments: Adequate joint range of motion  without pain, limitation, nor crepitation to foot and ankle joints.      Feet:      Right foot:      Skin integrity: Dry skin present. No ulcer, blister, erythema or warmth.      Left foot:      Skin integrity: Dry skin present. No ulcer, blister, erythema or warmth.   Skin:     General: Skin is warm and dry.      Capillary Refill: Capillary refill takes less than 2 seconds.      Comments: Skin is warm and dry, no acute signs of infection noted bilaterally      See wound description below    Toenails are thickened by 2-4 mm's, dystrophic, and are darkened in coloration with subungual fungal debris.     Otherwise, no open wounds, macerations or hyperkeratotic lesions, bilaterally            Neurological:      Mental Status: He is alert and oriented to person, place, and time.      Sensory: Sensory deficit present.      Motor: No abnormal muscle tone.      Comments: Light touch within normal limits. [place] 5.07 monofilamant testing is diminished. Vibratory sensation is diminished bilaterally.   Psychiatric:         Mood and Affect: Mood normal.         Behavior: Behavior normal.         Thought Content: Thought content normal.            Altered Skin Integrity 11/21/23 1300 Right medial Toe, fifth Diabetic Ulcer (Active)   11/21/23 1300   Altered Skin Integrity Present on Admission - Did Patient arrive to the hospital with altered skin?: yes   Side: Right   Orientation: medial   Location: Toe, fifth   Wound Number:    Is this injury device related?: No   Primary Wound Type: Diabetic ulc   Description of Altered Skin Integrity:    Ankle-Brachial Index:    Pulses: palpable   Removal Indication and Assessment:    Wound Outcome:    (Retired) Wound Length (cm):    (Retired) Wound Width (cm):    (Retired) Depth (cm):    Wound Description (Comments):    Removal Indications:    Wound Image   11/21/23 1322   Dressing Appearance Moist drainage 11/21/23 1322   Drainage Amount Small 11/21/23 1322   Drainage  Characteristics/Odor Serosanguineous 11/21/23 1322   Appearance Red;Moist 11/21/23 1322   Tissue loss description Full thickness 11/21/23 1322   Red (%), Wound Tissue Color 100 % 11/21/23 1322   Periwound Area Dry 11/21/23 1322   Wound Edges Irregular 11/21/23 1322   Blackwell Classification (diabetic foot ulcers only) Grade 2 11/21/23 1322   Wound Length (cm) 0.6 cm 11/21/23 1322   Wound Width (cm) 0.7 cm 11/21/23 1322   Wound Depth (cm) 0.1 cm 11/21/23 1322   Wound Volume (cm^3) 0.042 cm^3 11/21/23 1322   Wound Surface Area (cm^2) 0.42 cm^2 11/21/23 1322   Care Cleansed with:;Sterile normal saline 11/21/23 1322   Dressing Applied;Foam;Absorptive Pad;Cast padding;Hydrofiber 11/21/23 1322   Periwound Care Absorptive dressing applied 11/21/23 1322   Off Loading Football dressing;Off loading shoe 11/21/23 1322            Incision/Site 11/10/23 1216 Right Foot (Active)   11/10/23 1216   Present Prior to Hospital Arrival?: No   Side: Right   Location: Foot   Orientation:    Incision Type:    Closure Method:    Additional Comments:    Removal Indication and Assessment:    Wound Outcome:    Removal Indications:    Wound Image   11/21/23 1322   Dressing Appearance Moist drainage 11/21/23 1322   Drainage Amount Small 11/21/23 1322   Drainage Characteristics/Odor Serosanguineous 11/21/23 1322   Appearance Red;Sutures intact;Moist 11/21/23 1322   Red (%), Wound Tissue Color 100 % 11/21/23 1322   Periwound Area Dry 11/21/23 1322   Wound Edges Approximated 11/21/23 1322   Wound Length (cm) 9 cm 11/21/23 1322   Wound Width (cm) 0.3 cm 11/21/23 1322   Wound Depth (cm) 0.3 cm 11/21/23 1322   Wound Volume (cm^3) 0.81 cm^3 11/21/23 1322   Wound Surface Area (cm^2) 2.7 cm^2 11/21/23 1322   Care Cleansed with:;Sterile normal saline 11/21/23 1322   Dressing Applied;Foam;Cast padding;Absorptive Pad;Hydrofiber 11/21/23 1322   Periwound Care Absorptive dressing applied 11/21/23 1322   Off Loading Off loading shoe;Football dressing 11/21/23  1322         Assessment/Plan:         ICD-10-CM ICD-9-CM   1. Post-operative state  Z98.890 V45.89   2. Skin ulcer of toe of right foot, limited to breakdown of skin  L97.511 707.15           Tissue pathology and/or culture taken:  [] Yes [x] No   Sharp debridement performed:   [] Yes [x] No   Labs ordered this visit:   [] Yes [x] No   Imaging ordered this visit:   [] Yes [x] No           Orders Placed This Encounter   Procedures    Change dressing     Right 1st toe amputation and right 5th medial toe:  Cleanse with: Normal saline  Lidocaine: Prn  Silver nitrate: Prn  Primary dressing: Hydrofera ready to both sites, ABD over incision  Secondary dressing: Football dressing using 2 georges foams to heel, 1 georges foam to plantar foot, 2 cast paddings, mefix tape, flexinet, blue bootie  Offloading: Darco to both feet  Edema control: Elevate RLE as much as possible  Frequency: 3 x weekly  Follow-up: Dr. Leung 12/12/23    Other Orders: Patient at [facility] ( phone # [phone]). Continue Iv antibiotics.        Follow up in about 3 weeks (around 12/12/2023).        S/p amputation 11/10/23. Continue IV antibiotics 6 week duration per Infectious Disease   New ulcer to 5th digit, no signs of infection noted.   Wound care per above.   I discussed with the  patient  signs and symptoms of infection including redness, drainage, purulence, odor, pain, elevated BS, streaking, fever, chills, etc . Patient is to seek medical attention (ER or urgent care) if these symptoms occur        This includes face to face time and non-face to face time preparing to see the patient (eg, review of tests), obtaining and/or reviewing separately obtained history, documenting clinical information in the electronic or other health record, independently interpreting results and communicating results to the patient/family/caregiver, or care coordinator.

## 2023-11-28 NOTE — PROGRESS NOTES
Patient blood culture from 11/10/23. 1 bottle positive after day five for MRSA.   Call [facility] and verified that vanco stop date will be 12/22/23.   Asked that additional blood cultures be drawn to document clearance.     Will see if we can also get some blood cultures on 12/12/23 when the patient is seen in Podiatry.  Patient is scheduled in ID clinic for 1/22/24.    Rodri Sarmiento   [employer] ID

## 2023-12-04 ENCOUNTER — HOSPITAL ENCOUNTER (EMERGENCY)
Facility: HOSPITAL | Age: 83
Discharge: HOME | End: 2023-12-04
Attending: EMERGENCY MEDICINE
Payer: MEDICARE

## 2023-12-04 VITALS
HEIGHT: 72 IN | WEIGHT: 190 LBS | DIASTOLIC BLOOD PRESSURE: 69 MMHG | SYSTOLIC BLOOD PRESSURE: 129 MMHG | HEART RATE: 75 BPM | TEMPERATURE: 98 F | BODY MASS INDEX: 25.73 KG/M2 | OXYGEN SATURATION: 97 % | RESPIRATION RATE: 18 BRPM

## 2023-12-04 DIAGNOSIS — Z78.9 PROBLEM WITH VASCULAR ACCESS: ICD-10-CM

## 2023-12-04 DIAGNOSIS — Z45.2 ENCOUNTER FOR ASSESSMENT OF PERIPHERALLY INSERTED CENTRAL CATHETER (PICC): Primary | ICD-10-CM

## 2023-12-04 PROCEDURE — 99284 EMERGENCY DEPT VISIT MOD MDM: CPT | Mod: 25

## 2023-12-04 PROCEDURE — 36569 INSJ PICC 5 YR+ W/O IMAGING: CPT

## 2023-12-04 PROCEDURE — C1751 CATH, INF, PER/CENT/MIDLINE: HCPCS

## 2023-12-04 RX ORDER — SODIUM CHLORIDE 0.9 % (FLUSH) 0.9 %
10 SYRINGE (ML) INJECTION
Status: DISCONTINUED | OUTPATIENT
Start: 2023-12-04 | End: 2023-12-04 | Stop reason: HOSPADM

## 2023-12-04 RX ORDER — SODIUM CHLORIDE 0.9 % (FLUSH) 0.9 %
10 SYRINGE (ML) INJECTION EVERY 6 HOURS
Status: DISCONTINUED | OUTPATIENT
Start: 2023-12-04 | End: 2023-12-04 | Stop reason: HOSPADM

## 2023-12-04 NOTE — PROCEDURES
Julien Meléndez is a 83 y.o. male patient.    Temp: 98.2 °F (36.8 °C) (12/04/23 1022)  Pulse: 75 (12/04/23 1022)  Resp: 18 (12/04/23 1022)  BP: 129/69 (12/04/23 1022)  SpO2: 97 % (12/04/23 1022)  Weight: 86.2 kg (190 lb) (12/04/23 1022)  Height: 6' (182.9 cm) (12/04/23 1022)    PICC  Date/Time: 12/4/2023 12:23 PM  Performed by: Roosevelt Capellan RN  Consent Done: Yes  Time out: Immediately prior to procedure a time out was called to verify the correct patient, procedure, equipment, support staff and site/side marked as required  Indications: med administration and vascular access  Anesthesia: local infiltration  Local anesthetic: lidocaine 1% without epinephrine  Anesthetic Total (mL): 5  Preparation: skin prepped with ChloraPrep  Skin prep agent dried: skin prep agent completely dried prior to procedure  Sterile barriers: all five maximum sterile barriers used - cap, mask, sterile gown, sterile gloves, and large sterile sheet  Hand hygiene: hand hygiene performed prior to central venous catheter insertion  Location details: right basilic  Catheter type: double lumen  Catheter size: 5 Fr  Catheter Length: 40cm    Ultrasound guidance: yes  Vessel Caliber: large, compressibility normal  Needle advanced into vessel with real time Ultrasound guidance.  Guidewire confirmed in vessel.  Sterile sheath used.  Number of attempts: 1  Post-procedure: blood return through all ports, chlorhexidine patch and sterile dressing applied            Name roosevelt capellan  12/4/2023

## 2023-12-04 NOTE — ED PROVIDER NOTES
Encounter Date: 12/4/2023       History     Chief Complaint   Patient presents with    Vascular Access Problem     Sent from nursing home for PICC line replacement. Presents in no distress.     Patient is an 83-year-old male who presents to the ED with vascular access problem.  Patient reportedly pulled out vs inadvertently pulled out his PICC line to the right upper extremity.  Patient states that he was being transferred from one bed to another any believes the PICC line came out yesterday.  Per chart check, has a history of a PICC line to the right upper extremity for long-term antibiotic administration. He has presented to the ED in the past for PICC line replacement. He denies any complaints. He denies any falls or trauma.       Review of patient's allergies indicates:   Allergen Reactions    Nystatin      Other reaction(s): Unknown     Past Medical History:   Diagnosis Date    Diabetes mellitus     Hiatal hernia     under Dr Saenz' care    Hyperlipidemia     Hypertension     MI (myocardial infarction)     Sleep apnea      Past Surgical History:   Procedure Laterality Date    ABDOMINAL HERNIA REPAIR      ANGIOGRAM, EXTREMITY, BILATERAL Bilateral 4/5/2023    Procedure: ANGIOGRAM, EXTREMITY, BILATERAL;  Surgeon: Michael Giraldo III, MD;  Location: [facility] CATH LAB;  Service: Cardiology;  Laterality: Bilateral;    ANGIOGRAPHY OF LOWER EXTREMITY Left 4/12/2023    Procedure: Angiogram Extremity Unilateral;  Surgeon: Michael Giraldo III, MD;  Location: [facility] CATH LAB;  Service: Cardiology;  Laterality: Left;    AORTOGRAPHY WITH SERIALOGRAPHY Bilateral 4/5/2023    Procedure: AORTOGRAM, WITH SERIALOGRAPHY;  Surgeon: Michael Giraldo III, MD;  Location: [facility] CATH LAB;  Service: Cardiology;  Laterality: Bilateral;    APPLICATION OF WOUND VACUUM-ASSISTED CLOSURE DEVICE Left 3/29/2023    Procedure: APPLICATION, WOUND VAC;  Surgeon: Alona Pierce DPM;  Location: [facility] OR;  Service: Podiatry;  Laterality: Left;     APPLICATION OF WOUND VACUUM-ASSISTED CLOSURE DEVICE Left 4/4/2023    Procedure: APPLICATION, WOUND VAC;  Surgeon: Alona Pierce DPM;  Location: [facility] OR;  Service: Podiatry;  Laterality: Left;    APPLICATION OF WOUND VACUUM-ASSISTED CLOSURE DEVICE Left 4/13/2023    Procedure: APPLICATION, WOUND VAC;  Surgeon: Alona Pierce DPM;  Location: [facility] OR;  Service: Podiatry;  Laterality: Left;    BONE BIOPSY Left 3/29/2023    Procedure: BIOPSY, BONE;  Surgeon: Alona Pierce DPM;  Location: [facility] OR;  Service: Podiatry;  Laterality: Left;  1st met    CARDIAC CATHETERIZATION      3 stents placed    CATARACT EXTRACTION, BILATERAL      CLOSURE DEVICE Left 4/12/2023    Procedure: Placement of Closure Device;  Surgeon: Michael Giraldo III, MD;  Location: [facility] CATH LAB;  Service: Cardiology;  Laterality: Left;    CORONARY ANGIOGRAPHY N/A 10/3/2019    Procedure: ANGIOGRAM, CORONARY ARTERY;  Surgeon: Edwin Azul MD;  Location: [facility] CATH LAB/EP;  Service: Cardiology;  Laterality: N/A;    DEBRIDEMENT OF FOOT Left 3/29/2023    Procedure: DEBRIDEMENT, FOOT;  Surgeon: Alona Pierce DPM;  Location: [facility] OR;  Service: Podiatry;  Laterality: Left;    DEBRIDEMENT OF FOOT Left 4/4/2023    Procedure: DEBRIDEMENT, FOOT;  Surgeon: Alona Pierce DPM;  Location: [facility] OR;  Service: Podiatry;  Laterality: Left;    DEBRIDEMENT OF FOOT Left 4/13/2023    Procedure: DEBRIDEMENT, FOOT;  Surgeon: Alona Pierce DPM;  Location: [facility] OR;  Service: Podiatry;  Laterality: Left;    ESOPHAGOGASTRODUODENOSCOPY N/A 4/17/2023    Procedure: EGD (ESOPHAGOGASTRODUODENOSCOPY);  Surgeon: Otto Villarreal MD;  Location: [facility] ENDO;  Service: Endoscopy;  Laterality: N/A;    HERNIA REPAIR      inguinal    IVUS (INTRAVASCULAR ULTRASOUND) Left 4/12/2023    Procedure: ULTRASOUND, INTRAVASCULAR;  Surgeon: Michael Giraldo III, MD;  Location: [facility] CATH LAB;  Service: Cardiology;  Laterality: Left;    LEFT HEART CATHETERIZATION Left 9/13/2019    Procedure: Left heart cath;   Surgeon: Edwin Azul MD;  Location: [facility] CATH LAB/EP;  Service: Cardiology;  Laterality: Left;    LEFT HEART CATHETERIZATION N/A 10/3/2019    Procedure: Left heart cath;  Surgeon: Edwin Azul MD;  Location: [facility] CATH LAB/EP;  Service: Cardiology;  Laterality: N/A;    OSTEOTOMY Left 6/7/2023    Procedure: OSTEOTOMY;  Surgeon: Alona Pierce DPM;  Location: [facility] OR;  Service: Podiatry;  Laterality: Left;    OSTEOTOMY OF METATARSAL BONE Right 4/4/2023    Procedure: OSTEOTOMY, METATARSAL BONE;  Surgeon: Alona Pierce DPM;  Location: [facility] OR;  Service: Podiatry;  Laterality: Right;    PERCUTANEOUS TRANSLUMINAL ANGIOPLASTY Left 4/12/2023    Procedure: PTA (ANGIOPLASTY, PERCUTANEOUS, TRANSLUMINAL);  Surgeon: Michael Giraldo III, MD;  Location: [facility] CATH LAB;  Service: Cardiology;  Laterality: Left;    SURGICAL REMOVAL OF BUNION WITH OSTEOTOMY OF METATARSAL BONE Right 4/4/2023    Procedure: BUNIONECTOMY, WITH METATARSAL OSTEOTOMY;  Surgeon: Alona Pierce DPM;  Location: [facility] OR;  Service: Podiatry;  Laterality: Right;    TOE AMPUTATION Left 4/4/2023    Procedure: AMPUTATION, TOE;  Surgeon: Alona Pierce DPM;  Location: [facility] OR;  Service: Podiatry;  Laterality: Left;  1st ray    TOE AMPUTATION Right 11/10/2023    Procedure: AMPUTATION, TOE;  Surgeon: Diya Leung DPM;  Location: [facility] OR;  Service: Podiatry;  Laterality: Right;    WASHOUT Left 6/7/2023    Procedure: WASHOUT;  Surgeon: Alona Pierce DPM;  Location: [facility] OR;  Service: Podiatry;  Laterality: Left;    WOUND DEBRIDEMENT Left 6/7/2023    Procedure: DEBRIDEMENT, WOUND;  Surgeon: Alona Pierce DPM;  Location: [facility] OR;  Service: Podiatry;  Laterality: Left;     Family History   Problem Relation Age of Onset    Heart disease Mother     Stroke Mother     Heart disease Sister     Heart disease Brother     Heart disease Maternal Uncle     Heart disease Brother     Heart disease Sister     Heart disease Maternal Uncle     Heart disease Maternal Uncle      Heart disease Maternal Uncle      Social History     Tobacco Use    Smoking status: Never    Smokeless tobacco: Never   Substance Use Topics    Alcohol use: No    Drug use: No     Review of Systems   Constitutional:  Negative for chills and fever.   Cardiovascular:  Negative for chest pain, palpitations and leg swelling.   Gastrointestinal:  Negative for abdominal pain, nausea and vomiting.   Musculoskeletal:  Negative for back pain and myalgias.   Neurological:  Negative for dizziness and headaches.   Psychiatric/Behavioral:  Negative for agitation and confusion.        Physical Exam     Initial Vitals [12/04/23 1022]   BP Pulse Resp Temp SpO2   129/69 75 18 98.2 °F (36.8 °C) 97 %      MAP       --         Physical Exam    Nursing note and vitals reviewed.  Constitutional: He appears well-developed and well-nourished.   HENT:   Head: Normocephalic and atraumatic.   Right Ear: External ear normal.   Left Ear: External ear normal.   No overt signs of head trauma noted.    Pulmonary/Chest: Breath sounds normal.   Abdominal: Abdomen is soft.   Musculoskeletal:      Comments: Diffuse edema to the LUE, stasis changes; non tender; this appears chronic in nature  RUE well-perfused, no PICC line noted; no bleeding            ED Course   Procedures  Labs Reviewed - No data to display       Imaging Results              X-Ray Chest AP Portable (Final result)  Result time 12/04/23 13:32:13      Final result by Danny Reyes MD (12/04/23 13:32:13)                   Impression:      As above.      Electronically signed by: Danny Reyes MD  Date:    12/04/2023  Time:    13:32               Narrative:    EXAMINATION:  XR CHEST AP PORTABLE    CLINICAL HISTORY:  picc placement;    TECHNIQUE:  Single frontal view of the chest was performed.    COMPARISON:  Chest radiograph 11/15/2023    FINDINGS:  Patient is slightly rotated.  Right-sided PICC overlies the distal SVC.  Cardiomediastinal silhouette is midline and stable without  evidence of failure.  Bibasilar platelike scarring versus atelectasis.  The lungs are normal to slightly hypoexpanded without consolidation, pleural effusion or pneumothorax.  Otherwise no change.                                       Medications   sodium chloride 0.9% flush 10 mL (has no administration in time range)     And   sodium chloride 0.9% flush 10 mL (has no administration in time range)     Medical Decision Making  See HPI     Amount and/or Complexity of Data Reviewed  Radiology: ordered.    Risk  Prescription drug management.                          Medical Decision Making:   Initial Assessment:   See HPI   ED Management:  - PICC to RUE placed by PICC team  - CXR confirmed placement  - will discharge back to NH  - pt given strict return precautions for any new or worsening of symptoms              Clinical Impression:  Final diagnoses:  [Z78.9] Problem with vascular access  [Z45.2] Encounter for assessment of peripherally inserted central catheter (PICC) (Primary)          ED Disposition Condition    Discharge Stable          ED Prescriptions    None       Follow-up Information    None          Jg Warren MD  12/04/23 5955       Jg Warren MD  12/04/23 2410

## 2023-12-04 NOTE — ED NOTES
Pt was moved into room 2 and picc was replaced. Pt tolerated well. Consent signed and witnessed. Xray ordered for placement verification. Call bell within reach.

## 2023-12-05 ENCOUNTER — OUTSIDE PLACE OF SERVICE (OUTPATIENT)
Dept: ADMINISTRATIVE | Facility: OTHER | Age: 83
End: 2023-12-05
Payer: MEDICARE

## 2023-12-05 PROCEDURE — 99305 PR NURSING FACILITY CARE, INIT, MOD SEVERITY: ICD-10-PCS | Mod: ,,, | Performed by: FAMILY MEDICINE

## 2023-12-05 PROCEDURE — 99305 1ST NF CARE MODERATE MDM 35: CPT | Mod: ,,, | Performed by: FAMILY MEDICINE

## 2023-12-12 ENCOUNTER — HOSPITAL ENCOUNTER (OUTPATIENT)
Dept: WOUND CARE | Facility: HOSPITAL | Age: 83
Discharge: HOME | End: 2023-12-12
Attending: STUDENT IN AN ORGANIZED HEALTH CARE EDUCATION/TRAINING PROGRAM
Payer: MEDICARE

## 2023-12-12 VITALS
HEIGHT: 72 IN | WEIGHT: 190 LBS | SYSTOLIC BLOOD PRESSURE: 120 MMHG | TEMPERATURE: 98 F | DIASTOLIC BLOOD PRESSURE: 57 MMHG | BODY MASS INDEX: 25.73 KG/M2 | HEART RATE: 72 BPM

## 2023-12-12 DIAGNOSIS — R23.4 ESCHAR OF TOE: ICD-10-CM

## 2023-12-12 DIAGNOSIS — S98.112A AMPUTATION OF LEFT GREAT TOE: ICD-10-CM

## 2023-12-12 DIAGNOSIS — L97.509 ULCER OF FOOT, UNSPECIFIED LATERALITY, UNSPECIFIED ULCER STAGE: ICD-10-CM

## 2023-12-12 DIAGNOSIS — T14.8XXA HEMATOMA: Primary | ICD-10-CM

## 2023-12-12 DIAGNOSIS — M86.10 ACUTE OSTEOMYELITIS: ICD-10-CM

## 2023-12-12 LAB — FUNGUS SPEC CULT: ABNORMAL

## 2023-12-12 PROCEDURE — 10140 INCISION AND DRAINAGE: ICD-10-PCS | Mod: 59,,, | Performed by: STUDENT IN AN ORGANIZED HEALTH CARE EDUCATION/TRAINING PROGRAM

## 2023-12-12 PROCEDURE — 99214 OFFICE O/P EST MOD 30 MIN: CPT | Mod: 25,,, | Performed by: STUDENT IN AN ORGANIZED HEALTH CARE EDUCATION/TRAINING PROGRAM

## 2023-12-12 PROCEDURE — 87106 FUNGI IDENTIFICATION YEAST: CPT | Performed by: STUDENT IN AN ORGANIZED HEALTH CARE EDUCATION/TRAINING PROGRAM

## 2023-12-12 PROCEDURE — 10140 I&D HMTMA SEROMA/FLUID COLLJ: CPT | Mod: 59,,, | Performed by: STUDENT IN AN ORGANIZED HEALTH CARE EDUCATION/TRAINING PROGRAM

## 2023-12-12 PROCEDURE — 87075 CULTR BACTERIA EXCEPT BLOOD: CPT | Performed by: STUDENT IN AN ORGANIZED HEALTH CARE EDUCATION/TRAINING PROGRAM

## 2023-12-12 PROCEDURE — 11043 DEBRIDEMENT: ICD-10-PCS | Mod: ,,, | Performed by: STUDENT IN AN ORGANIZED HEALTH CARE EDUCATION/TRAINING PROGRAM

## 2023-12-12 PROCEDURE — 11042 DBRDMT SUBQ TIS 1ST 20SQCM/<: CPT

## 2023-12-12 PROCEDURE — 99214 PR OFFICE/OUTPT VISIT, EST, LEVL IV, 30-39 MIN: ICD-10-PCS | Mod: 25,,, | Performed by: STUDENT IN AN ORGANIZED HEALTH CARE EDUCATION/TRAINING PROGRAM

## 2023-12-12 PROCEDURE — 11043 DBRDMT MUSC&/FSCA 1ST 20/<: CPT | Mod: ,,, | Performed by: STUDENT IN AN ORGANIZED HEALTH CARE EDUCATION/TRAINING PROGRAM

## 2023-12-12 PROCEDURE — 11043 DBRDMT MUSC&/FSCA 1ST 20/<: CPT

## 2023-12-12 PROCEDURE — 87070 CULTURE OTHR SPECIMN AEROBIC: CPT | Mod: 59 | Performed by: STUDENT IN AN ORGANIZED HEALTH CARE EDUCATION/TRAINING PROGRAM

## 2023-12-12 NOTE — PROGRESS NOTES
[facility]    Subjective:       Patient ID: Julien Meléndez is a 83 y.o. male.    Chief Complaint: Wound Care and Diabetic Foot Ulcer    12/12/2023  Follow up with Dr Leung related to right first toe amputation site and right 5th medial toe wound. No complaints. New satellite lesion to right 5th medial toe wound. Patient tolerated suture removal from right first toe amputation site, old blood present in wound, wound deep, large amount of bleeding, surgicell placed. Leg elevated in wheelchair. Plan of care updated. Order printed and provided to patient to bring back to facility.      Review of Systems   Constitutional:  Negative for activity change, chills, diaphoresis and fever.   HENT:  Negative for congestion.    Respiratory:  Negative for cough and shortness of breath.    Cardiovascular:  Negative for leg swelling.   Gastrointestinal:  Negative for nausea and vomiting.   Skin:  Positive for color change and wound. Negative for pallor and rash.   Neurological:  Positive for numbness. Negative for tremors and speech difficulty.   Psychiatric/Behavioral:  Negative for agitation and confusion. The patient is not nervous/anxious.          Objective:     Vitals:    12/12/23 1323   BP: (!) 120/57   Pulse: 72   Temp: 97.9 °F (36.6 °C)         Physical Exam  Constitutional:       General: He is not in acute distress.     Appearance: Normal appearance. He is well-developed. He is not diaphoretic.   Cardiovascular:      Pulses:           Dorsalis pedis pulses are detected w/ Doppler on the right side and detected w/ Doppler on the left side.        Posterior tibial pulses are 0 on the right side and detected w/ Doppler on the left side.      Comments: Dorsalis pedis and posterior tibial pulses are mildly diminished. Skin temperature is within normal limits. Toes are cool to touch and feet are warm proximally. Hair growth is diminished. Skin is atrophic and with mild  hyperpigmentation. Mild edema noted, bilaterally. Telangiectasias to medial ankle, bilaterally   Musculoskeletal:         General: No tenderness.      Comments: Adequate joint range of motion without pain, limitation, nor crepitation to foot and ankle joints.      Feet:      Right foot:      Skin integrity: Dry skin present. No ulcer, blister, erythema or warmth.      Left foot:      Skin integrity: Dry skin present. No ulcer, blister, erythema or warmth.   Skin:     General: Skin is warm and dry.      Capillary Refill: Capillary refill takes less than 2 seconds.      Comments: Skin is warm and dry, no acute signs of infection noted bilaterally      See wound description below    Toenails are thickened by 2-4 mm's, dystrophic, and are darkened in coloration with subungual fungal debris.     Otherwise, no open wounds, macerations or hyperkeratotic lesions, bilaterally            Neurological:      Mental Status: He is alert and oriented to person, place, and time.      Sensory: Sensory deficit present.      Motor: No abnormal muscle tone.      Comments: Light touch within normal limits. [place] 5.07 monofilamant testing is diminished. Vibratory sensation is diminished bilaterally.   Psychiatric:         Mood and Affect: Mood normal.         Behavior: Behavior normal.         Thought Content: Thought content normal.            Altered Skin Integrity 11/21/23 1300 Right medial Toe, fifth Diabetic Ulcer (Active)   11/21/23 1300   Altered Skin Integrity Present on Admission - Did Patient arrive to the hospital with altered skin?: yes   Side: Right   Orientation: medial   Location: Toe, fifth   Wound Number:    Is this injury device related?: No   Primary Wound Type: Diabetic ulc   Description of Altered Skin Integrity:    Ankle-Brachial Index:    Pulses: palpable   Removal Indication and Assessment:    Wound Outcome:    (Retired) Wound Length (cm):    (Retired) Wound Width (cm):    (Retired) Depth (cm):    Wound  Description (Comments):    Removal Indications:    Wound Image    12/12/23 1300   Description of Altered Skin Integrity Full thickness tissue loss. Base is covered by slough and/or eschar in the wound bed 12/12/23 1300   Dressing Appearance Dry;Intact;Clean 12/12/23 1300   Drainage Amount None 12/12/23 1300   Appearance Eschar;Black 12/12/23 1300   Tissue loss description Not applicable 12/12/23 1300   Black (%), Wound Tissue Color 100 % 12/12/23 1300   Periwound Area Satellite lesion;Dry 12/12/23 1300   Wound Edges Irregular;Rolled/closed 12/12/23 1300   Blackwell Classification (diabetic foot ulcers only) Grade 2 12/12/23 1300   Wound Length (cm) 0.7 cm 12/12/23 1300   Wound Width (cm) 1.2 cm 12/12/23 1300   Wound Depth (cm) 0 cm 12/12/23 1300   Wound Volume (cm^3) 0 cm^3 12/12/23 1300   Wound Surface Area (cm^2) 0.84 cm^2 12/12/23 1300   Care Cleansed with:;Soap and water;Sterile normal saline 12/12/23 1300   Dressing Applied;Cast padding;Foam 12/12/23 1300   Periwound Care Absorptive dressing applied;Dry periwound area maintained 12/12/23 1300   Off Loading Football dressing;Off loading shoe 12/12/23 1300   Dressing Change Due 12/14/23 12/12/23 1300            Incision/Site 11/10/23 1216 Right Foot (Active)   11/10/23 1216   Present Prior to Hospital Arrival?: No   Side: Right   Location: Foot   Orientation:    Incision Type:    Closure Method:    Additional Comments:    Removal Indication and Assessment:    Wound Outcome:    Removal Indications:    Wound Image   12/12/23 1300   Incision WDL ex 12/12/23 1300   Dressing Appearance Moist drainage 12/12/23 1300   Drainage Amount Small 12/12/23 1300   Drainage Characteristics/Odor Serosanguineous 12/12/23 1300   Appearance Red;Sutures intact;Yellow;Moist 12/12/23 1300   Yellow (%), Wound Tissue Color 100 % 12/12/23 1300   Periwound Area Intact;Dry 12/12/23 1300   Wound Edges Open 12/12/23 1300   Wound Length (cm) 7.5 cm 12/12/23 1300   Wound Width (cm) 0.5 cm 12/12/23  1300   Wound Depth (cm) 4.8 cm 12/12/23 1300   Wound Volume (cm^3) 18 cm^3 12/12/23 1300   Wound Surface Area (cm^2) 3.75 cm^2 12/12/23 1300   Care Cleansed with:;Soap and water;Sterile normal saline;Debrided;Sutures removed 12/12/23 1300   Dressing Applied;Silver;Calcium alginate;Foam;Cast padding 12/12/23 1300   Periwound Care Absorptive dressing applied;Dry periwound area maintained 12/12/23 1300   Off Loading Football dressing;Off loading shoe 12/12/23 1300   Dressing Change Due 12/14/23 12/12/23 1300         Assessment/Plan:         ICD-10-CM ICD-9-CM   1. Acute osteomyelitis  M86.10 730.00   2. Amputation of left great toe  S98.112A 895.0   3. Hematoma  T14.8XXA 924.9           Tissue pathology and/or culture taken:  [x] Yes [] No   Sharp debridement performed:   [x] Yes [] No   Labs ordered this visit:   [x] Yes [] No   Imaging ordered this visit:   [] Yes [x] No           Orders Placed This Encounter   Procedures    CULTURE, BLOOD     Standing Status:   Future     Number of Occurrences:   1     Standing Expiration Date:   2/9/2025    CULTURE, ANAEROBE          CULTURE, AEROBIC  (SPECIFY SOURCE)          Aerobic culture          Culture, Anaerobic          CBC auto differential     Standing Status:   Future     Number of Occurrences:   1     Standing Expiration Date:   2/9/2025    Comprehensive Metabolic Panel     Standing Status:   Future     Number of Occurrences:   1     Standing Expiration Date:   2/9/2025    Change dressing     Right 5th medial toe right 4th lateral toe:  Cleanse with: Normal saline  Lidocaine: Prn  Silver nitrate: Prn  Primary dressing: betadine    Right 1st toe amputation:  Cleanse with: Normal saline  Lidocaine: Prn  Silver nitrate: Prn  Primary dressing: gently pack with aquacel ag rope, ABD over incision  Secondary dressing: Football dressing using 2 georges foams to heel, 1 georges foam to plantar foot, 2 cast paddings, mefix tape, flexinet, blue bootie    Offloading: Darco to both  feet  Edema control: Elevate RLE as much as possible  Frequency: 3 x weekly  Follow-up: with Dr Leung in 1 week    Other Orders: Patient at [facility] ( phone # [phone]). Continue Iv antibiotics. Please continue with wound care as above three times weekly and as needed except when the patient is in clinic. One time placement of surgicell ordered 12/12/23, please elevate the right leg for the rest of the day due to bleeding, ok to remove surgicell and continue with wound care as above at next dressing change. Thank you.        Follow up in about 1 week (around 12/19/2023) for Dr Leung.        S/p amputation 11/10/23. Continue IV antibiotics 6 week duration per Infectious Disease   Sutures removed from amputation site. Hematoma drained from area of wound dehiscence, see procedure note, deep cultures taken. Wound debridement performed as well.   New ulcer to 5th digit, no signs of infection noted.   Wound care per above.   I discussed with the  patient  signs and symptoms of infection including redness, drainage, purulence, odor, pain, elevated BS, streaking, fever, chills, etc . Patient is to seek medical attention (ER or urgent care) if these symptoms occur        This includes face to face time and non-face to face time preparing to see the patient (eg, review of tests), obtaining and/or reviewing separately obtained history, documenting clinical information in the electronic or other health record, independently interpreting results and communicating results to the patient/family/caregiver, or care coordinator.

## 2023-12-12 NOTE — PROCEDURES
Incision and Drainage    Date/Time: 12/12/2023 1:00 PM  Location procedure was performed: [facility] WOUND CARE    Performed by: Diya Leung DPM  Authorized by: Diya Leung DPM  Consent Done: Yes  Consent: Verbal consent obtained.  Type: hematoma  Body area: lower extremity  Location details: right foot  Anesthesia: see MAR for details (none needed)  Scalpel size: 15  Incision type: single straight  Incision depth: muscle  Complexity: simple  Drainage: bloody  Drainage amount: moderate  Wound treatment: wound packed, expression of material and wound left open  Complications: No  Estimated blood loss (mL): 30  Specimens: No  Implants: No  Comments: Deep cultures taken. See progress note for wound dressing     Incision depth: muscle      Debridement    Date/Time: 12/12/2023 1:00 PM    Performed by: Diya Leung DPM  Authorized by: Diya Leung DPM    Consent Done?:  Yes (Verbal)  Local anesthesia used?: No      Wound Details:    Location:  Right foot    Location:  Right 1st Metatarsal Head    Type of Debridement:  Excisional       Length (cm):  7.5       Area (sq cm):  3.75       Width (cm):  0.5       Percent Debrided (%):  100       Depth (cm):  4.8       Total Area Debrided (sq cm):  3.75    Depth of debridement:  Muscle/fascia/tendon    Tissue debrided:  Subcutaneous, Other and Muscle    Devitalized tissue debrided:  Biofilm, Callus, Fibrin and Necrotic/Eschar    Instruments:  Blade and Curette  Patient tolerance:  Patient tolerated the procedure well with no immediate complications     cultures were taken during this visit

## 2023-12-14 DIAGNOSIS — R22.32 MASS OF FINGER OF LEFT HAND: ICD-10-CM

## 2023-12-14 DIAGNOSIS — M25.512 ACUTE PAIN OF LEFT SHOULDER: Primary | ICD-10-CM

## 2023-12-15 LAB — BACTERIA SPEC AEROBE CULT: ABNORMAL

## 2023-12-18 LAB — BACTERIA SPEC ANAEROBE CULT: NORMAL

## 2023-12-20 ENCOUNTER — TELEPHONE (OUTPATIENT)
Dept: ORTHOPEDICS | Facility: CLINIC | Age: 83
End: 2023-12-20
Payer: MEDICARE

## 2023-12-20 NOTE — TELEPHONE ENCOUNTER
Called and pt's wife Ms Pichardo confirmed pt's PICC line is located on the right arm and he is being evaluated by Miguel for his left shoulder. Ms Pichardo says pt's right arm is fine.

## 2023-12-21 ENCOUNTER — OFFICE VISIT (OUTPATIENT)
Dept: ORTHOPEDICS | Facility: CLINIC | Age: 83
End: 2023-12-21
Payer: MEDICARE

## 2023-12-21 ENCOUNTER — HOSPITAL ENCOUNTER (OUTPATIENT)
Dept: RADIOLOGY | Facility: HOSPITAL | Age: 83
Discharge: HOME | End: 2023-12-21
Attending: NURSE PRACTITIONER
Payer: MEDICARE

## 2023-12-21 VITALS — WEIGHT: 190.06 LBS | HEIGHT: 72 IN | BODY MASS INDEX: 25.74 KG/M2

## 2023-12-21 DIAGNOSIS — M25.512 CHRONIC LEFT SHOULDER PAIN: Primary | ICD-10-CM

## 2023-12-21 DIAGNOSIS — M79.89 SWELLING OF LEFT UPPER EXTREMITY: ICD-10-CM

## 2023-12-21 DIAGNOSIS — Z92.29 HISTORY OF LONG TERM USE OF ANTIBIOTICS: ICD-10-CM

## 2023-12-21 DIAGNOSIS — M67.912 ROTATOR CUFF DISORDER, LEFT: ICD-10-CM

## 2023-12-21 DIAGNOSIS — G89.29 CHRONIC LEFT SHOULDER PAIN: Primary | ICD-10-CM

## 2023-12-21 DIAGNOSIS — R60.0 BILATERAL LEG EDEMA: ICD-10-CM

## 2023-12-21 DIAGNOSIS — M25.512 ACUTE PAIN OF LEFT SHOULDER: Primary | ICD-10-CM

## 2023-12-21 DIAGNOSIS — M25.512 ACUTE PAIN OF LEFT SHOULDER: ICD-10-CM

## 2023-12-21 DIAGNOSIS — R78.81 BACTEREMIA: ICD-10-CM

## 2023-12-21 PROCEDURE — 73030 X-RAY EXAM OF SHOULDER: CPT | Mod: 26,LT,, | Performed by: RADIOLOGY

## 2023-12-21 PROCEDURE — 99214 PR OFFICE/OUTPT VISIT, EST, LEVL IV, 30-39 MIN: ICD-10-PCS | Mod: S$GLB,,, | Performed by: NURSE PRACTITIONER

## 2023-12-21 PROCEDURE — 1160F PR REVIEW ALL MEDS BY PRESCRIBER/CLIN PHARMACIST DOCUMENTED: ICD-10-PCS | Mod: CPTII,S$GLB,, | Performed by: NURSE PRACTITIONER

## 2023-12-21 PROCEDURE — 99999 PR PBB SHADOW E&M-EST. PATIENT-LVL IV: ICD-10-PCS | Mod: PBBFAC,,, | Performed by: NURSE PRACTITIONER

## 2023-12-21 PROCEDURE — 1160F RVW MEDS BY RX/DR IN RCRD: CPT | Mod: CPTII,S$GLB,, | Performed by: NURSE PRACTITIONER

## 2023-12-21 PROCEDURE — 73030 X-RAY EXAM OF SHOULDER: CPT | Mod: TC,LT

## 2023-12-21 PROCEDURE — 99214 OFFICE O/P EST MOD 30 MIN: CPT | Mod: S$GLB,,, | Performed by: NURSE PRACTITIONER

## 2023-12-21 PROCEDURE — 1159F MED LIST DOCD IN RCRD: CPT | Mod: CPTII,S$GLB,, | Performed by: NURSE PRACTITIONER

## 2023-12-21 PROCEDURE — 99999 PR PBB SHADOW E&M-EST. PATIENT-LVL IV: CPT | Mod: PBBFAC,,, | Performed by: NURSE PRACTITIONER

## 2023-12-21 PROCEDURE — 1125F AMNT PAIN NOTED PAIN PRSNT: CPT | Mod: CPTII,S$GLB,, | Performed by: NURSE PRACTITIONER

## 2023-12-21 PROCEDURE — 1125F PR PAIN SEVERITY QUANTIFIED, PAIN PRESENT: ICD-10-PCS | Mod: CPTII,S$GLB,, | Performed by: NURSE PRACTITIONER

## 2023-12-21 PROCEDURE — 73030 XR SHOULDER COMPLETE 2 OR MORE VIEWS LEFT: ICD-10-PCS | Mod: 26,LT,, | Performed by: RADIOLOGY

## 2023-12-21 PROCEDURE — 1159F PR MEDICATION LIST DOCUMENTED IN MEDICAL RECORD: ICD-10-PCS | Mod: CPTII,S$GLB,, | Performed by: NURSE PRACTITIONER

## 2023-12-21 NOTE — PROGRESS NOTES
SUBJECTIVE:     Chief Complaint & History of Present Illness:  Julien Meléndez is a New 83 y.o. year old male patient presenting with constant left shoulder pain swelling that started 3 months ago.  He is Right hand dominant.  Patient reports he was in the hospital after undergoing a toe amputation and being treated for bacteremia.  He states he was working with physical therapy and standing by sink and lost his balance and fell down.  He had immediate pain to the left shoulder.  He was evaluated by Dr. Loya/Dr. Jon with [facility].  X-ray of his left shoulder revealed no acute fractures.  They considered aspirating the area but elected to follow the patient outpatient and recommend that he get a MRI to rule out a torn rotator cuff.  Patient also had an ultrasound of his left upper extremity on November 11, 2023, findings showed a nonocclusive thrombus in the left internal jugular vein.  No thrombus found in his left upper extremity.    Patient's wife reports patient has seen his cardiologist, outside of [facility] at [facility], who repeated the ultrasound of his left upper extremity in according to the wife no blood clots were found.  These records are not available to me.  Wife reports that the cardiologist's initiated the patient on diuretics.    Wife reports he has a repeat ultrasound ordered for his left upper extremity tomorrow, December 22, 2023 that was ordered by the provider at the nursing facility, [place].  It appears his MRI of the left shoulder was never scheduled.    Currently the patient is being treated with antibiotics for a MRSA infection.  He has currently a resident of [email or web address]  Patient reports chronic pain to the left shoulder.  He reports he had injured his rotator cuff several years ago but could not give an exact date.  He reports he had seen another orthopedist Dr. Borrero at [facility] in [place] again those records are not available to me.    Currently the patient  complains of pain and swelling to the left arm in at the left shoulder.  Denies any fever chills.  Denies any numbness or tingling down his arm.  He is currently in a nursing facility for IV antibiotics as well as physical therapy.    Review of patient's allergies indicates:   Allergen Reactions    Nystatin      Other reaction(s): Unknown         Current Outpatient Medications   Medication Sig Dispense Refill    alcohol swabs PadM Apply 1 each topically as needed (for use with glucose monitoring). 200 each 3    amLODIPine (NORVASC) 5 MG tablet Take 1 tablet (5 mg total) by mouth once daily.      aspirin 81 MG Chew Take 1 tablet (81 mg total) by mouth once daily.  0    atorvastatin (LIPITOR) 40 MG tablet Take 1 tablet (40 mg total) by mouth once daily. (Patient taking differently: Take 40 mg by mouth every evening.) 90 tablet 3    blood sugar diagnostic (TRUE METRIX GLUCOSE TEST STRIP) Strp 1 strip by Misc.(Non-Drug; Combo Route) route 2 (two) times a day. 100 each 6    blood-glucose meter (TRUE METRIX GLUCOSE METER) Misc TEST TWICE A DAY 1 each 0    clopidogreL (PLAVIX) 75 mg tablet Take 1 tablet (75 mg total) by mouth once daily.      dextrose 5 % (D5W) SolP 250 mL with vancomycin 1,000 mg SolR 1,000 mg Inject 1,000 mg into the vein every 12 (twelve) hours. End date 12/22/2023 1000 mg 0    ELIQUIS 5 mg Tab Take 5 mg by mouth 2 (two) times daily.      ferrous sulfate (FEOSOL) 325 mg (65 mg iron) Tab tablet Take 325 mg by mouth daily with breakfast.      FLUoxetine 40 MG capsule Take 40 mg by mouth once daily.      furosemide (LASIX) 20 MG tablet Take 1 tablet (20 mg total) by mouth once daily. 90 tablet 3    HYDROcodone-acetaminophen (NORCO)  mg per tablet Take 1 tablet by mouth every 6 (six) hours as needed for Pain. 5 tablet 0    insulin detemir U-100, Levemir, 100 unit/mL (3 mL) SubQ InPn pen Inject 10 Units into the skin every evening.  0    isosorbide mononitrate (IMDUR) 30 MG 24 hr tablet Take 1 tablet  (30 mg total) by mouth once daily. 90 tablet 3    lancets 28 gauge Misc 1 lancet  by Misc.(Non-Drug; Combo Route) route 2 (two) times a day. 200 each 3    metoprolol succinate (TOPROL-XL) 25 MG 24 hr tablet Take 1 tablet (25 mg total) by mouth once daily. 90 tablet 3    mupirocin (BACTROBAN) 2 % ointment Apply topically 3 (three) times daily. Apply to nasal area 15 g 1    nitroGLYCERIN (NITROSTAT) 0.4 MG SL tablet Place 0.4 mg under the tongue every 5 (five) minutes as needed for Chest pain.      ondansetron (ZOFRAN-ODT) 8 MG TbDL Take 1 tablet (8 mg total) by mouth 3 (three) times daily as needed (nausea or vomiting). 20 tablet 0    pantoprazole (PROTONIX) 40 MG tablet Take 1 tablet (40 mg total) by mouth 2 (two) times daily. 60 tablet 11    ranolazine (RANEXA) 1,000 mg Tb12 Take 1,000 mg by mouth 2 (two) times daily.      SITagliptan-metformin (JANUMET XR) 100-1,000 mg TM24 Take 1 tablet by mouth once daily. 30 tablet 11    sucralfate (CARAFATE) 1 gram tablet Take 1 tablet (1 g total) by mouth 4 (four) times daily before meals and nightly.      traZODone (DESYREL) 150 MG tablet TAKE 1 TABLET EVERY NIGHT 90 tablet 3     No current facility-administered medications for this visit.       Past Medical History:   Diagnosis Date    Diabetes mellitus     Hiatal hernia     under Dr Saenz' care    Hyperlipidemia     Hypertension     MI (myocardial infarction)     Sleep apnea        Past Surgical History:   Procedure Laterality Date    ABDOMINAL HERNIA REPAIR      ANGIOGRAM, EXTREMITY, BILATERAL Bilateral 4/5/2023    Procedure: ANGIOGRAM, EXTREMITY, BILATERAL;  Surgeon: Michael Giraldo III, MD;  Location: [facility] CATH LAB;  Service: Cardiology;  Laterality: Bilateral;    ANGIOGRAPHY OF LOWER EXTREMITY Left 4/12/2023    Procedure: Angiogram Extremity Unilateral;  Surgeon: Michael Giraldo III, MD;  Location: [facility] CATH LAB;  Service: Cardiology;  Laterality: Left;    AORTOGRAPHY WITH SERIALOGRAPHY Bilateral 4/5/2023     Procedure: AORTOGRAM, WITH SERIALOGRAPHY;  Surgeon: Michael Giraldo III, MD;  Location: [facility] CATH LAB;  Service: Cardiology;  Laterality: Bilateral;    APPLICATION OF WOUND VACUUM-ASSISTED CLOSURE DEVICE Left 3/29/2023    Procedure: APPLICATION, WOUND VAC;  Surgeon: Alona Pierce DPM;  Location: [facility] OR;  Service: Podiatry;  Laterality: Left;    APPLICATION OF WOUND VACUUM-ASSISTED CLOSURE DEVICE Left 4/4/2023    Procedure: APPLICATION, WOUND VAC;  Surgeon: Alona Pierce DPM;  Location: [facility] OR;  Service: Podiatry;  Laterality: Left;    APPLICATION OF WOUND VACUUM-ASSISTED CLOSURE DEVICE Left 4/13/2023    Procedure: APPLICATION, WOUND VAC;  Surgeon: Alona Pierce DPM;  Location: [facility] OR;  Service: Podiatry;  Laterality: Left;    BONE BIOPSY Left 3/29/2023    Procedure: BIOPSY, BONE;  Surgeon: Alona Pierce DPM;  Location: [facility] OR;  Service: Podiatry;  Laterality: Left;  1st met    CARDIAC CATHETERIZATION      3 stents placed    CATARACT EXTRACTION, BILATERAL      CLOSURE DEVICE Left 4/12/2023    Procedure: Placement of Closure Device;  Surgeon: Michael Giraldo III, MD;  Location: [facility] CATH LAB;  Service: Cardiology;  Laterality: Left;    CORONARY ANGIOGRAPHY N/A 10/3/2019    Procedure: ANGIOGRAM, CORONARY ARTERY;  Surgeon: Edwin Azul MD;  Location: [facility] CATH LAB/EP;  Service: Cardiology;  Laterality: N/A;    DEBRIDEMENT OF FOOT Left 3/29/2023    Procedure: DEBRIDEMENT, FOOT;  Surgeon: Alona Pierce DPM;  Location: [facility] OR;  Service: Podiatry;  Laterality: Left;    DEBRIDEMENT OF FOOT Left 4/4/2023    Procedure: DEBRIDEMENT, FOOT;  Surgeon: Alona Pierce DPM;  Location: [facility] OR;  Service: Podiatry;  Laterality: Left;    DEBRIDEMENT OF FOOT Left 4/13/2023    Procedure: DEBRIDEMENT, FOOT;  Surgeon: Alona Pierce DPM;  Location: [facility] OR;  Service: Podiatry;  Laterality: Left;    ESOPHAGOGASTRODUODENOSCOPY N/A 4/17/2023    Procedure: EGD (ESOPHAGOGASTRODUODENOSCOPY);  Surgeon: Otto Villarreal MD;  Location:  [facility] ENDO;  Service: Endoscopy;  Laterality: N/A;    HERNIA REPAIR      inguinal    IVUS (INTRAVASCULAR ULTRASOUND) Left 4/12/2023    Procedure: ULTRASOUND, INTRAVASCULAR;  Surgeon: Michael Giraldo III, MD;  Location: [facility] CATH LAB;  Service: Cardiology;  Laterality: Left;    LEFT HEART CATHETERIZATION Left 9/13/2019    Procedure: Left heart cath;  Surgeon: Edwin Azul MD;  Location: [facility] CATH LAB/EP;  Service: Cardiology;  Laterality: Left;    LEFT HEART CATHETERIZATION N/A 10/3/2019    Procedure: Left heart cath;  Surgeon: Edwin Azul MD;  Location: [facility] CATH LAB/EP;  Service: Cardiology;  Laterality: N/A;    OSTEOTOMY Left 6/7/2023    Procedure: OSTEOTOMY;  Surgeon: Alona Pierce DPM;  Location: [facility] OR;  Service: Podiatry;  Laterality: Left;    OSTEOTOMY OF METATARSAL BONE Right 4/4/2023    Procedure: OSTEOTOMY, METATARSAL BONE;  Surgeon: Alona Pierce DPM;  Location: [facility] OR;  Service: Podiatry;  Laterality: Right;    PERCUTANEOUS TRANSLUMINAL ANGIOPLASTY Left 4/12/2023    Procedure: PTA (ANGIOPLASTY, PERCUTANEOUS, TRANSLUMINAL);  Surgeon: Michael Giraldo III, MD;  Location: [facility] CATH LAB;  Service: Cardiology;  Laterality: Left;    SURGICAL REMOVAL OF BUNION WITH OSTEOTOMY OF METATARSAL BONE Right 4/4/2023    Procedure: BUNIONECTOMY, WITH METATARSAL OSTEOTOMY;  Surgeon: Alona Pierce DPM;  Location: [facility] OR;  Service: Podiatry;  Laterality: Right;    TOE AMPUTATION Left 4/4/2023    Procedure: AMPUTATION, TOE;  Surgeon: Alona Pierce DPM;  Location: [facility] OR;  Service: Podiatry;  Laterality: Left;  1st ray    TOE AMPUTATION Right 11/10/2023    Procedure: AMPUTATION, TOE;  Surgeon: Diya Leung DPM;  Location: [facility] OR;  Service: Podiatry;  Laterality: Right;    WASHOUT Left 6/7/2023    Procedure: WASHOUT;  Surgeon: Alona Pierce DPM;  Location: [facility] OR;  Service: Podiatry;  Laterality: Left;    WOUND DEBRIDEMENT Left 6/7/2023    Procedure: DEBRIDEMENT, WOUND;  Surgeon: Alona Pierce DPM;   Location: [facility] OR;  Service: Podiatry;  Laterality: Left;       Family History   Problem Relation Age of Onset    Heart disease Mother     Stroke Mother     Heart disease Sister     Heart disease Brother     Heart disease Maternal Uncle     Heart disease Brother     Heart disease Sister     Heart disease Maternal Uncle     Heart disease Maternal Uncle     Heart disease Maternal Uncle      Review of Systems:  ROS:  Constitutional: no fever or chills  Eyes: no visual changes  ENT: no nasal congestion or sore throat  Respiratory: no cough or shortness of breath  Cardiovascular: no chest pain or palpitations  Gastrointestinal: no nausea or vomiting, tolerating diet  Genitourinary: no hematuria or dysuria  Integument/Breast: no rash or pruritis  Hematologic/Lymphatic: no easy bruising or lymphadenopathy  Musculoskeletal: positive for arthralgias  Neurological: no seizures or tremors  Behavioral/Psych: no auditory or visual hallucinations  Endocrine: no heat or cold intolerance      OBJECTIVE:     PHYSICAL EXAM:  Vital Signs (Most Recent)  There were no vitals filed for this visit.  Height: 6' (182.9 cm) Weight: 86.2 kg (190 lb 0.6 oz),   Estimated body mass index is 25.77 kg/m² as calculated from the following:    Height as of this encounter: 6' (1.829 m).    Weight as of this encounter: 86.2 kg (190 lb 0.6 oz).   General Appearance: Well nourished, well developed, in no acute distress.  HENT: Normal cephalic, oropharynx pink and moist  Eyes: PERRLA bilaterally and EOM x 4  Respiratory: Even and unlabored  Skin: Warm and Dry.   Psychiatric: AAO x 4, Mood & affect are normal.    Shoulder exam: left  Tenderness: AC joint  ROM: pain at the extremes of mobility  Shoulder Strength: biceps 1/5, triceps 1/5, abduction 1/5, adduction 1/5  non-specific diffuse tenderness about the shoulder, tenderness over the glenohumeral joint, sensory exam normal, and radial pulse intact  Patient with 1 to 2+ edema to the left upper extremity.   Patient with bilateral lower extremity edema.      RADIOGRAPHS:  X-ray of the left shoulder obtained, findings show no acute fracture.  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  All radiographs were personally reviewed by me.    ASSESSMENT/PLAN:       ICD-10-CM ICD-9-CM   1. Chronic left shoulder pain  M25.512 719.41    G89.29 338.29   2. Swelling of left upper extremity  M79.89 729.81   3. Bilateral leg edema  R60.0 782.3   4. Rotator cuff disorder, left  M67.912 726.10   5. Bacteremia  R78.81 790.7   6. History of long term use of antibiotics  Z92.29 V87.49     -Julien Meléndez presents to clinic with c/c left shoulder pain for the past 3 months.  -X-ray as above.    Patient is currently a resident at [facility].  He is currently receiving IV antibiotics for bacteremia.    I explained to the patient in his wife that his swelling could be multifactorial.  Patient is scheduled for an ultrasound of his left upper extremity to rule out DVT and I think this is wise to get this done.  Additionally patient was seen evaluated by an orthopedist outside of [facility].  An MRI of his left shoulder has been ordered but never scheduled.  We will get this scheduled but recommend that the patient follow-up with the ordering provider for re-evaluation of the patient.  In regards to the patient's chronic edema this could be multifactorial and recommend that the patient follow up with his primary care doctor for treatment.

## 2023-12-22 ENCOUNTER — HOSPITAL ENCOUNTER (OUTPATIENT)
Dept: RADIOLOGY | Facility: HOSPITAL | Age: 83
Discharge: HOME | End: 2023-12-22
Attending: FAMILY MEDICINE
Payer: MEDICARE

## 2023-12-22 ENCOUNTER — HOSPITAL ENCOUNTER (OUTPATIENT)
Dept: RADIOLOGY | Facility: HOSPITAL | Age: 83
Discharge: HOME | End: 2023-12-22
Attending: INTERNAL MEDICINE
Payer: MEDICARE

## 2023-12-22 DIAGNOSIS — M25.512 LEFT SHOULDER PAIN: ICD-10-CM

## 2023-12-22 DIAGNOSIS — M25.512 ACUTE PAIN OF LEFT SHOULDER: ICD-10-CM

## 2023-12-22 DIAGNOSIS — M25.512 LEFT SHOULDER PAIN: Primary | ICD-10-CM

## 2023-12-22 PROCEDURE — 93971 EXTREMITY STUDY: CPT | Mod: TC,LT

## 2023-12-22 PROCEDURE — 73030 X-RAY EXAM OF SHOULDER: CPT | Mod: TC,FY,LT

## 2023-12-22 PROCEDURE — 93971 EXTREMITY STUDY: CPT | Mod: 26,LT,, | Performed by: RADIOLOGY

## 2023-12-22 PROCEDURE — 73030 XR SHOULDER COMPLETE 2 OR MORE VIEWS LEFT: ICD-10-PCS | Mod: 26,LT,, | Performed by: RADIOLOGY

## 2023-12-22 PROCEDURE — 93971 US UPPER EXTREMITY VEINS LEFT: ICD-10-PCS | Mod: 26,LT,, | Performed by: RADIOLOGY

## 2023-12-22 PROCEDURE — 73030 X-RAY EXAM OF SHOULDER: CPT | Mod: 26,LT,, | Performed by: RADIOLOGY

## 2023-12-29 LAB
ACID FAST MOD KINY STN SPEC: NORMAL
MYCOBACTERIUM SPEC QL CULT: NORMAL

## 2024-01-02 PROCEDURE — G0180 MD CERTIFICATION HHA PATIENT: HCPCS | Mod: ,,, | Performed by: FAMILY MEDICINE

## 2024-01-04 ENCOUNTER — DOCUMENT SCAN (OUTPATIENT)
Dept: HOME HEALTH SERVICES | Facility: HOSPITAL | Age: 84
End: 2024-01-04
Payer: MEDICARE

## 2024-01-04 ENCOUNTER — TELEPHONE (OUTPATIENT)
Dept: HOME HEALTH SERVICES | Facility: CLINIC | Age: 84
End: 2024-01-04
Payer: MEDICARE

## 2024-01-04 DIAGNOSIS — E78.2 MIXED HYPERLIPIDEMIA: ICD-10-CM

## 2024-01-04 DIAGNOSIS — I25.2 HISTORY OF MI (MYOCARDIAL INFARCTION): Primary | ICD-10-CM

## 2024-01-04 PROBLEM — L97.529 ULCER OF LEFT FOOT: Status: ACTIVE | Noted: 2024-01-04

## 2024-01-04 PROBLEM — L97.519 ULCER OF RIGHT FOOT: Status: ACTIVE | Noted: 2024-01-04

## 2024-01-04 PROBLEM — Z74.01 BEDBOUND: Status: ACTIVE | Noted: 2024-01-04

## 2024-01-05 ENCOUNTER — PES CALL (OUTPATIENT)
Dept: HOME HEALTH SERVICES | Facility: CLINIC | Age: 84
End: 2024-01-05
Payer: MEDICARE

## 2024-01-08 ENCOUNTER — PES CALL (OUTPATIENT)
Dept: HOME HEALTH SERVICES | Facility: CLINIC | Age: 84
End: 2024-01-08
Payer: MEDICARE

## 2024-01-09 ENCOUNTER — PES CALL (OUTPATIENT)
Dept: HOME HEALTH SERVICES | Facility: CLINIC | Age: 84
End: 2024-01-09
Payer: MEDICARE

## 2024-01-12 ENCOUNTER — LAB VISIT (OUTPATIENT)
Dept: LAB | Facility: HOSPITAL | Age: 84
End: 2024-01-12
Attending: INTERNAL MEDICINE
Payer: MEDICARE

## 2024-01-12 ENCOUNTER — TELEPHONE (OUTPATIENT)
Dept: FAMILY MEDICINE | Facility: CLINIC | Age: 84
End: 2024-01-12
Payer: MEDICARE

## 2024-01-12 DIAGNOSIS — R60.9 EDEMA: ICD-10-CM

## 2024-01-12 DIAGNOSIS — I48.0 PAROXYSMAL ATRIAL FIBRILLATION: ICD-10-CM

## 2024-01-12 LAB
ALBUMIN SERPL BCP-MCNC: 2.9 G/DL (ref 3.5–5.2)
ALP SERPL-CCNC: 80 U/L (ref 55–135)
ALT SERPL W/O P-5'-P-CCNC: 8 U/L (ref 10–44)
ANION GAP SERPL CALC-SCNC: 9 MMOL/L (ref 8–16)
AST SERPL-CCNC: 6 U/L (ref 10–40)
BASOPHILS # BLD AUTO: 0.05 K/UL (ref 0–0.2)
BASOPHILS NFR BLD: 0.7 % (ref 0–1.9)
BILIRUB SERPL-MCNC: 0.5 MG/DL (ref 0.1–1)
BUN SERPL-MCNC: 28 MG/DL (ref 8–23)
CALCIUM SERPL-MCNC: 9.9 MG/DL (ref 8.7–10.5)
CHLORIDE SERPL-SCNC: 99 MMOL/L (ref 95–110)
CO2 SERPL-SCNC: 32 MMOL/L (ref 23–29)
CREAT SERPL-MCNC: 1 MG/DL (ref 0.5–1.4)
DIFFERENTIAL METHOD BLD: ABNORMAL
EOSINOPHIL # BLD AUTO: 0.2 K/UL (ref 0–0.5)
EOSINOPHIL NFR BLD: 2.9 % (ref 0–8)
ERYTHROCYTE [DISTWIDTH] IN BLOOD BY AUTOMATED COUNT: 15.5 % (ref 11.5–14.5)
EST. GFR  (NO RACE VARIABLE): >60 ML/MIN/1.73 M^2
GLUCOSE SERPL-MCNC: 220 MG/DL (ref 70–110)
HCT VFR BLD AUTO: 32.9 % (ref 40–54)
HGB BLD-MCNC: 10.4 G/DL (ref 14–18)
IMM GRANULOCYTES # BLD AUTO: 0.05 K/UL (ref 0–0.04)
IMM GRANULOCYTES NFR BLD AUTO: 0.7 % (ref 0–0.5)
LYMPHOCYTES # BLD AUTO: 1.5 K/UL (ref 1–4.8)
LYMPHOCYTES NFR BLD: 21.9 % (ref 18–48)
MCH RBC QN AUTO: 26.9 PG (ref 27–31)
MCHC RBC AUTO-ENTMCNC: 31.6 G/DL (ref 32–36)
MCV RBC AUTO: 85 FL (ref 82–98)
MONOCYTES # BLD AUTO: 0.8 K/UL (ref 0.3–1)
MONOCYTES NFR BLD: 12 % (ref 4–15)
NEUTROPHILS # BLD AUTO: 4.3 K/UL (ref 1.8–7.7)
NEUTROPHILS NFR BLD: 61.8 % (ref 38–73)
NRBC BLD-RTO: 0 /100 WBC
PLATELET # BLD AUTO: 288 K/UL (ref 150–450)
PMV BLD AUTO: 11.3 FL (ref 9.2–12.9)
POTASSIUM SERPL-SCNC: 4.2 MMOL/L (ref 3.5–5.1)
PROT SERPL-MCNC: 7 G/DL (ref 6–8.4)
RBC # BLD AUTO: 3.87 M/UL (ref 4.6–6.2)
SODIUM SERPL-SCNC: 140 MMOL/L (ref 136–145)
WBC # BLD AUTO: 7 K/UL (ref 3.9–12.7)

## 2024-01-12 PROCEDURE — 80053 COMPREHEN METABOLIC PANEL: CPT | Performed by: INTERNAL MEDICINE

## 2024-01-12 PROCEDURE — 85025 COMPLETE CBC W/AUTO DIFF WBC: CPT | Performed by: INTERNAL MEDICINE

## 2024-01-12 NOTE — TELEPHONE ENCOUNTER
----- Message from Yael Mathias sent at 1/12/2024  1:25 PM CST -----  Type:  RX Refill Request    Who Called:  Valerie from [facility]  Refill or New Rx: Refill  RX Name and Strength:   insulin detemir U-100, Levemir, 100 unit/mL (3 mL) SubQ InPn pen [[id number]     How is the patient currently taking it? (ex. 1XDay): Route: Inject 10 Units into the skin every evening. - Subcutaneous  Is this a 30 day or 90 day RX:  Preferred Pharmacy with phone number: [id number] - [place], [place] - [address]   Phone: [phone]  Fax: [phone]  Local or Mail Order: Local  Ordering Provider: Nola  Would the patient rather a call back or a response via [facility]? call  Best Call Back Number:  [phone]  Additional Information:

## 2024-01-24 ENCOUNTER — DOCUMENT SCAN (OUTPATIENT)
Dept: HOME HEALTH SERVICES | Facility: HOSPITAL | Age: 84
End: 2024-01-24
Payer: MEDICARE

## 2024-01-30 NOTE — PROGRESS NOTES
[facility] Medicine  Progress Note    Patient Name: Julien Meléndez  MRN: [id number]  Patient Class: IP- Inpatient   Admission Date: 11/7/2023  Length of Stay: 5 days  Attending Physician: No att. providers found  Primary Care Provider: Kelvin Wayne MD        Subjective:     Principal Problem:Acute osteomyelitis        HPI:  Julien Meléndez is a that is why 83-year-old male with past medical history of diabetes, anxiety/depression, atrial fibrillation on anticoagulation, heart failure who presents with foot wound.    Patient reports a recent biopsy of his right great toe by Podiatry over the past week.  Patient reports worsening drainage and odor from his right foot biopsy site.  He states he has been taking antibiotics for the infection, with minimal relief.  He denies any fevers , chills , nausea, although reports episodes of nonbilious nonbloody emesis shortly after going to the ED after discussing the plan with his wound care nurse.      Triage vitals were significant for slightly elevated blood pressures.  CBC was significant for leukocytosis, normocytic anemia.  CMP was significant for hyponatremia, hyperglycemia, and anion gap of 22.  CRP was elevated at 228.9.  Lactic acid was 2.9.    Blood cultures were collected.    X-rays of the right foot showed no significant change of ulceration and destructive changes of the distal metatarsal head of the 1st toe of the right foot.    Pt given fluids and antibiotics.    Patient admitted for cellulitis of right toe/right foot wound,  with podiatry consult in the a.m.    Overview/Hospital Course:  11/8: wound cx growing out gm positive. Pt on vanc and meropenem. Patient with clinical acute osteomyelitis of right hallux ulcer with bone exposure and purulent drainage noted. Xray, MRI ordered. Surgery plans for Friday by podiatry  11/9: MRI confirms osteo. Pt voices no complaints.NPO after midnight for tentative surgery   11/10:Pt taken to OR for a partial  "1st ray amputation of right great toe. Clean margins taken, sent for Culture and Pathology plan for 2-6 weeks of antibiotics pending results upon d/c, antibiotic management per Infectious Disease  11/11: Echo ordered. Repeat blood cx on 11/10 NGTD. Blood cx upon admission growing out MRSA, sensitive to clinda and vanco. D/c meropenem. ID recs 4-6 weeks of iV abx. CM notified of duration and set up  11/12: U/S of LUE shows nonocclusive thormbus in the left internal jugular vein.Will do lovenox. Pt was on eliquis and was held for procedure.  Restarted pt on plavix. echo normal. No issues voiced. Awaiting home infusion approval   .    BP (!) 118/56   Pulse 75   Temp 97.8 °F (36.6 °C)   Resp 20   Ht 6' 1" (1.854 m)   Wt 86.6 kg (190 lb 14.7 oz)   SpO2 96%   BMI 25.19 kg/m²     Physical Exam  Vitals and nursing note reviewed.   Constitutional:       General: He is not in acute distress.     Appearance: He is well-developed.   HENT:      Head: Normocephalic and atraumatic.      Nose: Nose normal.   Eyes:      Conjunctiva/sclera: Conjunctivae normal.   Cardiovascular:      Rate and Rhythm: Normal rate and regular rhythm.      Heart sounds: Normal heart sounds. No murmur heard.  Pulmonary:      Effort: Pulmonary effort is normal.      Breath sounds: Normal breath sounds. No wheezing.   Abdominal:      General: Bowel sounds are normal.      Palpations: Abdomen is soft. There is no mass.      Tenderness: There is no abdominal tenderness. There is no guarding or rebound.   Musculoskeletal:         General: Normal range of motion.      Cervical back: Normal range of motion and neck supple.      Comments: right hallux s/p ampuation with dressings intact  Left shoulder tenderness with Limited ROM   Skin:     General: Skin is warm and dry.      Findings: No rash.   Neurological:      Mental Status: He is alert and oriented to person, place, and time.   Psychiatric:         Behavior: Behavior normal.     new subjective & " objective note:no issues voiced      Assessment/Plan:      * Acute osteomyelitis  Podiatry consult:  MRI confirms osteomyelitis     s/p partial 1st ray amputation of right great toe. Clean margins taken, sent for Culture and Pathology plan for 2-6 weeks of antibiotics pending results upon d/c, antibiotic management per Infectious Disease    1. Keep dressing dry and intact, if strike-through noted, please reinforce dressing with kerlix and ACE  2. Patient non-weightbearing to surgical extremity for next two days  3. Elevate surgical foot when at rest, Z-flex boots to bilateral lower extremity     Patient to only to transfer in short distances to affected foot with Darco shoe, partial weightbearing to heel  Patient to keep dressings clean dry and intact     6 weeks of IV abx total  SNF pending    Left shoulder pain  Limited ROM  Had it since the fall 3 mos ago  Ortho consulted and rec'd MRI outpt       Internal jugular (IJ) vein thromboembolism, acute  U/s of LUE: NONOCCLUSIVE THROMBUS IN THE LEFT INTERNAL JUGULAR VEIN     eliquis was on hold for procedure  Will do lovenox for now until further input from heme/onc  Heme/onc consulted and rec'd eliquis  Resume eliquis      Bacteremia  Blood cx upon admission +MRSA, sensitive to clinda and vanco  Cont vanc  Echo wnl  Blood cx repeated on 11/10 NGTD    Will need 4-6 weeks of iv abx  CM notified      Hyponatremia  Patient has hyponatremia which is controlled,We will aim to correct the sodium by 4-6mEq in 24 hours. We will monitor sodium Daily. The hyponatremia is due to Dehydration/hypovolemia. We will obtain the following studies:  Hold off on studies at this time. We will treat the hyponatremia with IV fluids as follows:  1 L bolus. The patient's sodium results have been reviewed and are listed below.  Recent Labs   Lab 11/07/23 2044   *       Cellulitis of foot  Patient presents with worsening right big toe wound with purulence and odor  Elevated CRP, WBC  Xray  without signs of new or worsening bone loss or significant change of ulceration and destructive changes of the distal metatarsal head of the 1st toe of the right foot  Elevated lactate    Plan:  Continue IV vancomycin and Zosyn  Consulted podiatry        Anemia  Patient's anemia is currently controlled. Has not received any PRBCs to date. Etiology likely d/t chronic disease due to Osteomyelitis,  Current CBC reviewed-   Lab Results   Component Value Date    HGB 13.2 (L) 11/07/2023    HCT 39.8 (L) 11/07/2023     Monitor serial CBC and transfuse if patient becomes hemodynamically unstable, symptomatic or H/H drops below 7/21.    Depressive disorder  Patient has persistent depression which is moderate and is currently controlled. Will Continue anti-depressant medications. We will not consult psychiatry at this time. Patient does not display psychosis at this time. Continue to monitor closely and adjust plan of care as needed.        Atrial fibrillation  Patient with Paroxysmal (<7 days) atrial fibrillation which is controlled currently with Beta Blocker. Patient is currently in sinus rhythm.[id number] Score: 4. HASBLED Score: 3+. Anticoagulation indicated. Anticoagulation done with Apixaban.  We will hold if patient needs possible procedure.\    Resume eliquis    Coronary artery disease due to calcified coronary lesion  Patient with known CAD s/p stent placement and CABG, which is controlled Will continue ASA and Statin and monitor for S/Sx of angina/ACS. Continue to monitor on telemetry.     Diabetes mellitus type 2, noninsulin dependent  Patient's FSGs are uncontrolled due to hyperglycemia on current medication regimen.  Last A1c reviewed- May  Lab Results   Component Value Date    HGBA1C 9.7 (H) 10/23/2023     Most recent fingerstick glucose reviewed-   Recent Labs   Lab 11/08/23  0126   POCTGLUCOSE 280*     Current correctional scale  Medium  Maintain anti-hyperglycemic dose as follows-   Antihyperglycemics (From  admission, onward)      Start     Stop Route Frequency Ordered    11/07/23 2315  insulin detemir U-100 (Levemir) pen 10 Units         -- SubQ Nightly 11/07/23 2215 11/07/23 2306  insulin aspart U-100 pen 0-5 Units         -- SubQ Before meals & nightly PRN 11/07/23 2208          Hold Oral hypoglycemics while patient is in the hospital.    Hypertension  Chronic, controlled. Latest blood pressure and vitals reviewed-     Temp:  [98.3 °F (36.8 °C)-99.2 °F (37.3 °C)]   Pulse:  []   Resp:  [14-33]   BP: (101-155)/(52-88)   SpO2:  [95 %-100 %] .   Home meds for hypertension were reviewed and noted below.   Hypertension Medications               amLODIPine (NORVASC) 5 MG tablet     furosemide (LASIX) 20 MG tablet Take 1 tablet (20 mg total) by mouth once daily.    isosorbide mononitrate (IMDUR) 30 MG 24 hr tablet TAKE 1 TABLET EVERY DAY    metoprolol succinate (TOPROL-XL) 25 MG 24 hr tablet TAKE 1 TABLET EVERY DAY    nitroGLYCERIN (NITROSTAT) 0.4 MG SL tablet Place 0.4 mg under the tongue every 5 (five) minutes as needed for Chest pain.            While in the hospital, will manage blood pressure as follows; Continue home antihypertensive regimen    Will utilize p.r.n. blood pressure medication only if patient's blood pressure greater than 180/110 and he develops symptoms such as worsening chest pain or shortness of breath.    Mixed hyperlipidemia  May continue with home statin        VTE Risk Mitigation (From admission, onward)           Ordered     IP VTE HIGH RISK PATIENT  Once         11/07/23 2208                    Discharge Planning   AURELIA: 11/14/2023     Code Status: Prior   Is the patient medically ready for discharge?:     Reason for patient still in hospital (select all that apply): Treatment  Discharge Plan A: Skilled Nursing Facility   Discharge Delays: (!) Post-Acute Set-up              Tarsha Styles MD  Department of Hospital Medicine   [facility]

## 2024-02-02 ENCOUNTER — EXTERNAL HOME HEALTH (OUTPATIENT)
Dept: HOME HEALTH SERVICES | Facility: HOSPITAL | Age: 84
End: 2024-02-02
Payer: MEDICARE

## 2024-02-06 NOTE — TELEPHONE ENCOUNTER
Cath lab team will call him with a date and time       Thanks      ZN  
----- Message from Kanchan Zepeda sent at 10/21/2019 11:50 AM CDT -----  Contact: Self [phone]  Patient is calling to talk to nurse in regards to scheduling his procedure.   
Returned pt phone call     Pt requesting to be scheduled for procedure     Stated he was supposed to have one done, had to postpone due to wedding and was going to be scheduled after Dr. Azul got back from vacation     Thanks   
ambulatory

## 2024-02-07 ENCOUNTER — DOCUMENT SCAN (OUTPATIENT)
Dept: HOME HEALTH SERVICES | Facility: HOSPITAL | Age: 84
End: 2024-02-07
Payer: MEDICARE

## 2024-02-12 ENCOUNTER — CARE AT HOME (OUTPATIENT)
Dept: HOME HEALTH SERVICES | Facility: CLINIC | Age: 84
End: 2024-02-12
Payer: MEDICARE

## 2024-02-12 DIAGNOSIS — L89.159 PRESSURE INJURY OF SKIN OF SACRAL REGION, UNSPECIFIED INJURY STAGE: ICD-10-CM

## 2024-02-12 DIAGNOSIS — I48.0 PAF (PAROXYSMAL ATRIAL FIBRILLATION): ICD-10-CM

## 2024-02-12 DIAGNOSIS — L97.419 DIABETIC ULCER OF RIGHT MIDFOOT ASSOCIATED WITH TYPE 2 DIABETES MELLITUS, UNSPECIFIED ULCER STAGE: ICD-10-CM

## 2024-02-12 DIAGNOSIS — N18.31 STAGE 3A CHRONIC KIDNEY DISEASE: ICD-10-CM

## 2024-02-12 DIAGNOSIS — I73.9 PAD (PERIPHERAL ARTERY DISEASE): ICD-10-CM

## 2024-02-12 DIAGNOSIS — Z74.09 OTHER REDUCED MOBILITY: ICD-10-CM

## 2024-02-12 DIAGNOSIS — Z74.01 BEDBOUND: ICD-10-CM

## 2024-02-12 DIAGNOSIS — Z79.4 DIABETES MELLITUS TYPE 2, INSULIN DEPENDENT: ICD-10-CM

## 2024-02-12 DIAGNOSIS — E11.621 DIABETIC ULCER OF RIGHT MIDFOOT ASSOCIATED WITH TYPE 2 DIABETES MELLITUS, UNSPECIFIED ULCER STAGE: ICD-10-CM

## 2024-02-12 DIAGNOSIS — F03.A0 MILD DEMENTIA, UNSPECIFIED DEMENTIA TYPE, UNSPECIFIED WHETHER BEHAVIORAL, PSYCHOTIC, OR MOOD DISTURBANCE OR ANXIETY: ICD-10-CM

## 2024-02-12 DIAGNOSIS — R53.81 DEBILITY: Primary | ICD-10-CM

## 2024-02-12 DIAGNOSIS — E78.2 MIXED HYPERLIPIDEMIA: ICD-10-CM

## 2024-02-12 DIAGNOSIS — I50.9 CONGESTIVE HEART FAILURE, UNSPECIFIED HF CHRONICITY, UNSPECIFIED HEART FAILURE TYPE: ICD-10-CM

## 2024-02-12 DIAGNOSIS — E11.9 DIABETES MELLITUS TYPE 2, INSULIN DEPENDENT: ICD-10-CM

## 2024-02-12 DIAGNOSIS — I25.2 HISTORY OF MI (MYOCARDIAL INFARCTION): ICD-10-CM

## 2024-02-12 PROCEDURE — 3074F SYST BP LT 130 MM HG: CPT | Mod: CPTII,S$GLB,, | Performed by: NURSE PRACTITIONER

## 2024-02-12 PROCEDURE — 99350 HOME/RES VST EST HIGH MDM 60: CPT | Mod: S$GLB,,, | Performed by: NURSE PRACTITIONER

## 2024-02-12 PROCEDURE — 1158F ADVNC CARE PLAN TLK DOCD: CPT | Mod: CPTII,S$GLB,, | Performed by: NURSE PRACTITIONER

## 2024-02-12 PROCEDURE — 99497 ADVNCD CARE PLAN 30 MIN: CPT | Mod: S$GLB,,, | Performed by: NURSE PRACTITIONER

## 2024-02-12 PROCEDURE — 3078F DIAST BP <80 MM HG: CPT | Mod: CPTII,S$GLB,, | Performed by: NURSE PRACTITIONER

## 2024-02-12 PROCEDURE — 1159F MED LIST DOCD IN RCRD: CPT | Mod: CPTII,S$GLB,, | Performed by: NURSE PRACTITIONER

## 2024-02-12 PROCEDURE — 1160F RVW MEDS BY RX/DR IN RCRD: CPT | Mod: CPTII,S$GLB,, | Performed by: NURSE PRACTITIONER

## 2024-02-12 PROCEDURE — 1126F AMNT PAIN NOTED NONE PRSNT: CPT | Mod: CPTII,S$GLB,, | Performed by: NURSE PRACTITIONER

## 2024-02-14 RX ORDER — TRAZODONE HYDROCHLORIDE 150 MG/1
150 TABLET ORAL NIGHTLY
Qty: 90 TABLET | Refills: 3 | Status: SHIPPED | OUTPATIENT
Start: 2024-02-14 | End: 2024-05-06

## 2024-02-15 ENCOUNTER — DOCUMENT SCAN (OUTPATIENT)
Dept: HOME HEALTH SERVICES | Facility: HOSPITAL | Age: 84
End: 2024-02-15
Payer: MEDICARE

## 2024-02-15 NOTE — PROGRESS NOTES
1/26/23  Patient called and rescheduled 1/24 missed lab appointment to today 1/26    
INR not at goal. Medications, chart, and patient findings reviewed. See calendar for adjustments to dose and follow up plan.    
99

## 2024-02-19 NOTE — PROGRESS NOTES
[facility]  Medical Chronic Care Home Visit    Encounter Provider: Gladis Fermin   PCP: Kelvin Wayne MD  Consult Requested By: Kanchan Fischer    HISTORY OF PRESENT ILLNESS      Patient ID: Julien Meléndez is a 83 y.o. male is being seen in the home due to physical debility that presents a taxing effort to leave the home, to mitigate high risk of hospital readmission and/or due to the limited availability of reliable or safe options for transportation to the point of access to the provider. Prior to treatment on this visit the chart was reviewed and patient verbal consent was obtained.    Chronic medical conditions synopsis:    Julien Meléndez was seen at home today as referral from home health due to bed bound status with wounds. Patient has notable memory deficit. Wife cares for him. Has contacted [place] [facility] to start providing meals on wheels, bathing assistance. Has sacral pressure wound and diabetic foot wound. Home health doing dressing changes. He is seen in hospital bed, too tall for bed with limbs dangling over. Wife is unable to operate his manual hospital bed as she is elderly and has heart failure and this requires too much exertion. Requesting mechanical hospital bed. He has PT/OT coming out as well. Medications reviewed with wife bottle by bottle. She is unable to afford janumet and was getting samples from PCP office. Was only giving lantus 10u nightly. Wife would like some respite care assistance.     Past Medical History:   Diagnosis Date    Diabetes mellitus     Hiatal hernia     under Dr Saenz' yazmin    Hyperlipidemia     Hypertension     MI (myocardial infarction)     Sleep apnea          DECISION MAKING TODAY       Assessment & Plan:  1. Debility  --cont HH with PT/OT  --chronic    2. History of MI (myocardial infarction)  -     Ambulatory referral/consult to [facility]  --cont ASA, BB, statin  --stable  --f/u with cardiology    3.  Mixed hyperlipidemia  -     Ambulatory referral/consult to [facility]  --cont statin    4. PAF (paroxysmal atrial fibrillation)  --cont eliquis and metoprolol  --stable  --followed by cardiology    5. PAD (peripheral artery disease)  -stable  --cont current regimen  --followed by cardiology    6. Congestive heart failure, unspecified HF chronicity, unspecified heart failure type  --stable  --cont current regimen  --followed by cardiology    7. Stage 3a chronic kidney disease  --stable  --at baseline on recent labs  --monitor    8. Diabetic ulcer of right midfoot associated with type 2 diabetes mellitus, unspecified ulcer stage  --cont HH with wound care  --cont glucose monitoring and lantus  --f/u in 1 month    9. Bedbound  --cont HH services with PT/OT  --motorized bed ordered  --wife is unable to operate manual hospital bed     Julien Meléndez requires a motorized hospital bed due to her/him requiring positioning of the body in ways not feasible with an ordinary bed with manual operation. Patient is completely immobile /or limited mobility and cannot independently make changes in body position without the use of the bed.  The positioning of the body cannot be sufficiently resolved by the use of pillows and wedges.    Julien Meléndez requires the head of the bed to be elevated more than 30 degrees most of the time due to CHF and risk of aspiration.  The positioning of the body cannot be sufficiently resolved by the use of pillows and wedges.          10. Pressure injury of skin of sacral region, unspecified injury stage  --cont HH with wound care  --discussed goals of care at length  --patient to remain full code at this time    Advance Care Planning     Date: 02/12/2024    [facility]  I engaged the patient and family in a voluntary conversation about advance care planning and we specifically addressed what the goals of care would be moving forward, in light of the patient's change in  clinical status, specifically severe debility, dementia, pressure wounds.  We did specifically address the patient's likely prognosis, which is fair .  We explored the patient's values and preferences for future care.  The patient and family endorses that what is most important right now is to focus on spending time at home, avoiding the hospital, remaining as independent as possible, symptom/pain control, and extending life as long as possible, even it it means sacrificing quality    Accordingly, we have decided that the best plan to meet the patient's goals includes continuing with treatment    I did not explain the role for hospice care at this stage of the patient's illness, including its ability to help the patient live with the best quality of life possible.  We will not be making a hospice referral.    I spent a total of 30 minutes engaging the patient in this advance care planning discussion.          11. Diabetes mellitus type 2, insulin dependent  --taking lantus 10u nightly  --increase to 15u nightly as ordered  --unable to get janumet; too expensive  --will switch to alternative if glucose remains uncontrolled    12. Mild dementia, unspecified dementia type, unspecified whether behavioral, psychotic, or mood disturbance or anxiety  -     traZODone (DESYREL) 150 MG tablet; Take 1 tablet (150 mg total) by mouth every evening.  Dispense: 90 tablet; Refill: 3  --patient with memory deficit noted  - discussed dementia diagnosis with wife   --stable    13. Other reduced mobility  --cont HH with PT/OT        --will f/u in 1 month  --consult  for assistance with needs         ENVIRONMENT OF CARE      Family and/or Caregiver present at visit?  Yes  Name of Caregiver: wife  History provided by: caregiver    4Ms for Medical Decision-Making in Older Adults    Last Completed EAWV: None    MOBILITY:  Get Up and Go:       No data to display              Activities of Daily Living:       No data to display               Whisper Test:       No data to display              Disability Status:      10/24/2017     7:49 AM   Disability Status   Are you deaf or do you have serious difficulty hearing? N   Are you blind or do you have serious difficulty seeing, even when wearing glasses? N   Because of a physical, mental, or emotional condition, do you have serious difficulty concentrating, remembering, or making decisions? N   Do you have serious difficulty walking or climbing stairs? N   Do you have difficulty dressing or bathing? N   Because of a physical, mental, or emotional condition, do you have difficulty doing errands alone such as visiting a doctor's office or shopping? N     Nutrition Screening:       No data to display             Screening Score: 0-7 Malnourished, 8-11 At Risk, 12-14 Normal    MENTATION:   Depression Patient Health Questionnaire:      11/9/2022    10:29 AM   Depression Patient Health Questionnaire   Over the last two weeks how often have you been bothered by little interest or pleasure in doing things Not at all   Over the last two weeks how often have you been bothered by feeling down, depressed or hopeless Not at all   PHQ-2 Total Score 0   PHQ-9 Total Score 2   PHQ-9 Interpretation Minimal or None     Has Dementia Dx: Yes    Cognitive Function Screening:       No data to display              Cognitive Function Screening Total - Less than 4 = Abnormal,  Greater than or equal to 4 = Normal    MEDICATIONS:  High Risk Medications:  Total Active Medications: 2  FLUoxetine - 40 MG  HYDROcodone-acetaminophen -  mg    WHAT MATTERS MOST:  Advance Care Planning   ACP Status:   Patient has had an ACP conversation  Living Will: No  Power of : No  [place]: No    What is most important right now is to focus on remaining as independent as possible    Accordingly, we have decided that the best plan to meet the patient's goals includes continuing with treatment      What matters most to patient  today is: getting medications           Is patient hospice appropriate: No  (If needed, use PPS <30 or FAST score >7)  Was referral to hospice placed: No    Impression upon entering the home:  Physical Dwelling: single family home   Appearance of home environment: cleaniness: clean  Functional Status: max assistance  Mobility: bedbound  Nutritional access: adequate intake and access  Home Health: Yes,  Agency [facility]    DME/Supplies: hospital bed     Diagnostic tests reviewed/disposition: No diagnosic tests pending after this hospitalization.  Disease/illness education: Diabetes, CAD, and Dementia  Establishment or re-establishment of referral orders for community resources: No other necessary community resources.   Discussion with other health care providers: No discussion with other health care providers necessary.   Does patient have a PCP at [facility]? Yes   Repatriation plan with PCP? Care at Home reason: mobility   Does patient have an ostomy (ileostomy, colostomy, suprapubic catheter, nephrostomy tube, tracheostomy, PEG tube, pleurex catheter, cholecystostomy, etc)? No  Were BPAs reviewed? Yes    Social History     Socioeconomic History    Marital status:    Tobacco Use    Smoking status: Never    Smokeless tobacco: Never   Substance and Sexual Activity    Alcohol use: No    Drug use: No     Social Determinants of Health     Financial Resource Strain: Patient Declined (11/8/2023)    Overall Financial Resource Strain (CARDIA)     Difficulty of Paying Living Expenses: Patient declined   Food Insecurity: No Food Insecurity (11/8/2023)    Hunger Vital Sign     Worried About Running Out of Food in the Last Year: Never true     Ran Out of Food in the Last Year: Never true   Transportation Needs: No Transportation Needs (11/8/2023)    PRAPARE - Transportation     Lack of Transportation (Medical): No     Lack of Transportation (Non-Medical): No   Physical Activity: Patient Declined (11/8/2023)     Exercise Vital Sign     Days of Exercise per Week: Patient declined     Minutes of Exercise per Session: Patient declined   Stress: Patient Declined (11/8/2023)    [employer] - Occupational Stress Questionnaire     Feeling of Stress : Patient declined   Social Connections: Unknown (11/8/2023)    Social Connection and Isolation Panel [NHANES]     Frequency of Communication with Friends and Family: More than three times a week     Frequency of Social Gatherings with Friends and Family: More than three times a week     Attends Jewish Services: Patient declined     Active Member of Clubs or Organizations: Patient declined     Attends Club or Organization Meetings: Patient declined     Marital Status:    Housing Stability: Low Risk  (11/8/2023)    Housing Stability Vital Sign     Unable to Pay for Housing in the Last Year: No     Number of Places Lived in the Last Year: 1     Unstable Housing in the Last Year: No         OBJECTIVE:     Vital Signs:  Vitals:    02/12/24 1300   BP: 125/73   Pulse: 75   Resp: 16   Temp: 97.5 °F (36.4 °C)       Review of Systems   Constitutional:  Negative for chills and fever.   HENT:  Negative for congestion.    Eyes:  Negative for visual disturbance.   Respiratory:  Negative for shortness of breath.    Cardiovascular:  Negative for chest pain.   Gastrointestinal:  Negative for abdominal pain and nausea.   Genitourinary:  Negative for dysuria.   Musculoskeletal:  Negative for arthralgias.   Skin:  Positive for wound.   Neurological:  Positive for weakness. Negative for headaches.   Psychiatric/Behavioral:  Negative for agitation.        Physical Exam:  Physical Exam  Vitals reviewed.   Constitutional:       General: He is not in acute distress.     Appearance: He is ill-appearing.   HENT:      Head: Normocephalic and atraumatic.      Nose: Nose normal.      Mouth/Throat:      Mouth: Mucous membranes are moist.   Eyes:      Pupils: Pupils are equal,  round, and reactive to light.   Cardiovascular:      Rate and Rhythm: Normal rate and regular rhythm.      Pulses: Normal pulses.      Heart sounds: Normal heart sounds.   Pulmonary:      Effort: Pulmonary effort is normal.      Breath sounds: Normal breath sounds.   Abdominal:      General: Bowel sounds are normal.      Palpations: Abdomen is soft.   Musculoskeletal:         General: No swelling.      Cervical back: Normal range of motion.   Skin:     General: Skin is warm and dry.      Findings: Lesion present.   Neurological:      Mental Status: He is alert. Mental status is at baseline. He is disoriented.      Motor: Weakness present.      Comments: Memory deficit   Psychiatric:         Behavior: Behavior normal.         INSTRUCTIONS FOR PATIENT:     Scheduled Follow-up, Appts Reviewed with Modifications if Needed: Yes  Future Appointments   Date Time Provider Department Center   3/11/2024  8:00 AM Gladis Fermin NP [id number]         Current Outpatient Medications:     amLODIPine (NORVASC) 5 MG tablet, Take 1 tablet (5 mg total) by mouth once daily., Disp: , Rfl:     aspirin 81 MG Chew, Take 1 tablet (81 mg total) by mouth once daily., Disp: , Rfl: 0    atorvastatin (LIPITOR) 40 MG tablet, Take 1 tablet (40 mg total) by mouth once daily. (Patient taking differently: Take 40 mg by mouth every evening.), Disp: 90 tablet, Rfl: 3    clopidogreL (PLAVIX) 75 mg tablet, Take 1 tablet (75 mg total) by mouth once daily., Disp: , Rfl:     ELIQUIS 5 mg Tab, Take 5 mg by mouth 2 (two) times daily., Disp: , Rfl:     FLUoxetine 40 MG capsule, Take 40 mg by mouth once daily., Disp: , Rfl:     furosemide (LASIX) 20 MG tablet, Take 1 tablet (20 mg total) by mouth once daily., Disp: 90 tablet, Rfl: 3    isosorbide mononitrate (IMDUR) 30 MG 24 hr tablet, Take 1 tablet (30 mg total) by mouth once daily., Disp: 90 tablet, Rfl: 3    LANTUS SOLOSTAR U-100 INSULIN glargine 100 units/mL SubQ pen, Inject 15 Units into the  skin every evening., Disp: , Rfl:     metoprolol succinate (TOPROL-XL) 25 MG 24 hr tablet, Take 1 tablet (25 mg total) by mouth once daily., Disp: 90 tablet, Rfl: 3    nystatin (MYCOSTATIN) powder, SMARTSIG:Packet(s) Topical Daily, Disp: , Rfl:     pantoprazole (PROTONIX) 40 MG tablet, Take 1 tablet (40 mg total) by mouth 2 (two) times daily., Disp: 60 tablet, Rfl: 11    ranolazine (RANEXA) 1,000 mg Tb12, Take 1,000 mg by mouth 2 (two) times daily., Disp: , Rfl:     traZODone (DESYREL) 150 MG tablet, Take 1 tablet (150 mg total) by mouth every evening., Disp: 90 tablet, Rfl: 3    alcohol swabs PadM, Apply 1 each topically as needed (for use with glucose monitoring)., Disp: 200 each, Rfl: 3    blood sugar diagnostic (TRUE METRIX GLUCOSE TEST STRIP) Strp, 1 strip by Misc.(Non-Drug; Combo Route) route 2 (two) times a day., Disp: 100 each, Rfl: 6    blood-glucose meter (TRUE METRIX AIR GLUCOSE METER) Misc, USE AS DIRECTED, Disp: 1 each, Rfl: 0    FARXIGA 10 mg tablet, , Disp: , Rfl:     ferrous sulfate (FEOSOL) 325 mg (65 mg iron) Tab tablet, Take 325 mg by mouth daily with breakfast., Disp: , Rfl:     HYDROcodone-acetaminophen (NORCO)  mg per tablet, Take 1 tablet by mouth every 6 (six) hours as needed for Pain., Disp: 5 tablet, Rfl: 0    lancets 28 gauge Misc, 1 lancet  by Misc.(Non-Drug; Combo Route) route 2 (two) times a day., Disp: 200 each, Rfl: 3    mupirocin (BACTROBAN) 2 % ointment, Apply topically 3 (three) times daily. Apply to nasal area, Disp: 15 g, Rfl: 1    nitroGLYCERIN (NITROSTAT) 0.4 MG SL tablet, Place 0.4 mg under the tongue every 5 (five) minutes as needed for Chest pain., Disp: , Rfl:     ondansetron (ZOFRAN-ODT) 8 MG TbDL, Take 1 tablet (8 mg total) by mouth 3 (three) times daily as needed (nausea or vomiting)., Disp: 20 tablet, Rfl: 0    SITagliptan-metformin (JANUMET XR) 100-1,000 mg TM24, Take 1 tablet by mouth once daily., Disp: 30 tablet, Rfl: 11    sucralfate (CARAFATE) 1 gram tablet,  Take 1 tablet (1 g total) by mouth 4 (four) times daily before meals and nightly., Disp: , Rfl:     Medication Reconciliation:  Were medications changed during this appointment? No  Were medications in the home? Yes  Is the patient taking the medications as directed? Yes  Does the patient/caregiver understand the medications and changes? Yes  Does updated med list accurately reflects meds patient is currently taking? Yes    Signature: Gladis Fermin NP

## 2024-02-22 ENCOUNTER — TELEPHONE (OUTPATIENT)
Dept: HOME HEALTH SERVICES | Facility: CLINIC | Age: 84
End: 2024-02-22
Payer: MEDICARE

## 2024-02-22 VITALS
RESPIRATION RATE: 16 BRPM | OXYGEN SATURATION: 97 % | SYSTOLIC BLOOD PRESSURE: 125 MMHG | TEMPERATURE: 98 F | DIASTOLIC BLOOD PRESSURE: 73 MMHG | HEART RATE: 75 BPM

## 2024-02-22 PROBLEM — F03.90 DEMENTIA: Status: ACTIVE | Noted: 2024-02-12

## 2024-02-22 RX ORDER — INSULIN GLARGINE 100 [IU]/ML
15 INJECTION, SOLUTION SUBCUTANEOUS NIGHTLY
COMMUNITY
Start: 2024-02-06 | End: 2024-04-22

## 2024-02-22 RX ORDER — DAPAGLIFLOZIN 10 MG/1
TABLET, FILM COATED ORAL
COMMUNITY
Start: 2023-09-19 | End: 2024-03-21

## 2024-02-22 RX ORDER — NYSTATIN 100000 [USP'U]/G
POWDER TOPICAL
COMMUNITY
Start: 2024-02-07 | End: 2024-03-21

## 2024-02-22 NOTE — TELEPHONE ENCOUNTER
Faxed orders, notes, and demographics to [facility] for [facility] per NP request.  Fax confirmation received.

## 2024-03-07 ENCOUNTER — DOCUMENT SCAN (OUTPATIENT)
Dept: HOME HEALTH SERVICES | Facility: HOSPITAL | Age: 84
End: 2024-03-07
Payer: MEDICARE

## 2024-03-08 ENCOUNTER — DOCUMENT SCAN (OUTPATIENT)
Dept: HOME HEALTH SERVICES | Facility: HOSPITAL | Age: 84
End: 2024-03-08
Payer: MEDICARE

## 2024-03-11 ENCOUNTER — CARE AT HOME (OUTPATIENT)
Dept: HOME HEALTH SERVICES | Facility: CLINIC | Age: 84
End: 2024-03-11
Payer: MEDICARE

## 2024-03-11 DIAGNOSIS — E44.0 MODERATE PROTEIN-CALORIE MALNUTRITION: ICD-10-CM

## 2024-03-11 DIAGNOSIS — I10 PRIMARY HYPERTENSION: ICD-10-CM

## 2024-03-11 DIAGNOSIS — E11.621 DIABETIC ULCER OF RIGHT MIDFOOT ASSOCIATED WITH TYPE 2 DIABETES MELLITUS, UNSPECIFIED ULCER STAGE: Primary | ICD-10-CM

## 2024-03-11 DIAGNOSIS — L97.419 DIABETIC ULCER OF RIGHT MIDFOOT ASSOCIATED WITH TYPE 2 DIABETES MELLITUS, UNSPECIFIED ULCER STAGE: Primary | ICD-10-CM

## 2024-03-11 DIAGNOSIS — I48.0 PAF (PAROXYSMAL ATRIAL FIBRILLATION): ICD-10-CM

## 2024-03-11 DIAGNOSIS — Z74.01 BEDBOUND: ICD-10-CM

## 2024-03-11 PROCEDURE — 3074F SYST BP LT 130 MM HG: CPT | Mod: CPTII,S$GLB,, | Performed by: NURSE PRACTITIONER

## 2024-03-11 PROCEDURE — 1126F AMNT PAIN NOTED NONE PRSNT: CPT | Mod: CPTII,S$GLB,, | Performed by: NURSE PRACTITIONER

## 2024-03-11 PROCEDURE — 3078F DIAST BP <80 MM HG: CPT | Mod: CPTII,S$GLB,, | Performed by: NURSE PRACTITIONER

## 2024-03-11 PROCEDURE — 99349 HOME/RES VST EST MOD MDM 40: CPT | Mod: S$GLB,,, | Performed by: NURSE PRACTITIONER

## 2024-03-14 ENCOUNTER — TELEPHONE (OUTPATIENT)
Dept: HOME HEALTH SERVICES | Facility: CLINIC | Age: 84
End: 2024-03-14
Payer: MEDICARE

## 2024-03-14 NOTE — TELEPHONE ENCOUNTER
Request from NP to get wound care notes on patient for documentation of wounds.  Once we receive these, send to [facility] for assistance with getting low air loss mattress.  Received notes but nothing documented on sacral wound.  Spoke with WMS NP Georgia Chaves.  She states that already saw the patient and didn't look at backside and CG didn't state anything about skin breakdown.  She is going next Thursday and will look at sacral area.  Wound care notes received from [facility] was faxed to [facility] for Reference.

## 2024-03-21 ENCOUNTER — HOSPITAL ENCOUNTER (INPATIENT)
Facility: HOSPITAL | Age: 84
LOS: 12 days | Discharge: NURSING, REHAB OR LONG-TERM CARE | DRG: 240 | End: 2024-04-02
Attending: EMERGENCY MEDICINE | Admitting: STUDENT IN AN ORGANIZED HEALTH CARE EDUCATION/TRAINING PROGRAM
Payer: MEDICARE

## 2024-03-21 DIAGNOSIS — E11.65 TYPE 2 DIABETES MELLITUS WITH HYPERGLYCEMIA, UNSPECIFIED WHETHER LONG TERM INSULIN USE: ICD-10-CM

## 2024-03-21 DIAGNOSIS — M86.9 OSTEOMYELITIS OF RIGHT FOOT, UNSPECIFIED TYPE: ICD-10-CM

## 2024-03-21 DIAGNOSIS — L08.9 WOUND INFECTION: ICD-10-CM

## 2024-03-21 DIAGNOSIS — T14.8XXA WOUND INFECTION: ICD-10-CM

## 2024-03-21 DIAGNOSIS — I70.221 CRITICAL LIMB ISCHEMIA OF RIGHT LOWER EXTREMITY: Primary | ICD-10-CM

## 2024-03-21 DIAGNOSIS — L08.9 FOOT INFECTION: ICD-10-CM

## 2024-03-21 DIAGNOSIS — I73.9 PAD (PERIPHERAL ARTERY DISEASE): ICD-10-CM

## 2024-03-21 DIAGNOSIS — I50.9 CONGESTIVE HEART FAILURE, UNSPECIFIED HF CHRONICITY, UNSPECIFIED HEART FAILURE TYPE: ICD-10-CM

## 2024-03-21 DIAGNOSIS — R07.9 CHEST PAIN: ICD-10-CM

## 2024-03-21 DIAGNOSIS — L03.119 CELLULITIS OF FOOT: Chronic | ICD-10-CM

## 2024-03-21 PROBLEM — E11.621 DIABETIC FOOT ULCER: Status: ACTIVE | Noted: 2024-03-21

## 2024-03-21 PROBLEM — M00.9 SEPTIC JOINT: Status: ACTIVE | Noted: 2024-03-21

## 2024-03-21 PROBLEM — L97.509 DIABETIC FOOT ULCER: Status: ACTIVE | Noted: 2024-03-21

## 2024-03-21 PROBLEM — N18.1 STAGE 1 CHRONIC KIDNEY DISEASE: Status: ACTIVE | Noted: 2019-10-03

## 2024-03-21 LAB
ALBUMIN SERPL BCP-MCNC: 2.9 G/DL (ref 3.5–5.2)
ALP SERPL-CCNC: 91 U/L (ref 55–135)
ALT SERPL W/O P-5'-P-CCNC: 9 U/L (ref 10–44)
ANION GAP SERPL CALC-SCNC: 9 MMOL/L (ref 8–16)
AST SERPL-CCNC: 6 U/L (ref 10–40)
BASOPHILS # BLD AUTO: 0.03 K/UL (ref 0–0.2)
BASOPHILS NFR BLD: 0.3 % (ref 0–1.9)
BILIRUB SERPL-MCNC: 0.4 MG/DL (ref 0.1–1)
BUN SERPL-MCNC: 21 MG/DL (ref 8–23)
CALCIUM SERPL-MCNC: 9.4 MG/DL (ref 8.7–10.5)
CHLORIDE SERPL-SCNC: 100 MMOL/L (ref 95–110)
CO2 SERPL-SCNC: 30 MMOL/L (ref 23–29)
CREAT SERPL-MCNC: 1 MG/DL (ref 0.5–1.4)
DIFFERENTIAL METHOD BLD: ABNORMAL
EOSINOPHIL # BLD AUTO: 0.1 K/UL (ref 0–0.5)
EOSINOPHIL NFR BLD: 1.3 % (ref 0–8)
ERYTHROCYTE [DISTWIDTH] IN BLOOD BY AUTOMATED COUNT: 16.7 % (ref 11.5–14.5)
EST. GFR  (NO RACE VARIABLE): >60 ML/MIN/1.73 M^2
GLUCOSE SERPL-MCNC: 232 MG/DL (ref 70–110)
HCT VFR BLD AUTO: 32.4 % (ref 40–54)
HGB BLD-MCNC: 10.7 G/DL (ref 14–18)
IMM GRANULOCYTES # BLD AUTO: 0.06 K/UL (ref 0–0.04)
IMM GRANULOCYTES NFR BLD AUTO: 0.6 % (ref 0–0.5)
LACTATE SERPL-SCNC: 1.3 MMOL/L (ref 0.5–2.2)
LYMPHOCYTES # BLD AUTO: 2.1 K/UL (ref 1–4.8)
LYMPHOCYTES NFR BLD: 19.8 % (ref 18–48)
MCH RBC QN AUTO: 27.4 PG (ref 27–31)
MCHC RBC AUTO-ENTMCNC: 33 G/DL (ref 32–36)
MCV RBC AUTO: 83 FL (ref 82–98)
MONOCYTES # BLD AUTO: 0.8 K/UL (ref 0.3–1)
MONOCYTES NFR BLD: 8.1 % (ref 4–15)
NEUTROPHILS # BLD AUTO: 7.3 K/UL (ref 1.8–7.7)
NEUTROPHILS NFR BLD: 69.9 % (ref 38–73)
NRBC BLD-RTO: 0 /100 WBC
PLATELET # BLD AUTO: 257 K/UL (ref 150–450)
PMV BLD AUTO: 10.7 FL (ref 9.2–12.9)
POCT GLUCOSE: 208 MG/DL (ref 70–110)
POCT GLUCOSE: 254 MG/DL (ref 70–110)
POTASSIUM SERPL-SCNC: 4.4 MMOL/L (ref 3.5–5.1)
PROT SERPL-MCNC: 7.1 G/DL (ref 6–8.4)
RBC # BLD AUTO: 3.9 M/UL (ref 4.6–6.2)
SODIUM SERPL-SCNC: 139 MMOL/L (ref 136–145)
WBC # BLD AUTO: 10.38 K/UL (ref 3.9–12.7)

## 2024-03-21 PROCEDURE — 87040 BLOOD CULTURE FOR BACTERIA: CPT | Performed by: NURSE PRACTITIONER

## 2024-03-21 PROCEDURE — 11000001 HC ACUTE MED/SURG PRIVATE ROOM

## 2024-03-21 PROCEDURE — A4216 STERILE WATER/SALINE, 10 ML: HCPCS | Performed by: NURSE PRACTITIONER

## 2024-03-21 PROCEDURE — 82962 GLUCOSE BLOOD TEST: CPT

## 2024-03-21 PROCEDURE — 87040 BLOOD CULTURE FOR BACTERIA: CPT | Mod: 59 | Performed by: NURSE PRACTITIONER

## 2024-03-21 PROCEDURE — 80053 COMPREHEN METABOLIC PANEL: CPT | Performed by: NURSE PRACTITIONER

## 2024-03-21 PROCEDURE — 25000003 PHARM REV CODE 250: Performed by: NURSE PRACTITIONER

## 2024-03-21 PROCEDURE — 63600175 PHARM REV CODE 636 W HCPCS: Performed by: NURSE PRACTITIONER

## 2024-03-21 PROCEDURE — 25000003 PHARM REV CODE 250: Performed by: STUDENT IN AN ORGANIZED HEALTH CARE EDUCATION/TRAINING PROGRAM

## 2024-03-21 PROCEDURE — 83605 ASSAY OF LACTIC ACID: CPT | Performed by: NURSE PRACTITIONER

## 2024-03-21 PROCEDURE — 96366 THER/PROPH/DIAG IV INF ADDON: CPT

## 2024-03-21 PROCEDURE — 99285 EMERGENCY DEPT VISIT HI MDM: CPT | Mod: 25

## 2024-03-21 PROCEDURE — 96365 THER/PROPH/DIAG IV INF INIT: CPT

## 2024-03-21 PROCEDURE — 96367 TX/PROPH/DG ADDL SEQ IV INF: CPT

## 2024-03-21 PROCEDURE — 85025 COMPLETE CBC W/AUTO DIFF WBC: CPT | Performed by: NURSE PRACTITIONER

## 2024-03-21 RX ORDER — ENOXAPARIN SODIUM 100 MG/ML
1 INJECTION SUBCUTANEOUS EVERY 12 HOURS
Status: DISCONTINUED | OUTPATIENT
Start: 2024-03-21 | End: 2024-03-22

## 2024-03-21 RX ORDER — SIMETHICONE 80 MG
1 TABLET,CHEWABLE ORAL 4 TIMES DAILY PRN
Status: DISCONTINUED | OUTPATIENT
Start: 2024-03-21 | End: 2024-04-02 | Stop reason: HOSPADM

## 2024-03-21 RX ORDER — SODIUM CHLORIDE 9 MG/ML
INJECTION, SOLUTION INTRAVENOUS
Status: DISCONTINUED | OUTPATIENT
Start: 2024-03-21 | End: 2024-04-02 | Stop reason: HOSPADM

## 2024-03-21 RX ORDER — ALUMINUM HYDROXIDE, MAGNESIUM HYDROXIDE, AND SIMETHICONE 1200; 120; 1200 MG/30ML; MG/30ML; MG/30ML
30 SUSPENSION ORAL 4 TIMES DAILY PRN
Status: DISCONTINUED | OUTPATIENT
Start: 2024-03-21 | End: 2024-04-02 | Stop reason: HOSPADM

## 2024-03-21 RX ORDER — IPRATROPIUM BROMIDE AND ALBUTEROL SULFATE 2.5; .5 MG/3ML; MG/3ML
3 SOLUTION RESPIRATORY (INHALATION) EVERY 4 HOURS PRN
Status: DISCONTINUED | OUTPATIENT
Start: 2024-03-21 | End: 2024-04-02 | Stop reason: HOSPADM

## 2024-03-21 RX ORDER — ACETAMINOPHEN 325 MG/1
650 TABLET ORAL EVERY 4 HOURS PRN
Status: DISCONTINUED | OUTPATIENT
Start: 2024-03-21 | End: 2024-04-02 | Stop reason: HOSPADM

## 2024-03-21 RX ORDER — FLUOXETINE HYDROCHLORIDE 20 MG/1
40 CAPSULE ORAL DAILY
Status: DISCONTINUED | OUTPATIENT
Start: 2024-03-22 | End: 2024-04-02 | Stop reason: HOSPADM

## 2024-03-21 RX ORDER — FUROSEMIDE 40 MG/1
40 TABLET ORAL DAILY
Status: ON HOLD | COMMUNITY
Start: 2023-11-30 | End: 2024-04-02 | Stop reason: HOSPADM

## 2024-03-21 RX ORDER — GLUCAGON 1 MG
1 KIT INJECTION
Status: DISCONTINUED | OUTPATIENT
Start: 2024-03-21 | End: 2024-04-02 | Stop reason: HOSPADM

## 2024-03-21 RX ORDER — MUPIROCIN 20 MG/G
OINTMENT TOPICAL 2 TIMES DAILY
Status: DISPENSED | OUTPATIENT
Start: 2024-03-21 | End: 2024-03-26

## 2024-03-21 RX ORDER — RANOLAZINE 500 MG/1
1000 TABLET, EXTENDED RELEASE ORAL 2 TIMES DAILY
Status: DISCONTINUED | OUTPATIENT
Start: 2024-03-21 | End: 2024-04-02 | Stop reason: HOSPADM

## 2024-03-21 RX ORDER — IBUPROFEN 200 MG
16 TABLET ORAL
Status: DISCONTINUED | OUTPATIENT
Start: 2024-03-21 | End: 2024-04-02 | Stop reason: HOSPADM

## 2024-03-21 RX ORDER — TALC
6 POWDER (GRAM) TOPICAL NIGHTLY PRN
Status: DISCONTINUED | OUTPATIENT
Start: 2024-03-21 | End: 2024-04-02 | Stop reason: HOSPADM

## 2024-03-21 RX ORDER — METOPROLOL SUCCINATE 25 MG/1
25 TABLET, EXTENDED RELEASE ORAL DAILY
Status: DISCONTINUED | OUTPATIENT
Start: 2024-03-22 | End: 2024-03-27

## 2024-03-21 RX ORDER — CLOPIDOGREL BISULFATE 75 MG/1
75 TABLET ORAL DAILY
Status: DISCONTINUED | OUTPATIENT
Start: 2024-03-22 | End: 2024-03-29

## 2024-03-21 RX ORDER — ATORVASTATIN CALCIUM 40 MG/1
40 TABLET, FILM COATED ORAL DAILY
Status: DISCONTINUED | OUTPATIENT
Start: 2024-03-22 | End: 2024-04-02 | Stop reason: HOSPADM

## 2024-03-21 RX ORDER — IBUPROFEN 200 MG
24 TABLET ORAL
Status: DISCONTINUED | OUTPATIENT
Start: 2024-03-21 | End: 2024-04-02 | Stop reason: HOSPADM

## 2024-03-21 RX ORDER — FUROSEMIDE 20 MG/1
20 TABLET ORAL DAILY
Status: DISCONTINUED | OUTPATIENT
Start: 2024-03-22 | End: 2024-04-02 | Stop reason: HOSPADM

## 2024-03-21 RX ORDER — AMOXICILLIN 250 MG
1 CAPSULE ORAL 2 TIMES DAILY
Status: DISCONTINUED | OUTPATIENT
Start: 2024-03-21 | End: 2024-04-02 | Stop reason: HOSPADM

## 2024-03-21 RX ORDER — HYDROCODONE BITARTRATE AND ACETAMINOPHEN 10; 325 MG/1; MG/1
1 TABLET ORAL EVERY 6 HOURS PRN
Status: DISCONTINUED | OUTPATIENT
Start: 2024-03-21 | End: 2024-04-02 | Stop reason: HOSPADM

## 2024-03-21 RX ORDER — NAPROXEN SODIUM 220 MG/1
81 TABLET, FILM COATED ORAL DAILY
Status: DISCONTINUED | OUTPATIENT
Start: 2024-03-22 | End: 2024-03-29

## 2024-03-21 RX ORDER — SODIUM CHLORIDE 0.9 % (FLUSH) 0.9 %
10 SYRINGE (ML) INJECTION EVERY 8 HOURS
Status: DISCONTINUED | OUTPATIENT
Start: 2024-03-21 | End: 2024-04-02 | Stop reason: HOSPADM

## 2024-03-21 RX ORDER — ISOSORBIDE MONONITRATE 30 MG/1
30 TABLET, EXTENDED RELEASE ORAL DAILY
Status: DISCONTINUED | OUTPATIENT
Start: 2024-03-22 | End: 2024-04-02 | Stop reason: HOSPADM

## 2024-03-21 RX ORDER — ENOXAPARIN SODIUM 100 MG/ML
40 INJECTION SUBCUTANEOUS EVERY 24 HOURS
Status: DISCONTINUED | OUTPATIENT
Start: 2024-03-21 | End: 2024-03-21

## 2024-03-21 RX ORDER — INSULIN ASPART 100 [IU]/ML
0-5 INJECTION, SOLUTION INTRAVENOUS; SUBCUTANEOUS
Status: DISCONTINUED | OUTPATIENT
Start: 2024-03-21 | End: 2024-04-02 | Stop reason: HOSPADM

## 2024-03-21 RX ORDER — PANTOPRAZOLE SODIUM 40 MG/1
40 TABLET, DELAYED RELEASE ORAL 2 TIMES DAILY
Status: DISCONTINUED | OUTPATIENT
Start: 2024-03-21 | End: 2024-03-27

## 2024-03-21 RX ORDER — ONDANSETRON HYDROCHLORIDE 2 MG/ML
4 INJECTION, SOLUTION INTRAVENOUS EVERY 8 HOURS PRN
Status: DISCONTINUED | OUTPATIENT
Start: 2024-03-21 | End: 2024-04-02 | Stop reason: HOSPADM

## 2024-03-21 RX ORDER — ONDANSETRON 8 MG/1
8 TABLET, ORALLY DISINTEGRATING ORAL EVERY 8 HOURS PRN
Status: DISCONTINUED | OUTPATIENT
Start: 2024-03-21 | End: 2024-04-02 | Stop reason: HOSPADM

## 2024-03-21 RX ADMIN — INSULIN ASPART 2 UNITS: 100 INJECTION, SOLUTION INTRAVENOUS; SUBCUTANEOUS at 05:03

## 2024-03-21 RX ADMIN — MUPIROCIN: 20 OINTMENT TOPICAL at 10:03

## 2024-03-21 RX ADMIN — Medication 10 ML: at 10:03

## 2024-03-21 RX ADMIN — PIPERACILLIN AND TAZOBACTAM 4.5 G: 4; .5 INJECTION, POWDER, LYOPHILIZED, FOR SOLUTION INTRAVENOUS; PARENTERAL at 10:03

## 2024-03-21 RX ADMIN — TRAZODONE HYDROCHLORIDE 150 MG: 100 TABLET ORAL at 09:03

## 2024-03-21 RX ADMIN — Medication 10 ML: at 05:03

## 2024-03-21 RX ADMIN — PIPERACILLIN AND TAZOBACTAM 4.5 G: 4; .5 INJECTION, POWDER, LYOPHILIZED, FOR SOLUTION INTRAVENOUS; PARENTERAL at 05:03

## 2024-03-21 RX ADMIN — INSULIN DETEMIR 10 UNITS: 100 INJECTION, SOLUTION SUBCUTANEOUS at 10:03

## 2024-03-21 RX ADMIN — SODIUM CHLORIDE: 9 INJECTION, SOLUTION INTRAVENOUS at 10:03

## 2024-03-21 RX ADMIN — VANCOMYCIN HYDROCHLORIDE 2000 MG: 500 INJECTION, POWDER, LYOPHILIZED, FOR SOLUTION INTRAVENOUS at 06:03

## 2024-03-21 RX ADMIN — RANOLAZINE 1000 MG: 500 TABLET, FILM COATED, EXTENDED RELEASE ORAL at 09:03

## 2024-03-21 RX ADMIN — INSULIN ASPART 1 UNITS: 100 INJECTION, SOLUTION INTRAVENOUS; SUBCUTANEOUS at 10:03

## 2024-03-21 RX ADMIN — ENOXAPARIN SODIUM 90 MG: 100 INJECTION SUBCUTANEOUS at 09:03

## 2024-03-21 RX ADMIN — PANTOPRAZOLE SODIUM 40 MG: 40 TABLET, DELAYED RELEASE ORAL at 09:03

## 2024-03-21 RX ADMIN — SENNOSIDES AND DOCUSATE SODIUM 1 TABLET: 8.6; 5 TABLET ORAL at 09:03

## 2024-03-21 NOTE — Clinical Note
The site was marked. The left groin and left radial was prepped. The site was prepped with ChloraPrep. The site was clipped. The patient was draped.

## 2024-03-21 NOTE — PHARMACY MED REC
"    [facility]           Pharmacy  Admission Medication History     The home medication history was taken by Sharon Boone.      Medication history obtained from Medications listed below were obtained from: Patient/family    Based on information gathered for medication list, you may go to "Admission" then "Reconcile Home Medications" tabs to review and/or act upon those items.     The home medication list has been updated by the Pharmacy department.   Please read ALL comments highlighted in yellow.   Please address this information as you see fit.    Feel free to contact us if you have any questions or require assistance.    The medications listed below were removed from the home medication list.  Please reorder if appropriate:    Patient reports NOT TAKING the following medication(s):  Aspirin 81mg chew  Farxiga 10mg  Feosol 325mg  Bactroban oint  Mycostatin powder  Carafate 1g      No current facility-administered medications on file prior to encounter.     Current Outpatient Medications on File Prior to Encounter   Medication Sig Dispense Refill    amLODIPine (NORVASC) 5 MG tablet Take 1 tablet (5 mg total) by mouth once daily.      atorvastatin (LIPITOR) 40 MG tablet Take 1 tablet (40 mg total) by mouth once daily. (Patient taking differently: Take 40 mg by mouth every evening.) 90 tablet 3    clopidogreL (PLAVIX) 75 mg tablet Take 1 tablet (75 mg total) by mouth once daily.      ELIQUIS 5 mg Tab Take 5 mg by mouth 2 (two) times daily.      FLUoxetine 40 MG capsule Take 40 mg by mouth once daily.      furosemide (LASIX) 40 MG tablet Take 40 mg by mouth once daily.      HYDROcodone-acetaminophen (NORCO)  mg per tablet Take 1 tablet by mouth every 6 (six) hours as needed for Pain. 5 tablet 0    isosorbide mononitrate (IMDUR) 30 MG 24 hr tablet Take 1 tablet (30 mg total) by mouth once daily. 90 tablet 3    LANTUS SOLOSTAR U-100 INSULIN glargine 100 units/mL SubQ pen Inject 15 Units " into the skin every evening.      metoprolol succinate (TOPROL-XL) 25 MG 24 hr tablet Take 1 tablet (25 mg total) by mouth once daily. 90 tablet 3    nitroGLYCERIN (NITROSTAT) 0.4 MG SL tablet Place 0.4 mg under the tongue every 5 (five) minutes as needed for Chest pain.      ondansetron (ZOFRAN-ODT) 8 MG TbDL Take 1 tablet (8 mg total) by mouth 3 (three) times daily as needed (nausea or vomiting). 20 tablet 0    pantoprazole (PROTONIX) 40 MG tablet Take 1 tablet (40 mg total) by mouth 2 (two) times daily. 60 tablet 11    ranolazine (RANEXA) 1,000 mg Tb12 Take 1,000 mg by mouth 2 (two) times daily.      SITagliptan-metformin (JANUMET XR) 100-1,000 mg TM24 Take 1 tablet by mouth once daily. 30 tablet 11    traZODone (DESYREL) 150 MG tablet Take 1 tablet (150 mg total) by mouth every evening. 90 tablet 3    alcohol swabs PadM Apply 1 each topically as needed (for use with glucose monitoring). 200 each 3    blood sugar diagnostic (TRUE METRIX GLUCOSE TEST STRIP) Strp 1 strip by Misc.(Non-Drug; Combo Route) route 2 (two) times a day. 100 each 6    blood-glucose meter (TRUE METRIX AIR GLUCOSE METER) Misc USE AS DIRECTED 1 each 0    lancets 28 gauge Misc 1 lancet  by Misc.(Non-Drug; Combo Route) route 2 (two) times a day. 200 each 3       Please address this information as you see fit.  Feel free to contact us if you have any questions or require assistance.    Sharon Boone  [phone]              .

## 2024-03-21 NOTE — HPI
Julien Meléndez is a 83 yr old male presents to the ER with reports of right foot infection. Pts family states over the past 2 days, they noticed an increase in erythema and drainage to the site. Denies fever. Denies taking oral antbx for this specific wound. Pt states he was advised to come to the ER for an evaluation from wound care. Wound has been dressed by HH. 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 Abnormal edema signal involving the 3rd metatarsophalangeal joint, 4th proximal interphalangeal joint and 5th distal interphalangeal joint concerning for reactive edema or early changes of septic arthritis. Will cont vancomycin and Zosyn and will admit to the [facility] service for further care.

## 2024-03-21 NOTE — Clinical Note
Diagnosis: Type 2 diabetes mellitus with hyperglycemia, unspecified whether long term insulin use [[id number]]   Future Attending Provider: ONELIA SIMPSON [[id number]]   Special Needs:: No Special Needs [1]

## 2024-03-21 NOTE — ED PROVIDER NOTES
Encounter Date: 3/21/2024       History     Chief Complaint   Patient presents with    Wound Check     Pt has wound to right foot that is being treated by wound care. Pt was told to come to ER for worsening of wound and possible MRI. Pt has no complaints on arrival. Wound was dressed today by home health. Pt arrived from home via [facility].      83 yr old male presents to the ER with reports of right foot infection. Pts family states over the past 2 days, they noticed an increase in erythema and drainage to the site. Denies fever. Denies taking oral antbx for this specific wound. Pt states he was advised to come to the ER for an evaluation from wound care. PMH of DM, Hital hernia, htn, hyperlipidemia, mi, sleep apnea.     The history is provided by the patient and the EMS personnel. No  was used.     Review of patient's allergies indicates:   Allergen Reactions    Nystatin      Other reaction(s): Unknown     Past Medical History:   Diagnosis Date    Diabetes mellitus     Hiatal hernia     under Dr Saenz' care    Hyperlipidemia     Hypertension     MI (myocardial infarction)     Sleep apnea      Past Surgical History:   Procedure Laterality Date    ABDOMINAL HERNIA REPAIR      ANGIOGRAM, EXTREMITY, BILATERAL Bilateral 4/5/2023    Procedure: ANGIOGRAM, EXTREMITY, BILATERAL;  Surgeon: Michael Giraldo III, MD;  Location: [facility] CATH LAB;  Service: Cardiology;  Laterality: Bilateral;    ANGIOGRAPHY OF LOWER EXTREMITY Left 4/12/2023    Procedure: Angiogram Extremity Unilateral;  Surgeon: Michael Giraldo III, MD;  Location: [facility] CATH LAB;  Service: Cardiology;  Laterality: Left;    AORTOGRAPHY WITH SERIALOGRAPHY Bilateral 4/5/2023    Procedure: AORTOGRAM, WITH SERIALOGRAPHY;  Surgeon: Michael Giraldo III, MD;  Location: [facility] CATH LAB;  Service: Cardiology;  Laterality: Bilateral;    APPLICATION OF WOUND VACUUM-ASSISTED CLOSURE DEVICE Left 3/29/2023    Procedure: APPLICATION, WOUND VAC;   Surgeon: Alona Pierce DPM;  Location: [facility] OR;  Service: Podiatry;  Laterality: Left;    APPLICATION OF WOUND VACUUM-ASSISTED CLOSURE DEVICE Left 4/4/2023    Procedure: APPLICATION, WOUND VAC;  Surgeon: Alona Pierce DPM;  Location: [facility] OR;  Service: Podiatry;  Laterality: Left;    APPLICATION OF WOUND VACUUM-ASSISTED CLOSURE DEVICE Left 4/13/2023    Procedure: APPLICATION, WOUND VAC;  Surgeon: Alona Pierce DPM;  Location: [facility] OR;  Service: Podiatry;  Laterality: Left;    BONE BIOPSY Left 3/29/2023    Procedure: BIOPSY, BONE;  Surgeon: Alona Pierce DPM;  Location: [facility] OR;  Service: Podiatry;  Laterality: Left;  1st met    CARDIAC CATHETERIZATION      3 stents placed    CATARACT EXTRACTION, BILATERAL      CLOSURE DEVICE Left 4/12/2023    Procedure: Placement of Closure Device;  Surgeon: Michael Giraldo III, MD;  Location: [facility] CATH LAB;  Service: Cardiology;  Laterality: Left;    CORONARY ANGIOGRAPHY N/A 10/3/2019    Procedure: ANGIOGRAM, CORONARY ARTERY;  Surgeon: Edwin Azul MD;  Location: [facility] CATH LAB/EP;  Service: Cardiology;  Laterality: N/A;    DEBRIDEMENT OF FOOT Left 3/29/2023    Procedure: DEBRIDEMENT, FOOT;  Surgeon: Alona Pierce DPM;  Location: [facility] OR;  Service: Podiatry;  Laterality: Left;    DEBRIDEMENT OF FOOT Left 4/4/2023    Procedure: DEBRIDEMENT, FOOT;  Surgeon: Alona Pierce DPM;  Location: [facility] OR;  Service: Podiatry;  Laterality: Left;    DEBRIDEMENT OF FOOT Left 4/13/2023    Procedure: DEBRIDEMENT, FOOT;  Surgeon: Alona Pierce DPM;  Location: [facility] OR;  Service: Podiatry;  Laterality: Left;    ESOPHAGOGASTRODUODENOSCOPY N/A 4/17/2023    Procedure: EGD (ESOPHAGOGASTRODUODENOSCOPY);  Surgeon: Otto Villarreal MD;  Location: [facility] ENDO;  Service: Endoscopy;  Laterality: N/A;    HERNIA REPAIR      inguinal    IVUS (INTRAVASCULAR ULTRASOUND) Left 4/12/2023    Procedure: ULTRASOUND, INTRAVASCULAR;  Surgeon: Michael Giraldo III, MD;  Location: [facility] CATH LAB;  Service: Cardiology;   Laterality: Left;    LEFT HEART CATHETERIZATION Left 9/13/2019    Procedure: Left heart cath;  Surgeon: Edwin Azul MD;  Location: [facility] CATH LAB/EP;  Service: Cardiology;  Laterality: Left;    LEFT HEART CATHETERIZATION N/A 10/3/2019    Procedure: Left heart cath;  Surgeon: Edwin Azul MD;  Location: [facility] CATH LAB/EP;  Service: Cardiology;  Laterality: N/A;    OSTEOTOMY Left 6/7/2023    Procedure: OSTEOTOMY;  Surgeon: Alona Pierce DPM;  Location: [facility] OR;  Service: Podiatry;  Laterality: Left;    OSTEOTOMY OF METATARSAL BONE Right 4/4/2023    Procedure: OSTEOTOMY, METATARSAL BONE;  Surgeon: Alona Pierce DPM;  Location: [facility] OR;  Service: Podiatry;  Laterality: Right;    PERCUTANEOUS TRANSLUMINAL ANGIOPLASTY Left 4/12/2023    Procedure: PTA (ANGIOPLASTY, PERCUTANEOUS, TRANSLUMINAL);  Surgeon: Michael Giraldo III, MD;  Location: [facility] CATH LAB;  Service: Cardiology;  Laterality: Left;    SURGICAL REMOVAL OF BUNION WITH OSTEOTOMY OF METATARSAL BONE Right 4/4/2023    Procedure: BUNIONECTOMY, WITH METATARSAL OSTEOTOMY;  Surgeon: Alona Pierce DPM;  Location: [facility] OR;  Service: Podiatry;  Laterality: Right;    TOE AMPUTATION Left 4/4/2023    Procedure: AMPUTATION, TOE;  Surgeon: Alona Pierce DPM;  Location: [facility] OR;  Service: Podiatry;  Laterality: Left;  1st ray    TOE AMPUTATION Right 11/10/2023    Procedure: AMPUTATION, TOE;  Surgeon: Diya Leung DPM;  Location: [facility] OR;  Service: Podiatry;  Laterality: Right;    WASHOUT Left 6/7/2023    Procedure: WASHOUT;  Surgeon: Alona Pierce DPM;  Location: [facility] OR;  Service: Podiatry;  Laterality: Left;    WOUND DEBRIDEMENT Left 6/7/2023    Procedure: DEBRIDEMENT, WOUND;  Surgeon: Alona Pierce DPM;  Location: [facility] OR;  Service: Podiatry;  Laterality: Left;     Family History   Problem Relation Age of Onset    Heart disease Mother     Stroke Mother     Heart disease Sister     Heart disease Brother     Heart disease Maternal Uncle     Heart disease Brother      Heart disease Sister     Heart disease Maternal Uncle     Heart disease Maternal Uncle     Heart disease Maternal Uncle      Social History     Tobacco Use    Smoking status: Never    Smokeless tobacco: Never   Substance Use Topics    Alcohol use: No    Drug use: No     Review of Systems   Skin:  Positive for wound.        Foot wound   All other systems reviewed and are negative.      Physical Exam     Initial Vitals [03/21/24 1159]   BP Pulse Resp Temp SpO2   110/60 75 18 98.2 °F (36.8 °C) 99 %      MAP       --         Physical Exam    Constitutional: He appears well-developed and well-nourished. He is not diaphoretic. No distress.   HENT:   Head: Normocephalic and atraumatic.   Right Ear: Hearing and tympanic membrane normal.   Left Ear: Hearing and tympanic membrane normal.   Nose: Nose normal.   Mouth/Throat: Uvula is midline, oropharynx is clear and moist and mucous membranes are normal.   Eyes: Lids are normal. Pupils are equal, round, and reactive to light.   Cardiovascular:  Normal rate.           Pulmonary/Chest: Effort normal and breath sounds normal. No respiratory distress. He has no wheezes. He has no rhonchi.   Abdominal: Abdomen is soft. There is no abdominal tenderness.   Musculoskeletal:         General: Normal range of motion.      Cervical back: No rigidity.     Neurological: He is oriented to person, place, and time.   Skin: Skin is warm and dry. No rash noted.        Psychiatric: He has a normal mood and affect. His behavior is normal. Judgment and thought content normal.                       ED Course   Critical Care    Date/Time: 3/21/2024 3:37 PM    Performed by: Amy Chaidez NP  Authorized by: Jg Warren MD  Total critical care time (exclusive of procedural time) : 0 minutes  Critical care time was exclusive of separately billable procedures and treating other patients.  Critical care was time spent personally by me on the following activities: blood draw for specimens,  discussions with consultants, interpretation of cardiac output measurements, examination of patient, obtaining history from patient or surrogate, ordering and performing treatments and interventions, ordering and review of laboratory studies, ordering and review of radiographic studies, pulse oximetry, review of old charts and re-evaluation of patient's condition.        Labs Reviewed   CBC W/ AUTO DIFFERENTIAL - Abnormal; Notable for the following components:       Result Value    RBC 3.90 (*)     Hemoglobin 10.7 (*)     Hematocrit 32.4 (*)     RDW 16.7 (*)     Immature Granulocytes 0.6 (*)     Immature Grans (Abs) 0.06 (*)     All other components within normal limits   COMPREHENSIVE METABOLIC PANEL - Abnormal; Notable for the following components:    CO2 30 (*)     Glucose 232 (*)     Albumin 2.9 (*)     AST 6 (*)     ALT 9 (*)     All other components within normal limits   POCT GLUCOSE - Abnormal; Notable for the following components:    POCT Glucose 208 (*)     All other components within normal limits   CULTURE, BLOOD   CULTURE, BLOOD   CULTURE, BLOOD   CULTURE, BLOOD   LACTIC ACID, PLASMA   POCT GLUCOSE, HAND-HELD DEVICE          Imaging Results              US Lower Extremity Arteries Bilateral (Final result)  Result time 03/21/24 17:07:11      Final result by Rohit Ho DO (03/21/24 17:07:11)                   Impression:      1. Hemodynamically significant stenosis in the left mid superficial femoral artery and the left anterior tibial artery.  2. Monophasic waveforms in the left distal popliteal, anterior tibial, and posterior tibial arteries.      Electronically signed by: Rohit Ho  Date:    03/21/2024  Time:    17:07               Narrative:    EXAMINATION:  US LOWER EXTREMITY ARTERIES BILATERAL    CLINICAL HISTORY:  Peripheral vascular disease, unspecified    TECHNIQUE:  Ultrasound arterial lower extremity bilateral.    COMPARISON:  11/08/2023.    FINDINGS:  Right lower extremity.    CFA:  108 cm/sec, triphasic.    DFA: 57.6 cm/sec, triphasic.    Prox SFA: 60.4 cm/sec, triphasic.    Mid SFA: 82.9 cm/sec, triphasic.    Dist SFA: 92.6 cm/sec, triphasic.    Prox pop A: 126 cm/sec, triphasic.    Distal pop A: 72.1 cm/sec, monophasic.    ERNIE: 39.8 cm/sec, monophasic.    PTA: 54.9 cm/sec, monophasic.    Left lower extremity    CFA: 85 cm/sec, biphasic.    DFA: 69.6 cm/sec, biphasic.    Prox SFA: 67.1 cm/sec, biphasic.    Mid SFA: 76/159/94.9 cm/sec, biphasic.    Dist SFA: 43.5 cm/sec, biphasic.    Prox pop A: 42.1 cm/sec, biphasic.    Distal pop A: 43 cm/sec, biphasic.    ERNIE: 49.6/145/23.9 cm/sec, biphasic    PTA: 19.3 cm/sec, biphasic.                                       MRI Foot (Forefoot) Right Without Contrast (Final result)  Result time 03/21/24 15:50:54      Final result by Alba Wyatt MD (03/21/24 15:50:54)                   Impression:      Recent amputation at the proximal 1st metatarsal and phalangeal ray.    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    Abnormal edema signal involving the 3rd metatarsophalangeal joint, 4th proximal interphalangeal joint and 5th distal interphalangeal joint concerning for reactive edema or early changes of septic arthritis.      Electronically signed by: Alba Wyatt MD  Date:    03/21/2024  Time:    15:50               Narrative:    EXAMINATION:  MRI FOOT (FOREFOOT) RIGHT WITHOUT CONTRAST    CLINICAL HISTORY:  Osteomyelitis, foot;    TECHNIQUE:  Multiplanar multi sequences images of the right foot obtained    COMPARISON:  11/08/2023    FINDINGS:  Prior amputation of the proximal 1st meta tarsal and 1st phalangeal ray.  Small area of edema at the amputated margin.    Abnormal edema at the 2nd metatarsophalangeal joint, 2nd metatarsal, and proximal phalanx concerning for septic arthritis and  osteomyelitis.  Associated reactive edema of the middle and distal phalanx of the 2nd toe.    There is edema involving the 3rd metatarsophalangeal joint which could represent reactive edema or early changes of septic arthritis.  There is reactive edema of the middle and distal phalanx of the 3rd toe.    There is abnormal edema of the 4th phalangeal ray concerning for early changes of septic arthritis at the proximal interphalangeal joint and reactive edema of the distal phalanx.    Abnormal edema at the 5th distal interphalangeal joint  concerning for reactive edema versus early changes of septic arthritis.  No edema at the 5th metatarsal fracture line.    There is mild subcutaneous soft tissue edema dorsal aspect of the foot.  There is reactive myositis at the plantar aspect of the foot.                                        X-Ray Foot Complete Right (Final result)  Result time 03/21/24 14:38:33      Final result by Alba Wyatt MD (03/21/24 14:38:33)                   Impression:      Recent amputation of the base of the 1st metatarsal including the phalangeal ray.    *New subtle lucency and possible fragmentation involving the 2nd metatarsal head concerning for demineralization which could be associated with infection, short-term follow-up advised. *    Unchanged postoperative or posttraumatic appearance of the 5th metatarsal.    This report was flagged in Epic as abnormal.      Electronically signed by: Alba Wyatt MD  Date:    03/21/2024  Time:    14:38               Narrative:    EXAMINATION:  XR FOOT COMPLETE 3 VIEW RIGHT    CLINICAL HISTORY:  . Local infection of the skin and subcutaneous tissue, unspecified    TECHNIQUE:  AP, lateral, and oblique views of the right foot were performed.    COMPARISON:  11/07/2023    FINDINGS:  Amputation at the base of the 1st metatarsal and phalangeal ray.    Remote posttraumatic change of the distal 5th metatarsal, unchanged.    Osseous resorption medial  aspect of the distal 2nd metatarsal along with osteopenia at the metatarsal head, early signs of infection cannot be excluded.  It is new from the prior exam.    Extensive vascular calcifications seen.  No soft tissue air, no foreign body.                                       Medications   piperacillin-tazobactam (ZOSYN) 4.5 g in dextrose 5 % in water (D5W) 100 mL IVPB (MB+) (4.5 g Intravenous New Bag 3/21/24 2941)   vancomycin 2 g in dextrose 5 % 500 mL IVPB (has no administration in time range)   sodium chloride 0.9% flush 10 mL (has no administration in time range)   albuterol-ipratropium 2.5 mg-0.5 mg/3 mL nebulizer solution 3 mL (has no administration in time range)   melatonin tablet 6 mg (has no administration in time range)   ondansetron disintegrating tablet 8 mg (has no administration in time range)   ondansetron injection 4 mg (has no administration in time range)   senna-docusate 8.6-50 mg per tablet 1 tablet (has no administration in time range)   simethicone chewable tablet 80 mg (has no administration in time range)   aluminum-magnesium hydroxide-simethicone 200-200-20 mg/5 mL suspension 30 mL (has no administration in time range)   acetaminophen tablet 650 mg (has no administration in time range)   glucose chewable tablet 16 g (has no administration in time range)   glucose chewable tablet 24 g (has no administration in time range)   glucagon (human recombinant) injection 1 mg (has no administration in time range)   insulin aspart U-100 pen 0-5 Units (has no administration in time range)   dextrose 10% bolus 125 mL 125 mL (has no administration in time range)   dextrose 10% bolus 250 mL 250 mL (has no administration in time range)   aspirin chewable tablet 81 mg (has no administration in time range)   atorvastatin tablet 40 mg (has no administration in time range)   clopidogreL tablet 75 mg (has no administration in time range)   FLUoxetine capsule 40 mg (has no administration in time range)    furosemide tablet 20 mg (has no administration in time range)   HYDROcodone-acetaminophen  mg per tablet 1 tablet (has no administration in time range)   isosorbide mononitrate 24 hr tablet 30 mg (has no administration in time range)   insulin detemir U-100 (Levemir) pen 10 Units (has no administration in time range)   metoprolol succinate (TOPROL-XL) 24 hr tablet 25 mg (has no administration in time range)   pantoprazole EC tablet 40 mg (has no administration in time range)   ranolazine 12 hr tablet 1,000 mg (has no administration in time range)   trazodone split tablet 150 mg (has no administration in time range)   enoxaparin injection 90 mg (has no administration in time range)   vancomycin - pharmacy to dose (has no administration in time range)   piperacillin-tazobactam (ZOSYN) 4.5 g in dextrose 5 % in water (D5W) 100 mL IVPB (MB+) (has no administration in time range)     Medical Decision Making  Differential Diagnosis includes, but is not limited to:  Cellulitis, abscess, necrotizing fasciitis, osteomyelitis, septic joint, allergic/contact dermatitis, EM/SJS, viral exanthem, local trauma/contusion     Amount and/or Complexity of Data Reviewed  Labs: ordered. Decision-making details documented in ED Course.  Radiology: ordered.    Risk  OTC drugs.  Prescription drug management.  Decision regarding hospitalization.               ED Course as of 03/21/24 1724   Thu Mar 21, 2024   1445 Impression:     Recent amputation of the base of the 1st metatarsal including the phalangeal ray.     *New subtle lucency and possible fragmentation involving the 2nd metatarsal head concerning for demineralization which could be associated with infection, short-term follow-up advised. *     Unchanged postoperative or posttraumatic appearance of the 5th metatarsal.     This report was flagged in Epic as abnormal.      [DT]   1450 Spoke with ZONIA Mercado from hospital [facility] concerning admission due to infected right foot wound. Pt  will be started on vanc and zosyn here in the ER and MRI/Arterial US pending.     Xray interpretation-  Great toe amputation, no new fractures noted, soft tissue swelling.  [DT]   1452 CBC auto differential(!) [DT]   1452 Comprehensive metabolic panel(!) [DT]   1452 Lactic acid, plasma [DT]   1452 Also blood cultures pending.  [DT]   1637 Impression:     Recent amputation at the proximal 1st metatarsal and phalangeal ray.     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     Abnormal edema signal involving the 3rd metatarsophalangeal joint, 4th proximal interphalangeal joint and 5th distal interphalangeal joint concerning for reactive edema or early changes of septic arthritis.         [DT]   1722 EKG with rate of 74, NSR with no stemi noted. Signed per Dr Warren.  [DT]      ED Course User Index  [DT] Amy Chaidez, ZONIA                           Clinical Impression:  Final diagnoses:  [L08.9] Foot infection  [I73.9] PAD (peripheral artery disease)  [E11.65] Type 2 diabetes mellitus with hyperglycemia, unspecified whether long term insulin use (Primary)  [R07.9] Chest pain  [T14.8XXA, L08.9] Wound infection          ED Disposition Condition    Admit                 Amy Chaidez, ZONIA  03/21/24 1538       Amy Chaidez, ZONIA  03/21/24 1539       Amy Chaidez, ZONIA  03/21/24 1637       Amy Chaidez, ZONIA  03/21/24 1724

## 2024-03-21 NOTE — PROGRESS NOTES
"Pharmacokinetic Initial Assessment: IV Vancomycin    Assessment/Plan:    Initiate intravenous vancomycin with loading dose of 2000 mg once followed by a maintenance dose of vancomycin 2000 mg IV every 24 hours  Desired empiric serum trough concentration is 10 to 20 mcg/mL  Draw vancomycin trough level 60 min prior to third dose on 03/23/24 at approximately 1700  Pharmacy will continue to follow and monitor vancomycin.      Please contact pharmacy at extension [phone] with any questions regarding this assessment.     Thank you for the consult,   Nany Zuniga       Patient brief summary:  Julien Meléndez is a 83 y.o. male initiated on antimicrobial therapy with IV Vancomycin for treatment of suspected bone/joint infection    Drug Allergies:   Review of patient's allergies indicates:   Allergen Reactions    Nystatin      Other reaction(s): Unknown       Actual Body Weight:   89.8 kg    Renal Function:   Estimated Creatinine Clearance: 65.1 mL/min (based on SCr of 1 mg/dL).,     Dialysis Method (if applicable):  N/A    CBC (last 72 hours):  Recent Labs   Lab Result Units 03/21/24  1342   WBC K/uL 10.38   Hemoglobin g/dL 10.7*   Hematocrit % 32.4*   Platelets K/uL 257   Gran % % 69.9   Lymph % % 19.8   Mono % % 8.1   Eosinophil % % 1.3   Basophil % % 0.3   Differential Method  Automated       Metabolic Panel (last 72 hours):  Recent Labs   Lab Result Units 03/21/24  1342   Sodium mmol/L 139   Potassium mmol/L 4.4   Chloride mmol/L 100   CO2 mmol/L 30*   Glucose mg/dL 232*   BUN mg/dL 21   Creatinine mg/dL 1.0   Albumin g/dL 2.9*   Total Bilirubin mg/dL 0.4   Alkaline Phosphatase U/L 91   AST U/L 6*   ALT U/L 9*       Drug levels (last 3 results):  No results for input(s): "VANCOMYCINRA", "VANCORANDOM", "VANCOMYCINPE", "VANCOPEAK", "VANCOMYCINTR", "VANCOTROUGH" in the last 72 hours.    Microbiologic Results:  Microbiology Results (last 7 days)       Procedure Component Value Units Date/Time    Blood culture [[id number]] " Collected: 03/21/24 1758    Order Status: Sent Specimen: Blood     Blood culture [[id number]] Collected: 03/21/24 1757    Order Status: Sent Specimen: Blood     Blood culture #1 **CANNOT BE ORDERED STAT** [[id number]] Collected: 03/21/24 1352    Order Status: Sent Specimen: Blood from Wrist, Right     Blood culture #2 **CANNOT BE ORDERED STAT** [[id number]] Collected: 03/21/24 1343    Order Status: Sent Specimen: Blood from Peripheral, Antecubital, Right

## 2024-03-21 NOTE — Clinical Note
An angiography of the  abdominal aorta was performed with the catheter and power injected with 40 mL contrast at at 20 mL/s.

## 2024-03-21 NOTE — ED NOTES
Patient arrived to ED room from xray. Awake, alert, asking for water. NPO status explained to patient. Family at bedside. Breathing unlabored and even. Fresh dried blood to dressing to foot. In no acute distress.

## 2024-03-21 NOTE — ED NOTES
APPEARANCE: Alert, oriented and in no acute distress.  CARDIAC: Normal rate and rhythm, no murmur heard.   PERIPHERAL VASCULAR: neuropathy. Unable to assess distal pulses due to bandage.   RESPIRATORY:Normal rate and effort, breath sounds clear bilaterally throughout chest. Respirations are equal and unlabored no obvious signs of distress.  GASTRO: soft, bowel sounds normal, no tenderness, no abdominal distention.  MUSC: Generalized weakness.   SKIN: Skin is warm and dry, normal skin turgor, mucous membranes moist. Wound to R foot. See media tab.   NEURO: 4/4 strength major flexors/extensors bilaterally. Sensory intact to light touch bilaterally. Mele coma scale: eyes open spontaneously-4, oriented & converses-5, obeys commands-6. No neurological abnormalities.   MENTAL STATUS: awake, alert and aware of environment.  EYE: PERRL, both eyes: pupils brisk and reactive to light. Normal size.  ENT: EARS: no obvious drainage. NOSE: no active bleeding.

## 2024-03-21 NOTE — ED NOTES
Pt returned from radiology awake alert and at baseline. Notified rn about sacral wound. Denies any pain at this time. Family and hospitalitis  at bedside.

## 2024-03-21 NOTE — ED TRIAGE NOTES
Patient arrived to ED via EMS. Reports was instructed to come to the ER for worsening wound to R foot and will probably need an MRI. Reports he has a diabetic ulcer to R foot. R first toe amputated. Wound over amputation site. See media tab for further details.  Foot wrapped with dressing. Unable to assess pulses and wound at this time.   Small amount of bright red drainage noted on bandage.   No other complaints of this time. Awake, alert at baseline. Family at bedside.

## 2024-03-22 PROBLEM — E44.0 MODERATE PROTEIN-CALORIE MALNUTRITION: Status: ACTIVE | Noted: 2024-03-22

## 2024-03-22 LAB
ANION GAP SERPL CALC-SCNC: 10 MMOL/L (ref 8–16)
BASOPHILS # BLD AUTO: 0.03 K/UL (ref 0–0.2)
BASOPHILS NFR BLD: 0.3 % (ref 0–1.9)
BUN SERPL-MCNC: 21 MG/DL (ref 8–23)
CALCIUM SERPL-MCNC: 9 MG/DL (ref 8.7–10.5)
CHLORIDE SERPL-SCNC: 100 MMOL/L (ref 95–110)
CO2 SERPL-SCNC: 26 MMOL/L (ref 23–29)
CREAT SERPL-MCNC: 1 MG/DL (ref 0.5–1.4)
DIFFERENTIAL METHOD BLD: ABNORMAL
EOSINOPHIL # BLD AUTO: 0.2 K/UL (ref 0–0.5)
EOSINOPHIL NFR BLD: 1.7 % (ref 0–8)
ERYTHROCYTE [DISTWIDTH] IN BLOOD BY AUTOMATED COUNT: 16.5 % (ref 11.5–14.5)
EST. GFR  (NO RACE VARIABLE): >60 ML/MIN/1.73 M^2
GLUCOSE SERPL-MCNC: 271 MG/DL (ref 70–110)
HCT VFR BLD AUTO: 30.5 % (ref 40–54)
HGB BLD-MCNC: 10.2 G/DL (ref 14–18)
IMM GRANULOCYTES # BLD AUTO: 0.07 K/UL (ref 0–0.04)
IMM GRANULOCYTES NFR BLD AUTO: 0.7 % (ref 0–0.5)
LYMPHOCYTES # BLD AUTO: 1.5 K/UL (ref 1–4.8)
LYMPHOCYTES NFR BLD: 16.1 % (ref 18–48)
MAGNESIUM SERPL-MCNC: 1.7 MG/DL (ref 1.6–2.6)
MCH RBC QN AUTO: 27.4 PG (ref 27–31)
MCHC RBC AUTO-ENTMCNC: 33.4 G/DL (ref 32–36)
MCV RBC AUTO: 82 FL (ref 82–98)
MONOCYTES # BLD AUTO: 0.8 K/UL (ref 0.3–1)
MONOCYTES NFR BLD: 8.7 % (ref 4–15)
NEUTROPHILS # BLD AUTO: 6.8 K/UL (ref 1.8–7.7)
NEUTROPHILS NFR BLD: 72.5 % (ref 38–73)
NRBC BLD-RTO: 0 /100 WBC
PLATELET # BLD AUTO: 244 K/UL (ref 150–450)
PMV BLD AUTO: 10.9 FL (ref 9.2–12.9)
POCT GLUCOSE: 219 MG/DL (ref 70–110)
POCT GLUCOSE: 251 MG/DL (ref 70–110)
POCT GLUCOSE: 289 MG/DL (ref 70–110)
POCT GLUCOSE: >500 MG/DL (ref 70–110)
POTASSIUM SERPL-SCNC: 4.1 MMOL/L (ref 3.5–5.1)
RBC # BLD AUTO: 3.72 M/UL (ref 4.6–6.2)
SODIUM SERPL-SCNC: 136 MMOL/L (ref 136–145)
WBC # BLD AUTO: 9.43 K/UL (ref 3.9–12.7)

## 2024-03-22 PROCEDURE — 99222 1ST HOSP IP/OBS MODERATE 55: CPT | Mod: ,,,

## 2024-03-22 PROCEDURE — 11000001 HC ACUTE MED/SURG PRIVATE ROOM

## 2024-03-22 PROCEDURE — A4216 STERILE WATER/SALINE, 10 ML: HCPCS | Performed by: NURSE PRACTITIONER

## 2024-03-22 PROCEDURE — 99222 1ST HOSP IP/OBS MODERATE 55: CPT | Mod: GC,,, | Performed by: INTERNAL MEDICINE

## 2024-03-22 PROCEDURE — 25000003 PHARM REV CODE 250: Performed by: STUDENT IN AN ORGANIZED HEALTH CARE EDUCATION/TRAINING PROGRAM

## 2024-03-22 PROCEDURE — 87070 CULTURE OTHR SPECIMN AEROBIC: CPT | Performed by: STUDENT IN AN ORGANIZED HEALTH CARE EDUCATION/TRAINING PROGRAM

## 2024-03-22 PROCEDURE — 87186 SC STD MICRODIL/AGAR DIL: CPT | Performed by: STUDENT IN AN ORGANIZED HEALTH CARE EDUCATION/TRAINING PROGRAM

## 2024-03-22 PROCEDURE — 94761 N-INVAS EAR/PLS OXIMETRY MLT: CPT

## 2024-03-22 PROCEDURE — 99900035 HC TECH TIME PER 15 MIN (STAT)

## 2024-03-22 PROCEDURE — 99223 1ST HOSP IP/OBS HIGH 75: CPT | Mod: ,,, | Performed by: STUDENT IN AN ORGANIZED HEALTH CARE EDUCATION/TRAINING PROGRAM

## 2024-03-22 PROCEDURE — 85025 COMPLETE CBC W/AUTO DIFF WBC: CPT | Performed by: NURSE PRACTITIONER

## 2024-03-22 PROCEDURE — 25000003 PHARM REV CODE 250: Performed by: NURSE PRACTITIONER

## 2024-03-22 PROCEDURE — 83735 ASSAY OF MAGNESIUM: CPT | Performed by: NURSE PRACTITIONER

## 2024-03-22 PROCEDURE — 36415 COLL VENOUS BLD VENIPUNCTURE: CPT | Performed by: NURSE PRACTITIONER

## 2024-03-22 PROCEDURE — 27000190 HC CPAP FULL FACE MASK W/VALVE

## 2024-03-22 PROCEDURE — 63600175 PHARM REV CODE 636 W HCPCS: Performed by: STUDENT IN AN ORGANIZED HEALTH CARE EDUCATION/TRAINING PROGRAM

## 2024-03-22 PROCEDURE — 80048 BASIC METABOLIC PNL TOTAL CA: CPT | Performed by: NURSE PRACTITIONER

## 2024-03-22 PROCEDURE — 87075 CULTR BACTERIA EXCEPT BLOOD: CPT | Performed by: STUDENT IN AN ORGANIZED HEALTH CARE EDUCATION/TRAINING PROGRAM

## 2024-03-22 PROCEDURE — 87077 CULTURE AEROBIC IDENTIFY: CPT | Mod: 59 | Performed by: STUDENT IN AN ORGANIZED HEALTH CARE EDUCATION/TRAINING PROGRAM

## 2024-03-22 RX ORDER — INSULIN ASPART 100 [IU]/ML
7 INJECTION, SOLUTION INTRAVENOUS; SUBCUTANEOUS ONCE
Status: COMPLETED | OUTPATIENT
Start: 2024-03-22 | End: 2024-03-22

## 2024-03-22 RX ORDER — ENOXAPARIN SODIUM 100 MG/ML
1 INJECTION SUBCUTANEOUS EVERY 12 HOURS
Status: DISCONTINUED | OUTPATIENT
Start: 2024-03-22 | End: 2024-03-29

## 2024-03-22 RX ADMIN — ATORVASTATIN CALCIUM 40 MG: 40 TABLET, FILM COATED ORAL at 09:03

## 2024-03-22 RX ADMIN — SENNOSIDES AND DOCUSATE SODIUM 1 TABLET: 8.6; 5 TABLET ORAL at 09:03

## 2024-03-22 RX ADMIN — CLOPIDOGREL BISULFATE 75 MG: 75 TABLET ORAL at 09:03

## 2024-03-22 RX ADMIN — ENOXAPARIN SODIUM 80 MG: 80 INJECTION SUBCUTANEOUS at 09:03

## 2024-03-22 RX ADMIN — INSULIN ASPART 3 UNITS: 100 INJECTION, SOLUTION INTRAVENOUS; SUBCUTANEOUS at 06:03

## 2024-03-22 RX ADMIN — RANOLAZINE 1000 MG: 500 TABLET, FILM COATED, EXTENDED RELEASE ORAL at 09:03

## 2024-03-22 RX ADMIN — MUPIROCIN: 20 OINTMENT TOPICAL at 09:03

## 2024-03-22 RX ADMIN — INSULIN DETEMIR 10 UNITS: 100 INJECTION, SOLUTION SUBCUTANEOUS at 09:03

## 2024-03-22 RX ADMIN — Medication 10 ML: at 01:03

## 2024-03-22 RX ADMIN — VANCOMYCIN HYDROCHLORIDE 2000 MG: 1 INJECTION, POWDER, LYOPHILIZED, FOR SOLUTION INTRAVENOUS at 05:03

## 2024-03-22 RX ADMIN — FUROSEMIDE 20 MG: 20 TABLET ORAL at 09:03

## 2024-03-22 RX ADMIN — PIPERACILLIN AND TAZOBACTAM 4.5 G: 4; .5 INJECTION, POWDER, LYOPHILIZED, FOR SOLUTION INTRAVENOUS; PARENTERAL at 01:03

## 2024-03-22 RX ADMIN — Medication 10 ML: at 10:03

## 2024-03-22 RX ADMIN — HYDROCODONE BITARTRATE AND ACETAMINOPHEN 1 TABLET: 10; 325 TABLET ORAL at 02:03

## 2024-03-22 RX ADMIN — FLUOXETINE HYDROCHLORIDE 40 MG: 20 CAPSULE ORAL at 09:03

## 2024-03-22 RX ADMIN — Medication 10 ML: at 06:03

## 2024-03-22 RX ADMIN — PIPERACILLIN AND TAZOBACTAM 4.5 G: 4; .5 INJECTION, POWDER, LYOPHILIZED, FOR SOLUTION INTRAVENOUS; PARENTERAL at 06:03

## 2024-03-22 RX ADMIN — PANTOPRAZOLE SODIUM 40 MG: 40 TABLET, DELAYED RELEASE ORAL at 09:03

## 2024-03-22 RX ADMIN — INSULIN ASPART 3 UNITS: 100 INJECTION, SOLUTION INTRAVENOUS; SUBCUTANEOUS at 04:03

## 2024-03-22 RX ADMIN — ASPIRIN 81 MG CHEWABLE TABLET 81 MG: 81 TABLET CHEWABLE at 09:03

## 2024-03-22 RX ADMIN — INSULIN ASPART 7 UNITS: 100 INJECTION, SOLUTION INTRAVENOUS; SUBCUTANEOUS at 10:03

## 2024-03-22 RX ADMIN — INSULIN ASPART 3 UNITS: 100 INJECTION, SOLUTION INTRAVENOUS; SUBCUTANEOUS at 09:03

## 2024-03-22 RX ADMIN — TRAZODONE HYDROCHLORIDE 150 MG: 100 TABLET ORAL at 09:03

## 2024-03-22 RX ADMIN — INSULIN ASPART 2 UNITS: 100 INJECTION, SOLUTION INTRAVENOUS; SUBCUTANEOUS at 11:03

## 2024-03-22 RX ADMIN — PIPERACILLIN AND TAZOBACTAM 4.5 G: 4; .5 INJECTION, POWDER, LYOPHILIZED, FOR SOLUTION INTRAVENOUS; PARENTERAL at 10:03

## 2024-03-22 RX ADMIN — METOPROLOL SUCCINATE 25 MG: 25 TABLET, EXTENDED RELEASE ORAL at 09:03

## 2024-03-22 NOTE — ASSESSMENT & PLAN NOTE
PAD (peripheral artery disease)     -we will continue aspirin statin as well as clopidogrel  -consulted Podiatry, Cardiology, and infectious  -IV antibiotics ordered:  IV Zosyn and IV vancomycin for now  -blood cultures are pending  -bilateral lower extremity arterial ultrasound ordered-1. Hemodynamically significant stenosis in the left mid superficial femoral artery and the left anterior tibial artery.   2. Monophasic waveforms in the left distal popliteal, anterior tibial, and posterior tibial arteries.

## 2024-03-22 NOTE — SUBJECTIVE & OBJECTIVE
Past Medical History:   Diagnosis Date    Diabetes mellitus     Hiatal hernia     under Dr Saenz' care    Hyperlipidemia     Hypertension     MI (myocardial infarction)     Sleep apnea        Past Surgical History:   Procedure Laterality Date    ABDOMINAL HERNIA REPAIR      ANGIOGRAM, EXTREMITY, BILATERAL Bilateral 4/5/2023    Procedure: ANGIOGRAM, EXTREMITY, BILATERAL;  Surgeon: Michael Giraldo III, MD;  Location: [facility] CATH LAB;  Service: Cardiology;  Laterality: Bilateral;    ANGIOGRAPHY OF LOWER EXTREMITY Left 4/12/2023    Procedure: Angiogram Extremity Unilateral;  Surgeon: Michael Giraldo III, MD;  Location: [facility] CATH LAB;  Service: Cardiology;  Laterality: Left;    AORTOGRAPHY WITH SERIALOGRAPHY Bilateral 4/5/2023    Procedure: AORTOGRAM, WITH SERIALOGRAPHY;  Surgeon: Michael Giraldo III, MD;  Location: [facility] CATH LAB;  Service: Cardiology;  Laterality: Bilateral;    APPLICATION OF WOUND VACUUM-ASSISTED CLOSURE DEVICE Left 3/29/2023    Procedure: APPLICATION, WOUND VAC;  Surgeon: Alona Pierce DPM;  Location: [facility] OR;  Service: Podiatry;  Laterality: Left;    APPLICATION OF WOUND VACUUM-ASSISTED CLOSURE DEVICE Left 4/4/2023    Procedure: APPLICATION, WOUND VAC;  Surgeon: Alona Pierce DPM;  Location: [facility] OR;  Service: Podiatry;  Laterality: Left;    APPLICATION OF WOUND VACUUM-ASSISTED CLOSURE DEVICE Left 4/13/2023    Procedure: APPLICATION, WOUND VAC;  Surgeon: Alona Pierce DPM;  Location: [facility] OR;  Service: Podiatry;  Laterality: Left;    BONE BIOPSY Left 3/29/2023    Procedure: BIOPSY, BONE;  Surgeon: Alona Pierce DPM;  Location: [facility] OR;  Service: Podiatry;  Laterality: Left;  1st met    CARDIAC CATHETERIZATION      3 stents placed    CATARACT EXTRACTION, BILATERAL      CLOSURE DEVICE Left 4/12/2023    Procedure: Placement of Closure Device;  Surgeon: Michael Giralod III, MD;  Location: [facility] CATH LAB;  Service: Cardiology;  Laterality: Left;    CORONARY ANGIOGRAPHY N/A 10/3/2019     Procedure: ANGIOGRAM, CORONARY ARTERY;  Surgeon: Edwin Azul MD;  Location: [facility] CATH LAB/EP;  Service: Cardiology;  Laterality: N/A;    DEBRIDEMENT OF FOOT Left 3/29/2023    Procedure: DEBRIDEMENT, FOOT;  Surgeon: Alona Pierce DPM;  Location: [facility] OR;  Service: Podiatry;  Laterality: Left;    DEBRIDEMENT OF FOOT Left 4/4/2023    Procedure: DEBRIDEMENT, FOOT;  Surgeon: Alona Pierce DPM;  Location: [facility] OR;  Service: Podiatry;  Laterality: Left;    DEBRIDEMENT OF FOOT Left 4/13/2023    Procedure: DEBRIDEMENT, FOOT;  Surgeon: Alona Pierce DPM;  Location: [facility] OR;  Service: Podiatry;  Laterality: Left;    ESOPHAGOGASTRODUODENOSCOPY N/A 4/17/2023    Procedure: EGD (ESOPHAGOGASTRODUODENOSCOPY);  Surgeon: Otto Villarreal MD;  Location: [facility];  Service: Endoscopy;  Laterality: N/A;    HERNIA REPAIR      inguinal    IVUS (INTRAVASCULAR ULTRASOUND) Left 4/12/2023    Procedure: ULTRASOUND, INTRAVASCULAR;  Surgeon: Michael Giraldo III, MD;  Location: [facility] CATH LAB;  Service: Cardiology;  Laterality: Left;    LEFT HEART CATHETERIZATION Left 9/13/2019    Procedure: Left heart cath;  Surgeon: Edwin Azul MD;  Location: [facility] CATH LAB/EP;  Service: Cardiology;  Laterality: Left;    LEFT HEART CATHETERIZATION N/A 10/3/2019    Procedure: Left heart cath;  Surgeon: Edwin Azul MD;  Location: [facility] CATH LAB/EP;  Service: Cardiology;  Laterality: N/A;    OSTEOTOMY Left 6/7/2023    Procedure: OSTEOTOMY;  Surgeon: Alona Pierce DPM;  Location: [facility] OR;  Service: Podiatry;  Laterality: Left;    OSTEOTOMY OF METATARSAL BONE Right 4/4/2023    Procedure: OSTEOTOMY, METATARSAL BONE;  Surgeon: Alona Pierce DPM;  Location: [facility] OR;  Service: Podiatry;  Laterality: Right;    PERCUTANEOUS TRANSLUMINAL ANGIOPLASTY Left 4/12/2023    Procedure: PTA (ANGIOPLASTY, PERCUTANEOUS, TRANSLUMINAL);  Surgeon: Michael Giraldo III, MD;  Location: [facility] CATH LAB;  Service: Cardiology;  Laterality: Left;    SURGICAL REMOVAL OF BUNION WITH  OSTEOTOMY OF METATARSAL BONE Right 4/4/2023    Procedure: BUNIONECTOMY, WITH METATARSAL OSTEOTOMY;  Surgeon: Alona Pierce DPM;  Location: [facility] OR;  Service: Podiatry;  Laterality: Right;    TOE AMPUTATION Left 4/4/2023    Procedure: AMPUTATION, TOE;  Surgeon: Alona Pierce DPM;  Location: [facility] OR;  Service: Podiatry;  Laterality: Left;  1st ray    TOE AMPUTATION Right 11/10/2023    Procedure: AMPUTATION, TOE;  Surgeon: Diya Leung DPM;  Location: [facility] OR;  Service: Podiatry;  Laterality: Right;    WASHOUT Left 6/7/2023    Procedure: WASHOUT;  Surgeon: Alona Pierce DPM;  Location: [facility] OR;  Service: Podiatry;  Laterality: Left;    WOUND DEBRIDEMENT Left 6/7/2023    Procedure: DEBRIDEMENT, WOUND;  Surgeon: Alona Pierce DPM;  Location: [facility] OR;  Service: Podiatry;  Laterality: Left;       Review of patient's allergies indicates:   Allergen Reactions    Nystatin      Other reaction(s): Unknown       No current facility-administered medications on file prior to encounter.     Current Outpatient Medications on File Prior to Encounter   Medication Sig    amLODIPine (NORVASC) 5 MG tablet Take 1 tablet (5 mg total) by mouth once daily.    atorvastatin (LIPITOR) 40 MG tablet Take 1 tablet (40 mg total) by mouth once daily. (Patient taking differently: Take 40 mg by mouth every evening.)    clopidogreL (PLAVIX) 75 mg tablet Take 1 tablet (75 mg total) by mouth once daily.    ELIQUIS 5 mg Tab Take 5 mg by mouth 2 (two) times daily.    FLUoxetine 40 MG capsule Take 40 mg by mouth once daily.    furosemide (LASIX) 40 MG tablet Take 40 mg by mouth once daily.    HYDROcodone-acetaminophen (NORCO)  mg per tablet Take 1 tablet by mouth every 6 (six) hours as needed for Pain.    isosorbide mononitrate (IMDUR) 30 MG 24 hr tablet Take 1 tablet (30 mg total) by mouth once daily.    LANTUS SOLOSTAR U-100 INSULIN glargine 100 units/mL SubQ pen Inject 15 Units into the skin every evening.    metoprolol succinate (TOPROL-XL)  25 MG 24 hr tablet Take 1 tablet (25 mg total) by mouth once daily.    nitroGLYCERIN (NITROSTAT) 0.4 MG SL tablet Place 0.4 mg under the tongue every 5 (five) minutes as needed for Chest pain.    ondansetron (ZOFRAN-ODT) 8 MG TbDL Take 1 tablet (8 mg total) by mouth 3 (three) times daily as needed (nausea or vomiting).    pantoprazole (PROTONIX) 40 MG tablet Take 1 tablet (40 mg total) by mouth 2 (two) times daily.    ranolazine (RANEXA) 1,000 mg Tb12 Take 1,000 mg by mouth 2 (two) times daily.    SITagliptan-metformin (JANUMET XR) 100-1,000 mg TM24 Take 1 tablet by mouth once daily.    traZODone (DESYREL) 150 MG tablet Take 1 tablet (150 mg total) by mouth every evening.    alcohol swabs PadM Apply 1 each topically as needed (for use with glucose monitoring).    blood sugar diagnostic (TRUE METRIX GLUCOSE TEST STRIP) Strp 1 strip by Misc.(Non-Drug; Combo Route) route 2 (two) times a day.    blood-glucose meter (TRUE METRIX AIR GLUCOSE METER) Misc USE AS DIRECTED    lancets 28 gauge Misc 1 lancet  by Misc.(Non-Drug; Combo Route) route 2 (two) times a day.    [DISCONTINUED] aspirin 81 MG Chew Take 1 tablet (81 mg total) by mouth once daily.    [DISCONTINUED] FARXIGA 10 mg tablet     [DISCONTINUED] ferrous sulfate (FEOSOL) 325 mg (65 mg iron) Tab tablet Take 325 mg by mouth daily with breakfast.    [DISCONTINUED] furosemide (LASIX) 20 MG tablet Take 1 tablet (20 mg total) by mouth once daily.    [DISCONTINUED] mupirocin (BACTROBAN) 2 % ointment Apply topically 3 (three) times daily. Apply to nasal area    [DISCONTINUED] nystatin (MYCOSTATIN) powder SMARTSIG:Packet(s) Topical Daily    [DISCONTINUED] sucralfate (CARAFATE) 1 gram tablet Take 1 tablet (1 g total) by mouth 4 (four) times daily before meals and nightly.     Family History       Problem Relation (Age of Onset)    Heart disease Mother, Sister, Brother, Maternal Uncle, Brother, Sister, Maternal Uncle, Maternal Uncle, Maternal Uncle    Stroke Mother           Tobacco Use    Smoking status: Never    Smokeless tobacco: Never   Substance and Sexual Activity    Alcohol use: No    Drug use: No    Sexual activity: Not on file     Review of Systems   Constitutional:  Positive for fatigue. Negative for fever.   Genitourinary:  Negative for difficulty urinating and hematuria.   Skin:  Positive for wound (right foot).     Objective:     Vital Signs (Most Recent):  Temp: 97.9 °F (36.6 °C) (03/21/24 2033)  Pulse: 76 (03/21/24 2033)  Resp: 16 (03/21/24 2033)  BP: (!) 144/64 (03/21/24 2033)  SpO2: 98 % (03/21/24 2033) Vital Signs (24h Range):  Temp:  [97.9 °F (36.6 °C)-98.4 °F (36.9 °C)] 97.9 °F (36.6 °C)  Pulse:  [73-76] 76  Resp:  [16-22] 16  SpO2:  [97 %-99 %] 98 %  BP: (110-147)/(57-67) 144/64     Weight: 77.2 kg (170 lb 3.1 oz)  Body mass index is 21.85 kg/m².     Physical Exam  Vitals and nursing note reviewed.   Constitutional:       Appearance: He is ill-appearing.   HENT:      Mouth/Throat:      Mouth: Mucous membranes are moist.   Eyes:      Extraocular Movements: Extraocular movements intact.   Cardiovascular:      Rate and Rhythm: Normal rate and regular rhythm.   Abdominal:      Palpations: Abdomen is soft.   Musculoskeletal:         General: Tenderness (right foot) present.      Cervical back: Normal range of motion and neck supple.   Skin:     Comments: R foot wound with erythema and purulent drainage    Neurological:      Mental Status: He is alert and oriented to person, place, and time.                Significant Labs: All pertinent labs within the past 24 hours have been reviewed.    Significant Imaging: I have reviewed all pertinent imaging results/findings within the past 24 hours.

## 2024-03-22 NOTE — PROGRESS NOTES
[facility] Medicine  Progress Note    Patient Name: Julien Meléndez  MRN: [id number]  Patient Class: IP- Inpatient   Admission Date: 3/21/2024  Length of Stay: 1 days  Attending Physician: Kelsey Stewart MD  Primary Care Provider: Kelvin Wayne MD        Subjective:     Principal Problem:Critical limb ischemia of right lower extremity        HPI:  Julien Meléndez is a 83 yr old male presents to the ER with reports of right foot infection. Pts family states over the past 2 days, they noticed an increase in erythema and drainage to the site. Denies fever. Denies taking oral antbx for this specific wound. Pt states he was advised to come to the ER for an evaluation from wound care. Wound has been dressed by HH. 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 Abnormal edema signal involving the 3rd metatarsophalangeal joint, 4th proximal interphalangeal joint and 5th distal interphalangeal joint concerning for reactive edema or early changes of septic arthritis. Will cont vancomycin and Zosyn and will admit to [facility] for further care.      Overview/Hospital Course:  No notes on file    Interval History: seen at the bedside, no distress noted. Appreciate consultants eval. Will cont IV abx.    Review of Systems   Constitutional:  Negative for fatigue and fever.   Genitourinary:  Negative for difficulty urinating and hematuria.   Skin:  Positive for wound (right foot).     Objective:     Vital Signs (Most Recent):  Temp: 97.7 °F (36.5 °C) (03/22/24 1116)  Pulse: 77 (03/22/24 1556)  Resp: 18 (03/22/24 1448)  BP: 123/61 (03/22/24 1116)  SpO2: 99 % (03/22/24 1116) Vital Signs (24h Range):  Temp:  [97.7 °F (36.5  °C)-99.7 °F (37.6 °C)] 97.7 °F (36.5 °C)  Pulse:  [73-84] 77  Resp:  [14-22] 18  SpO2:  [95 %-99 %] 99 %  BP: (110-147)/(55-67) 123/61     Weight: 77.1 kg (169 lb 15.6 oz)  Body mass index is 21.82 kg/m².     Physical Exam  Vitals and nursing note reviewed.   Constitutional:       Appearance: He is ill-appearing.   HENT:      Mouth/Throat:      Mouth: Mucous membranes are moist.   Eyes:      Extraocular Movements: Extraocular movements intact.   Cardiovascular:      Rate and Rhythm: Normal rate and regular rhythm.   Abdominal:      Palpations: Abdomen is soft.   Musculoskeletal:         General: Tenderness (right foot) present.      Cervical back: Normal range of motion and neck supple.   Skin:     Comments: R foot wound with erythema and purulent drainage    Neurological:      Mental Status: He is alert and oriented to person, place, and time.                Significant Labs: All pertinent labs within the past 24 hours have been reviewed.    Significant Imaging: I have reviewed all pertinent imaging results/findings within the past 24 hours.    Assessment/Plan:      * Critical limb ischemia of right lower extremity  PAD (peripheral artery disease)     -we will continue aspirin statin as well as clopidogrel  -consulted Podiatry, Cardiology, and infectious  -IV antibiotics ordered:  IV Zosyn and IV vancomycin for now  -blood cultures are pending  -bilateral lower extremity arterial ultrasound ordered-1. Hemodynamically significant stenosis in the left mid superficial femoral artery and the left anterior tibial artery.   2. Monophasic waveforms in the left distal popliteal, anterior tibial, and posterior tibial arteries.   -Cardiology planning angio on Monday      Osteomyelitis of right foot  -MRI with OM noted to the right foot  -blood cultures ngtd  -follow wound cx  -infectious disease consulted  -we will continue vanc and Zosyn for now        Septic joint right foot  -will consult Orthopedics      Diabetic foot  ulcer-right  Patient's FSGs are uncontrolled due to hyperglycemia on current medication regimen.  Last A1c reviewed-   Lab Results   Component Value Date    HGBA1C 8.3 (H) 03/15/2024     Most recent fingerstick glucose reviewed-   Recent Labs   Lab 03/21/24  2210 03/22/24  0609 03/22/24  1048 03/22/24  1559   POCTGLUCOSE 254* 251* 219* 289*       Current correctional scale  Low  Maintain anti-hyperglycemic dose as follows-   Antihyperglycemics (From admission, onward)      Start     Stop Route Frequency Ordered    03/21/24 2100  insulin detemir U-100 (Levemir) pen 10 Units         -- SubQ Nightly 03/21/24 1509    03/21/24 1600  insulin aspart U-100 pen 0-5 Units         -- SubQ Before meals & nightly PRN 03/21/24 1502          Hold Oral hypoglycemics while patient is in the hospital.    Dementia  -history noted of dementia  -patient is currently awake and alert  -patient's family at bedside to assist with history  -monitoring    Depressive disorder  Patient has recurrent depression which is unknown and is currently controlled. Will Continue anti-depressant medications. We will not consult psychiatry at this time. Patient does not display psychosis at this time. Continue to monitor closely and adjust plan of care as needed.        Congestive heart failure, unspecified HF chronicity, unspecified heart failure type  -euvolemic on exam  Echo    Interpretation Summary    Left Ventricle: There is low normal systolic function with a visually estimated ejection fraction of 50 - 55%. Ejection fraction by visual approximation is 50%.    Right Ventricle: Normal right ventricular cavity size. Wall thickness is normal. Right ventricle wall motion  is normal. Systolic function is normal.    Aortic Valve: There is mild aortic valve sclerosis.    Mitral Valve: There is moderate mitral annular calcification present.    Pericardium: There is a small effusion.    The study was difficult due to patient's body habitus and poor  "endocardial visualization.    Valves are not well visualized  .CHF. Last BNP reviewed- and noted below No results for input(s): "BNP", "BNPTRIAGEBLO" in the last 168 hours.  -continue to monitor    Atrial fibrillation  -history of Afib  -holding Eliquis at this time in case of any procedure  -ordered full-dose Lovenox for now  -continue cardiac tele monitoring    Stage 1 chronic kidney disease  Creatine stable for now. BMP reviewed- noted Estimated Creatinine Clearance: 61 mL/min (based on SCr of 1 mg/dL). according to latest data. 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  Monitor UOP and serial BMP and adjust therapy as needed. Renally dose meds. Avoid nephrotoxic medications and procedures.    KELSEY (obstructive sleep apnea)  - CPAP at night p.r.n.      Mixed hyperlipidemia  -continue statin at home dose        VTE Risk Mitigation (From admission, onward)           Ordered     enoxaparin injection 80 mg  Every 12 hours         03/22/24 0015     IP VTE HIGH RISK PATIENT  Once         03/21/24 1502     Place sequential compression device  Until discontinued         03/21/24 1502                    Discharge Planning   AURELIA:      Code Status: Full Code   Is the patient medically ready for discharge?:     Reason for patient still in hospital (select all that apply): Laboratory test, Treatment, Imaging, and Consult recommendations                     Lucius Rodarte NP  Department of Hospital Medicine   [facility]    "

## 2024-03-22 NOTE — CONSULTS
"Infectious Disease Consult Note     Infectious Disease Attending: Dr. Jaramillo  Resident: Stacie Mishra MD     Date of Admit: 3/21/2024  Date of Consult: 3/22/2024    Reason for Consultation:     "Diabetic foot wounds"    Assessment:     Julien Meléndez is a 83 y.o. 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 Cardiology planning for angio of RLE Monday and podiatry planning for TMA.     Plan/Recommendations:     - continue vanc and zosyn  - recommend wound/bone cultures and agree with podiatry plan for TMA    History of Present Illness:      Julien Meléndez is a 83 y.o. male with a PMH significant for DMII, HLD, HTN, MI, depression, CHF, Afib, CKD1, diabetic foot ulcer and KELSEY who presented to [facility] ED after being sent by home health wound care for worsening erythema and drainage of R foot wound. He reports that he has home health twice weekly and wound care once weekly  to help with care and has not been mobile for the last year or so since his previous left great toe amputation. The patient had amputation of the right great toe in 11/2023 and since then has had some pain and swelling with color change of his other toes on the right foot. On evaluation, the patient expresses that he feels depressed due to loss of mobility which affects his ability to go to healthcare appointments and restricts him from enjoying his hobbies.    MRI forefoot shows recent amputation of proximal 1st metatarsal and phalangeal ray with concern for septic arthritis and osteomyelitis at the 2nd metatarsophalangeal joint and possible early septic arthritis of 3rd metatarsophalangeal and 4th proximal interphalangeal and 5th distal interphalangeal joints. Arterial US shows significant stenosis in the left mid superficial femoral artery and left anterior tibial " artery. Labs on admission significant for no leukocytosis, WBC 10.38, Hgb 10.7, Plt 257, glucose 232, creatinine 1, lactic acid 1.3, and A1c from 1 week ago 8.3. Patient is currently being treated with vancomycin and zosyn. Podiatry consult pending. Cardiology planning for angio of R leg Monday.     Patient was previously treated with prolonged course of IV Vancomycin for MRSA bacteremia and wound cultures of R foot in 11/2023. At that time he also had candida albicans in the wound.     Past Medical History:     Past Medical History:   Diagnosis Date    Diabetes mellitus     Hiatal hernia     under Dr Saenz' care    Hyperlipidemia     Hypertension     MI (myocardial infarction)     Sleep apnea      Surgical History:     Past Surgical History:   Procedure Laterality Date    ABDOMINAL HERNIA REPAIR      ANGIOGRAM, EXTREMITY, BILATERAL Bilateral 4/5/2023    Procedure: ANGIOGRAM, EXTREMITY, BILATERAL;  Surgeon: Michael Giraldo III, MD;  Location: [facility] CATH LAB;  Service: Cardiology;  Laterality: Bilateral;    ANGIOGRAPHY OF LOWER EXTREMITY Left 4/12/2023    Procedure: Angiogram Extremity Unilateral;  Surgeon: Michael Giraldo III, MD;  Location: [facility] CATH LAB;  Service: Cardiology;  Laterality: Left;    AORTOGRAPHY WITH SERIALOGRAPHY Bilateral 4/5/2023    Procedure: AORTOGRAM, WITH SERIALOGRAPHY;  Surgeon: Michael Giraldo III, MD;  Location: [facility] CATH LAB;  Service: Cardiology;  Laterality: Bilateral;    APPLICATION OF WOUND VACUUM-ASSISTED CLOSURE DEVICE Left 3/29/2023    Procedure: APPLICATION, WOUND VAC;  Surgeon: Alona Pierce DPM;  Location: [facility] OR;  Service: Podiatry;  Laterality: Left;    APPLICATION OF WOUND VACUUM-ASSISTED CLOSURE DEVICE Left 4/4/2023    Procedure: APPLICATION, WOUND VAC;  Surgeon: Alona Pierce DPM;  Location: [facility] OR;  Service: Podiatry;  Laterality: Left;    APPLICATION OF WOUND VACUUM-ASSISTED CLOSURE DEVICE Left 4/13/2023    Procedure: APPLICATION, WOUND VAC;  Surgeon: Alona DAILY  ABIMBOLA Pierce;  Location: [facility] OR;  Service: Podiatry;  Laterality: Left;    BONE BIOPSY Left 3/29/2023    Procedure: BIOPSY, BONE;  Surgeon: Alona Pierce DPM;  Location: [facility] OR;  Service: Podiatry;  Laterality: Left;  1st met    CARDIAC CATHETERIZATION      3 stents placed    CATARACT EXTRACTION, BILATERAL      CLOSURE DEVICE Left 4/12/2023    Procedure: Placement of Closure Device;  Surgeon: Michael Giraldo III, MD;  Location: [facility] CATH LAB;  Service: Cardiology;  Laterality: Left;    CORONARY ANGIOGRAPHY N/A 10/3/2019    Procedure: ANGIOGRAM, CORONARY ARTERY;  Surgeon: Edwin Azul MD;  Location: [facility] CATH LAB/EP;  Service: Cardiology;  Laterality: N/A;    DEBRIDEMENT OF FOOT Left 3/29/2023    Procedure: DEBRIDEMENT, FOOT;  Surgeon: Alona Pierce DPM;  Location: [facility] OR;  Service: Podiatry;  Laterality: Left;    DEBRIDEMENT OF FOOT Left 4/4/2023    Procedure: DEBRIDEMENT, FOOT;  Surgeon: Alona Pierce DPM;  Location: [facility] OR;  Service: Podiatry;  Laterality: Left;    DEBRIDEMENT OF FOOT Left 4/13/2023    Procedure: DEBRIDEMENT, FOOT;  Surgeon: Alona Pierce DPM;  Location: [facility] OR;  Service: Podiatry;  Laterality: Left;    ESOPHAGOGASTRODUODENOSCOPY N/A 4/17/2023    Procedure: EGD (ESOPHAGOGASTRODUODENOSCOPY);  Surgeon: Otto Villarreal MD;  Location: [facility] ENDO;  Service: Endoscopy;  Laterality: N/A;    HERNIA REPAIR      inguinal    IVUS (INTRAVASCULAR ULTRASOUND) Left 4/12/2023    Procedure: ULTRASOUND, INTRAVASCULAR;  Surgeon: Michael Giraldo III, MD;  Location: [facility] CATH LAB;  Service: Cardiology;  Laterality: Left;    LEFT HEART CATHETERIZATION Left 9/13/2019    Procedure: Left heart cath;  Surgeon: Edwin Azul MD;  Location: [facility] CATH LAB/EP;  Service: Cardiology;  Laterality: Left;    LEFT HEART CATHETERIZATION N/A 10/3/2019    Procedure: Left heart cath;  Surgeon: Edwin Azul MD;  Location: [facility] CATH LAB/EP;  Service: Cardiology;  Laterality: N/A;    OSTEOTOMY Left 6/7/2023    Procedure:  OSTEOTOMY;  Surgeon: Alona Pierce DPM;  Location: [facility] OR;  Service: Podiatry;  Laterality: Left;    OSTEOTOMY OF METATARSAL BONE Right 4/4/2023    Procedure: OSTEOTOMY, METATARSAL BONE;  Surgeon: Alona Pierce DPM;  Location: [facility] OR;  Service: Podiatry;  Laterality: Right;    PERCUTANEOUS TRANSLUMINAL ANGIOPLASTY Left 4/12/2023    Procedure: PTA (ANGIOPLASTY, PERCUTANEOUS, TRANSLUMINAL);  Surgeon: Michael Giraldo III, MD;  Location: [facility] CATH LAB;  Service: Cardiology;  Laterality: Left;    SURGICAL REMOVAL OF BUNION WITH OSTEOTOMY OF METATARSAL BONE Right 4/4/2023    Procedure: BUNIONECTOMY, WITH METATARSAL OSTEOTOMY;  Surgeon: Alona Pierce DPM;  Location: [facility] OR;  Service: Podiatry;  Laterality: Right;    TOE AMPUTATION Left 4/4/2023    Procedure: AMPUTATION, TOE;  Surgeon: Alona Pierce DPM;  Location: [facility] OR;  Service: Podiatry;  Laterality: Left;  1st ray    TOE AMPUTATION Right 11/10/2023    Procedure: AMPUTATION, TOE;  Surgeon: Diya Leung DPM;  Location: [facility] OR;  Service: Podiatry;  Laterality: Right;    WASHOUT Left 6/7/2023    Procedure: WASHOUT;  Surgeon: Alona Pierce DPM;  Location: [facility] OR;  Service: Podiatry;  Laterality: Left;    WOUND DEBRIDEMENT Left 6/7/2023    Procedure: DEBRIDEMENT, WOUND;  Surgeon: Alona Pierce DPM;  Location: [facility] OR;  Service: Podiatry;  Laterality: Left;     Allergies:     Review of patient's allergies indicates:   Allergen Reactions    Nystatin      Other reaction(s): Unknown     Family History:     Family History   Problem Relation Age of Onset    Heart disease Mother     Stroke Mother     Heart disease Sister     Heart disease Brother     Heart disease Maternal Uncle     Heart disease Brother     Heart disease Sister     Heart disease Maternal Uncle     Heart disease Maternal Uncle     Heart disease Maternal Uncle      Social History:     Social History     Tobacco Use    Smoking status: Never    Smokeless tobacco: Never   Substance Use Topics    Alcohol  use: No    Drug use: No     Review of Systems:     Review of Systems   Constitutional:  Negative for chills and fever.   Respiratory:  Negative for cough, shortness of breath and wheezing.    Cardiovascular:  Negative for chest pain.   Gastrointestinal:  Negative for abdominal pain, constipation, diarrhea and vomiting.   Psychiatric/Behavioral:  Positive for depression.      Medications:     Home Medications:  Prior to Admission medications    Medication Sig Start Date End Date Taking? Authorizing Provider   amLODIPine (NORVASC) 5 MG tablet Take 1 tablet (5 mg total) by mouth once daily. 11/14/23  Yes Luis Salas MD   atorvastatin (LIPITOR) 40 MG tablet Take 1 tablet (40 mg total) by mouth once daily.  Patient taking differently: Take 40 mg by mouth every evening. 4/19/22  Yes Kelvin Wayne MD   clopidogreL (PLAVIX) 75 mg tablet Take 1 tablet (75 mg total) by mouth once daily. 4/23/23  Yes Cynthia Sanchez MD   ELIQUIS 5 mg Tab Take 5 mg by mouth 2 (two) times daily. 5/26/23  Yes Provider, Historical   FLUoxetine 40 MG capsule Take 40 mg by mouth once daily. 9/1/22  Yes Provider, Historical   furosemide (LASIX) 40 MG tablet Take 40 mg by mouth once daily. 11/30/23  Yes Provider, Historical   HYDROcodone-acetaminophen (NORCO)  mg per tablet Take 1 tablet by mouth every 6 (six) hours as needed for Pain. 11/14/23  Yes Luis Salas MD   isosorbide mononitrate (IMDUR) 30 MG 24 hr tablet Take 1 tablet (30 mg total) by mouth once daily. 11/14/23  Yes Luis Salas MD LANTUS SOLOSTAR U-100 INSULIN glargine 100 units/mL SubQ pen Inject 15 Units into the skin every evening. 2/6/24  Yes Provider, Historical   metoprolol succinate (TOPROL-XL) 25 MG 24 hr tablet Take 1 tablet (25 mg total) by mouth once daily. 11/14/23  Yes Luis Salas MD   nitroGLYCERIN (NITROSTAT) 0.4 MG SL tablet Place 0.4 mg under the tongue every 5 (five) minutes as needed for Chest pain.   Yes Provider, Historical    ondansetron (ZOFRAN-ODT) 8 MG TbDL Take 1 tablet (8 mg total) by mouth 3 (three) times daily as needed (nausea or vomiting). 11/3/23  Yes Adrian Anaya MD   pantoprazole (PROTONIX) 40 MG tablet Take 1 tablet (40 mg total) by mouth 2 (two) times daily. 4/21/23 4/20/24 Yes Cynthia Sanchez MD   ranolazine (RANEXA) 1,000 mg Tb12 Take 1,000 mg by mouth 2 (two) times daily. 1/27/23  Yes Provider, Historical   SITagliptan-metformin (JANUMET XR) 100-1,000 mg TM24 Take 1 tablet by mouth once daily. 3/14/22  Yes Kelvin Wayne MD   traZODone (DESYREL) 150 MG tablet Take 1 tablet (150 mg total) by mouth every evening. 2/14/24  Yes Gladis Fermin, NP   alcohol swabs PadM Apply 1 each topically as needed (for use with glucose monitoring). 9/8/23   Kelvin Wayne MD   blood sugar diagnostic (TRUE METRIX GLUCOSE TEST STRIP) Strp 1 strip by Misc.(Non-Drug; Combo Route) route 2 (two) times a day. 9/8/23   Kelvin Wayne MD   blood-glucose meter (TRUE METRIX AIR GLUCOSE METER) [facility] USE AS DIRECTED 1/30/24   Kelvin Wayne MD   lancets 28 gauge Misc 1 lancet  by Misc.(Non-Drug; Combo Route) route 2 (two) times a day. 9/8/23   Kelvin Wayne MD     Current/Inpatient Medications:  Infusions:    Scheduled:   aspirin  81 mg Oral Daily    atorvastatin  40 mg Oral Daily    clopidogreL  75 mg Oral Daily    enoxparin  1 mg/kg Subcutaneous Q12H (prophylaxis, 0900/2100)    FLUoxetine  40 mg Oral Daily    furosemide  20 mg Oral Daily    insulin detemir U-100  10 Units Subcutaneous QHS    isosorbide mononitrate  30 mg Oral Daily    metoprolol succinate  25 mg Oral Daily    mupirocin   Nasal BID    pantoprazole  40 mg Oral BID    piperacillin-tazobactam (Zosyn) IV (PEDS and ADULTS) (extended infusion is not appropriate)  4.5 g Intravenous Q8H    ranolazine  1,000 mg Oral BID    senna-docusate 8.6-50 mg  1 tablet Oral BID    sodium chloride 0.9%  10 mL Intravenous Q8H    traZODone  150 mg Oral QHS    vancomycin  "(VANCOCIN) IV (PEDS and ADULTS)  2,000 mg Intravenous Q24H     PRN:  sodium chloride 0.9%, acetaminophen, albuterol-ipratropium, aluminum-magnesium hydroxide-simethicone, dextrose 10%, dextrose 10%, glucagon (human recombinant), glucose, glucose, HYDROcodone-acetaminophen, influenza 65up-adj, insulin aspart U-100, melatonin, ondansetron, ondansetron, simethicone, Pharmacy to dose Vancomycin consult **AND** vancomycin - pharmacy to dose    Objective:     24-hour Vitals:   Temp:  [97.9 °F (36.6 °C)-99.7 °F (37.6 °C)] 97.9 °F (36.6 °C)  Pulse:  [73-84] 74  Resp:  [14-22] 16  SpO2:  [95 %-99 %] 98 %  BP: (110-147)/(55-67) 126/55    Vitals: BP (!) 126/55 (BP Location: Right arm, Patient Position: Lying)   Pulse 74   Temp 97.9 °F (36.6 °C) (Oral)   Resp 16   Ht 6' 2" (1.88 m)   Wt 77.1 kg (169 lb 15.6 oz)   SpO2 98%   BMI 21.82 kg/m²     Intake/Output Summary (Last 24 hours) at 3/22/2024 0942  Last data filed at 3/22/2024 0609  Gross per 24 hour   Intake 988.5 ml   Output --   Net 988.5 ml     Physical Exam  Vitals and nursing note reviewed.   Constitutional:       General: He is not in acute distress.     Appearance: He is normal weight. He is not ill-appearing.   HENT:      Head: Normocephalic and atraumatic.      Right Ear: External ear normal.      Left Ear: External ear normal.      Nose: Nose normal. No congestion.      Mouth/Throat:      Mouth: Mucous membranes are moist.      Pharynx: Oropharynx is clear.   Eyes:      Extraocular Movements: Extraocular movements intact.      Conjunctiva/sclera: Conjunctivae normal.      Pupils: Pupils are equal, round, and reactive to light.   Cardiovascular:      Rate and Rhythm: Normal rate and regular rhythm.      Pulses: Normal pulses.      Heart sounds: Normal heart sounds. No murmur heard.     No friction rub. No gallop.   Pulmonary:      Effort: Pulmonary effort is normal. No respiratory distress.      Breath sounds: Normal breath sounds. No stridor. No wheezing, " rhonchi or rales.   Abdominal:      General: Abdomen is flat. Bowel sounds are normal. There is no distension.      Palpations: Abdomen is soft. There is no mass.      Tenderness: There is no abdominal tenderness.   Musculoskeletal:         General: No swelling.      Cervical back: Normal range of motion. No rigidity.      Comments: RLE and LLE wrapped in bandage.   Skin:     General: Skin is warm and dry.      Coloration: Skin is not jaundiced or pale.   Neurological:      General: No focal deficit present.      Mental Status: He is alert and oriented to person, place, and time. Mental status is at baseline.   Psychiatric:         Behavior: Behavior normal.         Thought Content: Thought content normal.         Judgment: Judgment normal.      Comments: Reports feeling depressed, appropriate affect        Labs:   I have reviewed the following labs below:    Cultures:  Blood cultures [id number]    Recent Labs   Lab 03/15/24  1140 03/21/24  1342 03/22/24  0457   WBC 8.21 10.38 9.43   HGB 10.7* 10.7* 10.2*   HCT 32.8* 32.4* 30.5*    257 244    139 136   K 4.2 4.4 4.1    100 100   CO2 25 30* 26   BUN 21* 21 21   CREATININE 0.85 1.0 1.0   * 232* 271*   CALCIUM 9.1 9.4 9.0   MG  --   --  1.7   PROT 7.1 7.1  --    ALBUMIN 3.5 2.9*  --    BILITOT 0.5 0.4  --    AST 15 6*  --    ALT 12 9*  --    ALKPHOS 78 91  --      Imaging:   I have reviewed the following studies below:    Imaging:   US Lower Extremity Arteries Bilateral   Final Result      1. Hemodynamically significant stenosis in the left mid superficial femoral artery and the left anterior tibial artery.   2. Monophasic waveforms in the left distal popliteal, anterior tibial, and posterior tibial arteries.         Electronically signed by: Roiht Ho   Date:    03/21/2024   Time:    17:07      MRI Foot (Forefoot) Right Without Contrast   Final Result      Recent amputation at the proximal 1st metatarsal and phalangeal ray.      Examination  concerning for septic arthritis and associated adjacent osteomyelitis at the 2nd metatarsophalangeal joint with local fluid and reactive edema of the distal and middle phalanges.      Abnormal edema signal involving the 3rd metatarsophalangeal joint, 4th proximal interphalangeal joint and 5th distal interphalangeal joint concerning for reactive edema or early changes of septic arthritis.         Electronically signed by: Alba Wyatt MD   Date:    03/21/2024   Time:    15:50      X-Ray Foot Complete Right   Final Result   Abnormal      Recent amputation of the base of the 1st metatarsal including the phalangeal ray.      *New subtle lucency and possible fragmentation involving the 2nd metatarsal head concerning for demineralization which could be associated with infection, short-term follow-up advised. *      Unchanged postoperative or posttraumatic appearance of the 5th metatarsal.      This report was flagged in Epic as abnormal.         Electronically signed by: Alba Wyatt MD   Date:    03/21/2024   Time:    14:38         Stacie Mishra MD  [facility] Medicine/Pediatrics, PGY1  03/22/2024 9:42 AM

## 2024-03-22 NOTE — CONSULTS
[facility] - Med Surg  Cardiology  Consult Note    Patient Name: Julien Meléndez  MRN: [id number]  Admission Date: 3/21/2024  Hospital Length of Stay: 1 days  Code Status: Full Code   Attending Provider: Kelsey Stewart MD   Consulting Provider: Shane Gillis DNP  Primary Care Physician: Kelvin Wayne MD  Principal Problem:Critical limb ischemia of right lower extremity    Patient information was obtained from patient, past medical records, ER records, and primary team.     Cardiology-[facility]  Consult performed by: Shane Gillis DNP  Consult ordered by: Lucius Rodarte NP          Subjective:     Chief Complaint:  R foot wound     HPI:   Julien Meléndez is a 83 yr old male with DM, HTN, HLD, CAD s/p PCI dLAD 2019, PAD present to ER with reports of R foot infection. Patient states that over the past 2d R foot wound has worsened.     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 Abnormal edema signal involving the 3rd metatarsophalangeal joint, 4th proximal interphalangeal joint and 5th distal interphalangeal joint concerning for reactive edema or early changes of septic arthritis. Will cont vancomycin and Zosyn and will admit to the [facility] service for further care.     Patient with significant PAD hx. Recent peripheral angio with PTA of L proximal peroneal artery with 3.5x80 mm IVL balloon and distal peroneal/posterior tibial artery with 2.5x40 mm PTA balloon with excellent angiographic results (04/2023 per Dr. Giraldo). Now with ongoing issues to R leg.       No new subjective & objective note has been filed under this hospital service since the last note was generated.    Past Medical History:   Diagnosis Date    Diabetes mellitus     Hiatal hernia     under Dr Saenz' care     Hyperlipidemia     Hypertension     MI (myocardial infarction)     Sleep apnea        Past Surgical History:   Procedure Laterality Date    ABDOMINAL HERNIA REPAIR      ANGIOGRAM, EXTREMITY, BILATERAL Bilateral 4/5/2023    Procedure: ANGIOGRAM, EXTREMITY, BILATERAL;  Surgeon: Michael Giraldo III, MD;  Location: [facility] CATH LAB;  Service: Cardiology;  Laterality: Bilateral;    ANGIOGRAPHY OF LOWER EXTREMITY Left 4/12/2023    Procedure: Angiogram Extremity Unilateral;  Surgeon: Michael Giraldo III, MD;  Location: [facility] CATH LAB;  Service: Cardiology;  Laterality: Left;    AORTOGRAPHY WITH SERIALOGRAPHY Bilateral 4/5/2023    Procedure: AORTOGRAM, WITH SERIALOGRAPHY;  Surgeon: Michael Giraldo III, MD;  Location: [facility] CATH LAB;  Service: Cardiology;  Laterality: Bilateral;    APPLICATION OF WOUND VACUUM-ASSISTED CLOSURE DEVICE Left 3/29/2023    Procedure: APPLICATION, WOUND VAC;  Surgeon: Alona Pierce DPM;  Location: [facility] OR;  Service: Podiatry;  Laterality: Left;    APPLICATION OF WOUND VACUUM-ASSISTED CLOSURE DEVICE Left 4/4/2023    Procedure: APPLICATION, WOUND VAC;  Surgeon: Alona Pierce DPM;  Location: [facility] OR;  Service: Podiatry;  Laterality: Left;    APPLICATION OF WOUND VACUUM-ASSISTED CLOSURE DEVICE Left 4/13/2023    Procedure: APPLICATION, WOUND VAC;  Surgeon: Alona Pierce DPM;  Location: [facility] OR;  Service: Podiatry;  Laterality: Left;    BONE BIOPSY Left 3/29/2023    Procedure: BIOPSY, BONE;  Surgeon: Alona Pierce DPM;  Location: [facility] OR;  Service: Podiatry;  Laterality: Left;  1st met    CARDIAC CATHETERIZATION      3 stents placed    CATARACT EXTRACTION, BILATERAL      CLOSURE DEVICE Left 4/12/2023    Procedure: Placement of Closure Device;  Surgeon: Michael Giraldo III, MD;  Location: [facility] CATH LAB;  Service: Cardiology;  Laterality: Left;    CORONARY ANGIOGRAPHY N/A 10/3/2019    Procedure: ANGIOGRAM, CORONARY ARTERY;  Surgeon: Edwin Azul MD;  Location: [facility] CATH LAB/EP;  Service:  Cardiology;  Laterality: N/A;    DEBRIDEMENT OF FOOT Left 3/29/2023    Procedure: DEBRIDEMENT, FOOT;  Surgeon: Alona Pierce DPM;  [facility]  Service: Podiatry;  Laterality: Left;    DEBRIDEMENT OF FOOT Left 4/4/2023    Procedure: DEBRIDEMENT, FOOT;  Surgeon: Alona Pierce DPM;  [facility];  Service: Podiatry;  Laterality: Left;    DEBRIDEMENT OF FOOT Left 4/13/2023    Procedure: DEBRIDEMENT, FOOT;  Surgeon: Alona Pierce DPM;  [facility];  Service: Podiatry;  Laterality: Left;    ESOPHAGOGASTRODUODENOSCOPY N/A 4/17/2023    Procedure: EGD (ESOPHAGOGASTRODUODENOSCOPY);  Surgeon: Otto Villarreal MD;  Location: [facility] ENDO;  Service: Endoscopy;  Laterality: N/A;    HERNIA REPAIR      inguinal    IVUS (INTRAVASCULAR ULTRASOUND) Left 4/12/2023    Procedure: ULTRASOUND, INTRAVASCULAR;  Surgeon: Michael Giraldo III, MD;  [facility];  Service: Cardiology;  Laterality: Left;    LEFT HEART CATHETERIZATION Left 9/13/2019    Procedure: Left heart cath;  Surgeon: Edwin Azul MD;  Location: [facility] CATH LAB/EP;  Service: Cardiology;  Laterality: Left;    LEFT HEART CATHETERIZATION N/A 10/3/2019    Procedure: Left heart cath;  Surgeon: Edwin Azul MD;  Location: [facility] CATH LAB/EP;  Service: Cardiology;  Laterality: N/A;    OSTEOTOMY Left 6/7/2023    Procedure: OSTEOTOMY;  Surgeon: Alona Pierce DPM;  [facility];  Service: Podiatry;  Laterality: Left;    OSTEOTOMY OF METATARSAL BONE Right 4/4/2023    Procedure: OSTEOTOMY, METATARSAL BONE;  Surgeon: Alona Pierce DPM;  [facility];  Service: Podiatry;  Laterality: Right;    PERCUTANEOUS TRANSLUMINAL ANGIOPLASTY Left 4/12/2023    Procedure: PTA (ANGIOPLASTY, PERCUTANEOUS, TRANSLUMINAL);  Surgeon: Michael Giraldo III, MD;  [facility];  Service: Cardiology;  Laterality: Left;    SURGICAL REMOVAL OF BUNION WITH OSTEOTOMY OF METATARSAL BONE Right 4/4/2023    Procedure: BUNIONECTOMY, WITH METATARSAL OSTEOTOMY;   Surgeon: Alona Pierce DPM;  Location: [facility] OR;  Service: Podiatry;  Laterality: Right;    TOE AMPUTATION Left 4/4/2023    Procedure: AMPUTATION, TOE;  Surgeon: Alona Pierce DPM;  Location: [facility] OR;  Service: Podiatry;  Laterality: Left;  1st ray    TOE AMPUTATION Right 11/10/2023    Procedure: AMPUTATION, TOE;  Surgeon: Diya Leung DPM;  Location: [facility] OR;  Service: Podiatry;  Laterality: Right;    WASHOUT Left 6/7/2023    Procedure: WASHOUT;  Surgeon: Alona Pierce DPM;  Location: [facility] OR;  Service: Podiatry;  Laterality: Left;    WOUND DEBRIDEMENT Left 6/7/2023    Procedure: DEBRIDEMENT, WOUND;  Surgeon: Alona Pierce DPM;  Location: [facility] OR;  Service: Podiatry;  Laterality: Left;       Review of patient's allergies indicates:   Allergen Reactions    Nystatin      Other reaction(s): Unknown       No current facility-administered medications on file prior to encounter.     Current Outpatient Medications on File Prior to Encounter   Medication Sig    amLODIPine (NORVASC) 5 MG tablet Take 1 tablet (5 mg total) by mouth once daily.    atorvastatin (LIPITOR) 40 MG tablet Take 1 tablet (40 mg total) by mouth once daily. (Patient taking differently: Take 40 mg by mouth every evening.)    clopidogreL (PLAVIX) 75 mg tablet Take 1 tablet (75 mg total) by mouth once daily.    ELIQUIS 5 mg Tab Take 5 mg by mouth 2 (two) times daily.    FLUoxetine 40 MG capsule Take 40 mg by mouth once daily.    furosemide (LASIX) 40 MG tablet Take 40 mg by mouth once daily.    HYDROcodone-acetaminophen (NORCO)  mg per tablet Take 1 tablet by mouth every 6 (six) hours as needed for Pain.    isosorbide mononitrate (IMDUR) 30 MG 24 hr tablet Take 1 tablet (30 mg total) by mouth once daily.    LANTUS SOLOSTAR U-100 INSULIN glargine 100 units/mL SubQ pen Inject 15 Units into the skin every evening.    metoprolol succinate (TOPROL-XL) 25 MG 24 hr tablet Take 1 tablet (25 mg total) by mouth once daily.    nitroGLYCERIN (NITROSTAT) 0.4  MG SL tablet Place 0.4 mg under the tongue every 5 (five) minutes as needed for Chest pain.    ondansetron (ZOFRAN-ODT) 8 MG TbDL Take 1 tablet (8 mg total) by mouth 3 (three) times daily as needed (nausea or vomiting).    pantoprazole (PROTONIX) 40 MG tablet Take 1 tablet (40 mg total) by mouth 2 (two) times daily.    ranolazine (RANEXA) 1,000 mg Tb12 Take 1,000 mg by mouth 2 (two) times daily.    SITagliptan-metformin (JANUMET XR) 100-1,000 mg TM24 Take 1 tablet by mouth once daily.    traZODone (DESYREL) 150 MG tablet Take 1 tablet (150 mg total) by mouth every evening.    alcohol swabs PadM Apply 1 each topically as needed (for use with glucose monitoring).    blood sugar diagnostic (TRUE METRIX GLUCOSE TEST STRIP) Strp 1 strip by Misc.(Non-Drug; Combo Route) route 2 (two) times a day.    blood-glucose meter (TRUE METRIX AIR GLUCOSE METER) Misc USE AS DIRECTED    lancets 28 gauge Misc 1 lancet  by Misc.(Non-Drug; Combo Route) route 2 (two) times a day.     Family History       Problem Relation (Age of Onset)    Heart disease Mother, Sister, Brother, Maternal Uncle, Brother, Sister, Maternal Uncle, Maternal Uncle, Maternal Uncle    Stroke Mother          Tobacco Use    Smoking status: Never    Smokeless tobacco: Never   Substance and Sexual Activity    Alcohol use: No    Drug use: No    Sexual activity: Not on file     ROS  Objective:     Vital Signs (Most Recent):  Temp: 97.7 °F (36.5 °C) (03/22/24 1116)  Pulse: 80 (03/22/24 1300)  Resp: 16 (03/22/24 1116)  BP: 123/61 (03/22/24 1116)  SpO2: 99 % (03/22/24 1116) Vital Signs (24h Range):  Temp:  [97.7 °F (36.5 °C)-99.7 °F (37.6 °C)] 97.7 °F (36.5 °C)  Pulse:  [73-84] 80  Resp:  [14-22] 16  SpO2:  [95 %-99 %] 99 %  BP: (110-147)/(55-67) 123/61     Weight: 77.1 kg (169 lb 15.6 oz)  Body mass index is 21.82 kg/m².    SpO2: 99 %         Intake/Output Summary (Last 24 hours) at 3/22/2024 1319  Last data filed at 3/22/2024 0609  Gross per 24 hour   Intake 988.5 ml  "  Output --   Net 988.5 ml       Lines/Drains/Airways       Drain  Duration             Male External Urinary Catheter 03/21/24 2145 Small <1 day              Peripheral Intravenous Line  Duration                  Peripheral IV - Single Lumen 03/21/24 1342 20 G Right Antecubital <1 day                     Physical Exam     Significant Labs: BMP:   Recent Labs   Lab 03/21/24  1342 03/22/24  0457   * 271*    136   K 4.4 4.1    100   CO2 30* 26   BUN 21 21   CREATININE 1.0 1.0   CALCIUM 9.4 9.0   MG  --  1.7   , CMP   Recent Labs   Lab 03/21/24  1342 03/22/24  0457    136   K 4.4 4.1    100   CO2 30* 26   * 271*   BUN 21 21   CREATININE 1.0 1.0   CALCIUM 9.4 9.0   PROT 7.1  --    ALBUMIN 2.9*  --    BILITOT 0.4  --    ALKPHOS 91  --    AST 6*  --    ALT 9*  --    ANIONGAP 9 10   , CBC   Recent Labs   Lab 03/21/24  1342 03/22/24  0457   WBC 10.38 9.43   HGB 10.7* 10.2*   HCT 32.4* 30.5*    244   , INR No results for input(s): "INR", "PROTIME" in the last 48 hours., Lipid Panel No results for input(s): "CHOL", "HDL", "LDLCALC", "TRIG", "CHOLHDL" in the last 48 hours., Troponin No results for input(s): "TROPONINI" in the last 48 hours., and All pertinent lab results from the last 24 hours have been reviewed.    Significant Imaging: CT scan: CT ABDOMEN PELVIS WITH CONTRAST: No results found for this visit on 03/21/24. and CT ABDOMEN PELVIS WITHOUT CONTRAST: No results found for this visit on 03/21/24. and Echocardiogram: 2D echo with color flow doppler:   Results for orders placed or performed during the hospital encounter of 03/23/17   2D Echo w/ Color Flow Doppler   Result Value Ref Range    EF + QEF 55 55 - 65    Diastolic Dysfunction Yes (A)     Mitral Valve Mobility NORMAL     Narrative    Date of Procedure: 03/23/2017        TEST DESCRIPTION   Technical Quality: This is a technically adequate study.     Aorta: The aortic root is normal in size, measuring 3.6 cm at " sinotubular junction.     Left Atrium: The left atrial volume index is normal, measuring 25.53 cc/m2.     Left Ventricle: The left ventricle is normal in size, with an end-diastolic diameter of 2.8 cm, and an end-systolic diameter of 2.1 cm. LV wall thickness is normal, with the septum measuring 2.0 cm and the posterior wall measuring 1.5 cm across. Relative   wall thickness was increased at 1.07, and the LV mass index was 99.0 g/m2 consistent with concentric remodeling. Global left ventricular systolic function appears normal. Visually estimated ejection fraction is 55-60%. The LV Doppler derived stroke   volume equals 74.0 ccs.   Mitral inflow patterns reveal an E:A ratio of 0.7, with a deceleration time of 156 msec., and an IVRT of 106.1 msec., consistent with diastolic dysfunction secondary to relaxation abnormality.     Right Atrium: The right atrium is normal in size, measuring 4.3 cm in length in the apical view.     Right Ventricle: The right ventricle is normal in size measuring 2.6 cm at the base in the apical right ventricle-focused view. Global right ventricular systolic function appears normal.     Aortic Valve:  The aortic valve is normal in structure with normal leaflet mobility. The aortic valve is tri-leaflet in structure.     Mitral Valve:  The mitral valve is mildly sclerotic with normal leaflet mobility. There is mitral annular calcification.     Tricuspid Valve:  The tricuspid valve is normal in structure.     Pulmonary Valve:  The pulmonic valve is not well seen.     IVC: The IVC is not visualized.     Intracavitary: There is no evidence of pericardial effusion, intracavity mass, thrombi, or vegetation.         CONCLUSIONS     1 - Normal left ventricular systolic function (EF 55-60%).     2 - Left ventricular diastolic dysfunction.     3 - Concentric remodeling.     4 - Normal right ventricular systolic function .             This document has been electronically    SIGNED BY: Tyesha Isaac MD  "On: 03/23/2017 15:13    and Transthoracic echo (TTE) complete ([facility]upid Only):   Results for orders placed or performed during the hospital encounter of 11/07/23   Echo   Result Value Ref Range    BSA 2.04 m2    LVIDd 3.72 3.5 - 6.0 cm    LV Systolic Volume 26.30 mL    LV Systolic Volume Index 12.8 mL/m2    LVIDs 2.67 2.1 - 4.0 cm    LV Diastolic Volume 58.98 mL    LV Diastolic Volume Index 28.77 mL/m2    IVS 0.97 0.6 - 1.1 cm    FS 28 28 - 44 %    Left Ventricle Relative Wall Thickness 0.68 cm    Posterior Wall 1.27 (A) 0.6 - 1.1 cm    LV mass 133.91 g    LV Mass Index 65 g/m2    MV Peak E Brody 0.63 m/s    TDI LATERAL 0.06 m/s    TDI SEPTAL 0.08 m/s    E/E' ratio 9.00 m/s    MV Peak A Brody 1.06 m/s    TR Max Brody 0.96 m/s    E/A ratio 0.59     E wave deceleration time 190.59 msec    MV "A" wave duration [id number] msec    LV SEPTAL E/E' RATIO 7.88 m/s    LV LATERAL E/E' RATIO 10.50 m/s    LVOT peak brody 0.85 m/s    Left Ventricular Outflow Tract Mean Velocity 0.69 cm/s    Left Ventricular Outflow Tract Mean Gradient 1.95 mmHg    RV S' 10.78 cm/s    LA size 2.85 cm    Left Atrium Minor Axis 5.94 cm    Left Atrium Major Axis 5.04 cm    LA volume (mod) 48.53 cm3    LA Volume Index (Mod) 23.7 mL/m2    AV mean gradient 3 mmHg    AV peak gradient 5 mmHg    Ao peak brody 1.12 m/s    Ao VTI 23.90 cm    LVOT peak VTI 18.10 cm    AV Velocity Ratio 0.76     AV index (prosthetic) 0.76     MV peak gradient 4 mmHg    MV stenosis pressure 1/2 time 55.27 ms    MV valve area p 1/2 method 3.98 cm2    MV VTI 23.9 cm    Triscuspid Valve Regurgitation Peak Gradient 4 mmHg    PV PEAK VELOCITY 0.65 m/s    PV peak gradient 2 mmHg    Pulmonary Valve Mean Velocity 0.42 m/s    Mean e' 0.07 m/s    ZLVIDS -2.72     ZLVIDD -5.03     LA Volume Index 23.2 mL/m2    LA volume 47.56 cm3    LA WIDTH 3.6 cm    EF 50 %    Narrative      Left Ventricle: There is low normal systolic function with a visually   estimated ejection fraction of 50 - 55%. " Ejection fraction by visual   approximation is 50%.    Right Ventricle: Normal right ventricular cavity size. Wall thickness   is normal. Right ventricle wall motion  is normal. Systolic function is   normal.    Aortic Valve: There is mild aortic valve sclerosis.    Mitral Valve: There is moderate mitral annular calcification present.    Pericardium: There is a small effusion.    The study was difficult due to patient's body habitus and poor   endocardial visualization.    Valves are not well visualized           Assessment and Plan:     * Critical limb ischemia of right lower extremity  Periph Cath 4/5/2023    There was severe infrapopliteal disease bilaterally.    The Ost L ERNIE to Prox L ERNIE lesion was 85% stenosed.    The Prox L Peroneal to Dist L Peroneal lesion was 100% stenosed.    The Ost R ERNIE to Prox R ERNIE lesion was 100% stenosed.    The Ost R PTA to Prox R PTA lesion was 100% stenosed.    The estimated blood loss was <50 mL.    Plan for staged PTA of bilateral lower extremities due to critical limb threatening ishcemia.    -known significant PAD as above  -podiatry, ortho, infectious disease consulted; appreciate recs  -continue lovenox, plavix, asa for now  -will plan for peripheral angio Monday     PAD (peripheral artery disease)  Peripheral Angio 4/11/2023    There was severe left infrapopliteal disease status post successful PTA of proximal peroneal artery with 3.5x80 mm IVL balloon and distal peroneal/posterior tibial artery with 2.5x40 mm PTA balloon with excellent angiographic results.    The Ost L ERNIE to Dist L ERNIE lesion was 100% stenosed.    The Ost L PTA to Dist L PTA lesion was 100% stenosed.    The Dist L Peroneal lesion was 99% stenosed with 10% stenosis post-intervention.    The Prox L Peroneal lesion was 99% stenosed with 10% stenosis post-intervention.    The estimated blood loss was <50 mL.    -known significant PAD  -recent periph angio s/p PPI as above  -R leg recs  above      Congestive heart failure, unspecified HF chronicity, unspecified heart failure type    Echo 11/11/2023    Interpretation Summary    Left Ventricle: There is low normal systolic function with a visually estimated ejection fraction of 50 - 55%. Ejection fraction by visual approximation is 50%.    Right Ventricle: Normal right ventricular cavity size. Wall thickness is normal. Right ventricle wall motion  is normal. Systolic function is normal.    Aortic Valve: There is mild aortic valve sclerosis.    Mitral Valve: There is moderate mitral annular calcification present.    Pericardium: There is a small effusion.    The study was difficult due to patient's body habitus and poor endocardial visualization.    Valves are not well visualized    -echo reviewed as above  -grossly euvolemic on exam  -continue PO lasix, BB    Atrial fibrillation  -[id number] Score: 4.   -continue BB, lovenox  -resume Eliquis upon D/C           VTE Risk Mitigation (From admission, onward)           Ordered     enoxaparin injection 80 mg  Every 12 hours         03/22/24 0015     IP VTE HIGH RISK PATIENT  Once         03/21/24 1502     Place sequential compression device  Until discontinued         03/21/24 1502                    Thank you for your consult. I will follow-up with patient. Please contact us if you have any additional questions.    Shane Gillis, AJ  Cardiology   [place] [facility]

## 2024-03-22 NOTE — ASSESSMENT & PLAN NOTE
Periph Cath 4/5/2023    There was severe infrapopliteal disease bilaterally.    The Ost L ERNIE to Prox L ERNIE lesion was 85% stenosed.    The Prox L Peroneal to Dist L Peroneal lesion was 100% stenosed.    The Ost R ERNIE to Prox R ERNIE lesion was 100% stenosed.    The Ost R PTA to Prox R PTA lesion was 100% stenosed.    The estimated blood loss was <50 mL.    Plan for staged PTA of bilateral lower extremities due to critical limb threatening ishcemia.    -known significant PAD as above  -podiatry, ortho, infectious disease consulted; appreciate recs  -continue lovenox, plavix, asa for now  -will plan for peripheral angio Monday

## 2024-03-22 NOTE — H&P
[facility]  History & Physical    Patient Name: Julien Meléndez  MRN: [id number]  Patient Class: IP- Inpatient  Admission Date: 3/21/2024  Attending Physician: Kelsey Stewart MD   Primary Care Provider: Kelvin Wayne MD         Patient information was obtained from patient and past medical records.     Subjective:     Principal Problem:Critical limb ischemia of right lower extremity    Chief Complaint:   Chief Complaint   Patient presents with    Wound Check     Pt has wound to right foot that is being treated by wound care. Pt was told to come to ER for worsening of wound and possible MRI. Pt has no complaints on arrival. Wound was dressed today by [facility]. Pt arrived from home via [facility].         HPI: Julien Meléndez is a 83 yr old male presents to the ER with reports of right foot infection. Pts family states over the past 2 days, they noticed an increase in erythema and drainage to the site. Denies fever. Denies taking oral antbx for this specific wound. Pt states he was advised to come to the ER for an evaluation from wound care. Wound has been dressed by HH. 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 Abnormal edema signal involving the 3rd metatarsophalangeal joint, 4th proximal interphalangeal joint and 5th distal interphalangeal joint concerning for reactive edema or early changes of septic arthritis. Will cont vancomycin and Zosyn and will admit to the [facility] for further care.      Past Medical History:   Diagnosis Date    Diabetes mellitus     Hiatal hernia     under Dr Saenz' care    Hyperlipidemia     Hypertension     MI (myocardial infarction)     Sleep apnea         Past Surgical History:   Procedure Laterality Date    ABDOMINAL HERNIA REPAIR      ANGIOGRAM, EXTREMITY, BILATERAL Bilateral 4/5/2023    Procedure: ANGIOGRAM, EXTREMITY, BILATERAL;  Surgeon: Michael Giraldo III, MD;  Location: [facility] CATH LAB;  Service: Cardiology;  Laterality: Bilateral;    ANGIOGRAPHY OF LOWER EXTREMITY Left 4/12/2023    Procedure: Angiogram Extremity Unilateral;  Surgeon: Michael Giraldo III, MD;  Location: [facility] CATH LAB;  Service: Cardiology;  Laterality: Left;    AORTOGRAPHY WITH SERIALOGRAPHY Bilateral 4/5/2023    Procedure: AORTOGRAM, WITH SERIALOGRAPHY;  Surgeon: Michael Giraldo III, MD;  Location: [facility] CATH LAB;  Service: Cardiology;  Laterality: Bilateral;    APPLICATION OF WOUND VACUUM-ASSISTED CLOSURE DEVICE Left 3/29/2023    Procedure: APPLICATION, WOUND VAC;  Surgeon: Alona Pierce DPM;  Location: [facility] OR;  Service: Podiatry;  Laterality: Left;    APPLICATION OF WOUND VACUUM-ASSISTED CLOSURE DEVICE Left 4/4/2023    Procedure: APPLICATION, WOUND VAC;  Surgeon: Alona Pierce DPM;  Location: [facility] OR;  Service: Podiatry;  Laterality: Left;    APPLICATION OF WOUND VACUUM-ASSISTED CLOSURE DEVICE Left 4/13/2023    Procedure: APPLICATION, WOUND VAC;  Surgeon: Alona Pierce DPM;  Location: [facility] OR;  Service: Podiatry;  Laterality: Left;    BONE BIOPSY Left 3/29/2023    Procedure: BIOPSY, BONE;  Surgeon: Alona Pierce DPM;  Location: [facility] OR;  Service: Podiatry;  Laterality: Left;  1st met    CARDIAC CATHETERIZATION      3 stents placed    CATARACT EXTRACTION, BILATERAL      CLOSURE DEVICE Left 4/12/2023    Procedure: Placement of Closure Device;  Surgeon: Michael Giraldo III, MD;  Location: [facility] CATH LAB;  Service: Cardiology;  Laterality: Left;    CORONARY ANGIOGRAPHY N/A 10/3/2019    Procedure: ANGIOGRAM, CORONARY ARTERY;  Surgeon: Edwin Azul MD;  Location: [facility] CATH LAB/EP;  Service: Cardiology;  Laterality: N/A;    DEBRIDEMENT OF FOOT Left 3/29/2023    Procedure:  DEBRIDEMENT, FOOT;  Surgeon: Alona Pierce DPM;  Location: [facility] OR;  Service: Podiatry;  Laterality: Left;    DEBRIDEMENT OF FOOT Left 4/4/2023    Procedure: DEBRIDEMENT, FOOT;  Surgeon: Alona Pierce DPM;  Location: [facility] OR;  Service: Podiatry;  Laterality: Left;    DEBRIDEMENT OF FOOT Left 4/13/2023    Procedure: DEBRIDEMENT, FOOT;  Surgeon: Alona Pierce DPM;  Location: [facility] OR;  Service: Podiatry;  Laterality: Left;    ESOPHAGOGASTRODUODENOSCOPY N/A 4/17/2023    Procedure: EGD (ESOPHAGOGASTRODUODENOSCOPY);  Surgeon: Otto Villarreal MD;  Location: [facility] ENDO;  Service: Endoscopy;  Laterality: N/A;    HERNIA REPAIR      inguinal    IVUS (INTRAVASCULAR ULTRASOUND) Left 4/12/2023    Procedure: ULTRASOUND, INTRAVASCULAR;  Surgeon: Michael Giraldo III, MD;  Location: [facility] CATH LAB;  Service: Cardiology;  Laterality: Left;    LEFT HEART CATHETERIZATION Left 9/13/2019    Procedure: Left heart cath;  Surgeon: Edwin Azul MD;  Location: [facility] CATH LAB/EP;  Service: Cardiology;  Laterality: Left;    LEFT HEART CATHETERIZATION N/A 10/3/2019    Procedure: Left heart cath;  Surgeon: Edwin Azul MD;  Location: [facility] CATH LAB/EP;  Service: Cardiology;  Laterality: N/A;    OSTEOTOMY Left 6/7/2023    Procedure: OSTEOTOMY;  Surgeon: Alona Pierce DPM;  Location: [facility] OR;  Service: Podiatry;  Laterality: Left;    OSTEOTOMY OF METATARSAL BONE Right 4/4/2023    Procedure: OSTEOTOMY, METATARSAL BONE;  Surgeon: Alona Pierce DPM;  Location: [facility] OR;  Service: Podiatry;  Laterality: Right;    PERCUTANEOUS TRANSLUMINAL ANGIOPLASTY Left 4/12/2023    Procedure: PTA (ANGIOPLASTY, PERCUTANEOUS, TRANSLUMINAL);  Surgeon: Michael Giraldo III, MD;  Location: [facility] CATH LAB;  Service: Cardiology;  Laterality: Left;    SURGICAL REMOVAL OF BUNION WITH OSTEOTOMY OF METATARSAL BONE Right 4/4/2023    Procedure: BUNIONECTOMY, WITH METATARSAL OSTEOTOMY;  Surgeon: Alona Pierce DPM;  Location: [facility] OR;  Service: Podiatry;  Laterality: Right;     TOE AMPUTATION Left 4/4/2023    Procedure: AMPUTATION, TOE;  Surgeon: Alona Pierce DPM;  Location: [facility] OR;  Service: Podiatry;  Laterality: Left;  1st ray    TOE AMPUTATION Right 11/10/2023    Procedure: AMPUTATION, TOE;  Surgeon: Diya Leung DPM;  Location: [facility] OR;  Service: Podiatry;  Laterality: Right;    WASHOUT Left 6/7/2023    Procedure: WASHOUT;  Surgeon: Alona Pierce DPM;  Location: [facility] OR;  Service: Podiatry;  Laterality: Left;    WOUND DEBRIDEMENT Left 6/7/2023    Procedure: DEBRIDEMENT, WOUND;  Surgeon: Alona Pierce DPM;  Location: [facility] OR;  Service: Podiatry;  Laterality: Left;       Review of patient's allergies indicates:   Allergen Reactions    Nystatin      Other reaction(s): Unknown       No current facility-administered medications on file prior to encounter.     Current Outpatient Medications on File Prior to Encounter   Medication Sig    amLODIPine (NORVASC) 5 MG tablet Take 1 tablet (5 mg total) by mouth once daily.    atorvastatin (LIPITOR) 40 MG tablet Take 1 tablet (40 mg total) by mouth once daily. (Patient taking differently: Take 40 mg by mouth every evening.)    clopidogreL (PLAVIX) 75 mg tablet Take 1 tablet (75 mg total) by mouth once daily.    ELIQUIS 5 mg Tab Take 5 mg by mouth 2 (two) times daily.    FLUoxetine 40 MG capsule Take 40 mg by mouth once daily.    furosemide (LASIX) 40 MG tablet Take 40 mg by mouth once daily.    HYDROcodone-acetaminophen (NORCO)  mg per tablet Take 1 tablet by mouth every 6 (six) hours as needed for Pain.    isosorbide mononitrate (IMDUR) 30 MG 24 hr tablet Take 1 tablet (30 mg total) by mouth once daily.    LANTUS SOLOSTAR U-100 INSULIN glargine 100 units/mL SubQ pen Inject 15 Units into the skin every evening.    metoprolol succinate (TOPROL-XL) 25 MG 24 hr tablet Take 1 tablet (25 mg total) by mouth once daily.    nitroGLYCERIN (NITROSTAT) 0.4 MG SL tablet Place 0.4 mg under the tongue every 5 (five) minutes as needed for Chest  pain.    ondansetron (ZOFRAN-ODT) 8 MG TbDL Take 1 tablet (8 mg total) by mouth 3 (three) times daily as needed (nausea or vomiting).    pantoprazole (PROTONIX) 40 MG tablet Take 1 tablet (40 mg total) by mouth 2 (two) times daily.    ranolazine (RANEXA) 1,000 mg Tb12 Take 1,000 mg by mouth 2 (two) times daily.    SITagliptan-metformin (JANUMET XR) 100-1,000 mg TM24 Take 1 tablet by mouth once daily.    traZODone (DESYREL) 150 MG tablet Take 1 tablet (150 mg total) by mouth every evening.    alcohol swabs PadM Apply 1 each topically as needed (for use with glucose monitoring).    blood sugar diagnostic (TRUE METRIX GLUCOSE TEST STRIP) Strp 1 strip by Misc.(Non-Drug; Combo Route) route 2 (two) times a day.    blood-glucose meter (TRUE METRIX AIR GLUCOSE METER) Misc USE AS DIRECTED    lancets 28 gauge Misc 1 lancet  by Misc.(Non-Drug; Combo Route) route 2 (two) times a day.    [DISCONTINUED] aspirin 81 MG Chew Take 1 tablet (81 mg total) by mouth once daily.    [DISCONTINUED] FARXIGA 10 mg tablet     [DISCONTINUED] ferrous sulfate (FEOSOL) 325 mg (65 mg iron) Tab tablet Take 325 mg by mouth daily with breakfast.    [DISCONTINUED] furosemide (LASIX) 20 MG tablet Take 1 tablet (20 mg total) by mouth once daily.    [DISCONTINUED] mupirocin (BACTROBAN) 2 % ointment Apply topically 3 (three) times daily. Apply to nasal area    [DISCONTINUED] nystatin (MYCOSTATIN) powder SMARTSIG:Packet(s) Topical Daily    [DISCONTINUED] sucralfate (CARAFATE) 1 gram tablet Take 1 tablet (1 g total) by mouth 4 (four) times daily before meals and nightly.     Family History       Problem Relation (Age of Onset)    Heart disease Mother, Sister, Brother, Maternal Uncle, Brother, Sister, Maternal Uncle, Maternal Uncle, Maternal Uncle    Stroke Mother          Tobacco Use    Smoking status: Never    Smokeless tobacco: Never   Substance and Sexual Activity    Alcohol use: No    Drug use: No    Sexual activity: Not on file     Review of Systems    Constitutional:  Positive for fatigue. Negative for fever.   Genitourinary:  Negative for difficulty urinating and hematuria.   Skin:  Positive for wound (right foot).     Objective:     Vital Signs (Most Recent):  Temp: 97.9 °F (36.6 °C) (03/21/24 2033)  Pulse: 76 (03/21/24 2033)  Resp: 16 (03/21/24 2033)  BP: (!) 144/64 (03/21/24 2033)  SpO2: 98 % (03/21/24 2033) Vital Signs (24h Range):  Temp:  [97.9 °F (36.6 °C)-98.4 °F (36.9 °C)] 97.9 °F (36.6 °C)  Pulse:  [73-76] 76  Resp:  [16-22] 16  SpO2:  [97 %-99 %] 98 %  BP: (110-147)/(57-67) 144/64     Weight: 77.2 kg (170 lb 3.1 oz)  Body mass index is 21.85 kg/m².     Physical Exam  Vitals and nursing note reviewed.   Constitutional:       Appearance: He is ill-appearing.   HENT:      Mouth/Throat:      Mouth: Mucous membranes are moist.   Eyes:      Extraocular Movements: Extraocular movements intact.   Cardiovascular:      Rate and Rhythm: Normal rate and regular rhythm.   Abdominal:      Palpations: Abdomen is soft.   Musculoskeletal:         General: Tenderness (right foot) present.      Cervical back: Normal range of motion and neck supple.   Skin:     Comments: R foot wound with erythema and purulent drainage    Neurological:      Mental Status: He is alert and oriented to person, place, and time.                Significant Labs: All pertinent labs within the past 24 hours have been reviewed.    Significant Imaging: I have reviewed all pertinent imaging results/findings within the past 24 hours.  Assessment/Plan:     * Critical limb ischemia of right lower extremity  PAD (peripheral artery disease)     -we will continue aspirin statin as well as clopidogrel  -consulted Podiatry, Cardiology, and infectious  -IV antibiotics ordered:  IV Zosyn and IV vancomycin for now  -blood cultures are pending  -bilateral lower extremity arterial ultrasound ordered-1. Hemodynamically significant stenosis in the left mid superficial femoral artery and the left anterior tibial  artery.   2. Monophasic waveforms in the left distal popliteal, anterior tibial, and posterior tibial arteries.       Osteomyelitis of right foot  -MRI with OM noted to the right foot  -blood cultures pending  -infectious disease consulted  -we will continue vanc and Zosyn for now        Septic joint right foot  -will consult Orthopedics      Diabetic foot ulcer-right  Patient's FSGs are uncontrolled due to hyperglycemia on current medication regimen.  Last A1c reviewed-   Lab Results   Component Value Date    HGBA1C 8.3 (H) 03/15/2024     Most recent fingerstick glucose reviewed-   Recent Labs   Lab 03/21/24  1715 03/21/24  2210   POCTGLUCOSE 208* 254*     Current correctional scale  Low  Maintain anti-hyperglycemic dose as follows-   Antihyperglycemics (From admission, onward)      Start     Stop Route Frequency Ordered    03/21/24 2100  insulin detemir U-100 (Levemir) pen 10 Units         -- SubQ Nightly 03/21/24 1509    03/21/24 1600  insulin aspart U-100 pen 0-5 Units         -- SubQ Before meals & nightly PRN 03/21/24 1502          Hold Oral hypoglycemics while patient is in the hospital.    Dementia  -history noted of dementia  -patient is currently awake and alert  -patient's family at bedside to assist with history  -monitoring    Depressive disorder  Patient has recurrent depression which is unknown and is currently controlled. Will Continue anti-depressant medications. We will not consult psychiatry at this time. Patient does not display psychosis at this time. Continue to monitor closely and adjust plan of care as needed.        Congestive heart failure, unspecified HF chronicity, unspecified heart failure type  -euvolemic on exam  Echo    Interpretation Summary    Left Ventricle: There is low normal systolic function with a visually estimated ejection fraction of 50 - 55%. Ejection fraction by visual approximation is 50%.    Right Ventricle: Normal right ventricular cavity size. Wall thickness is  "normal. Right ventricle wall motion  is normal. Systolic function is normal.    Aortic Valve: There is mild aortic valve sclerosis.    Mitral Valve: There is moderate mitral annular calcification present.    Pericardium: There is a small effusion.    The study was difficult due to patient's body habitus and poor endocardial visualization.    Valves are not well visualized  .CHF. Last BNP reviewed- and noted below No results for input(s): "BNP", "BNPTRIAGEBLO" in the last 168 hours.  -continue to monitor    Atrial fibrillation  -history of Afib  -holding Eliquis at this time in case of any procedure  -ordered full-dose Lovenox for now  -continue cardiac tele monitoring    Stage 1 chronic kidney disease  Creatine stable for now. BMP reviewed- noted Estimated Creatinine Clearance: 61.1 mL/min (based on SCr of 1 mg/dL). according to latest data. 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  Monitor UOP and serial BMP and adjust therapy as needed. Renally dose meds. Avoid nephrotoxic medications and procedures.    KELSEY (obstructive sleep apnea)  - CPAP at night p.r.n.      Mixed hyperlipidemia  -continue statin at home dose      Aggravate  VTE Risk Mitigation (From admission, onward)           Ordered     enoxaparin injection 90 mg  Every 12 hours         03/21/24 1521     IP VTE HIGH RISK PATIENT  Once         03/21/24 1502     Place sequential compression device  Until discontinued         03/21/24 1502                               Pharmacokinetic Initial Assessment: IV Vancomycin    Assessment/Plan:    Initiate intravenous vancomycin with loading dose of 2000 mg once followed by a maintenance dose of vancomycin 2000 mg IV every 24 hours  Desired empiric serum trough concentration is 10 to 20 mcg/mL  Draw vancomycin trough level 60 min prior to third dose on 03/23/24 at approximately 1700  Pharmacy will continue to follow and monitor vancomycin.      Please contact pharmacy at extension [phone] with " "any questions regarding this assessment.     Thank you for the consult,   Nany Zuniga       Patient brief summary:  Julien Meléndez is a 83 y.o. male initiated on antimicrobial therapy with IV Vancomycin for treatment of suspected bone/joint infection    Drug Allergies:   Review of patient's allergies indicates:   Allergen Reactions    Nystatin      Other reaction(s): Unknown       Actual Body Weight:   89.8 kg    Renal Function:   Estimated Creatinine Clearance: 65.1 mL/min (based on SCr of 1 mg/dL).,     Dialysis Method (if applicable):  N/A    CBC (last 72 hours):  Recent Labs   Lab Result Units 03/21/24  1342   WBC K/uL 10.38   Hemoglobin g/dL 10.7*   Hematocrit % 32.4*   Platelets K/uL 257   Gran % % 69.9   Lymph % % 19.8   Mono % % 8.1   Eosinophil % % 1.3   Basophil % % 0.3   Differential Method  Automated       Metabolic Panel (last 72 hours):  Recent Labs   Lab Result Units 03/21/24  1342   Sodium mmol/L 139   Potassium mmol/L 4.4   Chloride mmol/L 100   CO2 mmol/L 30*   Glucose mg/dL 232*   BUN mg/dL 21   Creatinine mg/dL 1.0   Albumin g/dL 2.9*   Total Bilirubin mg/dL 0.4   Alkaline Phosphatase U/L 91   AST U/L 6*   ALT U/L 9*       Drug levels (last 3 results):  No results for input(s): "VANCOMYCINRA", "VANCORANDOM", "VANCOMYCINPE", "VANCOPEAK", "VANCOMYCINTR", "VANCOTROUGH" in the last 72 hours.    Microbiologic Results:  Microbiology Results (last 7 days)       Procedure Component Value Units Date/Time    Blood culture [[id number]] Collected: 03/21/24 1758    Order Status: Sent Specimen: Blood     Blood culture [[id number]] Collected: 03/21/24 1757    Order Status: Sent Specimen: Blood     Blood culture #1 **CANNOT BE ORDERED STAT** [[id number]] Collected: 03/21/24 1352    Order Status: Sent Specimen: Blood from Wrist, Right     Blood culture #2 **CANNOT BE ORDERED STAT** [[id number]] Collected: 03/21/24 1343    Order Status: Sent Specimen: Blood from Peripheral, Antecubital, Right       "         Lucius Rodarte NP  Department of Hospital Medicine  [facility]

## 2024-03-22 NOTE — ASSESSMENT & PLAN NOTE
Creatine stable for now. BMP reviewed- noted Estimated Creatinine Clearance: 61.1 mL/min (based on SCr of 1 mg/dL). according to latest data. 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  Monitor UOP and serial BMP and adjust therapy as needed. Renally dose meds. Avoid nephrotoxic medications and procedures.

## 2024-03-22 NOTE — ASSESSMENT & PLAN NOTE
"-euvolemic on exam  Echo    Interpretation Summary    Left Ventricle: There is low normal systolic function with a visually estimated ejection fraction of 50 - 55%. Ejection fraction by visual approximation is 50%.    Right Ventricle: Normal right ventricular cavity size. Wall thickness is normal. Right ventricle wall motion  is normal. Systolic function is normal.    Aortic Valve: There is mild aortic valve sclerosis.    Mitral Valve: There is moderate mitral annular calcification present.    Pericardium: There is a small effusion.    The study was difficult due to patient's body habitus and poor endocardial visualization.    Valves are not well visualized  .CHF. Last BNP reviewed- and noted below No results for input(s): "BNP", "BNPTRIAGEBLO" in the last 168 hours.  -continue to monitor  "

## 2024-03-22 NOTE — ASSESSMENT & PLAN NOTE
-history noted of dementia  -patient is currently awake and alert  -patient's family at bedside to assist with history  -monitoring

## 2024-03-22 NOTE — ASSESSMENT & PLAN NOTE
Echo 11/11/2023    Interpretation Summary    Left Ventricle: There is low normal systolic function with a visually estimated ejection fraction of 50 - 55%. Ejection fraction by visual approximation is 50%.    Right Ventricle: Normal right ventricular cavity size. Wall thickness is normal. Right ventricle wall motion  is normal. Systolic function is normal.    Aortic Valve: There is mild aortic valve sclerosis.    Mitral Valve: There is moderate mitral annular calcification present.    Pericardium: There is a small effusion.    The study was difficult due to patient's body habitus and poor endocardial visualization.    Valves are not well visualized    -echo reviewed as above  -grossly euvolemic on exam  -continue PO lasix, BB

## 2024-03-22 NOTE — ASSESSMENT & PLAN NOTE
Julien Meléndez is a 83 y.o. male with IDSA moderate infection.  X-ray and MRI shows osteomyelitis of the 2nd MTPJ, with possible septic arthritis of the 2nd MTPJ and digits 3 through 5.  WBC count within normal limits, vital signs are stable.  Known critical limb ischemia of right lower extremity.  Interventional cardiology consulted and planning peripheral angiogram of the bilateral lower extremity on Monday 3/25.     - Debrided medial forefoot wounds, drained 2nd MTPJ with copious amounts of purulent drainage.  Patient tolerated procedure well.  Cultured 2nd MTPJ, and cultured 5th digit medial wound.  Follow up results.  - interventional cardiology consulted, angiogram planned Monday 3/25.  Appreciate recs.  - discussed at length surgical versus conservative treatment options with patient.  Discussed risks and benefits of both.  After lengthy discussion, patient is amenable to transmetatarsal amputation of the right foot.  Family at bedside agrees.  Planning to amputate after angiogram and intervention.  - dressed wound as follows:  Painted with Betadine, then dressed with gauze, Kerlix, and Ace wrap.  Wound care orders to follow.  - continue wearing heel offloading boots.  - if patient has white blood cell count spike and unstable vitals, please contact Podiatry immediately for immediate surgical intervention.  If not, for best patient results, opting for amputation after vascular intervention.

## 2024-03-22 NOTE — ASSESSMENT & PLAN NOTE
-MRI with OM noted to the right foot  -blood cultures ngtd  -follow wound cx  -infectious disease consulted  -we will continue vanc and Zosyn for now

## 2024-03-22 NOTE — NURSING
Ox2. Meds admin per MAR. Waffle placed on bed. Voids per male PW. Tolerated diet. Triand to sacrum and groin. Turned Q2. Safety m/t.

## 2024-03-22 NOTE — PROGRESS NOTES
"[facility]           Pharmacy  Admission Medication Reconciliation     Based on information gathered for medication list, you may go to "Admission" then "Reconcile Home Medications" tabs to review and/or act upon those items.     The home medication list has been updated by the Pharmacy department.   Please read ALL comments highlighted in red.   Please address this information as you see fit.    Feel free to contact us if you have any questions or require assistance.    Home medication list has been compared to current inpatient medications. Please review the following discrepancies noted below:      Patient reports STILL TAKING the following medication(s) which was not ordered upon admit  Amlodipine    Patient reports taking a DIFFERENT dose,route, or frequency is listed  than how ordered upon admit  Furosemide  Feel free to contact us if you have any questions or require assistance.    Holly Barraza, PharmD  [phone]        "

## 2024-03-22 NOTE — ASSESSMENT & PLAN NOTE
PAD (peripheral artery disease)     -we will continue aspirin statin as well as clopidogrel  -consulted Podiatry, Cardiology, and infectious  -IV antibiotics ordered:  IV Zosyn and IV vancomycin for now  -blood cultures are pending  -bilateral lower extremity arterial ultrasound ordered-1. Hemodynamically significant stenosis in the left mid superficial femoral artery and the left anterior tibial artery.   2. Monophasic waveforms in the left distal popliteal, anterior tibial, and posterior tibial arteries.   -Cardiology planning angio on Monday

## 2024-03-22 NOTE — SUBJECTIVE & OBJECTIVE
Past Medical History:   Diagnosis Date    Diabetes mellitus     Hiatal hernia     under Dr Saenz' care    Hyperlipidemia     Hypertension     MI (myocardial infarction)     Sleep apnea        Past Surgical History:   Procedure Laterality Date    ABDOMINAL HERNIA REPAIR      ANGIOGRAM, EXTREMITY, BILATERAL Bilateral 4/5/2023    Procedure: ANGIOGRAM, EXTREMITY, BILATERAL;  Surgeon: Michael Giraldo III, MD;  Location: [facility] CATH LAB;  Service: Cardiology;  Laterality: Bilateral;    ANGIOGRAPHY OF LOWER EXTREMITY Left 4/12/2023    Procedure: Angiogram Extremity Unilateral;  Surgeon: Michael Giraldo III, MD;  Location: [facility] CATH LAB;  Service: Cardiology;  Laterality: Left;    AORTOGRAPHY WITH SERIALOGRAPHY Bilateral 4/5/2023    Procedure: AORTOGRAM, WITH SERIALOGRAPHY;  Surgeon: Michael Giraldo III, MD;  Location: [facility] CATH LAB;  Service: Cardiology;  Laterality: Bilateral;    APPLICATION OF WOUND VACUUM-ASSISTED CLOSURE DEVICE Left 3/29/2023    Procedure: APPLICATION, WOUND VAC;  Surgeon: Alona Pierce DPM;  Location: [facility] OR;  Service: Podiatry;  Laterality: Left;    APPLICATION OF WOUND VACUUM-ASSISTED CLOSURE DEVICE Left 4/4/2023    Procedure: APPLICATION, WOUND VAC;  Surgeon: Alona Pierce DPM;  Location: [facility] OR;  Service: Podiatry;  Laterality: Left;    APPLICATION OF WOUND VACUUM-ASSISTED CLOSURE DEVICE Left 4/13/2023    Procedure: APPLICATION, WOUND VAC;  Surgeon: Alona Pierce DPM;  Location: [facility] OR;  Service: Podiatry;  Laterality: Left;    BONE BIOPSY Left 3/29/2023    Procedure: BIOPSY, BONE;  Surgeon: Alona Pierce DPM;  Location: [facility] OR;  Service: Podiatry;  Laterality: Left;  1st met    CARDIAC CATHETERIZATION      3 stents placed    CATARACT EXTRACTION, BILATERAL      CLOSURE DEVICE Left 4/12/2023    Procedure: Placement of Closure Device;  Surgeon: Michael Giraldo III, MD;  Location: [facility] CATH LAB;  Service: Cardiology;  Laterality: Left;    CORONARY ANGIOGRAPHY N/A 10/3/2019     Procedure: ANGIOGRAM, CORONARY ARTERY;  Surgeon: Edwin Azul MD;  Location: [facility] CATH LAB/EP;  Service: Cardiology;  Laterality: N/A;    DEBRIDEMENT OF FOOT Left 3/29/2023    Procedure: DEBRIDEMENT, FOOT;  Surgeon: Alona Pierce DPM;  Location: [facility] OR;  Service: Podiatry;  Laterality: Left;    DEBRIDEMENT OF FOOT Left 4/4/2023    Procedure: DEBRIDEMENT, FOOT;  Surgeon: Alona Pierce DPM;  Location: [facility] OR;  Service: Podiatry;  Laterality: Left;    DEBRIDEMENT OF FOOT Left 4/13/2023    Procedure: DEBRIDEMENT, FOOT;  Surgeon: Alona Pierce DPM;  Location: [facility] OR;  Service: Podiatry;  Laterality: Left;    ESOPHAGOGASTRODUODENOSCOPY N/A 4/17/2023    Procedure: EGD (ESOPHAGOGASTRODUODENOSCOPY);  Surgeon: Otto Villarreal MD;  Location: [facility];  Service: Endoscopy;  Laterality: N/A;    HERNIA REPAIR      inguinal    IVUS (INTRAVASCULAR ULTRASOUND) Left 4/12/2023    Procedure: ULTRASOUND, INTRAVASCULAR;  Surgeon: Michael Giraldo III, MD;  Location: [facility] CATH LAB;  Service: Cardiology;  Laterality: Left;    LEFT HEART CATHETERIZATION Left 9/13/2019    Procedure: Left heart cath;  Surgeon: Edwin Azul MD;  Location: [facility] CATH LAB/EP;  Service: Cardiology;  Laterality: Left;    LEFT HEART CATHETERIZATION N/A 10/3/2019    Procedure: Left heart cath;  Surgeon: Edwin Azul MD;  Location: [facility] CATH LAB/EP;  Service: Cardiology;  Laterality: N/A;    OSTEOTOMY Left 6/7/2023    Procedure: OSTEOTOMY;  Surgeon: Alona Pierce DPM;  Location: [facility] OR;  Service: Podiatry;  Laterality: Left;    OSTEOTOMY OF METATARSAL BONE Right 4/4/2023    Procedure: OSTEOTOMY, METATARSAL BONE;  Surgeon: Alona Pierce DPM;  Location: [facility] OR;  Service: Podiatry;  Laterality: Right;    PERCUTANEOUS TRANSLUMINAL ANGIOPLASTY Left 4/12/2023    Procedure: PTA (ANGIOPLASTY, PERCUTANEOUS, TRANSLUMINAL);  Surgeon: Michael Giraldo III, MD;  Location: [facility] CATH LAB;  Service: Cardiology;  Laterality: Left;    SURGICAL REMOVAL OF BUNION WITH  OSTEOTOMY OF METATARSAL BONE Right 4/4/2023    Procedure: BUNIONECTOMY, WITH METATARSAL OSTEOTOMY;  Surgeon: Alona Pierce DPM;  Location: [facility] OR;  Service: Podiatry;  Laterality: Right;    TOE AMPUTATION Left 4/4/2023    Procedure: AMPUTATION, TOE;  Surgeon: Alona Pierce DPM;  Location: [facility] OR;  Service: Podiatry;  Laterality: Left;  1st ray    TOE AMPUTATION Right 11/10/2023    Procedure: AMPUTATION, TOE;  Surgeon: Diya Leung DPM;  Location: [facility] OR;  Service: Podiatry;  Laterality: Right;    WASHOUT Left 6/7/2023    Procedure: WASHOUT;  Surgeon: Alona Pierce DPM;  Location: [facility] OR;  Service: Podiatry;  Laterality: Left;    WOUND DEBRIDEMENT Left 6/7/2023    Procedure: DEBRIDEMENT, WOUND;  Surgeon: Alona Pierce DPM;  Location: [facility] OR;  Service: Podiatry;  Laterality: Left;       Review of patient's allergies indicates:   Allergen Reactions    Nystatin      Other reaction(s): Unknown       No current facility-administered medications on file prior to encounter.     Current Outpatient Medications on File Prior to Encounter   Medication Sig    amLODIPine (NORVASC) 5 MG tablet Take 1 tablet (5 mg total) by mouth once daily.    atorvastatin (LIPITOR) 40 MG tablet Take 1 tablet (40 mg total) by mouth once daily. (Patient taking differently: Take 40 mg by mouth every evening.)    clopidogreL (PLAVIX) 75 mg tablet Take 1 tablet (75 mg total) by mouth once daily.    ELIQUIS 5 mg Tab Take 5 mg by mouth 2 (two) times daily.    FLUoxetine 40 MG capsule Take 40 mg by mouth once daily.    furosemide (LASIX) 40 MG tablet Take 40 mg by mouth once daily.    HYDROcodone-acetaminophen (NORCO)  mg per tablet Take 1 tablet by mouth every 6 (six) hours as needed for Pain.    isosorbide mononitrate (IMDUR) 30 MG 24 hr tablet Take 1 tablet (30 mg total) by mouth once daily.    LANTUS SOLOSTAR U-100 INSULIN glargine 100 units/mL SubQ pen Inject 15 Units into the skin every evening.    metoprolol succinate (TOPROL-XL)  25 MG 24 hr tablet Take 1 tablet (25 mg total) by mouth once daily.    nitroGLYCERIN (NITROSTAT) 0.4 MG SL tablet Place 0.4 mg under the tongue every 5 (five) minutes as needed for Chest pain.    ondansetron (ZOFRAN-ODT) 8 MG TbDL Take 1 tablet (8 mg total) by mouth 3 (three) times daily as needed (nausea or vomiting).    pantoprazole (PROTONIX) 40 MG tablet Take 1 tablet (40 mg total) by mouth 2 (two) times daily.    ranolazine (RANEXA) 1,000 mg Tb12 Take 1,000 mg by mouth 2 (two) times daily.    SITagliptan-metformin (JANUMET XR) 100-1,000 mg TM24 Take 1 tablet by mouth once daily.    traZODone (DESYREL) 150 MG tablet Take 1 tablet (150 mg total) by mouth every evening.    alcohol swabs PadM Apply 1 each topically as needed (for use with glucose monitoring).    blood sugar diagnostic (TRUE METRIX GLUCOSE TEST STRIP) Strp 1 strip by Misc.(Non-Drug; Combo Route) route 2 (two) times a day.    blood-glucose meter (TRUE METRIX AIR GLUCOSE METER) Misc USE AS DIRECTED    lancets 28 gauge Misc 1 lancet  by Misc.(Non-Drug; Combo Route) route 2 (two) times a day.     Family History       Problem Relation (Age of Onset)    Heart disease Mother, Sister, Brother, Maternal Uncle, Brother, Sister, Maternal Uncle, Maternal Uncle, Maternal Uncle    Stroke Mother          Tobacco Use    Smoking status: Never    Smokeless tobacco: Never   Substance and Sexual Activity    Alcohol use: No    Drug use: No    Sexual activity: Not on file     Review of Systems   Constitutional: Negative for chills, diaphoresis, malaise/fatigue, weight gain and weight loss.   Cardiovascular:  Negative for chest pain, dyspnea on exertion, irregular heartbeat, leg swelling, near-syncope, orthopnea, palpitations, paroxysmal nocturnal dyspnea and syncope.   Respiratory:  Negative for cough, hemoptysis, shortness of breath and snoring.    Gastrointestinal:  Negative for bloating, abdominal pain, nausea and vomiting.   Neurological:  Negative for  light-headedness and weakness.     Objective:     Vital Signs (Most Recent):  Temp: 97.7 °F (36.5 °C) (03/22/24 1116)  Pulse: 80 (03/22/24 1116)  Resp: 16 (03/22/24 1116)  BP: 123/61 (03/22/24 1116)  SpO2: 99 % (03/22/24 1116) Vital Signs (24h Range):  Temp:  [97.7 °F (36.5 °C)-99.7 °F (37.6 °C)] 97.7 °F (36.5 °C)  Pulse:  [73-84] 80  Resp:  [14-22] 16  SpO2:  [95 %-99 %] 99 %  BP: (110-147)/(55-67) 123/61     Weight: 77.1 kg (169 lb 15.6 oz)  Body mass index is 21.82 kg/m².    SpO2: 99 %         Intake/Output Summary (Last 24 hours) at 3/22/2024 1248  Last data filed at 3/22/2024 0609  Gross per 24 hour   Intake 988.5 ml   Output --   Net 988.5 ml       Lines/Drains/Airways       Drain  Duration             Male External Urinary Catheter 03/21/24 2145 Small <1 day              Peripheral Intravenous Line  Duration                  Peripheral IV - Single Lumen 03/21/24 1342 20 G Right Antecubital <1 day                     Physical Exam  Vitals and nursing note reviewed.   Constitutional:       Appearance: Normal appearance.   Cardiovascular:      Rate and Rhythm: Normal rate and regular rhythm.      Heart sounds: No murmur heard.     No gallop.   Pulmonary:      Effort: Pulmonary effort is normal.      Breath sounds: Normal breath sounds.   Abdominal:      General: Bowel sounds are normal. There is no distension.      Palpations: Abdomen is soft.      Tenderness: There is no abdominal tenderness.   Skin:     General: Skin is warm and dry.      Findings: Wound present.      Comments: R foot   Neurological:      Mental Status: He is alert and oriented to person, place, and time.          Significant Labs: BMP:   Recent Labs   Lab 03/21/24  1342 03/22/24  0457   * 271*    136   K 4.4 4.1    100   CO2 30* 26   BUN 21 21   CREATININE 1.0 1.0   CALCIUM 9.4 9.0   MG  --  1.7   , CMP   Recent Labs   Lab 03/21/24  1342 03/22/24  0457    136   K 4.4 4.1    100   CO2 30* 26   * 271*   BUN  "21 21   CREATININE 1.0 1.0   CALCIUM 9.4 9.0   PROT 7.1  --    ALBUMIN 2.9*  --    BILITOT 0.4  --    ALKPHOS 91  --    AST 6*  --    ALT 9*  --    ANIONGAP 9 10   , CBC   Recent Labs   Lab 03/21/24  1342 03/22/24  0457   WBC 10.38 9.43   HGB 10.7* 10.2*   HCT 32.4* 30.5*    244   , INR No results for input(s): "INR", "PROTIME" in the last 48 hours., Lipid Panel No results for input(s): "CHOL", "HDL", "LDLCALC", "TRIG", "CHOLHDL" in the last 48 hours., Troponin No results for input(s): "TROPONINI" in the last 48 hours., and All pertinent lab results from the last 24 hours have been reviewed.    Significant Imaging: Echocardiogram: 2D echo with color flow doppler:   Results for orders placed or performed during the hospital encounter of 03/23/17   2D Echo w/ Color Flow Doppler   Result Value Ref Range    EF + QEF 55 55 - 65    Diastolic Dysfunction Yes (A)     Mitral Valve Mobility NORMAL     Narrative    Date of Procedure: 03/23/2017        TEST DESCRIPTION   Technical Quality: This is a technically adequate study.     Aorta: The aortic root is normal in size, measuring 3.6 cm at sinotubular junction.     Left Atrium: The left atrial volume index is normal, measuring 25.53 cc/m2.     Left Ventricle: The left ventricle is normal in size, with an end-diastolic diameter of 2.8 cm, and an end-systolic diameter of 2.1 cm. LV wall thickness is normal, with the septum measuring 2.0 cm and the posterior wall measuring 1.5 cm across. Relative   wall thickness was increased at 1.07, and the LV mass index was 99.0 g/m2 consistent with concentric remodeling. Global left ventricular systolic function appears normal. Visually estimated ejection fraction is 55-60%. The LV Doppler derived stroke   volume equals 74.0 ccs.   Mitral inflow patterns reveal an E:A ratio of 0.7, with a deceleration time of 156 msec., and an IVRT of 106.1 msec., consistent with diastolic dysfunction secondary to relaxation abnormality.     Right " "Atrium: The right atrium is normal in size, measuring 4.3 cm in length in the apical view.     Right Ventricle: The right ventricle is normal in size measuring 2.6 cm at the base in the apical right ventricle-focused view. Global right ventricular systolic function appears normal.     Aortic Valve:  The aortic valve is normal in structure with normal leaflet mobility. The aortic valve is tri-leaflet in structure.     Mitral Valve:  The mitral valve is mildly sclerotic with normal leaflet mobility. There is mitral annular calcification.     Tricuspid Valve:  The tricuspid valve is normal in structure.     Pulmonary Valve:  The pulmonic valve is not well seen.     IVC: The IVC is not visualized.     Intracavitary: There is no evidence of pericardial effusion, intracavity mass, thrombi, or vegetation.         CONCLUSIONS     1 - Normal left ventricular systolic function (EF 55-60%).     2 - Left ventricular diastolic dysfunction.     3 - Concentric remodeling.     4 - Normal right ventricular systolic function .             This document has been electronically    SIGNED BY: Tyesha Isaac MD On: 03/23/2017 15:13    and Transthoracic echo (TTE) complete (Cupid Only):   Results for orders placed or performed during the hospital encounter of 11/07/23   Echo   Result Value Ref Range    BSA 2.04 m2    LVIDd 3.72 3.5 - 6.0 cm    LV Systolic Volume 26.30 mL    LV Systolic Volume Index 12.8 mL/m2    LVIDs 2.67 2.1 - 4.0 cm    LV Diastolic Volume 58.98 mL    LV Diastolic Volume Index 28.77 mL/m2    IVS 0.97 0.6 - 1.1 cm    FS 28 28 - 44 %    Left Ventricle Relative Wall Thickness 0.68 cm    Posterior Wall 1.27 (A) 0.6 - 1.1 cm    LV mass 133.91 g    LV Mass Index 65 g/m2    MV Peak E Brody 0.63 m/s    TDI LATERAL 0.06 m/s    TDI SEPTAL 0.08 m/s    E/E' ratio 9.00 m/s    MV Peak A Brody 1.06 m/s    TR Max Brody 0.96 m/s    E/A ratio 0.59     E wave deceleration time 190.59 msec    MV "A" wave duration [id number] msec    LV " SEPTAL E/E' RATIO 7.88 m/s    LV LATERAL E/E' RATIO 10.50 m/s    LVOT peak johnny 0.85 m/s    Left Ventricular Outflow Tract Mean Velocity 0.69 cm/s    Left Ventricular Outflow Tract Mean Gradient 1.95 mmHg    RV S' 10.78 cm/s    LA size 2.85 cm    Left Atrium Minor Axis 5.94 cm    Left Atrium Major Axis 5.04 cm    LA volume (mod) 48.53 cm3    LA Volume Index (Mod) 23.7 mL/m2    AV mean gradient 3 mmHg    AV peak gradient 5 mmHg    Ao peak johnny 1.12 m/s    Ao VTI 23.90 cm    LVOT peak VTI 18.10 cm    AV Velocity Ratio 0.76     AV index (prosthetic) 0.76     MV peak gradient 4 mmHg    MV stenosis pressure 1/2 time 55.27 ms    MV valve area p 1/2 method 3.98 cm2    MV VTI 23.9 cm    Triscuspid Valve Regurgitation Peak Gradient 4 mmHg    PV PEAK VELOCITY 0.65 m/s    PV peak gradient 2 mmHg    Pulmonary Valve Mean Velocity 0.42 m/s    Mean e' 0.07 m/s    ZLVIDS -2.72     ZLVIDD -5.03     LA Volume Index 23.2 mL/m2    LA volume 47.56 cm3    LA WIDTH 3.6 cm    EF 50 %    Narrative      Left Ventricle: There is low normal systolic function with a visually   estimated ejection fraction of 50 - 55%. Ejection fraction by visual   approximation is 50%.    Right Ventricle: Normal right ventricular cavity size. Wall thickness   is normal. Right ventricle wall motion  is normal. Systolic function is   normal.    Aortic Valve: There is mild aortic valve sclerosis.    Mitral Valve: There is moderate mitral annular calcification present.    Pericardium: There is a small effusion.    The study was difficult due to patient's body habitus and poor   endocardial visualization.    Valves are not well visualized

## 2024-03-22 NOTE — PLAN OF CARE
VN cued into room to complete admit assessment. VIP model introduced; VN working alongside bedside treatment team.  Plan of care reviewed with patient. Patient informed of fall risk, fall precautions, call light within reach, side rails x2 elevated. Patient notified to ask staff for assistance. Patient verbalized complete understanding. Time allowed for questions. Will continue to monitor and intervene as needed.   03/21/24 2128   Admission   Initial VN Admission Questions Complete   Communication Issues? None   Shift   Pain Management Interventions quiet environment facilitated;relaxation techniques promoted   Virtual Nurse - Patient Verbalized Approval Of VN Rounding;Camera Use   Type of Frequent Check   Type Patient Rounds;Telemetry Monitoring   Safety/Activity   Patient Rounds bed in low position;bed wheels locked;call light in patient/parent reach;clutter free environment maintained;ID band on;visualized patient;placement of personal items at bedside   Safety Promotion/Fall Prevention assistive device/personal item within reach;bed alarm set;medications reviewed;nonskid shoes/socks when out of bed;instructed to call staff for mobility;Fall Risk reviewed with patient/family;side rails raised x 2;room near unit station   Safety Precautions emergency equipment at bedside   Safety Bands on Patient Fall Risk Band   Activity Management Rolling - L1   Positioning   Body Position position changed independently   Head of Bed (HOB) Positioning HOB elevated   Positioning/Transfer Devices pillows;in use     Plan of care , labs , orders and progress notes reviewed.

## 2024-03-22 NOTE — NURSING
"Pt Arrived to unit alert and oriented to self and situation with spouse at bedside. Upon assessment, pt's brief and absorbant pad saturated with urine. Incontinence care performed and small condom catheter placed. No report of pain to right foot, "unless you touch it"; wound is GENA with scant, serosanguinous drainage. Absorbant pads placed at foot of bed to catch drainage. Telemetry monitor in place. Scheduled medications crushed in applesauce and administered per MAR. Pt provided with sandwich. Plan of care, call light functions and safety protocols reviewed with pt and spouse. Spouse to remain at bedside throughout night. Bed in low/locked position with 3 side rails raised, alarm set and call light within pt's reach.  "

## 2024-03-22 NOTE — SUBJECTIVE & OBJECTIVE
Interval History: seen at the bedside, no distress noted. Appreciate consultants eval. Will cont IV abx.    Review of Systems   Constitutional:  Negative for fatigue and fever.   Genitourinary:  Negative for difficulty urinating and hematuria.   Skin:  Positive for wound (right foot).     Objective:     Vital Signs (Most Recent):  Temp: 97.7 °F (36.5 °C) (03/22/24 1116)  Pulse: 77 (03/22/24 1556)  Resp: 18 (03/22/24 1448)  BP: 123/61 (03/22/24 1116)  SpO2: 99 % (03/22/24 1116) Vital Signs (24h Range):  Temp:  [97.7 °F (36.5 °C)-99.7 °F (37.6 °C)] 97.7 °F (36.5 °C)  Pulse:  [73-84] 77  Resp:  [14-22] 18  SpO2:  [95 %-99 %] 99 %  BP: (110-147)/(55-67) 123/61     Weight: 77.1 kg (169 lb 15.6 oz)  Body mass index is 21.82 kg/m².     Physical Exam  Vitals and nursing note reviewed.   Constitutional:       Appearance: He is ill-appearing.   HENT:      Mouth/Throat:      Mouth: Mucous membranes are moist.   Eyes:      Extraocular Movements: Extraocular movements intact.   Cardiovascular:      Rate and Rhythm: Normal rate and regular rhythm.   Abdominal:      Palpations: Abdomen is soft.   Musculoskeletal:         General: Tenderness (right foot) present.      Cervical back: Normal range of motion and neck supple.   Skin:     Comments: R foot wound with erythema and purulent drainage    Neurological:      Mental Status: He is alert and oriented to person, place, and time.                Significant Labs: All pertinent labs within the past 24 hours have been reviewed.    Significant Imaging: I have reviewed all pertinent imaging results/findings within the past 24 hours.

## 2024-03-22 NOTE — ASSESSMENT & PLAN NOTE
Creatine stable for now. BMP reviewed- noted Estimated Creatinine Clearance: 61 mL/min (based on SCr of 1 mg/dL). according to latest data. 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  Monitor UOP and serial BMP and adjust therapy as needed. Renally dose meds. Avoid nephrotoxic medications and procedures.

## 2024-03-22 NOTE — H&P (VIEW-ONLY)
[place] - Med Surg  Cardiology  Consult Note    Patient Name: Julien Meléndez  MRN: [id number]  Admission Date: 3/21/2024  Hospital Length of Stay: 1 days  Code Status: Full Code   Attending Provider: Kelsey Stewart MD   Consulting Provider: Shane Gillis DNP  Primary Care Physician: Kelvin Wayne MD  Principal Problem:Critical limb ischemia of right lower extremity    Patient information was obtained from patient, past medical records, ER records, and primary team.     Cardiology-[facility]  Consult performed by: Shane Gillis DNP  Consult ordered by: Lucius Rodarte NP          Subjective:     Chief Complaint:  R foot wound     HPI:   Julien Meléndez is a 83 yr old male with DM, HTN, HLD, CAD s/p PCI dLAD 2019, PAD present to ER with reports of R foot infection. Patient states that over the past 2d R foot wound has worsened.     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 Abnormal edema signal involving the 3rd metatarsophalangeal joint, 4th proximal interphalangeal joint and 5th distal interphalangeal joint concerning for reactive edema or early changes of septic arthritis. Will cont vancomycin and Zosyn and will admit to the [facility] service for further care.     Patient with significant PAD hx. Recent peripheral angio with PTA of L proximal peroneal artery with 3.5x80 mm IVL balloon and distal peroneal/posterior tibial artery with 2.5x40 mm PTA balloon with excellent angiographic results (04/2023 per Dr. Giraldo). Now with ongoing issues to R leg.       No new subjective & objective note has been filed under this hospital service since the last note was generated.    Past Medical History:   Diagnosis Date    Diabetes mellitus     Hiatal hernia     under Dr Saenz' care     Hyperlipidemia     Hypertension     MI (myocardial infarction)     Sleep apnea        Past Surgical History:   Procedure Laterality Date    ABDOMINAL HERNIA REPAIR      ANGIOGRAM, EXTREMITY, BILATERAL Bilateral 4/5/2023    Procedure: ANGIOGRAM, EXTREMITY, BILATERAL;  Surgeon: Michael Giraldo III, MD;  Location: [facility] CATH LAB;  Service: Cardiology;  Laterality: Bilateral;    ANGIOGRAPHY OF LOWER EXTREMITY Left 4/12/2023    Procedure: Angiogram Extremity Unilateral;  Surgeon: Michael Giraldo III, MD;  Location: [facility] CATH LAB;  Service: Cardiology;  Laterality: Left;    AORTOGRAPHY WITH SERIALOGRAPHY Bilateral 4/5/2023    Procedure: AORTOGRAM, WITH SERIALOGRAPHY;  Surgeon: Michael Giraldo III, MD;  Location: [facility] CATH LAB;  Service: Cardiology;  Laterality: Bilateral;    APPLICATION OF WOUND VACUUM-ASSISTED CLOSURE DEVICE Left 3/29/2023    Procedure: APPLICATION, WOUND VAC;  Surgeon: Alona Pierce DPM;  Location: [facility] OR;  Service: Podiatry;  Laterality: Left;    APPLICATION OF WOUND VACUUM-ASSISTED CLOSURE DEVICE Left 4/4/2023    Procedure: APPLICATION, WOUND VAC;  Surgeon: Alona Pierce DPM;  Location: [facility] OR;  Service: Podiatry;  Laterality: Left;    APPLICATION OF WOUND VACUUM-ASSISTED CLOSURE DEVICE Left 4/13/2023    Procedure: APPLICATION, WOUND VAC;  Surgeon: Alona Pierce DPM;  Location: [facility] OR;  Service: Podiatry;  Laterality: Left;    BONE BIOPSY Left 3/29/2023    Procedure: BIOPSY, BONE;  Surgeon: Alona Pierce DPM;  Location: [facility] OR;  Service: Podiatry;  Laterality: Left;  1st met    CARDIAC CATHETERIZATION      3 stents placed    CATARACT EXTRACTION, BILATERAL      CLOSURE DEVICE Left 4/12/2023    Procedure: Placement of Closure Device;  Surgeon: Michael Giraldo III, MD;  Location: [facility] CATH LAB;  Service: Cardiology;  Laterality: Left;    CORONARY ANGIOGRAPHY N/A 10/3/2019    Procedure: ANGIOGRAM, CORONARY ARTERY;  Surgeon: Edwin Azul MD;  Location: [facility] CATH LAB/EP;  Service:  Cardiology;  Laterality: N/A;    DEBRIDEMENT OF FOOT Left 3/29/2023    Procedure: DEBRIDEMENT, FOOT;  Surgeon: Alona Pierce DPM;  Location: [facility] OR;  Service: Podiatry;  Laterality: Left;    DEBRIDEMENT OF FOOT Left 4/4/2023    Procedure: DEBRIDEMENT, FOOT;  Surgeon: Alona Pierce DPM;  Location: [facility] OR;  Service: Podiatry;  Laterality: Left;    DEBRIDEMENT OF FOOT Left 4/13/2023    Procedure: DEBRIDEMENT, FOOT;  Surgeon: Alona Pierce DPM;  Location: [facility] OR;  Service: Podiatry;  Laterality: Left;    ESOPHAGOGASTRODUODENOSCOPY N/A 4/17/2023    Procedure: EGD (ESOPHAGOGASTRODUODENOSCOPY);  Surgeon: Otto Villarreal MD;  Location: [facility] ENDO;  Service: Endoscopy;  Laterality: N/A;    HERNIA REPAIR      inguinal    IVUS (INTRAVASCULAR ULTRASOUND) Left 4/12/2023    Procedure: ULTRASOUND, INTRAVASCULAR;  Surgeon: Michael Giraldo III, MD;  Location: [facility] CATH LAB;  Service: Cardiology;  Laterality: Left;    LEFT HEART CATHETERIZATION Left 9/13/2019    Procedure: Left heart cath;  Surgeon: Edwin Azul MD;  Location: [facility] CATH LAB/EP;  Service: Cardiology;  Laterality: Left;    LEFT HEART CATHETERIZATION N/A 10/3/2019    Procedure: Left heart cath;  Surgeon: Edwin Azul MD;  Location: [facility] CATH LAB/EP;  Service: Cardiology;  Laterality: N/A;    OSTEOTOMY Left 6/7/2023    Procedure: OSTEOTOMY;  Surgeon: Alona Pierce DPM;  Location: [facility] OR;  Service: Podiatry;  Laterality: Left;    OSTEOTOMY OF METATARSAL BONE Right 4/4/2023    Procedure: OSTEOTOMY, METATARSAL BONE;  Surgeon: Alona Pierce DPM;  Location: [facility] OR;  Service: Podiatry;  Laterality: Right;    PERCUTANEOUS TRANSLUMINAL ANGIOPLASTY Left 4/12/2023    Procedure: PTA (ANGIOPLASTY, PERCUTANEOUS, TRANSLUMINAL);  Surgeon: Michael Giraldo III, MD;  Location: [facility] CATH LAB;  Service: Cardiology;  Laterality: Left;    SURGICAL REMOVAL OF BUNION WITH OSTEOTOMY OF METATARSAL BONE Right 4/4/2023    Procedure: BUNIONECTOMY, WITH METATARSAL OSTEOTOMY;   Surgeon: Alona Pierce DPM;  Location: [facility] OR;  Service: Podiatry;  Laterality: Right;    TOE AMPUTATION Left 4/4/2023    Procedure: AMPUTATION, TOE;  Surgeon: Alona Pierce DPM;  Location: [facility] OR;  Service: Podiatry;  Laterality: Left;  1st ray    TOE AMPUTATION Right 11/10/2023    Procedure: AMPUTATION, TOE;  Surgeon: Diya Leung DPM;  Location: [facility] OR;  Service: Podiatry;  Laterality: Right;    WASHOUT Left 6/7/2023    Procedure: WASHOUT;  Surgeon: Alona Pierce DPM;  Location: [facility] OR;  Service: Podiatry;  Laterality: Left;    WOUND DEBRIDEMENT Left 6/7/2023    Procedure: DEBRIDEMENT, WOUND;  Surgeon: Alona Pierce DPM;  Location: [facility] OR;  Service: Podiatry;  Laterality: Left;       Review of patient's allergies indicates:   Allergen Reactions    Nystatin      Other reaction(s): Unknown       No current facility-administered medications on file prior to encounter.     Current Outpatient Medications on File Prior to Encounter   Medication Sig    amLODIPine (NORVASC) 5 MG tablet Take 1 tablet (5 mg total) by mouth once daily.    atorvastatin (LIPITOR) 40 MG tablet Take 1 tablet (40 mg total) by mouth once daily. (Patient taking differently: Take 40 mg by mouth every evening.)    clopidogreL (PLAVIX) 75 mg tablet Take 1 tablet (75 mg total) by mouth once daily.    ELIQUIS 5 mg Tab Take 5 mg by mouth 2 (two) times daily.    FLUoxetine 40 MG capsule Take 40 mg by mouth once daily.    furosemide (LASIX) 40 MG tablet Take 40 mg by mouth once daily.    HYDROcodone-acetaminophen (NORCO)  mg per tablet Take 1 tablet by mouth every 6 (six) hours as needed for Pain.    isosorbide mononitrate (IMDUR) 30 MG 24 hr tablet Take 1 tablet (30 mg total) by mouth once daily.    LANTUS SOLOSTAR U-100 INSULIN glargine 100 units/mL SubQ pen Inject 15 Units into the skin every evening.    metoprolol succinate (TOPROL-XL) 25 MG 24 hr tablet Take 1 tablet (25 mg total) by mouth once daily.    nitroGLYCERIN (NITROSTAT) 0.4  MG SL tablet Place 0.4 mg under the tongue every 5 (five) minutes as needed for Chest pain.    ondansetron (ZOFRAN-ODT) 8 MG TbDL Take 1 tablet (8 mg total) by mouth 3 (three) times daily as needed (nausea or vomiting).    pantoprazole (PROTONIX) 40 MG tablet Take 1 tablet (40 mg total) by mouth 2 (two) times daily.    ranolazine (RANEXA) 1,000 mg Tb12 Take 1,000 mg by mouth 2 (two) times daily.    SITagliptan-metformin (JANUMET XR) 100-1,000 mg TM24 Take 1 tablet by mouth once daily.    traZODone (DESYREL) 150 MG tablet Take 1 tablet (150 mg total) by mouth every evening.    alcohol swabs PadM Apply 1 each topically as needed (for use with glucose monitoring).    blood sugar diagnostic (TRUE METRIX GLUCOSE TEST STRIP) Strp 1 strip by Misc.(Non-Drug; Combo Route) route 2 (two) times a day.    blood-glucose meter (TRUE METRIX AIR GLUCOSE METER) Misc USE AS DIRECTED    lancets 28 gauge Misc 1 lancet  by Misc.(Non-Drug; Combo Route) route 2 (two) times a day.     Family History       Problem Relation (Age of Onset)    Heart disease Mother, Sister, Brother, Maternal Uncle, Brother, Sister, Maternal Uncle, Maternal Uncle, Maternal Uncle    Stroke Mother          Tobacco Use    Smoking status: Never    Smokeless tobacco: Never   Substance and Sexual Activity    Alcohol use: No    Drug use: No    Sexual activity: Not on file     ROS  Objective:     Vital Signs (Most Recent):  Temp: 97.7 °F (36.5 °C) (03/22/24 1116)  Pulse: 80 (03/22/24 1300)  Resp: 16 (03/22/24 1116)  BP: 123/61 (03/22/24 1116)  SpO2: 99 % (03/22/24 1116) Vital Signs (24h Range):  Temp:  [97.7 °F (36.5 °C)-99.7 °F (37.6 °C)] 97.7 °F (36.5 °C)  Pulse:  [73-84] 80  Resp:  [14-22] 16  SpO2:  [95 %-99 %] 99 %  BP: (110-147)/(55-67) 123/61     Weight: 77.1 kg (169 lb 15.6 oz)  Body mass index is 21.82 kg/m².    SpO2: 99 %         Intake/Output Summary (Last 24 hours) at 3/22/2024 1319  Last data filed at 3/22/2024 0609  Gross per 24 hour   Intake 988.5 ml  "  Output --   Net 988.5 ml       Lines/Drains/Airways       Drain  Duration             Male External Urinary Catheter 03/21/24 2145 Small <1 day              Peripheral Intravenous Line  Duration                  Peripheral IV - Single Lumen 03/21/24 1342 20 G Right Antecubital <1 day                     Physical Exam     Significant Labs: BMP:   Recent Labs   Lab 03/21/24  1342 03/22/24  0457   * 271*    136   K 4.4 4.1    100   CO2 30* 26   BUN 21 21   CREATININE 1.0 1.0   CALCIUM 9.4 9.0   MG  --  1.7   , CMP   Recent Labs   Lab 03/21/24  1342 03/22/24  0457    136   K 4.4 4.1    100   CO2 30* 26   * 271*   BUN 21 21   CREATININE 1.0 1.0   CALCIUM 9.4 9.0   PROT 7.1  --    ALBUMIN 2.9*  --    BILITOT 0.4  --    ALKPHOS 91  --    AST 6*  --    ALT 9*  --    ANIONGAP 9 10   , CBC   Recent Labs   Lab 03/21/24  1342 03/22/24  0457   WBC 10.38 9.43   HGB 10.7* 10.2*   HCT 32.4* 30.5*    244   , INR No results for input(s): "INR", "PROTIME" in the last 48 hours., Lipid Panel No results for input(s): "CHOL", "HDL", "LDLCALC", "TRIG", "CHOLHDL" in the last 48 hours., Troponin No results for input(s): "TROPONINI" in the last 48 hours., and All pertinent lab results from the last 24 hours have been reviewed.    Significant Imaging: CT scan: CT ABDOMEN PELVIS WITH CONTRAST: No results found for this visit on 03/21/24. and CT ABDOMEN PELVIS WITHOUT CONTRAST: No results found for this visit on 03/21/24. and Echocardiogram: 2D echo with color flow doppler:   Results for orders placed or performed during the hospital encounter of 03/23/17   2D Echo w/ Color Flow Doppler   Result Value Ref Range    EF + QEF 55 55 - 65    Diastolic Dysfunction Yes (A)     Mitral Valve Mobility NORMAL     Narrative    Date of Procedure: 03/23/2017        TEST DESCRIPTION   Technical Quality: This is a technically adequate study.     Aorta: The aortic root is normal in size, measuring 3.6 cm at " sinotubular junction.     Left Atrium: The left atrial volume index is normal, measuring 25.53 cc/m2.     Left Ventricle: The left ventricle is normal in size, with an end-diastolic diameter of 2.8 cm, and an end-systolic diameter of 2.1 cm. LV wall thickness is normal, with the septum measuring 2.0 cm and the posterior wall measuring 1.5 cm across. Relative   wall thickness was increased at 1.07, and the LV mass index was 99.0 g/m2 consistent with concentric remodeling. Global left ventricular systolic function appears normal. Visually estimated ejection fraction is 55-60%. The LV Doppler derived stroke   volume equals 74.0 ccs.   Mitral inflow patterns reveal an E:A ratio of 0.7, with a deceleration time of 156 msec., and an IVRT of 106.1 msec., consistent with diastolic dysfunction secondary to relaxation abnormality.     Right Atrium: The right atrium is normal in size, measuring 4.3 cm in length in the apical view.     Right Ventricle: The right ventricle is normal in size measuring 2.6 cm at the base in the apical right ventricle-focused view. Global right ventricular systolic function appears normal.     Aortic Valve:  The aortic valve is normal in structure with normal leaflet mobility. The aortic valve is tri-leaflet in structure.     Mitral Valve:  The mitral valve is mildly sclerotic with normal leaflet mobility. There is mitral annular calcification.     Tricuspid Valve:  The tricuspid valve is normal in structure.     Pulmonary Valve:  The pulmonic valve is not well seen.     IVC: The IVC is not visualized.     Intracavitary: There is no evidence of pericardial effusion, intracavity mass, thrombi, or vegetation.         CONCLUSIONS     1 - Normal left ventricular systolic function (EF 55-60%).     2 - Left ventricular diastolic dysfunction.     3 - Concentric remodeling.     4 - Normal right ventricular systolic function .             This document has been electronically    SIGNED BY: Tyesha Isaac MD  "On: 03/23/2017 15:13    and Transthoracic echo (TTE) complete (Cupid Only):   Results for orders placed or performed during the hospital encounter of 11/07/23   Echo   Result Value Ref Range    BSA 2.04 m2    LVIDd 3.72 3.5 - 6.0 cm    LV Systolic Volume 26.30 mL    LV Systolic Volume Index 12.8 mL/m2    LVIDs 2.67 2.1 - 4.0 cm    LV Diastolic Volume 58.98 mL    LV Diastolic Volume Index 28.77 mL/m2    IVS 0.97 0.6 - 1.1 cm    FS 28 28 - 44 %    Left Ventricle Relative Wall Thickness 0.68 cm    Posterior Wall 1.27 (A) 0.6 - 1.1 cm    LV mass 133.91 g    LV Mass Index 65 g/m2    MV Peak E Brody 0.63 m/s    TDI LATERAL 0.06 m/s    TDI SEPTAL 0.08 m/s    E/E' ratio 9.00 m/s    MV Peak A Brody 1.06 m/s    TR Max Brody 0.96 m/s    E/A ratio 0.59     E wave deceleration time 190.59 msec    MV "A" wave duration [id number] msec    LV SEPTAL E/E' RATIO 7.88 m/s    LV LATERAL E/E' RATIO 10.50 m/s    LVOT peak brody 0.85 m/s    Left Ventricular Outflow Tract Mean Velocity 0.69 cm/s    Left Ventricular Outflow Tract Mean Gradient 1.95 mmHg    RV S' 10.78 cm/s    LA size 2.85 cm    Left Atrium Minor Axis 5.94 cm    Left Atrium Major Axis 5.04 cm    LA volume (mod) 48.53 cm3    LA Volume Index (Mod) 23.7 mL/m2    AV mean gradient 3 mmHg    AV peak gradient 5 mmHg    Ao peak brody 1.12 m/s    Ao VTI 23.90 cm    LVOT peak VTI 18.10 cm    AV Velocity Ratio 0.76     AV index (prosthetic) 0.76     MV peak gradient 4 mmHg    MV stenosis pressure 1/2 time 55.27 ms    MV valve area p 1/2 method 3.98 cm2    MV VTI 23.9 cm    Triscuspid Valve Regurgitation Peak Gradient 4 mmHg    PV PEAK VELOCITY 0.65 m/s    PV peak gradient 2 mmHg    Pulmonary Valve Mean Velocity 0.42 m/s    Mean e' 0.07 m/s    ZLVIDS -2.72     ZLVIDD -5.03     LA Volume Index 23.2 mL/m2    LA volume 47.56 cm3    LA WIDTH 3.6 cm    EF 50 %    Narrative      Left Ventricle: There is low normal systolic function with a visually   estimated ejection fraction of 50 - 55%. " Ejection fraction by visual   approximation is 50%.    Right Ventricle: Normal right ventricular cavity size. Wall thickness   is normal. Right ventricle wall motion  is normal. Systolic function is   normal.    Aortic Valve: There is mild aortic valve sclerosis.    Mitral Valve: There is moderate mitral annular calcification present.    Pericardium: There is a small effusion.    The study was difficult due to patient's body habitus and poor   endocardial visualization.    Valves are not well visualized           Assessment and Plan:     * Critical limb ischemia of right lower extremity  Periph Cath 4/5/2023    There was severe infrapopliteal disease bilaterally.    The Ost L ERNIE to Prox L ERNIE lesion was 85% stenosed.    The Prox L Peroneal to Dist L Peroneal lesion was 100% stenosed.    The Ost R ERNIE to Prox R ERNIE lesion was 100% stenosed.    The Ost R PTA to Prox R PTA lesion was 100% stenosed.    The estimated blood loss was <50 mL.    Plan for staged PTA of bilateral lower extremities due to critical limb threatening ishcemia.    -known significant PAD as above  -podiatry, ortho, infectious disease consulted; appreciate recs  -continue lovenox, plavix, asa for now  -will plan for peripheral angio Monday     PAD (peripheral artery disease)  Peripheral Angio 4/11/2023    There was severe left infrapopliteal disease status post successful PTA of proximal peroneal artery with 3.5x80 mm IVL balloon and distal peroneal/posterior tibial artery with 2.5x40 mm PTA balloon with excellent angiographic results.    The Ost L ERNIE to Dist L ERNIE lesion was 100% stenosed.    The Ost L PTA to Dist L PTA lesion was 100% stenosed.    The Dist L Peroneal lesion was 99% stenosed with 10% stenosis post-intervention.    The Prox L Peroneal lesion was 99% stenosed with 10% stenosis post-intervention.    The estimated blood loss was <50 mL.    -known significant PAD  -recent periph angio s/p PPI as above  -R leg recs  above      Congestive heart failure, unspecified HF chronicity, unspecified heart failure type    Echo 11/11/2023    Interpretation Summary    Left Ventricle: There is low normal systolic function with a visually estimated ejection fraction of 50 - 55%. Ejection fraction by visual approximation is 50%.    Right Ventricle: Normal right ventricular cavity size. Wall thickness is normal. Right ventricle wall motion  is normal. Systolic function is normal.    Aortic Valve: There is mild aortic valve sclerosis.    Mitral Valve: There is moderate mitral annular calcification present.    Pericardium: There is a small effusion.    The study was difficult due to patient's body habitus and poor endocardial visualization.    Valves are not well visualized    -echo reviewed as above  -grossly euvolemic on exam  -continue PO lasix, BB    Atrial fibrillation  -[id number] Score: 4.   -continue BB, lovenox  -resume Eliquis upon D/C           VTE Risk Mitigation (From admission, onward)           Ordered     enoxaparin injection 80 mg  Every 12 hours         03/22/24 0015     IP VTE HIGH RISK PATIENT  Once         03/21/24 1502     Place sequential compression device  Until discontinued         03/21/24 1502                    Thank you for your consult. I will follow-up with patient. Please contact us if you have any additional questions.    Shane Gillis, AJ  Cardiology   [place] [facility] Surg

## 2024-03-22 NOTE — CONSULTS
[place] [facility] Surg  Wound Care    Patient Name:  Julien Meléndez   MRN:  [id number]  Date: 3/22/2024  Diagnosis: Critical limb ischemia of right lower extremity    History:     Past Medical History:   Diagnosis Date    Diabetes mellitus     Hiatal hernia     under Dr Saenz' care    Hyperlipidemia     Hypertension     MI (myocardial infarction)     Sleep apnea        Social History     Socioeconomic History    Marital status:    Tobacco Use    Smoking status: Never    Smokeless tobacco: Never   Substance and Sexual Activity    Alcohol use: No    Drug use: No     Social Determinants of Health     Financial Resource Strain: Patient Declined (11/8/2023)    Overall Financial Resource Strain (CARDIA)     Difficulty of Paying Living Expenses: Patient declined   Food Insecurity: No Food Insecurity (11/8/2023)    Hunger Vital Sign     Worried About Running Out of Food in the Last Year: Never true     Ran Out of Food in the Last Year: Never true   Transportation Needs: No Transportation Needs (11/8/2023)    PRAPARE - Transportation     Lack of Transportation (Medical): No     Lack of Transportation (Non-Medical): No   Physical Activity: Patient Declined (11/8/2023)    Exercise Vital Sign     Days of Exercise per Week: Patient declined     Minutes of Exercise per Session: Patient declined   Stress: Patient Declined (11/8/2023)    [employer] [place] [employer] - Occupational Stress Questionnaire     Feeling of Stress : Patient declined   Social Connections: Unknown (11/8/2023)    Social Connection and Isolation Panel [NHANES]     Frequency of Communication with Friends and Family: More than three times a week     Frequency of Social Gatherings with Friends and Family: More than three times a week     Attends Sikh Services: Patient declined     Active Member of Clubs or Organizations: Patient declined     Attends Club or Organization Meetings: Patient declined     Marital Status:    Housing Stability:  Low Risk  (11/8/2023)    Housing Stability Vital Sign     Unable to Pay for Housing in the Last Year: No     Number of Places Lived in the Last Year: 1     Unstable Housing in the Last Year: No       Precautions:     Allergies as of 03/21/2024 - Reviewed 03/21/2024   Allergen Reaction Noted    Nystatin  03/30/2020       [facility] Assessment Details/Treatment     Podiatry consult in place- see photos in media- bone exposed to R 5th toe wound    L groin- red rash related to moisture  Scrotum- redness related to moisture      R hip- red rash with fungal appearance  Sacrum- non blanchable redness      Bilateral heels intact with no redness  R lateral foot with red/maroon areas- see photo    Recommednations discussed with pt, nurse and :  - Pressure injury prevention interventions - waffle overlay and heel boots  - Triad ointment to groin,scrotum,and sacrum BID     03/22/2024

## 2024-03-22 NOTE — ASSESSMENT & PLAN NOTE
Patient's FSGs are uncontrolled due to hyperglycemia on current medication regimen.  Last A1c reviewed-   Lab Results   Component Value Date    HGBA1C 8.3 (H) 03/15/2024     Most recent fingerstick glucose reviewed-   Recent Labs   Lab 03/21/24  2210 03/22/24  0609 03/22/24  1048 03/22/24  1559   POCTGLUCOSE 254* 251* 219* 289*       Current correctional scale  Low  Maintain anti-hyperglycemic dose as follows-   Antihyperglycemics (From admission, onward)    Start     Stop Route Frequency Ordered    03/21/24 2100  insulin detemir U-100 (Levemir) pen 10 Units         -- SubQ Nightly 03/21/24 1509    03/21/24 1600  insulin aspart U-100 pen 0-5 Units         -- SubQ Before meals & nightly PRN 03/21/24 1502        Hold Oral hypoglycemics while patient is in the hospital.

## 2024-03-22 NOTE — ASSESSMENT & PLAN NOTE
Peripheral Angio 4/11/2023    There was severe left infrapopliteal disease status post successful PTA of proximal peroneal artery with 3.5x80 mm IVL balloon and distal peroneal/posterior tibial artery with 2.5x40 mm PTA balloon with excellent angiographic results.    The Ost L ERNIE to Dist L ERNIE lesion was 100% stenosed.    The Ost L PTA to Dist L PTA lesion was 100% stenosed.    The Dist L Peroneal lesion was 99% stenosed with 10% stenosis post-intervention.    The Prox L Peroneal lesion was 99% stenosed with 10% stenosis post-intervention.    The estimated blood loss was <50 mL.    -known significant PAD  -recent periph angio s/p PPI as above  -RLE recs above

## 2024-03-22 NOTE — PROGRESS NOTES
[facility]  Medical Chronic Care Home Visit    Encounter Provider: Gladis Fermin   PCP: Kelvin Wayne MD  Consult Requested By: No ref. provider found    HISTORY OF PRESENT ILLNESS      Patient ID: Julien Meléndez is a 83 y.o. male is being seen in the home due to physical debility that presents a taxing effort to leave the home, to mitigate high risk of hospital readmission and/or due to the limited availability of reliable or safe options for transportation to the point of access to the provider. Prior to treatment on this visit the chart was reviewed and patient verbal consent was obtained.    Chronic medical conditions synopsis:    Julien Meléndez is an 84 yo male with a pmh including DM2, diabetic foot ulcer, osteolyelitis, dementia, MI, CAD, CHF, PAD, atrial fibrillation, HLD, HLD, KLESEY, CKD, anemia.     Today, he was seen for medical follow up. He appears to be at his baseline. No new complaints. He has diabetic foot wound to RLE with management per wound care NP. Patient has home health nurse and PT coming out as well. The patient has not yet received a new hospital bed, ordered after last visit. Wife denies need for refills. Pending on approval for meals on wheels and respite care services per [place] on Aging. Wife is requesting assistance with medication paperwork.       DECISION MAKING TODAY       Assessment & Plan:  1. Diabetic ulcer of right midfoot associated with type 2 diabetes mellitus, unspecified ulcer stage    --managed per wound care NP    2. Bedbound  --discussed order for new bed with [facility] today while in the home  --bed will be delivered  --cont HH with PT    3. Moderate protein-calorie malnutrition  Overview:  Noted by [facility]  last documented on [id number]    --stable, cont to monitor  --discussed nutrition with wife  --waiting on meals on wheels to be approved  --increase intake    4. PAF (paroxysmal atrial fibrillation)  --Chronic, stable on current  regimen. Followed by cardiology    5. Primary hypertension  -stable  -monitor       --will f/u in 2 weeks for paperwork request    ENVIRONMENT OF CARE      Family and/or Caregiver present at visit?  Yes  Name of Caregiver: wife  History provided by: caregiver    4Ms for Medical Decision-Making in Older Adults    Last Completed EAWV: None    MOBILITY:  Get Up and Go:       No data to display              Activities of Daily Living:       No data to display              Whisper Test:       No data to display              Disability Status:      10/24/2017     7:49 AM   Disability Status   Are you deaf or do you have serious difficulty hearing? N   Are you blind or do you have serious difficulty seeing, even when wearing glasses? N   Because of a physical, mental, or emotional condition, do you have serious difficulty concentrating, remembering, or making decisions? N   Do you have serious difficulty walking or climbing stairs? N   Do you have difficulty dressing or bathing? N   Because of a physical, mental, or emotional condition, do you have difficulty doing errands alone such as visiting a doctor's office or shopping? N     Nutrition Screening:       No data to display             Screening Score: 0-7 Malnourished, 8-11 At Risk, 12-14 Normal    MENTATION:   Depression Patient Health Questionnaire:      11/9/2022    10:29 AM   Depression Patient Health Questionnaire   Over the last two weeks how often have you been bothered by little interest or pleasure in doing things Not at all   Over the last two weeks how often have you been bothered by feeling down, depressed or hopeless Not at all   PHQ-2 Total Score 0   PHQ-9 Total Score 2   PHQ-9 Interpretation Minimal or None     Has Dementia Dx: Yes    Cognitive Function Screening:       No data to display              Cognitive Function Screening Total - Less than 4 = Abnormal,  Greater than or equal to 4 = Normal    MEDICATIONS:  High Risk Medications:  Total Active  Medications: 2  FLUoxetine - 20 MG, 40 MG  HYDROcodone-acetaminophen -  mg,  mg    WHAT MATTERS MOST:  Advance Care Planning   ACP Status:   Patient has had an ACP conversation  Living Will: No  Power of : No  [place]: No    What is most important right now is to focus on spending time at homeavoiding the hospitalremaining as independent as possiblesymptom/pain controlextending life as long as possible, even it it means sacrificing quality    Accordingly, we have decided that the best plan to meet the patient's goals includes continuing with treatment      What matters most to patient today is: new bed           Is patient hospice appropriate: No  (If needed, use PPS <30 or FAST score >7)  Was referral to hospice placed: No    Impression upon entering the home:  Physical Dwelling: single family home   Appearance of home environment: cleaniness: clean  Functional Status: max assistance  Mobility: bedbound  Nutritional access: adequate intake and access  Home Health: Yes,  Agency [facility]    DME/Supplies: hospital bed     Diagnostic tests reviewed/disposition: No diagnosic tests pending after this hospitalization.  Disease/illness education: Diabetes, CAD, and PAD  Establishment or re-establishment of referral orders for community resources: No other necessary community resources.   Discussion with other health care providers: No discussion with other health care providers necessary.   Does patient have a PCP at [facility]? Yes   Repatriation plan with PCP? Care at Home reason: mobility   Does patient have an ostomy (ileostomy, colostomy, suprapubic catheter, nephrostomy tube, tracheostomy, PEG tube, pleurex catheter, cholecystostomy, etc)? No  Were BPAs reviewed? Yes    Social History     Socioeconomic History    Marital status:    Tobacco Use    Smoking status: Never    Smokeless tobacco: Never   Substance and Sexual Activity    Alcohol use: No    Drug use: No     Social Determinants  of Health     Financial Resource Strain: Low Risk  (3/22/2024)    Overall Financial Resource Strain (CARDIA)     Difficulty of Paying Living Expenses: Not hard at all   Food Insecurity: No Food Insecurity (3/22/2024)    Hunger Vital Sign     Worried About Running Out of Food in the Last Year: Never true     Ran Out of Food in the Last Year: Never true   Transportation Needs: No Transportation Needs (3/22/2024)    PRAPARE - Transportation     Lack of Transportation (Medical): No     Lack of Transportation (Non-Medical): No   Physical Activity: Inactive (3/22/2024)    Exercise Vital Sign     Days of Exercise per Week: 0 days     Minutes of Exercise per Session: 0 min   Stress: Stress Concern Present (3/22/2024)    [employer] - Occupational Stress Questionnaire     Feeling of Stress : To some extent   Social Connections: Unknown (3/22/2024)    Social Connection and Isolation Panel [NHANES]     Frequency of Communication with Friends and Family: More than three times a week     Frequency of Social Gatherings with Friends and Family: More than three times a week     Attends Restorationist Services: Patient declined     Active Member of Clubs or Organizations: No     Attends Club or Organization Meetings: Patient declined     Marital Status:    Housing Stability: Low Risk  (3/22/2024)    Housing Stability Vital Sign     Unable to Pay for Housing in the Last Year: No     Number of Places Lived in the Last Year: 1     Unstable Housing in the Last Year: No         OBJECTIVE:     Vital Signs:  Vitals:    03/11/24 0800   BP: (!) 93/56   Pulse: 76   Resp: 16   Temp: 97.5 °F (36.4 °C)       Review of Systems   Constitutional:  Negative for chills and fever.   HENT:  Negative for congestion.    Eyes:  Negative for visual disturbance.   Respiratory:  Negative for shortness of breath.    Cardiovascular:  Negative for chest pain.   Gastrointestinal:  Negative for abdominal pain and nausea.    Genitourinary:  Negative for dysuria.   Musculoskeletal:  Negative for arthralgias.   Skin:  Positive for wound.   Neurological:  Positive for weakness. Negative for headaches.   Psychiatric/Behavioral:  Negative for agitation.        Physical Exam:  Physical Exam  Vitals reviewed.   Constitutional:       General: He is not in acute distress.     Appearance: He is ill-appearing.   HENT:      Head: Normocephalic and atraumatic.      Nose: Nose normal.      Mouth/Throat:      Mouth: Mucous membranes are moist.   Eyes:      Pupils: Pupils are equal, round, and reactive to light.   Cardiovascular:      Rate and Rhythm: Normal rate and regular rhythm.      Pulses: Normal pulses.      Heart sounds: Normal heart sounds.   Pulmonary:      Effort: Pulmonary effort is normal.      Breath sounds: Normal breath sounds.   Abdominal:      General: Bowel sounds are normal.      Palpations: Abdomen is soft.   Musculoskeletal:         General: No swelling.      Cervical back: Normal range of motion.   Skin:     General: Skin is warm and dry.      Findings: Lesion present.   Neurological:      Mental Status: He is alert. Mental status is at baseline. He is disoriented.      Motor: Weakness present.      Comments: Memory deficit   Psychiatric:         Behavior: Behavior normal.         INSTRUCTIONS FOR PATIENT:     Scheduled Follow-up, Appts Reviewed with Modifications if Needed: Yes  Future Appointments   Date Time Provider Department Center   4/15/2024  8:00 AM [id number]     No current facility-administered medications on file prior to visit.     Current Outpatient Medications on File Prior to Visit   Medication Sig Dispense Refill    alcohol swabs PadM Apply 1 each topically as needed (for use with glucose monitoring). 200 each 3    amLODIPine (NORVASC) 5 MG tablet Take 1 tablet (5 mg total) by mouth once daily.      atorvastatin (LIPITOR) 40 MG tablet Take 1 tablet (40 mg total) by mouth once  daily. (Patient taking differently: Take 40 mg by mouth every evening.) 90 tablet 3    blood sugar diagnostic (TRUE METRIX GLUCOSE TEST STRIP) Strp 1 strip by Misc.(Non-Drug; Combo Route) route 2 (two) times a day. 100 each 6    blood-glucose meter (TRUE METRIX AIR GLUCOSE METER) Misc USE AS DIRECTED 1 each 0    clopidogreL (PLAVIX) 75 mg tablet Take 1 tablet (75 mg total) by mouth once daily.      ELIQUIS 5 mg Tab Take 5 mg by mouth 2 (two) times daily.      FLUoxetine 40 MG capsule Take 40 mg by mouth once daily.      HYDROcodone-acetaminophen (NORCO)  mg per tablet Take 1 tablet by mouth every 6 (six) hours as needed for Pain. 5 tablet 0    isosorbide mononitrate (IMDUR) 30 MG 24 hr tablet Take 1 tablet (30 mg total) by mouth once daily. 90 tablet 3    lancets 28 gauge Misc 1 lancet  by Misc.(Non-Drug; Combo Route) route 2 (two) times a day. 200 each 3    LANTUS SOLOSTAR U-100 INSULIN glargine 100 units/mL SubQ pen Inject 15 Units into the skin every evening.      metoprolol succinate (TOPROL-XL) 25 MG 24 hr tablet Take 1 tablet (25 mg total) by mouth once daily. 90 tablet 3    nitroGLYCERIN (NITROSTAT) 0.4 MG SL tablet Place 0.4 mg under the tongue every 5 (five) minutes as needed for Chest pain.      ondansetron (ZOFRAN-ODT) 8 MG TbDL Take 1 tablet (8 mg total) by mouth 3 (three) times daily as needed (nausea or vomiting). 20 tablet 0    pantoprazole (PROTONIX) 40 MG tablet Take 1 tablet (40 mg total) by mouth 2 (two) times daily. 60 tablet 11    ranolazine (RANEXA) 1,000 mg Tb12 Take 1,000 mg by mouth 2 (two) times daily.      SITagliptan-metformin (JANUMET XR) 100-1,000 mg TM24 Take 1 tablet by mouth once daily. 30 tablet 11    traZODone (DESYREL) 150 MG tablet Take 1 tablet (150 mg total) by mouth every evening. 90 tablet 3          Medication Reconciliation:  Were medications changed during this appointment? No  Were medications in the home? yes  Is the patient taking the medications as directed?  Yes  Does the patient/caregiver understand the medications and changes? Yes  Does updated med list accurately reflects meds patient is currently taking? Yes    Signature: Gladis Fermin NP

## 2024-03-22 NOTE — CONSULTS
[facility] S[facility]  Podiatry  Consult Note    Patient Name: Julien Meléndez  MRN: [id number]  Admission Date: 3/21/2024  Hospital Length of Stay: 1 days  Attending Physician: Kelsey Stewart MD  Primary Care Provider: Kelvin Wayne MD     Inpatient consult to Podiatry  Consult performed by: Mac Allen DPM  Consult ordered by: Mac Allen DPM  Reason for consult: right foot septic arthritis, wounds.        Subjective:     History of Present Illness:  Julien Meléndez is a 83 y.o. male with previous amputations of the foot bilaterally, diabetes mellitus, CAD, CHF who presents to the ER with reports of right foot infection. Pts family states over the past 2 days, they noticed an increase in erythema and drainage to the site.  Patient states his right foot has been extremely painful especially at the distal forefoot at the 2nd MTPJ.  Patient admits to purulent drainage from site.  Patient states he does have some pain in digits 3 through 5 as well.  Patient's wife, daughter, and brother at present in the room as well who relay partial parts of the history.  They state patient has been dealing with wound for quite some time 1-2 years.  Wound is located on the medial forefoot.  Another wound is located in interdigital spaces as well as on the digit medial aspect.    Denies nausea, fever, vomiting, chills, chest pain, shortness for breath, headaches.  Admits to increased bowel movements      Scheduled Meds:   aspirin  81 mg Oral Daily    atorvastatin  40 mg Oral Daily    clopidogreL  75 mg Oral Daily    enoxparin  1 mg/kg Subcutaneous Q12H (prophylaxis, 0900/2100)    FLUoxetine  40 mg Oral Daily    furosemide  20 mg Oral Daily    insulin detemir U-100  10 Units Subcutaneous QHS    isosorbide mononitrate  30 mg Oral Daily    metoprolol succinate  25 mg Oral Daily    mupirocin   Nasal BID    pantoprazole  40 mg Oral BID    piperacillin-tazobactam (Zosyn) IV (PEDS and ADULTS) (extended infusion is not appropriate)  4.5  g Intravenous Q8H    ranolazine  1,000 mg Oral BID    senna-docusate 8.6-50 mg  1 tablet Oral BID    sodium chloride 0.9%  10 mL Intravenous Q8H    traZODone  150 mg Oral QHS    vancomycin (VANCOCIN) IV (PEDS and ADULTS)  2,000 mg Intravenous Q24H     Continuous Infusions:  PRN Meds:sodium chloride 0.9%, acetaminophen, albuterol-ipratropium, aluminum-magnesium hydroxide-simethicone, dextrose 10%, dextrose 10%, glucagon (human recombinant), glucose, glucose, HYDROcodone-acetaminophen, influenza 65up-adj, insulin aspart U-100, melatonin, ondansetron, ondansetron, simethicone, Pharmacy to dose Vancomycin consult **AND** vancomycin - pharmacy to dose    Review of patient's allergies indicates:   Allergen Reactions    Nystatin      Other reaction(s): Unknown        Past Medical History:   Diagnosis Date    Diabetes mellitus     Hiatal hernia     under Dr Saenz' care    Hyperlipidemia     Hypertension     MI (myocardial infarction)     Sleep apnea      Past Surgical History:   Procedure Laterality Date    ABDOMINAL HERNIA REPAIR      ANGIOGRAM, EXTREMITY, BILATERAL Bilateral 4/5/2023    Procedure: ANGIOGRAM, EXTREMITY, BILATERAL;  Surgeon: Michael Giraldo III, MD;  Location: [facility] CATH LAB;  Service: Cardiology;  Laterality: Bilateral;    ANGIOGRAPHY OF LOWER EXTREMITY Left 4/12/2023    Procedure: Angiogram Extremity Unilateral;  Surgeon: Michael Giraldo III, MD;  Location: [facility] CATH LAB;  Service: Cardiology;  Laterality: Left;    AORTOGRAPHY WITH SERIALOGRAPHY Bilateral 4/5/2023    Procedure: AORTOGRAM, WITH SERIALOGRAPHY;  Surgeon: Michael Giraldo III, MD;  Location: [facility] CATH LAB;  Service: Cardiology;  Laterality: Bilateral;    APPLICATION OF WOUND VACUUM-ASSISTED CLOSURE DEVICE Left 3/29/2023    Procedure: APPLICATION, WOUND VAC;  Surgeon: Alona Pierce DPM;  Location: [facility] OR;  Service: Podiatry;  Laterality: Left;    APPLICATION OF WOUND VACUUM-ASSISTED CLOSURE DEVICE Left 4/4/2023    Procedure:  APPLICATION, WOUND VAC;  Surgeon: Alona Pierce DPM;  Location: [facility] OR;  Service: Podiatry;  Laterality: Left;    APPLICATION OF WOUND VACUUM-ASSISTED CLOSURE DEVICE Left 4/13/2023    Procedure: APPLICATION, WOUND VAC;  Surgeon: Alona Pierce DPM;  Location: [facility] OR;  Service: Podiatry;  Laterality: Left;    BONE BIOPSY Left 3/29/2023    Procedure: BIOPSY, BONE;  Surgeon: Alona Pierce DPM;  Location: [facility] OR;  Service: Podiatry;  Laterality: Left;  1st met    CARDIAC CATHETERIZATION      3 stents placed    CATARACT EXTRACTION, BILATERAL      CLOSURE DEVICE Left 4/12/2023    Procedure: Placement of Closure Device;  Surgeon: Michael Giraldo III, MD;  Location: [facility] CATH LAB;  Service: Cardiology;  Laterality: Left;    CORONARY ANGIOGRAPHY N/A 10/3/2019    Procedure: ANGIOGRAM, CORONARY ARTERY;  Surgeon: Edwin Azul MD;  Location: [facility] CATH LAB/EP;  Service: Cardiology;  Laterality: N/A;    DEBRIDEMENT OF FOOT Left 3/29/2023    Procedure: DEBRIDEMENT, FOOT;  Surgeon: Alona Pierce DPM;  Location: [facility] OR;  Service: Podiatry;  Laterality: Left;    DEBRIDEMENT OF FOOT Left 4/4/2023    Procedure: DEBRIDEMENT, FOOT;  Surgeon: Alona Pierce DPM;  Location: [facility] OR;  Service: Podiatry;  Laterality: Left;    DEBRIDEMENT OF FOOT Left 4/13/2023    Procedure: DEBRIDEMENT, FOOT;  Surgeon: Alona Pierce DPM;  Location: [facility] OR;  Service: Podiatry;  Laterality: Left;    ESOPHAGOGASTRODUODENOSCOPY N/A 4/17/2023    Procedure: EGD (ESOPHAGOGASTRODUODENOSCOPY);  Surgeon: Otto Villarreal MD;  Location: [facility] ENDO;  Service: Endoscopy;  Laterality: N/A;    HERNIA REPAIR      inguinal    IVUS (INTRAVASCULAR ULTRASOUND) Left 4/12/2023    Procedure: ULTRASOUND, INTRAVASCULAR;  Surgeon: Michael Giraldo III, MD;  Location: [facility] CATH LAB;  Service: Cardiology;  Laterality: Left;    LEFT HEART CATHETERIZATION Left 9/13/2019    Procedure: Left heart cath;  Surgeon: Edwin Azul MD;  Location: [facility] CATH LAB/EP;  Service:  Cardiology;  Laterality: Left;    LEFT HEART CATHETERIZATION N/A 10/3/2019    Procedure: Left heart cath;  Surgeon: Edwin Azul MD;  Location: [facility] CATH LAB/EP;  Service: Cardiology;  Laterality: N/A;    OSTEOTOMY Left 6/7/2023    Procedure: OSTEOTOMY;  Surgeon: Alona Pierce DPM;  Location: [facility] OR;  Service: Podiatry;  Laterality: Left;    OSTEOTOMY OF METATARSAL BONE Right 4/4/2023    Procedure: OSTEOTOMY, METATARSAL BONE;  Surgeon: Alona Pierce DPM;  Location: [facility] OR;  Service: Podiatry;  Laterality: Right;    PERCUTANEOUS TRANSLUMINAL ANGIOPLASTY Left 4/12/2023    Procedure: PTA (ANGIOPLASTY, PERCUTANEOUS, TRANSLUMINAL);  Surgeon: Michael Giraldo III, MD;  Location: [facility] CATH LAB;  Service: Cardiology;  Laterality: Left;    SURGICAL REMOVAL OF BUNION WITH OSTEOTOMY OF METATARSAL BONE Right 4/4/2023    Procedure: BUNIONECTOMY, WITH METATARSAL OSTEOTOMY;  Surgeon: Alona Pierce DPM;  Location: [facility] OR;  Service: Podiatry;  Laterality: Right;    TOE AMPUTATION Left 4/4/2023    Procedure: AMPUTATION, TOE;  Surgeon: Alona Pierce DPM;  Location: [facility] OR;  Service: Podiatry;  Laterality: Left;  1st ray    TOE AMPUTATION Right 11/10/2023    Procedure: AMPUTATION, TOE;  Surgeon: Diya Leung DPM;  Location: [facility] OR;  Service: Podiatry;  Laterality: Right;    WASHOUT Left 6/7/2023    Procedure: WASHOUT;  Surgeon: Alona Pierce DPM;  Location: [facility] OR;  Service: Podiatry;  Laterality: Left;    WOUND DEBRIDEMENT Left 6/7/2023    Procedure: DEBRIDEMENT, WOUND;  Surgeon: Alona Pierce DPM;  Location: [facility] OR;  Service: Podiatry;  Laterality: Left;       Family History       Problem Relation (Age of Onset)    Heart disease Mother, Sister, Brother, Maternal Uncle, Brother, Sister, Maternal Uncle, Maternal Uncle, Maternal Uncle    Stroke Mother          Tobacco Use    Smoking status: Never    Smokeless tobacco: Never   Substance and Sexual Activity    Alcohol use: No    Drug use: No    Sexual activity: Not on  file     Review of Systems   Constitutional:  Negative for fatigue and fever.   Respiratory:  Negative for cough and shortness of breath.    Cardiovascular:  Negative for chest pain.   Gastrointestinal:  Negative for constipation, diarrhea, nausea and vomiting.   Genitourinary:  Negative for difficulty urinating and hematuria.   Skin:  Positive for wound (right foot).   Neurological:  Negative for headaches.     Objective:     Vital Signs (Most Recent):  Temp: 97.9 °F (36.6 °C) (03/22/24 1557)  Pulse: 81 (03/22/24 1652)  Resp: 18 (03/22/24 1557)  BP: 129/60 (03/22/24 1557)  SpO2: 96 % (03/22/24 1557) Vital Signs (24h Range):  Temp:  [97.7 °F (36.5 °C)-99.7 °F (37.6 °C)] 97.9 °F (36.6 °C)  Pulse:  [73-84] 81  Resp:  [14-22] 18  SpO2:  [95 %-99 %] 96 %  BP: (110-144)/(55-67) 129/60     Weight: 77.1 kg (169 lb 15.6 oz)  Body mass index is 21.82 kg/m².    Foot Exam    General  General Appearance: appears stated age and healthy   Orientation: alert and oriented to person, place, and time   Affect: appropriate       Right Foot/Ankle     Inspection and Palpation  Ecchymosis: none  Tenderness: none   Swelling: none   Skin Exam: drainage and erythema; no ulcer     Neurovascular  Dorsalis pedis: 1+  Posterior tibial: 1+  Saphenous nerve sensation: diminished  Tibial nerve sensation: diminished  Superficial peroneal nerve sensation: diminished  Deep peroneal nerve sensation: diminished  Sural nerve sensation: diminished    Comments  Status post 1st ray partial resection.  Two wounds located on the medial aspect of the forefoot.  No malodor noted.  Upon debridement, sanguinous-purulent drainage noted on manual expression.  Erythema and edema noted throughout the forefoot, most notably concentrated on the medial forefoot.    Fifth digit: Medial aspect with ulcer noted.  Probes to bone.  No purulent drainage noted on manual expression.  Slight surrounding erythema and edema noted.    Third and 4th digit:  Diffuse preulcerative  "lesions noted on medial and lateral aspects.      Left Foot/Ankle      Inspection and Palpation  Ecchymosis: none  Tenderness: none   Swelling: none   Skin Exam: skin not intact     Neurovascular  Dorsalis pedis: 1+  Posterior tibial: 1+  Saphenous nerve sensation: diminished  Tibial nerve sensation: diminished  Superficial peroneal nerve sensation: diminished  Deep peroneal nerve sensation: diminished  Sural nerve sensation: diminished    Comments  No open wounds.                          Laboratory:  A1C:   Recent Labs   Lab 10/23/23  1426 03/15/24  1140   HGBA1C 9.7* 8.3*     BMP:   Recent Labs   Lab 03/22/24  0457   *      K 4.1      CO2 26   BUN 21   CREATININE 1.0   CALCIUM 9.0   MG 1.7     CBC:   Recent Labs   Lab 03/22/24  0457   WBC 9.43   RBC 3.72*   HGB 10.2*   HCT 30.5*      MCV 82   MCH 27.4   MCHC 33.4     CMP:   Recent Labs   Lab 03/21/24  1342 03/22/24  0457   * 271*   CALCIUM 9.4 9.0   ALBUMIN 2.9*  --    PROT 7.1  --     136   K 4.4 4.1   CO2 30* 26    100   BUN 21 21   CREATININE 1.0 1.0   ALKPHOS 91  --    ALT 9*  --    AST 6*  --    BILITOT 0.4  --      CRP: No results for input(s): "CRP" in the last 168 hours.  ESR: No results for input(s): "SEDRATE" in the last 168 hours.  Wound Cultures:   Recent Labs   Lab 10/31/23  1500 11/07/23  2208 11/10/23  1249 12/12/23  1346   LABAERO METHICILLIN RESISTANT STAPHYLOCOCCUS AUREUS  Moderate  * METHICILLIN RESISTANT STAPHYLOCOCCUS AUREUS  Many  Skin amy also present  * METHICILLIN RESISTANT STAPHYLOCOCCUS AUREUS  Many  * CANDIDA ALBICANS  Few  *     Microbiology Results (last 7 days)       Procedure Component Value Units Date/Time    Culture, Anaerobe [[id number]] Collected: 03/22/24 1513    Order Status: Sent Specimen: Wound from Foot, Right Updated: 03/22/24 1522    Aerobic culture [[id number]] Collected: 03/22/24 1513    Order Status: Sent Specimen: Wound from Foot, Right Updated: 03/22/24 1522    Culture, " Anaerobe [[id number]] Collected: 03/22/24 1513    Order Status: Sent Specimen: Wound from Foot, Right Updated: 03/22/24 1521    Aerobic culture [[id number]] Collected: 03/22/24 1513    Order Status: Sent Specimen: Wound from Foot, Right Updated: 03/22/24 1520    Blood culture #2 **CANNOT BE ORDERED STAT** [[id number]] Collected: 03/21/24 1343    Order Status: Completed Specimen: Blood from Peripheral, Antecubital, Right Updated: 03/22/24 0315     Blood Culture, Routine No Growth to date    Blood culture [[id number]] Collected: 03/21/24 1758    Order Status: Completed Specimen: Blood Updated: 03/22/24 0315     Blood Culture, Routine No Growth to date    Blood culture [[id number]] Collected: 03/21/24 1757    Order Status: Completed Specimen: Blood Updated: 03/22/24 0315     Blood Culture, Routine No Growth to date    Blood culture #1 **CANNOT BE ORDERED STAT** [[id number]] Collected: 03/21/24 1352    Order Status: Completed Specimen: Blood from Wrist, Right Updated: 03/22/24 0315     Blood Culture, Routine No Growth to date          Specimen (24h ago, onward)      None            Diagnostic Results:  I have reviewed all pertinent imaging results/findings within the past 24 hours.      Assessment/Plan:     ID  Osteomyelitis of right foot  Julien Meléndez is a 83 y.o. male with IDSA moderate infection.  X-ray and MRI shows osteomyelitis of the 2nd MTPJ, with possible septic arthritis of the 2nd MTPJ and digits 3 through 5.  WBC count within normal limits, vital signs are stable.  Known critical limb ischemia of right lower extremity.  Interventional cardiology consulted and planning peripheral angiogram of the bilateral lower extremity on Monday 3/25.     - Debrided medial forefoot wounds, drained 2nd MTPJ with copious amounts of purulent drainage.  Patient tolerated procedure well.  Cultured 2nd MTPJ, and cultured 5th digit medial wound.  Follow up results.  - interventional cardiology consulted, angiogram planned  Monday 3/25.  Appreciate recs.  - discussed at length surgical versus conservative treatment options with patient.  Discussed risks and benefits of both.  After lengthy discussion, patient is amenable to transmetatarsal amputation of the right foot.  Family at bedside agrees.  Planning to amputate after angiogram and intervention.  - dressed wound as follows:  Painted with Betadine, then dressed with gauze, Kerlix, and Ace wrap.  Wound care orders to follow.  - continue wearing heel offloading boots.  - if patient has white blood cell count spike and unstable vitals, please contact Podiatry immediately for immediate surgical intervention.  If not, for best patient results, opting for amputation after vascular intervention.        Thank you for your consult. I will follow-up with patient. Please contact us if you have any additional questions.    Mac Allen DPM  Podiatry  [place] - Med Surg

## 2024-03-22 NOTE — ASSESSMENT & PLAN NOTE
Patient's FSGs are uncontrolled due to hyperglycemia on current medication regimen.  Last A1c reviewed-   Lab Results   Component Value Date    HGBA1C 8.3 (H) 03/15/2024     Most recent fingerstick glucose reviewed-   Recent Labs   Lab 03/21/24  1715 03/21/24  2210   POCTGLUCOSE 208* 254*     Current correctional scale  Low  Maintain anti-hyperglycemic dose as follows-   Antihyperglycemics (From admission, onward)      Start     Stop Route Frequency Ordered    03/21/24 2100  insulin detemir U-100 (Levemir) pen 10 Units         -- SubQ Nightly 03/21/24 1509    03/21/24 1600  insulin aspart U-100 pen 0-5 Units         -- SubQ Before meals & nightly PRN 03/21/24 1502          Hold Oral hypoglycemics while patient is in the hospital.

## 2024-03-22 NOTE — SUBJECTIVE & OBJECTIVE
Pt dc aaox3  patent airway & steady gait out of er with friend to drive home; pt verbalized understanding of dc teaching.    Scheduled Meds:   aspirin  81 mg Oral Daily    atorvastatin  40 mg Oral Daily    clopidogreL  75 mg Oral Daily    enoxparin  1 mg/kg Subcutaneous Q12H (prophylaxis, 0900/2100)    FLUoxetine  40 mg Oral Daily    furosemide  20 mg Oral Daily    insulin detemir U-100  10 Units Subcutaneous QHS    isosorbide mononitrate  30 mg Oral Daily    metoprolol succinate  25 mg Oral Daily    mupirocin   Nasal BID    pantoprazole  40 mg Oral BID    piperacillin-tazobactam (Zosyn) IV (PEDS and ADULTS) (extended infusion is not appropriate)  4.5 g Intravenous Q8H    ranolazine  1,000 mg Oral BID    senna-docusate 8.6-50 mg  1 tablet Oral BID    sodium chloride 0.9%  10 mL Intravenous Q8H    traZODone  150 mg Oral QHS    vancomycin (VANCOCIN) IV (PEDS and ADULTS)  2,000 mg Intravenous Q24H     Continuous Infusions:  PRN Meds:sodium chloride 0.9%, acetaminophen, albuterol-ipratropium, aluminum-magnesium hydroxide-simethicone, dextrose 10%, dextrose 10%, glucagon (human recombinant), glucose, glucose, HYDROcodone-acetaminophen, influenza 65up-adj, insulin aspart U-100, melatonin, ondansetron, ondansetron, simethicone, Pharmacy to dose Vancomycin consult **AND** vancomycin - pharmacy to dose    Review of patient's allergies indicates:   Allergen Reactions    Nystatin      Other reaction(s): Unknown        Past Medical History:   Diagnosis Date    Diabetes mellitus     Hiatal hernia     under Dr Saenz' care    Hyperlipidemia     Hypertension     MI (myocardial infarction)     Sleep apnea      Past Surgical History:   Procedure Laterality Date    ABDOMINAL HERNIA REPAIR      ANGIOGRAM, EXTREMITY, BILATERAL Bilateral 4/5/2023    Procedure: ANGIOGRAM, EXTREMITY, BILATERAL;  Surgeon: Michael Giraldo III, MD;  Location: [facility] CATH LAB;  Service: Cardiology;  Laterality: Bilateral;    ANGIOGRAPHY OF LOWER EXTREMITY Left 4/12/2023    Procedure: Angiogram Extremity Unilateral;  Surgeon: Michael Giraldo III, MD;  Location: [facility] CATH LAB;   Service: Cardiology;  Laterality: Left;    AORTOGRAPHY WITH SERIALOGRAPHY Bilateral 4/5/2023    Procedure: AORTOGRAM, WITH SERIALOGRAPHY;  Surgeon: Michael Giraldo III, MD;  Location: [facility] CATH LAB;  Service: Cardiology;  Laterality: Bilateral;    APPLICATION OF WOUND VACUUM-ASSISTED CLOSURE DEVICE Left 3/29/2023    Procedure: APPLICATION, WOUND VAC;  Surgeon: Alona Pierce DPM;  Location: [facility] OR;  Service: Podiatry;  Laterality: Left;    APPLICATION OF WOUND VACUUM-ASSISTED CLOSURE DEVICE Left 4/4/2023    Procedure: APPLICATION, WOUND VAC;  Surgeon: Alona Pierce DPM;  Location: [facility] OR;  Service: Podiatry;  Laterality: Left;    APPLICATION OF WOUND VACUUM-ASSISTED CLOSURE DEVICE Left 4/13/2023    Procedure: APPLICATION, WOUND VAC;  Surgeon: Alona Pierce DPM;  Location: [facility] OR;  Service: Podiatry;  Laterality: Left;    BONE BIOPSY Left 3/29/2023    Procedure: BIOPSY, BONE;  Surgeon: Alona Pierce DPM;  Location: [facility] OR;  Service: Podiatry;  Laterality: Left;  1st met    CARDIAC CATHETERIZATION      3 stents placed    CATARACT EXTRACTION, BILATERAL      CLOSURE DEVICE Left 4/12/2023    Procedure: Placement of Closure Device;  Surgeon: Michael Giraldo III, MD;  Location: [facility] CATH LAB;  Service: Cardiology;  Laterality: Left;    CORONARY ANGIOGRAPHY N/A 10/3/2019    Procedure: ANGIOGRAM, CORONARY ARTERY;  Surgeon: Edwin Azul MD;  Location: [facility] CATH LAB/EP;  Service: Cardiology;  Laterality: N/A;    DEBRIDEMENT OF FOOT Left 3/29/2023    Procedure: DEBRIDEMENT, FOOT;  Surgeon: Alona Pierce DPM;  Location: [facility] OR;  Service: Podiatry;  Laterality: Left;    DEBRIDEMENT OF FOOT Left 4/4/2023    Procedure: DEBRIDEMENT, FOOT;  Surgeon: Alona Pierce DPM;  Location: [facility] OR;  Service: Podiatry;  Laterality: Left;    DEBRIDEMENT OF FOOT Left 4/13/2023    Procedure: DEBRIDEMENT, FOOT;  Surgeon: Alona Pierce DPM;  Location: [facility] OR;  Service: Podiatry;  Laterality: Left;    ESOPHAGOGASTRODUODENOSCOPY N/A  4/17/2023    Procedure: EGD (ESOPHAGOGASTRODUODENOSCOPY);  Surgeon: Otto Villarreal MD;  Location: [facility] ENDO;  Service: Endoscopy;  Laterality: N/A;    HERNIA REPAIR      inguinal    IVUS (INTRAVASCULAR ULTRASOUND) Left 4/12/2023    Procedure: ULTRASOUND, INTRAVASCULAR;  Surgeon: Michael Giraldo III, MD;  Location: [facility] CATH LAB;  Service: Cardiology;  Laterality: Left;    LEFT HEART CATHETERIZATION Left 9/13/2019    Procedure: Left heart cath;  Surgeon: Edwin Azul MD;  Location: [facility] CATH LAB/EP;  Service: Cardiology;  Laterality: Left;    LEFT HEART CATHETERIZATION N/A 10/3/2019    Procedure: Left heart cath;  Surgeon: Edwin Azul MD;  Location: [facility] CATH LAB/EP;  Service: Cardiology;  Laterality: N/A;    OSTEOTOMY Left 6/7/2023    Procedure: OSTEOTOMY;  Surgeon: Alona Pierce DPM;  Location: [facility] OR;  Service: Podiatry;  Laterality: Left;    OSTEOTOMY OF METATARSAL BONE Right 4/4/2023    Procedure: OSTEOTOMY, METATARSAL BONE;  Surgeon: Alona Pierce DPM;  Location: [facility] OR;  Service: Podiatry;  Laterality: Right;    PERCUTANEOUS TRANSLUMINAL ANGIOPLASTY Left 4/12/2023    Procedure: PTA (ANGIOPLASTY, PERCUTANEOUS, TRANSLUMINAL);  Surgeon: Michael Giraldo III, MD;  Location: [facility] CATH LAB;  Service: Cardiology;  Laterality: Left;    SURGICAL REMOVAL OF BUNION WITH OSTEOTOMY OF METATARSAL BONE Right 4/4/2023    Procedure: BUNIONECTOMY, WITH METATARSAL OSTEOTOMY;  Surgeon: Alona Pierce DPM;  Location: [facility] OR;  Service: Podiatry;  Laterality: Right;    TOE AMPUTATION Left 4/4/2023    Procedure: AMPUTATION, TOE;  Surgeon: Alona Pierce DPM;  Location: [facility] OR;  Service: Podiatry;  Laterality: Left;  1st ray    TOE AMPUTATION Right 11/10/2023    Procedure: AMPUTATION, TOE;  Surgeon: Diya Leung DPM;  Location: [facility] OR;  Service: Podiatry;  Laterality: Right;    WASHOUT Left 6/7/2023    Procedure: WASHOUT;  Surgeon: Alona Pierce DPM;  Location: [facility] OR;  Service: Podiatry;  Laterality: Left;     WOUND DEBRIDEMENT Left 6/7/2023    Procedure: DEBRIDEMENT, WOUND;  Surgeon: Alona Pierce DPM;  Location: [facility] OR;  Service: Podiatry;  Laterality: Left;       Family History       Problem Relation (Age of Onset)    Heart disease Mother, Sister, Brother, Maternal Uncle, Brother, Sister, Maternal Uncle, Maternal Uncle, Maternal Uncle    Stroke Mother          Tobacco Use    Smoking status: Never    Smokeless tobacco: Never   Substance and Sexual Activity    Alcohol use: No    Drug use: No    Sexual activity: Not on file     Review of Systems   Constitutional:  Negative for fatigue and fever.   Respiratory:  Negative for cough and shortness of breath.    Cardiovascular:  Negative for chest pain.   Gastrointestinal:  Negative for constipation, diarrhea, nausea and vomiting.   Genitourinary:  Negative for difficulty urinating and hematuria.   Skin:  Positive for wound (right foot).   Neurological:  Negative for headaches.     Objective:     Vital Signs (Most Recent):  Temp: 97.9 °F (36.6 °C) (03/22/24 1557)  Pulse: 81 (03/22/24 1652)  Resp: 18 (03/22/24 1557)  BP: 129/60 (03/22/24 1557)  SpO2: 96 % (03/22/24 1557) Vital Signs (24h Range):  Temp:  [97.7 °F (36.5 °C)-99.7 °F (37.6 °C)] 97.9 °F (36.6 °C)  Pulse:  [73-84] 81  Resp:  [14-22] 18  SpO2:  [95 %-99 %] 96 %  BP: (110-144)/(55-67) 129/60     Weight: 77.1 kg (169 lb 15.6 oz)  Body mass index is 21.82 kg/m².    Foot Exam    General  General Appearance: appears stated age and healthy   Orientation: alert and oriented to person, place, and time   Affect: appropriate       Right Foot/Ankle     Inspection and Palpation  Ecchymosis: none  Tenderness: none   Swelling: none   Skin Exam: drainage and erythema; no ulcer     Neurovascular  Dorsalis pedis: 1+  Posterior tibial: 1+  Saphenous nerve sensation: diminished  Tibial nerve sensation: diminished  Superficial peroneal nerve sensation: diminished  Deep peroneal nerve sensation: diminished  Sural nerve sensation:  "diminished    Comments  Status post 1st ray partial resection.  Two wounds located on the medial aspect of the forefoot.  No malodor noted.  Upon debridement, sanguinous-purulent drainage noted on manual expression.  Erythema and edema noted throughout the forefoot, most notably concentrated on the medial forefoot.    Fifth digit: Medial aspect with ulcer noted.  Probes to bone.  No purulent drainage noted on manual expression.  Slight surrounding erythema and edema noted.    Third and 4th digit:  Diffuse preulcerative lesions noted on medial and lateral aspects.      Left Foot/Ankle      Inspection and Palpation  Ecchymosis: none  Tenderness: none   Swelling: none   Skin Exam: skin not intact     Neurovascular  Dorsalis pedis: 1+  Posterior tibial: 1+  Saphenous nerve sensation: diminished  Tibial nerve sensation: diminished  Superficial peroneal nerve sensation: diminished  Deep peroneal nerve sensation: diminished  Sural nerve sensation: diminished    Comments  No open wounds.                          Laboratory:  A1C:   Recent Labs   Lab 10/23/23  1426 03/15/24  1140   HGBA1C 9.7* 8.3*     BMP:   Recent Labs   Lab 03/22/24  0457   *      K 4.1      CO2 26   BUN 21   CREATININE 1.0   CALCIUM 9.0   MG 1.7     CBC:   Recent Labs   Lab 03/22/24  0457   WBC 9.43   RBC 3.72*   HGB 10.2*   HCT 30.5*      MCV 82   MCH 27.4   MCHC 33.4     CMP:   Recent Labs   Lab 03/21/24  1342 03/22/24  0457   * 271*   CALCIUM 9.4 9.0   ALBUMIN 2.9*  --    PROT 7.1  --     136   K 4.4 4.1   CO2 30* 26    100   BUN 21 21   CREATININE 1.0 1.0   ALKPHOS 91  --    ALT 9*  --    AST 6*  --    BILITOT 0.4  --      CRP: No results for input(s): "CRP" in the last 168 hours.  ESR: No results for input(s): "SEDRATE" in the last 168 hours.  Wound Cultures:   Recent Labs   Lab 10/31/23  1500 11/07/23  2208 11/10/23  1249 12/12/23  1346   LABAERO METHICILLIN RESISTANT STAPHYLOCOCCUS AUREUS  Moderate  * " METHICILLIN RESISTANT STAPHYLOCOCCUS AUREUS  Many  Skin amy also present  * METHICILLIN RESISTANT STAPHYLOCOCCUS AUREUS  Many  * CANDIDA ALBICANS  Few  *     Microbiology Results (last 7 days)       Procedure Component Value Units Date/Time    Culture, Anaerobe [[id number]] Collected: 03/22/24 1513    Order Status: Sent Specimen: Wound from Foot, Right Updated: 03/22/24 1522    Aerobic culture [[id number]] Collected: 03/22/24 1513    Order Status: Sent Specimen: Wound from Foot, Right Updated: 03/22/24 1522    Culture, Anaerobe [[id number]] Collected: 03/22/24 1513    Order Status: Sent Specimen: Wound from Foot, Right Updated: 03/22/24 1521    Aerobic culture [[id number]] Collected: 03/22/24 1513    Order Status: Sent Specimen: Wound from Foot, Right Updated: 03/22/24 1520    Blood culture #2 **CANNOT BE ORDERED STAT** [[id number]] Collected: 03/21/24 1343    Order Status: Completed Specimen: Blood from Peripheral, Antecubital, Right Updated: 03/22/24 0315     Blood Culture, Routine No Growth to date    Blood culture [[id number]] Collected: 03/21/24 1758    Order Status: Completed Specimen: Blood Updated: 03/22/24 0315     Blood Culture, Routine No Growth to date    Blood culture [[id number]] Collected: 03/21/24 1757    Order Status: Completed Specimen: Blood Updated: 03/22/24 0315     Blood Culture, Routine No Growth to date    Blood culture #1 **CANNOT BE ORDERED STAT** [[id number]] Collected: 03/21/24 1352    Order Status: Completed Specimen: Blood from Wrist, Right Updated: 03/22/24 0315     Blood Culture, Routine No Growth to date          Specimen (24h ago, onward)      None            Diagnostic Results:  I have reviewed all pertinent imaging results/findings within the past 24 hours.

## 2024-03-22 NOTE — HPI
Julien Meléndez is a 83 y.o. male with previous amputations of the foot bilaterally, diabetes mellitus, CAD, CHF who presents to the ER with reports of right foot infection. Pts family states over the past 2 days, they noticed an increase in erythema and drainage to the site.  Patient states his right foot has been extremely painful especially at the distal forefoot at the 2nd MTPJ.  Patient admits to purulent drainage from site.  Patient states he does have some pain in digits 3 through 5 as well.  Patient's wife, daughter, and brother at present in the room as well who relay partial parts of the history.  They state patient has been dealing with wound for quite some time 1-2 years.  Wound is located on the medial forefoot.  Another wound is located in interdigital spaces as well as on the digit medial aspect.    Denies nausea, fever, vomiting, chills, chest pain, shortness for breath, headaches.  Admits to increased bowel movements

## 2024-03-22 NOTE — ASSESSMENT & PLAN NOTE
"-euvolemic on exam  Echo    Interpretation Summary    Left Ventricle: There is low normal systolic function with a visually estimated ejection fraction of 50 - 55%. Ejection fraction by visual approximation is 50%.    Right Ventricle: Normal right ventricular cavity size. Wall thickness is normal. Right ventricle wall motion  is normal. Systolic function is normal.    Aortic Valve: There is mild aortic valve sclerosis.    Mitral Valve: There is moderate mitral annular calcification present.    Pericardium: There is a small effusion.    The study was difficult due to patient's body habitus and poor endocardial visualization.    Valves are not well visualized  .CHF. Last BNP reviewed- and noted below No results for input(s): "BNP", "BNPTRIAGEBLO" in the last 168 hours.  -continue to monitor  "

## 2024-03-22 NOTE — PROGRESS NOTES
Pharmacist Renal Dose Adjustment Note    Julien Meléndez is a 83 y.o. male being treated with the medication zosyn    Patient Data:    Vital Signs (Most Recent):  Temp: 97.9 °F (36.6 °C) (03/21/24 2033)  Pulse: 78 (03/22/24 0000)  Resp: 19 (03/22/24 0000)  BP: (!) 144/64 (03/21/24 2033)  SpO2: 97 % (03/22/24 0000) Vital Signs (72h Range):  Temp:  [97.9 °F (36.6 °C)-98.4 °F (36.9 °C)]   Pulse:  [73-84]   Resp:  [16-22]   BP: (110-147)/(57-67)   SpO2:  [97 %-99 %]      Recent Labs   Lab 03/15/24  1140 03/21/24  1342   CREATININE 0.85 1.0     Serum creatinine: 1 mg/dL 03/21/24 1342  Estimated creatinine clearance: 61.1 mL/min    Medication:zosyn dose: 4.5G frequency q6h will be changed to medication:zosyn dose:4.5G frequency:q8h (extended infusion)    Pharmacist's Name: Rupal Vega  Pharmacist's Extension: [phone]

## 2024-03-22 NOTE — ASSESSMENT & PLAN NOTE
-history of Afib  -holding Eliquis at this time in case of any procedure  -ordered full-dose Lovenox for now  -continue cardiac tele monitoring

## 2024-03-22 NOTE — HPI
Julien Meléndez is a 83 yr old male with DM, HTN, HLD, CAD s/p PCI dLAD 2019, PAD present to ER with reports of R foot infection. Patient states that over the past 2d R foot wound has worsened.     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 Abnormal edema signal involving the 3rd metatarsophalangeal joint, 4th proximal interphalangeal joint and 5th distal interphalangeal joint concerning for reactive edema or early changes of septic arthritis. Will cont vancomycin and Zosyn and will admit to the [facility] for further care.     Patient with significant PAD hx. Recent peripheral angio with PTA of L proximal peroneal artery with 3.5x80 mm IVL balloon and distal peroneal/posterior tibial artery with 2.5x40 mm PTA balloon with excellent angiographic results (04/2023 per Dr. Giraldo). Now with ongoing issues to R leg.

## 2024-03-23 LAB
ANION GAP SERPL CALC-SCNC: 9 MMOL/L (ref 8–16)
BASOPHILS # BLD AUTO: 0.04 K/UL (ref 0–0.2)
BASOPHILS NFR BLD: 0.4 % (ref 0–1.9)
BUN SERPL-MCNC: 16 MG/DL (ref 8–23)
CALCIUM SERPL-MCNC: 9.1 MG/DL (ref 8.7–10.5)
CHLORIDE SERPL-SCNC: 100 MMOL/L (ref 95–110)
CO2 SERPL-SCNC: 28 MMOL/L (ref 23–29)
CREAT SERPL-MCNC: 0.9 MG/DL (ref 0.5–1.4)
DIFFERENTIAL METHOD BLD: ABNORMAL
EOSINOPHIL # BLD AUTO: 0.3 K/UL (ref 0–0.5)
EOSINOPHIL NFR BLD: 3.2 % (ref 0–8)
ERYTHROCYTE [DISTWIDTH] IN BLOOD BY AUTOMATED COUNT: 16.5 % (ref 11.5–14.5)
EST. GFR  (NO RACE VARIABLE): >60 ML/MIN/1.73 M^2
GLUCOSE SERPL-MCNC: 170 MG/DL (ref 70–110)
HCT VFR BLD AUTO: 31.7 % (ref 40–54)
HGB BLD-MCNC: 10.3 G/DL (ref 14–18)
IMM GRANULOCYTES # BLD AUTO: 0.07 K/UL (ref 0–0.04)
IMM GRANULOCYTES NFR BLD AUTO: 0.7 % (ref 0–0.5)
LYMPHOCYTES # BLD AUTO: 1.9 K/UL (ref 1–4.8)
LYMPHOCYTES NFR BLD: 19.9 % (ref 18–48)
MAGNESIUM SERPL-MCNC: 1.7 MG/DL (ref 1.6–2.6)
MCH RBC QN AUTO: 26.8 PG (ref 27–31)
MCHC RBC AUTO-ENTMCNC: 32.5 G/DL (ref 32–36)
MCV RBC AUTO: 83 FL (ref 82–98)
MONOCYTES # BLD AUTO: 1 K/UL (ref 0.3–1)
MONOCYTES NFR BLD: 10.7 % (ref 4–15)
NEUTROPHILS # BLD AUTO: 6.2 K/UL (ref 1.8–7.7)
NEUTROPHILS NFR BLD: 65.1 % (ref 38–73)
NRBC BLD-RTO: 0 /100 WBC
PLATELET # BLD AUTO: 240 K/UL (ref 150–450)
PMV BLD AUTO: 10.9 FL (ref 9.2–12.9)
POCT GLUCOSE: 181 MG/DL (ref 70–110)
POCT GLUCOSE: 225 MG/DL (ref 70–110)
POCT GLUCOSE: 243 MG/DL (ref 70–110)
POCT GLUCOSE: 244 MG/DL (ref 70–110)
POCT GLUCOSE: 264 MG/DL (ref 70–110)
POCT GLUCOSE: 264 MG/DL (ref 70–110)
POTASSIUM SERPL-SCNC: 3.8 MMOL/L (ref 3.5–5.1)
RBC # BLD AUTO: 3.84 M/UL (ref 4.6–6.2)
SODIUM SERPL-SCNC: 137 MMOL/L (ref 136–145)
VANCOMYCIN TROUGH SERPL-MCNC: 15 UG/ML (ref 10–22)
WBC # BLD AUTO: 9.56 K/UL (ref 3.9–12.7)

## 2024-03-23 PROCEDURE — 25000003 PHARM REV CODE 250: Performed by: NURSE PRACTITIONER

## 2024-03-23 PROCEDURE — 83735 ASSAY OF MAGNESIUM: CPT | Performed by: NURSE PRACTITIONER

## 2024-03-23 PROCEDURE — 63600175 PHARM REV CODE 636 W HCPCS: Performed by: STUDENT IN AN ORGANIZED HEALTH CARE EDUCATION/TRAINING PROGRAM

## 2024-03-23 PROCEDURE — 99233 SBSQ HOSP IP/OBS HIGH 50: CPT | Mod: ,,, | Performed by: STUDENT IN AN ORGANIZED HEALTH CARE EDUCATION/TRAINING PROGRAM

## 2024-03-23 PROCEDURE — 21400001 HC TELEMETRY ROOM

## 2024-03-23 PROCEDURE — 94660 CPAP INITIATION&MGMT: CPT

## 2024-03-23 PROCEDURE — A4216 STERILE WATER/SALINE, 10 ML: HCPCS | Performed by: NURSE PRACTITIONER

## 2024-03-23 PROCEDURE — 99232 SBSQ HOSP IP/OBS MODERATE 35: CPT | Mod: ,,, | Performed by: INTERNAL MEDICINE

## 2024-03-23 PROCEDURE — 5A09357 ASSISTANCE WITH RESPIRATORY VENTILATION, LESS THAN 24 CONSECUTIVE HOURS, CONTINUOUS POSITIVE AIRWAY PRESSURE: ICD-10-PCS | Performed by: INTERNAL MEDICINE

## 2024-03-23 PROCEDURE — 36415 COLL VENOUS BLD VENIPUNCTURE: CPT | Performed by: NURSE PRACTITIONER

## 2024-03-23 PROCEDURE — 80202 ASSAY OF VANCOMYCIN: CPT | Performed by: STUDENT IN AN ORGANIZED HEALTH CARE EDUCATION/TRAINING PROGRAM

## 2024-03-23 PROCEDURE — 25000003 PHARM REV CODE 250: Performed by: STUDENT IN AN ORGANIZED HEALTH CARE EDUCATION/TRAINING PROGRAM

## 2024-03-23 PROCEDURE — 85025 COMPLETE CBC W/AUTO DIFF WBC: CPT | Performed by: NURSE PRACTITIONER

## 2024-03-23 PROCEDURE — 94761 N-INVAS EAR/PLS OXIMETRY MLT: CPT

## 2024-03-23 PROCEDURE — 80048 BASIC METABOLIC PNL TOTAL CA: CPT | Performed by: NURSE PRACTITIONER

## 2024-03-23 PROCEDURE — 99900035 HC TECH TIME PER 15 MIN (STAT)

## 2024-03-23 PROCEDURE — 36415 COLL VENOUS BLD VENIPUNCTURE: CPT | Mod: XB | Performed by: STUDENT IN AN ORGANIZED HEALTH CARE EDUCATION/TRAINING PROGRAM

## 2024-03-23 RX ADMIN — Medication 10 ML: at 06:03

## 2024-03-23 RX ADMIN — PIPERACILLIN AND TAZOBACTAM 4.5 G: 4; .5 INJECTION, POWDER, LYOPHILIZED, FOR SOLUTION INTRAVENOUS; PARENTERAL at 09:03

## 2024-03-23 RX ADMIN — PANTOPRAZOLE SODIUM 40 MG: 40 TABLET, DELAYED RELEASE ORAL at 09:03

## 2024-03-23 RX ADMIN — SENNOSIDES AND DOCUSATE SODIUM 1 TABLET: 8.6; 5 TABLET ORAL at 09:03

## 2024-03-23 RX ADMIN — MUPIROCIN: 20 OINTMENT TOPICAL at 09:03

## 2024-03-23 RX ADMIN — ATORVASTATIN CALCIUM 40 MG: 40 TABLET, FILM COATED ORAL at 08:03

## 2024-03-23 RX ADMIN — ENOXAPARIN SODIUM 80 MG: 80 INJECTION SUBCUTANEOUS at 09:03

## 2024-03-23 RX ADMIN — HYDROCODONE BITARTRATE AND ACETAMINOPHEN 1 TABLET: 10; 325 TABLET ORAL at 11:03

## 2024-03-23 RX ADMIN — INSULIN ASPART 2 UNITS: 100 INJECTION, SOLUTION INTRAVENOUS; SUBCUTANEOUS at 11:03

## 2024-03-23 RX ADMIN — Medication 10 ML: at 09:03

## 2024-03-23 RX ADMIN — Medication 6 MG: at 09:03

## 2024-03-23 RX ADMIN — FLUOXETINE HYDROCHLORIDE 40 MG: 20 CAPSULE ORAL at 08:03

## 2024-03-23 RX ADMIN — FUROSEMIDE 20 MG: 20 TABLET ORAL at 08:03

## 2024-03-23 RX ADMIN — Medication 10 ML: at 04:03

## 2024-03-23 RX ADMIN — INSULIN ASPART 1 UNITS: 100 INJECTION, SOLUTION INTRAVENOUS; SUBCUTANEOUS at 10:03

## 2024-03-23 RX ADMIN — VANCOMYCIN HYDROCHLORIDE 2000 MG: 1 INJECTION, POWDER, LYOPHILIZED, FOR SOLUTION INTRAVENOUS at 06:03

## 2024-03-23 RX ADMIN — INSULIN DETEMIR 10 UNITS: 100 INJECTION, SOLUTION SUBCUTANEOUS at 09:03

## 2024-03-23 RX ADMIN — TRAZODONE HYDROCHLORIDE 150 MG: 100 TABLET ORAL at 09:03

## 2024-03-23 RX ADMIN — SENNOSIDES AND DOCUSATE SODIUM 1 TABLET: 8.6; 5 TABLET ORAL at 08:03

## 2024-03-23 RX ADMIN — ASPIRIN 81 MG CHEWABLE TABLET 81 MG: 81 TABLET CHEWABLE at 08:03

## 2024-03-23 RX ADMIN — PIPERACILLIN AND TAZOBACTAM 4.5 G: 4; .5 INJECTION, POWDER, LYOPHILIZED, FOR SOLUTION INTRAVENOUS; PARENTERAL at 06:03

## 2024-03-23 RX ADMIN — RANOLAZINE 1000 MG: 500 TABLET, FILM COATED, EXTENDED RELEASE ORAL at 08:03

## 2024-03-23 RX ADMIN — RANOLAZINE 1000 MG: 500 TABLET, FILM COATED, EXTENDED RELEASE ORAL at 09:03

## 2024-03-23 RX ADMIN — INSULIN ASPART 2 UNITS: 100 INJECTION, SOLUTION INTRAVENOUS; SUBCUTANEOUS at 05:03

## 2024-03-23 RX ADMIN — CLOPIDOGREL BISULFATE 75 MG: 75 TABLET ORAL at 08:03

## 2024-03-23 RX ADMIN — ENOXAPARIN SODIUM 80 MG: 80 INJECTION SUBCUTANEOUS at 08:03

## 2024-03-23 NOTE — PROGRESS NOTES
[facility]  Podiatry  Progress Note    Patient Name: Julien Meléndez  MRN: [id number]  Admission Date: 3/21/2024  Hospital Length of Stay: 2 days  Attending Physician: Kelsey Stewart MD  Primary Care Provider: Kelvin Wayne MD     Subjective:     Interval History:  Afebrile.  Vital signs stable.  No acute events overnight.  Patient states he is feeling okay.        Scheduled Meds:   aspirin  81 mg Oral Daily    atorvastatin  40 mg Oral Daily    clopidogreL  75 mg Oral Daily    enoxparin  1 mg/kg Subcutaneous Q12H (prophylaxis, 0900/2100)    FLUoxetine  40 mg Oral Daily    furosemide  20 mg Oral Daily    insulin detemir U-100  10 Units Subcutaneous QHS    isosorbide mononitrate  30 mg Oral Daily    metoprolol succinate  25 mg Oral Daily    mupirocin   Nasal BID    pantoprazole  40 mg Oral BID    piperacillin-tazobactam (Zosyn) IV (PEDS and ADULTS) (extended infusion is not appropriate)  4.5 g Intravenous Q8H    ranolazine  1,000 mg Oral BID    senna-docusate 8.6-50 mg  1 tablet Oral BID    sodium chloride 0.9%  10 mL Intravenous Q8H    traZODone  150 mg Oral QHS    vancomycin (VANCOCIN) IV (PEDS and ADULTS)  2,000 mg Intravenous Q24H     Continuous Infusions:  PRN Meds:sodium chloride 0.9%, acetaminophen, albuterol-ipratropium, aluminum-magnesium hydroxide-simethicone, dextrose 10%, dextrose 10%, glucagon (human recombinant), glucose, glucose, HYDROcodone-acetaminophen, influenza 65up-adj, insulin aspart U-100, melatonin, ondansetron, ondansetron, simethicone, Pharmacy to dose Vancomycin consult **AND** vancomycin - pharmacy to dose    Review of Systems   Constitutional:  Negative for fatigue and fever.   Respiratory:  Negative for cough and shortness of breath.    Cardiovascular:  Negative for chest pain.   Gastrointestinal:  Negative for constipation, diarrhea, nausea and vomiting.   Genitourinary:  Negative for difficulty urinating and hematuria.   Skin:  Positive for wound (right foot).   Neurological:   Negative for headaches.     Objective:     Vital Signs (Most Recent):  Temp: 98.2 °F (36.8 °C) (03/23/24 1117)  Pulse: 70 (03/23/24 1242)  Resp: 18 (03/23/24 1155)  BP: 137/64 (03/23/24 1117)  SpO2: 99 % (03/23/24 1117) Vital Signs (24h Range):  Temp:  [97.9 °F (36.6 °C)-98.5 °F (36.9 °C)] 98.2 °F (36.8 °C)  Pulse:  [66-81] 70  Resp:  [16-18] 18  SpO2:  [96 %-99 %] 99 %  BP: (116-139)/(60-64) 137/64     Weight: 79.9 kg (176 lb 2.4 oz)  Body mass index is 22.62 kg/m².    Foot Exam    General  General Appearance: appears stated age and healthy   Orientation: alert and oriented to person, place, and time   Affect: appropriate       Right Foot/Ankle     Inspection and Palpation  Ecchymosis: none  Tenderness: none   Swelling: none   Skin Exam: drainage and erythema; no ulcer     Neurovascular  Dorsalis pedis: 1+  Posterior tibial: 1+  Saphenous nerve sensation: diminished  Tibial nerve sensation: diminished  Superficial peroneal nerve sensation: diminished  Deep peroneal nerve sensation: diminished  Sural nerve sensation: diminished    Comments  Status post 1st ray partial resection.  Two wounds located on the medial aspect of the forefoot.  No malodor noted.  Upon debridement, sanguinous-purulent drainage noted on manual expression.  Erythema and edema noted throughout the forefoot, most notably concentrated on the medial forefoot.    Fifth digit: Medial aspect with ulcer noted.  Probes to bone.  No purulent drainage noted on manual expression.  Slight surrounding erythema and edema noted.    Third and 4th digit:  Diffuse preulcerative lesions noted on medial and lateral aspects.      Left Foot/Ankle      Inspection and Palpation  Ecchymosis: none  Tenderness: none   Swelling: none   Skin Exam: skin not intact     Neurovascular  Dorsalis pedis: 1+  Posterior tibial: 1+  Saphenous nerve sensation: diminished  Tibial nerve sensation: diminished  Superficial peroneal nerve sensation: diminished  Deep peroneal nerve  "sensation: diminished  Sural nerve sensation: diminished    Comments  No open wounds.                Laboratory:  A1C:   Recent Labs   Lab 10/23/23  1426 03/15/24  1140   HGBA1C 9.7* 8.3*     CBC:   Recent Labs   Lab 03/23/24  0524   WBC 9.56   RBC 3.84*   HGB 10.3*   HCT 31.7*      MCV 83   MCH 26.8*   MCHC 32.5     CMP:   Recent Labs   Lab 03/21/24  1342 03/22/24  0457 03/23/24  0524   *   < > 170*   CALCIUM 9.4   < > 9.1   ALBUMIN 2.9*  --   --    PROT 7.1  --   --       < > 137   K 4.4   < > 3.8   CO2 30*   < > 28      < > 100   BUN 21   < > 16   CREATININE 1.0   < > 0.9   ALKPHOS 91  --   --    ALT 9*  --   --    AST 6*  --   --    BILITOT 0.4  --   --     < > = values in this interval not displayed.     CRP: No results for input(s): "CRP" in the last 168 hours.  ESR: No results for input(s): "SEDRATE" in the last 168 hours.  Wound Cultures:   Recent Labs   Lab 10/31/23  1500 11/07/23  2208 11/10/23  1249 12/12/23  1346   LABAERO METHICILLIN RESISTANT STAPHYLOCOCCUS AUREUS  Moderate  * METHICILLIN RESISTANT STAPHYLOCOCCUS AUREUS  Many  Skin aym also present  * METHICILLIN RESISTANT STAPHYLOCOCCUS AUREUS  Many  * CANDIDA ALBICANS  Few  *     Microbiology Results (last 7 days)       Procedure Component Value Units Date/Time    Blood culture [[id number]] Collected: 03/21/24 1758    Order Status: Completed Specimen: Blood Updated: 03/22/24 2012     Blood Culture, Routine No Growth to date      No Growth to date    Blood culture #2 **CANNOT BE ORDERED STAT** [[id number]] Collected: 03/21/24 1343    Order Status: Completed Specimen: Blood from Peripheral, Antecubital, Right Updated: 03/22/24 2012     Blood Culture, Routine No Growth to date      No Growth to date    Blood culture [[id number]] Collected: 03/21/24 1757    Order Status: Completed Specimen: Blood Updated: 03/22/24 2012     Blood Culture, Routine No Growth to date      No Growth to date    Blood culture #1 **CANNOT BE ORDERED " STAT** [[id number]] Collected: 03/21/24 1352    Order Status: Completed Specimen: Blood from Wrist, Right Updated: 03/22/24 2012     Blood Culture, Routine No Growth to date      No Growth to date    Culture, Anaerobe [[id number]] Collected: 03/22/24 1513    Order Status: Sent Specimen: Wound from Foot, Right Updated: 03/22/24 1948    Aerobic culture [[id number]] Collected: 03/22/24 1513    Order Status: Sent Specimen: Wound from Foot, Right Updated: 03/22/24 1948    Culture, Anaerobe [[id number]] Collected: 03/22/24 1513    Order Status: Sent Specimen: Wound from Foot, Right Updated: 03/22/24 1948    Aerobic culture [[id number]] Collected: 03/22/24 1513    Order Status: Sent Specimen: Wound from Foot, Right Updated: 03/22/24 1948          Specimen (24h ago, onward)      None            Diagnostic Results:  I have reviewed all pertinent imaging results/findings within the past 24 hours.    Assessment/Plan:     ID  Osteomyelitis of right foot  Julien Meléndez is a 83 y.o. male with IDSA moderate infection.  X-ray and MRI shows osteomyelitis of the 2nd MTPJ, with possible septic arthritis of the 2nd MTPJ and digits 3 through 5.  WBC count within normal limits, vital signs are stable.  Known critical limb ischemia of right lower extremity.  Interventional cardiology consulted and planning peripheral angiogram of the bilateral lower extremity on Monday 3/25.     - Debrided medial forefoot wounds, drained 2nd MTPJ with mild amounts of purulent drainage.  Patient tolerated procedure well.  Cultured 2nd MTPJ, and cultured 5th digit medial wound.  Follow up results.  - interventional cardiology consulted, angiogram planned Monday 3/25.  Appreciate recs.  - discussed at length surgical versus conservative treatment options with patient.  Discussed risks and benefits of both.  After lengthy discussion, patient is amenable to transmetatarsal amputation of the right foot.  Family at bedside agrees.  Planning to amputate after  angiogram and intervention.  - dressed wound as follows:  Painted with Betadine, then dressed with gauze, Kerlix, and Ace wrap.  Wound care orders to follow.  - continue wearing heel offloading boots.  - if patient has white blood cell count spike and unstable vitals, please contact Podiatry immediately for immediate surgical intervention.  If not, for best patient results, opting for amputation after vascular intervention.        Mac Allen DPM  Podiatry  [facility]

## 2024-03-23 NOTE — ASSESSMENT & PLAN NOTE
PAD (peripheral artery disease)     -we will continue aspirin statin as well as clopidogrel  -consulted Podiatry, Cardiology, and infectious  -IV antibiotics ordered:  IV Zosyn and IV vancomycin for now  -blood cultures are pending  -bilateral lower extremity arterial ultrasound ordered-1. Hemodynamically significant stenosis in the left mid superficial femoral artery and the left anterior tibial artery.   2. Monophasic waveforms in the left distal popliteal, anterior tibial, and posterior tibial arteries.   -Cardiology planning angio on Monday    Plan per podiatry below:  - Debrided medial forefoot wounds, drained 2nd MTPJ with mild amounts of purulent drainage.  Patient tolerated procedure well.  Cultured 2nd MTPJ, and cultured 5th digit medial wound.  Follow up results.  - interventional cardiology consulted, angiogram planned Monday 3/25.  Appreciate recs.  - discussed at length surgical versus conservative treatment options with patient.  Discussed risks and benefits of both.  After lengthy discussion, patient is amenable to transmetatarsal amputation of the right foot.  Family at bedside agrees.  Planning to amputate after angiogram and intervention.  - dressed wound as follows:  Painted with Betadine, then dressed with gauze, Kerlix, and Ace wrap.  Wound care orders to follow.  - continue wearing heel offloading boots.  - if patient has white blood cell count spike and unstable vitals, please contact Podiatry immediately for immediate surgical intervention.  If not, for best patient results, opting for amputation after vascular intervention.

## 2024-03-23 NOTE — ASSESSMENT & PLAN NOTE
Creatine stable for now. BMP reviewed- noted Estimated Creatinine Clearance: 70.3 mL/min (based on SCr of 0.9 mg/dL). according to latest data. 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  Monitor UOP and serial BMP and adjust therapy as needed. Renally dose meds. Avoid nephrotoxic medications and procedures.

## 2024-03-23 NOTE — PROGRESS NOTES
Infectious Disease Progress Note     Infectious Disease Attending: Dr. Jaramillo      Assessment:     Julien Meléndez is a 83 y.o. 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 Cardiology planning for angio of RLE Monday and podiatry planning for TMA.     Plan/Recommendations:     - continue vanc and zosyn  - recommend wound/bone cultures and agree with podiatry plan for TMA  - awaiting procedures    History of Present Illness:      Julien Meléndez is a 83 y.o. male with a PMH significant for DMII, HLD, HTN, MI, depression, CHF, Afib, CKD1, diabetic foot ulcer and KELSEY who presented to [facility] ED after being sent by home health wound care for worsening erythema and drainage of R foot wound. He reports that he has home health twice weekly and wound care once weekly  to help with care and has not been mobile for the last year or so since his previous left great toe amputation. The patient had amputation of the right great toe in 11/2023 and since then has had some pain and swelling with color change of his other toes on the right foot. On evaluation, the patient expresses that he feels depressed due to loss of mobility which affects his ability to go to healthcare appointments and restricts him from enjoying his hobbies.    MRI forefoot shows recent amputation of proximal 1st metatarsal and phalangeal ray with concern for septic arthritis and osteomyelitis at the 2nd metatarsophalangeal joint and possible early septic arthritis of 3rd metatarsophalangeal and 4th proximal interphalangeal and 5th distal interphalangeal joints. Arterial US shows significant stenosis in the left mid superficial femoral artery and left anterior tibial artery. Labs on admission significant for no leukocytosis, WBC 10.38, Hgb 10.7, Plt 257, glucose 232, creatinine 1,  lactic acid 1.3, and A1c from 1 week ago 8.3. Patient is currently being treated with vancomycin and zosyn. Podiatry consult pending. Cardiology planning for angio of R leg Monday.     Patient was previously treated with prolonged course of IV Vancomycin for MRSA bacteremia and wound cultures of R foot in 11/2023. At that time he also had candida albicans in the wound.     Past Medical History:     Past Medical History:   Diagnosis Date    Diabetes mellitus     Hiatal hernia     under Dr Saenz' care    Hyperlipidemia     Hypertension     MI (myocardial infarction)     Sleep apnea      Surgical History:     Past Surgical History:   Procedure Laterality Date    ABDOMINAL HERNIA REPAIR      ANGIOGRAM, EXTREMITY, BILATERAL Bilateral 4/5/2023    Procedure: ANGIOGRAM, EXTREMITY, BILATERAL;  Surgeon: Michael Giraldo III, MD;  Location: [facility] CATH LAB;  Service: Cardiology;  Laterality: Bilateral;    ANGIOGRAPHY OF LOWER EXTREMITY Left 4/12/2023    Procedure: Angiogram Extremity Unilateral;  Surgeon: Michael Giraldo III, MD;  Location: [facility] CATH LAB;  Service: Cardiology;  Laterality: Left;    AORTOGRAPHY WITH SERIALOGRAPHY Bilateral 4/5/2023    Procedure: AORTOGRAM, WITH SERIALOGRAPHY;  Surgeon: Michael Giraldo III, MD;  Location: [facility] CATH LAB;  Service: Cardiology;  Laterality: Bilateral;    APPLICATION OF WOUND VACUUM-ASSISTED CLOSURE DEVICE Left 3/29/2023    Procedure: APPLICATION, WOUND VAC;  Surgeon: Alona Pierce DPM;  Location: [facility] OR;  Service: Podiatry;  Laterality: Left;    APPLICATION OF WOUND VACUUM-ASSISTED CLOSURE DEVICE Left 4/4/2023    Procedure: APPLICATION, WOUND VAC;  Surgeon: Alona Pierce DPM;  Location: [facility] OR;  Service: Podiatry;  Laterality: Left;    APPLICATION OF WOUND VACUUM-ASSISTED CLOSURE DEVICE Left 4/13/2023    Procedure: APPLICATION, WOUND VAC;  Surgeon: Alona Pierce DPM;  Location: [facility] OR;  Service: Podiatry;  Laterality: Left;    BONE BIOPSY Left 3/29/2023    Procedure:  BIOPSY, BONE;  Surgeon: Alona Pierce DPM;  Location: [facility] OR;  Service: Podiatry;  Laterality: Left;  1st met    CARDIAC CATHETERIZATION      3 stents placed    CATARACT EXTRACTION, BILATERAL      CLOSURE DEVICE Left 4/12/2023    Procedure: Placement of Closure Device;  Surgeon: Michael Giraldo III, MD;  Location: [facility] CATH LAB;  Service: Cardiology;  Laterality: Left;    CORONARY ANGIOGRAPHY N/A 10/3/2019    Procedure: ANGIOGRAM, CORONARY ARTERY;  Surgeon: Edwin Azul MD;  Location: [facility] CATH LAB/EP;  Service: Cardiology;  Laterality: N/A;    DEBRIDEMENT OF FOOT Left 3/29/2023    Procedure: DEBRIDEMENT, FOOT;  Surgeon: Alona Pierce DPM;  Location: [facility] OR;  Service: Podiatry;  Laterality: Left;    DEBRIDEMENT OF FOOT Left 4/4/2023    Procedure: DEBRIDEMENT, FOOT;  Surgeon: Alona Pierce DPM;  Location: [facility] OR;  Service: Podiatry;  Laterality: Left;    DEBRIDEMENT OF FOOT Left 4/13/2023    Procedure: DEBRIDEMENT, FOOT;  Surgeon: Alona Pierce DPM;  Location: [facility] OR;  Service: Podiatry;  Laterality: Left;    ESOPHAGOGASTRODUODENOSCOPY N/A 4/17/2023    Procedure: EGD (ESOPHAGOGASTRODUODENOSCOPY);  Surgeon: Otto Villarreal MD;  Location: [facility];  Service: Endoscopy;  Laterality: N/A;    HERNIA REPAIR      inguinal    IVUS (INTRAVASCULAR ULTRASOUND) Left 4/12/2023    Procedure: ULTRASOUND, INTRAVASCULAR;  Surgeon: Michael Giraldo III, MD;  Location: [facility] CATH LAB;  Service: Cardiology;  Laterality: Left;    LEFT HEART CATHETERIZATION Left 9/13/2019    Procedure: Left heart cath;  Surgeon: Edwin Azul MD;  Location: [facility] CATH LAB/EP;  Service: Cardiology;  Laterality: Left;    LEFT HEART CATHETERIZATION N/A 10/3/2019    Procedure: Left heart cath;  Surgeon: Edwin Azul MD;  Location: [facility] CATH LAB/EP;  Service: Cardiology;  Laterality: N/A;    OSTEOTOMY Left 6/7/2023    Procedure: OSTEOTOMY;  Surgeon: Alona Pierce DPM;  Location: [facility] OR;  Service: Podiatry;  Laterality: Left;    OSTEOTOMY OF  METATARSAL BONE Right 4/4/2023    Procedure: OSTEOTOMY, METATARSAL BONE;  Surgeon: Alona Pierce DPM;  Location: [facility] OR;  Service: Podiatry;  Laterality: Right;    PERCUTANEOUS TRANSLUMINAL ANGIOPLASTY Left 4/12/2023    Procedure: PTA (ANGIOPLASTY, PERCUTANEOUS, TRANSLUMINAL);  Surgeon: Michael Giraldo III, MD;  Location: [facility] CATH LAB;  Service: Cardiology;  Laterality: Left;    SURGICAL REMOVAL OF BUNION WITH OSTEOTOMY OF METATARSAL BONE Right 4/4/2023    Procedure: BUNIONECTOMY, WITH METATARSAL OSTEOTOMY;  Surgeon: Alona Pierce DPM;  Location: [facility] OR;  Service: Podiatry;  Laterality: Right;    TOE AMPUTATION Left 4/4/2023    Procedure: AMPUTATION, TOE;  Surgeon: Alona Pierce DPM;  Location: [facility] OR;  Service: Podiatry;  Laterality: Left;  1st ray    TOE AMPUTATION Right 11/10/2023    Procedure: AMPUTATION, TOE;  Surgeon: Diya Leung DPM;  Location: [facility] OR;  Service: Podiatry;  Laterality: Right;    WASHOUT Left 6/7/2023    Procedure: WASHOUT;  Surgeon: Alona Pierce DPM;  Location: [facility] OR;  Service: Podiatry;  Laterality: Left;    WOUND DEBRIDEMENT Left 6/7/2023    Procedure: DEBRIDEMENT, WOUND;  Surgeon: Alona Pierce DPM;  Location: [facility] OR;  Service: Podiatry;  Laterality: Left;     Allergies:     Review of patient's allergies indicates:   Allergen Reactions    Nystatin      Other reaction(s): Unknown     Family History:     Family History   Problem Relation Age of Onset    Heart disease Mother     Stroke Mother     Heart disease Sister     Heart disease Brother     Heart disease Maternal Uncle     Heart disease Brother     Heart disease Sister     Heart disease Maternal Uncle     Heart disease Maternal Uncle     Heart disease Maternal Uncle      Social History:     Social History     Tobacco Use    Smoking status: Never    Smokeless tobacco: Never   Substance Use Topics    Alcohol use: No    Drug use: No     Review of Systems:     Review of Systems   Constitutional:  Negative for chills and  fever.   Respiratory:  Negative for cough, shortness of breath and wheezing.    Cardiovascular:  Negative for chest pain.   Gastrointestinal:  Negative for abdominal pain, constipation, diarrhea and vomiting.   Psychiatric/Behavioral:  Positive for depression.      Medications:     Home Medications:  Prior to Admission medications    Medication Sig Start Date End Date Taking? Authorizing Provider   amLODIPine (NORVASC) 5 MG tablet Take 1 tablet (5 mg total) by mouth once daily. 11/14/23  Yes Luis Salas MD   atorvastatin (LIPITOR) 40 MG tablet Take 1 tablet (40 mg total) by mouth once daily.  Patient taking differently: Take 40 mg by mouth every evening. 4/19/22  Yes Kelvin Wayne MD   clopidogreL (PLAVIX) 75 mg tablet Take 1 tablet (75 mg total) by mouth once daily. 4/23/23  Yes Cynthia Sanchez MD   ELIQUIS 5 mg Tab Take 5 mg by mouth 2 (two) times daily. 5/26/23  Yes Provider, Historical   FLUoxetine 40 MG capsule Take 40 mg by mouth once daily. 9/1/22  Yes Provider, Historical   furosemide (LASIX) 40 MG tablet Take 40 mg by mouth once daily. 11/30/23  Yes Provider, Historical   HYDROcodone-acetaminophen (NORCO)  mg per tablet Take 1 tablet by mouth every 6 (six) hours as needed for Pain. 11/14/23  Yes Luis Salas MD   isosorbide mononitrate (IMDUR) 30 MG 24 hr tablet Take 1 tablet (30 mg total) by mouth once daily. 11/14/23  Yes Luis Salas MD   LANTUS SOLOSTAR U-100 INSULIN glargine 100 units/mL SubQ pen Inject 15 Units into the skin every evening. 2/6/24  Yes Provider, Historical   metoprolol succinate (TOPROL-XL) 25 MG 24 hr tablet Take 1 tablet (25 mg total) by mouth once daily. 11/14/23  Yes Luis Salas MD   nitroGLYCERIN (NITROSTAT) 0.4 MG SL tablet Place 0.4 mg under the tongue every 5 (five) minutes as needed for Chest pain.   Yes Provider, Historical   ondansetron (ZOFRAN-ODT) 8 MG TbDL Take 1 tablet (8 mg total) by mouth 3 (three) times daily as needed  (nausea or vomiting). 11/3/23  Yes Adrian Anaya MD   pantoprazole (PROTONIX) 40 MG tablet Take 1 tablet (40 mg total) by mouth 2 (two) times daily. 4/21/23 4/20/24 Yes Cynthia Sanchez MD   ranolazine (RANEXA) 1,000 mg Tb12 Take 1,000 mg by mouth 2 (two) times daily. 1/27/23  Yes Provider, Historical   SITagliptan-metformin (JANUMET XR) 100-1,000 mg TM24 Take 1 tablet by mouth once daily. 3/14/22  Yes Kelvin Wayne MD   traZODone (DESYREL) 150 MG tablet Take 1 tablet (150 mg total) by mouth every evening. 2/14/24  Yes Gladis Fermin, NP   alcohol swabs PadM Apply 1 each topically as needed (for use with glucose monitoring). 9/8/23   Kelvin Wayne MD   blood sugar diagnostic (TRUE METRIX GLUCOSE TEST STRIP) Strp 1 strip by Misc.(Non-Drug; Combo Route) route 2 (two) times a day. 9/8/23   Kelvin Wayne MD   blood-glucose meter (TRUE METRIX AIR GLUCOSE METER) [facility] [place] AS DIRECTED 1/30/24   Kelvin Wayne MD   lancets 28 gauge Misc 1 lancet  by Misc.(Non-Drug; Combo Route) route 2 (two) times a day. 9/8/23   Kelvin Wayne MD     Current/Inpatient Medications:  Infusions:    Scheduled:   aspirin  81 mg Oral Daily    atorvastatin  40 mg Oral Daily    clopidogreL  75 mg Oral Daily    enoxparin  1 mg/kg Subcutaneous Q12H (prophylaxis, 0900/2100)    FLUoxetine  40 mg Oral Daily    furosemide  20 mg Oral Daily    insulin detemir U-100  10 Units Subcutaneous QHS    isosorbide mononitrate  30 mg Oral Daily    metoprolol succinate  25 mg Oral Daily    mupirocin   Nasal BID    pantoprazole  40 mg Oral BID    piperacillin-tazobactam (Zosyn) IV (PEDS and ADULTS) (extended infusion is not appropriate)  4.5 g Intravenous Q8H    ranolazine  1,000 mg Oral BID    senna-docusate 8.6-50 mg  1 tablet Oral BID    sodium chloride 0.9%  10 mL Intravenous Q8H    traZODone  150 mg Oral QHS    vancomycin (VANCOCIN) IV (PEDS and ADULTS)  2,000 mg Intravenous Q24H     PRN:  sodium chloride 0.9%,  "acetaminophen, albuterol-ipratropium, aluminum-magnesium hydroxide-simethicone, dextrose 10%, dextrose 10%, glucagon (human recombinant), glucose, glucose, HYDROcodone-acetaminophen, influenza 65up-adj, insulin aspart U-100, melatonin, ondansetron, ondansetron, simethicone, Pharmacy to dose Vancomycin consult **AND** vancomycin - pharmacy to dose    Objective:     24-hour Vitals:   Temp:  [97.9 °F (36.6 °C)-98.5 °F (36.9 °C)] 98.2 °F (36.8 °C)  Pulse:  [66-81] 71  Resp:  [16-18] 18  SpO2:  [96 %-99 %] 99 %  BP: (116-139)/(60-64) 137/64    Vitals: /64   Pulse 71   Temp 98.2 °F (36.8 °C)   Resp 18   Ht 6' 2" (1.88 m)   Wt 79.9 kg (176 lb 2.4 oz)   SpO2 99%   BMI 22.62 kg/m²     Intake/Output Summary (Last 24 hours) at 3/23/2024 1201  Last data filed at 3/23/2024 0635  Gross per 24 hour   Intake --   Output 1350 ml   Net -1350 ml       Physical Exam  Vitals and nursing note reviewed.   Constitutional:       General: He is not in acute distress.     Appearance: He is normal weight. He is not ill-appearing.   HENT:      Head: Normocephalic and atraumatic.      Right Ear: External ear normal.      Left Ear: External ear normal.      Nose: Nose normal. No congestion.      Mouth/Throat:      Mouth: Mucous membranes are moist.      Pharynx: Oropharynx is clear.   Eyes:      Extraocular Movements: Extraocular movements intact.      Conjunctiva/sclera: Conjunctivae normal.      Pupils: Pupils are equal, round, and reactive to light.   Cardiovascular:      Rate and Rhythm: Normal rate and regular rhythm.      Pulses: Normal pulses.      Heart sounds: Normal heart sounds. No murmur heard.     No friction rub. No gallop.   Pulmonary:      Effort: Pulmonary effort is normal. No respiratory distress.      Breath sounds: Normal breath sounds. No stridor. No wheezing, rhonchi or rales.   Abdominal:      General: Abdomen is flat. Bowel sounds are normal. There is no distension.      Palpations: Abdomen is soft. There is " no mass.      Tenderness: There is no abdominal tenderness.   Musculoskeletal:         General: No swelling.      Cervical back: Normal range of motion. No rigidity.      Comments: RLE and LLE wrapped in bandage.   Skin:     General: Skin is warm and dry.      Coloration: Skin is not jaundiced or pale.   Neurological:      General: No focal deficit present.      Mental Status: He is alert and oriented to person, place, and time. Mental status is at baseline.   Psychiatric:         Behavior: Behavior normal.         Thought Content: Thought content normal.         Judgment: Judgment normal.      Comments: Reports feeling depressed, appropriate affect        Labs:   I have reviewed the following labs below:    Cultures:  Blood cultures [id number]    Recent Labs   Lab 03/21/24  1342 03/22/24  0457 03/23/24  0524   WBC 10.38 9.43 9.56   HGB 10.7* 10.2* 10.3*   HCT 32.4* 30.5* 31.7*    244 240    136 137   K 4.4 4.1 3.8    100 100   CO2 30* 26 28   BUN 21 21 16   CREATININE 1.0 1.0 0.9   * 271* 170*   CALCIUM 9.4 9.0 9.1   MG  --  1.7 1.7   PROT 7.1  --   --    ALBUMIN 2.9*  --   --    BILITOT 0.4  --   --    AST 6*  --   --    ALT 9*  --   --    ALKPHOS 91  --   --        Imaging:   I have reviewed the following studies below:    Imaging:   US Lower Extremity Arteries Bilateral   Final Result      1. Hemodynamically significant stenosis in the left mid superficial femoral artery and the left anterior tibial artery.   2. Monophasic waveforms in the left distal popliteal, anterior tibial, and posterior tibial arteries.         Electronically signed by: Rohit Ho   Date:    03/21/2024   Time:    17:07      MRI Foot (Forefoot) Right Without Contrast   Final Result      Recent amputation at the proximal 1st metatarsal and phalangeal ray.      Examination concerning for septic arthritis and associated adjacent osteomyelitis at the 2nd metatarsophalangeal joint with local fluid and reactive edema of  the distal and middle phalanges.      Abnormal edema signal involving the 3rd metatarsophalangeal joint, 4th proximal interphalangeal joint and 5th distal interphalangeal joint concerning for reactive edema or early changes of septic arthritis.         Electronically signed by: Alba Wyatt MD   Date:    03/21/2024   Time:    15:50      X-Ray Foot Complete Right   Final Result   Abnormal      Recent amputation of the base of the 1st metatarsal including the phalangeal ray.      *New subtle lucency and possible fragmentation involving the 2nd metatarsal head concerning for demineralization which could be associated with infection, short-term follow-up advised. *      Unchanged postoperative or posttraumatic appearance of the 5th metatarsal.      This report was flagged in Epic as abnormal.         Electronically signed by: Alba Wyatt MD   Date:    03/21/2024   Time:    14:38         Stacie Mishra MD  [facility] Medicine/Pediatrics, PGY1  03/23/2024 9:42 AM

## 2024-03-23 NOTE — ASSESSMENT & PLAN NOTE
Julien Meléndez is a 83 y.o. male with IDSA moderate infection.  X-ray and MRI shows osteomyelitis of the 2nd MTPJ, with possible septic arthritis of the 2nd MTPJ and digits 3 through 5.  WBC count within normal limits, vital signs are stable.  Known critical limb ischemia of right lower extremity.  Interventional cardiology consulted and planning peripheral angiogram of the bilateral lower extremity on Monday 3/25.     - Debrided medial forefoot wounds, drained 2nd MTPJ with mild amounts of purulent drainage.  Patient tolerated procedure well.  Cultured 2nd MTPJ, and cultured 5th digit medial wound.  Follow up results.  - interventional cardiology consulted, angiogram planned Monday 3/25.  Appreciate recs.  - discussed at length surgical versus conservative treatment options with patient.  Discussed risks and benefits of both.  After lengthy discussion, patient is amenable to transmetatarsal amputation of the right foot.  Family at bedside agrees.  Planning to amputate after angiogram and intervention.  - dressed wound as follows:  Painted with Betadine, then dressed with gauze, Kerlix, and Ace wrap.  Wound care orders to follow.  - continue wearing heel offloading boots.  - if patient has white blood cell count spike and unstable vitals, please contact Podiatry immediately for immediate surgical intervention.  If not, for best patient results, opting for amputation after vascular intervention.

## 2024-03-23 NOTE — PROGRESS NOTES
[facility] Progress Note        Subjective:      Pt seen and examined at bedside this am, NAEO. VSS     Objective:     Last 24 Hour Vital Signs:  BP  Min: 116/61  Max: 139/63  Temp  Av.2 °F (36.8 °C)  Min: 97.9 °F (36.6 °C)  Max: 98.5 °F (36.9 °C)  Pulse  Av.8  Min: 66  Max: 81  Resp  Av.6  Min: 16  Max: 18  SpO2  Av.7 %  Min: 96 %  Max: 99 %  Weight  Av.9 kg (176 lb 2.4 oz)  Min: 79.9 kg (176 lb 2.4 oz)  Max: 79.9 kg (176 lb 2.4 oz)  I/O last 3 completed shifts:  In: 888.5 [P.O.:240; I.V.:48.2; IV Piggyback:600.3]  Out: 1350 [Urine:1350]    Physical Examination:  Physical Exam  Vitals and nursing note reviewed.   Constitutional:       General: He is not in acute distress.     Appearance: He is normal weight. He is not ill-appearing.   HENT:      Head: Normocephalic and atraumatic.      Right Ear: External ear normal.      Left Ear: External ear normal.      Nose: Nose normal. No congestion.      Mouth/Throat:      Mouth: Mucous membranes are moist.      Pharynx: Oropharynx is clear.   Eyes:      Extraocular Movements: Extraocular movements intact.      Conjunctiva/sclera: Conjunctivae normal.      Pupils: Pupils are equal, round, and reactive to light.   Cardiovascular:      Rate and Rhythm: Normal rate and regular rhythm.      Pulses: Normal pulses.      Heart sounds: Normal heart sounds. No murmur heard.     No friction rub. No gallop.   Pulmonary:      Effort: Pulmonary effort is normal. No respiratory distress.      Breath sounds: Normal breath sounds. No stridor. No wheezing, rhonchi or rales.   Abdominal:      General: Abdomen is flat. Bowel sounds are normal. There is no distension.      Palpations: Abdomen is soft. There is no mass.      Tenderness: There is no abdominal tenderness.   Musculoskeletal:         General: No swelling.      Cervical back: Normal range of motion. No rigidity.      Comments: RLE and LLE wrapped in bandage.   Skin:     General: Skin is warm and dry.       Coloration: Skin is not jaundiced or pale.   Neurological:      General: No focal deficit present.      Mental Status: He is alert and oriented to person, place, and time. Mental status is at baseline.   Psychiatric:         Behavior: Behavior normal.         Thought Content: Thought content normal.         Judgment: Judgment normal.      Comments: Reports feeling depressed, appropriate affect     Laboratory:  Laboratory Data Reviewed: yes  Pertinent Findings:      Other Results:  EKG   ECHO:        Current Medications:     Infusions:       Scheduled:   aspirin  81 mg Oral Daily    atorvastatin  40 mg Oral Daily    clopidogreL  75 mg Oral Daily    enoxparin  1 mg/kg Subcutaneous Q12H (prophylaxis, 0900/2100)    FLUoxetine  40 mg Oral Daily    furosemide  20 mg Oral Daily    insulin detemir U-100  10 Units Subcutaneous QHS    isosorbide mononitrate  30 mg Oral Daily    metoprolol succinate  25 mg Oral Daily    mupirocin   Nasal BID    pantoprazole  40 mg Oral BID    piperacillin-tazobactam (Zosyn) IV (PEDS and ADULTS) (extended infusion is not appropriate)  4.5 g Intravenous Q8H    ranolazine  1,000 mg Oral BID    senna-docusate 8.6-50 mg  1 tablet Oral BID    sodium chloride 0.9%  10 mL Intravenous Q8H    traZODone  150 mg Oral QHS    vancomycin (VANCOCIN) IV (PEDS and ADULTS)  2,000 mg Intravenous Q24H        PRN:  sodium chloride 0.9%, acetaminophen, albuterol-ipratropium, aluminum-magnesium hydroxide-simethicone, dextrose 10%, dextrose 10%, glucagon (human recombinant), glucose, glucose, HYDROcodone-acetaminophen, influenza 65up-adj, insulin aspart U-100, melatonin, ondansetron, ondansetron, simethicone, Pharmacy to dose Vancomycin consult **AND** vancomycin - pharmacy to dose      Assessment:     Julien Meléndez is a 83 y.o.male with  Patient Active Problem List    Diagnosis Date Noted    Moderate protein-calorie malnutrition 03/22/2024    Critical limb ischemia of right lower extremity 03/21/2024     Diabetic foot ulcer-right 03/21/2024    Septic joint right foot 03/21/2024    Osteomyelitis of right foot 03/21/2024    Sacral pressure ulcer     Dementia 02/12/2024    Bedbound 01/04/2024    Ulcer of left foot 01/04/2024    Ulcer of right foot 01/04/2024    Left shoulder pain 11/13/2023    Internal jugular (IJ) vein thromboembolism, acute 11/12/2023    Bacteremia 11/11/2023    Hyponatremia 11/08/2023    Acute osteomyelitis 11/08/2023    Cellulitis of foot 11/07/2023    Ulcer of toe of right foot, with necrosis of bone 11/06/2023    Other thrombophilia 11/06/2023    Skin ulcer of toe of right foot with necrosis of bone 10/31/2023    Chronic osteomyelitis 09/12/2023    Non-prs chr ulcer of right heel and midfoot w unsp severt 08/10/2023    Hyperglycemia 05/31/2023    Diabetic ulcer of right midfoot associated with type 2 diabetes mellitus 05/31/2023    Chronic gastritis 05/31/2023    Anemia 05/10/2023    Amputation of left great toe 05/10/2023    Postoperative state 04/17/2023    Critical limb ischemia of both lower extremities 04/14/2023    PAD (peripheral artery disease) 04/13/2023    Atherosclerosis of native artery of right lower extremity with ulceration of midfoot 04/11/2023    Pressure ulcer of left foot, unstageable 03/27/2023    Cellulitis of right lower extremity 03/25/2023    Intermittent claudication of both lower extremities due to atherosclerosis 05/31/2022    History of left heart catheterization (LHC) 04/20/2022    Depressive disorder 04/20/2022    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 04/20/2022    Congestive heart failure, unspecified HF chronicity, unspecified heart failure type 03/14/2022    Typical atrial flutter 04/29/2021    PAF (paroxysmal atrial fibrillation) 03/29/2021    Long term (current) use of anticoagulants 03/29/2021    Pain in left knee 03/09/2021    Atrial fibrillation 11/02/2020    Stage 1 chronic kidney disease 10/03/2019    Hypersomnia with sleep  apnea     Coronary artery disease 10/24/2017    Chronic fatigue 07/11/2017    Atherosclerotic heart disease of native coronary artery with other forms of angina pectoris 04/25/2017    Diverticulosis of large intestine without perforation or abscess without bleeding 04/25/2017    Systolic dysfunction 04/25/2017    Equivocal stress test 03/27/2017    Major depressive disorder, single episode, mild 12/19/2016    KELSEY (obstructive sleep apnea) 10/26/2016    Type 2 diabetes mellitus without complication 01/26/2016    Abnormal findings on diagnostic imaging of heart and coronary circulation 12/29/2015    Coronary artery disease due to calcified coronary lesion 12/28/2015    Critical limb ischemia of both lower extremities with gangrene 11/12/2015    Type 2 diabetes mellitus with diabetic peripheral angiopathy without gangrene 11/12/2015    Unspecified osteoarthritis, unspecified site 11/12/2015    Cardiomyopathy, ischemic 09/17/2013    History of MI (myocardial infarction)     Mixed hyperlipidemia     Hypertension     Hiatal hernia     Diabetes mellitus type 2, insulin dependent         Plan:     CLI - Will plan for angiogram tentatively Tuesday after TMA on Monday. Continue DAPT, statin.   OM of RLE- continue abx per primary team  Ced - eliquis held. Lovenox .     Don Jeronimo MD  [facility]

## 2024-03-23 NOTE — SUBJECTIVE & OBJECTIVE
Subjective:     Interval History:  Afebrile.  Vital signs stable.  No acute events overnight.  Patient states he is feeling okay.        Scheduled Meds:   aspirin  81 mg Oral Daily    atorvastatin  40 mg Oral Daily    clopidogreL  75 mg Oral Daily    enoxparin  1 mg/kg Subcutaneous Q12H (prophylaxis, 0900/2100)    FLUoxetine  40 mg Oral Daily    furosemide  20 mg Oral Daily    insulin detemir U-100  10 Units Subcutaneous QHS    isosorbide mononitrate  30 mg Oral Daily    metoprolol succinate  25 mg Oral Daily    mupirocin   Nasal BID    pantoprazole  40 mg Oral BID    piperacillin-tazobactam (Zosyn) IV (PEDS and ADULTS) (extended infusion is not appropriate)  4.5 g Intravenous Q8H    ranolazine  1,000 mg Oral BID    senna-docusate 8.6-50 mg  1 tablet Oral BID    sodium chloride 0.9%  10 mL Intravenous Q8H    traZODone  150 mg Oral QHS    vancomycin (VANCOCIN) IV (PEDS and ADULTS)  2,000 mg Intravenous Q24H     Continuous Infusions:  PRN Meds:sodium chloride 0.9%, acetaminophen, albuterol-ipratropium, aluminum-magnesium hydroxide-simethicone, dextrose 10%, dextrose 10%, glucagon (human recombinant), glucose, glucose, HYDROcodone-acetaminophen, influenza 65up-adj, insulin aspart U-100, melatonin, ondansetron, ondansetron, simethicone, Pharmacy to dose Vancomycin consult **AND** vancomycin - pharmacy to dose    Review of Systems   Constitutional:  Negative for fatigue and fever.   Respiratory:  Negative for cough and shortness of breath.    Cardiovascular:  Negative for chest pain.   Gastrointestinal:  Negative for constipation, diarrhea, nausea and vomiting.   Genitourinary:  Negative for difficulty urinating and hematuria.   Skin:  Positive for wound (right foot).   Neurological:  Negative for headaches.     Objective:     Vital Signs (Most Recent):  Temp: 98.2 °F (36.8 °C) (03/23/24 1117)  Pulse: 70 (03/23/24 1242)  Resp: 18 (03/23/24 1155)  BP: 137/64 (03/23/24 1117)  SpO2: 99 % (03/23/24 1117) Vital Signs (24h  Range):  Temp:  [97.9 °F (36.6 °C)-98.5 °F (36.9 °C)] 98.2 °F (36.8 °C)  Pulse:  [66-81] 70  Resp:  [16-18] 18  SpO2:  [96 %-99 %] 99 %  BP: (116-139)/(60-64) 137/64     Weight: 79.9 kg (176 lb 2.4 oz)  Body mass index is 22.62 kg/m².    Foot Exam    General  General Appearance: appears stated age and healthy   Orientation: alert and oriented to person, place, and time   Affect: appropriate       Right Foot/Ankle     Inspection and Palpation  Ecchymosis: none  Tenderness: none   Swelling: none   Skin Exam: drainage and erythema; no ulcer     Neurovascular  Dorsalis pedis: 1+  Posterior tibial: 1+  Saphenous nerve sensation: diminished  Tibial nerve sensation: diminished  Superficial peroneal nerve sensation: diminished  Deep peroneal nerve sensation: diminished  Sural nerve sensation: diminished    Comments  Status post 1st ray partial resection.  Two wounds located on the medial aspect of the forefoot.  No malodor noted.  Upon debridement, sanguinous-purulent drainage noted on manual expression.  Erythema and edema noted throughout the forefoot, most notably concentrated on the medial forefoot.    Fifth digit: Medial aspect with ulcer noted.  Probes to bone.  No purulent drainage noted on manual expression.  Slight surrounding erythema and edema noted.    Third and 4th digit:  Diffuse preulcerative lesions noted on medial and lateral aspects.      Left Foot/Ankle      Inspection and Palpation  Ecchymosis: none  Tenderness: none   Swelling: none   Skin Exam: skin not intact     Neurovascular  Dorsalis pedis: 1+  Posterior tibial: 1+  Saphenous nerve sensation: diminished  Tibial nerve sensation: diminished  Superficial peroneal nerve sensation: diminished  Deep peroneal nerve sensation: diminished  Sural nerve sensation: diminished    Comments  No open wounds.                Laboratory:  A1C:   Recent Labs   Lab 10/23/23  1426 03/15/24  1140   HGBA1C 9.7* 8.3*     CBC:   Recent Labs   Lab 03/23/24  0524   WBC 9.56  "  RBC 3.84*   HGB 10.3*   HCT 31.7*      MCV 83   MCH 26.8*   MCHC 32.5     CMP:   Recent Labs   Lab 03/21/24  1342 03/22/24  0457 03/23/24  0524   *   < > 170*   CALCIUM 9.4   < > 9.1   ALBUMIN 2.9*  --   --    PROT 7.1  --   --       < > 137   K 4.4   < > 3.8   CO2 30*   < > 28      < > 100   BUN 21   < > 16   CREATININE 1.0   < > 0.9   ALKPHOS 91  --   --    ALT 9*  --   --    AST 6*  --   --    BILITOT 0.4  --   --     < > = values in this interval not displayed.     CRP: No results for input(s): "CRP" in the last 168 hours.  ESR: No results for input(s): "SEDRATE" in the last 168 hours.  Wound Cultures:   Recent Labs   Lab 10/31/23  1500 11/07/23  2208 11/10/23  1249 12/12/23  1346   LABAERO METHICILLIN RESISTANT STAPHYLOCOCCUS AUREUS  Moderate  * METHICILLIN RESISTANT STAPHYLOCOCCUS AUREUS  Many  Skin amy also present  * METHICILLIN RESISTANT STAPHYLOCOCCUS AUREUS  Many  * CANDIDA ALBICANS  Few  *     Microbiology Results (last 7 days)       Procedure Component Value Units Date/Time    Blood culture [[id number]] Collected: 03/21/24 1758    Order Status: Completed Specimen: Blood Updated: 03/22/24 2012     Blood Culture, Routine No Growth to date      No Growth to date    Blood culture #2 **CANNOT BE ORDERED STAT** [[id number]] Collected: 03/21/24 1343    Order Status: Completed Specimen: Blood from Peripheral, Antecubital, Right Updated: 03/22/24 2012     Blood Culture, Routine No Growth to date      No Growth to date    Blood culture [[id number]] Collected: 03/21/24 1757    Order Status: Completed Specimen: Blood Updated: 03/22/24 2012     Blood Culture, Routine No Growth to date      No Growth to date    Blood culture #1 **CANNOT BE ORDERED STAT** [[id number]] Collected: 03/21/24 1352    Order Status: Completed Specimen: Blood from Wrist, Right Updated: 03/22/24 2012     Blood Culture, Routine No Growth to date      No Growth to date    Culture, Anaerobe [[id number]] Collected: " 03/22/24 1513    Order Status: Sent Specimen: Wound from Foot, Right Updated: 03/22/24 1948    Aerobic culture [[id number]] Collected: 03/22/24 1513    Order Status: Sent Specimen: Wound from Foot, Right Updated: 03/22/24 1948    Culture, Anaerobe [[id number]] Collected: 03/22/24 1513    Order Status: Sent Specimen: Wound from Foot, Right Updated: 03/22/24 1948    Aerobic culture [[id number]] Collected: 03/22/24 1513    Order Status: Sent Specimen: Wound from Foot, Right Updated: 03/22/24 1948          Specimen (24h ago, onward)      None            Diagnostic Results:  I have reviewed all pertinent imaging results/findings within the past 24 hours.

## 2024-03-23 NOTE — PLAN OF CARE
CM met with pt at bedside to discuss discharge planning  Pt is dependent with ADL's and is bed bound due to pain and weakness to left arm and BLE .Pt has multiple DME including a hospital bed. He has HH with [facility] and has NP home program. Pt has been to SNF twice and if recommended he is willing to try again with 1st facility, [facility]. Stated when he went to 2nd facility [place], he saw no improvement, but did improve with [facility]. Referrals sent thru care port. Upon discharge, if dc back to home with HH, he will need hospital ambulance due to bed bound. Pt made aware to contact CM with any questions or concerns. Cm will continue to follow and assist with any discharge needs  Future Appointments   Date Time Provider Department Center   4/15/2024  8:00 AM Gladis Fermin, NP [address] Surg  Initial Discharge Assessment       Primary Care Provider: Kelvin Wayne MD    Admission Diagnosis: Wound infection [T14.8XXA, L08.9]  Foot infection [L08.9]  PAD (peripheral artery disease) [I73.9]  Chest pain [R07.9]  Type 2 diabetes mellitus with hyperglycemia, unspecified whether long term insulin use [E11.65]    Admission Date: 3/21/2024  Expected Discharge Date:     Transition of Care Barriers: (P) None    Payor: [employer] MANAGED MEDICARE / Plan: [employer] MEDICARE HMO / Product Type: Capitation /     Extended Emergency Contact Information  Primary Emergency Contact: Kylee Meléndez  Address: [address] [address] [facility]  Home Phone: [phone]  Mobile Phone: [phone]  Relation: Spouse    Discharge Plan A: (P) Skilled Nursing Facility  Discharge Plan B: (P) Home with family, Home, Home Health      [facility] - [place], [place] - [address]  [address]  [facility] [address]  Phone: [phone] Fax: [phone]    [facility] - [place] - [address]  [address]  [place]  [address]  Phone: [phone] Fax: [phone]      Initial Assessment (most recent)       Adult Discharge Assessment - 03/22/24 1946          Discharge Assessment    Assessment Type Discharge Planning Assessment     Confirmed/corrected address, phone number and insurance Yes     Confirmed Demographics Correct on Facesheet     Source of Information patient;family     When was your last doctors appointment? 03/07/24     Communicated AURELIA with patient/caregiver Date not available/Unable to determine     Reason For Admission Right Foot Wound     People in Home spouse     Do you expect to return to your current living situation? Yes     Do you have help at home or someone to help you manage your care at home? Yes     Who are your caregiver(s) and their phone number(s)? Kylee Meléndez (spouse) [phone]     Prior to hospitilization cognitive status: Alert/Oriented     Current cognitive status: Alert/Oriented     Walking or Climbing Stairs Difficulty yes     Walking or Climbing Stairs transferring difficulty, dependent     Mobility Management Unable to ambulate     Dressing/Bathing Difficulty yes     Dressing/Bathing bathing difficulty, assistance 1 person     Equipment Currently Used at Home hospital bed;walker, rolling;wheelchair;bedside commode;shower chair     Readmission within 30 days? No (P)      Patient currently being followed by outpatient case management? No (P)      Do you currently have service(s) that help you manage your care at home? Yes (P)      Name and Contact number of agency [facility] (P)      Is the pt/caregiver preference to resume services with current agency Yes (P)      Do you take prescription medications? Yes (P)      Do you have prescription coverage? Yes (P)      Coverage [employer] Managed (P)      Do you have any problems affording any of your prescribed medications? No (P)      Is the patient taking medications as prescribed? yes (P)      Who is going to help you get home at  discharge? Will need hospital transport, bedbound (P)      How do you get to doctors appointments? other (see comments) (P)    NP Home Program    Are you on dialysis? No (P)      Do you take coumadin? No (P)    Plavix    Discharge Plan A Skilled Nursing Facility (P)      Discharge Plan B Home with family;Home;Home Health (P)      Discharge Plan discussed with: Spouse/sig other;Patient (P)      Name(s) and Number(s) Kylee Meléndez (spouse) [phone] (P)      Transition of Care Barriers None (P)         Physical Activity    On average, how many days per week do you engage in moderate to strenuous exercise (like a brisk walk)? 0 days (P)      On average, how many minutes do you engage in exercise at this level? 0 min (P)         Financial Resource Strain    How hard is it for you to pay for the very basics like food, housing, medical care, and heating? Not hard at all (P)         Housing Stability    In the last 12 months, was there a time when you were not able to pay the mortgage or rent on time? No (P)      In the last 12 months, how many places have you lived? 1 (P)      In the last 12 months, was there a time when you did not have a steady place to sleep or slept in a shelter (including now)? No (P)         Transportation Needs    In the past 12 months, has lack of transportation kept you from medical appointments or from getting medications? No (P)      In the past 12 months, has lack of transportation kept you from meetings, work, or from getting things needed for daily living? No (P)         Food Insecurity    Within the past 12 months, you worried that your food would run out before you got the money to buy more. Never true (P)      Within the past 12 months, the food you bought just didn't last and you didn't have money to get more. Never true (P)         Stress    Do you feel stress - tense, restless, nervous, or anxious, or unable to sleep at night because your mind is troubled all the time - these days? To  some extent (P)         Social Connections    Do you belong to any clubs or organizations such as Jain groups, unions, fraternal or athletic groups, or school groups? No (P)      Are you , , , , never , or living with a partner?  (P)         Alcohol Use    Q1: How often do you have a drink containing alcohol? Never (P)      Q2: How many drinks containing alcohol do you have on a typical day when you are drinking? Patient does not drink (P)      Q3: How often do you have six or more drinks on one occasion? Never (P)         OTHER    Name(s) of People in Home Kylee Meléndez (spouse) [phone] (P)

## 2024-03-23 NOTE — PROGRESS NOTES
[facility] Medicine  Progress Note    Patient Name: Julien Meléndez  MRN: [id number]  Patient Class: IP- Inpatient   Admission Date: 3/21/2024  Length of Stay: 2 days  Attending Physician: Kelsey Stewart MD  Primary Care Provider: Kelvin Wayne MD        Subjective:     Principal Problem:Critical limb ischemia of right lower extremity        HPI:  Julien Meléndez is a 83 yr old male presents to the ER with reports of right foot infection. Pts family states over the past 2 days, they noticed an increase in erythema and drainage to the site. Denies fever. Denies taking oral antbx for this specific wound. Pt states he was advised to come to the ER for an evaluation from wound care. Wound has been dressed by HH. 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 Abnormal edema signal involving the 3rd metatarsophalangeal joint, 4th proximal interphalangeal joint and 5th distal interphalangeal joint concerning for reactive edema or early changes of septic arthritis. Will cont vancomycin and Zosyn and will admit to the [facility] for further care.      Overview/Hospital Course:  No notes on file    Interval History: seen at the bedside, no distress noted. Appreciate consultants eval. Will cont IV abx.    Review of Systems   Constitutional:  Negative for fatigue and fever.   Genitourinary:  Negative for difficulty urinating and hematuria.   Skin:  Positive for wound (right foot).     Objective:     Vital Signs (Most Recent):  Temp: 97.8 °F (36.6 °C) (03/23/24 1555)  Pulse: 69 (03/23/24 1634)  Resp: 18 (03/23/24 1155)  BP: (!) 140/61 (03/23/24 1555)  SpO2: 99 % (03/23/24 1555) Vital Signs (24h Range):  Temp:  [97.8 °F  (36.6 °C)-98.5 °F (36.9 °C)] 97.8 °F (36.6 °C)  Pulse:  [66-74] 69  Resp:  [16-18] 18  SpO2:  [96 %-99 %] 99 %  BP: (116-140)/(61-64) 140/61     Weight: 79.9 kg (176 lb 2.4 oz)  Body mass index is 22.62 kg/m².     Physical Exam  Vitals and nursing note reviewed.   Constitutional:       Appearance: He is ill-appearing.   HENT:      Mouth/Throat:      Mouth: Mucous membranes are moist.   Eyes:      Extraocular Movements: Extraocular movements intact.   Cardiovascular:      Rate and Rhythm: Normal rate and regular rhythm.   Abdominal:      Palpations: Abdomen is soft.   Musculoskeletal:         General: Tenderness (right foot) present.      Cervical back: Normal range of motion and neck supple.   Skin:     Comments: R foot wound with erythema and purulent drainage    Neurological:      Mental Status: He is alert and oriented to person, place, and time.                Significant Labs: All pertinent labs within the past 24 hours have been reviewed.    Significant Imaging: I have reviewed all pertinent imaging results/findings within the past 24 hours.    Assessment/Plan:      * Critical limb ischemia of right lower extremity  PAD (peripheral artery disease)     -we will continue aspirin statin as well as clopidogrel  -consulted Podiatry, Cardiology, and infectious  -IV antibiotics ordered:  IV Zosyn and IV vancomycin for now  -blood cultures are pending  -bilateral lower extremity arterial ultrasound ordered-1. Hemodynamically significant stenosis in the left mid superficial femoral artery and the left anterior tibial artery.   2. Monophasic waveforms in the left distal popliteal, anterior tibial, and posterior tibial arteries.   -Cardiology planning angio on Monday    Plan per podiatry below:  - Debrided medial forefoot wounds, drained 2nd MTPJ with mild amounts of purulent drainage.  Patient tolerated procedure well.  Cultured 2nd MTPJ, and cultured 5th digit medial wound.  Follow up results.  - interventional  cardiology consulted, angiogram planned Monday 3/25.  Appreciate recs.  - discussed at length surgical versus conservative treatment options with patient.  Discussed risks and benefits of both.  After lengthy discussion, patient is amenable to transmetatarsal amputation of the right foot.  Family at bedside agrees.  Planning to amputate after angiogram and intervention.  - dressed wound as follows:  Painted with Betadine, then dressed with gauze, Kerlix, and Ace wrap.  Wound care orders to follow.  - continue wearing heel offloading boots.  - if patient has white blood cell count spike and unstable vitals, please contact Podiatry immediately for immediate surgical intervention.  If not, for best patient results, opting for amputation after vascular intervention.      Osteomyelitis of right foot  -MRI with OM noted to the right foot  -blood cultures ngtd  -follow wound cx  -infectious disease consulted  -we will continue vanc and Zosyn for now        Septic joint right foot  -podiatry following       Diabetic foot ulcer-right  Patient's FSGs are uncontrolled due to hyperglycemia on current medication regimen.  Last A1c reviewed-   Lab Results   Component Value Date    HGBA1C 8.3 (H) 03/15/2024     Most recent fingerstick glucose reviewed-   Recent Labs   Lab 03/23/24  0024 03/23/24  0611 03/23/24  1116 03/23/24  1554   POCTGLUCOSE 243* 181* 225* 244*       Current correctional scale  Low  Maintain anti-hyperglycemic dose as follows-   Antihyperglycemics (From admission, onward)      Start     Stop Route Frequency Ordered    03/21/24 2100  insulin detemir U-100 (Levemir) pen 10 Units         -- SubQ Nightly 03/21/24 1509    03/21/24 1600  insulin aspart U-100 pen 0-5 Units         -- SubQ Before meals & nightly PRN 03/21/24 1502          Hold Oral hypoglycemics while patient is in the hospital.    Dementia  -history noted of dementia  -patient is currently awake and alert  -patient's family at bedside to assist with  "history  -monitoring    Depressive disorder  Patient has recurrent depression which is unknown and is currently controlled. Will Continue anti-depressant medications. We will not consult psychiatry at this time. Patient does not display psychosis at this time. Continue to monitor closely and adjust plan of care as needed.        Congestive heart failure, unspecified HF chronicity, unspecified heart failure type  -euvolemic on exam  Echo    Interpretation Summary    Left Ventricle: There is low normal systolic function with a visually estimated ejection fraction of 50 - 55%. Ejection fraction by visual approximation is 50%.    Right Ventricle: Normal right ventricular cavity size. Wall thickness is normal. Right ventricle wall motion  is normal. Systolic function is normal.    Aortic Valve: There is mild aortic valve sclerosis.    Mitral Valve: There is moderate mitral annular calcification present.    Pericardium: There is a small effusion.    The study was difficult due to patient's body habitus and poor endocardial visualization.    Valves are not well visualized  .CHF. Last BNP reviewed- and noted below No results for input(s): "BNP", "BNPTRIAGEBLO" in the last 168 hours.  -continue to monitor    Atrial fibrillation  -history of Afib  -holding Eliquis at this time in case of any procedure  -ordered full-dose Lovenox for now  -continue cardiac tele monitoring    Stage 1 chronic kidney disease  Creatine stable for now. BMP reviewed- noted Estimated Creatinine Clearance: 70.3 mL/min (based on SCr of 0.9 mg/dL). according to latest data. 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  Monitor UOP and serial BMP and adjust therapy as needed. Renally dose meds. Avoid nephrotoxic medications and procedures.    KELSEY (obstructive sleep apnea)  - CPAP at night p.r.n.      Mixed hyperlipidemia  -continue statin at home dose        VTE Risk Mitigation (From admission, onward)           Ordered     enoxaparin " injection 80 mg  Every 12 hours         03/22/24 0015     IP VTE HIGH RISK PATIENT  Once         03/21/24 1502     Place sequential compression device  Until discontinued         03/21/24 1502                    Discharge Planning   AURELIA:      Code Status: Full Code   Is the patient medically ready for discharge?:     Reason for patient still in hospital (select all that apply): Laboratory test, Treatment, Imaging, Consult recommendations, and PT / OT recommendations  Discharge Plan A: Skilled Nursing Facility                  Lucius Rodarte NP  Department of Hospital Medicine   [facility]

## 2024-03-23 NOTE — SUBJECTIVE & OBJECTIVE
Interval History: seen at the bedside, no distress noted. Appreciate consultants eval. Will cont IV abx.    Review of Systems   Constitutional:  Negative for fatigue and fever.   Genitourinary:  Negative for difficulty urinating and hematuria.   Skin:  Positive for wound (right foot).     Objective:     Vital Signs (Most Recent):  Temp: 97.8 °F (36.6 °C) (03/23/24 1555)  Pulse: 69 (03/23/24 1634)  Resp: 18 (03/23/24 1155)  BP: (!) 140/61 (03/23/24 1555)  SpO2: 99 % (03/23/24 1555) Vital Signs (24h Range):  Temp:  [97.8 °F (36.6 °C)-98.5 °F (36.9 °C)] 97.8 °F (36.6 °C)  Pulse:  [66-74] 69  Resp:  [16-18] 18  SpO2:  [96 %-99 %] 99 %  BP: (116-140)/(61-64) 140/61     Weight: 79.9 kg (176 lb 2.4 oz)  Body mass index is 22.62 kg/m².     Physical Exam  Vitals and nursing note reviewed.   Constitutional:       Appearance: He is ill-appearing.   HENT:      Mouth/Throat:      Mouth: Mucous membranes are moist.   Eyes:      Extraocular Movements: Extraocular movements intact.   Cardiovascular:      Rate and Rhythm: Normal rate and regular rhythm.   Abdominal:      Palpations: Abdomen is soft.   Musculoskeletal:         General: Tenderness (right foot) present.      Cervical back: Normal range of motion and neck supple.   Skin:     Comments: R foot wound with erythema and purulent drainage    Neurological:      Mental Status: He is alert and oriented to person, place, and time.                Significant Labs: All pertinent labs within the past 24 hours have been reviewed.    Significant Imaging: I have reviewed all pertinent imaging results/findings within the past 24 hours.

## 2024-03-23 NOTE — ASSESSMENT & PLAN NOTE
Patient's FSGs are uncontrolled due to hyperglycemia on current medication regimen.  Last A1c reviewed-   Lab Results   Component Value Date    HGBA1C 8.3 (H) 03/15/2024     Most recent fingerstick glucose reviewed-   Recent Labs   Lab 03/23/24  0024 03/23/24  0611 03/23/24  1116 03/23/24  1554   POCTGLUCOSE 243* 181* 225* 244*       Current correctional scale  Low  Maintain anti-hyperglycemic dose as follows-   Antihyperglycemics (From admission, onward)    Start     Stop Route Frequency Ordered    03/21/24 2100  insulin detemir U-100 (Levemir) pen 10 Units         -- SubQ Nightly 03/21/24 1509    03/21/24 1600  insulin aspart U-100 pen 0-5 Units         -- SubQ Before meals & nightly PRN 03/21/24 1502        Hold Oral hypoglycemics while patient is in the hospital.

## 2024-03-24 LAB
ANION GAP SERPL CALC-SCNC: 9 MMOL/L (ref 8–16)
BASOPHILS # BLD AUTO: 0.03 K/UL (ref 0–0.2)
BASOPHILS NFR BLD: 0.4 % (ref 0–1.9)
BUN SERPL-MCNC: 16 MG/DL (ref 8–23)
CALCIUM SERPL-MCNC: 9 MG/DL (ref 8.7–10.5)
CHLORIDE SERPL-SCNC: 99 MMOL/L (ref 95–110)
CO2 SERPL-SCNC: 28 MMOL/L (ref 23–29)
CREAT SERPL-MCNC: 0.9 MG/DL (ref 0.5–1.4)
DIFFERENTIAL METHOD BLD: ABNORMAL
EOSINOPHIL # BLD AUTO: 0.2 K/UL (ref 0–0.5)
EOSINOPHIL NFR BLD: 3.1 % (ref 0–8)
ERYTHROCYTE [DISTWIDTH] IN BLOOD BY AUTOMATED COUNT: 16.4 % (ref 11.5–14.5)
EST. GFR  (NO RACE VARIABLE): >60 ML/MIN/1.73 M^2
GLUCOSE SERPL-MCNC: 191 MG/DL (ref 70–110)
GLUCOSE SERPL-MCNC: 191 MG/DL (ref 70–110)
GLUCOSE SERPL-MCNC: 194 MG/DL (ref 70–110)
HCT VFR BLD AUTO: 29.4 % (ref 40–54)
HGB BLD-MCNC: 9.8 G/DL (ref 14–18)
IMM GRANULOCYTES # BLD AUTO: 0.04 K/UL (ref 0–0.04)
IMM GRANULOCYTES NFR BLD AUTO: 0.5 % (ref 0–0.5)
LYMPHOCYTES # BLD AUTO: 1.6 K/UL (ref 1–4.8)
LYMPHOCYTES NFR BLD: 21.7 % (ref 18–48)
MAGNESIUM SERPL-MCNC: 1.8 MG/DL (ref 1.6–2.6)
MCH RBC QN AUTO: 27.4 PG (ref 27–31)
MCHC RBC AUTO-ENTMCNC: 33.3 G/DL (ref 32–36)
MCV RBC AUTO: 82 FL (ref 82–98)
MONOCYTES # BLD AUTO: 0.7 K/UL (ref 0.3–1)
MONOCYTES NFR BLD: 9.8 % (ref 4–15)
NEUTROPHILS # BLD AUTO: 4.7 K/UL (ref 1.8–7.7)
NEUTROPHILS NFR BLD: 64.5 % (ref 38–73)
NRBC BLD-RTO: 0 /100 WBC
PLATELET # BLD AUTO: 212 K/UL (ref 150–450)
PMV BLD AUTO: 10.6 FL (ref 9.2–12.9)
POCT GLUCOSE: 191 MG/DL (ref 70–110)
POCT GLUCOSE: 227 MG/DL (ref 70–110)
POCT GLUCOSE: 230 MG/DL (ref 70–110)
POCT GLUCOSE: 315 MG/DL (ref 70–110)
POTASSIUM SERPL-SCNC: 4 MMOL/L (ref 3.5–5.1)
RBC # BLD AUTO: 3.58 M/UL (ref 4.6–6.2)
SODIUM SERPL-SCNC: 136 MMOL/L (ref 136–145)
WBC # BLD AUTO: 7.36 K/UL (ref 3.9–12.7)

## 2024-03-24 PROCEDURE — 85025 COMPLETE CBC W/AUTO DIFF WBC: CPT | Performed by: NURSE PRACTITIONER

## 2024-03-24 PROCEDURE — 36415 COLL VENOUS BLD VENIPUNCTURE: CPT | Performed by: NURSE PRACTITIONER

## 2024-03-24 PROCEDURE — 63600175 PHARM REV CODE 636 W HCPCS: Performed by: STUDENT IN AN ORGANIZED HEALTH CARE EDUCATION/TRAINING PROGRAM

## 2024-03-24 PROCEDURE — 21400001 HC TELEMETRY ROOM

## 2024-03-24 PROCEDURE — 83735 ASSAY OF MAGNESIUM: CPT | Performed by: NURSE PRACTITIONER

## 2024-03-24 PROCEDURE — 25000003 PHARM REV CODE 250: Performed by: STUDENT IN AN ORGANIZED HEALTH CARE EDUCATION/TRAINING PROGRAM

## 2024-03-24 PROCEDURE — 99900035 HC TECH TIME PER 15 MIN (STAT)

## 2024-03-24 PROCEDURE — 94761 N-INVAS EAR/PLS OXIMETRY MLT: CPT

## 2024-03-24 PROCEDURE — A4216 STERILE WATER/SALINE, 10 ML: HCPCS | Performed by: NURSE PRACTITIONER

## 2024-03-24 PROCEDURE — 94660 CPAP INITIATION&MGMT: CPT

## 2024-03-24 PROCEDURE — 80048 BASIC METABOLIC PNL TOTAL CA: CPT | Performed by: NURSE PRACTITIONER

## 2024-03-24 PROCEDURE — 25000003 PHARM REV CODE 250: Performed by: NURSE PRACTITIONER

## 2024-03-24 RX ORDER — DOCUSATE SODIUM 100 MG/1
100 CAPSULE, LIQUID FILLED ORAL DAILY
Status: DISCONTINUED | OUTPATIENT
Start: 2024-03-24 | End: 2024-03-29

## 2024-03-24 RX ORDER — POLYETHYLENE GLYCOL 3350 17 G/17G
17 POWDER, FOR SOLUTION ORAL DAILY
Status: DISCONTINUED | OUTPATIENT
Start: 2024-03-24 | End: 2024-03-29

## 2024-03-24 RX ADMIN — HYDROCODONE BITARTRATE AND ACETAMINOPHEN 1 TABLET: 10; 325 TABLET ORAL at 02:03

## 2024-03-24 RX ADMIN — PIPERACILLIN AND TAZOBACTAM 4.5 G: 4; .5 INJECTION, POWDER, LYOPHILIZED, FOR SOLUTION INTRAVENOUS; PARENTERAL at 11:03

## 2024-03-24 RX ADMIN — DOCUSATE SODIUM 100 MG: 100 CAPSULE, LIQUID FILLED ORAL at 06:03

## 2024-03-24 RX ADMIN — POLYETHYLENE GLYCOL 3350 17 G: 17 POWDER, FOR SOLUTION ORAL at 06:03

## 2024-03-24 RX ADMIN — Medication 10 ML: at 06:03

## 2024-03-24 RX ADMIN — Medication 10 ML: at 02:03

## 2024-03-24 RX ADMIN — ENOXAPARIN SODIUM 80 MG: 80 INJECTION SUBCUTANEOUS at 08:03

## 2024-03-24 RX ADMIN — Medication 10 ML: at 10:03

## 2024-03-24 RX ADMIN — PIPERACILLIN AND TAZOBACTAM 4.5 G: 4; .5 INJECTION, POWDER, LYOPHILIZED, FOR SOLUTION INTRAVENOUS; PARENTERAL at 06:03

## 2024-03-24 RX ADMIN — SODIUM CHLORIDE: 9 INJECTION, SOLUTION INTRAVENOUS at 06:03

## 2024-03-24 RX ADMIN — FUROSEMIDE 20 MG: 20 TABLET ORAL at 08:03

## 2024-03-24 RX ADMIN — ACETAMINOPHEN 650 MG: 325 TABLET ORAL at 06:03

## 2024-03-24 RX ADMIN — VANCOMYCIN HYDROCHLORIDE 2000 MG: 1 INJECTION, POWDER, LYOPHILIZED, FOR SOLUTION INTRAVENOUS at 06:03

## 2024-03-24 RX ADMIN — FLUOXETINE HYDROCHLORIDE 40 MG: 20 CAPSULE ORAL at 08:03

## 2024-03-24 RX ADMIN — MUPIROCIN: 20 OINTMENT TOPICAL at 08:03

## 2024-03-24 RX ADMIN — INSULIN DETEMIR 10 UNITS: 100 INJECTION, SOLUTION SUBCUTANEOUS at 08:03

## 2024-03-24 RX ADMIN — PIPERACILLIN AND TAZOBACTAM 4.5 G: 4; .5 INJECTION, POWDER, LYOPHILIZED, FOR SOLUTION INTRAVENOUS; PARENTERAL at 02:03

## 2024-03-24 RX ADMIN — TRAZODONE HYDROCHLORIDE 150 MG: 100 TABLET ORAL at 08:03

## 2024-03-24 RX ADMIN — INSULIN ASPART 2 UNITS: 100 INJECTION, SOLUTION INTRAVENOUS; SUBCUTANEOUS at 12:03

## 2024-03-24 RX ADMIN — INSULIN ASPART 2 UNITS: 100 INJECTION, SOLUTION INTRAVENOUS; SUBCUTANEOUS at 08:03

## 2024-03-24 RX ADMIN — INSULIN ASPART 2 UNITS: 100 INJECTION, SOLUTION INTRAVENOUS; SUBCUTANEOUS at 06:03

## 2024-03-24 RX ADMIN — HYDROCODONE BITARTRATE AND ACETAMINOPHEN 1 TABLET: 10; 325 TABLET ORAL at 11:03

## 2024-03-24 RX ADMIN — SENNOSIDES AND DOCUSATE SODIUM 1 TABLET: 8.6; 5 TABLET ORAL at 08:03

## 2024-03-24 RX ADMIN — CLOPIDOGREL BISULFATE 75 MG: 75 TABLET ORAL at 08:03

## 2024-03-24 RX ADMIN — ASPIRIN 81 MG CHEWABLE TABLET 81 MG: 81 TABLET CHEWABLE at 08:03

## 2024-03-24 RX ADMIN — ATORVASTATIN CALCIUM 40 MG: 40 TABLET, FILM COATED ORAL at 08:03

## 2024-03-24 NOTE — ASSESSMENT & PLAN NOTE
Creatine stable for now. BMP reviewed- noted Estimated Creatinine Clearance: 71.5 mL/min (based on SCr of 0.9 mg/dL). according to latest data. 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  Monitor UOP and serial BMP and adjust therapy as needed. Renally dose meds. Avoid nephrotoxic medications and procedures.

## 2024-03-24 NOTE — ASSESSMENT & PLAN NOTE
Patient has recurrent depression which is unknown and is currently controlled. Will Continue anti-depressant medications. We will not consult psychiatry at this time. Patient does not display psychosis at this time. Continue to monitor closely and adjust plan of care as needed.       Problem: Discharge Planning  Goal: Discharge to home or other facility with appropriate resources  Outcome: Progressing     Problem: Pain  Goal: Verbalizes/displays adequate comfort level or baseline comfort level  Outcome: Progressing     Problem: Skin/Tissue Integrity  Goal: Absence of new skin breakdown  Description: 1. Monitor for areas of redness and/or skin breakdown  2. Assess vascular access sites hourly  3. Every 4-6 hours minimum:  Change oxygen saturation probe site  4. Every 4-6 hours:  If on nasal continuous positive airway pressure, respiratory therapy assess nares and determine need for appliance change or resting period.   Outcome: Progressing     Problem: Safety - Adult  Goal: Free from fall injury  Outcome: Progressing     Problem: ABCDS Injury Assessment  Goal: Absence of physical injury  Outcome: Progressing     Problem: Respiratory - Adult  Goal: Achieves optimal ventilation and oxygenation  Outcome: Progressing     Problem: Skin/Tissue Integrity - Adult  Goal: Incisions, wounds, or drain sites healing without S/S of infection  Outcome: Progressing     Problem: Musculoskeletal - Adult  Goal: Maintain proper alignment of affected body part  Outcome: Progressing  Goal: Return ADL status to a safe level of function  Outcome: Progressing

## 2024-03-24 NOTE — PROGRESS NOTES
[facility] Medicine  Progress Note    Patient Name: Julien Meléndez  MRN: [id number]  Patient Class: IP- Inpatient   Admission Date: 3/21/2024  Length of Stay: 3 days  Attending Physician: Kelsey Stewart MD  Primary Care Provider: Kelvin Wayne MD        Subjective:     Principal Problem:Critical limb ischemia of right lower extremity        HPI:  Julien Meléndez is a 83 yr old male presents to the ER with reports of right foot infection. Pts family states over the past 2 days, they noticed an increase in erythema and drainage to the site. Denies fever. Denies taking oral antbx for this specific wound. Pt states he was advised to come to the ER for an evaluation from wound care. Wound has been dressed by HH. 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 Abnormal edema signal involving the 3rd metatarsophalangeal joint, 4th proximal interphalangeal joint and 5th distal interphalangeal joint concerning for reactive edema or early changes of septic arthritis. Will cont vancomycin and Zosyn and will admit to the [facility] for further care.      Overview/Hospital Course:  No notes on file    Interval History: seen at the bedside, no distress noted. Appreciate consultants eval. Will cont IV abx. Cardiology os planning an angiogram tomorrow.     Review of Systems   Constitutional:  Negative for fatigue and fever.   Genitourinary:  Negative for difficulty urinating and hematuria.   Skin:  Positive for wound (right foot).     Objective:     Vital Signs (Most Recent):  Temp: 98.2 °F (36.8 °C) (03/24/24 1159)  Pulse: 71 (03/24/24 1221)  Resp: 16 (03/24/24 1411)  BP: (!) 109/57 (03/24/24 1159)  SpO2: (!) 94 % (03/24/24  1159) Vital Signs (24h Range):  Temp:  [97 °F (36.1 °C)-98.2 °F (36.8 °C)] 98.2 °F (36.8 °C)  Pulse:  [64-76] 71  Resp:  [16-20] 16  SpO2:  [94 %-99 %] 94 %  BP: (109-147)/(57-67) 109/57     Weight: 81.3 kg (179 lb 3.7 oz)  Body mass index is 23.01 kg/m².     Physical Exam  Vitals and nursing note reviewed.   Constitutional:       Appearance: He is ill-appearing.   HENT:      Mouth/Throat:      Mouth: Mucous membranes are moist.   Eyes:      Extraocular Movements: Extraocular movements intact.   Cardiovascular:      Rate and Rhythm: Normal rate and regular rhythm.   Abdominal:      Palpations: Abdomen is soft.   Musculoskeletal:         General: Tenderness (right foot) present.      Cervical back: Normal range of motion and neck supple.   Skin:     Comments: R foot wound with erythema and purulent drainage    Neurological:      Mental Status: He is alert and oriented to person, place, and time.                Significant Labs: All pertinent labs within the past 24 hours have been reviewed.    Significant Imaging: I have reviewed all pertinent imaging results/findings within the past 24 hours.    Assessment/Plan:      * Critical limb ischemia of right lower extremity  PAD (peripheral artery disease)     -we will continue aspirin statin as well as clopidogrel  -consulted Podiatry, Cardiology, and infectious  -IV antibiotics ordered:  IV Zosyn and IV vancomycin for now  -blood cultures are pending  -bilateral lower extremity arterial ultrasound ordered-1. Hemodynamically significant stenosis in the left mid superficial femoral artery and the left anterior tibial artery.   2. Monophasic waveforms in the left distal popliteal, anterior tibial, and posterior tibial arteries.   -Cardiology planning angio on Monday    Plan per podiatry below:  - Debrided medial forefoot wounds, drained 2nd MTPJ with mild amounts of purulent drainage.  Patient tolerated procedure well.  Cultured 2nd MTPJ, and cultured 5th digit medial wound.   Follow up results.  - interventional cardiology consulted, angiogram planned Monday 3/25.  Appreciate recs.  - discussed at length surgical versus conservative treatment options with patient.  Discussed risks and benefits of both.  After lengthy discussion, patient is amenable to transmetatarsal amputation of the right foot.  Family at bedside agrees.  Planning to amputate after angiogram and intervention.  - dressed wound as follows:  Painted with Betadine, then dressed with gauze, Kerlix, and Ace wrap.  Wound care orders to follow.  - continue wearing heel offloading boots.  - if patient has white blood cell count spike and unstable vitals, please contact Podiatry immediately for immediate surgical intervention.  If not, for best patient results, opting for amputation after vascular intervention.      Osteomyelitis of right foot  -MRI with OM noted to the right foot  -blood cultures ngtd  -follow wound cx  -infectious disease consulted  -we will continue vanc and Zosyn for now        Septic joint right foot  -podiatry following       Diabetic foot ulcer-right  Patient's FSGs are uncontrolled due to hyperglycemia on current medication regimen.  Last A1c reviewed-   Lab Results   Component Value Date    HGBA1C 8.3 (H) 03/15/2024     Most recent fingerstick glucose reviewed-   Recent Labs   Lab 03/23/24  0024 03/23/24  0611 03/23/24  1116 03/23/24  1554   POCTGLUCOSE 243* 181* 225* 244*       Current correctional scale  Low  Maintain anti-hyperglycemic dose as follows-   Antihyperglycemics (From admission, onward)      Start     Stop Route Frequency Ordered    03/21/24 2100  insulin detemir U-100 (Levemir) pen 10 Units         -- SubQ Nightly 03/21/24 1509    03/21/24 1600  insulin aspart U-100 pen 0-5 Units         -- SubQ Before meals & nightly PRN 03/21/24 1502          Hold Oral hypoglycemics while patient is in the hospital.    Dementia  -history noted of dementia  -patient is currently awake and  "alert  -patient's family at bedside to assist with history  -monitoring    Depressive disorder  Patient has recurrent depression which is unknown and is currently controlled. Will Continue anti-depressant medications. We will not consult psychiatry at this time. Patient does not display psychosis at this time. Continue to monitor closely and adjust plan of care as needed.        Congestive heart failure, unspecified HF chronicity, unspecified heart failure type  -euvolemic on exam  Echo    Interpretation Summary    Left Ventricle: There is low normal systolic function with a visually estimated ejection fraction of 50 - 55%. Ejection fraction by visual approximation is 50%.    Right Ventricle: Normal right ventricular cavity size. Wall thickness is normal. Right ventricle wall motion  is normal. Systolic function is normal.    Aortic Valve: There is mild aortic valve sclerosis.    Mitral Valve: There is moderate mitral annular calcification present.    Pericardium: There is a small effusion.    The study was difficult due to patient's body habitus and poor endocardial visualization.    Valves are not well visualized  .CHF. Last BNP reviewed- and noted below No results for input(s): "BNP", "BNPTRIAGEBLO" in the last 168 hours.  -continue to monitor    Atrial fibrillation  -history of Afib  -holding Eliquis at this time in case of any procedure  -ordered full-dose Lovenox for now  -continue cardiac tele monitoring    Stage 1 chronic kidney disease  Creatine stable for now. BMP reviewed- noted Estimated Creatinine Clearance: 71.5 mL/min (based on SCr of 0.9 mg/dL). according to latest data. 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  Monitor UOP and serial BMP and adjust therapy as needed. Renally dose meds. Avoid nephrotoxic medications and procedures.    KELSEY (obstructive sleep apnea)  - CPAP at night p.r.n.      Mixed hyperlipidemia  -continue statin at home dose        VTE Risk Mitigation (From " admission, onward)           Ordered     enoxaparin injection 80 mg  Every 12 hours         03/22/24 0015     IP VTE HIGH RISK PATIENT  Once         03/21/24 1502     Place sequential compression device  Until discontinued         03/21/24 1502                    Discharge Planning   AURELIA:      Code Status: Full Code   Is the patient medically ready for discharge?:     Reason for patient still in hospital (select all that apply): Laboratory test, Treatment, Imaging, Consult recommendations, and PT / OT recommendations  Discharge Plan A: Skilled Nursing Facility                  Lucius Rodarte NP  Department of Hospital Medicine   [facility]

## 2024-03-24 NOTE — NURSING
Pt disoriented to time and situation, but very pleasant. Uneventful night. Pt slept peacefully with no report fo pain or discomfort. Pt turned q2h per orders. Safety maintained throughout shift.

## 2024-03-24 NOTE — PLAN OF CARE
Pt disoriented to time, place, situation. Dressing change was done by MD this am  on right foot. C/o of pain in right LE, pain med given. Pt unable to swallow pills. Meds were crushed. Call light within reach, bed in lowest position.

## 2024-03-24 NOTE — SUBJECTIVE & OBJECTIVE
Interval History: seen at the bedside, no distress noted. Appreciate consultants eval. Will cont IV abx. Cardiology os planning an angiogram tomorrow.     Review of Systems   Constitutional:  Negative for fatigue and fever.   Genitourinary:  Negative for difficulty urinating and hematuria.   Skin:  Positive for wound (right foot).     Objective:     Vital Signs (Most Recent):  Temp: 98.2 °F (36.8 °C) (03/24/24 1159)  Pulse: 71 (03/24/24 1221)  Resp: 16 (03/24/24 1411)  BP: (!) 109/57 (03/24/24 1159)  SpO2: (!) 94 % (03/24/24 1159) Vital Signs (24h Range):  Temp:  [97 °F (36.1 °C)-98.2 °F (36.8 °C)] 98.2 °F (36.8 °C)  Pulse:  [64-76] 71  Resp:  [16-20] 16  SpO2:  [94 %-99 %] 94 %  BP: (109-147)/(57-67) 109/57     Weight: 81.3 kg (179 lb 3.7 oz)  Body mass index is 23.01 kg/m².     Physical Exam  Vitals and nursing note reviewed.   Constitutional:       Appearance: He is ill-appearing.   HENT:      Mouth/Throat:      Mouth: Mucous membranes are moist.   Eyes:      Extraocular Movements: Extraocular movements intact.   Cardiovascular:      Rate and Rhythm: Normal rate and regular rhythm.   Abdominal:      Palpations: Abdomen is soft.   Musculoskeletal:         General: Tenderness (right foot) present.      Cervical back: Normal range of motion and neck supple.   Skin:     Comments: R foot wound with erythema and purulent drainage    Neurological:      Mental Status: He is alert and oriented to person, place, and time.                Significant Labs: All pertinent labs within the past 24 hours have been reviewed.    Significant Imaging: I have reviewed all pertinent imaging results/findings within the past 24 hours.

## 2024-03-25 VITALS
HEART RATE: 76 BPM | RESPIRATION RATE: 16 BRPM | OXYGEN SATURATION: 97 % | DIASTOLIC BLOOD PRESSURE: 56 MMHG | SYSTOLIC BLOOD PRESSURE: 93 MMHG | TEMPERATURE: 98 F

## 2024-03-25 LAB
BACTERIA SPEC AEROBE CULT: ABNORMAL
POCT GLUCOSE: 181 MG/DL (ref 70–110)
POCT GLUCOSE: 196 MG/DL (ref 70–110)
POCT GLUCOSE: 247 MG/DL (ref 70–110)
POCT GLUCOSE: 288 MG/DL (ref 70–110)
VANCOMYCIN TROUGH SERPL-MCNC: 21.3 UG/ML (ref 10–22)

## 2024-03-25 PROCEDURE — 21400001 HC TELEMETRY ROOM

## 2024-03-25 PROCEDURE — A4216 STERILE WATER/SALINE, 10 ML: HCPCS | Performed by: NURSE PRACTITIONER

## 2024-03-25 PROCEDURE — 99233 SBSQ HOSP IP/OBS HIGH 50: CPT | Mod: ,,, | Performed by: PODIATRIST

## 2024-03-25 PROCEDURE — 94761 N-INVAS EAR/PLS OXIMETRY MLT: CPT

## 2024-03-25 PROCEDURE — 99232 SBSQ HOSP IP/OBS MODERATE 35: CPT | Mod: ,,,

## 2024-03-25 PROCEDURE — 99900035 HC TECH TIME PER 15 MIN (STAT)

## 2024-03-25 PROCEDURE — 63600175 PHARM REV CODE 636 W HCPCS: Performed by: STUDENT IN AN ORGANIZED HEALTH CARE EDUCATION/TRAINING PROGRAM

## 2024-03-25 PROCEDURE — 25000003 PHARM REV CODE 250: Performed by: STUDENT IN AN ORGANIZED HEALTH CARE EDUCATION/TRAINING PROGRAM

## 2024-03-25 PROCEDURE — 36415 COLL VENOUS BLD VENIPUNCTURE: CPT | Performed by: STUDENT IN AN ORGANIZED HEALTH CARE EDUCATION/TRAINING PROGRAM

## 2024-03-25 PROCEDURE — 80202 ASSAY OF VANCOMYCIN: CPT | Performed by: STUDENT IN AN ORGANIZED HEALTH CARE EDUCATION/TRAINING PROGRAM

## 2024-03-25 PROCEDURE — 25000003 PHARM REV CODE 250: Performed by: NURSE PRACTITIONER

## 2024-03-25 PROCEDURE — 0QBN0ZZ EXCISION OF RIGHT METATARSAL, OPEN APPROACH: ICD-10-PCS | Performed by: PODIATRIST

## 2024-03-25 RX ADMIN — VANCOMYCIN HYDROCHLORIDE 1750 MG: 500 INJECTION, POWDER, LYOPHILIZED, FOR SOLUTION INTRAVENOUS at 08:03

## 2024-03-25 RX ADMIN — ACETAMINOPHEN 650 MG: 325 TABLET ORAL at 01:03

## 2024-03-25 RX ADMIN — HYDROCODONE BITARTRATE AND ACETAMINOPHEN 1 TABLET: 10; 325 TABLET ORAL at 01:03

## 2024-03-25 RX ADMIN — PIPERACILLIN AND TAZOBACTAM 4.5 G: 4; .5 INJECTION, POWDER, LYOPHILIZED, FOR SOLUTION INTRAVENOUS; PARENTERAL at 06:03

## 2024-03-25 RX ADMIN — Medication 10 ML: at 10:03

## 2024-03-25 RX ADMIN — Medication 10 ML: at 03:03

## 2024-03-25 RX ADMIN — INSULIN DETEMIR 10 UNITS: 100 INJECTION, SOLUTION SUBCUTANEOUS at 09:03

## 2024-03-25 RX ADMIN — SODIUM CHLORIDE: 9 INJECTION, SOLUTION INTRAVENOUS at 09:03

## 2024-03-25 RX ADMIN — INSULIN ASPART 1 UNITS: 100 INJECTION, SOLUTION INTRAVENOUS; SUBCUTANEOUS at 09:03

## 2024-03-25 RX ADMIN — FLUOXETINE HYDROCHLORIDE 40 MG: 20 CAPSULE ORAL at 03:03

## 2024-03-25 RX ADMIN — PANTOPRAZOLE SODIUM 40 MG: 40 TABLET, DELAYED RELEASE ORAL at 03:03

## 2024-03-25 RX ADMIN — MUPIROCIN: 20 OINTMENT TOPICAL at 09:03

## 2024-03-25 RX ADMIN — SENNOSIDES AND DOCUSATE SODIUM 1 TABLET: 8.6; 5 TABLET ORAL at 09:03

## 2024-03-25 RX ADMIN — MUPIROCIN: 20 OINTMENT TOPICAL at 03:03

## 2024-03-25 RX ADMIN — PIPERACILLIN AND TAZOBACTAM 4.5 G: 4; .5 INJECTION, POWDER, LYOPHILIZED, FOR SOLUTION INTRAVENOUS; PARENTERAL at 09:03

## 2024-03-25 RX ADMIN — ENOXAPARIN SODIUM 80 MG: 80 INJECTION SUBCUTANEOUS at 03:03

## 2024-03-25 RX ADMIN — TRAZODONE HYDROCHLORIDE 150 MG: 100 TABLET ORAL at 09:03

## 2024-03-25 RX ADMIN — CLOPIDOGREL BISULFATE 75 MG: 75 TABLET ORAL at 03:03

## 2024-03-25 RX ADMIN — SENNOSIDES AND DOCUSATE SODIUM 1 TABLET: 8.6; 5 TABLET ORAL at 03:03

## 2024-03-25 RX ADMIN — RANOLAZINE 1000 MG: 500 TABLET, FILM COATED, EXTENDED RELEASE ORAL at 03:03

## 2024-03-25 RX ADMIN — INSULIN ASPART 2 UNITS: 100 INJECTION, SOLUTION INTRAVENOUS; SUBCUTANEOUS at 05:03

## 2024-03-25 RX ADMIN — METOPROLOL SUCCINATE 25 MG: 25 TABLET, EXTENDED RELEASE ORAL at 03:03

## 2024-03-25 RX ADMIN — ATORVASTATIN CALCIUM 40 MG: 40 TABLET, FILM COATED ORAL at 03:03

## 2024-03-25 RX ADMIN — POLYETHYLENE GLYCOL 3350 17 G: 17 POWDER, FOR SOLUTION ORAL at 03:03

## 2024-03-25 RX ADMIN — PANTOPRAZOLE SODIUM 40 MG: 40 TABLET, DELAYED RELEASE ORAL at 09:03

## 2024-03-25 RX ADMIN — ENOXAPARIN SODIUM 80 MG: 80 INJECTION SUBCUTANEOUS at 09:03

## 2024-03-25 RX ADMIN — FUROSEMIDE 20 MG: 20 TABLET ORAL at 03:03

## 2024-03-25 RX ADMIN — ISOSORBIDE MONONITRATE 30 MG: 30 TABLET, EXTENDED RELEASE ORAL at 03:03

## 2024-03-25 RX ADMIN — HYDROCODONE BITARTRATE AND ACETAMINOPHEN 1 TABLET: 10; 325 TABLET ORAL at 10:03

## 2024-03-25 RX ADMIN — DOCUSATE SODIUM 100 MG: 100 CAPSULE, LIQUID FILLED ORAL at 03:03

## 2024-03-25 RX ADMIN — PIPERACILLIN AND TAZOBACTAM 4.5 G: 4; .5 INJECTION, POWDER, LYOPHILIZED, FOR SOLUTION INTRAVENOUS; PARENTERAL at 03:03

## 2024-03-25 RX ADMIN — ASPIRIN 81 MG CHEWABLE TABLET 81 MG: 81 TABLET CHEWABLE at 03:03

## 2024-03-25 NOTE — PLAN OF CARE
Recommendations  Recommendation:   1. When medically able, resume diabetic/cardiac diet 2000 kcals   2. Add Raymond BID   3. Monitor weight and labs     Goals: Pt will be on diet by RD follow up     Nutrition Goal Status: new  Communication of RD Recs: other (comment) (POC)

## 2024-03-25 NOTE — CONSULTS
"  [facility] - Med Surg  Adult Nutrition  Consult Note    SUMMARY     Recommendations  Recommendation:   1. When medically able, resume diabetic/cardiac diet 2000 kcals   2. Add Raymond BID   3. Monitor weight and labs    Goals: Pt will be on diet by RD follow up    Nutrition Goal Status: new  Communication of RD Recs: other (comment) (POC)    Assessment and Plan  Nutrition Problem  Altered nutrition related labs     Related to (etiology):   DM    Signs and Symptoms (as evidenced by):   Glucose 194   Lab Results   Component Value Date    HGBA1C 8.3 (H) 03/15/2024     Interventions:  Collaboration with other providers   CHO consistent Diet     Nutrition Diagnosis Status:   New     Reason for Assessment  Reason For Assessment: consult (altered skin integrity)  Diagnosis: other (see comments) (critical limb ischemia of right lower extremity)  Relevant Medical History: MI, DM, HLD, HTN, hiatal hernia, sleep apnea, hernia repair, abdominal hernia, toe amputation, CHF  Interdisciplinary Rounds: did not attend  General Information Comments: Pt is currently NPO for procedure. Yoni-13/incision to right foot, gluteal cleft, sacral spine, left groin, right hip. Noted 100% meal intake. Noted wound to right foot, located on medial forefoot. Noted 4 lb weight gain in 3 months. Unable to conduct NFPE at time of visit d/t pt with nursing at time of visit.  Nutrition Discharge Planning: d/c on cardiac/diabetic diet    Nutrition Risk Screen  Nutrition Risk Screen: no indicators present    Nutrition/Diet History  Patient Reported Diet/Restrictions/Preferences: diabetic diet, heart healthy  Food Preferences: AMBAR  Factors Affecting Nutritional Intake: None identified at this time    Nutrition Related Social Determinants of Health: SDOH: Unable to assess at this time.       Anthropometrics  Temp: 97.6 °F (36.4 °C)  Height Method: Stated  Height: 6' 2" (188 cm)  Height (inches): 74 in  Weight Method: Bed Scale  Weight: 88.4 kg (194 lb 14.2 " oz)  Weight (lb): 194.89 lb  Ideal Body Weight (IBW), Male: 190 lb  % Ideal Body Weight, Male (lb): 102.57 %  BMI (Calculated): 25  BMI Grade: 25 - 29.9 - overweight  Usual Body Weight (UBW), k.2 kg (23)  % Usual Body Weight: 102.77  % Weight Change From Usual Weight: 2.55 %     Lab/Procedures/Meds  Pertinent Labs Reviewed: reviewed  Pertinent Labs Comments: Glucose 194  Pertinent Medications Reviewed: reviewed  Pertinent Medications Comments: atorvastatin, enoxaparin, furosemide, insulin, pantoprazole, senna, sodium chloride, trazondone, vancomycin    Estimated/Assessed Needs  Weight Used For Calorie Calculations: 88.4 kg (194 lb 14.2 oz)  Energy Calorie Requirements (kcal): 2210 kcals (25 kcals/kg)  Energy Need Method: Kcal/kg  Protein Requirements: 106g (1.2 g/kg)  Weight Used For Protein Calculations: 88.4 kg (194 lb 14.2 oz)     Estimated Fluid Requirement Method: RDA Method  RDA Method (mL): 2210  CHO Requirement: 270g    Nutrition Prescription Ordered  Current Diet Order: NPO    Evaluation of Received Nutrient/Fluid Intake  I/O: 1520/2000  Energy Calories Required: not meeting needs  Protein Required: not meeting needs  Fluid Required: not meeting needs  Comments: LBM-3/20/24  Tolerance: not tolerating  % Intake of Estimated Energy Needs: Other: NPO  % Meal Intake: NPO    Nutrition Risk  Level of Risk/Frequency of Follow-up: low - moderate (1x/weekly)     Monitor and Evaluation  Food and Nutrient Intake: energy intake, food and beverage intake  Food and Nutrient Adminstration: diet order  Anthropometric Measurements: weight, weight change, body mass index  Biochemical Data, Medical Tests and Procedures: electrolyte and renal panel, gastrointestinal profile, lipid profile, inflammatory profile, glucose/endocrine profile     Nutrition Follow-Up  RD Follow-up?: Yes

## 2024-03-25 NOTE — ASSESSMENT & PLAN NOTE
Periph Cath 4/5/2023    There was severe infrapopliteal disease bilaterally.    The Ost L ERNIE to Prox L ERNIE lesion was 85% stenosed.    The Prox L Peroneal to Dist L Peroneal lesion was 100% stenosed.    The Ost R ERNIE to Prox R ERNIE lesion was 100% stenosed.    The Ost R PTA to Prox R PTA lesion was 100% stenosed.    The estimated blood loss was <50 mL.    Plan for staged PTA of bilateral lower extremities due to critical limb threatening ishcemia.    -known significant PAD as above  -podiatry, ortho, infectious disease consulted, appreciate recs  -plan for TMA Wednesday  -will likely need peripheral angiogram, will discuss timing with Dr. Malloy   -continue lovenox, plavix, asa

## 2024-03-25 NOTE — SUBJECTIVE & OBJECTIVE
Review of Systems   Constitutional: Positive for malaise/fatigue. Negative for chills, diaphoresis, weight gain and weight loss.   Cardiovascular:  Negative for chest pain, dyspnea on exertion, irregular heartbeat, leg swelling, near-syncope, orthopnea, palpitations, paroxysmal nocturnal dyspnea and syncope.   Respiratory:  Negative for cough, hemoptysis, shortness of breath and snoring.    Gastrointestinal:  Negative for bloating, abdominal pain, nausea and vomiting.   Neurological:  Negative for light-headedness and weakness.     Objective:     Vital Signs (Most Recent):  Temp: 97.6 °F (36.4 °C) (03/25/24 0812)  Pulse: 67 (03/25/24 0812)  Resp: 19 (03/25/24 0812)  BP: 127/65 (03/25/24 0812)  SpO2: 97 % (03/25/24 0812) Vital Signs (24h Range):  Temp:  [97.6 °F (36.4 °C)-98.2 °F (36.8 °C)] 97.6 °F (36.4 °C)  Pulse:  [67-78] 67  Resp:  [16-20] 19  SpO2:  [94 %-98 %] 97 %  BP: (109-131)/(57-65) 127/65     Weight: 88.4 kg (194 lb 14.2 oz)  Body mass index is 25.02 kg/m².     SpO2: 97 %         Intake/Output Summary (Last 24 hours) at 3/25/2024 1046  Last data filed at 3/25/2024 0607  Gross per 24 hour   Intake 1280.16 ml   Output 2000 ml   Net -719.84 ml       Lines/Drains/Airways       Drain  Duration             Female External Urinary Catheter w/ Suction 03/23/24 2150 1 day              Peripheral Intravenous Line  Duration                  Peripheral IV - Single Lumen 03/24/24 1819 20 G Left Wrist <1 day                       Physical Exam  Vitals and nursing note reviewed.   Constitutional:       Appearance: Normal appearance.   Cardiovascular:      Rate and Rhythm: Normal rate and regular rhythm.      Heart sounds: No murmur heard.     No gallop.   Pulmonary:      Effort: Pulmonary effort is normal.      Breath sounds: Normal breath sounds.   Abdominal:      General: Bowel sounds are normal. There is no distension.      Palpations: Abdomen is soft.      Tenderness: There is no abdominal tenderness.   Feet:       "Comments: BLE bandages CDI  Skin:     General: Skin is warm and dry.      Findings: Wound present.      Comments: R foot   Neurological:      Mental Status: He is alert and oriented to person, place, and time.            Significant Labs: Blood Culture: No results for input(s): "LABBLOO" in the last 48 hours., BMP:   Recent Labs   Lab 03/24/24  0600   *      K 4.0   CL 99   CO2 28   BUN 16   CREATININE 0.9   CALCIUM 9.0   MG 1.8   , CMP   Recent Labs   Lab 03/24/24  0600      K 4.0   CL 99   CO2 28   *   BUN 16   CREATININE 0.9   CALCIUM 9.0   ANIONGAP 9   , CBC   Recent Labs   Lab 03/24/24  0600   WBC 7.36   HGB 9.8*   HCT 29.4*      , INR No results for input(s): "INR", "PROTIME" in the last 48 hours., Lipid Panel No results for input(s): "CHOL", "HDL", "LDLCALC", "TRIG", "CHOLHDL" in the last 48 hours., Troponin No results for input(s): "TROPONINI" in the last 48 hours., and All pertinent lab results from the last 24 hours have been reviewed.    Significant Imaging: Echocardiogram: 2D echo with color flow doppler:   Results for orders placed or performed during the hospital encounter of 03/23/17   2D Echo w/ Color Flow Doppler   Result Value Ref Range    EF + QEF 55 55 - 65    Diastolic Dysfunction Yes (A)     Mitral Valve Mobility NORMAL     Narrative    Date of Procedure: 03/23/2017        TEST DESCRIPTION   Technical Quality: This is a technically adequate study.     Aorta: The aortic root is normal in size, measuring 3.6 cm at sinotubular junction.     Left Atrium: The left atrial volume index is normal, measuring 25.53 cc/m2.     Left Ventricle: The left ventricle is normal in size, with an end-diastolic diameter of 2.8 cm, and an end-systolic diameter of 2.1 cm. LV wall thickness is normal, with the septum measuring 2.0 cm and the posterior wall measuring 1.5 cm across. Relative   wall thickness was increased at 1.07, and the LV mass index was 99.0 g/m2 consistent with " concentric remodeling. Global left ventricular systolic function appears normal. Visually estimated ejection fraction is 55-60%. The LV Doppler derived stroke   volume equals 74.0 ccs.   Mitral inflow patterns reveal an E:A ratio of 0.7, with a deceleration time of 156 msec., and an IVRT of 106.1 msec., consistent with diastolic dysfunction secondary to relaxation abnormality.     Right Atrium: The right atrium is normal in size, measuring 4.3 cm in length in the apical view.     Right Ventricle: The right ventricle is normal in size measuring 2.6 cm at the base in the apical right ventricle-focused view. Global right ventricular systolic function appears normal.     Aortic Valve:  The aortic valve is normal in structure with normal leaflet mobility. The aortic valve is tri-leaflet in structure.     Mitral Valve:  The mitral valve is mildly sclerotic with normal leaflet mobility. There is mitral annular calcification.     Tricuspid Valve:  The tricuspid valve is normal in structure.     Pulmonary Valve:  The pulmonic valve is not well seen.     IVC: The IVC is not visualized.     Intracavitary: There is no evidence of pericardial effusion, intracavity mass, thrombi, or vegetation.         CONCLUSIONS     1 - Normal left ventricular systolic function (EF 55-60%).     2 - Left ventricular diastolic dysfunction.     3 - Concentric remodeling.     4 - Normal right ventricular systolic function .             This document has been electronically    SIGNED BY: Tyesha Isaac MD On: 03/23/2017 15:13    and Transthoracic echo (TTE) complete (Cupid Only):   Results for orders placed or performed during the hospital encounter of 11/07/23   Echo   Result Value Ref Range    BSA 2.04 m2    LVIDd 3.72 3.5 - 6.0 cm    LV Systolic Volume 26.30 mL    LV Systolic Volume Index 12.8 mL/m2    LVIDs 2.67 2.1 - 4.0 cm    LV Diastolic Volume 58.98 mL    LV Diastolic Volume Index 28.77 mL/m2    IVS 0.97 0.6 - 1.1 cm    FS 28 28 - 44 %     "Left Ventricle Relative Wall Thickness 0.68 cm    Posterior Wall 1.27 (A) 0.6 - 1.1 cm    LV mass 133.91 g    LV Mass Index 65 g/m2    MV Peak E Brody 0.63 m/s    TDI LATERAL 0.06 m/s    TDI SEPTAL 0.08 m/s    E/E' ratio 9.00 m/s    MV Peak A Brody 1.06 m/s    TR Max Brody 0.96 m/s    E/A ratio 0.59     E wave deceleration time 190.59 msec    MV "A" wave duration 131.629120580689621 msec    LV SEPTAL E/E' RATIO 7.88 m/s    LV LATERAL E/E' RATIO 10.50 m/s    LVOT peak brody 0.85 m/s    Left Ventricular Outflow Tract Mean Velocity 0.69 cm/s    Left Ventricular Outflow Tract Mean Gradient 1.95 mmHg    RV S' 10.78 cm/s    LA size 2.85 cm    Left Atrium Minor Axis 5.94 cm    Left Atrium Major Axis 5.04 cm    LA volume (mod) 48.53 cm3    LA Volume Index (Mod) 23.7 mL/m2    AV mean gradient 3 mmHg    AV peak gradient 5 mmHg    Ao peak brody 1.12 m/s    Ao VTI 23.90 cm    LVOT peak VTI 18.10 cm    AV Velocity Ratio 0.76     AV index (prosthetic) 0.76     MV peak gradient 4 mmHg    MV stenosis pressure 1/2 time 55.27 ms    MV valve area p 1/2 method 3.98 cm2    MV VTI 23.9 cm    Triscuspid Valve Regurgitation Peak Gradient 4 mmHg    PV PEAK VELOCITY 0.65 m/s    PV peak gradient 2 mmHg    Pulmonary Valve Mean Velocity 0.42 m/s    Mean e' 0.07 m/s    ZLVIDS -2.72     ZLVIDD -5.03     LA Volume Index 23.2 mL/m2    LA volume 47.56 cm3    LA WIDTH 3.6 cm    EF 50 %    Narrative      Left Ventricle: There is low normal systolic function with a visually   estimated ejection fraction of 50 - 55%. Ejection fraction by visual   approximation is 50%.    Right Ventricle: Normal right ventricular cavity size. Wall thickness   is normal. Right ventricle wall motion  is normal. Systolic function is   normal.    Aortic Valve: There is mild aortic valve sclerosis.    Mitral Valve: There is moderate mitral annular calcification present.    Pericardium: There is a small effusion.    The study was difficult due to patient's body habitus and poor "   endocardial visualization.    Valves are not well visualized

## 2024-03-25 NOTE — ASSESSMENT & PLAN NOTE
Echo 11/11/2023    Interpretation Summary    Left Ventricle: There is low normal systolic function with a visually estimated ejection fraction of 50 - 55%. Ejection fraction by visual approximation is 50%.    Right Ventricle: Normal right ventricular cavity size. Wall thickness is normal. Right ventricle wall motion  is normal. Systolic function is normal.    Aortic Valve: There is mild aortic valve sclerosis.    Mitral Valve: There is moderate mitral annular calcification present.    Pericardium: There is a small effusion.    The study was difficult due to patient's body habitus and poor endocardial visualization.    Valves are not well visualized    -echo reviewed as above  -grossly euvolemic on exam  -continue PO lasix, BB

## 2024-03-25 NOTE — ASSESSMENT & PLAN NOTE
Periph Cath 4/5/2023    There was severe infrapopliteal disease bilaterally.    The Ost L ERNIE to Prox L ERNIE lesion was 85% stenosed.    The Prox L Peroneal to Dist L Peroneal lesion was 100% stenosed.    The Ost R ERNIE to Prox R ERNIE lesion was 100% stenosed.    The Ost R PTA to Prox R PTA lesion was 100% stenosed.    The estimated blood loss was <50 mL.    Plan for staged PTA of bilateral lower extremities due to critical limb threatening ishcemia.    -known significant PAD as above  -podiatry, ortho, infectious disease consulted, appreciate recs  -plan for TMA today  -will likely need peripheral angiogram, will discuss timing with Dr. Malloy   -continue lovenox, plavix, asa

## 2024-03-25 NOTE — CONSULTS
[facility] Surg  Wound Care    Patient Name:  Julien Meléndez   MRN:  [id number]  Date: 3/25/2024  Diagnosis: Critical limb ischemia of right lower extremity    History:     Past Medical History:   Diagnosis Date    Diabetes mellitus     Hiatal hernia     under Dr Saenz' care    Hyperlipidemia     Hypertension     MI (myocardial infarction)     Sleep apnea        Social History     Socioeconomic History    Marital status:    Tobacco Use    Smoking status: Never    Smokeless tobacco: Never   Substance and Sexual Activity    Alcohol use: No    Drug use: No     Social Determinants of Health     Financial Resource Strain: Low Risk  (3/22/2024)    Overall Financial Resource Strain (CARDIA)     Difficulty of Paying Living Expenses: Not hard at all   Food Insecurity: No Food Insecurity (3/22/2024)    Hunger Vital Sign     Worried About Running Out of Food in the Last Year: Never true     Ran Out of Food in the Last Year: Never true   Transportation Needs: No Transportation Needs (3/22/2024)    PRAPARE - Transportation     Lack of Transportation (Medical): No     Lack of Transportation (Non-Medical): No   Physical Activity: Inactive (3/22/2024)    Exercise Vital Sign     Days of Exercise per Week: 0 days     Minutes of Exercise per Session: 0 min   Stress: Stress Concern Present (3/22/2024)    [employer] - Occupational Stress Questionnaire     Feeling of Stress : To some extent   Social Connections: Unknown (3/22/2024)    Social Connection and Isolation Panel [NHANES]     Frequency of Communication with Friends and Family: More than three times a week     Frequency of Social Gatherings with Friends and Family: More than three times a week     Attends [employer] Services: Patient declined     Active Member of Clubs or Organizations: No     Attends Club or Organization Meetings: Patient declined     Marital Status:    Housing Stability: Low Risk  (3/22/2024)    Housing Stability  Vital Sign     Unable to Pay for Housing in the Last Year: No     Number of Places Lived in the Last Year: 1     Unstable Housing in the Last Year: No       Precautions:     Allergies as of 03/21/2024 - Reviewed 03/21/2024   Allergen Reaction Noted    Nystatin  03/30/2020       WO Assessment Details/Treatment     Podiatry managing foot wound    Sacrum and groin- redness improving since last assessment 3/22/24- recommend nurse to continue with orders in place for triad ointment BID and pressure injury prevention interventions     03/25/2024

## 2024-03-25 NOTE — ASSESSMENT & PLAN NOTE
Creatine stable for now. BMP reviewed- noted Estimated Creatinine Clearance: 72.3 mL/min (based on SCr of 0.9 mg/dL). according to latest data. 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  Monitor UOP and serial BMP and adjust therapy as needed. Renally dose meds. Avoid nephrotoxic medications and procedures.

## 2024-03-25 NOTE — PROGRESS NOTES
Pharmacokinetic Assessment Follow Up: IV Vancomycin    Vancomycin serum concentration assessment(s):    The trough level was drawn correctly and can be used to guide therapy at this time. The measurement is above the desired definitive target range of 15 to 20 mcg/mL.    Vancomycin Regimen Plan:    Change regimen to Vancomycin 1750 mg IV with next random serum trough concentration measured at 1800 on 03/26/24    Drug levels (last 3 results):  Recent Labs   Lab Result Units 03/23/24  1754 03/25/24  1657   Vancomycin-Trough ug/mL 15.0 21.3       Pharmacy will continue to follow and monitor vancomycin.    Please contact pharmacy at extension [phone] for questions regarding this assessment.    Thank you for the consult,   Nany Zuniga       Patient brief summary:  Julien Meléndez is a 83 y.o. male initiated on antimicrobial therapy with IV Vancomycin for treatment of bone/joint infection    The patient's current regimen is 2000 mg q 24 h    Drug Allergies:   Review of patient's allergies indicates:   Allergen Reactions    Nystatin      Other reaction(s): Unknown       Actual Body Weight:   88.4 kg    Renal Function:   Estimated Creatinine Clearance: 72.3 mL/min (based on SCr of 0.9 mg/dL).,     Dialysis Method (if applicable):  N/A    CBC (last 72 hours):  Recent Labs   Lab Result Units 03/23/24  0524 03/24/24  0600   WBC K/uL 9.56 7.36   Hemoglobin g/dL 10.3* 9.8*   Hematocrit % 31.7* 29.4*   Platelets K/uL 240 212   Gran % % 65.1 64.5   Lymph % % 19.9 21.7   Mono % % 10.7 9.8   Eosinophil % % 3.2 3.1   Basophil % % 0.4 0.4   Differential Method  Automated Automated       Metabolic Panel (last 72 hours):  Recent Labs   Lab Result Units 03/23/24  0524 03/24/24  0600   Sodium mmol/L 137 136   Potassium mmol/L 3.8 4.0   Chloride mmol/L 100 99   CO2 mmol/L 28 28   Glucose mg/dL 170* 194*   BUN mg/dL 16 16   Creatinine mg/dL 0.9 0.9   Magnesium mg/dL 1.7 1.8       Vancomycin Administrations:  vancomycin given in the last  96 hours                     vancomycin 2 g in dextrose 5 % 500 mL IVPB (mg) 2,000 mg New Bag 03/24/24 1843     2,000 mg New Bag 03/23/24 1843     2,000 mg New Bag 03/22/24 1749    vancomycin 2 g in dextrose 5 % 500 mL IVPB (mg) 2,000 mg New Bag 03/21/24 1802                    Microbiologic Results:  Microbiology Results (last 7 days)       Procedure Component Value Units Date/Time    Aerobic culture [[id number]]  (Abnormal)  (Susceptibility) Collected: 03/22/24 1513    Order Status: Completed Specimen: Wound from Foot, Right Updated: 03/25/24 1304     Aerobic Bacterial Culture STAPHYLOCOCCUS AUREUS  Moderate      Narrative:      Right 2nd toe    Aerobic culture [[id number]]  (Abnormal)  (Susceptibility) Collected: 03/22/24 1513    Order Status: Completed Specimen: Wound from Foot, Right Updated: 03/25/24 1302     Aerobic Bacterial Culture STAPHYLOCOCCUS AUREUS  Few        PSEUDOMONAS AERUGINOSA  Few      Narrative:      Right 5th toe    Blood culture [[id number]] Collected: 03/21/24 1758    Order Status: Completed Specimen: Blood Updated: 03/24/24 2012     Blood Culture, Routine No Growth to date      No Growth to date      No Growth to date      No Growth to date    Blood culture #1 **CANNOT BE ORDERED STAT** [[id number]] Collected: 03/21/24 1352    Order Status: Completed Specimen: Blood from Wrist, Right Updated: 03/24/24 2012     Blood Culture, Routine No Growth to date      No Growth to date      No Growth to date      No Growth to date    Blood culture #2 **CANNOT BE ORDERED STAT** [[id number]] Collected: 03/21/24 1343    Order Status: Completed Specimen: Blood from Peripheral, Antecubital, Right Updated: 03/24/24 2012     Blood Culture, Routine No Growth to date      No Growth to date      No Growth to date      No Growth to date    Blood culture [[id number]] Collected: 03/21/24 1757    Order Status: Completed Specimen: Blood Updated: 03/24/24 2012     Blood Culture, Routine No Growth to date      No Growth  to date      No Growth to date      No Growth to date    Culture, Anaerobe [[id number]] Collected: 03/22/24 1513    Order Status: Completed Specimen: Wound from Foot, Right Updated: 03/24/24 1238     Anaerobic Culture Culture in progress    Narrative:      Right 5th toe    Culture, Anaerobe [[id number]] Collected: 03/22/24 1513    Order Status: Completed Specimen: Wound from Foot, Right Updated: 03/24/24 1238     Anaerobic Culture Culture in progress    Narrative:      Right 2nd toe

## 2024-03-25 NOTE — ASSESSMENT & PLAN NOTE
Patient's FSGs are uncontrolled due to hyperglycemia on current medication regimen.  Last A1c reviewed-   Lab Results   Component Value Date    HGBA1C 8.3 (H) 03/15/2024     Most recent fingerstick glucose reviewed-   Recent Labs   Lab 03/24/24  1153 03/24/24  1703 03/24/24  2044 03/25/24  0541   POCTGLUCOSE 230* 227* 315* 196*       Current correctional scale  Low  Maintain anti-hyperglycemic dose as follows-   Antihyperglycemics (From admission, onward)    Start     Stop Route Frequency Ordered    03/21/24 2100  insulin detemir U-100 (Levemir) pen 10 Units         -- SubQ Nightly 03/21/24 1509    03/21/24 1600  insulin aspart U-100 pen 0-5 Units         -- SubQ Before meals & nightly PRN 03/21/24 1502        Hold Oral hypoglycemics while patient is in the hospital.

## 2024-03-25 NOTE — PLAN OF CARE
Rounded at bedside. Pt scheduled for procedure today. Not medically ready for discharge. Plan is for SNF when medically ready. Referrals sent. LOCET complete, waiting on 142. CM will continue to follow pt during this hospital visit and provide any discharge needs.  Future Appointments   Date Time Provider Department Center   4/15/2024  8:00 AM Gladis Fermin NP [facility]      03/25/24 0928   Rounds   Attendance Provider;Nurse    Discharge Plan A Skilled Nursing Facility   Why the patient remains in the hospital Requires continued medical care   Transition of Care Barriers None

## 2024-03-25 NOTE — PLAN OF CARE
Pt disoriented to time & place. Dressing change done on r foot this shift. Meds were crushed, pt unable to swallow pills. Wife @ bedside. Meds were given according MAR. Safety protocol in place.

## 2024-03-25 NOTE — PROGRESS NOTES
[facility]  Progress Note    Patient Name: Julien Meléndez  MRN: [id number]  Patient Class: IP- Inpatient   Admission Date: 3/21/2024  Length of Stay: 4 days  Attending Physician: Kelsey Stewart MD  Primary Care Provider: Kelvin Wayne MD        Subjective:     Principal Problem:Critical limb ischemia of right lower extremity        HPI:  Julien Meléndez is a 83 yr old male presents to the ER with reports of right foot infection. Pts family states over the past 2 days, they noticed an increase in erythema and drainage to the site. Denies fever. Denies taking oral antbx for this specific wound. Pt states he was advised to come to the ER for an evaluation from wound care. Wound has been dressed by HH. 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 Abnormal edema signal involving the 3rd metatarsophalangeal joint, 4th proximal interphalangeal joint and 5th distal interphalangeal joint concerning for reactive edema or early changes of septic arthritis. Will cont vancomycin and Zosyn and will admit to the [facility] service for further care.      Overview/Hospital Course:  No notes on file    Interval History: seen at the bedside, no distress noted. Appreciate consultants eval. Will cont IV abx. Cardiology os planning an angiogram today.     Review of Systems   Constitutional:  Negative for fatigue and fever.   Genitourinary:  Negative for difficulty urinating and hematuria.   Skin:  Positive for wound (right foot).     Objective:     Vital Signs (Most Recent):  Temp: 97.6 °F (36.4 °C) (03/25/24 0812)  Pulse: 67 (03/25/24 0812)  Resp: 19 (03/25/24 0812)  BP: 127/65 (03/25/24 0812)  SpO2: 97 % (03/25/24 0812) Vital  Signs (24h Range):  Temp:  [97.6 °F (36.4 °C)-98.2 °F (36.8 °C)] 97.6 °F (36.4 °C)  Pulse:  [67-78] 67  Resp:  [16-20] 19  SpO2:  [94 %-98 %] 97 %  BP: (109-131)/(57-65) 127/65     Weight: 88.4 kg (194 lb 14.2 oz)  Body mass index is 25.02 kg/m².     Physical Exam  Vitals and nursing note reviewed.   Constitutional:       Appearance: He is ill-appearing.   HENT:      Mouth/Throat:      Mouth: Mucous membranes are moist.   Eyes:      Extraocular Movements: Extraocular movements intact.   Cardiovascular:      Rate and Rhythm: Normal rate and regular rhythm.   Abdominal:      Palpations: Abdomen is soft.   Musculoskeletal:         General: Tenderness (right foot) present.      Cervical back: Normal range of motion and neck supple.   Skin:     Comments: R foot wound with erythema and purulent drainage    Neurological:      Mental Status: He is alert and oriented to person, place, and time.   Psychiatric:         Mood and Affect: Mood normal.                Significant Labs: All pertinent labs within the past 24 hours have been reviewed.    Significant Imaging: I have reviewed all pertinent imaging results/findings within the past 24 hours.    Assessment/Plan:      * Critical limb ischemia of right lower extremity  PAD (peripheral artery disease)     -we will continue aspirin statin as well as clopidogrel  -consulted Podiatry, Cardiology, and infectious  -IV antibiotics ordered:  IV Zosyn and IV vancomycin for now  -blood cultures are pending  -bilateral lower extremity arterial ultrasound ordered-1. Hemodynamically significant stenosis in the left mid superficial femoral artery and the left anterior tibial artery.   2. Monophasic waveforms in the left distal popliteal, anterior tibial, and posterior tibial arteries.   -Cardiology planning angio on Monday    Plan per podiatry below:  - Debrided medial forefoot wounds, drained 2nd MTPJ with mild amounts of purulent drainage.  Patient tolerated procedure well.  Cultured  2nd MTPJ, and cultured 5th digit medial wound.  Follow up results.  - interventional cardiology consulted, angiogram planned Monday 3/25.  Appreciate recs.  - discussed at length surgical versus conservative treatment options with patient.  Discussed risks and benefits of both.  After lengthy discussion, patient is amenable to transmetatarsal amputation of the right foot.  Family at bedside agrees.  Planning to amputate after angiogram and intervention.  - dressed wound as follows:  Painted with Betadine, then dressed with gauze, Kerlix, and Ace wrap.  Wound care orders to follow.  - continue wearing heel offloading boots.  - if patient has white blood cell count spike and unstable vitals, please contact Podiatry immediately for immediate surgical intervention.  If not, for best patient results, opting for amputation after vascular intervention.      Osteomyelitis of right foot  -MRI with OM noted to the right foot  -blood cultures ngtd  -follow wound cx  -infectious disease consulted  -we will continue vanc and Zosyn for now        Septic joint right foot  -podiatry following       Diabetic foot ulcer-right  Patient's FSGs are uncontrolled due to hyperglycemia on current medication regimen.  Last A1c reviewed-   Lab Results   Component Value Date    HGBA1C 8.3 (H) 03/15/2024     Most recent fingerstick glucose reviewed-   Recent Labs   Lab 03/24/24  1153 03/24/24  1703 03/24/24  2044 03/25/24  0541   POCTGLUCOSE 230* 227* 315* 196*       Current correctional scale  Low  Maintain anti-hyperglycemic dose as follows-   Antihyperglycemics (From admission, onward)      Start     Stop Route Frequency Ordered    03/21/24 2100  insulin detemir U-100 (Levemir) pen 10 Units         -- SubQ Nightly 03/21/24 1509    03/21/24 1600  insulin aspart U-100 pen 0-5 Units         -- SubQ Before meals & nightly PRN 03/21/24 1502          Hold Oral hypoglycemics while patient is in the hospital.    Dementia  -history noted of  "dementia  -patient is currently awake and alert  -patient's family at bedside to assist with history  -monitoring    Depressive disorder  Patient has recurrent depression which is unknown and is currently controlled. Will Continue anti-depressant medications. We will not consult psychiatry at this time. Patient does not display psychosis at this time. Continue to monitor closely and adjust plan of care as needed.        Congestive heart failure, unspecified HF chronicity, unspecified heart failure type  -euvolemic on exam  Echo    Interpretation Summary    Left Ventricle: There is low normal systolic function with a visually estimated ejection fraction of 50 - 55%. Ejection fraction by visual approximation is 50%.    Right Ventricle: Normal right ventricular cavity size. Wall thickness is normal. Right ventricle wall motion  is normal. Systolic function is normal.    Aortic Valve: There is mild aortic valve sclerosis.    Mitral Valve: There is moderate mitral annular calcification present.    Pericardium: There is a small effusion.    The study was difficult due to patient's body habitus and poor endocardial visualization.    Valves are not well visualized  .CHF. Last BNP reviewed- and noted below No results for input(s): "BNP", "BNPTRIAGEBLO" in the last 168 hours.  -continue to monitor    Atrial fibrillation  -history of Afib  -holding Eliquis at this time in case of any procedure  -ordered full-dose Lovenox for now  -continue cardiac tele monitoring    Stage 1 chronic kidney disease  Creatine stable for now. BMP reviewed- noted Estimated Creatinine Clearance: 72.3 mL/min (based on SCr of 0.9 mg/dL). according to latest data. 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  Monitor UOP and serial BMP and adjust therapy as needed. Renally dose meds. Avoid nephrotoxic medications and procedures.    KELSEY (obstructive sleep apnea)  - CPAP at night p.r.n.      Mixed hyperlipidemia  -continue statin at home " dose        VTE Risk Mitigation (From admission, onward)           Ordered     enoxaparin injection 80 mg  Every 12 hours         03/22/24 0015     IP VTE HIGH RISK PATIENT  Once         03/21/24 1502     Place sequential compression device  Until discontinued         03/21/24 1502                    Discharge Planning   AURELIA:      Code Status: Full Code   Is the patient medically ready for discharge?:     Reason for patient still in hospital (select all that apply): Laboratory test, Treatment, Imaging, Consult recommendations, and PT / OT recommendations  Discharge Plan A: Skilled Nursing Facility                  Lucius Rodarte NP  Department of Hospital Medicine   [facility]     negative

## 2024-03-25 NOTE — PROCEDURES
"Julien Meléndez is a 83 y.o. male patient.    Temp: 98.3 °F (36.8 °C) (03/25/24 1610)  Pulse: 82 (03/25/24 1645)  Resp: 18 (03/25/24 1610)  BP: (!) 108/55 (03/25/24 1610)  SpO2: 97 % (03/25/24 1610)  Weight: 88.4 kg (194 lb 14.2 oz) (03/25/24 1040)  Height: 6' 2" (188 cm) (03/25/24 1040)       Debridement    Date/Time: 3/25/2024 5:16 PM    Performed by: Merari Charles MD  Authorized by: Merari Charles MD    Time out: Immediately prior to procedure a "time out" was called to verify the correct patient, procedure, equipment, support staff and site/side marked as required.    Consent Done?:  Yes (Verbal)    Preparation: Patient was prepped and draped in usual sterile fashion and Patient was prepped and draped with clean technique    Local anesthesia used?: No      Wound Details:    Location:  Right foot    Location:  Right Plantar    Type of Debridement:  Excisional       Length (cm):  1.1       Area (sq cm):  1.1       Width (cm):  1       Percent Debrided (%):  92       Depth (cm):  2       Total Area Debrided (sq cm):  1.01    Depth of debridement:  Bone    Tissue debrided:  Dermis, Epidermis and Subcutaneous    Devitalized tissue debrided:  Biofilm, Exudate, Fibrin, Slough and Callus    Instruments:  Scissors, Forceps and Curette  Bleeding:  Minimal  Hemostasis Achieved: Yes  Method Used:  Pressure  Patient tolerance:  Patient tolerated the procedure well with no immediate complications     Wound noted to right 2nd plantar me that's tracks dorsally and connects to dorsal medial wound. Mild purulence expressed. Thoroughly irrigated with jan Charles DPMEGAN PGY-2  Podiatric Medicine & Surgery  [facility]  Secure Chat Preferred  Mobile: [phone]  Pager: [phone]   "

## 2024-03-25 NOTE — ASSESSMENT & PLAN NOTE
Julien Meléndez is a 83 y.o. male with IDSA moderate infection.  X-ray and MRI shows osteomyelitis of the 2nd MTPJ, with possible septic arthritis of the 2nd MTPJ and digits 3 through 5.  WBC count within normal limits, vital signs are stable.      - Today Debrided medial forefoot wounds, drained 2nd MTPJ with mild amounts of purulent drainage.  Patient tolerated procedure well. See procedure note  - discussed at length surgical versus conservative treatment options with patient.  Discussed risks and benefits of both.  After lengthy discussion, patient is amenable to transmetatarsal amputation of the right foot.  Family at bedside agrees. Patient scheduled for right TMA with Dr. Leung on 3/27/24  - Patient to be NPO at midnight Tuesday night  - anticoagulant plan per cardiology   - interventional cardiology following  planning angiogram after patient receives right TMA   - ABX plan per ID   - dressed wounds as follows:  Painted with Betadine soaked 4x4 gauze, then dressed with gauze, Kerlix, and Ace wrap.    - nursing to continue wound care   - continue wearing heel offloading boots.  - podiatry will follow

## 2024-03-25 NOTE — PROGRESS NOTES
[facility]  Cardiology  Progress Note    Patient Name: Julien Meléndez  MRN: [id number]  Admission Date: 3/21/2024  Hospital Length of Stay: 4 days  Code Status: Full Code   Attending Physician: Kelsey Stewart MD   Primary Care Physician: Kelvin Wayne MD  Expected Discharge Date:   Principal Problem:Critical limb ischemia of right lower extremity    Subjective:     Hospital Course:   3/22/2024: Per HPI    3/25/2024: Patient seen in room, family at bedside. Patient reportedly feeling okay. Plan of care discussed with patient, tentative plan for [facility] Wednesday. May need peripheral angio to follow, will discuss timing with Dr. Malloy.     No new subjective & objective note has been filed under this hospital service since the last note was generated.      Assessment and Plan:       * Critical limb ischemia of right lower extremity  Periph Cath 4/5/2023    There was severe infrapopliteal disease bilaterally.    The Ost L ERNIE to Prox L ERNIE lesion was 85% stenosed.    The Prox L Peroneal to Dist L Peroneal lesion was 100% stenosed.    The Ost R ERNIE to Prox R ERNIE lesion was 100% stenosed.    The Ost R PTA to Prox R PTA lesion was 100% stenosed.    The estimated blood loss was <50 mL.    Plan for staged PTA of bilateral lower extremities due to critical limb threatening ishcemia.    -known significant PAD as above  -podiatry, ortho, infectious disease consulted, appreciate recs  -plan for [facility] Wednesday  -will likely need peripheral angiogram, will discuss timing with Dr. Malloy   -continue lovenox, plavix, asa     PAD (peripheral artery disease)  Peripheral Angio 4/11/2023    There was severe left infrapopliteal disease status post successful PTA of proximal peroneal artery with 3.5x80 mm IVL balloon and distal peroneal/posterior tibial artery with 2.5x40 mm PTA balloon with excellent angiographic results.    The Ost L ERNIE to Dist L ERNIE lesion was 100% stenosed.    The Ost L PTA to Dist L PTA lesion was 100%  stenosed.    The Dist L Peroneal lesion was 99% stenosed with 10% stenosis post-intervention.    The Prox L Peroneal lesion was 99% stenosed with 10% stenosis post-intervention.    The estimated blood loss was <50 mL.    -known significant PAD  -recent periph angio s/p PPI as above  -RLE recs above      Congestive heart failure, unspecified HF chronicity, unspecified heart failure type    Echo 11/11/2023    Interpretation Summary    Left Ventricle: There is low normal systolic function with a visually estimated ejection fraction of 50 - 55%. Ejection fraction by visual approximation is 50%.    Right Ventricle: Normal right ventricular cavity size. Wall thickness is normal. Right ventricle wall motion  is normal. Systolic function is normal.    Aortic Valve: There is mild aortic valve sclerosis.    Mitral Valve: There is moderate mitral annular calcification present.    Pericardium: There is a small effusion.    The study was difficult due to patient's body habitus and poor endocardial visualization.    Valves are not well visualized    -echo reviewed as above  -grossly euvolemic on exam  -continue PO lasix, BB    Atrial fibrillation  -[id number] Score: 4.   -continue BB, lovenox  -resume Eliquis upon D/C       Mixed hyperlipidemia  -continue atorvastatin, last LDLc 71 2022  -needs repeat lipid panel outpatient          VTE Risk Mitigation (From admission, onward)           Ordered     enoxaparin injection 80 mg  Every 12 hours         03/22/24 0015     IP VTE HIGH RISK PATIENT  Once         03/21/24 1502     Place sequential compression device  Until discontinued         03/21/24 1502                    Shane Gillis, AJ  Cardiology  [facility]

## 2024-03-25 NOTE — NURSING
Care of pt assumed from SHEELA Gonzales. Pt sleeping peacefully, turned to right side, with HOB at 30o. No signs of distress noted. Wife, Kylee, sleeping peacefully at bedside. IV-ABX infusing. NPO sign hung on door. White board updated. Bed in low/locked position with alarm set, 3 side rails raised and call light within reach.

## 2024-03-25 NOTE — SUBJECTIVE & OBJECTIVE
Interval History: seen at the bedside, no distress noted. Appreciate consultants eval. Will cont IV abx. Cardiology os planning an angiogram today.     Review of Systems   Constitutional:  Negative for fatigue and fever.   Genitourinary:  Negative for difficulty urinating and hematuria.   Skin:  Positive for wound (right foot).     Objective:     Vital Signs (Most Recent):  Temp: 97.6 °F (36.4 °C) (03/25/24 0812)  Pulse: 67 (03/25/24 0812)  Resp: 19 (03/25/24 0812)  BP: 127/65 (03/25/24 0812)  SpO2: 97 % (03/25/24 0812) Vital Signs (24h Range):  Temp:  [97.6 °F (36.4 °C)-98.2 °F (36.8 °C)] 97.6 °F (36.4 °C)  Pulse:  [67-78] 67  Resp:  [16-20] 19  SpO2:  [94 %-98 %] 97 %  BP: (109-131)/(57-65) 127/65     Weight: 88.4 kg (194 lb 14.2 oz)  Body mass index is 25.02 kg/m².     Physical Exam  Vitals and nursing note reviewed.   Constitutional:       Appearance: He is ill-appearing.   HENT:      Mouth/Throat:      Mouth: Mucous membranes are moist.   Eyes:      Extraocular Movements: Extraocular movements intact.   Cardiovascular:      Rate and Rhythm: Normal rate and regular rhythm.   Abdominal:      Palpations: Abdomen is soft.   Musculoskeletal:         General: Tenderness (right foot) present.      Cervical back: Normal range of motion and neck supple.   Skin:     Comments: R foot wound with erythema and purulent drainage    Neurological:      Mental Status: He is alert and oriented to person, place, and time.   Psychiatric:         Mood and Affect: Mood normal.                Significant Labs: All pertinent labs within the past 24 hours have been reviewed.    Significant Imaging: I have reviewed all pertinent imaging results/findings within the past 24 hours.

## 2024-03-25 NOTE — HOSPITAL COURSE
3/22/2024: Per HPI    3/25/2024: Patient seen in room, family at bedside. Patient reportedly feeling okay. Plan of care discussed with patient, tentative plan for TMA Wednesday. May need peripheral angio to follow, will discuss timing with Dr. Malloy.

## 2024-03-25 NOTE — ASSESSMENT & PLAN NOTE
Peripheral Angio 4/11/2023    There was severe left infrapopliteal disease status post successful PTA of proximal peroneal artery with 3.5x80 mm IVL balloon and distal peroneal/posterior tibial artery with 2.5x40 mm PTA balloon with excellent angiographic results.    The Ost L ERNIE to Dist L ERNIE lesion was 100% stenosed.    The Ost L PTA to Dist L PTA lesion was 100% stenosed.    The Dist L Peroneal lesion was 99% stenosed with 10% stenosis post-intervention.    The Prox L Peroneal lesion was 99% stenosed with 10% stenosis post-intervention.    The estimated blood loss was <50 mL.    -known significant PAD  -recent periph angio s/p PPI as above  -RLE recs above

## 2024-03-25 NOTE — SUBJECTIVE & OBJECTIVE
Subjective:     Interval History: Patient seen lying in bed. Admits to right foot pain. Dressings clean dry and intact. Patient denies any current fever chills nausea or vomiting. Patient has no other pedal complaints at this time.     Scheduled Meds:   aspirin  81 mg Oral Daily    atorvastatin  40 mg Oral Daily    clopidogreL  75 mg Oral Daily    docusate sodium  100 mg Oral Daily    enoxparin  1 mg/kg Subcutaneous Q12H (prophylaxis, 0900/2100)    FLUoxetine  40 mg Oral Daily    furosemide  20 mg Oral Daily    insulin detemir U-100  10 Units Subcutaneous QHS    isosorbide mononitrate  30 mg Oral Daily    metoprolol succinate  25 mg Oral Daily    mupirocin   Nasal BID    pantoprazole  40 mg Oral BID    piperacillin-tazobactam (Zosyn) IV (PEDS and ADULTS) (extended infusion is not appropriate)  4.5 g Intravenous Q8H    polyethylene glycol  17 g Oral Daily    ranolazine  1,000 mg Oral BID    senna-docusate 8.6-50 mg  1 tablet Oral BID    sodium chloride 0.9%  10 mL Intravenous Q8H    traZODone  150 mg Oral QHS    vancomycin (VANCOCIN) IV (PEDS and ADULTS)  2,000 mg Intravenous Q24H     Continuous Infusions:  PRN Meds:sodium chloride 0.9%, acetaminophen, albuterol-ipratropium, aluminum-magnesium hydroxide-simethicone, dextrose 10%, dextrose 10%, glucagon (human recombinant), glucose, glucose, HYDROcodone-acetaminophen, influenza 65up-adj, insulin aspart U-100, melatonin, ondansetron, ondansetron, simethicone, Pharmacy to dose Vancomycin consult **AND** vancomycin - pharmacy to dose    Review of Systems   Constitutional:  Positive for activity change and fatigue. Negative for chills and fever.   Gastrointestinal:  Negative for nausea and vomiting.   Musculoskeletal:  Positive for arthralgias, gait problem and myalgias.   Skin:  Positive for color change and wound.   Neurological:  Positive for numbness.   Psychiatric/Behavioral:  Negative for behavioral problems and confusion.      Objective:     Vital Signs (Most  Recent):  Temp: 98.3 °F (36.8 °C) (03/25/24 1610)  Pulse: 82 (03/25/24 1645)  Resp: 18 (03/25/24 1610)  BP: (!) 108/55 (03/25/24 1610)  SpO2: 97 % (03/25/24 1610) Vital Signs (24h Range):  Temp:  [97.6 °F (36.4 °C)-98.3 °F (36.8 °C)] 98.3 °F (36.8 °C)  Pulse:  [67-82] 82  Resp:  [18-20] 18  SpO2:  [96 %-98 %] 97 %  BP: (108-147)/(55-66) 108/55     Weight: 88.4 kg (194 lb 14.2 oz)  Body mass index is 25.02 kg/m².    Foot Exam    General  General Appearance: appears stated age and healthy   Orientation: alert and oriented to person, place, and time   Affect: appropriate       Right Foot/Ankle     Inspection and Palpation  Ecchymosis: none  Tenderness: none   Swelling: none   Skin Exam: drainage and erythema; no ulcer     Neurovascular  Dorsalis pedis: 1+  Posterior tibial: 1+  Saphenous nerve sensation: diminished  Tibial nerve sensation: diminished  Superficial peroneal nerve sensation: diminished  Deep peroneal nerve sensation: diminished  Sural nerve sensation: diminished    Comments  Status post 1st ray partial resection.  Two wounds located on the dorsal medial aspect of the forefoot distal wound has connecting tract with plantar 2nd mtpj wound.  No foul malodor noted.  Upon debridement, sanguinous-purulent drainage noted on manual expression.  Erythema noted throughout the forefoot, most notably concentrated on the medial forefoot.    Fifth digit: Medial aspect with ulcer noted.  Probes to bone.  No purulent drainage noted on manual expression.  Slight surrounding erythema and edema noted.    Third and 4th digit:  Diffuse preulcerative lesions noted on medial and lateral aspects.      Left Foot/Ankle      Inspection and Palpation  Ecchymosis: none  Tenderness: none   Swelling: none   Skin Exam: skin not intact     Neurovascular  Dorsalis pedis: 1+  Posterior tibial: 1+  Saphenous nerve sensation: diminished  Tibial nerve sensation: diminished  Superficial peroneal nerve sensation: diminished  Deep peroneal nerve  "sensation: diminished  Sural nerve sensation: diminished    Comments  No open wounds.                      Laboratory:  A1C:   Recent Labs   Lab 10/23/23  1426 03/15/24  1140   HGBA1C 9.7* 8.3*     Blood Cultures: No results for input(s): "LABBLOO" in the last 48 hours.  BMP:   Recent Labs   Lab 03/24/24  0600   *      K 4.0   CL 99   CO2 28   BUN 16   CREATININE 0.9   CALCIUM 9.0   MG 1.8     CBC:   Recent Labs   Lab 03/24/24  0600   WBC 7.36   RBC 3.58*   HGB 9.8*   HCT 29.4*      MCV 82   MCH 27.4   MCHC 33.3     CMP:   Recent Labs   Lab 03/21/24  1342 03/22/24  0457 03/24/24  0600   *   < > 194*   CALCIUM 9.4   < > 9.0   ALBUMIN 2.9*  --   --    PROT 7.1  --   --       < > 136   K 4.4   < > 4.0   CO2 30*   < > 28      < > 99   BUN 21   < > 16   CREATININE 1.0   < > 0.9   ALKPHOS 91  --   --    ALT 9*  --   --    AST 6*  --   --    BILITOT 0.4  --   --     < > = values in this interval not displayed.     Coagulation: No results for input(s): "PT", "INR", "APTT" in the last 168 hours.  CRP: No results for input(s): "CRP" in the last 168 hours.  ESR: No results for input(s): "SEDRATE" in the last 168 hours.  Prealbumin: No results for input(s): "PREALBUMIN" in the last 48 hours.  Wound Cultures:   Recent Labs   Lab 10/31/23  1500 11/07/23  2208 11/10/23  1249 12/12/23  1346 03/22/24  1513   LABAERO METHICILLIN RESISTANT STAPHYLOCOCCUS AUREUS  Moderate  * METHICILLIN RESISTANT STAPHYLOCOCCUS AUREUS  Many  Skin amy also present  * METHICILLIN RESISTANT STAPHYLOCOCCUS AUREUS  Many  * CANDIDA ALBICANS  Few  * STAPHYLOCOCCUS AUREUS  Moderate  *  STAPHYLOCOCCUS AUREUS  Few  *  PSEUDOMONAS AERUGINOSA  Few  *     Microbiology Results (last 7 days)       Procedure Component Value Units Date/Time    Aerobic culture [[id number]]  (Abnormal)  (Susceptibility) Collected: 03/22/24 1513    Order Status: Completed Specimen: Wound from Foot, Right Updated: 03/25/24 1304     Aerobic " Bacterial Culture STAPHYLOCOCCUS AUREUS  Moderate      Narrative:      Right 2nd toe    Aerobic culture [[id number]]  (Abnormal)  (Susceptibility) Collected: 03/22/24 1513    Order Status: Completed Specimen: Wound from Foot, Right Updated: 03/25/24 1302     Aerobic Bacterial Culture STAPHYLOCOCCUS AUREUS  Few        PSEUDOMONAS AERUGINOSA  Few      Narrative:      Right 5th toe    Blood culture [[id number]] Collected: 03/21/24 1758    Order Status: Completed Specimen: Blood Updated: 03/24/24 2012     Blood Culture, Routine No Growth to date      No Growth to date      No Growth to date      No Growth to date    Blood culture #1 **CANNOT BE ORDERED STAT** [[id number]] Collected: 03/21/24 1352    Order Status: Completed Specimen: Blood from Wrist, Right Updated: 03/24/24 2012     Blood Culture, Routine No Growth to date      No Growth to date      No Growth to date      No Growth to date    Blood culture #2 **CANNOT BE ORDERED STAT** [[id number]] Collected: 03/21/24 1343    Order Status: Completed Specimen: Blood from Peripheral, Antecubital, Right Updated: 03/24/24 2012     Blood Culture, Routine No Growth to date      No Growth to date      No Growth to date      No Growth to date    Blood culture [[id number]] Collected: 03/21/24 1757    Order Status: Completed Specimen: Blood Updated: 03/24/24 2012     Blood Culture, Routine No Growth to date      No Growth to date      No Growth to date      No Growth to date    Culture, Anaerobe [[id number]] Collected: 03/22/24 1513    Order Status: Completed Specimen: Wound from Foot, Right Updated: 03/24/24 1238     Anaerobic Culture Culture in progress    Narrative:      Right 5th toe    Culture, Anaerobe [[id number]] Collected: 03/22/24 1513    Order Status: Completed Specimen: Wound from Foot, Right Updated: 03/24/24 1238     Anaerobic Culture Culture in progress    Narrative:      Right 2nd toe          Specimen (24h ago, onward)      None

## 2024-03-25 NOTE — PROGRESS NOTES
[facility]  Podiatry  Progress Note    Patient Name: Julien Meléndez  MRN: [id number]  Admission Date: 3/21/2024  Hospital Length of Stay: 4 days  Attending Physician: Kelsey Stewart MD  Primary Care Provider: Kelvin Wayne MD     Subjective:     Interval History: Patient seen lying in bed. Admits to right foot pain. Dressings clean dry and intact. Patient denies any current fever chills nausea or vomiting. Patient has no other pedal complaints at this time.     Scheduled Meds:   aspirin  81 mg Oral Daily    atorvastatin  40 mg Oral Daily    clopidogreL  75 mg Oral Daily    docusate sodium  100 mg Oral Daily    enoxparin  1 mg/kg Subcutaneous Q12H (prophylaxis, 0900/2100)    FLUoxetine  40 mg Oral Daily    furosemide  20 mg Oral Daily    insulin detemir U-100  10 Units Subcutaneous QHS    isosorbide mononitrate  30 mg Oral Daily    metoprolol succinate  25 mg Oral Daily    mupirocin   Nasal BID    pantoprazole  40 mg Oral BID    piperacillin-tazobactam (Zosyn) IV (PEDS and ADULTS) (extended infusion is not appropriate)  4.5 g Intravenous Q8H    polyethylene glycol  17 g Oral Daily    ranolazine  1,000 mg Oral BID    senna-docusate 8.6-50 mg  1 tablet Oral BID    sodium chloride 0.9%  10 mL Intravenous Q8H    traZODone  150 mg Oral QHS    vancomycin (VANCOCIN) IV (PEDS and ADULTS)  2,000 mg Intravenous Q24H     Continuous Infusions:  PRN Meds:sodium chloride 0.9%, acetaminophen, albuterol-ipratropium, aluminum-magnesium hydroxide-simethicone, dextrose 10%, dextrose 10%, glucagon (human recombinant), glucose, glucose, HYDROcodone-acetaminophen, influenza 65up-adj, insulin aspart U-100, melatonin, ondansetron, ondansetron, simethicone, Pharmacy to dose Vancomycin consult **AND** vancomycin - pharmacy to dose    Review of Systems   Constitutional:  Positive for activity change and fatigue. Negative for chills and fever.   Gastrointestinal:  Negative for nausea and vomiting.   Musculoskeletal:  Positive for  arthralgias, gait problem and myalgias.   Skin:  Positive for color change and wound.   Neurological:  Positive for numbness.   Psychiatric/Behavioral:  Negative for behavioral problems and confusion.      Objective:     Vital Signs (Most Recent):  Temp: 98.3 °F (36.8 °C) (03/25/24 1610)  Pulse: 82 (03/25/24 1645)  Resp: 18 (03/25/24 1610)  BP: (!) 108/55 (03/25/24 1610)  SpO2: 97 % (03/25/24 1610) Vital Signs (24h Range):  Temp:  [97.6 °F (36.4 °C)-98.3 °F (36.8 °C)] 98.3 °F (36.8 °C)  Pulse:  [67-82] 82  Resp:  [18-20] 18  SpO2:  [96 %-98 %] 97 %  BP: (108-147)/(55-66) 108/55     Weight: 88.4 kg (194 lb 14.2 oz)  Body mass index is 25.02 kg/m².    Foot Exam    General  General Appearance: appears stated age and healthy   Orientation: alert and oriented to person, place, and time   Affect: appropriate       Right Foot/Ankle     Inspection and Palpation  Ecchymosis: none  Tenderness: none   Swelling: none   Skin Exam: drainage and erythema; no ulcer     Neurovascular  Dorsalis pedis: 1+  Posterior tibial: 1+  Saphenous nerve sensation: diminished  Tibial nerve sensation: diminished  Superficial peroneal nerve sensation: diminished  Deep peroneal nerve sensation: diminished  Sural nerve sensation: diminished    Comments  Status post 1st ray partial resection.  Two wounds located on the dorsal medial aspect of the forefoot distal wound has connecting tract with plantar 2nd mtpj wound.  No foul malodor noted.  Upon debridement, sanguinous-purulent drainage noted on manual expression.  Erythema noted throughout the forefoot, most notably concentrated on the medial forefoot.    Fifth digit: Medial aspect with ulcer noted.  Probes to bone.  No purulent drainage noted on manual expression.  Slight surrounding erythema and edema noted.    Third and 4th digit:  Diffuse preulcerative lesions noted on medial and lateral aspects.      Left Foot/Ankle      Inspection and Palpation  Ecchymosis: none  Tenderness: none   Swelling:  "none   Skin Exam: skin not intact     Neurovascular  Dorsalis pedis: 1+  Posterior tibial: 1+  Saphenous nerve sensation: diminished  Tibial nerve sensation: diminished  Superficial peroneal nerve sensation: diminished  Deep peroneal nerve sensation: diminished  Sural nerve sensation: diminished    Comments  No open wounds.                      Laboratory:  A1C:   Recent Labs   Lab 10/23/23  1426 03/15/24  1140   HGBA1C 9.7* 8.3*     Blood Cultures: No results for input(s): "LABBLOO" in the last 48 hours.  BMP:   Recent Labs   Lab 03/24/24  0600   *      K 4.0   CL 99   CO2 28   BUN 16   CREATININE 0.9   CALCIUM 9.0   MG 1.8     CBC:   Recent Labs   Lab 03/24/24  0600   WBC 7.36   RBC 3.58*   HGB 9.8*   HCT 29.4*      MCV 82   MCH 27.4   MCHC 33.3     CMP:   Recent Labs   Lab 03/21/24  1342 03/22/24  0457 03/24/24  0600   *   < > 194*   CALCIUM 9.4   < > 9.0   ALBUMIN 2.9*  --   --    PROT 7.1  --   --       < > 136   K 4.4   < > 4.0   CO2 30*   < > 28      < > 99   BUN 21   < > 16   CREATININE 1.0   < > 0.9   ALKPHOS 91  --   --    ALT 9*  --   --    AST 6*  --   --    BILITOT 0.4  --   --     < > = values in this interval not displayed.     Coagulation: No results for input(s): "PT", "INR", "APTT" in the last 168 hours.  CRP: No results for input(s): "CRP" in the last 168 hours.  ESR: No results for input(s): "SEDRATE" in the last 168 hours.  Prealbumin: No results for input(s): "PREALBUMIN" in the last 48 hours.  Wound Cultures:   Recent Labs   Lab 10/31/23  1500 11/07/23  2208 11/10/23  1249 12/12/23  1346 03/22/24  1513   LABAERO METHICILLIN RESISTANT STAPHYLOCOCCUS AUREUS  Moderate  * METHICILLIN RESISTANT STAPHYLOCOCCUS AUREUS  Many  Skin amy also present  * METHICILLIN RESISTANT STAPHYLOCOCCUS AUREUS  Many  * CANDIDA ALBICANS  Few  * STAPHYLOCOCCUS AUREUS  Moderate  *  STAPHYLOCOCCUS AUREUS  Few  *  PSEUDOMONAS AERUGINOSA  Few  *     Microbiology Results (last 7 " days)       Procedure Component Value Units Date/Time    Aerobic culture [[id number]]  (Abnormal)  (Susceptibility) Collected: 03/22/24 1513    Order Status: Completed Specimen: Wound from Foot, Right Updated: 03/25/24 1304     Aerobic Bacterial Culture STAPHYLOCOCCUS AUREUS  Moderate      Narrative:      Right 2nd toe    Aerobic culture [[id number]]  (Abnormal)  (Susceptibility) Collected: 03/22/24 1513    Order Status: Completed Specimen: Wound from Foot, Right Updated: 03/25/24 1302     Aerobic Bacterial Culture STAPHYLOCOCCUS AUREUS  Few        PSEUDOMONAS AERUGINOSA  Few      Narrative:      Right 5th toe    Blood culture [[id number]] Collected: 03/21/24 1758    Order Status: Completed Specimen: Blood Updated: 03/24/24 2012     Blood Culture, Routine No Growth to date      No Growth to date      No Growth to date      No Growth to date    Blood culture #1 **CANNOT BE ORDERED STAT** [[id number]] Collected: 03/21/24 1352    Order Status: Completed Specimen: Blood from Wrist, Right Updated: 03/24/24 2012     Blood Culture, Routine No Growth to date      No Growth to date      No Growth to date      No Growth to date    Blood culture #2 **CANNOT BE ORDERED STAT** [[id number]] Collected: 03/21/24 1343    Order Status: Completed Specimen: Blood from Peripheral, Antecubital, Right Updated: 03/24/24 2012     Blood Culture, Routine No Growth to date      No Growth to date      No Growth to date      No Growth to date    Blood culture [[id number]] Collected: 03/21/24 1757    Order Status: Completed Specimen: Blood Updated: 03/24/24 2012     Blood Culture, Routine No Growth to date      No Growth to date      No Growth to date      No Growth to date    Culture, Anaerobe [[id number]] Collected: 03/22/24 1513    Order Status: Completed Specimen: Wound from Foot, Right Updated: 03/24/24 1238     Anaerobic Culture Culture in progress    Narrative:      Right 5th toe    Culture, Anaerobe [[id number]] Collected: 03/22/24 1513     Order Status: Completed Specimen: Wound from Foot, Right Updated: 03/24/24 1237     Anaerobic Culture Culture in progress    Narrative:      Right 2nd toe          Specimen (24h ago, onward)      None              Assessment/Plan:     Cardiac/Vascular  * Critical limb ischemia of right lower extremity  See rest of plan     PAD (peripheral artery disease)  Per interventional cardiology     Mixed hyperlipidemia  Per hospital medicine     Renal/  Stage 1 chronic kidney disease  Per hospital medicine/ nephrology     ID  Osteomyelitis of right foot  Julien eMléndez is a 83 y.o. male with IDSA moderate infection.  X-ray and MRI shows osteomyelitis of the 2nd MTPJ, with possible septic arthritis of the 2nd MTPJ and digits 3 through 5.  WBC count within normal limits, vital signs are stable.      - Today Debrided medial forefoot wounds, drained 2nd MTPJ with mild amounts of purulent drainage.  Patient tolerated procedure well. See procedure note  - discussed at length surgical versus conservative treatment options with patient.  Discussed risks and benefits of both.  After lengthy discussion, patient is amenable to transmetatarsal amputation of the right foot.  Family at bedside agrees. Patient scheduled for right TMA with Dr. Leung on 3/27/24  - Patient to be NPO at midnight Tuesday night  - anticoagulant plan per cardiology   - interventional cardiology following  planning angiogram after patient receives right TMA   - ABX plan per ID   - dressed wounds as follows:  Painted with Betadine soaked 4x4 gauze, then dressed with gauze, Kerlix, and Ace wrap.    - nursing to continue wound care   - continue wearing heel offloading boots.  - podiatry will follow     Endocrine  Diabetic foot ulcer-right  See rest of plan     Other  KELSEY (obstructive sleep apnea)  Per hospital medicine         Merari Charles DPM PGY-2  Podiatric Medicine & Surgery  [facility]  Secure Chat Preferred  Mobile: [phone]  Pager:  [phone]

## 2024-03-26 LAB
ANION GAP SERPL CALC-SCNC: 10 MMOL/L (ref 8–16)
BACTERIA BLD CULT: NORMAL
BASOPHILS # BLD AUTO: 0.05 K/UL (ref 0–0.2)
BASOPHILS NFR BLD: 0.8 % (ref 0–1.9)
BUN SERPL-MCNC: 18 MG/DL (ref 8–23)
CALCIUM SERPL-MCNC: 9.3 MG/DL (ref 8.7–10.5)
CHLORIDE SERPL-SCNC: 100 MMOL/L (ref 95–110)
CO2 SERPL-SCNC: 26 MMOL/L (ref 23–29)
CREAT SERPL-MCNC: 1 MG/DL (ref 0.5–1.4)
CREAT SERPL-MCNC: 1.1 MG/DL (ref 0.5–1.4)
DIFFERENTIAL METHOD BLD: ABNORMAL
EOSINOPHIL # BLD AUTO: 0.2 K/UL (ref 0–0.5)
EOSINOPHIL NFR BLD: 3 % (ref 0–8)
ERYTHROCYTE [DISTWIDTH] IN BLOOD BY AUTOMATED COUNT: 16 % (ref 11.5–14.5)
EST. GFR  (NO RACE VARIABLE): >60 ML/MIN/1.73 M^2
EST. GFR  (NO RACE VARIABLE): >60 ML/MIN/1.73 M^2
GLUCOSE SERPL-MCNC: 231 MG/DL (ref 70–110)
HCT VFR BLD AUTO: 29.3 % (ref 40–54)
HGB BLD-MCNC: 9.7 G/DL (ref 14–18)
IMM GRANULOCYTES # BLD AUTO: 0.03 K/UL (ref 0–0.04)
IMM GRANULOCYTES NFR BLD AUTO: 0.5 % (ref 0–0.5)
LYMPHOCYTES # BLD AUTO: 1.4 K/UL (ref 1–4.8)
LYMPHOCYTES NFR BLD: 21.5 % (ref 18–48)
MAGNESIUM SERPL-MCNC: 1.9 MG/DL (ref 1.6–2.6)
MCH RBC QN AUTO: 27.2 PG (ref 27–31)
MCHC RBC AUTO-ENTMCNC: 33.1 G/DL (ref 32–36)
MCV RBC AUTO: 82 FL (ref 82–98)
MONOCYTES # BLD AUTO: 0.7 K/UL (ref 0.3–1)
MONOCYTES NFR BLD: 11.1 % (ref 4–15)
NEUTROPHILS # BLD AUTO: 4 K/UL (ref 1.8–7.7)
NEUTROPHILS NFR BLD: 63.1 % (ref 38–73)
NRBC BLD-RTO: 0 /100 WBC
PLATELET # BLD AUTO: 212 K/UL (ref 150–450)
PMV BLD AUTO: 10.7 FL (ref 9.2–12.9)
POCT GLUCOSE: 215 MG/DL (ref 70–110)
POCT GLUCOSE: 226 MG/DL (ref 70–110)
POCT GLUCOSE: 249 MG/DL (ref 70–110)
POCT GLUCOSE: 319 MG/DL (ref 70–110)
POTASSIUM SERPL-SCNC: 4 MMOL/L (ref 3.5–5.1)
RBC # BLD AUTO: 3.57 M/UL (ref 4.6–6.2)
SODIUM SERPL-SCNC: 136 MMOL/L (ref 136–145)
VANCOMYCIN SERPL-MCNC: 21.5 UG/ML
WBC # BLD AUTO: 6.28 K/UL (ref 3.9–12.7)

## 2024-03-26 PROCEDURE — 75625 CONTRAST EXAM ABDOMINL AORTA: CPT | Mod: 26,,, | Performed by: INTERNAL MEDICINE

## 2024-03-26 PROCEDURE — 99900035 HC TECH TIME PER 15 MIN (STAT)

## 2024-03-26 PROCEDURE — 99232 SBSQ HOSP IP/OBS MODERATE 35: CPT | Mod: ,,, | Performed by: INTERNAL MEDICINE

## 2024-03-26 PROCEDURE — 85025 COMPLETE CBC W/AUTO DIFF WBC: CPT

## 2024-03-26 PROCEDURE — 63600175 PHARM REV CODE 636 W HCPCS: Performed by: INTERNAL MEDICINE

## 2024-03-26 PROCEDURE — C1769 GUIDE WIRE: HCPCS | Performed by: INTERNAL MEDICINE

## 2024-03-26 PROCEDURE — 25500020 PHARM REV CODE 255: Performed by: INTERNAL MEDICINE

## 2024-03-26 PROCEDURE — 25000003 PHARM REV CODE 250: Performed by: NURSE PRACTITIONER

## 2024-03-26 PROCEDURE — 99153 MOD SED SAME PHYS/QHP EA: CPT | Performed by: INTERNAL MEDICINE

## 2024-03-26 PROCEDURE — 63600175 PHARM REV CODE 636 W HCPCS: Performed by: STUDENT IN AN ORGANIZED HEALTH CARE EDUCATION/TRAINING PROGRAM

## 2024-03-26 PROCEDURE — 25000003 PHARM REV CODE 250: Performed by: INTERNAL MEDICINE

## 2024-03-26 PROCEDURE — 82565 ASSAY OF CREATININE: CPT | Performed by: FAMILY MEDICINE

## 2024-03-26 PROCEDURE — 83735 ASSAY OF MAGNESIUM: CPT

## 2024-03-26 PROCEDURE — 75710 ARTERY X-RAYS ARM/LEG: CPT | Performed by: INTERNAL MEDICINE

## 2024-03-26 PROCEDURE — 99152 MOD SED SAME PHYS/QHP 5/>YRS: CPT | Mod: ,,, | Performed by: INTERNAL MEDICINE

## 2024-03-26 PROCEDURE — 36245 INS CATH ABD/L-EXT ART 1ST: CPT | Mod: RT | Performed by: INTERNAL MEDICINE

## 2024-03-26 PROCEDURE — 63600175 PHARM REV CODE 636 W HCPCS

## 2024-03-26 PROCEDURE — B410YZZ FLUOROSCOPY OF ABDOMINAL AORTA USING OTHER CONTRAST: ICD-10-PCS | Performed by: INTERNAL MEDICINE

## 2024-03-26 PROCEDURE — 99152 MOD SED SAME PHYS/QHP 5/>YRS: CPT | Performed by: INTERNAL MEDICINE

## 2024-03-26 PROCEDURE — 36415 COLL VENOUS BLD VENIPUNCTURE: CPT

## 2024-03-26 PROCEDURE — 25000003 PHARM REV CODE 250: Performed by: STUDENT IN AN ORGANIZED HEALTH CARE EDUCATION/TRAINING PROGRAM

## 2024-03-26 PROCEDURE — 80048 BASIC METABOLIC PNL TOTAL CA: CPT

## 2024-03-26 PROCEDURE — C1894 INTRO/SHEATH, NON-LASER: HCPCS | Performed by: INTERNAL MEDICINE

## 2024-03-26 PROCEDURE — 25000003 PHARM REV CODE 250

## 2024-03-26 PROCEDURE — 21400001 HC TELEMETRY ROOM

## 2024-03-26 PROCEDURE — C1887 CATHETER, GUIDING: HCPCS | Performed by: INTERNAL MEDICINE

## 2024-03-26 PROCEDURE — 36415 COLL VENOUS BLD VENIPUNCTURE: CPT | Mod: XB | Performed by: FAMILY MEDICINE

## 2024-03-26 PROCEDURE — 80202 ASSAY OF VANCOMYCIN: CPT | Performed by: STUDENT IN AN ORGANIZED HEALTH CARE EDUCATION/TRAINING PROGRAM

## 2024-03-26 PROCEDURE — A4216 STERILE WATER/SALINE, 10 ML: HCPCS | Performed by: NURSE PRACTITIONER

## 2024-03-26 PROCEDURE — 25000003 PHARM REV CODE 250: Performed by: FAMILY MEDICINE

## 2024-03-26 PROCEDURE — 63600175 PHARM REV CODE 636 W HCPCS: Performed by: FAMILY MEDICINE

## 2024-03-26 PROCEDURE — 36245 INS CATH ABD/L-EXT ART 1ST: CPT | Mod: RT,,, | Performed by: INTERNAL MEDICINE

## 2024-03-26 PROCEDURE — 75625 CONTRAST EXAM ABDOMINL AORTA: CPT | Performed by: INTERNAL MEDICINE

## 2024-03-26 PROCEDURE — 75710 ARTERY X-RAYS ARM/LEG: CPT | Mod: 26,,, | Performed by: INTERNAL MEDICINE

## 2024-03-26 PROCEDURE — B41FYZZ FLUOROSCOPY OF RIGHT LOWER EXTREMITY ARTERIES USING OTHER CONTRAST: ICD-10-PCS | Performed by: INTERNAL MEDICINE

## 2024-03-26 PROCEDURE — 94761 N-INVAS EAR/PLS OXIMETRY MLT: CPT

## 2024-03-26 RX ORDER — HEPARIN SODIUM 200 [USP'U]/100ML
INJECTION, SOLUTION INTRAVENOUS
Status: DISCONTINUED | OUTPATIENT
Start: 2024-03-26 | End: 2024-04-02 | Stop reason: HOSPADM

## 2024-03-26 RX ORDER — VERAPAMIL HYDROCHLORIDE 2.5 MG/ML
INJECTION, SOLUTION INTRAVENOUS
Status: DISCONTINUED | OUTPATIENT
Start: 2024-03-26 | End: 2024-03-26 | Stop reason: HOSPADM

## 2024-03-26 RX ORDER — IODIXANOL 320 MG/ML
INJECTION, SOLUTION INTRAVASCULAR
Status: DISCONTINUED | OUTPATIENT
Start: 2024-03-26 | End: 2024-03-26 | Stop reason: HOSPADM

## 2024-03-26 RX ORDER — LIDOCAINE HYDROCHLORIDE 10 MG/ML
INJECTION, SOLUTION EPIDURAL; INFILTRATION; INTRACAUDAL; PERINEURAL
Status: DISCONTINUED | OUTPATIENT
Start: 2024-03-26 | End: 2024-03-26 | Stop reason: HOSPADM

## 2024-03-26 RX ORDER — MIDAZOLAM HYDROCHLORIDE 1 MG/ML
INJECTION, SOLUTION INTRAMUSCULAR; INTRAVENOUS
Status: DISCONTINUED | OUTPATIENT
Start: 2024-03-26 | End: 2024-03-26 | Stop reason: HOSPADM

## 2024-03-26 RX ORDER — HEPARIN SODIUM 1000 [USP'U]/ML
INJECTION, SOLUTION INTRAVENOUS; SUBCUTANEOUS
Status: DISCONTINUED | OUTPATIENT
Start: 2024-03-26 | End: 2024-03-26 | Stop reason: HOSPADM

## 2024-03-26 RX ORDER — PHENYLEPHRINE HCL IN 0.9% NACL 1 MG/10 ML
SYRINGE (ML) INTRAVENOUS
Status: DISCONTINUED | OUTPATIENT
Start: 2024-03-26 | End: 2024-03-26 | Stop reason: HOSPADM

## 2024-03-26 RX ORDER — FENTANYL CITRATE 50 UG/ML
INJECTION, SOLUTION INTRAMUSCULAR; INTRAVENOUS
Status: DISCONTINUED | OUTPATIENT
Start: 2024-03-26 | End: 2024-03-26 | Stop reason: HOSPADM

## 2024-03-26 RX ADMIN — INSULIN DETEMIR 10 UNITS: 100 INJECTION, SOLUTION SUBCUTANEOUS at 09:03

## 2024-03-26 RX ADMIN — ENOXAPARIN SODIUM 80 MG: 80 INJECTION SUBCUTANEOUS at 09:03

## 2024-03-26 RX ADMIN — ASPIRIN 81 MG CHEWABLE TABLET 81 MG: 81 TABLET CHEWABLE at 11:03

## 2024-03-26 RX ADMIN — TRAZODONE HYDROCHLORIDE 150 MG: 100 TABLET ORAL at 09:03

## 2024-03-26 RX ADMIN — PANTOPRAZOLE SODIUM 40 MG: 40 TABLET, DELAYED RELEASE ORAL at 09:03

## 2024-03-26 RX ADMIN — SODIUM CHLORIDE: 9 INJECTION, SOLUTION INTRAVENOUS at 09:03

## 2024-03-26 RX ADMIN — PIPERACILLIN AND TAZOBACTAM 4.5 G: 4; .5 INJECTION, POWDER, LYOPHILIZED, FOR SOLUTION INTRAVENOUS; PARENTERAL at 06:03

## 2024-03-26 RX ADMIN — INSULIN ASPART 2 UNITS: 100 INJECTION, SOLUTION INTRAVENOUS; SUBCUTANEOUS at 06:03

## 2024-03-26 RX ADMIN — CLOPIDOGREL BISULFATE 75 MG: 75 TABLET ORAL at 11:03

## 2024-03-26 RX ADMIN — Medication 10 ML: at 04:03

## 2024-03-26 RX ADMIN — HYDROCODONE BITARTRATE AND ACETAMINOPHEN 1 TABLET: 10; 325 TABLET ORAL at 09:03

## 2024-03-26 RX ADMIN — INSULIN ASPART 2 UNITS: 100 INJECTION, SOLUTION INTRAVENOUS; SUBCUTANEOUS at 09:03

## 2024-03-26 RX ADMIN — MEROPENEM 1 G: 1 INJECTION, POWDER, FOR SOLUTION INTRAVENOUS at 11:03

## 2024-03-26 RX ADMIN — Medication 10 ML: at 09:03

## 2024-03-26 RX ADMIN — MEROPENEM 1 G: 1 INJECTION, POWDER, FOR SOLUTION INTRAVENOUS at 04:03

## 2024-03-26 RX ADMIN — INSULIN ASPART 2 UNITS: 100 INJECTION, SOLUTION INTRAVENOUS; SUBCUTANEOUS at 04:03

## 2024-03-26 RX ADMIN — Medication 6 MG: at 09:03

## 2024-03-26 RX ADMIN — VANCOMYCIN HYDROCHLORIDE 1500 MG: 1.5 INJECTION, POWDER, LYOPHILIZED, FOR SOLUTION INTRAVENOUS at 09:03

## 2024-03-26 RX ADMIN — SENNOSIDES AND DOCUSATE SODIUM 1 TABLET: 8.6; 5 TABLET ORAL at 09:03

## 2024-03-26 NOTE — HOSPITAL COURSE
podiatry, ortho, infectious disease consulted, appreciate recs . Pt taken for Aortogram with selective right lower extremity angiogram  on 3/26.  ID switching zosyn to meropenem 1g Q8 . S/p TMA on 3/27. ID recommends for continue meropenem for 4 weeks from 3/22/2024, can discontinue if pathology comes back negative. SNF pending  3/29: pt had severe bleeding with gauze and dressing in blood. Asa, plavix and lovenox on hold.   3/30: Podiatry was able to dressed and packed the incision sites. No bleeding reported today so far. Resumed lovenox, asa and plavix

## 2024-03-26 NOTE — INTERVAL H&P NOTE
The patient has been examined and the H&P has been reviewed:    I concur with the findings and no changes have occurred since H&P was written.    Anesthesia/Surgery risks, benefits and alternative options discussed and understood by patient/family.          Active Hospital Problems    Diagnosis  POA    *Critical limb ischemia of right lower extremity [I70.221]  Yes    Diabetic foot ulcer-right [E11.621, L97.509]  Yes    Septic joint right foot [M00.9]  Yes    Osteomyelitis of right foot [M86.9]  Yes    Dementia [F03.90]  Yes    PAD (peripheral artery disease) [I73.9]  Yes    Depressive disorder [F32.A]  Yes    Congestive heart failure, unspecified HF chronicity, unspecified heart failure type [I50.9]  Yes    Atrial fibrillation [I48.91]  Yes    Stage 1 chronic kidney disease [N18.1]  Yes    KELSEY (obstructive sleep apnea) [G47.33]  Yes         12/2016   AHI 5.7 with oxygen jude 87%   AHI 11.2 in supine position   REM AHI 40            Mixed hyperlipidemia [E78.2]  Yes      Resolved Hospital Problems   No resolved problems to display.

## 2024-03-26 NOTE — PROGRESS NOTES
[facility] Infectious Disease Progress Note    Attending Physician: Tarsha Styles MD  Infectious Disease Attending Physician: Ishan Jaramillo MD  Intern: Stacie Mishra MD    Subjective:      Patient had an angiogram of the RLE today and tolerated the procedure well. He has no current complaints and states that he feels well. Patient reports that his mood is okay and that he has come to terms with the plan for a TMA tomorrow.     Assessment:     Julien Meléndez is a 83 y.o.male with PMH significant for DMII, HLD, HTN, MI, depression, CHF, Afib, CKD1, diabetic foot ulcer and KELSEY who is admitted for critical limb ischemia with gangrene and osteomyelitis of multiple toes of the right foot. Cardiology performed an angiogram of the right leg today and found some stenosis. Podiatry planning for TMA tomorrow. Wound cultures positive for MSSA and pseudomonas resistant to zosyn which patient was currently being treated with. Will change zosyn to Meropenem.      Plan:     - switch zosyn to meropenem 1g Q8  - continue to monitor patient condition after TMA is performed and follow biopsy results    Objective:     Last 24 Hour Vital Signs:  BP  Min: 94/53  Max: 135/69  Temp  Av.3 °F (36.8 °C)  Min: 97.9 °F (36.6 °C)  Max: 99 °F (37.2 °C)  Pulse  Av.6  Min: 60  Max: 82  Resp  Av.6  Min: 16  Max: 20  SpO2  Av.6 %  Min: 94 %  Max: 100 %  Weight  Av.8 kg (180 lb 5.4 oz)  Min: 81.8 kg (180 lb 5.4 oz)  Max: 81.8 kg (180 lb 5.4 oz)  I/O last 3 completed shifts:  In: 2261.4 [P.O.:840; I.V.:8.5; IV Piggyback:1412.9]  Out: 2750 [Urine:2750]    Physical Examination:  Physical Exam  Vitals and nursing note reviewed.   Constitutional:       General: He is not in acute distress.     Appearance: Normal appearance. He is normal weight. He is not ill-appearing.   HENT:      Head: Normocephalic and atraumatic.      Right Ear: External ear normal.      Left Ear: External ear normal.      Nose: Nose normal. No congestion.       Mouth/Throat:      Mouth: Mucous membranes are moist.      Pharynx: Oropharynx is clear.   Eyes:      Extraocular Movements: Extraocular movements intact.      Conjunctiva/sclera: Conjunctivae normal.      Pupils: Pupils are equal, round, and reactive to light.   Cardiovascular:      Rate and Rhythm: Normal rate and regular rhythm.      Pulses: Normal pulses.      Heart sounds: Normal heart sounds. No murmur heard.  Pulmonary:      Effort: Pulmonary effort is normal. No respiratory distress.      Breath sounds: Normal breath sounds. No stridor. No wheezing, rhonchi or rales.   Abdominal:      General: Abdomen is flat. Bowel sounds are normal. There is no distension.      Palpations: Abdomen is soft. There is no mass.      Tenderness: There is no abdominal tenderness.   Musculoskeletal:         General: No swelling or tenderness. Normal range of motion.      Cervical back: Normal range of motion. No rigidity.      Comments: Both feet wrapped in ACE bandages. No swelling or erythema to legs above bandage.   Skin:     General: Skin is warm and dry.      Capillary Refill: Capillary refill takes less than 2 seconds.      Coloration: Skin is not jaundiced or pale.      Findings: No bruising.   Neurological:      General: No focal deficit present.      Mental Status: He is alert and oriented to person, place, and time. Mental status is at baseline.   Psychiatric:         Mood and Affect: Mood normal.         Behavior: Behavior normal.         Thought Content: Thought content normal.         Judgment: Judgment normal.      Laboratory:  Laboratory Data Reviewed:   Pertinent Findings:  WBC 6.28    Microbiology Data Reviewed:   Pertinent Findings:  R Foot wound positive for MSSA and pseudomonas aeruginosa     Radiology:    RLE aortogram 3/25:  Procedures:  - Aortogram with selective right lower extremity angiogram     Access:  - left radial with 4-5 Frisian sheath     Closure:  Vasc band     Angiogram findings:     Abdominal  aorta:  Patent with mild disease     Renals:  Patent     Rt side:  - Rt TYLER patent  - Rt EIA patent  - Rt IIA patent  - Rt CFA patent  - Rt SFA patent with mild diffuse disease/distal SFA with 50-60% stenosis  - Rt Profunda patent  - Rt Popliteal patent mild diffuse disease  - Rt TPT.  80% stenosis, then  distal  - Rt AT  proximal  - Rt PT , reconstitute in the prox to mid section via collaterals from the distal TPT  - Rt Peroneal  proximal reconstitute prox to mid section via collaterals from the anterior tibial stump     Lt side:  - Lt TYLER patent  - Lt EIA patent  - Lt IIA patent  - Lt CFA patent     Complications:  No apparent immediate complications postprocedure     Estimated blood loss less than 10 cc     Assessment and plan:     Severe right infrapopliteal disease as above with  proximal AT,  PT and peroneal extensive collaterals circulation out the  with reconstitution of flow in the peroneal and PT which supplies excellent blood flow to the foot and plantar arch     Plan  We will continue medical therapy.  Extensive collateral circulation noted.  Hopefully TMA will heal given the extensive collateral circulation.   Proceed with TMA  We will follow up closely for healing.  If impaired healing then we will consider PTA to posterior tibial artery +/- peroneal through antegrade and retrograde approach  Close follow up with Cardiology and Podiatry     Current Medications:     Infusions:   heparin (porcine) 2,000 Units/hr (03/26/24 1026)        Scheduled:   aspirin  81 mg Oral Daily    atorvastatin  40 mg Oral Daily    clopidogreL  75 mg Oral Daily    docusate sodium  100 mg Oral Daily    enoxparin  1 mg/kg Subcutaneous Q12H (prophylaxis, 0900/2100)    FLUoxetine  40 mg Oral Daily    furosemide  20 mg Oral Daily    insulin detemir U-100  10 Units Subcutaneous QHS    isosorbide mononitrate  30 mg Oral Daily    metoprolol succinate  25 mg Oral Daily    mupirocin   Nasal BID    pantoprazole  40 mg  Oral BID    piperacillin-tazobactam (Zosyn) IV (PEDS and ADULTS) (extended infusion is not appropriate)  4.5 g Intravenous Q8H    polyethylene glycol  17 g Oral Daily    ranolazine  1,000 mg Oral BID    senna-docusate 8.6-50 mg  1 tablet Oral BID    sodium chloride 0.9%  10 mL Intravenous Q8H    traZODone  150 mg Oral QHS        PRN:  sodium chloride 0.9%, acetaminophen, albuterol-ipratropium, aluminum-magnesium hydroxide-simethicone, dextrose 10%, dextrose 10%, glucagon (human recombinant), glucose, glucose, heparin (porcine), HYDROcodone-acetaminophen, influenza 65up-adj, insulin aspart U-100, melatonin, ondansetron, ondansetron, simethicone, Pharmacy to dose Vancomycin consult **AND** vancomycin - pharmacy to dose    Antibiotics and Day Number of Therapy:  Zosyn 3/21-3/26  Vanc 3/21-3/25    Stacie Mishra MD  [facility] Internal Medicine/Pediatrics -1

## 2024-03-26 NOTE — PLAN OF CARE
Remaining air removed from L radial vasc band. Gauze and tegaderm dressing applied. Site is CDI. No bleeding or hematoma noted around site. Site and surrounding areas soft. +2 petra radial pulses palpated. Skin normal in color, warm to touch, < 3 sec cap refill. Will continue to monitor pt.

## 2024-03-26 NOTE — PLAN OF CARE
Visited pt. Angiogram was done today and pt is scheduled for TMA on tomorrow. Referrals sent for SNF, will send referrals if needed for Rehab after recommendation by PT/OT. Pt will get evaluated after surgery. CM will continue to follow pt during this hospital admit and provide discharge needs. Pt and wife is agreeable with therapy placement if recommended. LOCET done, 142 received.  Future Appointments   Date Time Provider Department Center   4/15/2024  8:00 AM Gladis Fermin NP [address]      03/26/24 1502   Post-Acute Status   Post-Acute Authorization Placement   Post-Acute Placement Status Referrals Sent   Coverage [facility] Resources/Appts/Education Provided Appointments scheduled and added to AVS   Discharge Delays None known at this time   Discharge Plan   Discharge Plan A Skilled Nursing Facility   Discharge Plan B Rehab

## 2024-03-26 NOTE — BRIEF OP NOTE
Procedures:  - Aortogram with selective right lower extremity angiogram      Access:  - left radial with 4-5 Serbian sheath      Closure:  Vasc band    Angiogram findings:    Abdominal aorta:  Patent with mild disease    Renals:  Patent    Rt side:  - Rt TYLER patent  - Rt EIA patent  - Rt IIA patent  - Rt CFA patent  - Rt SFA patent with mild diffuse disease/distal SFA with 50-60% stenosis  - Rt Profunda patent  - Rt Popliteal patent mild diffuse disease  - Rt TPT.  80% stenosis, then  distal  - Rt AT  proximal  - Rt PT , reconstitute in the prox to mid section via collaterals from the distal TPT  - Rt Peroneal  proximal reconstitute prox to mid section via collaterals from the anterior tibial stump      Lt side:  - Lt TYLER patent  - Lt EIA patent  - Lt IIA patent  - Lt CFA patent    Complications:  No apparent immediate complications postprocedure    Estimated blood loss less than 10 cc    Assessment and plan:     Severe right infrapopliteal disease as above with  proximal AT,  PT and peroneal extensive collaterals circulation out the  with reconstitution of flow in the peroneal and PT which supplies excellent blood flow to the foot and plantar arch    Plan  We will continue medical therapy.  Extensive collateral circulation noted.  Hopefully TMA will heal given the extensive collateral circulation.   Proceed with TMA  We will follow up closely for healing.  If impaired healing then we will consider PTA to posterior tibial artery +/- peroneal through antegrade and retrograde approach  Close follow up with Cardiology and Podiatry

## 2024-03-26 NOTE — PLAN OF CARE
2 cc of air removed from L radial vasc band. No hematoma or bleeding noted. +2 petra radial pulses palpated. Skin is normal in color, warm to touch, < 3 sec cap refill. VSS. Will continue to monitor pt for safety and needs.

## 2024-03-26 NOTE — SUBJECTIVE & OBJECTIVE
Interval History: seen at the bedside, no distress noted. Appreciate consultants eval. Will cont IV abx. Cardiology os planning an angiogram today.     Review of Systems   Constitutional:  Negative for fatigue and fever.   Genitourinary:  Negative for difficulty urinating and hematuria.   Skin:  Positive for wound (right foot).     Objective:     Vital Signs (Most Recent):  Temp: 98.1 °F (36.7 °C) (03/26/24 1534)  Pulse: 72 (03/26/24 1633)  Resp: 18 (03/26/24 1534)  BP: 136/66 (03/26/24 1534)  SpO2: 98 % (03/26/24 1534) Vital Signs (24h Range):  Temp:  [97.9 °F (36.6 °C)-99 °F (37.2 °C)] 98.1 °F (36.7 °C)  Pulse:  [60-77] 72  Resp:  [16-20] 18  SpO2:  [94 %-100 %] 98 %  BP: ()/(53-69) 136/66     Weight: 81.8 kg (180 lb 5.4 oz)  Body mass index is 23.15 kg/m².     Physical Exam  Vitals and nursing note reviewed.   Constitutional:       Appearance: He is ill-appearing.   HENT:      Mouth/Throat:      Mouth: Mucous membranes are moist.   Eyes:      Extraocular Movements: Extraocular movements intact.   Cardiovascular:      Rate and Rhythm: Normal rate and regular rhythm.   Abdominal:      Palpations: Abdomen is soft.   Musculoskeletal:         General: Tenderness (right foot) present.      Cervical back: Normal range of motion and neck supple.   Skin:     Comments: R foot wound with erythema and purulent drainage    Neurological:      Mental Status: He is alert and oriented to person, place, and time.   Psychiatric:         Mood and Affect: Mood normal.                Significant Labs: All pertinent labs within the past 24 hours have been reviewed.    Significant Imaging: I have reviewed all pertinent imaging results/findings within the past 24 hours.

## 2024-03-26 NOTE — PROGRESS NOTES
[facility]  Progress Note    Patient Name: Julien Meléndez  MRN: [id number]  Patient Class: IP- Inpatient   Admission Date: 3/21/2024  Length of Stay: 5 days  Attending Physician: Tarsha Styles MD  Primary Care Provider: Kelvin Wayne MD        Subjective:     Principal Problem:Critical limb ischemia of right lower extremity        HPI:  Julien Meléndez is a 83 yr old male presents to the ER with reports of right foot infection. Pts family states over the past 2 days, they noticed an increase in erythema and drainage to the site. Denies fever. Denies taking oral antbx for this specific wound. Pt states he was advised to come to the ER for an evaluation from wound care. Wound has been dressed by HH. 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 Abnormal edema signal involving the 3rd metatarsophalangeal joint, 4th proximal interphalangeal joint and 5th distal interphalangeal joint concerning for reactive edema or early changes of septic arthritis. Will cont vancomycin and Zosyn and will admit [facility] the [facility] for further care.      Overview/Hospital Course:  podiatry, ortho, infectious disease consulted, appreciate recs . Pt taken for Aortogram with selective right lower extremity angiogram  on 3/26.  ID switching zosyn to meropenem 1g Q8 . plan for TMA Wednesday     Interval History: seen at the bedside, no distress noted. Appreciate consultants eval. Will cont IV abx. Cardiology os planning an angiogram today.     Review of Systems   Constitutional:  Negative for fatigue and fever.   Genitourinary:  Negative for difficulty urinating and hematuria.   Skin:  Positive for wound (right foot).      Objective:     Vital Signs (Most Recent):  Temp: 98.1 °F (36.7 °C) (03/26/24 1534)  Pulse: 72 (03/26/24 1633)  Resp: 18 (03/26/24 1534)  BP: 136/66 (03/26/24 1534)  SpO2: 98 % (03/26/24 1534) Vital Signs (24h Range):  Temp:  [97.9 °F (36.6 °C)-99 °F (37.2 °C)] 98.1 °F (36.7 °C)  Pulse:  [60-77] 72  Resp:  [16-20] 18  SpO2:  [94 %-100 %] 98 %  BP: ()/(53-69) 136/66     Weight: 81.8 kg (180 lb 5.4 oz)  Body mass index is 23.15 kg/m².     Physical Exam  Vitals and nursing note reviewed.   Constitutional:       Appearance: He is ill-appearing.   HENT:      Mouth/Throat:      Mouth: Mucous membranes are moist.   Eyes:      Extraocular Movements: Extraocular movements intact.   Cardiovascular:      Rate and Rhythm: Normal rate and regular rhythm.   Abdominal:      Palpations: Abdomen is soft.   Musculoskeletal:         General: Tenderness (right foot) present.      Cervical back: Normal range of motion and neck supple.   Skin:     Comments: R foot wound with erythema and purulent drainage    Neurological:      Mental Status: He is alert and oriented to person, place, and time.   Psychiatric:         Mood and Affect: Mood normal.                Significant Labs: All pertinent labs within the past 24 hours have been reviewed.    Significant Imaging: I have reviewed all pertinent imaging results/findings within the past 24 hours.    Assessment/Plan:      * Critical limb ischemia of right lower extremity  PAD (peripheral artery disease)     -we will continue aspirin statin as well as clopidogrel  -consulted Podiatry, Cardiology, and infectious  -IV antibiotics ordered:  IV Zosyn and IV vancomycin for now  -blood cultures are pending  -bilateral lower extremity arterial ultrasound ordered-1. Hemodynamically significant stenosis in the left mid superficial femoral artery and the left anterior tibial artery.   2. Monophasic waveforms in the left distal popliteal, anterior tibial, and posterior tibial arteries.    -Cardiology planning angio on Monday    Plan per podiatry below:  - Debrided medial forefoot wounds, drained 2nd MTPJ with mild amounts of purulent drainage.  Patient tolerated procedure well.  Cultured 2nd MTPJ, and cultured 5th digit medial wound.  Follow up results.  - interventional cardiology consulted, angiogram planned Monday 3/25.  Appreciate recs.  - discussed at length surgical versus conservative treatment options with patient.  Discussed risks and benefits of both.  After lengthy discussion, patient is amenable to transmetatarsal amputation of the right foot.  Family at bedside agrees.  Planning to amputate after angiogram and intervention.  - dressed wound as follows:  Painted with Betadine, then dressed with gauze, Kerlix, and Ace wrap.  Wound care orders to follow.  - continue wearing heel offloading boots.  - if patient has white blood cell count spike and unstable vitals, please contact Podiatry immediately for immediate surgical intervention.  If not, for best patient results, opting for amputation after vascular intervention.      Osteomyelitis of right foot  -MRI with OM noted to the right foot  -blood cultures ngtd  -follow wound cx  -infectious disease consulted  -we will continue vanc and Zosyn for now        Septic joint right foot  -podiatry following       Diabetic foot ulcer-right  Patient's FSGs are uncontrolled due to hyperglycemia on current medication regimen.  Last A1c reviewed-   Lab Results   Component Value Date    HGBA1C 8.3 (H) 03/15/2024     Most recent fingerstick glucose reviewed-   Recent Labs   Lab 03/24/24  1153 03/24/24  1703 03/24/24  2044 03/25/24  0541   POCTGLUCOSE 230* 227* 315* 196*       Current correctional scale  Low  Maintain anti-hyperglycemic dose as follows-   Antihyperglycemics (From admission, onward)      Start     Stop Route Frequency Ordered    03/21/24 2100  insulin detemir U-100 (Levemir) pen 10 Units         -- SubQ Nightly 03/21/24 1509    03/21/24  "1600  insulin aspart U-100 pen 0-5 Units         -- SubQ Before meals & nightly PRN 03/21/24 1502          Hold Oral hypoglycemics while patient is in the hospital.    Dementia  -history noted of dementia  -patient is currently awake and alert  -patient's family at bedside to assist with history  -monitoring    Depressive disorder  Patient has recurrent depression which is unknown and is currently controlled. Will Continue anti-depressant medications. We will not consult psychiatry at this time. Patient does not display psychosis at this time. Continue to monitor closely and adjust plan of care as needed.        Congestive heart failure, unspecified HF chronicity, unspecified heart failure type  -euvolemic on exam  Echo    Interpretation Summary    Left Ventricle: There is low normal systolic function with a visually estimated ejection fraction of 50 - 55%. Ejection fraction by visual approximation is 50%.    Right Ventricle: Normal right ventricular cavity size. Wall thickness is normal. Right ventricle wall motion  is normal. Systolic function is normal.    Aortic Valve: There is mild aortic valve sclerosis.    Mitral Valve: There is moderate mitral annular calcification present.    Pericardium: There is a small effusion.    The study was difficult due to patient's body habitus and poor endocardial visualization.    Valves are not well visualized  .CHF. Last BNP reviewed- and noted below No results for input(s): "BNP", "BNPTRIAGEBLO" in the last 168 hours.  -continue to monitor    Atrial fibrillation  -history of Afib  -holding Eliquis at this time in case of any procedure  -ordered full-dose Lovenox for now  -continue cardiac tele monitoring    Stage 1 chronic kidney disease  Creatine stable for now. BMP reviewed- noted Estimated Creatinine Clearance: 72.3 mL/min (based on SCr of 0.9 mg/dL). according to latest data. 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  Monitor UOP and serial BMP and " adjust therapy as needed. Renally dose meds. Avoid nephrotoxic medications and procedures.    KELSEY (obstructive sleep apnea)  - CPAP at night p.r.n.      Mixed hyperlipidemia  -continue statin at home dose        VTE Risk Mitigation (From admission, onward)           Ordered     heparin infusion 1,000 units/500 ml in 0.9% NaCl (pressure line flush)  Intra-op continuous PRN         03/26/24 1027     enoxaparin injection 80 mg  Every 12 hours         03/22/24 0015     IP VTE HIGH RISK PATIENT  Once         03/21/24 1502     Place sequential compression device  Until discontinued         03/21/24 1502                    Discharge Planning   AURELIA:      Code Status: Full Code   Is the patient medically ready for discharge?:     Reason for patient still in hospital (select all that apply): Treatment  Discharge Plan A: Skilled Nursing Facility   Discharge Delays: None known at this time              Tarsha Styles MD  Department of Hospital Medicine   [facility]

## 2024-03-26 NOTE — PLAN OF CARE
VIRTUAL NURSE:  Labs, notes, orders, and careplan reviewed. VN to be available as needed.      Problem: Adult Inpatient Plan of Care  Goal: Plan of Care Review  Outcome: Ongoing, Progressing

## 2024-03-26 NOTE — NURSING
Pt awake and disoriented to time and situation. Wife, Kylee, at bedside attentive to pt. Plan of care reviewed. Updated pt regarding tentative time for aortogram and reminded pt not to eat or drink after midnight. Pt repositioned q2h per orders. Medications administered per MAR. Safety maintanied.

## 2024-03-26 NOTE — PLAN OF CARE
Patient transferred via bed to Room 521 5th floor [facility] on assigned cardiac tele monitor. VSS, AAOx4. No c/o pain.     Left radial gauze/tegaderm site CDI. No bleeding no hematoma noted. Site and surrounding area soft.  Assessed by SHEELA De La Rosa at bedside. +2 petra radial pulses. Dopplered petra pedal pulses. All questions answered.

## 2024-03-26 NOTE — NURSING
Received report from Petrona, tenzin the patient lying supine in bed locked in low with alarm on and call light in reach at present. Spouse at bedside and instructed to call for assistance.

## 2024-03-26 NOTE — PLAN OF CARE
Patient transfered to cath lab bay #2 via bed with side rails up x4. Pt AAOx4 and able to follow commands. Pt is stable when connecting to cardiac monitors. VSS.   Left  radial vasc band in place with 11cc of air in band. Site is CDI. No redness, bruising, or hematoma noted around site. +2 petra radial pulses palpated. Dopplered petra pedal pulses. Skin normal in color and warm to touch, <3 sec cap refill.  Fall risk precautions given and patient acknowledges.  AIDET completed to pt. Will continue to monitor patient.

## 2024-03-27 ENCOUNTER — ANESTHESIA EVENT (OUTPATIENT)
Dept: SURGERY | Facility: HOSPITAL | Age: 84
DRG: 240 | End: 2024-03-27
Payer: MEDICARE

## 2024-03-27 ENCOUNTER — ANESTHESIA (OUTPATIENT)
Dept: SURGERY | Facility: HOSPITAL | Age: 84
DRG: 240 | End: 2024-03-27
Payer: MEDICARE

## 2024-03-27 LAB
BACTERIA SPEC ANAEROBE CULT: NORMAL
BACTERIA SPEC ANAEROBE CULT: NORMAL
CREAT SERPL-MCNC: 1 MG/DL (ref 0.5–1.4)
EST. GFR  (NO RACE VARIABLE): >60 ML/MIN/1.73 M^2
POCT GLUCOSE: 196 MG/DL (ref 70–110)
POCT GLUCOSE: 213 MG/DL (ref 70–110)
POCT GLUCOSE: 254 MG/DL (ref 70–110)
POCT GLUCOSE: 256 MG/DL (ref 70–110)
POCT GLUCOSE: 288 MG/DL (ref 70–110)
VANCOMYCIN SERPL-MCNC: 25.6 UG/ML

## 2024-03-27 PROCEDURE — 94660 CPAP INITIATION&MGMT: CPT

## 2024-03-27 PROCEDURE — 25000003 PHARM REV CODE 250: Performed by: STUDENT IN AN ORGANIZED HEALTH CARE EDUCATION/TRAINING PROGRAM

## 2024-03-27 PROCEDURE — D9220A PRA ANESTHESIA: Mod: CRNA,,, | Performed by: NURSE ANESTHETIST, CERTIFIED REGISTERED

## 2024-03-27 PROCEDURE — 63600175 PHARM REV CODE 636 W HCPCS

## 2024-03-27 PROCEDURE — 94761 N-INVAS EAR/PLS OXIMETRY MLT: CPT

## 2024-03-27 PROCEDURE — 87205 SMEAR GRAM STAIN: CPT | Performed by: STUDENT IN AN ORGANIZED HEALTH CARE EDUCATION/TRAINING PROGRAM

## 2024-03-27 PROCEDURE — A4216 STERILE WATER/SALINE, 10 ML: HCPCS | Performed by: NURSE PRACTITIONER

## 2024-03-27 PROCEDURE — 37000009 HC ANESTHESIA EA ADD 15 MINS: Performed by: STUDENT IN AN ORGANIZED HEALTH CARE EDUCATION/TRAINING PROGRAM

## 2024-03-27 PROCEDURE — 36000706: Performed by: STUDENT IN AN ORGANIZED HEALTH CARE EDUCATION/TRAINING PROGRAM

## 2024-03-27 PROCEDURE — 88311 DECALCIFY TISSUE: CPT | Performed by: PATHOLOGY

## 2024-03-27 PROCEDURE — 0Y6M0ZF DETACHMENT AT RIGHT FOOT, PARTIAL 5TH RAY, OPEN APPROACH: ICD-10-PCS | Performed by: STUDENT IN AN ORGANIZED HEALTH CARE EDUCATION/TRAINING PROGRAM

## 2024-03-27 PROCEDURE — 25000003 PHARM REV CODE 250

## 2024-03-27 PROCEDURE — 0Y6M0ZD DETACHMENT AT RIGHT FOOT, PARTIAL 4TH RAY, OPEN APPROACH: ICD-10-PCS | Performed by: STUDENT IN AN ORGANIZED HEALTH CARE EDUCATION/TRAINING PROGRAM

## 2024-03-27 PROCEDURE — 63600175 PHARM REV CODE 636 W HCPCS: Performed by: NURSE ANESTHETIST, CERTIFIED REGISTERED

## 2024-03-27 PROCEDURE — 88305 TISSUE EXAM BY PATHOLOGIST: CPT | Performed by: PATHOLOGY

## 2024-03-27 PROCEDURE — 36000707: Performed by: STUDENT IN AN ORGANIZED HEALTH CARE EDUCATION/TRAINING PROGRAM

## 2024-03-27 PROCEDURE — 87116 MYCOBACTERIA CULTURE: CPT | Performed by: STUDENT IN AN ORGANIZED HEALTH CARE EDUCATION/TRAINING PROGRAM

## 2024-03-27 PROCEDURE — 88311 DECALCIFY TISSUE: CPT | Mod: 26,,, | Performed by: PATHOLOGY

## 2024-03-27 PROCEDURE — 25000003 PHARM REV CODE 250: Performed by: NURSE PRACTITIONER

## 2024-03-27 PROCEDURE — 0Y6M0ZB DETACHMENT AT RIGHT FOOT, PARTIAL 2ND RAY, OPEN APPROACH: ICD-10-PCS | Performed by: STUDENT IN AN ORGANIZED HEALTH CARE EDUCATION/TRAINING PROGRAM

## 2024-03-27 PROCEDURE — 99900035 HC TECH TIME PER 15 MIN (STAT)

## 2024-03-27 PROCEDURE — D9220A PRA ANESTHESIA: Mod: ANES,,, | Performed by: STUDENT IN AN ORGANIZED HEALTH CARE EDUCATION/TRAINING PROGRAM

## 2024-03-27 PROCEDURE — 80202 ASSAY OF VANCOMYCIN: CPT | Performed by: FAMILY MEDICINE

## 2024-03-27 PROCEDURE — 37000008 HC ANESTHESIA 1ST 15 MINUTES: Performed by: STUDENT IN AN ORGANIZED HEALTH CARE EDUCATION/TRAINING PROGRAM

## 2024-03-27 PROCEDURE — 28805 AMPUTATION THRU METATARSAL: CPT | Mod: RT,,, | Performed by: STUDENT IN AN ORGANIZED HEALTH CARE EDUCATION/TRAINING PROGRAM

## 2024-03-27 PROCEDURE — 87070 CULTURE OTHR SPECIMN AEROBIC: CPT | Performed by: STUDENT IN AN ORGANIZED HEALTH CARE EDUCATION/TRAINING PROGRAM

## 2024-03-27 PROCEDURE — 71000039 HC RECOVERY, EACH ADD'L HOUR: Performed by: STUDENT IN AN ORGANIZED HEALTH CARE EDUCATION/TRAINING PROGRAM

## 2024-03-27 PROCEDURE — 88305 TISSUE EXAM BY PATHOLOGIST: CPT | Mod: 26,,, | Performed by: PATHOLOGY

## 2024-03-27 PROCEDURE — 25000003 PHARM REV CODE 250: Performed by: NURSE ANESTHETIST, CERTIFIED REGISTERED

## 2024-03-27 PROCEDURE — 11000001 HC ACUTE MED/SURG PRIVATE ROOM

## 2024-03-27 PROCEDURE — 82565 ASSAY OF CREATININE: CPT | Performed by: FAMILY MEDICINE

## 2024-03-27 PROCEDURE — 71000033 HC RECOVERY, INTIAL HOUR: Performed by: STUDENT IN AN ORGANIZED HEALTH CARE EDUCATION/TRAINING PROGRAM

## 2024-03-27 PROCEDURE — 63600175 PHARM REV CODE 636 W HCPCS: Mod: JZ,JG | Performed by: STUDENT IN AN ORGANIZED HEALTH CARE EDUCATION/TRAINING PROGRAM

## 2024-03-27 PROCEDURE — 87077 CULTURE AEROBIC IDENTIFY: CPT | Performed by: STUDENT IN AN ORGANIZED HEALTH CARE EDUCATION/TRAINING PROGRAM

## 2024-03-27 PROCEDURE — 87186 SC STD MICRODIL/AGAR DIL: CPT | Performed by: STUDENT IN AN ORGANIZED HEALTH CARE EDUCATION/TRAINING PROGRAM

## 2024-03-27 PROCEDURE — 63600175 PHARM REV CODE 636 W HCPCS: Performed by: STUDENT IN AN ORGANIZED HEALTH CARE EDUCATION/TRAINING PROGRAM

## 2024-03-27 PROCEDURE — 0Y6M0ZC DETACHMENT AT RIGHT FOOT, PARTIAL 3RD RAY, OPEN APPROACH: ICD-10-PCS | Performed by: STUDENT IN AN ORGANIZED HEALTH CARE EDUCATION/TRAINING PROGRAM

## 2024-03-27 PROCEDURE — 87206 SMEAR FLUORESCENT/ACID STAI: CPT | Performed by: STUDENT IN AN ORGANIZED HEALTH CARE EDUCATION/TRAINING PROGRAM

## 2024-03-27 PROCEDURE — 87102 FUNGUS ISOLATION CULTURE: CPT | Performed by: STUDENT IN AN ORGANIZED HEALTH CARE EDUCATION/TRAINING PROGRAM

## 2024-03-27 PROCEDURE — 27201423 OPTIME MED/SURG SUP & DEVICES STERILE SUPPLY: Performed by: STUDENT IN AN ORGANIZED HEALTH CARE EDUCATION/TRAINING PROGRAM

## 2024-03-27 PROCEDURE — 87075 CULTR BACTERIA EXCEPT BLOOD: CPT | Performed by: STUDENT IN AN ORGANIZED HEALTH CARE EDUCATION/TRAINING PROGRAM

## 2024-03-27 RX ORDER — LIDOCAINE HYDROCHLORIDE 20 MG/ML
INJECTION INTRAVENOUS
Status: DISCONTINUED | OUTPATIENT
Start: 2024-03-27 | End: 2024-03-27

## 2024-03-27 RX ORDER — PROPOFOL 10 MG/ML
VIAL (ML) INTRAVENOUS CONTINUOUS PRN
Status: DISCONTINUED | OUTPATIENT
Start: 2024-03-27 | End: 2024-03-27

## 2024-03-27 RX ORDER — HYDROMORPHONE HYDROCHLORIDE 2 MG/ML
0.2 INJECTION, SOLUTION INTRAMUSCULAR; INTRAVENOUS; SUBCUTANEOUS EVERY 5 MIN PRN
Status: DISCONTINUED | OUTPATIENT
Start: 2024-03-27 | End: 2024-03-28

## 2024-03-27 RX ORDER — LIDOCAINE HYDROCHLORIDE 10 MG/ML
INJECTION INFILTRATION; PERINEURAL
Status: DISCONTINUED | OUTPATIENT
Start: 2024-03-27 | End: 2024-03-27 | Stop reason: HOSPADM

## 2024-03-27 RX ORDER — OXYCODONE HYDROCHLORIDE 5 MG/1
5 TABLET ORAL
Status: DISCONTINUED | OUTPATIENT
Start: 2024-03-27 | End: 2024-03-28

## 2024-03-27 RX ORDER — PHENYLEPHRINE HYDROCHLORIDE 10 MG/ML
INJECTION INTRAVENOUS
Status: DISCONTINUED | OUTPATIENT
Start: 2024-03-27 | End: 2024-03-27

## 2024-03-27 RX ORDER — ONDANSETRON HYDROCHLORIDE 2 MG/ML
4 INJECTION, SOLUTION INTRAVENOUS DAILY PRN
Status: DISCONTINUED | OUTPATIENT
Start: 2024-03-27 | End: 2024-03-28

## 2024-03-27 RX ORDER — PANTOPRAZOLE SODIUM 40 MG/1
40 FOR SUSPENSION ORAL 2 TIMES DAILY
Status: DISCONTINUED | OUTPATIENT
Start: 2024-03-28 | End: 2024-04-02 | Stop reason: HOSPADM

## 2024-03-27 RX ORDER — BUPIVACAINE HYDROCHLORIDE 2.5 MG/ML
INJECTION, SOLUTION EPIDURAL; INFILTRATION; INTRACAUDAL
Status: DISCONTINUED | OUTPATIENT
Start: 2024-03-27 | End: 2024-03-27 | Stop reason: HOSPADM

## 2024-03-27 RX ORDER — PROPOFOL 10 MG/ML
VIAL (ML) INTRAVENOUS
Status: DISCONTINUED | OUTPATIENT
Start: 2024-03-27 | End: 2024-03-27

## 2024-03-27 RX ORDER — PANTOPRAZOLE SODIUM 40 MG/1
40 FOR SUSPENSION ORAL 2 TIMES DAILY
Status: DISCONTINUED | OUTPATIENT
Start: 2024-03-27 | End: 2024-03-27

## 2024-03-27 RX ORDER — METOPROLOL TARTRATE 25 MG/1
12.5 TABLET ORAL 2 TIMES DAILY
Status: DISCONTINUED | OUTPATIENT
Start: 2024-03-28 | End: 2024-04-02 | Stop reason: HOSPADM

## 2024-03-27 RX ORDER — ONDANSETRON HYDROCHLORIDE 2 MG/ML
INJECTION, SOLUTION INTRAVENOUS
Status: DISCONTINUED | OUTPATIENT
Start: 2024-03-27 | End: 2024-03-27

## 2024-03-27 RX ADMIN — PROPOFOL 50 MG: 10 INJECTION, EMULSION INTRAVENOUS at 01:03

## 2024-03-27 RX ADMIN — INSULIN DETEMIR 10 UNITS: 100 INJECTION, SOLUTION SUBCUTANEOUS at 09:03

## 2024-03-27 RX ADMIN — Medication 10 ML: at 09:03

## 2024-03-27 RX ADMIN — PHENYLEPHRINE HYDROCHLORIDE 200 MCG: 10 INJECTION INTRAVENOUS at 02:03

## 2024-03-27 RX ADMIN — ENOXAPARIN SODIUM 80 MG: 80 INJECTION SUBCUTANEOUS at 09:03

## 2024-03-27 RX ADMIN — ONDANSETRON 4 MG: 2 INJECTION, SOLUTION INTRAMUSCULAR; INTRAVENOUS at 01:03

## 2024-03-27 RX ADMIN — FUROSEMIDE 20 MG: 20 TABLET ORAL at 08:03

## 2024-03-27 RX ADMIN — PHENYLEPHRINE HYDROCHLORIDE 200 MCG: 10 INJECTION INTRAVENOUS at 01:03

## 2024-03-27 RX ADMIN — Medication 10 ML: at 06:03

## 2024-03-27 RX ADMIN — SODIUM CHLORIDE, SODIUM LACTATE, POTASSIUM CHLORIDE, AND CALCIUM CHLORIDE: .6; .31; .03; .02 INJECTION, SOLUTION INTRAVENOUS at 01:03

## 2024-03-27 RX ADMIN — MEROPENEM 1 G: 1 INJECTION, POWDER, FOR SOLUTION INTRAVENOUS at 08:03

## 2024-03-27 RX ADMIN — OXYCODONE 5 MG: 5 TABLET ORAL at 03:03

## 2024-03-27 RX ADMIN — PHENYLEPHRINE HYDROCHLORIDE 50 MCG: 10 INJECTION INTRAVENOUS at 01:03

## 2024-03-27 RX ADMIN — SENNOSIDES AND DOCUSATE SODIUM 1 TABLET: 8.6; 5 TABLET ORAL at 09:03

## 2024-03-27 RX ADMIN — ISOSORBIDE MONONITRATE 30 MG: 30 TABLET, EXTENDED RELEASE ORAL at 08:03

## 2024-03-27 RX ADMIN — PHENYLEPHRINE HYDROCHLORIDE 100 MCG: 10 INJECTION INTRAVENOUS at 02:03

## 2024-03-27 RX ADMIN — TRAZODONE HYDROCHLORIDE 150 MG: 100 TABLET ORAL at 09:03

## 2024-03-27 RX ADMIN — HYPROMELLOSE 2910 1 DROP: 5 SOLUTION/ DROPS OPHTHALMIC at 09:03

## 2024-03-27 RX ADMIN — INSULIN ASPART 1 UNITS: 100 INJECTION, SOLUTION INTRAVENOUS; SUBCUTANEOUS at 09:03

## 2024-03-27 RX ADMIN — MEROPENEM 1 G: 1 INJECTION, POWDER, FOR SOLUTION INTRAVENOUS at 06:03

## 2024-03-27 RX ADMIN — PROPOFOL 20 MG: 10 INJECTION, EMULSION INTRAVENOUS at 01:03

## 2024-03-27 RX ADMIN — FLUOXETINE HYDROCHLORIDE 40 MG: 20 CAPSULE ORAL at 08:03

## 2024-03-27 RX ADMIN — ATORVASTATIN CALCIUM 40 MG: 40 TABLET, FILM COATED ORAL at 08:03

## 2024-03-27 RX ADMIN — POLYETHYLENE GLYCOL 3350 17 G: 17 POWDER, FOR SOLUTION ORAL at 08:03

## 2024-03-27 RX ADMIN — INSULIN ASPART 2 UNITS: 100 INJECTION, SOLUTION INTRAVENOUS; SUBCUTANEOUS at 07:03

## 2024-03-27 RX ADMIN — PHENYLEPHRINE HYDROCHLORIDE 100 MCG: 10 INJECTION INTRAVENOUS at 01:03

## 2024-03-27 RX ADMIN — METOPROLOL SUCCINATE 25 MG: 25 TABLET, EXTENDED RELEASE ORAL at 08:03

## 2024-03-27 RX ADMIN — GLYCOPYRROLATE 0.2 MG: 0.2 INJECTION, SOLUTION INTRAMUSCULAR; INTRAVITREAL at 01:03

## 2024-03-27 RX ADMIN — LIDOCAINE HYDROCHLORIDE 60 MG: 20 INJECTION, SOLUTION INTRAVENOUS at 01:03

## 2024-03-27 RX ADMIN — SENNOSIDES AND DOCUSATE SODIUM 1 TABLET: 8.6; 5 TABLET ORAL at 08:03

## 2024-03-27 RX ADMIN — PROPOFOL 50 MCG/KG/MIN: 10 INJECTION, EMULSION INTRAVENOUS at 01:03

## 2024-03-27 NOTE — PLAN OF CARE
Patient has met PACU discharge criteria, VSS, pain well controlled. Family updated by text. Released from PACU per protocol.

## 2024-03-27 NOTE — OP NOTE
[facility]  [facility]ative [facility]      Date of Procedure: 3/27/2024     Procedure: Procedure(s) (LRB):  AMPUTATION, FOOT, TRANSMETATARSAL (Right)     Surgeon(s) and Role:     * Diya Leung DPM - Primary    Assisting Surgeon: None    Pre-Operative Diagnosis: Foot infection [L08.9]  Osteomyelitis of right foot, unspecified type [M86.9]    Post-Operative Diagnosis: Post-Op Diagnosis Codes:     * Foot infection [L08.9]     * Osteomyelitis of right foot, unspecified type [M86.9]    Anesthesia: Local MAC    Operative Findings (including complications, if any): transmetatarsal amputation performed with primary closure. 2nd MTP with necrotic bone. Clean margins sent for Pathology and for culture.        Description of Technical Procedures:     The patient was brought to the operating room on a stretcher and placed on the operating table in a supine position. Following the successful induction of MAC anesthesia, a local anesthetic block consisting of  20 cc of 1:1 mixture of 1% Lidocaine plain + 0.5% Bupivacaine plain was injected as a forefoot block. Then, the right foot was scrubbed, prepped and draped in the usual aseptic manner. No tourniquet was used.  A timeout was performed confirming the patient's identification, procedure, surgical site, medications and allergies. All were in agreement.      Attention was directed to the right forefoot with swelling noted to forefoot, ulcer to medial and plantar 2nd MTP that probed to bone. Ulcer to medial 5th digit and lateral 4th digit which probed to bone. Decision was made for transmetatarsal amputation.     A 15 blade was used to create a fish mouth incision which was deepened to the level of bone and soft tissue was reflected proximally to reveal the 2nd-5th MTP. The digits were disarticulated and distal 2nd metatarsal head was removed using rongeur, this was sent to Pathology. Next all necrotic and nonviable and exposed tendons/ligaments were resected to reveal a  healthy, bleeding and granular base. Site was copiously flushed with sterile saline using pulse lavage. Cultures were taken of bone using a roungeur. Next, plantar flap was advanced dorsally for closure. Closure was performed using 4-0 nylon. Skin was cleansed and then dried. Site was dressed with xeroform soaked in betadine, 4x4 gauze, abd pad, cast padding and ACE. Secured in post op shoe.     The patient tolerated the procedure and anesthesia well. Patient was transferred to the recovery room with vital signs stable and vascular status intact to the distal foot. This is part of a staged procedure, wounds will likely require serial debridements in the future.      Following a period of post op monitoring, the patient will be transferred to room on the following written and oral post op instructions:      1. Keep dressing dry and intact, if strike-through noted, please reinforce dressing with kerlix and ACE  2. Patient non-weightbearing to surgical extremity for next two days  3. Elevate surgical foot when at rest, Z-flex boots to bilateral lower extremity      Contact podiatry for all post op follow up care and if any problems arise.     Significant Surgical Tasks Conducted by the Assistant(s), if Applicable: none    Estimated Blood Loss (EBL): * No values recorded between 3/27/2024  1:32 PM and 3/27/2024  2:15 PM *           Implants: * No implants in log *    Specimens:   Specimen (24h ago, onward)       Start     Ordered    03/27/24 1350  Specimen to Pathology, Surgery General Surgery  Once        Comments: Pre-op Diagnosis: Foot infection [L08.9]Osteomyelitis of right foot, unspecified type [M86.9]Procedure(s):AMPUTATION, FOOT, TRANSMETATARSAL Number of specimens: 2Name of specimens: 1. Transmetatarsal bone right foot - perm2. Clean margin right foot - perm     References:    Click here for ordering Quick Tip   Question Answer Comment   Procedure Type: General Surgery    Which provider would you like to cc?  MADELIN SOLITARIO    Release to patient Immediate        03/27/24 8988                            Condition: Good    Disposition: PACU - hemodynamically stable.    Attestation: I was present and scrubbed for the entire procedure.

## 2024-03-27 NOTE — PROGRESS NOTES
Pharmacokinetic Assessment Follow Up: IV Vancomycin    Vancomycin serum concentration assessment(s):    The random level was drawn correctly and can be used to guide therapy at this time. The measurement is above the desired definitive target range of 15 to 20 mcg/mL.    Vancomycin Regimen Plan:    Change regimen to Vancomycin 1500 mg IV with next random serum trough concentration measured at 1900 on 03/27/24    Drug levels (last 3 results):  Recent Labs   Lab Result Units 03/25/24  1657 03/26/24  1847   Vancomycin, Random ug/mL  --  21.5   Vancomycin-Trough ug/mL 21.3  --        Pharmacy will continue to follow and monitor vancomycin.    Please contact pharmacy at extension [phone] for questions regarding this assessment.    Thank you for the consult,   Nany Zuniga       Patient brief summary:  Julien Meléndez is a 83 y.o. male initiated on antimicrobial therapy with IV Vancomycin for treatment of bone/joint infection    The patient's current regimen is pulse    Drug Allergies:   Review of patient's allergies indicates:   Allergen Reactions    Nystatin      Other reaction(s): Unknown       Actual Body Weight:   81.8 kg    Renal Function:   Estimated Creatinine Clearance: 58.9 mL/min (based on SCr of 1.1 mg/dL).,     Dialysis Method (if applicable):  N/A    CBC (last 72 hours):  Recent Labs   Lab Result Units 03/24/24  0600 03/26/24  0749   WBC K/uL 7.36 6.28   Hemoglobin g/dL 9.8* 9.7*   Hematocrit % 29.4* 29.3*   Platelets K/uL 212 212   Gran % % 64.5 63.1   Lymph % % 21.7 21.5   Mono % % 9.8 11.1   Eosinophil % % 3.1 3.0   Basophil % % 0.4 0.8   Differential Method  Automated Automated       Metabolic Panel (last 72 hours):  Recent Labs   Lab Result Units 03/24/24  0600 03/26/24  0749 03/26/24  1847   Sodium mmol/L 136 136  --    Potassium mmol/L 4.0 4.0  --    Chloride mmol/L 99 100  --    CO2 mmol/L 28 26  --    Glucose mg/dL 194* 231*  --    BUN mg/dL 16 18  --    Creatinine mg/dL 0.9 1.0 1.1   Magnesium  mg/dL 1.8 1.9  --        Vancomycin Administrations:  vancomycin given in the last 96 hours                     vancomycin (VANCOCIN) 1,750 mg in dextrose 5 % (D5W) 500 mL IVPB (mg) 1,750 mg New Bag 03/25/24 2006    vancomycin 2 g in dextrose 5 % 500 mL IVPB (mg) 2,000 mg New Bag 03/24/24 1843     2,000 mg New Bag 03/23/24 1843                    Microbiologic Results:  Microbiology Results (last 7 days)       Procedure Component Value Units Date/Time    Blood culture [[id number]] Collected: 03/21/24 1758    Order Status: Completed Specimen: Blood Updated: 03/25/24 2012     Blood Culture, Routine No Growth to date      No Growth to date      No Growth to date      No Growth to date      No Growth to date    Blood culture #1 **CANNOT BE ORDERED STAT** [[id number]] Collected: 03/21/24 1352    Order Status: Completed Specimen: Blood from Wrist, Right Updated: 03/25/24 2012     Blood Culture, Routine No Growth to date      No Growth to date      No Growth to date      No Growth to date      No Growth to date    Blood culture #2 **CANNOT BE ORDERED STAT** [[id number]] Collected: 03/21/24 1343    Order Status: Completed Specimen: Blood from Peripheral, Antecubital, Right Updated: 03/25/24 2012     Blood Culture, Routine No Growth to date      No Growth to date      No Growth to date      No Growth to date      No Growth to date    Blood culture [[id number]] Collected: 03/21/24 1757    Order Status: Completed Specimen: Blood Updated: 03/25/24 2012     Blood Culture, Routine No Growth to date      No Growth to date      No Growth to date      No Growth to date      No Growth to date    Aerobic culture [[id number]]  (Abnormal)  (Susceptibility) Collected: 03/22/24 1513    Order Status: Completed Specimen: Wound from Foot, Right Updated: 03/25/24 1304     Aerobic Bacterial Culture STAPHYLOCOCCUS AUREUS  Moderate      Narrative:      Right 2nd toe    Aerobic culture [[id number]]  (Abnormal)  (Susceptibility) Collected:  03/22/24 1513    Order Status: Completed Specimen: Wound from Foot, Right Updated: 03/25/24 1302     Aerobic Bacterial Culture STAPHYLOCOCCUS AUREUS  Few        PSEUDOMONAS AERUGINOSA  Few      Narrative:      Right 5th toe    Culture, Anaerobe [[id number]] Collected: 03/22/24 1513    Order Status: Completed Specimen: Wound from Foot, Right Updated: 03/24/24 1238     Anaerobic Culture Culture in progress    Narrative:      Right 5th toe    Culture, Anaerobe [[id number]] Collected: 03/22/24 1513    Order Status: Completed Specimen: Wound from Foot, Right Updated: 03/24/24 1238     Anaerobic Culture Culture in progress    Narrative:      Right 2nd toe

## 2024-03-27 NOTE — PLAN OF CARE
Visited at bedside prior to going to surgery for TMA. Family members present. Plan is for PT/OT eval and recommendation after surgery. Referrals were sent and [facility] pt and [facility] willing to accept pt. If SNF is recommended. PASRR/142 in care port. CM will continue to follow pt during this hospital visit and continue to provide any discharge needs.  Future Appointments   Date Time Provider Department Center   4/15/2024  8:00 AM Gladis Fermin, NP [address]      03/27/24 1305   Post-Acute Status   Post-Acute Authorization Placement   Post-Acute Placement Status Pending medical clearance/testing   Hospital Resources/Appts/Education Provided Appointments scheduled and added to AVS   Discharge Delays None known at this time   Discharge Plan   Discharge Plan A Skilled Nursing Facility   Discharge Plan B Rehab

## 2024-03-27 NOTE — NURSING
Report received from SHEELA Leong. Patient received from PACU in stable condition in no acute distress. VS taken. Chloraseptic spray provided. Diabetic 2800 diet placed. Will continue to monitor.

## 2024-03-27 NOTE — PROGRESS NOTES
[facility] Medicine  Progress Note    Patient Name: Julien Meléndez  MRN: [id number]  Patient Class: IP- Inpatient   Admission Date: 3/21/2024  Length of Stay: 6 days  Attending Physician: Tarsha Styles MD  Primary Care Provider: Kelvin Wayne MD        Subjective:     Principal Problem:Critical limb ischemia of right lower extremity        HPI:  Julien Meléndez is a 83 yr old male presents to the ER with reports of right foot infection. Pts family states over the past 2 days, they noticed an increase in erythema and drainage to the site. Denies fever. Denies taking oral antbx for this specific wound. Pt states he was advised to come to the ER for an evaluation from wound care. Wound has been dressed by HH. 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 Abnormal edema signal involving the 3rd metatarsophalangeal joint, 4th proximal interphalangeal joint and 5th distal interphalangeal joint concerning for reactive edema or early changes of septic arthritis. Will cont vancomycin and Zosyn and will admit to the [facility] for further care.      Overview/Hospital Course:  podiatry, ortho, infectious disease consulted, appreciate recs . Pt taken for Aortogram with selective right lower extremity angiogram  on 3/26.  ID switching zosyn to meropenem 1g Q8 . plan for TMA Wednesday     Interval History: seen at the bedside, no distress noted. Appreciate consultants eval. Will cont IV abx. Amputation today. Complains of left shoulder pain and not being able to move as much for 5 mos    Review of Systems   Constitutional:  Negative for fatigue and fever.   Genitourinary:  Negative for difficulty urinating and  hematuria.   Skin:  Positive for wound (right foot).     Objective:     Vital Signs (Most Recent):  Temp: 98.8 °F (37.1 °C) (03/27/24 1553)  Pulse: 62 (03/27/24 1621)  Resp: 18 (03/27/24 1553)  BP: 138/66 (03/27/24 1553)  SpO2: 96 % (03/27/24 1553) Vital Signs (24h Range):  Temp:  [97.5 °F (36.4 °C)-98.8 °F (37.1 °C)] 98.8 °F (37.1 °C)  Pulse:  [56-81] 62  Resp:  [14-20] 18  SpO2:  [93 %-100 %] 96 %  BP: ()/(53-73) 138/66     Weight: 78.8 kg (173 lb 11.6 oz)  Body mass index is 22.3 kg/m².     Physical Exam  Vitals and nursing note reviewed.   Constitutional:       Appearance: He is ill-appearing.   HENT:      Mouth/Throat:      Mouth: Mucous membranes are moist.   Eyes:      Extraocular Movements: Extraocular movements intact.   Cardiovascular:      Rate and Rhythm: Normal rate and regular rhythm.   Abdominal:      Palpations: Abdomen is soft.   Musculoskeletal:         General: Tenderness (right foot) present.      Cervical back: Normal range of motion and neck supple.      Comments: Limited shoulder ROM with bruising on arm   Skin:     Comments: R foot wound with erythema and purulent drainage    Neurological:      Mental Status: He is alert and oriented to person, place, and time.   Psychiatric:         Mood and Affect: Mood normal.                Significant Labs: All pertinent labs within the past 24 hours have been reviewed.    Significant Imaging: I have reviewed all pertinent imaging results/findings within the past 24 hours.    Assessment/Plan:      * Critical limb ischemia of right lower extremity  PAD (peripheral artery disease)     -we will continue aspirin statin as well as clopidogrel  -consulted Podiatry, Cardiology, and infectious  -IV antibiotics ordered:  IV Zosyn and IV vancomycin for now  -blood cultures are pending  -bilateral lower extremity arterial ultrasound ordered-1. Hemodynamically significant stenosis in the left mid superficial femoral artery and the left anterior tibial artery.    2. Monophasic waveforms in the left distal popliteal, anterior tibial, and posterior tibial arteries.   -Cardiology planning angio on Monday    Plan per podiatry below:  - Debrided medial forefoot wounds, drained 2nd MTPJ with mild amounts of purulent drainage.  Patient tolerated procedure well.  Cultured 2nd MTPJ, and cultured 5th digit medial wound.  Follow up results.  - interventional cardiology consulted, angiogram planned Monday 3/25.  Appreciate recs.  - discussed at length surgical versus conservative treatment options with patient.  Discussed risks and benefits of both.  After lengthy discussion, patient is amenable to transmetatarsal amputation of the right foot.  Family at bedside agrees.  Planning to amputate after angiogram and intervention.  - dressed wound as follows:  Painted with Betadine, then dressed with gauze, Kerlix, and Ace wrap.  Wound care orders to follow.  - continue wearing heel offloading boots.  - if patient has white blood cell count spike and unstable vitals, please contact Podiatry immediately for immediate surgical intervention.  If not, for best patient results, opting for amputation after vascular intervention.      Osteomyelitis of right foot  -MRI with OM noted to the right foot  -blood cultures ngtd  -follow wound cx  -infectious disease consulted  -we will continue vanc and Zosyn for now        Septic joint right foot  -podiatry following       Diabetic foot ulcer-right  Patient's FSGs are uncontrolled due to hyperglycemia on current medication regimen.  Last A1c reviewed-   Lab Results   Component Value Date    HGBA1C 8.3 (H) 03/15/2024     Most recent fingerstick glucose reviewed-   Recent Labs   Lab 03/24/24  1153 03/24/24  1703 03/24/24  2044 03/25/24  0541   POCTGLUCOSE 230* 227* 315* 196*       Current correctional scale  Low  Maintain anti-hyperglycemic dose as follows-   Antihyperglycemics (From admission, onward)      Start     Stop Route Frequency Ordered     "03/21/24 2100  insulin detemir U-100 (Levemir) pen 10 Units         -- SubQ Nightly 03/21/24 1509    03/21/24 1600  insulin aspart U-100 pen 0-5 Units         -- SubQ Before meals & nightly PRN 03/21/24 1502          Hold Oral hypoglycemics while patient is in the hospital.    Dementia  -history noted of dementia  -patient is currently awake and alert  -patient's family at bedside to assist with history  -monitoring    Depressive disorder  Patient has recurrent depression which is unknown and is currently controlled. Will Continue anti-depressant medications. We will not consult psychiatry at this time. Patient does not display psychosis at this time. Continue to monitor closely and adjust plan of care as needed.        Congestive heart failure, unspecified HF chronicity, unspecified heart failure type  -euvolemic on exam  Echo    Interpretation Summary    Left Ventricle: There is low normal systolic function with a visually estimated ejection fraction of 50 - 55%. Ejection fraction by visual approximation is 50%.    Right Ventricle: Normal right ventricular cavity size. Wall thickness is normal. Right ventricle wall motion  is normal. Systolic function is normal.    Aortic Valve: There is mild aortic valve sclerosis.    Mitral Valve: There is moderate mitral annular calcification present.    Pericardium: There is a small effusion.    The study was difficult due to patient's body habitus and poor endocardial visualization.    Valves are not well visualized  .CHF. Last BNP reviewed- and noted below No results for input(s): "BNP", "BNPTRIAGEBLO" in the last 168 hours.  -continue to monitor    Atrial fibrillation  -history of Afib  -holding Eliquis at this time in case of any procedure  -ordered full-dose Lovenox for now  -continue cardiac tele monitoring    Stage 1 chronic kidney disease  Creatine stable for now. BMP reviewed- noted Estimated Creatinine Clearance: 72.3 mL/min (based on SCr of 0.9 mg/dL). according to " latest data. 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  Monitor UOP and serial BMP and adjust therapy as needed. Renally dose meds. Avoid nephrotoxic medications and procedures.    KELSEY (obstructive sleep apnea)  - CPAP at night p.r.n.      Mixed hyperlipidemia  -continue statin at home dose        VTE Risk Mitigation (From admission, onward)           Ordered     heparin infusion 1,000 units/500 ml in 0.9% NaCl (pressure line flush)  Intra-op continuous PRN         03/26/24 1027     enoxaparin injection 80 mg  Every 12 hours         03/22/24 0015     IP VTE HIGH RISK PATIENT  Once         03/21/24 1502     Place sequential compression device  Until discontinued         03/21/24 1502                    Discharge Planning   AURELIA:      Code Status: Full Code   Is the patient medically ready for discharge?:     Reason for patient still in hospital (select all that apply): Treatment  Discharge Plan A: Skilled Nursing Facility   Discharge Delays: None known at this time              Tarsha Styles MD  Department of Hospital Medicine   [facility]

## 2024-03-27 NOTE — PROGRESS NOTES
Therapy with vancomycin complete and/or consult discontinued by provider.  Pharmacy will sign off, please re-consult as needed.      Holly Barraza, PharmD, BCPS, BCCCP  Clinical Pharmacist  [phone]

## 2024-03-27 NOTE — NURSING
Pt awake but drowsy, disoriented to time and place. Wife at bedside. Discussed plan of care for shift with pt and wife. CHG bath to be done later in shift in preparation for TMA in morning/afternoon. Minor c/o pain alleviated with PRN medications. Safety maintained.

## 2024-03-27 NOTE — TRANSFER OF CARE
"Anesthesia Transfer of Care Note    Patient: Julien Meléndez    Procedure(s) Performed: Procedure(s) (LRB):  AMPUTATION, FOOT, TRANSMETATARSAL (Right)    Patient location: PACU    Anesthesia Type: general    Transport from OR: Transported from OR on 6-10 L/min O2 by face mask with adequate spontaneous ventilation    Post pain: adequate analgesia    Post assessment: no apparent anesthetic complications    Post vital signs: stable    Level of consciousness: responds to stimulation    Nausea/Vomiting: no nausea/vomiting    Complications: none    Transfer of care protocol was followed      Last vitals: Visit Vitals  /60 (BP Location: Left arm, Patient Position: Lying)   Pulse 69   Temp 36.4 °C (97.5 °F) (Oral)   Resp 18   Ht 6' 2" (1.88 m)   Wt 78.8 kg (173 lb 11.6 oz)   SpO2 98%   BMI 22.30 kg/m²     "

## 2024-03-27 NOTE — SUBJECTIVE & OBJECTIVE
Interval History: seen at the bedside, no distress noted. Appreciate consultants eval. Will cont IV abx. Amputation today. Complains of left shoulder pain and not being able to move as much for 5 mos    Review of Systems   Constitutional:  Negative for fatigue and fever.   Genitourinary:  Negative for difficulty urinating and hematuria.   Skin:  Positive for wound (right foot).     Objective:     Vital Signs (Most Recent):  Temp: 98.8 °F (37.1 °C) (03/27/24 1553)  Pulse: 62 (03/27/24 1621)  Resp: 18 (03/27/24 1553)  BP: 138/66 (03/27/24 1553)  SpO2: 96 % (03/27/24 1553) Vital Signs (24h Range):  Temp:  [97.5 °F (36.4 °C)-98.8 °F (37.1 °C)] 98.8 °F (37.1 °C)  Pulse:  [56-81] 62  Resp:  [14-20] 18  SpO2:  [93 %-100 %] 96 %  BP: ()/(53-73) 138/66     Weight: 78.8 kg (173 lb 11.6 oz)  Body mass index is 22.3 kg/m².     Physical Exam  Vitals and nursing note reviewed.   Constitutional:       Appearance: He is ill-appearing.   HENT:      Mouth/Throat:      Mouth: Mucous membranes are moist.   Eyes:      Extraocular Movements: Extraocular movements intact.   Cardiovascular:      Rate and Rhythm: Normal rate and regular rhythm.   Abdominal:      Palpations: Abdomen is soft.   Musculoskeletal:         General: Tenderness (right foot) present.      Cervical back: Normal range of motion and neck supple.      Comments: Limited shoulder ROM with bruising on arm   Skin:     Comments: R foot wound with erythema and purulent drainage    Neurological:      Mental Status: He is alert and oriented to person, place, and time.   Psychiatric:         Mood and Affect: Mood normal.                Significant Labs: All pertinent labs within the past 24 hours have been reviewed.    Significant Imaging: I have reviewed all pertinent imaging results/findings within the past 24 hours.

## 2024-03-27 NOTE — ANESTHESIA PREPROCEDURE EVALUATION
03/27/2024  Julien Meléndez is a 83 y.o., male.      Pre-op Assessment    I have reviewed the Patient Summary Reports.     I have reviewed the Nursing Notes. I have reviewed the NPO Status.   I have reviewed the Medications.     Review of Systems  Anesthesia Hx:  No problems with previous Anesthesia             Denies Family Hx of Anesthesia complications.    Denies Personal Hx of Anesthesia complications.                    Hematology/Oncology:       -- Anemia:                                  Cardiovascular:  Exercise tolerance: poor   Hypertension  Past MI CAD    Dysrhythmias  Angina CHF   PVD hyperlipidemia   ECG has been reviewed. Last stent placed in July of last year. Denies cardiac symptoms since that time   Cardiovascular Symptoms: Angina       Coronary Artery Disease:          Hx of Myocardial Infarction     Congestive Heart Failure (CHF)                Hypertension     Atrial Fibrillation     Pulmonary:        Sleep Apnea     Obstructive Sleep Apnea (KELSEY).           Renal/:  Chronic Renal Disease, CKD        Kidney Function/Disease             Hepatic/GI:    Hiatal Hernia,        Hernia, Hiatal Hernia      Musculoskeletal:  Arthritis        Arthritis          Neurological:    Neuromuscular Disease,           Arthritis                         Neuromuscular Disease   Endocrine:  Diabetes    Diabetes                      Psych:  Psychiatric History                  Physical Exam  General: Cooperative, Alert and Oriented    Airway:  Mallampati: I   Mouth Opening: Normal  TM Distance: Normal  Tongue: Normal  Neck ROM: Normal ROM    Dental:  Intact, Caps / Implants        Anesthesia Plan  Type of Anesthesia, risks & benefits discussed:    Anesthesia Type: Gen Natural Airway  Intra-op Monitoring Plan: Standard ASA Monitors  Post Op Pain Control Plan: multimodal analgesia and IV/PO Opioids  PRN  Induction:  IV  Informed Consent: Informed consent signed with the Patient and all parties understand the risks and agree with anesthesia plan.  All questions answered.   ASA Score: 3  Day of Surgery Review of History & Physical: H&P Update referred to the surgeon/provider.    Ready For Surgery From Anesthesia Perspective.     .

## 2024-03-27 NOTE — PT/OT/SLP PROGRESS
Occupational Therapy evaluation attempt      Patient Name:  Julien Meléndez   MRN:  [id number]    Patient not seen today secondary to  (in surgery for transmetarsal amputation). Will follow-up as able post surgery.    3/27/2024

## 2024-03-27 NOTE — PROGRESS NOTES
Pharmacokinetic Assessment Follow Up: IV Vancomycin    Vancomycin serum concentration assessment(s):    The random level was drawn correctly and can be used to guide therapy at this time. The measurement is above the desired definitive target range of 15 to 20 mcg/mL.    Vancomycin Regimen Plan:    Will obtain a vancomycin random 24 hours from last dose on 3/28 @ 2000    Drug levels (last 3 results):  Recent Labs   Lab Result Units 03/25/24  1657 03/26/24 1847 03/27/24  0910   Vancomycin, Random ug/mL  --  21.5 25.6   Vancomycin-Trough ug/mL 21.3  --   --        Pharmacy will continue to follow and monitor vancomycin.    Please contact pharmacy at extension [phone] for questions regarding this assessment.    Thank you for the consult,   Holly Barraza       Patient brief summary:  Julien Meléndez is a 83 y.o. male initiated on antimicrobial therapy with IV Vancomycin for treatment of bone/joint infection    The patient's current regimen is pulse dose    Drug Allergies:   Review of patient's allergies indicates:   Allergen Reactions    Nystatin      Other reaction(s): Unknown       Actual Body Weight:   78.8 kg    Renal Function:   Estimated Creatinine Clearance: 62.4 mL/min (based on SCr of 1 mg/dL).,     Dialysis Method (if applicable):  N/A    CBC (last 72 hours):  Recent Labs   Lab Result Units 03/26/24  0749   WBC K/uL 6.28   Hemoglobin g/dL 9.7*   Hematocrit % 29.3*   Platelets K/uL 212   Gran % % 63.1   Lymph % % 21.5   Mono % % 11.1   Eosinophil % % 3.0   Basophil % % 0.8   Differential Method  Automated       Metabolic Panel (last 72 hours):  Recent Labs   Lab Result Units 03/26/24  0749 03/26/24  1847 03/27/24  0910   Sodium mmol/L 136  --   --    Potassium mmol/L 4.0  --   --    Chloride mmol/L 100  --   --    CO2 mmol/L 26  --   --    Glucose mg/dL 231*  --   --    BUN mg/dL 18  --   --    Creatinine mg/dL 1.0 1.1 1.0   Magnesium mg/dL 1.9  --   --        Vancomycin Administrations:  vancomycin given in  the last 96 hours                     vancomycin 1,500 mg in dextrose 5 % (D5W) 250 mL IVPB (Vial-Mate) (mg) 1,500 mg New Bag 03/26/24 2110    vancomycin (VANCOCIN) 1,750 mg in dextrose 5 % (D5W) 500 mL IVPB (mg) 1,750 mg New Bag 03/25/24 2006    vancomycin 2 g in dextrose 5 % 500 mL IVPB (mg) 2,000 mg New Bag 03/24/24 1843     2,000 mg New Bag 03/23/24 1843                    Microbiologic Results:  Microbiology Results (last 7 days)       Procedure Component Value Units Date/Time    Culture, Anaerobe [[id number]] Collected: 03/22/24 1513    Order Status: Completed Specimen: Wound from Foot, Right Updated: 03/27/24 0915     Anaerobic Culture No anaerobes isolated    Narrative:      Right 5th toe    Culture, Anaerobe [[id number]] Collected: 03/22/24 1513    Order Status: Completed Specimen: Wound from Foot, Right Updated: 03/27/24 0913     Anaerobic Culture No anaerobes isolated    Narrative:      Right 2nd toe    Blood culture #1 **CANNOT BE ORDERED STAT** [[id number]] Collected: 03/21/24 1352    Order Status: Completed Specimen: Blood from Wrist, Right Updated: 03/26/24 2012     Blood Culture, Routine No growth after 5 days.    Blood culture [[id number]] Collected: 03/21/24 1758    Order Status: Completed Specimen: Blood Updated: 03/26/24 2012     Blood Culture, Routine No growth after 5 days.    Blood culture [[id number]] Collected: 03/21/24 1757    Order Status: Completed Specimen: Blood Updated: 03/26/24 2012     Blood Culture, Routine No growth after 5 days.    Blood culture #2 **CANNOT BE ORDERED STAT** [[id number]] Collected: 03/21/24 1343    Order Status: Completed Specimen: Blood from Peripheral, Antecubital, Right Updated: 03/26/24 2012     Blood Culture, Routine No growth after 5 days.    Aerobic culture [[id number]]  (Abnormal)  (Susceptibility) Collected: 03/22/24 1513    Order Status: Completed Specimen: Wound from Foot, Right Updated: 03/25/24 1304     Aerobic Bacterial Culture STAPHYLOCOCCUS  AUREUS  Moderate      Narrative:      Right 2nd toe    Aerobic culture [[id number]]  (Abnormal)  (Susceptibility) Collected: 03/22/24 1513    Order Status: Completed Specimen: Wound from Foot, Right Updated: 03/25/24 1302     Aerobic Bacterial Culture STAPHYLOCOCCUS AUREUS  Few        PSEUDOMONAS AERUGINOSA  Few      Narrative:      Right 5th toe

## 2024-03-27 NOTE — ANESTHESIA POSTPROCEDURE EVALUATION
Anesthesia Post Evaluation    Patient: Julien Meléndez    Procedure(s) Performed: Procedure(s) (LRB):  AMPUTATION, FOOT, TRANSMETATARSAL (Right)    Final Anesthesia Type: general      Patient location during evaluation: PACU  Patient participation: Yes- Able to Participate  Level of consciousness: awake and alert  Post-procedure vital signs: reviewed and stable  Pain management: adequate  Airway patency: patent    PONV status at discharge: No PONV  Anesthetic complications: no      Cardiovascular status: stable  Respiratory status: room air  Hydration status: euvolemic  Follow-up not needed.              Vitals Value Taken Time   /66 03/27/24 1553   Temp 37.1 °C (98.8 °F) 03/27/24 1553   Pulse 62 03/27/24 1621   Resp 18 03/27/24 1553   SpO2 96 % 03/27/24 1553         Event Time   Out of Recovery 03/27/2024 15:39:06         Pain/Rangel Score: Pain Rating Prior to Med Admin: 4 (3/27/2024  3:01 PM)  Pain Rating Post Med Admin: 2 (3/26/2024 10:15 PM)  Rangel Score: 10 (3/27/2024  3:38 PM)

## 2024-03-27 NOTE — PLAN OF CARE
Plan for transmetatarsal amputation of right foot and all procedures deemed medically necessary as previously discussed  Long discussion with patient regarding the procedure in detail. Patient understands all risks, potential complications, and alternatives, including, but not limited to those listed on the consent form. Risks include but are not limited to recurrence, infection, delayed wound healing, wound dehiscence, scar, poor cosmesis, neuropathic changes/nerve pain/pain, syndromes (RSD), numbness, infection, bleeding, hematoma, gangrenous changes, further surgery, loss of function, healing in a poor position, new or different ulceration or pre ulcerative callus, difficulty walking, gait changes, limb loss, blood clots of leg, lung, heart, brain, and death. All questions were answered. No guarantees given or implied as to outcome. Informed verbal and written consent was obtained. Consent forms read, signed, witnessed. Patient for surgery   Has been NPO since [place]  Will follow

## 2024-03-27 NOTE — BRIEF OP NOTE
[facility]  Brief Operative Note    Surgery Date: 3/27/2024     Surgeon(s) and Role:     * Diya Solitario, DPM - Primary    Assisting Surgeon: None    Pre-op Diagnosis:  Foot infection [L08.9]  Osteomyelitis of right foot, unspecified type [M86.9]    Post-op Diagnosis:  Post-Op Diagnosis Codes:     * Foot infection [L08.9]     * Osteomyelitis of right foot, unspecified type [M86.9]    Procedure(s) (LRB):  AMPUTATION, FOOT, TRANSMETATARSAL (Right)    Anesthesia: Local MAC    Operative Findings: transmetatarsal amputation performed with primary closure. 2nd MTP with necrotic bone. Clean margins sent for Pathology and for culture.     Estimated Blood Loss: * No values recorded between 3/27/2024  1:32 PM and 3/27/2024  2:15 PM *         Specimens:   Specimen (24h ago, onward)       Start     Ordered    03/27/24 1350  Specimen to Pathology, Surgery General Surgery  Once        Comments: Pre-op Diagnosis: Foot infection [L08.9]Osteomyelitis of right foot, unspecified type [M86.9]Procedure(s):AMPUTATION, FOOT, TRANSMETATARSAL Number of specimens: 2Name of specimens: 1. Transmetatarsal bone right foot - perm2. Clean margin right foot - perm     References:    Click here for ordering Quick Tip   Question Answer Comment   Procedure Type: General Surgery    Which provider would you like to cc? DIYA SOLITARIO    Release to patient Immediate        03/27/24 1350                  The patient tolerated the procedure and anesthesia well. Patient was transferred to the recovery room with vital signs stable and vascular status intact to the distal foot. This is part of a staged procedure, wounds will likely require serial debridements in the future.     Following a period of post op monitoring, the patient will be transferred to room on the following written and oral post op instructions:     1. Keep dressing dry and intact, if strike-through noted, please reinforce dressing with kerlix and ACE  2. Patient  non-weightbearing to surgical extremity for next two days  3. Elevate surgical foot when at rest, Z-flex boots to bilateral lower extremity     Contact podiatry for all post op follow up care and if any problems arise.

## 2024-03-28 PROBLEM — N47.1 PHIMOSIS: Status: ACTIVE | Noted: 2024-03-28

## 2024-03-28 LAB
GRAM STN SPEC: NORMAL
GRAM STN SPEC: NORMAL
POCT GLUCOSE: 165 MG/DL (ref 70–110)
POCT GLUCOSE: 212 MG/DL (ref 70–110)
POCT GLUCOSE: 238 MG/DL (ref 70–110)
POCT GLUCOSE: 256 MG/DL (ref 70–110)

## 2024-03-28 PROCEDURE — 97530 THERAPEUTIC ACTIVITIES: CPT

## 2024-03-28 PROCEDURE — 94761 N-INVAS EAR/PLS OXIMETRY MLT: CPT

## 2024-03-28 PROCEDURE — 51702 INSERT TEMP BLADDER CATH: CPT

## 2024-03-28 PROCEDURE — 25000003 PHARM REV CODE 250: Performed by: FAMILY MEDICINE

## 2024-03-28 PROCEDURE — 97165 OT EVAL LOW COMPLEX 30 MIN: CPT

## 2024-03-28 PROCEDURE — 97163 PT EVAL HIGH COMPLEX 45 MIN: CPT

## 2024-03-28 PROCEDURE — 97535 SELF CARE MNGMENT TRAINING: CPT

## 2024-03-28 PROCEDURE — 63600175 PHARM REV CODE 636 W HCPCS: Performed by: STUDENT IN AN ORGANIZED HEALTH CARE EDUCATION/TRAINING PROGRAM

## 2024-03-28 PROCEDURE — 99232 SBSQ HOSP IP/OBS MODERATE 35: CPT | Mod: ,,, | Performed by: INTERNAL MEDICINE

## 2024-03-28 PROCEDURE — 99222 1ST HOSP IP/OBS MODERATE 55: CPT | Mod: ,,, | Performed by: UROLOGY

## 2024-03-28 PROCEDURE — 51798 US URINE CAPACITY MEASURE: CPT

## 2024-03-28 PROCEDURE — 25000003 PHARM REV CODE 250

## 2024-03-28 PROCEDURE — 21400001 HC TELEMETRY ROOM

## 2024-03-28 PROCEDURE — 25000003 PHARM REV CODE 250: Performed by: STUDENT IN AN ORGANIZED HEALTH CARE EDUCATION/TRAINING PROGRAM

## 2024-03-28 PROCEDURE — 25000003 PHARM REV CODE 250: Performed by: NURSE PRACTITIONER

## 2024-03-28 PROCEDURE — 63600175 PHARM REV CODE 636 W HCPCS

## 2024-03-28 PROCEDURE — 99900035 HC TECH TIME PER 15 MIN (STAT)

## 2024-03-28 PROCEDURE — A4216 STERILE WATER/SALINE, 10 ML: HCPCS | Performed by: NURSE PRACTITIONER

## 2024-03-28 RX ORDER — TAMSULOSIN HYDROCHLORIDE 0.4 MG/1
0.4 CAPSULE ORAL DAILY
Status: DISCONTINUED | OUTPATIENT
Start: 2024-03-28 | End: 2024-04-02 | Stop reason: HOSPADM

## 2024-03-28 RX ORDER — CLOTRIMAZOLE AND BETAMETHASONE DIPROPIONATE 10; .64 MG/G; MG/G
CREAM TOPICAL 2 TIMES DAILY
Status: DISCONTINUED | OUTPATIENT
Start: 2024-03-28 | End: 2024-04-02 | Stop reason: HOSPADM

## 2024-03-28 RX ADMIN — HYDROCODONE BITARTRATE AND ACETAMINOPHEN 1 TABLET: 10; 325 TABLET ORAL at 09:03

## 2024-03-28 RX ADMIN — INSULIN DETEMIR 10 UNITS: 100 INJECTION, SOLUTION SUBCUTANEOUS at 09:03

## 2024-03-28 RX ADMIN — MEROPENEM 1 G: 1 INJECTION, POWDER, FOR SOLUTION INTRAVENOUS at 04:03

## 2024-03-28 RX ADMIN — SENNOSIDES AND DOCUSATE SODIUM 1 TABLET: 8.6; 5 TABLET ORAL at 09:03

## 2024-03-28 RX ADMIN — FUROSEMIDE 20 MG: 20 TABLET ORAL at 09:03

## 2024-03-28 RX ADMIN — CLOPIDOGREL BISULFATE 75 MG: 75 TABLET ORAL at 09:03

## 2024-03-28 RX ADMIN — Medication 10 ML: at 06:03

## 2024-03-28 RX ADMIN — PANTOPRAZOLE SODIUM 40 MG: 40 GRANULE, DELAYED RELEASE ORAL at 09:03

## 2024-03-28 RX ADMIN — METOPROLOL TARTRATE 12.5 MG: 25 TABLET, FILM COATED ORAL at 09:03

## 2024-03-28 RX ADMIN — RANOLAZINE 1000 MG: 500 TABLET, FILM COATED, EXTENDED RELEASE ORAL at 09:03

## 2024-03-28 RX ADMIN — FLUOXETINE HYDROCHLORIDE 40 MG: 20 CAPSULE ORAL at 09:03

## 2024-03-28 RX ADMIN — MEROPENEM 1 G: 1 INJECTION, POWDER, FOR SOLUTION INTRAVENOUS at 09:03

## 2024-03-28 RX ADMIN — INSULIN ASPART 2 UNITS: 100 INJECTION, SOLUTION INTRAVENOUS; SUBCUTANEOUS at 04:03

## 2024-03-28 RX ADMIN — CLOTRIMAZOLE AND BETAMETHASONE DIPROPIONATE: 10; .5 CREAM TOPICAL at 09:03

## 2024-03-28 RX ADMIN — INSULIN ASPART 2 UNITS: 100 INJECTION, SOLUTION INTRAVENOUS; SUBCUTANEOUS at 06:03

## 2024-03-28 RX ADMIN — TAMSULOSIN HYDROCHLORIDE 0.4 MG: 0.4 CAPSULE ORAL at 03:03

## 2024-03-28 RX ADMIN — ONDANSETRON 8 MG: 8 TABLET, ORALLY DISINTEGRATING ORAL at 10:03

## 2024-03-28 RX ADMIN — ENOXAPARIN SODIUM 80 MG: 80 INJECTION SUBCUTANEOUS at 09:03

## 2024-03-28 RX ADMIN — POLYETHYLENE GLYCOL 3350 17 G: 17 POWDER, FOR SOLUTION ORAL at 09:03

## 2024-03-28 RX ADMIN — HYDROCODONE BITARTRATE AND ACETAMINOPHEN 1 TABLET: 10; 325 TABLET ORAL at 04:03

## 2024-03-28 RX ADMIN — INSULIN ASPART 3 UNITS: 100 INJECTION, SOLUTION INTRAVENOUS; SUBCUTANEOUS at 11:03

## 2024-03-28 RX ADMIN — Medication 10 ML: at 01:03

## 2024-03-28 RX ADMIN — Medication 10 ML: at 09:03

## 2024-03-28 RX ADMIN — DOCUSATE SODIUM 100 MG: 100 CAPSULE, LIQUID FILLED ORAL at 09:03

## 2024-03-28 RX ADMIN — ASPIRIN 81 MG CHEWABLE TABLET 81 MG: 81 TABLET CHEWABLE at 09:03

## 2024-03-28 RX ADMIN — ISOSORBIDE MONONITRATE 30 MG: 30 TABLET, EXTENDED RELEASE ORAL at 09:03

## 2024-03-28 RX ADMIN — TRAZODONE HYDROCHLORIDE 150 MG: 100 TABLET ORAL at 09:03

## 2024-03-28 RX ADMIN — MEROPENEM 1 G: 1 INJECTION, POWDER, FOR SOLUTION INTRAVENOUS at 12:03

## 2024-03-28 RX ADMIN — ATORVASTATIN CALCIUM 40 MG: 40 TABLET, FILM COATED ORAL at 09:03

## 2024-03-28 NOTE — CONSULTS
[facility] Surg  Urology  Consult Note    Patient Name: Julien Meléndez  MRN: [id number]  Admission Date: 3/21/2024  Hospital Length of Stay: 7   Code Status: Full Code   Attending Provider: Tarsha Styles MD   Consulting Provider: Tonya Jiménez MD  Primary Care Physician: Kelvin Wayne MD  Principal Problem:Critical limb ischemia of right lower extremity    Inpatient consult to Urology  Consult performed by: Tonya Jiménez MD  Consult ordered by: Tarsha Styles MD          Subjective:     HPI:  83 y.o.male with PMH significant for DMII, HLD, HTN, MI, depression, CHF, Afib, CKD1, diabetic foot ulcer and KELSEY who is admitted for critical limb ischemia with gangrene and osteomyelitis of multiple toes of the right foot. TMA of the right foot was performed yesterday. Urology consulted for urinary retention and difficult Doherty placement. Patient reports he only voids a few times a day.  He denies any difficulty voiding at baseline.  Bladder scan showing  >410 cc.  He was unable to void.  Nurses attempted Doherty catheter placement were unsuccessful.  There is no cross-sectional imaging available for review.  His creatinine is at baseline.    Past Medical History:   Diagnosis Date    Diabetes mellitus     Hiatal hernia     under Dr Saenz' care    Hyperlipidemia     Hypertension     MI (myocardial infarction)     Sleep apnea        Past Surgical History:   Procedure Laterality Date    ABDOMINAL HERNIA REPAIR      ANGIOGRAM, EXTREMITY, BILATERAL Bilateral 4/5/2023    Procedure: ANGIOGRAM, EXTREMITY, BILATERAL;  Surgeon: Michael Giraldo III, MD;  Location: [facility] CATH LAB;  Service: Cardiology;  Laterality: Bilateral;    ANGIOGRAPHY OF LOWER EXTREMITY Left 4/12/2023    Procedure: Angiogram Extremity Unilateral;  Surgeon: Michael Giraldo III, MD;  Location: [facility] CATH LAB;  Service: Cardiology;  Laterality: Left;    AORTOGRAPHY WITH SERIALOGRAPHY Bilateral 4/5/2023    Procedure: AORTOGRAM, WITH  SERIALOGRAPHY;  Surgeon: Michael Giraldo III, MD;  Location: [facility] CATH LAB;  Service: Cardiology;  Laterality: Bilateral;    AORTOGRAPHY WITH SERIALOGRAPHY N/A 3/26/2024    Procedure: AORTOGRAM, WITH SERIALOGRAPHY;  Surgeon: Jin Malloy MD;  Location: [facility] CATH LAB/EP;  Service: Cardiology;  Laterality: N/A;    APPLICATION OF WOUND VACUUM-ASSISTED CLOSURE DEVICE Left 3/29/2023    Procedure: APPLICATION, WOUND VAC;  Surgeon: Alona Pierce DPM;  Location: [facility] OR;  Service: Podiatry;  Laterality: Left;    APPLICATION OF WOUND VACUUM-ASSISTED CLOSURE DEVICE Left 4/4/2023    Procedure: APPLICATION, WOUND VAC;  Surgeon: Alona Pierce DPM;  Location: [facility] OR;  Service: Podiatry;  Laterality: Left;    APPLICATION OF WOUND VACUUM-ASSISTED CLOSURE DEVICE Left 4/13/2023    Procedure: APPLICATION, WOUND VAC;  Surgeon: Alona Pierce DPM;  Location: [facility] OR;  Service: Podiatry;  Laterality: Left;    BONE BIOPSY Left 3/29/2023    Procedure: BIOPSY, BONE;  Surgeon: Alona Pierce DPM;  Location: [facility] OR;  Service: Podiatry;  Laterality: Left;  1st met    CARDIAC CATHETERIZATION      3 stents placed    CATARACT EXTRACTION, BILATERAL      CLOSURE DEVICE Left 4/12/2023    Procedure: Placement of Closure Device;  Surgeon: Michael Giraldo III, MD;  Location: [facility] CATH LAB;  Service: Cardiology;  Laterality: Left;    CORONARY ANGIOGRAPHY N/A 10/3/2019    Procedure: ANGIOGRAM, CORONARY ARTERY;  Surgeon: Edwin Azul MD;  Location: [facility] CATH LAB/EP;  Service: Cardiology;  Laterality: N/A;    DEBRIDEMENT OF FOOT Left 3/29/2023    Procedure: DEBRIDEMENT, FOOT;  Surgeon: Alona Pierce DPM;  Location: [facility] OR;  Service: Podiatry;  Laterality: Left;    DEBRIDEMENT OF FOOT Left 4/4/2023    Procedure: DEBRIDEMENT, FOOT;  Surgeon: Alona Pierce DPM;  Location: [facility] OR;  Service: Podiatry;  Laterality: Left;    DEBRIDEMENT OF FOOT Left 4/13/2023    Procedure: DEBRIDEMENT, FOOT;  Surgeon: Alona Pierce DPM;  Location: [facility] OR;   Service: Podiatry;  Laterality: Left;    ESOPHAGOGASTRODUODENOSCOPY N/A 4/17/2023    Procedure: EGD (ESOPHAGOGASTRODUODENOSCOPY);  Surgeon: Otto Villarreal MD;  Location: [facility] ENDO;  Service: Endoscopy;  Laterality: N/A;    FOOT AMPUTATION THROUGH METATARSAL Right 3/27/2024    Procedure: AMPUTATION, FOOT, TRANSMETATARSAL;  Surgeon: Diya Leung DPM;  Location: [facility] OR;  Service: Podiatry;  Laterality: Right;    HERNIA REPAIR      inguinal    IVUS (INTRAVASCULAR ULTRASOUND) Left 4/12/2023    Procedure: ULTRASOUND, INTRAVASCULAR;  Surgeon: Michael Giraldo III, MD;  Location: [facility] CATH LAB;  Service: Cardiology;  Laterality: Left;    LEFT HEART CATHETERIZATION Left 9/13/2019    Procedure: Left heart cath;  Surgeon: Edwin Azul MD;  Location: [facility] CATH LAB/EP;  Service: Cardiology;  Laterality: Left;    LEFT HEART CATHETERIZATION N/A 10/3/2019    Procedure: Left heart cath;  Surgeon: Edwin Azul MD;  Location: [facility] CATH LAB/EP;  Service: Cardiology;  Laterality: N/A;    OSTEOTOMY Left 6/7/2023    Procedure: OSTEOTOMY;  Surgeon: Alona Pierce DPM;  Location: [facility] OR;  Service: Podiatry;  Laterality: Left;    OSTEOTOMY OF METATARSAL BONE Right 4/4/2023    Procedure: OSTEOTOMY, METATARSAL BONE;  Surgeon: Alona Pierce DPM;  Location: [facility] OR;  Service: Podiatry;  Laterality: Right;    PERCUTANEOUS TRANSLUMINAL ANGIOPLASTY Left 4/12/2023    Procedure: PTA (ANGIOPLASTY, PERCUTANEOUS, TRANSLUMINAL);  Surgeon: Michael Giraldo III, MD;  Location: [facility] CATH LAB;  Service: Cardiology;  Laterality: Left;    SURGICAL REMOVAL OF BUNION WITH OSTEOTOMY OF METATARSAL BONE Right 4/4/2023    Procedure: BUNIONECTOMY, WITH METATARSAL OSTEOTOMY;  Surgeon: Alona Pierce DPM;  Location: [facility] OR;  Service: Podiatry;  Laterality: Right;    TOE AMPUTATION Left 4/4/2023    Procedure: AMPUTATION, TOE;  Surgeon: Alona Pierce DPM;  Location: [facility] OR;  Service: Podiatry;  Laterality: Left;  1st ray    TOE AMPUTATION Right  11/10/2023    Procedure: AMPUTATION, TOE;  Surgeon: Diya Leung DPM;  Location: [facility] OR;  Service: Podiatry;  Laterality: Right;    WASHOUT Left 6/7/2023    Procedure: WASHOUT;  Surgeon: Alona Pierce DPM;  [facility] OR;  Service: Podiatry;  Laterality: Left;    WOUND DEBRIDEMENT Left 6/7/2023    Procedure: DEBRIDEMENT, WOUND;  Surgeon: Alona Pierce DPM;  [facility] OR;  Service: Podiatry;  Laterality: Left;       Review of patient's allergies indicates:   Allergen Reactions    Nystatin      Other reaction(s): Unknown       Family History       Problem Relation (Age of Onset)    Heart disease Mother, Sister, Brother, Maternal Uncle, Brother, Sister, Maternal Uncle, Maternal Uncle, Maternal Uncle    Stroke Mother            Tobacco Use    Smoking status: Never    Smokeless tobacco: Never   Substance and Sexual Activity    Alcohol use: No    Drug use: No    Sexual activity: Not on file       Review of Systems   Constitutional:  Negative for activity change, appetite change, chills, fever and unexpected weight change.   Respiratory:  Negative for shortness of breath.    Cardiovascular:  Negative for chest pain.   Gastrointestinal:  Negative for abdominal pain, constipation, diarrhea, nausea and vomiting.   Genitourinary:  Negative for difficulty urinating, dysuria, flank pain and hematuria.   Musculoskeletal:  Negative for back pain.   Skin:  Positive for wound.   Neurological:  Negative for headaches.   Psychiatric/Behavioral:  Negative for confusion.        Objective:     Temp:  [98 °F (36.7 °C)-98.8 °F (37.1 °C)] 98.1 °F (36.7 °C)  Pulse:  [61-85] 61  Resp:  [15-20] 20  SpO2:  [93 %-100 %] 97 %  BP: (109-171)/(53-76) 118/57  Weight: 77.5 kg (170 lb 13.7 oz)  Body mass index is 21.94 kg/m².      Bladder Scan Volume (mL): 409 mL (03/28/24 1300)    Drains       Drain  Duration             Female External Urinary Catheter w/ Suction 03/23/24 2150 4 days                     Physical Exam  Vitals  reviewed.   Constitutional:       General: He is not in acute distress.     Appearance: He is not diaphoretic.   HENT:      Head: Normocephalic and atraumatic.      Nose: Nose normal.   Eyes:      Conjunctiva/sclera: Conjunctivae normal.   Cardiovascular:      Rate and Rhythm: Normal rate.   Pulmonary:      Effort: Pulmonary effort is normal. No respiratory distress.   Abdominal:      General: There is no distension.      Palpations: Abdomen is soft.      Tenderness: There is no abdominal tenderness. There is no right CVA tenderness, left CVA tenderness, guarding or rebound.   Genitourinary:     Comments: Uncircumcised male with phimosis.   Musculoskeletal:      Cervical back: Normal range of motion.   Neurological:      Mental Status: He is alert.   Psychiatric:         Behavior: Behavior normal.         Thought Content: Thought content normal.          Significant Labs:    BMP:  Recent Labs   Lab 03/23/24  0524 03/24/24  0600 03/26/24  0749 03/26/24  1847 03/27/24  0910    136 136  --   --    K 3.8 4.0 4.0  --   --     99 100  --   --    CO2 28 28 26  --   --    BUN 16 16 18  --   --    CREATININE 0.9 0.9 1.0 1.1 1.0   CALCIUM 9.1 9.0 9.3  --   --        CBC:  Recent Labs   Lab 03/23/24  0524 03/24/24  0600 03/26/24  0749   WBC 9.56 7.36 6.28   HGB 10.3* 9.8* 9.7*   HCT 31.7* 29.4* 29.3*    212 212       All pertinent labs results from the past 24 hours have been reviewed.    Significant Imaging:  All pertinent imaging results/findings from the past 24 hours have been reviewed.                    Assessment and Plan:     Phimosis  82 yo admitted with the above. Urology consulted for urinary retention.    - Patient with significant phimosis.  Once foreskin was retracted, a 16 [place] Doherty catheter was placed without difficulty.  500 cc of clear yellow urine was returned.  10 cc in the balloon.  - Recommend twice daily bathing of foreskin with normal saline solution.   - Recommend Lotrisone  applied to the foreskin BID for 1-2 weeks.   - Start Flomax  - Maintain Doherty catheter for 5 days. Please perform early morning voiding trial if patient remains inpatient. If he is discharged prior to then, please have case management arrange outpatient Urology visit for a voiding trial next week.    - Rest of care per primary.            VTE Risk Mitigation (From admission, onward)           Ordered     heparin infusion 1,000 units/500 ml in 0.9% NaCl (pressure line flush)  Intra-op continuous PRN         03/26/24 1027     enoxaparin injection 80 mg  Every 12 hours         03/22/24 0015     IP VTE HIGH RISK PATIENT  Once         03/21/24 1502                    Thank you for your consult. I will sign off. Please contact us if you have any additional questions.    Tonya Jiménez MD  Urology  [facility] Surg

## 2024-03-28 NOTE — NURSING
Spoke to Dr. Landa regarding difficulty with straight cath'ing patient. MD stated to try 18 Fr Coude. MD made aware via secure chat that straight cath was still unsuccessful with Coude. Will continue to monitor.

## 2024-03-28 NOTE — HPI
83 y.o.male with PMH significant for DMII, HLD, HTN, MI, depression, CHF, Afib, CKD1, diabetic foot ulcer and KELSEY who is admitted for critical limb ischemia with gangrene and osteomyelitis of multiple toes of the right foot. TMA of the right foot was performed yesterday. Urology consulted for urinary retention and difficult Doherty placement. Patient reports he only voids a few times a day.  He denies any difficulty voiding at baseline.  Bladder scan showing  >410 cc.  He was unable to void.  Nurses attempted Doherty catheter placement were unsuccessful.  There is no cross-sectional imaging available for review.  His creatinine is at baseline.

## 2024-03-28 NOTE — PT/OT/SLP EVAL
Physical Therapy Evaluation    Patient Name:  Julien Meléndez   MRN:  [id number]    Recommendations:     Discharge Recommendations: Moderate Intensity Therapy   Discharge Equipment Recommendations: to be determined by next level of care   Barriers to discharge: None    Assessment:     Julien Meléndez is a 83 y.o. male admitted with a medical diagnosis of Critical limb ischemia of right lower extremity.  He presents with the following impairments/functional limitations: impaired endurance, weakness, impaired functional mobility, gait instability, impaired balance, decreased lower extremity function, decreased safety awareness, impaired muscle length, orthopedic precautions, impaired cardiopulmonary response to activity, impaired cognition. PT evaluation completed. Overall, pt disoriented and with hx of dementia. Pt oriented to person, but able to provide accurate social history, confirmed with wife. Pt able to perform bed mobility with sba and lateral scooting with cga-min a x 3 reps. Plan to progress transfers next session. Pt reporting motivation to become stronger and more mobile. Anticipate pt will need DEBBY placement at time of dc.     Rehab Prognosis: Fair; patient would benefit from acute skilled PT services to address these deficits and reach maximum level of function.    Recent Surgery: Procedure(s) (LRB):  AMPUTATION, FOOT, TRANSMETATARSAL (Right) 1 Day Post-Op    Plan:     During this hospitalization, patient to be seen 3 x/week to address the identified rehab impairments via gait training, therapeutic activities, therapeutic exercises, neuromuscular re-education and progress toward the following goals:    Plan of Care Expires:  04/11/24    Subjective     Chief Complaint: weakness   Patient/Family Comments/goals: to go home   Pain/Comfort:  Pain Rating 1: 0/10  Pain Rating Post-Intervention 1: 0/10    Patients cultural, spiritual, Mosque conflicts given the current situation:      Living Environment:  Pt  lives in a [facility], no [facility]. Lives with wife and wife was serving as primary caregiver for pt. Pt's wife able to assist 24/7.  Prior to admission, patients level of function was bed bound for last 3 months with wife assisting with brief changes in bed and pt assisting with rolling. Prior to 3 months ago pt was transferring to and from bedside commode and wc without assistance. Pt has been non ambulatory since last year per wife report. Above subjective hx obtained from wife.   Equipment used at home: shower chair, walker, rolling, bedside commode, wheelchair.  DME owned (not currently used): none.  Upon discharge, patient will have assistance from wife.    Objective:     Communicated with RN prior to session.  Patient found supine with bed alarm, telemetry, bailey catheter, peripheral IV  upon PT entry to room.    General Precautions: Standard, fall  Orthopedic Precautions:RLE non weight bearing   Braces: N/A  Respiratory Status: Room air    Exams:  Cognitive Exam:  Patient is oriented to Person  Postural Exam:  Patient presented with the following abnormalities:    -       Rounded shoulders  -       Forward head  RLE ROM: WFL  RLE Strength: WFL  LLE ROM: WFL  LLE Strength: WFL    Functional Mobility:  Bed Mobility:     Supine to Sit: stand by assistance, requires sba for safety.  Sit to Supine: stand by assistance, requires sba for safety.   Balance: pt demonstrates good static and dynamic sitting balance. Pt requires cga-min a for lateral scooting, likely secondary to poor sequencing of tasks rather than decreased balance.       AM-PAC 6 CLICK MOBILITY  Total Score:13       Treatment & Education:  Educated pt on acute role of PT.   Spoke with pt's wife on the phone to gather social history.   Patient requires sba for safety during bed mobility and increased time for task processing. PT provided demonstration of lateral scooting for patient to better understand sequencing of task. Patient performed x 3 lateral scoots  with cga-min a. Educated pt on frequency and duration of therapy POC while in the acute setting. Pt educated on NWB to RLE and able to recall later in session. He demonstrated fair ability to maintain NWB RLE during scooting, requiring min vc's for reminders.     Patient left supine with all lines intact, call button in reach, and RN notified. Bed alarm on.     GOALS:   Multidisciplinary Problems       Physical Therapy Goals          Problem: Physical Therapy    Goal Priority Disciplines Outcome Goal Variances Interventions   Physical Therapy Goal     PT, PT/OT Ongoing, Progressing     Description: Goals to be met by: 2024     Patient will increase functional independence with mobility by performin. Sit to stand transfer with Moderate Assistance w/RW  2. Bed to chair transfer with Moderate Assistance using Rolling Walker  3. Patient will be able to laterally scoot along eob with spv x 5 reps to prepare for transfer training.                          History:     Past Medical History:   Diagnosis Date    Diabetes mellitus     Hiatal hernia     under Dr Saenz' care    Hyperlipidemia     Hypertension     MI (myocardial infarction)     Sleep apnea        Past Surgical History:   Procedure Laterality Date    ABDOMINAL HERNIA REPAIR      ANGIOGRAM, EXTREMITY, BILATERAL Bilateral 2023    Procedure: ANGIOGRAM, EXTREMITY, BILATERAL;  Surgeon: Michael Giraldo III, MD;  Location: [facility] CATH LAB;  Service: Cardiology;  Laterality: Bilateral;    ANGIOGRAPHY OF LOWER EXTREMITY Left 2023    Procedure: Angiogram Extremity Unilateral;  Surgeon: Michael Giraldo III, MD;  Location: [facility] CATH LAB;  Service: Cardiology;  Laterality: Left;    AORTOGRAPHY WITH SERIALOGRAPHY Bilateral 2023    Procedure: AORTOGRAM, WITH SERIALOGRAPHY;  Surgeon: Michael Giraldo III, MD;  Location: [facility] CATH LAB;  Service: Cardiology;  Laterality: Bilateral;    AORTOGRAPHY WITH SERIALOGRAPHY N/A 3/26/2024    Procedure:  AORTOGRAM, WITH SERIALOGRAPHY;  Surgeon: Jin Malloy MD;  Location: [facility] CATH LAB/EP;  Service: Cardiology;  Laterality: N/A;    APPLICATION OF WOUND VACUUM-ASSISTED CLOSURE DEVICE Left 3/29/2023    Procedure: APPLICATION, WOUND VAC;  Surgeon: Alona Pierce DPM;  Location: [facility] OR;  Service: Podiatry;  Laterality: Left;    APPLICATION OF WOUND VACUUM-ASSISTED CLOSURE DEVICE Left 4/4/2023    Procedure: APPLICATION, WOUND VAC;  Surgeon: Alona Pierce DPM;  Location: [facility] OR;  Service: Podiatry;  Laterality: Left;    APPLICATION OF WOUND VACUUM-ASSISTED CLOSURE DEVICE Left 4/13/2023    Procedure: APPLICATION, WOUND VAC;  Surgeon: Alona Pierce DPM;  Location: [facility] OR;  Service: Podiatry;  Laterality: Left;    BONE BIOPSY Left 3/29/2023    Procedure: BIOPSY, BONE;  Surgeon: Alona Pierce DPM;  Location: [facility] OR;  Service: Podiatry;  Laterality: Left;  1st met    CARDIAC CATHETERIZATION      3 stents placed    CATARACT EXTRACTION, BILATERAL      CLOSURE DEVICE Left 4/12/2023    Procedure: Placement of Closure Device;  Surgeon: Michael Giraldo III, MD;  Location: [facility] CATH LAB;  Service: Cardiology;  Laterality: Left;    CORONARY ANGIOGRAPHY N/A 10/3/2019    Procedure: ANGIOGRAM, CORONARY ARTERY;  Surgeon: Edwin Azul MD;  Location: [facility] CATH LAB/EP;  Service: Cardiology;  Laterality: N/A;    DEBRIDEMENT OF FOOT Left 3/29/2023    Procedure: DEBRIDEMENT, FOOT;  Surgeon: Alona Pierce DPM;  Location: [facility] OR;  Service: Podiatry;  Laterality: Left;    DEBRIDEMENT OF FOOT Left 4/4/2023    Procedure: DEBRIDEMENT, FOOT;  Surgeon: Alona Pierce DPM;  Location: [facility] OR;  Service: Podiatry;  Laterality: Left;    DEBRIDEMENT OF FOOT Left 4/13/2023    Procedure: DEBRIDEMENT, FOOT;  Surgeon: Alona Pierce DPM;  Location: [facility] OR;  Service: Podiatry;  Laterality: Left;    ESOPHAGOGASTRODUODENOSCOPY N/A 4/17/2023    Procedure: EGD (ESOPHAGOGASTRODUODENOSCOPY);  Surgeon: Otto Villarreal MD;  Location: [facility];  Service:  Endoscopy;  Laterality: N/A;    FOOT AMPUTATION THROUGH METATARSAL Right 3/27/2024    Procedure: AMPUTATION, FOOT, TRANSMETATARSAL;  Surgeon: Diya Leung DPM;  Location: [facility] OR;  Service: Podiatry;  Laterality: Right;    HERNIA REPAIR      inguinal    IVUS (INTRAVASCULAR ULTRASOUND) Left 4/12/2023    Procedure: ULTRASOUND, INTRAVASCULAR;  Surgeon: Michael Giraldo III, MD;  Location: [facility] CATH LAB;  Service: Cardiology;  Laterality: Left;    LEFT HEART CATHETERIZATION Left 9/13/2019    Procedure: Left heart cath;  Surgeon: Edwin Azul MD;  Location: [facility] CATH LAB/EP;  Service: Cardiology;  Laterality: Left;    LEFT HEART CATHETERIZATION N/A 10/3/2019    Procedure: Left heart cath;  Surgeon: Edwin Azul MD;  Location: [facility] CATH LAB/EP;  Service: Cardiology;  Laterality: N/A;    OSTEOTOMY Left 6/7/2023    Procedure: OSTEOTOMY;  Surgeon: Alona Pierce DPM;  Location: [facility] OR;  Service: Podiatry;  Laterality: Left;    OSTEOTOMY OF METATARSAL BONE Right 4/4/2023    Procedure: OSTEOTOMY, METATARSAL BONE;  Surgeon: Alona Pierce DPM;  Location: [facility] OR;  Service: Podiatry;  Laterality: Right;    PERCUTANEOUS TRANSLUMINAL ANGIOPLASTY Left 4/12/2023    Procedure: PTA (ANGIOPLASTY, PERCUTANEOUS, TRANSLUMINAL);  Surgeon: Michael Giraldo III, MD;  Location: [facility] CATH LAB;  Service: Cardiology;  Laterality: Left;    SURGICAL REMOVAL OF BUNION WITH OSTEOTOMY OF METATARSAL BONE Right 4/4/2023    Procedure: BUNIONECTOMY, WITH METATARSAL OSTEOTOMY;  Surgeon: Alona Pierce DPM;  Location: [facility] OR;  Service: Podiatry;  Laterality: Right;    TOE AMPUTATION Left 4/4/2023    Procedure: AMPUTATION, TOE;  Surgeon: Alona Pierce DPM;  Location: [facility] OR;  Service: Podiatry;  Laterality: Left;  1st ray    TOE AMPUTATION Right 11/10/2023    Procedure: AMPUTATION, TOE;  Surgeon: Diya Leung DPM;  Location: [facility] OR;  Service: Podiatry;  Laterality: Right;    WASHOUT Left 6/7/2023    Procedure: WASHOUT;  Surgeon: Alona  KILLIAN Pierce DPM;  Location: [facility] OR;  Service: Podiatry;  Laterality: Left;    WOUND DEBRIDEMENT Left 6/7/2023    Procedure: DEBRIDEMENT, WOUND;  Surgeon: Alona Pierce DPM;  Location: [facility] OR;  Service: Podiatry;  Laterality: Left;       Time Tracking:     PT Received On: 03/28/24  PT Start Time: 1535     PT Stop Time: 1552  PT Total Time (min): 17 min     Billable Minutes: Evaluation 9 and Therapeutic Activity 8      03/28/2024

## 2024-03-28 NOTE — SUBJECTIVE & OBJECTIVE
Past Medical History:   Diagnosis Date    Diabetes mellitus     Hiatal hernia     under Dr Saenz' care    Hyperlipidemia     Hypertension     MI (myocardial infarction)     Sleep apnea        Past Surgical History:   Procedure Laterality Date    ABDOMINAL HERNIA REPAIR      ANGIOGRAM, EXTREMITY, BILATERAL Bilateral 4/5/2023    Procedure: ANGIOGRAM, EXTREMITY, BILATERAL;  Surgeon: Michael Giraldo III, MD;  Location: [facility] CATH LAB;  Service: Cardiology;  Laterality: Bilateral;    ANGIOGRAPHY OF LOWER EXTREMITY Left 4/12/2023    Procedure: Angiogram Extremity Unilateral;  Surgeon: Michael Giraldo III, MD;  Location: [facility] CATH LAB;  Service: Cardiology;  Laterality: Left;    AORTOGRAPHY WITH SERIALOGRAPHY Bilateral 4/5/2023    Procedure: AORTOGRAM, WITH SERIALOGRAPHY;  Surgeon: Michael Giraldo III, MD;  Location: [facility] CATH LAB;  Service: Cardiology;  Laterality: Bilateral;    AORTOGRAPHY WITH SERIALOGRAPHY N/A 3/26/2024    Procedure: AORTOGRAM, WITH SERIALOGRAPHY;  Surgeon: Jin Malloy MD;  Location: [facility] CATH LAB/EP;  Service: Cardiology;  Laterality: N/A;    APPLICATION OF WOUND VACUUM-ASSISTED CLOSURE DEVICE Left 3/29/2023    Procedure: APPLICATION, WOUND VAC;  Surgeon: Alona Pierce DPM;  Location: [facility] OR;  Service: Podiatry;  Laterality: Left;    APPLICATION OF WOUND VACUUM-ASSISTED CLOSURE DEVICE Left 4/4/2023    Procedure: APPLICATION, WOUND VAC;  Surgeon: Alona Pierce DPM;  Location: [facility] OR;  Service: Podiatry;  Laterality: Left;    APPLICATION OF WOUND VACUUM-ASSISTED CLOSURE DEVICE Left 4/13/2023    Procedure: APPLICATION, WOUND VAC;  Surgeon: Alona Pierce DPM;  Location: [facility] OR;  Service: Podiatry;  Laterality: Left;    BONE BIOPSY Left 3/29/2023    Procedure: BIOPSY, BONE;  Surgeon: Alona Pierce DPM;  Location: [facility] OR;  Service: Podiatry;  Laterality: Left;  1st met    CARDIAC CATHETERIZATION      3 stents placed    CATARACT EXTRACTION, BILATERAL      CLOSURE DEVICE Left 4/12/2023     Procedure: Placement of Closure Device;  Surgeon: Michael Giraldo III, MD;  [facility];  Service: Cardiology;  Laterality: Left;    CORONARY ANGIOGRAPHY N/A 10/3/2019    Procedure: ANGIOGRAM, CORONARY ARTERY;  Surgeon: Edwin Azul MD;  Location: [facility] CATH LAB/EP;  Service: Cardiology;  Laterality: N/A;    DEBRIDEMENT OF FOOT Left 3/29/2023    Procedure: DEBRIDEMENT, FOOT;  Surgeon: Alona Pierce DPM;  [facility];  Service: Podiatry;  Laterality: Left;    DEBRIDEMENT OF FOOT Left 4/4/2023    Procedure: DEBRIDEMENT, FOOT;  Surgeon: Alona Pierce DPM;  [facility];  Service: Podiatry;  Laterality: Left;    DEBRIDEMENT OF FOOT Left 4/13/2023    Procedure: DEBRIDEMENT, FOOT;  Surgeon: Alona Pierce DPM;  [facility];  Service: Podiatry;  Laterality: Left;    ESOPHAGOGASTRODUODENOSCOPY N/A 4/17/2023    Procedure: EGD (ESOPHAGOGASTRODUODENOSCOPY);  Surgeon: Otto Villarreal MD;  Location: [facility] ENDO;  Service: Endoscopy;  Laterality: N/A;    FOOT AMPUTATION THROUGH METATARSAL Right 3/27/2024    Procedure: AMPUTATION, FOOT, TRANSMETATARSAL;  Surgeon: Diya Leung DPM;  Location: [facility] OR;  Service: Podiatry;  Laterality: Right;    HERNIA REPAIR      inguinal    IVUS (INTRAVASCULAR ULTRASOUND) Left 4/12/2023    Procedure: ULTRASOUND, INTRAVASCULAR;  Surgeon: Michael Giraldo III, MD;  [facility];  Service: Cardiology;  Laterality: Left;    LEFT HEART CATHETERIZATION Left 9/13/2019    Procedure: Left heart cath;  Surgeon: Edwin Azul MD;  Location: [facility] CATH LAB/EP;  Service: Cardiology;  Laterality: Left;    LEFT HEART CATHETERIZATION N/A 10/3/2019    Procedure: Left heart cath;  Surgeon: Edwin Azul MD;  Location: [facility] CATH LAB/EP;  Service: Cardiology;  Laterality: N/A;    OSTEOTOMY Left 6/7/2023    Procedure: OSTEOTOMY;  Surgeon: Alona Pierce DPM;  [facility];  Service: Podiatry;  Laterality: Left;    OSTEOTOMY OF METATARSAL BONE Right 4/4/2023     Procedure: OSTEOTOMY, METATARSAL BONE;  Surgeon: Alona Pierce DPM;  Location: [facility] OR;  Service: Podiatry;  Laterality: Right;    PERCUTANEOUS TRANSLUMINAL ANGIOPLASTY Left 4/12/2023    Procedure: PTA (ANGIOPLASTY, PERCUTANEOUS, TRANSLUMINAL);  Surgeon: Michael Giraldo III, MD;  Location: [facility] CATH LAB;  Service: Cardiology;  Laterality: Left;    SURGICAL REMOVAL OF BUNION WITH OSTEOTOMY OF METATARSAL BONE Right 4/4/2023    Procedure: BUNIONECTOMY, WITH METATARSAL OSTEOTOMY;  Surgeon: Alona Pierce DPM;  Location: [facility] OR;  Service: Podiatry;  Laterality: Right;    TOE AMPUTATION Left 4/4/2023    Procedure: AMPUTATION, TOE;  Surgeon: Alona Pierce DPM;  Location: [facility] OR;  Service: Podiatry;  Laterality: Left;  1st ray    TOE AMPUTATION Right 11/10/2023    Procedure: AMPUTATION, TOE;  Surgeon: Diya Leung DPM;  Location: [facility] OR;  Service: Podiatry;  Laterality: Right;    WASHOUT Left 6/7/2023    Procedure: WASHOUT;  Surgeon: Alona Pierce DPM;  Location: [facility] OR;  Service: Podiatry;  Laterality: Left;    WOUND DEBRIDEMENT Left 6/7/2023    Procedure: DEBRIDEMENT, WOUND;  Surgeon: Alona Pierce DPM;  Location: [facility] OR;  Service: Podiatry;  Laterality: Left;       Review of patient's allergies indicates:   Allergen Reactions    Nystatin      Other reaction(s): Unknown       Family History       Problem Relation (Age of Onset)    Heart disease Mother, Sister, Brother, Maternal Uncle, Brother, Sister, Maternal Uncle, Maternal Uncle, Maternal Uncle    Stroke Mother            Tobacco Use    Smoking status: Never    Smokeless tobacco: Never   Substance and Sexual Activity    Alcohol use: No    Drug use: No    Sexual activity: Not on file       Review of Systems   Constitutional:  Negative for activity change, appetite change, chills, fever and unexpected weight change.   Respiratory:  Negative for shortness of breath.    Cardiovascular:  Negative for chest pain.   Gastrointestinal:  Negative for abdominal pain,  constipation, diarrhea, nausea and vomiting.   Genitourinary:  Negative for difficulty urinating, dysuria, flank pain and hematuria.   Musculoskeletal:  Negative for back pain.   Skin:  Positive for wound.   Neurological:  Negative for headaches.   Psychiatric/Behavioral:  Negative for confusion.        Objective:     Temp:  [98 °F (36.7 °C)-98.8 °F (37.1 °C)] 98.1 °F (36.7 °C)  Pulse:  [61-85] 61  Resp:  [15-20] 20  SpO2:  [93 %-100 %] 97 %  BP: (109-171)/(53-76) 118/57  Weight: 77.5 kg (170 lb 13.7 oz)  Body mass index is 21.94 kg/m².      Bladder Scan Volume (mL): 409 mL (03/28/24 1300)    Drains       Drain  Duration             Female External Urinary Catheter w/ Suction 03/23/24 2150 4 days                     Physical Exam  Vitals reviewed.   Constitutional:       General: He is not in acute distress.     Appearance: He is not diaphoretic.   HENT:      Head: Normocephalic and atraumatic.      Nose: Nose normal.   Eyes:      Conjunctiva/sclera: Conjunctivae normal.   Cardiovascular:      Rate and Rhythm: Normal rate.   Pulmonary:      Effort: Pulmonary effort is normal. No respiratory distress.   Abdominal:      General: There is no distension.      Palpations: Abdomen is soft.      Tenderness: There is no abdominal tenderness. There is no right CVA tenderness, left CVA tenderness, guarding or rebound.   Genitourinary:     Comments: Uncircumcised male with phimosis.   Musculoskeletal:      Cervical back: Normal range of motion.   Neurological:      Mental Status: He is alert.   Psychiatric:         Behavior: Behavior normal.         Thought Content: Thought content normal.          Significant Labs:    BMP:  Recent Labs   Lab 03/23/24  0524 03/24/24  0600 03/26/24  0749 03/26/24  1847 03/27/24  0910    136 136  --   --    K 3.8 4.0 4.0  --   --     99 100  --   --    CO2 28 28 26  --   --    BUN 16 16 18  --   --    CREATININE 0.9 0.9 1.0 1.1 1.0   CALCIUM 9.1 9.0 9.3  --   --        CBC:  Recent  Labs   Lab 03/23/24  0524 03/24/24  0600 03/26/24  0749   WBC 9.56 7.36 6.28   HGB 10.3* 9.8* 9.7*   HCT 31.7* 29.4* 29.3*    212 212       All pertinent labs results from the past 24 hours have been reviewed.    Significant Imaging:  All pertinent imaging results/findings from the past 24 hours have been reviewed.

## 2024-03-28 NOTE — SUBJECTIVE & OBJECTIVE
Interval History: seen at the bedside, no distress noted. Appreciate consultants eval. Will cont IV abx. Amputation today. Complains of left shoulder pain and not being able to move as much for 5 mos    Review of Systems   Constitutional:  Negative for fatigue and fever.   Genitourinary:  Negative for difficulty urinating and hematuria.   Skin:  Positive for wound (right foot).     Objective:     Vital Signs (Most Recent):  Temp: 97.4 °F (36.3 °C) (03/28/24 1609)  Pulse: 63 (03/28/24 1623)  Resp: 20 (03/28/24 1624)  BP: 132/62 (03/28/24 1609)  SpO2: 95 % (03/28/24 1609) Vital Signs (24h Range):  Temp:  [97.4 °F (36.3 °C)-98.4 °F (36.9 °C)] 97.4 °F (36.3 °C)  Pulse:  [59-85] 63  Resp:  [16-20] 20  SpO2:  [95 %-100 %] 95 %  BP: (109-171)/(53-76) 132/62     Weight: 77.5 kg (170 lb 13.7 oz)  Body mass index is 21.94 kg/m².     Physical Exam  Vitals and nursing note reviewed.   Constitutional:       Appearance: He is ill-appearing.   HENT:      Mouth/Throat:      Mouth: Mucous membranes are moist.   Eyes:      Extraocular Movements: Extraocular movements intact.   Cardiovascular:      Rate and Rhythm: Normal rate and regular rhythm.   Abdominal:      Palpations: Abdomen is soft.   Musculoskeletal:         General: Tenderness (right foot) present.      Cervical back: Normal range of motion and neck supple.      Comments: Limited shoulder ROM with bruising on arm  . Right foot s/p TMA   Skin:     Comments: R foot wound with erythema and purulent drainage    Neurological:      Mental Status: He is alert and oriented to person, place, and time.   Psychiatric:         Mood and Affect: Mood normal.                Significant Labs: All pertinent labs within the past 24 hours have been reviewed.    Significant Imaging: I have reviewed all pertinent imaging results/findings within the past 24 hours.

## 2024-03-28 NOTE — PLAN OF CARE
OT evaluation this date. Bed level eval this date for safety 2/2 patient bed level at baseline from patient report, RLE NWB, impaired orientation and sequencing. Phone call attempt to wife and call to  for further PLOF. Education provided on Darco shoe and RLE NWB. Skilled acute OT to follow. Recommend moderate intensity therapy once medically stable and transition to senior living 2/2 dementia dx and increased need of assist.    Problem: Occupational Therapy  Goal: Occupational Therapy Goal  Description: Goals to be met by: 4/25/2024     Patient will increase functional independence with ADLs by performing:    Rolling to Bilateral with independence for toileting regimen.  Toilet transfer to DA bedside commode with Contact Guard Assistance and WB precaution adherence with least restrictive device.  Dynamic sitting balance with independence for ADLs.  Family or caregiver 100% verbalized reciprocation of dementia friendly recommendations (ex: simplify choices; decrease visual clutter; remove hazardous objects; maximize familiarity, etc) to support patient's wellbeing and highest level of occupational engagement and participation in least restrictive discharge environment     Outcome: Ongoing, Progressing

## 2024-03-28 NOTE — PLAN OF CARE
Visited pt at bedside. No complaints voiced. PT/OT Eval pending. Pt is agreeable with placement if recommended. Plan is SNF at [facility] when medically ready for discharge. PASRR/142 in care port. CM will continue to follow pt and provide any discharge needs.   Future Appointments   Date Time Provider Department Center   4/15/2024  8:00 AM Gladis Fermin NP [facility]      03/28/24 1500   Post-Acute Status   Post-Acute Authorization Placement   Post-Acute Placement Status Pending medical clearance/testing   Hospital Resources/Appts/Education Provided Appointments scheduled and added to AVS   Discharge Delays None known at this time   Discharge Plan   Discharge Plan A Skilled Nursing Facility

## 2024-03-28 NOTE — ASSESSMENT & PLAN NOTE
84 yo admitted with the above. Urology consulted for urinary retention.    - Patient with significant phimosis.  Once foreskin was retracted, a 16 English Doherty catheter was placed without difficulty.  500 cc of clear yellow urine was returned.  10 cc in the balloon.  - Recommend twice daily bathing of foreskin with normal saline solution.   - Recommend Lotrisone applied to the foreskin BID for 1-2 weeks.   - Start Flomax  - Maintain Doherty catheter for 5 days. Please perform early morning voiding trial if patient remains inpatient. If he is discharged prior to then, please have case management arrange outpatient Urology visit for a voiding trial next week.    - Rest of care per primary.

## 2024-03-28 NOTE — PROGRESS NOTES
[facility] Infectious Disease Progress Note    Attending Physician: Tarsha Styles MD  Infectious Disease Attending Physician: Ishan Jaramillo MD  Intern: Stacie Mishra MD    Subjective:      Patient had right foot transmetatarsal amputation yesterday which he tolerated well, pathology pending. He has no current complaints other than pain at the surgical site.     Assessment:     Julien Meléndez is a 83 y.o.male with PMH significant for DMII, HLD, HTN, MI, depression, CHF, Afib, CKD1, diabetic foot ulcer and KELSEY who is admitted for critical limb ischemia with gangrene and osteomyelitis of multiple toes of the right foot. TMA of the right foot was performed yesterday, pathology pending but podiatry noted that adjacent bone looked to be involved. Treating with meropenem for MSSA and pseudomonas osteomyelitis.     Plan:     - continue meropenem for 4 weeks from 3/22/2024, can discontinue if pathology comes back negative     Objective:     Last 24 Hour Vital Signs:  BP  Min: 94/53  Max: 171/76  Temp  Av.1 °F (36.7 °C)  Min: 97.5 °F (36.4 °C)  Max: 98.8 °F (37.1 °C)  Pulse  Av.6  Min: 56  Max: 85  Resp  Av.4  Min: 14  Max: 20  SpO2  Av.3 %  Min: 93 %  Max: 100 %  Weight  Av.5 kg (170 lb 13.7 oz)  Min: 77.5 kg (170 lb 13.7 oz)  Max: 77.5 kg (170 lb 13.7 oz)  I/O last 3 completed shifts:  In: 2389.6 [P.O.:1130; I.V.:15.5; IV Piggyback:1244.1]  Out: 950 [Urine:950]    Physical Examination:  Physical Exam  Vitals and nursing note reviewed.   Constitutional:       General: He is not in acute distress.     Appearance: Normal appearance. He is normal weight. He is not ill-appearing.   HENT:      Head: Normocephalic and atraumatic.      Right Ear: External ear normal.      Left Ear: External ear normal.      Nose: Nose normal. No congestion.      Mouth/Throat:      Mouth: Mucous membranes are moist.      Pharynx: Oropharynx is clear.   Eyes:      Extraocular Movements: Extraocular movements intact.       Conjunctiva/sclera: Conjunctivae normal.      Pupils: Pupils are equal, round, and reactive to light.   Cardiovascular:      Rate and Rhythm: Normal rate and regular rhythm.      Pulses: Normal pulses.      Heart sounds: Normal heart sounds. No murmur heard.  Pulmonary:      Effort: Pulmonary effort is normal. No respiratory distress.      Breath sounds: Normal breath sounds. No stridor. No wheezing, rhonchi or rales.   Abdominal:      General: Abdomen is flat. Bowel sounds are normal. There is no distension.      Palpations: Abdomen is soft. There is no mass.   Musculoskeletal:         General: No swelling, tenderness or deformity.      Cervical back: Normal range of motion and neck supple. No rigidity or tenderness.      Comments: RLE wrapped in ACE bandage. No surrounding warmth or erythema.   Skin:     General: Skin is warm and dry.      Capillary Refill: Capillary refill takes less than 2 seconds.      Coloration: Skin is not jaundiced or pale.      Findings: No bruising.   Neurological:      General: No focal deficit present.      Mental Status: He is alert and oriented to person, place, and time. Mental status is at baseline.   Psychiatric:         Mood and Affect: Mood normal.         Behavior: Behavior normal.         Thought Content: Thought content normal.         Judgment: Judgment normal.        Laboratory:  Microbiology Data Reviewed:   Pertinent Findings:  3/22 wound cx MSSA, pseudomonas  3/27 pending    Current Medications:     Infusions:   heparin (porcine) 2,000 Units/hr (03/26/24 1026)        Scheduled:   aspirin  81 mg Oral Daily    atorvastatin  40 mg Oral Daily    clopidogreL  75 mg Oral Daily    docusate sodium  100 mg Oral Daily    enoxparin  1 mg/kg Subcutaneous Q12H (prophylaxis, 0900/2100)    FLUoxetine  40 mg Oral Daily    furosemide  20 mg Oral Daily    insulin detemir U-100  10 Units Subcutaneous QHS    isosorbide mononitrate  30 mg Oral Daily    meropenem IV (PEDS and ADULTS)  1 g  Intravenous Q8H    metoprolol tartrate  12.5 mg Oral BID    pantoprazole  40 mg Oral BID    polyethylene glycol  17 g Oral Daily    ranolazine  1,000 mg Oral BID    senna-docusate 8.6-50 mg  1 tablet Oral BID    sodium chloride 0.9%  10 mL Intravenous Q8H    traZODone  150 mg Oral QHS        PRN:  sodium chloride 0.9%, acetaminophen, albuterol-ipratropium, aluminum-magnesium hydroxide-simethicone, artificial tears, dextrose 10%, dextrose 10%, glucagon (human recombinant), glucose, glucose, heparin (porcine), HYDROcodone-acetaminophen, influenza 65up-adj, insulin aspart U-100, melatonin, ondansetron, ondansetron, simethicone    Antibiotics and Day Number of Therapy:  Meropenem 3/26-present  Zosyn 3/21-3/26  Vancomycin 3/21-3/26    Stacie Mishra MD  [facility] Internal Medicine/Pediatrics HO-1

## 2024-03-28 NOTE — PLAN OF CARE
Problem: Physical Therapy  Goal: Physical Therapy Goal  Description: Goals to be met by: 2024     Patient will increase functional independence with mobility by performin. Sit to stand transfer with Moderate Assistance w/RW  2. Bed to chair transfer with Moderate Assistance using Rolling Walker  3. Patient will be able to laterally scoot along eob with spv x 5 reps to prepare for transfer training.     Outcome: Ongoing, Progressing       PT evaluation completed. Overall, pt disoriented and with hx of dementia. Pt oriented to person, but able to provide accurate social history, confirmed with wife. Pt able to perform bed mobility with sba and lateral scooting with cga-min a x 3 reps. Plan to progress transfers next session. Anticipate pt will need DEBBY placement at time of dc.

## 2024-03-28 NOTE — PLAN OF CARE
Patient is awake and alert. Patient given medications as ordered per MAR. IV antibiotics given as scheduled. Cardiac monitoring in place. Blood glucose monitoring maintained. PRN Norco given for pain. Podiatrist changed RLE dressing during shift, elevated on pillows. Doherty catheter placed by Urology. Safety maintained. Waffle mattress in place. Triad cream applied. Instructed to use call light for assistance. Will continue to monitor.        Problem: Diabetes Comorbidity  Goal: Blood Glucose Level Within Targeted Range  Outcome: Ongoing, Progressing     Problem: Impaired Wound Healing  Goal: Optimal Wound Healing  Outcome: Ongoing, Progressing     Problem: Pain (Surgery Nonspecified)  Goal: Acceptable Pain Control  Outcome: Ongoing, Progressing

## 2024-03-28 NOTE — PLAN OF CARE
S/p TMA, site is healing well. No evidence of hematoma formation. Recommend rest and elevation. Elevate above level of heart.  NWB to surgical extremity.   Antibiotic management per Infectious Disease recommendations, will likely require 6 weeks IV antibiotics  Dressings changed today, nursing orders in for dressing changes  Upon D/C, home health to change dressings as follows:  Remove foot dressings.   Cleanse wound with normal saline or wound . Dry well.    Apply gentian violet to kelsi wound maceration if needed.   Apply xeroform to foot wound(s). Cover with 4x4 gauze  Next apply foam pad x 2 to plantar forefoot  Top with football dressing consisting 3 rolls of cast padding. Top with flexinet, secure in darco shoe  Change 3  times per week and PRN    Plan for follow up in wound clinic within 1-2 weeks of D/C

## 2024-03-28 NOTE — NURSING
Dr. Styles notified that patient's bladder scan showed >409 mL. Multiple attempts to straight cath by more than one nurse unsuccessful due to tubing coiling. Urology consult requested. Will continue to monitor.

## 2024-03-28 NOTE — PT/OT/SLP EVAL
Occupational Therapy   Evaluation and Treatment    Name: Julien Meléndez  MRN: [id number]  Admitting Diagnosis: Critical limb ischemia of right lower extremity  Recent Surgery: Procedure(s) (LRB):  AMPUTATION, FOOT, TRANSMETATARSAL (Right) 1 Day Post-Op    Recommendations:     Discharge Recommendations: Moderate Intensity Therapy (recommend transition to longterm. Recommend palliative care consult for goals of care / disease trajectory discussion.)  Discharge Equipment Recommendations:  to be determined by next level of care  Barriers to discharge:   (increased need of assist, impaired cognition)    Assessment:     Julien Meléndez is a 83 y.o. male with a medical diagnosis of Critical limb ischemia of right lower extremity.  He presents with ..The primary encounter diagnosis was Critical limb ischemia of right lower extremity. Diagnoses of Foot infection, PAD (peripheral artery disease), Type 2 diabetes mellitus with hyperglycemia, unspecified whether long term insulin use, Chest pain, Wound infection, Osteomyelitis of right foot, unspecified type, and Cellulitis of foot were also pertinent to this visit. . Performance deficits affecting function: weakness, impaired sensation, impaired self care skills, impaired functional mobility, gait instability, impaired balance, impaired cognition, decreased upper extremity function, decreased lower extremity function, decreased ROM, impaired skin, orthopedic precautions.      OT evaluation this date. Bed level eval this date for safety 2/2 patient bed level at baseline from patient report, RLE NWB, impaired orientation and sequencing. Phone call attempt to wife and call to  for further PLOF. Education provided on Darco shoe and RLE NWB. Skilled acute OT to follow. Recommend moderate intensity therapy once medically stable and transition to longterm 2/2 dementia dx and increased need of assist.    Rehab Prognosis: Fair; patient would benefit from acute skilled OT  "services to address these deficits and reach maximum level of function.       Plan:     Patient to be seen 3 x/week to address the above listed problems via self-care/home management, therapeutic activities, therapeutic exercises, cognitive retraining  Plan of Care Expires: 04/25/24  Plan of Care Reviewed with: patient    Subjective     Chief Complaint: wanting to contact wife  Patient/Family Comments/goals: "I don't think my wife knows I'm here." Room phone handed to patient and assisted to dial wife at end of session; no answer at current time.    Occupational Profile: patient unknown historian report. Attempt to call wife (Kylee) but unable to answer. Called . PT with successful call to wife and provided following PLOF:  Living Environment: Lives with wife, SSH, 0 JUSTIN  Previous level of function: Bedbound past 3 months; prior to 3 months, pivot to BSC and w/c. Hasn't walked in past year. Wife assists dressing and bed baths, uses urinal/bedpan/diapers in bed.  Equipment Used at Home: shower chair, wheelchair, walker, rolling, bedside commode, other (see comments) (per  and patient interview)  Assistance upon Discharge: limited from wife    Pain/Comfort:  Pain Rating 1: 0/10  Pain Addressed 1: Reposition  Pain Rating Post-Intervention 1: 0/10        Objective:     Communicated with: nursing prior to session.  Patient found HOB elevated with bed alarm, telemetry, bailey catheter, Other (comments) (waffle overlay) upon OT entry to room.    General Precautions: Standard, fall  Orthopedic Precautions: RLE non weight bearing  Braces:  (Darco on L foot for support)  Respiratory Status: Room air    Occupational Performance:    Bed Mobility:    Patient completed Rolling/Turning to Left with  minimum assistance  Patient attempted lateral Scooting; unable to sequence. Patient instructed on scooting to HOB while supine: moderate performance but self-terminated 2/2 L shoulder pain. Total assist to " complete scoot in trendelenburg   Patient completed Supine to Sit with min assistance for RLE  Patient completed Sit to Supine with min assistance for RLE    Functional Mobility/Transfers:  Functional Mobility: Patient tolerated sitting eob for assessment ~13 min. Good sitting balance with hands on bedrails and when instructed to place hands in lap.    Activities of Daily Living:  Grooming: stand by assistance for setup of oral hygiene, hair combing  Lower Body Dressing: total assistance with education on don/doff Darco to L foot prior to lateral scoot attempt  Toileting: total assistance ; leo    Cognitive/Visual Perceptual:  Cognitive/Psychosocial Skills:     -       Oriented to: Person and unable to choose hospital given choices   -       Follows Commands/attention:Follows two-step commands  -       Communication: clear/fluent  -       Memory: short term recall WFL; able to recall RLE NWB precaution during instructed teach-back 2/2x  -       Mood/Affect/Coping skills/emotional control: Cooperative and Pleasant    Physical Exam:  Balance:    -       CGA progressing to SBA during static seat  Postural examination/scapula alignment:    -       Rounded shoulders  Skin integrity: dressing intact RLE; post-sx scar on L foot medial aspect  Sensation:    -       Impaired  ; minimal light touch L foot  Dominant hand:    -       Right  Upper Extremity Range of Motion:     -       Right Upper Extremity: WFL  -       Left Upper Extremity: Distal WFL; Shoulder limited to 25% range 2/2 prior injury/sustained falls per patient  Upper Extremity Strength:    -       Right Upper Extremity: WFL  -       Left Upper Extremity: maintains antigravity < 30 deg shoulder flexion   Strength:    -       Right Upper Extremity: WFL  -       Left Upper Extremity: WFL    AMPAC 6 Click ADL:  AMPAC Total Score: 13    Treatment & Education:  Patient educated on role of OT/ POC development.   Discussion initiated on reorientation; unable to  be oriented.  Occupational profile developed via interview with patient, phone call to , PT provided information from successful phone call attempt to wife. Patient guided to transition eob for assessment.   Initiated ADL / functional mobility training as above with instruction on RLE NWB. Educated patient on importance of elevating BLEs/safe positioning supine and seated eob.   Answered questions within scope.   All needs met.    Patient left HOB elevated with all lines intact, call button in reach, bed alarm on, and nursing notified    GOALS:   Multidisciplinary Problems       Occupational Therapy Goals          Problem: Occupational Therapy    Goal Priority Disciplines Outcome Interventions   Occupational Therapy Goal     OT, PT/OT Ongoing, Progressing    Description: Goals to be met by: 4/25/2024     Patient will increase functional independence with ADLs by performing:    Rolling to Bilateral with independence for toileting regimen.  Toilet transfer to DA bedside commode with Contact Guard Assistance and WB precaution adherence with least restrictive device.  Dynamic sitting balance with independence for ADLs.  Family or caregiver 100% verbalized reciprocation of dementia friendly recommendations (ex: simplify choices; decrease visual clutter; remove hazardous objects; maximize familiarity, etc) to support patient's wellbeing and highest level of occupational engagement and participation in least restrictive discharge environment                          History:     Past Medical History:   Diagnosis Date    Diabetes mellitus     Hiatal hernia     under Dr Saenz' care    Hyperlipidemia     Hypertension     MI (myocardial infarction)     Sleep apnea          Past Surgical History:   Procedure Laterality Date    ABDOMINAL HERNIA REPAIR      ANGIOGRAM, EXTREMITY, BILATERAL Bilateral 4/5/2023    Procedure: ANGIOGRAM, EXTREMITY, BILATERAL;  Surgeon: Michael Giraldo III, MD;  Location: [facility] CATH LAB;   Service: Cardiology;  Laterality: Bilateral;    ANGIOGRAPHY OF LOWER EXTREMITY Left 4/12/2023    Procedure: Angiogram Extremity Unilateral;  Surgeon: Michael Giraldo III, MD;  Location: [facility] CATH LAB;  Service: Cardiology;  Laterality: Left;    AORTOGRAPHY WITH SERIALOGRAPHY Bilateral 4/5/2023    Procedure: AORTOGRAM, WITH SERIALOGRAPHY;  Surgeon: Michael Giraldo III, MD;  Location: [facility] CATH LAB;  Service: Cardiology;  Laterality: Bilateral;    AORTOGRAPHY WITH SERIALOGRAPHY N/A 3/26/2024    Procedure: AORTOGRAM, WITH SERIALOGRAPHY;  Surgeon: Jin Malloy MD;  Location: [facility] CATH LAB/EP;  Service: Cardiology;  Laterality: N/A;    APPLICATION OF WOUND VACUUM-ASSISTED CLOSURE DEVICE Left 3/29/2023    Procedure: APPLICATION, WOUND VAC;  Surgeon: Alona Pierce DPM;  Location: [facility] OR;  Service: Podiatry;  Laterality: Left;    APPLICATION OF WOUND VACUUM-ASSISTED CLOSURE DEVICE Left 4/4/2023    Procedure: APPLICATION, WOUND VAC;  Surgeon: Alona Pierce DPM;  Location: [facility] OR;  Service: Podiatry;  Laterality: Left;    APPLICATION OF WOUND VACUUM-ASSISTED CLOSURE DEVICE Left 4/13/2023    Procedure: APPLICATION, WOUND VAC;  Surgeon: Alona Pierce DPM;  Location: [facility] OR;  Service: Podiatry;  Laterality: Left;    BONE BIOPSY Left 3/29/2023    Procedure: BIOPSY, BONE;  Surgeon: Alona Pierce DPM;  Location: [facility] OR;  Service: Podiatry;  Laterality: Left;  1st met    CARDIAC CATHETERIZATION      3 stents placed    CATARACT EXTRACTION, BILATERAL      CLOSURE DEVICE Left 4/12/2023    Procedure: Placement of Closure Device;  Surgeon: Michael Giraldo III, MD;  Location: [facility] CATH LAB;  Service: Cardiology;  Laterality: Left;    CORONARY ANGIOGRAPHY N/A 10/3/2019    Procedure: ANGIOGRAM, CORONARY ARTERY;  Surgeon: Edwin Azul MD;  Location: [facility] CATH LAB/EP;  Service: Cardiology;  Laterality: N/A;    DEBRIDEMENT OF FOOT Left 3/29/2023    Procedure: DEBRIDEMENT, FOOT;  Surgeon: Alona Pierce DPM;  Location:  [facility] OR;  Service: Podiatry;  Laterality: Left;    DEBRIDEMENT OF FOOT Left 4/4/2023    Procedure: DEBRIDEMENT, FOOT;  Surgeon: Alona Pierce DPM;  Location: [facility] OR;  Service: Podiatry;  Laterality: Left;    DEBRIDEMENT OF FOOT Left 4/13/2023    Procedure: DEBRIDEMENT, FOOT;  Surgeon: Alona Pierce DPM;  Location: [facility] OR;  Service: Podiatry;  Laterality: Left;    ESOPHAGOGASTRODUODENOSCOPY N/A 4/17/2023    Procedure: EGD (ESOPHAGOGASTRODUODENOSCOPY);  Surgeon: Otto Villarreal MD;  Location: [facility] ENDO;  Service: Endoscopy;  Laterality: N/A;    FOOT AMPUTATION THROUGH METATARSAL Right 3/27/2024    Procedure: AMPUTATION, FOOT, TRANSMETATARSAL;  Surgeon: Diya Leung DPM;  Location: [facility] OR;  Service: Podiatry;  Laterality: Right;    HERNIA REPAIR      inguinal    IVUS (INTRAVASCULAR ULTRASOUND) Left 4/12/2023    Procedure: ULTRASOUND, INTRAVASCULAR;  Surgeon: Michael Giraldo III, MD;  Location: [facility] CATH LAB;  Service: Cardiology;  Laterality: Left;    LEFT HEART CATHETERIZATION Left 9/13/2019    Procedure: Left heart cath;  Surgeon: Edwin Azul MD;  Location: [facility] CATH LAB/EP;  Service: Cardiology;  Laterality: Left;    LEFT HEART CATHETERIZATION N/A 10/3/2019    Procedure: Left heart cath;  Surgeon: Edwin Azul MD;  Location: [facility] CATH LAB/EP;  Service: Cardiology;  Laterality: N/A;    OSTEOTOMY Left 6/7/2023    Procedure: OSTEOTOMY;  Surgeon: Alona Pierce DPM;  Location: [facility] OR;  Service: Podiatry;  Laterality: Left;    OSTEOTOMY OF METATARSAL BONE Right 4/4/2023    Procedure: OSTEOTOMY, METATARSAL BONE;  Surgeon: Alona Pierce DPM;  Location: [facility] OR;  Service: Podiatry;  Laterality: Right;    PERCUTANEOUS TRANSLUMINAL ANGIOPLASTY Left 4/12/2023    Procedure: PTA (ANGIOPLASTY, PERCUTANEOUS, TRANSLUMINAL);  Surgeon: Michael Giraldo III, MD;  Location: [facility] CATH LAB;  Service: Cardiology;  Laterality: Left;    SURGICAL REMOVAL OF BUNION WITH OSTEOTOMY OF METATARSAL BONE Right 4/4/2023     Procedure: BUNIONECTOMY, WITH METATARSAL OSTEOTOMY;  Surgeon: Alona Pierce DPM;  Location: [facility] OR;  Service: Podiatry;  Laterality: Right;    TOE AMPUTATION Left 4/4/2023    Procedure: AMPUTATION, TOE;  Surgeon: Alona Pierce DPM;  Location: [facility] OR;  Service: Podiatry;  Laterality: Left;  1st ray    TOE AMPUTATION Right 11/10/2023    Procedure: AMPUTATION, TOE;  Surgeon: Diya Leung DPM;  Location: [facility] OR;  Service: Podiatry;  Laterality: Right;    WASHOUT Left 6/7/2023    Procedure: WASHOUT;  Surgeon: Alona Pierce DPM;  Location: [facility] OR;  Service: Podiatry;  Laterality: Left;    WOUND DEBRIDEMENT Left 6/7/2023    Procedure: DEBRIDEMENT, WOUND;  Surgeon: Alona Pierce DPM;  Location: [facility] OR;  Service: Podiatry;  Laterality: Left;       Time Tracking:     OT Date of Treatment: 03/28/24  OT Start Time: 1437  OT Stop Time: 1515  OT Total Time (min): 38 min    Billable Minutes:Evaluation 15 min  Self Care/Home Management 11 min  Therapeutic Activity 12 min    3/28/2024

## 2024-03-28 NOTE — PLAN OF CARE
Oriented to self and place. Tele monitored. Glucose monitored. Dressing clean, dry, and intact. Medications administered per MAR. Safety precautions maintained. Call bell within reach.

## 2024-03-28 NOTE — PROGRESS NOTES
[facility] Medicine  Progress Note    Patient Name: Julien Meléndez  MRN: [id number]  Patient Class: IP- Inpatient   Admission Date: 3/21/2024  Length of Stay: 7 days  Attending Physician: Tarsha Styles MD  Primary Care Provider: Kelvin Wayne MD        Subjective:     Principal Problem:Critical limb ischemia of right lower extremity        HPI:  Julien Meléndez is a 83 yr old male presents to the ER with reports of right foot infection. Pts family states over the past 2 days, they noticed an increase in erythema and drainage to the site. Denies fever. Denies taking oral antbx for this specific wound. Pt states he was advised to come to the ER for an evaluation from wound care. Wound has been dressed by HH. 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 Abnormal edema signal involving the 3rd metatarsophalangeal joint, 4th proximal interphalangeal joint and 5th distal interphalangeal joint concerning for reactive edema or early changes of septic arthritis. Will cont vancomycin and Zosyn and will admit to the [facility] service for further care.      Overview/Hospital Course:  podiatry, ortho, infectious disease consulted, appreciate recs . Pt taken for Aortogram with selective right lower extremity angiogram  on 3/26.  ID switching zosyn to meropenem 1g Q8 . plan for TMA Wednesday. ID recommends for continue meropenem for 4 weeks from 3/22/2024, can discontinue if pathology comes back negative. SNF pending    Interval History: seen at the bedside, no distress noted. Appreciate consultants eval. Will cont IV abx. Amputation today. Complains of left shoulder pain and not being able to move as much for 5  mos    Review of Systems   Constitutional:  Negative for fatigue and fever.   Genitourinary:  Negative for difficulty urinating and hematuria.   Skin:  Positive for wound (right foot).     Objective:     Vital Signs (Most Recent):  Temp: 97.4 °F (36.3 °C) (03/28/24 1609)  Pulse: 63 (03/28/24 1623)  Resp: 20 (03/28/24 1624)  BP: 132/62 (03/28/24 1609)  SpO2: 95 % (03/28/24 1609) Vital Signs (24h Range):  Temp:  [97.4 °F (36.3 °C)-98.4 °F (36.9 °C)] 97.4 °F (36.3 °C)  Pulse:  [59-85] 63  Resp:  [16-20] 20  SpO2:  [95 %-100 %] 95 %  BP: (109-171)/(53-76) 132/62     Weight: 77.5 kg (170 lb 13.7 oz)  Body mass index is 21.94 kg/m².     Physical Exam  Vitals and nursing note reviewed.   Constitutional:       Appearance: He is ill-appearing.   HENT:      Mouth/Throat:      Mouth: Mucous membranes are moist.   Eyes:      Extraocular Movements: Extraocular movements intact.   Cardiovascular:      Rate and Rhythm: Normal rate and regular rhythm.   Abdominal:      Palpations: Abdomen is soft.   Musculoskeletal:         General: Tenderness (right foot) present.      Cervical back: Normal range of motion and neck supple.      Comments: Limited shoulder ROM with bruising on arm  . Right foot s/p TMA   Skin:     Comments: R foot wound with erythema and purulent drainage    Neurological:      Mental Status: He is alert and oriented to person, place, and time.   Psychiatric:         Mood and Affect: Mood normal.                Significant Labs: All pertinent labs within the past 24 hours have been reviewed.    Significant Imaging: I have reviewed all pertinent imaging results/findings within the past 24 hours.    Assessment/Plan:      * Critical limb ischemia of right lower extremity  PAD (peripheral artery disease)     -we will continue aspirin statin as well as clopidogrel  -consulted Podiatry, Cardiology, and infectious  -IV antibiotics ordered:  IV Zosyn and IV vancomycin for now  -blood cultures are pending  -bilateral lower  extremity arterial ultrasound ordered-1. Hemodynamically significant stenosis in the left mid superficial femoral artery and the left anterior tibial artery.   2. Monophasic waveforms in the left distal popliteal, anterior tibial, and posterior tibial arteries.   -Cardiology planning angio on Monday    Plan per podiatry below:  - Debrided medial forefoot wounds, drained 2nd MTPJ with mild amounts of purulent drainage.  Patient tolerated procedure well.  Cultured 2nd MTPJ, and cultured 5th digit medial wound.  Follow up results.  - interventional cardiology consulted, angiogram planned Monday 3/25.  Appreciate recs.  - discussed at length surgical versus conservative treatment options with patient.  Discussed risks and benefits of both.  After lengthy discussion, patient is amenable to transmetatarsal amputation of the right foot.  Family at bedside agrees.  Planning to amputate after angiogram and intervention.  - dressed wound as follows:  Painted with Betadine, then dressed with gauze, Kerlix, and Ace wrap.  Wound care orders to follow.  - continue wearing heel offloading boots.  - if patient has white blood cell count spike and unstable vitals, please contact Podiatry immediately for immediate surgical intervention.  If not, for best patient results, opting for amputation after vascular intervention.      Osteomyelitis of right foot  -MRI with OM noted to the right foot  -blood cultures ngtd  -follow wound cx  -infectious disease consulted  -we will continue vanc and Zosyn for now        Septic joint right foot  -podiatry following       Diabetic foot ulcer-right  Patient's FSGs are uncontrolled due to hyperglycemia on current medication regimen.  Last A1c reviewed-   Lab Results   Component Value Date    HGBA1C 8.3 (H) 03/15/2024     Most recent fingerstick glucose reviewed-   Recent Labs   Lab 03/24/24  1153 03/24/24  1703 03/24/24  2044 03/25/24  0541   POCTGLUCOSE 230* 227* 315* 196*       Current  "correctional scale  Low  Maintain anti-hyperglycemic dose as follows-   Antihyperglycemics (From admission, onward)      Start     Stop Route Frequency Ordered    03/21/24 2100  insulin detemir U-100 (Levemir) pen 10 Units         -- SubQ Nightly 03/21/24 1509    03/21/24 1600  insulin aspart U-100 pen 0-5 Units         -- SubQ Before meals & nightly PRN 03/21/24 1502          Hold Oral hypoglycemics while patient is in the hospital.    Dementia  -history noted of dementia  -patient is currently awake and alert  -patient's family at bedside to assist with history  -monitoring    Depressive disorder  Patient has recurrent depression which is unknown and is currently controlled. Will Continue anti-depressant medications. We will not consult psychiatry at this time. Patient does not display psychosis at this time. Continue to monitor closely and adjust plan of care as needed.        Congestive heart failure, unspecified HF chronicity, unspecified heart failure type  -euvolemic on exam  Echo    Interpretation Summary    Left Ventricle: There is low normal systolic function with a visually estimated ejection fraction of 50 - 55%. Ejection fraction by visual approximation is 50%.    Right Ventricle: Normal right ventricular cavity size. Wall thickness is normal. Right ventricle wall motion  is normal. Systolic function is normal.    Aortic Valve: There is mild aortic valve sclerosis.    Mitral Valve: There is moderate mitral annular calcification present.    Pericardium: There is a small effusion.    The study was difficult due to patient's body habitus and poor endocardial visualization.    Valves are not well visualized  .CHF. Last BNP reviewed- and noted below No results for input(s): "BNP", "BNPTRIAGEBLO" in the last 168 hours.  -continue to monitor    Atrial fibrillation  -history of Afib  -holding Eliquis at this time in case of any procedure  -ordered full-dose Lovenox for now  -continue cardiac tele " monitoring    Stage 1 chronic kidney disease  Creatine stable for now. BMP reviewed- noted Estimated Creatinine Clearance: 72.3 mL/min (based on SCr of 0.9 mg/dL). according to latest data. 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  Monitor UOP and serial BMP and adjust therapy as needed. Renally dose meds. Avoid nephrotoxic medications and procedures.    KELSEY (obstructive sleep apnea)  - CPAP at night p.r.n.      Mixed hyperlipidemia  -continue statin at home dose        VTE Risk Mitigation (From admission, onward)           Ordered     heparin infusion 1,000 units/500 ml in 0.9% NaCl (pressure line flush)  Intra-op continuous PRN         03/26/24 1027     enoxaparin injection 80 mg  Every 12 hours         03/22/24 0015     IP VTE HIGH RISK PATIENT  Once         03/21/24 1502                    Discharge Planning   AURELIA:      Code Status: Full Code   Is the patient medically ready for discharge?:     Reason for patient still in hospital (select all that apply): Treatment  Discharge Plan A: Skilled Nursing Facility   Discharge Delays: None known at this time              Tarsha Styles MD  Department of Hospital Medicine   [facility]

## 2024-03-29 LAB
ANION GAP SERPL CALC-SCNC: 9 MMOL/L (ref 8–16)
BASOPHILS # BLD AUTO: 0.04 K/UL (ref 0–0.2)
BASOPHILS NFR BLD: 0.5 % (ref 0–1.9)
BUN SERPL-MCNC: 22 MG/DL (ref 8–23)
CALCIUM SERPL-MCNC: 9.3 MG/DL (ref 8.7–10.5)
CHLORIDE SERPL-SCNC: 99 MMOL/L (ref 95–110)
CO2 SERPL-SCNC: 29 MMOL/L (ref 23–29)
CREAT SERPL-MCNC: 1 MG/DL (ref 0.5–1.4)
DIFFERENTIAL METHOD BLD: ABNORMAL
EOSINOPHIL # BLD AUTO: 0.2 K/UL (ref 0–0.5)
EOSINOPHIL NFR BLD: 2.5 % (ref 0–8)
ERYTHROCYTE [DISTWIDTH] IN BLOOD BY AUTOMATED COUNT: 16.4 % (ref 11.5–14.5)
EST. GFR  (NO RACE VARIABLE): >60 ML/MIN/1.73 M^2
GLUCOSE SERPL-MCNC: 239 MG/DL (ref 70–110)
HCT VFR BLD AUTO: 31.8 % (ref 40–54)
HGB BLD-MCNC: 10.4 G/DL (ref 14–18)
IMM GRANULOCYTES # BLD AUTO: 0.06 K/UL (ref 0–0.04)
IMM GRANULOCYTES NFR BLD AUTO: 0.8 % (ref 0–0.5)
LYMPHOCYTES # BLD AUTO: 1.7 K/UL (ref 1–4.8)
LYMPHOCYTES NFR BLD: 22.3 % (ref 18–48)
MCH RBC QN AUTO: 27.2 PG (ref 27–31)
MCHC RBC AUTO-ENTMCNC: 32.7 G/DL (ref 32–36)
MCV RBC AUTO: 83 FL (ref 82–98)
MONOCYTES # BLD AUTO: 0.6 K/UL (ref 0.3–1)
MONOCYTES NFR BLD: 8.2 % (ref 4–15)
NEUTROPHILS # BLD AUTO: 5 K/UL (ref 1.8–7.7)
NEUTROPHILS NFR BLD: 65.7 % (ref 38–73)
NRBC BLD-RTO: 0 /100 WBC
PLATELET # BLD AUTO: 262 K/UL (ref 150–450)
PMV BLD AUTO: 10.3 FL (ref 9.2–12.9)
POCT GLUCOSE: 226 MG/DL (ref 70–110)
POCT GLUCOSE: 226 MG/DL (ref 70–110)
POCT GLUCOSE: 235 MG/DL (ref 70–110)
POCT GLUCOSE: 258 MG/DL (ref 70–110)
POCT GLUCOSE: 348 MG/DL (ref 70–110)
POTASSIUM SERPL-SCNC: 4.8 MMOL/L (ref 3.5–5.1)
RBC # BLD AUTO: 3.83 M/UL (ref 4.6–6.2)
SODIUM SERPL-SCNC: 137 MMOL/L (ref 136–145)
WBC # BLD AUTO: 7.54 K/UL (ref 3.9–12.7)

## 2024-03-29 PROCEDURE — 80048 BASIC METABOLIC PNL TOTAL CA: CPT | Performed by: FAMILY MEDICINE

## 2024-03-29 PROCEDURE — 25000003 PHARM REV CODE 250: Performed by: NURSE PRACTITIONER

## 2024-03-29 PROCEDURE — 25000003 PHARM REV CODE 250: Performed by: STUDENT IN AN ORGANIZED HEALTH CARE EDUCATION/TRAINING PROGRAM

## 2024-03-29 PROCEDURE — 63600175 PHARM REV CODE 636 W HCPCS: Performed by: STUDENT IN AN ORGANIZED HEALTH CARE EDUCATION/TRAINING PROGRAM

## 2024-03-29 PROCEDURE — 63600175 PHARM REV CODE 636 W HCPCS

## 2024-03-29 PROCEDURE — A4216 STERILE WATER/SALINE, 10 ML: HCPCS | Performed by: NURSE PRACTITIONER

## 2024-03-29 PROCEDURE — 99900035 HC TECH TIME PER 15 MIN (STAT)

## 2024-03-29 PROCEDURE — 25000003 PHARM REV CODE 250

## 2024-03-29 PROCEDURE — 21400001 HC TELEMETRY ROOM

## 2024-03-29 PROCEDURE — 85025 COMPLETE CBC W/AUTO DIFF WBC: CPT | Performed by: FAMILY MEDICINE

## 2024-03-29 PROCEDURE — 94761 N-INVAS EAR/PLS OXIMETRY MLT: CPT

## 2024-03-29 PROCEDURE — 25000003 PHARM REV CODE 250: Performed by: FAMILY MEDICINE

## 2024-03-29 PROCEDURE — 36415 COLL VENOUS BLD VENIPUNCTURE: CPT | Performed by: FAMILY MEDICINE

## 2024-03-29 RX ORDER — ADHESIVE BANDAGE
30 BANDAGE TOPICAL 2 TIMES DAILY PRN
Status: DISCONTINUED | OUTPATIENT
Start: 2024-03-29 | End: 2024-04-02 | Stop reason: HOSPADM

## 2024-03-29 RX ORDER — POLYETHYLENE GLYCOL 3350 17 G/17G
17 POWDER, FOR SOLUTION ORAL 2 TIMES DAILY
Status: DISCONTINUED | OUTPATIENT
Start: 2024-03-29 | End: 2024-04-02 | Stop reason: HOSPADM

## 2024-03-29 RX ADMIN — INSULIN ASPART 2 UNITS: 100 INJECTION, SOLUTION INTRAVENOUS; SUBCUTANEOUS at 04:03

## 2024-03-29 RX ADMIN — INSULIN ASPART 2 UNITS: 100 INJECTION, SOLUTION INTRAVENOUS; SUBCUTANEOUS at 09:03

## 2024-03-29 RX ADMIN — POLYETHYLENE GLYCOL 3350 17 G: 17 POWDER, FOR SOLUTION ORAL at 09:03

## 2024-03-29 RX ADMIN — Medication 10 ML: at 06:03

## 2024-03-29 RX ADMIN — TAMSULOSIN HYDROCHLORIDE 0.4 MG: 0.4 CAPSULE ORAL at 09:03

## 2024-03-29 RX ADMIN — CLOTRIMAZOLE AND BETAMETHASONE DIPROPIONATE: 10; .5 CREAM TOPICAL at 09:03

## 2024-03-29 RX ADMIN — INSULIN DETEMIR 10 UNITS: 100 INJECTION, SOLUTION SUBCUTANEOUS at 09:03

## 2024-03-29 RX ADMIN — ENOXAPARIN SODIUM 80 MG: 80 INJECTION SUBCUTANEOUS at 09:03

## 2024-03-29 RX ADMIN — ATORVASTATIN CALCIUM 40 MG: 40 TABLET, FILM COATED ORAL at 09:03

## 2024-03-29 RX ADMIN — SENNOSIDES AND DOCUSATE SODIUM 1 TABLET: 8.6; 5 TABLET ORAL at 09:03

## 2024-03-29 RX ADMIN — MEROPENEM 1 G: 1 INJECTION, POWDER, FOR SOLUTION INTRAVENOUS at 12:03

## 2024-03-29 RX ADMIN — METOPROLOL TARTRATE 12.5 MG: 25 TABLET, FILM COATED ORAL at 09:03

## 2024-03-29 RX ADMIN — MEROPENEM 1 G: 1 INJECTION, POWDER, FOR SOLUTION INTRAVENOUS at 04:03

## 2024-03-29 RX ADMIN — HYDROCODONE BITARTRATE AND ACETAMINOPHEN 1 TABLET: 10; 325 TABLET ORAL at 09:03

## 2024-03-29 RX ADMIN — Medication 10 ML: at 09:03

## 2024-03-29 RX ADMIN — INSULIN ASPART 2 UNITS: 100 INJECTION, SOLUTION INTRAVENOUS; SUBCUTANEOUS at 12:03

## 2024-03-29 RX ADMIN — PANTOPRAZOLE SODIUM 40 MG: 40 GRANULE, DELAYED RELEASE ORAL at 09:03

## 2024-03-29 RX ADMIN — INSULIN ASPART 2 UNITS: 100 INJECTION, SOLUTION INTRAVENOUS; SUBCUTANEOUS at 06:03

## 2024-03-29 RX ADMIN — ASPIRIN 81 MG CHEWABLE TABLET 81 MG: 81 TABLET CHEWABLE at 09:03

## 2024-03-29 RX ADMIN — HYDROCODONE BITARTRATE AND ACETAMINOPHEN 1 TABLET: 10; 325 TABLET ORAL at 10:03

## 2024-03-29 RX ADMIN — MEROPENEM 1 G: 1 INJECTION, POWDER, FOR SOLUTION INTRAVENOUS at 09:03

## 2024-03-29 RX ADMIN — FLUOXETINE HYDROCHLORIDE 40 MG: 20 CAPSULE ORAL at 09:03

## 2024-03-29 RX ADMIN — Medication 10 ML: at 04:03

## 2024-03-29 RX ADMIN — CLOPIDOGREL BISULFATE 75 MG: 75 TABLET ORAL at 09:03

## 2024-03-29 RX ADMIN — TRAZODONE HYDROCHLORIDE 150 MG: 100 TABLET ORAL at 09:03

## 2024-03-29 RX ADMIN — FUROSEMIDE 20 MG: 20 TABLET ORAL at 09:03

## 2024-03-29 NOTE — PLAN OF CARE
Oriented to self and place. Tele monitored. Glucose monitored. Doherty in place. Medications administered per MAR. Safety precautions maintained. Call bell within reach.

## 2024-03-29 NOTE — PROGRESS NOTES
[facility] Medicine  Progress Note    Patient Name: Julien Meléndez  MRN: [id number]  Patient Class: IP- Inpatient   Admission Date: 3/21/2024  Length of Stay: 8 days  Attending Physician: Tarsha Styles MD  Primary Care Provider: Kelvin Wayne MD        Subjective:     Principal Problem:Critical limb ischemia of right lower extremity        HPI:  Julien Meléndez is a 83 yr old male presents to the ER with reports of right foot infection. Pts family states over the past 2 days, they noticed an increase in erythema and drainage to the site. Denies fever. Denies taking oral antbx for this specific wound. Pt states he was advised to come to the ER for an evaluation from wound care. Wound has been dressed by HH. 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 Abnormal edema signal involving the 3rd metatarsophalangeal joint, 4th proximal interphalangeal joint and 5th distal interphalangeal joint concerning for reactive edema or early changes of septic arthritis. Will cont vancomycin and Zosyn and will admit to the [facility] service for further care.      Overview/Hospital Course:  podiatry, ortho, infectious disease consulted, appreciate recs . Pt taken for Aortogram with selective right lower extremity angiogram  on 3/26.  ID switching zosyn to meropenem 1g Q8 . S/p TMA on 3/27. ID recommends for continue meropenem for 4 weeks from 3/22/2024, can discontinue if pathology comes back negative. SNF pending  3/29: pt had severe bleeding with gauze and dressing in blood. Asa, plavix and lovenox on hold.     Interval History: pt had severe bleeding with gauze and dressing in blood. Asa, plavix and lovenox on hold.      Review of Systems   Constitutional:  Negative for fatigue and fever.   Genitourinary:  Negative for difficulty urinating and hematuria.   Skin:  Positive for wound (right foot).     Objective:     Vital Signs (Most Recent):  Temp: 97.7 °F (36.5 °C) (03/29/24 1555)  Pulse: 70 (03/29/24 1555)  Resp: 19 (03/29/24 1555)  BP: (!) 108/56 (03/29/24 1555)  SpO2: 98 % (03/29/24 1600) Vital Signs (24h Range):  Temp:  [97.7 °F (36.5 °C)-98.4 °F (36.9 °C)] 97.7 °F (36.5 °C)  Pulse:  [66-74] 70  Resp:  [16-20] 19  SpO2:  [97 %-100 %] 98 %  BP: (108-166)/(56-82) 108/56     Weight: 77.5 kg (170 lb 13.7 oz)  Body mass index is 21.94 kg/m².     Physical Exam  Vitals and nursing note reviewed.   Constitutional:       Appearance: He is ill-appearing.   HENT:      Mouth/Throat:      Mouth: Mucous membranes are moist.   Eyes:      Extraocular Movements: Extraocular movements intact.   Cardiovascular:      Rate and Rhythm: Normal rate and regular rhythm.   Abdominal:      Palpations: Abdomen is soft.   Musculoskeletal:         General: Tenderness (right foot) present.      Cervical back: Normal range of motion and neck supple.      Comments: Limited shoulder ROM with bruising on arm  . Right foot s/p TMA   Skin:     Comments: R foot wound with erythema and purulent drainage    Neurological:      Mental Status: He is alert and oriented to person, place, and time.   Psychiatric:         Mood and Affect: Mood normal.                Significant Labs: All pertinent labs within the past 24 hours have been reviewed.    Significant Imaging: I have reviewed all pertinent imaging results/findings within the past 24 hours.    Assessment/Plan:      * Critical limb ischemia of right lower extremity  PAD (peripheral artery disease)     -we will continue aspirin statin as well as clopidogrel  -consulted Podiatry, Cardiology, and infectious  -IV antibiotics ordered:  I  -blood cultures are pending  -bilateral lower extremity arterial ultrasound  ordered-1. Hemodynamically significant stenosis in the left mid superficial femoral artery and the left anterior tibial artery.   2. Monophasic waveforms in the left distal popliteal, anterior tibial, and posterior tibial arteries.   -Cardiology planning angio on Monday      S/p right TMA on 3/27     ID rec's continue meropenem for 4 weeks from 3/22/2024, can discontinue if pathology comes back negative       Osteomyelitis of right foot  -MRI with OM noted to the right foot  -blood cultures ngtd  -follow wound cx  -infectious disease consulted      ID switching zosyn to meropenem 1g Q8 . S/p TMA on 3/27. ID recommends for continue meropenem for 4 weeks from 3/22/2024,         Septic joint right foot  -podiatry following       Diabetic foot ulcer-right  Patient's FSGs are uncontrolled due to hyperglycemia on current medication regimen.  Last A1c reviewed-   Lab Results   Component Value Date    HGBA1C 8.3 (H) 03/15/2024     Most recent fingerstick glucose reviewed-   Recent Labs   Lab 03/24/24  1153 03/24/24  1703 03/24/24  2044 03/25/24  0541   POCTGLUCOSE 230* 227* 315* 196*       Current correctional scale  Low  Maintain anti-hyperglycemic dose as follows-   Antihyperglycemics (From admission, onward)      Start     Stop Route Frequency Ordered    03/21/24 2100  insulin detemir U-100 (Levemir) pen 10 Units         -- SubQ Nightly 03/21/24 1509    03/21/24 1600  insulin aspart U-100 pen 0-5 Units         -- SubQ Before meals & nightly PRN 03/21/24 1502          Hold Oral hypoglycemics while patient is in the hospital.    Dementia  -history noted of dementia  -patient is currently awake and alert  -patient's family at bedside to assist with history  -monitoring    Depressive disorder  Patient has recurrent depression which is unknown and is currently controlled. Will Continue anti-depressant medications. We will not consult psychiatry at this time. Patient does not display psychosis at this time. Continue to monitor  "closely and adjust plan of care as needed.        Congestive heart failure, unspecified HF chronicity, unspecified heart failure type  -euvolemic on exam  Echo    Interpretation Summary    Left Ventricle: There is low normal systolic function with a visually estimated ejection fraction of 50 - 55%. Ejection fraction by visual approximation is 50%.    Right Ventricle: Normal right ventricular cavity size. Wall thickness is normal. Right ventricle wall motion  is normal. Systolic function is normal.    Aortic Valve: There is mild aortic valve sclerosis.    Mitral Valve: There is moderate mitral annular calcification present.    Pericardium: There is a small effusion.    The study was difficult due to patient's body habitus and poor endocardial visualization.    Valves are not well visualized  .CHF. Last BNP reviewed- and noted below No results for input(s): "BNP", "BNPTRIAGEBLO" in the last 168 hours.  -continue to monitor    Atrial fibrillation  -history of Afib  -holding Eliquis at this time in case of any procedure  -continue cardiac tele monitoring    pt had severe bleeding with gauze and dressing in blood. Asa, plavix and lovenox on hold.     Stage 1 chronic kidney disease  Creatine stable for now. BMP reviewed- noted Estimated Creatinine Clearance: 72.3 mL/min (based on SCr of 0.9 mg/dL). according to latest data. 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  Monitor UOP and serial BMP and adjust therapy as needed. Renally dose meds. Avoid nephrotoxic medications and procedures.    KELSEY (obstructive sleep apnea)  - CPAP at night p.r.n.      Mixed hyperlipidemia  -continue statin at home dose        VTE Risk Mitigation (From admission, onward)           Ordered     heparin infusion 1,000 units/500 ml in 0.9% NaCl (pressure line flush)  Intra-op continuous PRN         03/26/24 1027     IP VTE HIGH RISK PATIENT  Once         03/21/24 1502                    Discharge Planning   AURELIA:      Code " Status: Full Code   Is the patient medically ready for discharge?:     Reason for patient still in hospital (select all that apply): Treatment  Discharge Plan A: Skilled Nursing Facility   Discharge Delays: None known at this time              Tarsha Styles MD  Department of Hospital Medicine   [facility]

## 2024-03-29 NOTE — PLAN OF CARE
Pt disoriented to time & place. Wound care performed to right foot, dressing soaked serosanguineous drainage. C/o pain after dressing change, pain med given. Pills were crushed, pt unable to swallow pills. Wife by bedside. Call light within reach/ bed in lowest position.

## 2024-03-29 NOTE — NURSING
Notified Dr. Tarsha Styles pt having heavy bloody drainage to right foot & not having a bowel movement since 3.20.24. Also notified podiatry Dr. Veronica Styles about heavy drainage to right foot. Dr. Veronica Styles stated she will come see the pt & to put a dressing in the mean time.

## 2024-03-29 NOTE — ASSESSMENT & PLAN NOTE
PAD (peripheral artery disease)     -we will continue aspirin statin as well as clopidogrel  -consulted Podiatry, Cardiology, and infectious  -IV antibiotics ordered:  I  -blood cultures are pending  -bilateral lower extremity arterial ultrasound ordered-1. Hemodynamically significant stenosis in the left mid superficial femoral artery and the left anterior tibial artery.   2. Monophasic waveforms in the left distal popliteal, anterior tibial, and posterior tibial arteries.   -Cardiology planning angio on Monday      S/p right TMA on 3/27     ID rec's continue meropenem for 4 weeks from 3/22/2024, can discontinue if pathology comes back negative

## 2024-03-29 NOTE — ASSESSMENT & PLAN NOTE
-history of Afib  -holding Eliquis at this time in case of any procedure  -continue cardiac tele monitoring    pt had severe bleeding with gauze and dressing in blood. Asa, plavix and lovenox on hold.

## 2024-03-29 NOTE — ASSESSMENT & PLAN NOTE
-MRI with OM noted to the right foot  -blood cultures ngtd  -follow wound cx  -infectious disease consulted      ID switching zosyn to meropenem 1g Q8 . S/p TMA on 3/27. ID recommends for continue meropenem for 4 weeks from 3/22/2024,

## 2024-03-29 NOTE — SUBJECTIVE & OBJECTIVE
Interval History: pt had severe bleeding with gauze and dressing in blood. Asa, plavix and lovenox on hold.     Review of Systems   Constitutional:  Negative for fatigue and fever.   Genitourinary:  Negative for difficulty urinating and hematuria.   Skin:  Positive for wound (right foot).     Objective:     Vital Signs (Most Recent):  Temp: 97.7 °F (36.5 °C) (03/29/24 1555)  Pulse: 70 (03/29/24 1555)  Resp: 19 (03/29/24 1555)  BP: (!) 108/56 (03/29/24 1555)  SpO2: 98 % (03/29/24 1600) Vital Signs (24h Range):  Temp:  [97.7 °F (36.5 °C)-98.4 °F (36.9 °C)] 97.7 °F (36.5 °C)  Pulse:  [66-74] 70  Resp:  [16-20] 19  SpO2:  [97 %-100 %] 98 %  BP: (108-166)/(56-82) 108/56     Weight: 77.5 kg (170 lb 13.7 oz)  Body mass index is 21.94 kg/m².     Physical Exam  Vitals and nursing note reviewed.   Constitutional:       Appearance: He is ill-appearing.   HENT:      Mouth/Throat:      Mouth: Mucous membranes are moist.   Eyes:      Extraocular Movements: Extraocular movements intact.   Cardiovascular:      Rate and Rhythm: Normal rate and regular rhythm.   Abdominal:      Palpations: Abdomen is soft.   Musculoskeletal:         General: Tenderness (right foot) present.      Cervical back: Normal range of motion and neck supple.      Comments: Limited shoulder ROM with bruising on arm  . Right foot s/p TMA   Skin:     Comments: R foot wound with erythema and purulent drainage    Neurological:      Mental Status: He is alert and oriented to person, place, and time.   Psychiatric:         Mood and Affect: Mood normal.                Significant Labs: All pertinent labs within the past 24 hours have been reviewed.    Significant Imaging: I have reviewed all pertinent imaging results/findings within the past 24 hours.

## 2024-03-30 LAB
ANION GAP SERPL CALC-SCNC: 8 MMOL/L (ref 8–16)
BACTERIA SPEC AEROBE CULT: ABNORMAL
BASOPHILS # BLD AUTO: 0.04 K/UL (ref 0–0.2)
BASOPHILS NFR BLD: 0.6 % (ref 0–1.9)
BUN SERPL-MCNC: 21 MG/DL (ref 8–23)
CALCIUM SERPL-MCNC: 8.8 MG/DL (ref 8.7–10.5)
CHLORIDE SERPL-SCNC: 102 MMOL/L (ref 95–110)
CO2 SERPL-SCNC: 28 MMOL/L (ref 23–29)
CREAT SERPL-MCNC: 1 MG/DL (ref 0.5–1.4)
DIFFERENTIAL METHOD BLD: ABNORMAL
EOSINOPHIL # BLD AUTO: 0.2 K/UL (ref 0–0.5)
EOSINOPHIL NFR BLD: 3.3 % (ref 0–8)
ERYTHROCYTE [DISTWIDTH] IN BLOOD BY AUTOMATED COUNT: 16.4 % (ref 11.5–14.5)
EST. GFR  (NO RACE VARIABLE): >60 ML/MIN/1.73 M^2
GLUCOSE SERPL-MCNC: 313 MG/DL (ref 70–110)
HCT VFR BLD AUTO: 28.6 % (ref 40–54)
HGB BLD-MCNC: 9.4 G/DL (ref 14–18)
IMM GRANULOCYTES # BLD AUTO: 0.04 K/UL (ref 0–0.04)
IMM GRANULOCYTES NFR BLD AUTO: 0.6 % (ref 0–0.5)
LYMPHOCYTES # BLD AUTO: 1.8 K/UL (ref 1–4.8)
LYMPHOCYTES NFR BLD: 27.4 % (ref 18–48)
MCH RBC QN AUTO: 27.2 PG (ref 27–31)
MCHC RBC AUTO-ENTMCNC: 32.9 G/DL (ref 32–36)
MCV RBC AUTO: 83 FL (ref 82–98)
MONOCYTES # BLD AUTO: 0.7 K/UL (ref 0.3–1)
MONOCYTES NFR BLD: 10.5 % (ref 4–15)
NEUTROPHILS # BLD AUTO: 3.9 K/UL (ref 1.8–7.7)
NEUTROPHILS NFR BLD: 57.6 % (ref 38–73)
NRBC BLD-RTO: 0 /100 WBC
PLATELET # BLD AUTO: 254 K/UL (ref 150–450)
PMV BLD AUTO: 11.1 FL (ref 9.2–12.9)
POCT GLUCOSE: 217 MG/DL (ref 70–110)
POCT GLUCOSE: 277 MG/DL (ref 70–110)
POCT GLUCOSE: 292 MG/DL (ref 70–110)
POCT GLUCOSE: 327 MG/DL (ref 70–110)
POTASSIUM SERPL-SCNC: 3.9 MMOL/L (ref 3.5–5.1)
RBC # BLD AUTO: 3.45 M/UL (ref 4.6–6.2)
SODIUM SERPL-SCNC: 138 MMOL/L (ref 136–145)
WBC # BLD AUTO: 6.69 K/UL (ref 3.9–12.7)

## 2024-03-30 PROCEDURE — 94761 N-INVAS EAR/PLS OXIMETRY MLT: CPT

## 2024-03-30 PROCEDURE — 25000003 PHARM REV CODE 250: Performed by: FAMILY MEDICINE

## 2024-03-30 PROCEDURE — 25000003 PHARM REV CODE 250: Performed by: NURSE PRACTITIONER

## 2024-03-30 PROCEDURE — 99900035 HC TECH TIME PER 15 MIN (STAT)

## 2024-03-30 PROCEDURE — 80048 BASIC METABOLIC PNL TOTAL CA: CPT | Performed by: FAMILY MEDICINE

## 2024-03-30 PROCEDURE — 85025 COMPLETE CBC W/AUTO DIFF WBC: CPT | Performed by: FAMILY MEDICINE

## 2024-03-30 PROCEDURE — 63600175 PHARM REV CODE 636 W HCPCS

## 2024-03-30 PROCEDURE — 25000003 PHARM REV CODE 250

## 2024-03-30 PROCEDURE — A4216 STERILE WATER/SALINE, 10 ML: HCPCS | Performed by: NURSE PRACTITIONER

## 2024-03-30 PROCEDURE — 36415 COLL VENOUS BLD VENIPUNCTURE: CPT | Performed by: FAMILY MEDICINE

## 2024-03-30 PROCEDURE — 63600175 PHARM REV CODE 636 W HCPCS: Performed by: FAMILY MEDICINE

## 2024-03-30 PROCEDURE — 21400001 HC TELEMETRY ROOM

## 2024-03-30 RX ORDER — CLOPIDOGREL BISULFATE 75 MG/1
75 TABLET ORAL DAILY
Status: DISCONTINUED | OUTPATIENT
Start: 2024-03-30 | End: 2024-04-02 | Stop reason: HOSPADM

## 2024-03-30 RX ORDER — ENOXAPARIN SODIUM 100 MG/ML
1 INJECTION SUBCUTANEOUS EVERY 12 HOURS
Status: DISCONTINUED | OUTPATIENT
Start: 2024-03-30 | End: 2024-04-02 | Stop reason: HOSPADM

## 2024-03-30 RX ORDER — NAPROXEN SODIUM 220 MG/1
81 TABLET, FILM COATED ORAL DAILY
Status: DISCONTINUED | OUTPATIENT
Start: 2024-03-30 | End: 2024-04-02 | Stop reason: HOSPADM

## 2024-03-30 RX ADMIN — Medication 10 ML: at 02:03

## 2024-03-30 RX ADMIN — CLOTRIMAZOLE AND BETAMETHASONE DIPROPIONATE: 10; .5 CREAM TOPICAL at 09:03

## 2024-03-30 RX ADMIN — INSULIN ASPART 5 UNITS: 100 INJECTION, SOLUTION INTRAVENOUS; SUBCUTANEOUS at 05:03

## 2024-03-30 RX ADMIN — POLYETHYLENE GLYCOL 3350 17 G: 17 POWDER, FOR SOLUTION ORAL at 12:03

## 2024-03-30 RX ADMIN — MEROPENEM 1 G: 1 INJECTION, POWDER, FOR SOLUTION INTRAVENOUS at 12:03

## 2024-03-30 RX ADMIN — ENOXAPARIN SODIUM 80 MG: 80 INJECTION SUBCUTANEOUS at 02:03

## 2024-03-30 RX ADMIN — INSULIN ASPART 3 UNITS: 100 INJECTION, SOLUTION INTRAVENOUS; SUBCUTANEOUS at 06:03

## 2024-03-30 RX ADMIN — MEROPENEM 1 G: 1 INJECTION, POWDER, FOR SOLUTION INTRAVENOUS at 09:03

## 2024-03-30 RX ADMIN — INSULIN DETEMIR 10 UNITS: 100 INJECTION, SOLUTION SUBCUTANEOUS at 09:03

## 2024-03-30 RX ADMIN — CLOPIDOGREL BISULFATE 75 MG: 75 TABLET ORAL at 02:03

## 2024-03-30 RX ADMIN — FLUOXETINE HYDROCHLORIDE 40 MG: 20 CAPSULE ORAL at 09:03

## 2024-03-30 RX ADMIN — Medication 10 ML: at 06:03

## 2024-03-30 RX ADMIN — METOPROLOL TARTRATE 12.5 MG: 25 TABLET, FILM COATED ORAL at 11:03

## 2024-03-30 RX ADMIN — ISOSORBIDE MONONITRATE 30 MG: 30 TABLET, EXTENDED RELEASE ORAL at 11:03

## 2024-03-30 RX ADMIN — Medication 10 ML: at 09:03

## 2024-03-30 RX ADMIN — ATORVASTATIN CALCIUM 40 MG: 40 TABLET, FILM COATED ORAL at 09:03

## 2024-03-30 RX ADMIN — CLOTRIMAZOLE AND BETAMETHASONE DIPROPIONATE: 10; .5 CREAM TOPICAL at 11:03

## 2024-03-30 RX ADMIN — SENNOSIDES AND DOCUSATE SODIUM 1 TABLET: 8.6; 5 TABLET ORAL at 08:03

## 2024-03-30 RX ADMIN — INSULIN ASPART 1 UNITS: 100 INJECTION, SOLUTION INTRAVENOUS; SUBCUTANEOUS at 09:03

## 2024-03-30 RX ADMIN — POLYETHYLENE GLYCOL 3350 17 G: 17 POWDER, FOR SOLUTION ORAL at 08:03

## 2024-03-30 RX ADMIN — PANTOPRAZOLE SODIUM 40 MG: 40 GRANULE, DELAYED RELEASE ORAL at 08:03

## 2024-03-30 RX ADMIN — TRAZODONE HYDROCHLORIDE 150 MG: 100 TABLET ORAL at 08:03

## 2024-03-30 RX ADMIN — INSULIN ASPART 3 UNITS: 100 INJECTION, SOLUTION INTRAVENOUS; SUBCUTANEOUS at 12:03

## 2024-03-30 RX ADMIN — PANTOPRAZOLE SODIUM 40 MG: 40 GRANULE, DELAYED RELEASE ORAL at 09:03

## 2024-03-30 RX ADMIN — ASPIRIN 81 MG CHEWABLE TABLET 81 MG: 81 TABLET CHEWABLE at 02:03

## 2024-03-30 RX ADMIN — MEROPENEM 1 G: 1 INJECTION, POWDER, FOR SOLUTION INTRAVENOUS at 05:03

## 2024-03-30 RX ADMIN — METOPROLOL TARTRATE 12.5 MG: 25 TABLET, FILM COATED ORAL at 08:03

## 2024-03-30 RX ADMIN — SENNOSIDES AND DOCUSATE SODIUM 1 TABLET: 8.6; 5 TABLET ORAL at 09:03

## 2024-03-30 RX ADMIN — FUROSEMIDE 20 MG: 20 TABLET ORAL at 09:03

## 2024-03-30 RX ADMIN — ENOXAPARIN SODIUM 80 MG: 80 INJECTION SUBCUTANEOUS at 08:03

## 2024-03-30 NOTE — PLAN OF CARE
Pt AAOx3, occasionally forgetful. Spouse at bedside. Medications administered per order; pills crushed. Right foot incision assessed and dressing changed by podiatry MD. Dressing CDI through the night. RF elevated. Pain managed with prn medication. BLE in z flex boots. Cardiac monitor maintained. BG monitored. Doherty catheter maintained. Encouraged to call with questions/concerns/assistance. Safety.

## 2024-03-30 NOTE — ASSESSMENT & PLAN NOTE
Julien Meléndez is a 83 y.o. male with IDSA moderate infection.  X-ray and MRI shows osteomyelitis of the 2nd MTPJ, with possible septic arthritis of the 2nd MTPJ and digits 3 through 5.  WBC count within normal limits, vital signs are stable.      - Seen POD2 of TMA of Right foot.  Profuse serosanguineous drainage was reported in interim.  - dressed today by Podiatry:  Surgicel strip packed to lateral aspect of incision site, were much bleeding was focally present.  Overlying Surgicel along rest of incision.  ABD to cover, covered by cast padding, Kerlix, Ace to moderate compression.  Secured in heel offloading boot.  - nursing to continue wound care   - continue wearing heel offloading boots.  - May resume anticoagulants as necessary  - interventional cardiology following  planning angiogram after patient receives right TMA   - ABX plan per ID   - Discharge recs per plan of care note by Dr. Leung 3/28  - podiatry will follow

## 2024-03-30 NOTE — PROGRESS NOTES
[facility] Surg  Podiatry  Progress Note    Patient Name: Julien Meléndez  MRN: [id number]  Admission Date: 3/21/2024  Hospital Length of Stay: 8 days  Attending Physician: Tarsha Styles MD  Primary Care Provider: Kelvin Wayne MD     Subjective:     Interval History:  Seen and evaluated by Podiatry evening of 3/29, profuse bleeding at site of TMA at right lower extremity; Plavix, ASA, Lovenox was temporarily held by HM.  Surgicel at bedside.  With wife at bedside. Stating no complaints aside from pain, single episode of vomiting overnight. Exam stable    Follow-up For: Procedure(s) (LRB):  AMPUTATION, FOOT, TRANSMETATARSAL (Right)    Post-Operative Day: 2 Days Post-Op    Scheduled Meds:   atorvastatin  40 mg Oral Daily    clotrimazole-betamethasone 1-0.05%   Topical (Top) BID    FLUoxetine  40 mg Oral Daily    furosemide  20 mg Oral Daily    insulin detemir U-100  10 Units Subcutaneous QHS    isosorbide mononitrate  30 mg Oral Daily    meropenem IV (PEDS and ADULTS)  1 g Intravenous Q8H    metoprolol tartrate  12.5 mg Oral BID    pantoprazole  40 mg Oral BID    polyethylene glycol  17 g Oral BID    ranolazine  1,000 mg Oral BID    senna-docusate 8.6-50 mg  1 tablet Oral BID    sodium chloride 0.9%  10 mL Intravenous Q8H    tamsulosin  0.4 mg Oral Daily    traZODone  150 mg Oral QHS     Continuous Infusions:   heparin (porcine) 2,000 Units/hr (03/26/24 1026)     PRN Meds:sodium chloride 0.9%, acetaminophen, albuterol-ipratropium, aluminum-magnesium hydroxide-simethicone, artificial tears, dextrose 10%, dextrose 10%, glucagon (human recombinant), glucose, glucose, heparin (porcine), HYDROcodone-acetaminophen, influenza 65up-adj, insulin aspart U-100, magnesium hydroxide 400 mg/5 ml, melatonin, ondansetron, ondansetron, simethicone    Review of Systems   Constitutional:  Positive for activity change and fatigue. Negative for chills and fever.   Respiratory:  Negative for cough and shortness of breath.     Cardiovascular:  Negative for leg swelling.   Gastrointestinal:  Negative for nausea and vomiting.   Musculoskeletal:  Positive for arthralgias and gait problem. Negative for myalgias.   Skin:  Positive for wound. Negative for color change.   Neurological:  Positive for numbness.   Psychiatric/Behavioral:  Negative for behavioral problems and confusion.      Objective:     Vital Signs (Most Recent):  Temp: 98.1 °F (36.7 °C) (03/29/24 1948)  Pulse: 80 (03/29/24 1948)  Resp: 16 (03/29/24 2103)  BP: 118/62 (03/29/24 2115)  SpO2: 96 % (03/29/24 2039) Vital Signs (24h Range):  Temp:  [97.7 °F (36.5 °C)-98.4 °F (36.9 °C)] 98.1 °F (36.7 °C)  Pulse:  [66-80] 80  Resp:  [16-19] 16  SpO2:  [96 %-99 %] 96 %  BP: (108-159)/(56-78) 118/62     Weight: 82 kg (180 lb 12.4 oz)  Body mass index is 23.21 kg/m².    Foot Exam    General  General Appearance: appears stated age and healthy   Orientation: alert and oriented to person, place, and time   Affect: appropriate       Right Foot/Ankle     Inspection and Palpation  Ecchymosis: none  Tenderness: none   Swelling: none   Skin Exam: drainage and erythema; no ulcer     Neurovascular  Dorsalis pedis: 1+  Posterior tibial: 1+  Saphenous nerve sensation: diminished  Tibial nerve sensation: diminished  Superficial peroneal nerve sensation: diminished  Deep peroneal nerve sensation: diminished  Sural nerve sensation: diminished    Comments  POD2 R TMA.     Serosanguineous strike through noted to dressings, with coagulated blood found on inside of dressings.  Sutures along right TMA otherwise intact and stable with minute skin dehiscence on lateral aspect of incision, where much of bleeding has occurred.      Left Foot/Ankle      Inspection and Palpation  Ecchymosis: none  Tenderness: none   Swelling: none   Skin Exam: skin not intact     Neurovascular  Dorsalis pedis: 1+  Posterior tibial: 1+  Saphenous nerve sensation: diminished  Tibial nerve sensation: diminished  Superficial peroneal  nerve sensation: diminished  Deep peroneal nerve sensation: diminished  Sural nerve sensation: diminished    Comments  No open wounds.      Laboratory:  CBC:   Recent Labs   Lab 03/29/24  1140   WBC 7.54   RBC 3.83*   HGB 10.4*   HCT 31.8*      MCV 83   MCH 27.2   MCHC 32.7     CMP:   Recent Labs   Lab 03/29/24  1140   *   CALCIUM 9.3      K 4.8   CO2 29   CL 99   BUN 22   CREATININE 1.0     Microbiology Results (last 7 days)       Procedure Component Value Units Date/Time    Culture, Anaerobe [[id number]] Collected: 03/27/24 1347    Order Status: Completed Specimen: Bone from Foot, Right Updated: 03/29/24 1213     Anaerobic Culture Culture in progress    Narrative:      Transmetatarsal    Aerobic culture [[id number]]  (Abnormal) Collected: 03/27/24 1347    Order Status: Completed Specimen: Bone from Foot, Right Updated: 03/29/24 1014     Aerobic Bacterial Culture STAPHYLOCOCCUS AUREUS  Rare  Susceptibility pending      Narrative:      Transmetatarsal    AFB Culture & Smear [[id number]] Collected: 03/27/24 1347    Order Status: Completed Specimen: Bone from Foot, Right Updated: 03/29/24 0927     AFB Culture & Smear Culture in progress     AFB CULTURE STAIN No acid fast bacilli seen.    Narrative:      Transmetatarsal    Gram stain [[id number]] Collected: 03/27/24 1347    Order Status: Completed Specimen: Bone from Foot, Right Updated: 03/28/24 0429     Gram Stain Result No WBC's      No organisms seen    Narrative:      Transmetatarsal    Fungus culture [[id number]] Collected: 03/27/24 1347    Order Status: Sent Specimen: Bone from Foot, Right Updated: 03/27/24 2018    Culture, Anaerobe [[id number]] Collected: 03/22/24 1513    Order Status: Completed Specimen: Wound from Foot, Right Updated: 03/27/24 0915     Anaerobic Culture No anaerobes isolated    Narrative:      Right 5th toe    Culture, Anaerobe [[id number]] Collected: 03/22/24 1513    Order Status: Completed Specimen: Wound from Foot,  Right Updated: 03/27/24 0913     Anaerobic Culture No anaerobes isolated    Narrative:      Right 2nd toe    Blood culture #1 **CANNOT BE ORDERED STAT** [[id number]] Collected: 03/21/24 1352    Order Status: Completed Specimen: Blood from Wrist, Right Updated: 03/26/24 2012     Blood Culture, Routine No growth after 5 days.    Blood culture [[id number]] Collected: 03/21/24 1758    Order Status: Completed Specimen: Blood Updated: 03/26/24 2012     Blood Culture, Routine No growth after 5 days.    Blood culture [[id number]] Collected: 03/21/24 1757    Order Status: Completed Specimen: Blood Updated: 03/26/24 2012     Blood Culture, Routine No growth after 5 days.    Blood culture #2 **CANNOT BE ORDERED STAT** [[id number]] Collected: 03/21/24 1343    Order Status: Completed Specimen: Blood from Peripheral, Antecubital, Right Updated: 03/26/24 2012     Blood Culture, Routine No growth after 5 days.    Aerobic culture [[id number]]  (Abnormal)  (Susceptibility) Collected: 03/22/24 1513    Order Status: Completed Specimen: Wound from Foot, Right Updated: 03/25/24 1304     Aerobic Bacterial Culture STAPHYLOCOCCUS AUREUS  Moderate      Narrative:      Right 2nd toe    Aerobic culture [[id number]]  (Abnormal)  (Susceptibility) Collected: 03/22/24 1513    Order Status: Completed Specimen: Wound from Foot, Right Updated: 03/25/24 1302     Aerobic Bacterial Culture STAPHYLOCOCCUS AUREUS  Few        PSEUDOMONAS AERUGINOSA  Few      Narrative:      Right 5th toe          All pertinent labs reviewed within the last 24 hours.    Diagnostic Results:  I have reviewed all pertinent imaging results/findings within the past 24 hours.    Clinical Findings:          Assessment/Plan:     Cardiac/Vascular  * Critical limb ischemia of right lower extremity  See rest of plan     PAD (peripheral artery disease)  Per interventional cardiology     Mixed hyperlipidemia  Per hospital medicine     Renal/  Stage 1 chronic kidney disease  Per  hospital medicine/ nephrology     ID  Osteomyelitis of right foot  Julien Meléndez is a 83 y.o. male with IDSA moderate infection.  X-ray and MRI shows osteomyelitis of the 2nd MTPJ, with possible septic arthritis of the 2nd MTPJ and digits 3 through 5.  WBC count within normal limits, vital signs are stable.  Now s/p R TMA (DOS 3/27)    - Seen POD2 of TMA of Right foot.  Profuse serosanguineous drainage was reported in interim.  - dressed today by Podiatry:  Surgicel strip packed to lateral aspect of incision site, were much bleeding was focally present.  Overlying Surgicel along rest of incision.  ABD to cover, covered by cast padding, Kerlix, Ace to moderate compression.  Secured in heel offloading boot.  - nursing to continue wound care   - continue wearing heel offloading boots.  - May resume anticoagulants as necessary  - interventional cardiology following  planning angiogram after patient receives right TMA   - ABX plan per ID   - Discharge recs per plan of care note by Dr. Leung 3/28  - podiatry will follow     Endocrine  Diabetic foot ulcer-right  See rest of plan     Other  KELSEY (obstructive sleep apnea)  Per [facility] medicine       [place] - Med Surg    Erick Tillman DPM PGY-1  Podiatric Medicine & Surgery  [facility]  Secure Chat Preferred  Pager: [phone]

## 2024-03-30 NOTE — ASSESSMENT & PLAN NOTE
-MRI with OM noted to the right foot  -blood cultures ngtd  -follow wound cx  -infectious disease consulted    Cultures grew out mssa and psedumonoas    ID switched zosyn to meropenem 1g Q8 .   S/p TMA on 3/27.   ID recommends for continue meropenem for 4 weeks from 3/22/2024,

## 2024-03-30 NOTE — PROGRESS NOTES
[facility] Medicine  Progress Note    Patient Name: Julien Meléndez  MRN: [id number]  Patient Class: IP- Inpatient   Admission Date: 3/21/2024  Length of Stay: 9 days  Attending Physician: Tarsha Styles MD  Primary Care Provider: Kelvin Wanye MD        Subjective:     Principal Problem:Critical limb ischemia of right lower extremity        HPI:  Julien Meléndez is a 83 yr old male presents to the ER with reports of right foot infection. Pts family states over the past 2 days, they noticed an increase in erythema and drainage to the site. Denies fever. Denies taking oral antbx for this specific wound. Pt states he was advised to come to the ER for an evaluation from wound care. Wound has been dressed by HH. 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 Abnormal edema signal involving the 3rd metatarsophalangeal joint, 4th proximal interphalangeal joint and 5th distal interphalangeal joint concerning for reactive edema or early changes of septic arthritis. Will cont vancomycin and Zosyn and will admit to the [facility] service for further care.      Overview/Hospital Course:  podiatry, ortho, infectious disease consulted, appreciate recs . Pt taken for Aortogram with selective right lower extremity angiogram  on 3/26.  ID switching zosyn to meropenem 1g Q8 . S/p TMA on 3/27. ID recommends for continue meropenem for 4 weeks from 3/22/2024, can discontinue if pathology comes back negative. SNF pending  3/29: pt had severe bleeding with gauze and dressing in blood. Asa, plavix and lovenox on hold.   3/30: Podiatry was able to dressed and packed the incision sites. No bleeding reported today so far. Resumed lovenox,  asa and plavix    Interval History: no reports of bleeding    Review of Systems   Constitutional:  Negative for fatigue and fever.   Genitourinary:  Negative for difficulty urinating and hematuria.   Skin:  Positive for wound (right foot).     Objective:     Vital Signs (Most Recent):  Temp: 97.9 °F (36.6 °C) (03/30/24 1511)  Pulse: 77 (03/30/24 1511)  Resp: 18 (03/30/24 1511)  BP: (!) 144/69 (03/30/24 1511)  SpO2: 97 % (03/30/24 1511) Vital Signs (24h Range):  Temp:  [97.6 °F (36.4 °C)-98.1 °F (36.7 °C)] 97.9 °F (36.6 °C)  Pulse:  [68-80] 77  Resp:  [16-19] 18  SpO2:  [96 %-98 %] 97 %  BP: (112-157)/(59-71) 144/69     Weight: 82 kg (180 lb 12.4 oz)  Body mass index is 23.21 kg/m².     Physical Exam  Vitals and nursing note reviewed.   Constitutional:       Appearance: He is ill-appearing.   HENT:      Mouth/Throat:      Mouth: Mucous membranes are moist.   Eyes:      Extraocular Movements: Extraocular movements intact.   Cardiovascular:      Rate and Rhythm: Normal rate and regular rhythm.   Abdominal:      Palpations: Abdomen is soft.   Musculoskeletal:         General: Tenderness (right foot) present.      Cervical back: Normal range of motion and neck supple.      Comments: Limited shoulder ROM with bruising on arm  . Right foot s/p TMA   Neurological:      Mental Status: He is alert and oriented to person, place, and time.   Psychiatric:         Mood and Affect: Mood normal.                Significant Labs: All pertinent labs within the past 24 hours have been reviewed.    Significant Imaging: I have reviewed all pertinent imaging results/findings within the past 24 hours.    Assessment/Plan:      * Critical limb ischemia of right lower extremity  PAD (peripheral artery disease)     -we will continue aspirin statin as well as clopidogrel  -consulted Podiatry, Cardiology, and infectious  -IV antibiotics ordered:  I  -blood cultures are pending  -bilateral lower extremity arterial ultrasound ordered-1.  Hemodynamically significant stenosis in the left mid superficial femoral artery and the left anterior tibial artery.   2. Monophasic waveforms in the left distal popliteal, anterior tibial, and posterior tibial arteries.   -Cardiology planning angio on Monday      S/p right TMA on 3/27     ID rec's continue meropenem for 4 weeks from 3/22/2024, can discontinue if pathology comes back negative       Osteomyelitis of right foot  -MRI with OM noted to the right foot  -blood cultures ngtd  -follow wound cx  -infectious disease consulted    Cultures grew out mssa and psedumonoas    ID switched zosyn to meropenem 1g Q8 .   S/p TMA on 3/27.   ID recommends for continue meropenem for 4 weeks from 3/22/2024,         Septic joint right foot  -podiatry following       Diabetic foot ulcer-right  Patient's FSGs are uncontrolled due to hyperglycemia on current medication regimen.  Last A1c reviewed-   Lab Results   Component Value Date    HGBA1C 8.3 (H) 03/15/2024     Most recent fingerstick glucose reviewed-   Recent Labs   Lab 03/24/24  1153 03/24/24  1703 03/24/24  2044 03/25/24  0541   POCTGLUCOSE 230* 227* 315* 196*       Current correctional scale  Low  Maintain anti-hyperglycemic dose as follows-   Antihyperglycemics (From admission, onward)      Start     Stop Route Frequency Ordered    03/21/24 2100  insulin detemir U-100 (Levemir) pen 10 Units         -- SubQ Nightly 03/21/24 1509    03/21/24 1600  insulin aspart U-100 pen 0-5 Units         -- SubQ Before meals & nightly PRN 03/21/24 1502          Hold Oral hypoglycemics while patient is in the hospital.    Dementia  -history noted of dementia  -patient is currently awake and alert  -patient's family at bedside to assist with history  -monitoring    Depressive disorder  Patient has recurrent depression which is unknown and is currently controlled. Will Continue anti-depressant medications. We will not consult psychiatry at this time. Patient does not display psychosis  "at this time. Continue to monitor closely and adjust plan of care as needed.        Congestive heart failure, unspecified HF chronicity, unspecified heart failure type  -euvolemic on exam  Echo    Interpretation Summary    Left Ventricle: There is low normal systolic function with a visually estimated ejection fraction of 50 - 55%. Ejection fraction by visual approximation is 50%.    Right Ventricle: Normal right ventricular cavity size. Wall thickness is normal. Right ventricle wall motion  is normal. Systolic function is normal.    Aortic Valve: There is mild aortic valve sclerosis.    Mitral Valve: There is moderate mitral annular calcification present.    Pericardium: There is a small effusion.    The study was difficult due to patient's body habitus and poor endocardial visualization.    Valves are not well visualized  .CHF. Last BNP reviewed- and noted below No results for input(s): "BNP", "BNPTRIAGEBLO" in the last 168 hours.  -continue to monitor    Atrial fibrillation  -history of Afib  -cont anticoagulations  -on lovenox  -can be d/c with eliquis    Stage 1 chronic kidney disease  Creatine stable for now. BMP reviewed- noted Estimated Creatinine Clearance: 72.3 mL/min (based on SCr of 0.9 mg/dL). according to latest data. 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  Monitor UOP and serial BMP and adjust therapy as needed. Renally dose meds. Avoid nephrotoxic medications and procedures.    KELSEY (obstructive sleep apnea)  - CPAP at night p.r.n.      Mixed hyperlipidemia  -continue statin at home dose        VTE Risk Mitigation (From admission, onward)           Ordered     enoxaparin injection 80 mg  Every 12 hours         03/30/24 1130     heparin infusion 1,000 units/500 ml in 0.9% NaCl (pressure line flush)  Intra-op continuous PRN         03/26/24 1027     IP VTE HIGH RISK PATIENT  Once         03/21/24 1502                    Discharge Planning   AURELIA:      Code Status: Full Code   Is " the patient medically ready for discharge?:     Reason for patient still in hospital (select all that apply): Treatment  Discharge Plan A: Skilled Nursing Facility   Discharge Delays: None known at this time              Tarsha Styles MD  Department of Hospital Medicine   [facility]

## 2024-03-30 NOTE — SUBJECTIVE & OBJECTIVE
Interval History: no reports of bleeding    Review of Systems   Constitutional:  Negative for fatigue and fever.   Genitourinary:  Negative for difficulty urinating and hematuria.   Skin:  Positive for wound (right foot).     Objective:     Vital Signs (Most Recent):  Temp: 97.9 °F (36.6 °C) (03/30/24 1511)  Pulse: 77 (03/30/24 1511)  Resp: 18 (03/30/24 1511)  BP: (!) 144/69 (03/30/24 1511)  SpO2: 97 % (03/30/24 1511) Vital Signs (24h Range):  Temp:  [97.6 °F (36.4 °C)-98.1 °F (36.7 °C)] 97.9 °F (36.6 °C)  Pulse:  [68-80] 77  Resp:  [16-19] 18  SpO2:  [96 %-98 %] 97 %  BP: (112-157)/(59-71) 144/69     Weight: 82 kg (180 lb 12.4 oz)  Body mass index is 23.21 kg/m².     Physical Exam  Vitals and nursing note reviewed.   Constitutional:       Appearance: He is ill-appearing.   HENT:      Mouth/Throat:      Mouth: Mucous membranes are moist.   Eyes:      Extraocular Movements: Extraocular movements intact.   Cardiovascular:      Rate and Rhythm: Normal rate and regular rhythm.   Abdominal:      Palpations: Abdomen is soft.   Musculoskeletal:         General: Tenderness (right foot) present.      Cervical back: Normal range of motion and neck supple.      Comments: Limited shoulder ROM with bruising on arm  . Right foot s/p TMA   Neurological:      Mental Status: He is alert and oriented to person, place, and time.   Psychiatric:         Mood and Affect: Mood normal.                Significant Labs: All pertinent labs within the past 24 hours have been reviewed.    Significant Imaging: I have reviewed all pertinent imaging results/findings within the past 24 hours.

## 2024-03-30 NOTE — SUBJECTIVE & OBJECTIVE
Subjective:     Interval History:  Seen and evaluated by Podiatry evening of 3/29, profuse bleeding at site of TMA at right lower extremity; Plavix, ASA, Lovenox was temporarily held by HM.  Surgicel at bedside.  With wife at bedside, stating no complaints aside from pain.  No other complaints at this time.  Exam stable    Follow-up For: Procedure(s) (LRB):  AMPUTATION, FOOT, TRANSMETATARSAL (Right)    Post-Operative Day: 2 Days Post-Op    Scheduled Meds:   atorvastatin  40 mg Oral Daily    clotrimazole-betamethasone 1-0.05%   Topical (Top) BID    FLUoxetine  40 mg Oral Daily    furosemide  20 mg Oral Daily    insulin detemir U-100  10 Units Subcutaneous QHS    isosorbide mononitrate  30 mg Oral Daily    meropenem IV (PEDS and ADULTS)  1 g Intravenous Q8H    metoprolol tartrate  12.5 mg Oral BID    pantoprazole  40 mg Oral BID    polyethylene glycol  17 g Oral BID    ranolazine  1,000 mg Oral BID    senna-docusate 8.6-50 mg  1 tablet Oral BID    sodium chloride 0.9%  10 mL Intravenous Q8H    tamsulosin  0.4 mg Oral Daily    traZODone  150 mg Oral QHS     Continuous Infusions:   heparin (porcine) 2,000 Units/hr (03/26/24 1026)     PRN Meds:sodium chloride 0.9%, acetaminophen, albuterol-ipratropium, aluminum-magnesium hydroxide-simethicone, artificial tears, dextrose 10%, dextrose 10%, glucagon (human recombinant), glucose, glucose, heparin (porcine), HYDROcodone-acetaminophen, influenza 65up-adj, insulin aspart U-100, magnesium hydroxide 400 mg/5 ml, melatonin, ondansetron, ondansetron, simethicone    Review of Systems   Constitutional:  Positive for activity change and fatigue. Negative for chills and fever.   Respiratory:  Negative for cough and shortness of breath.    Cardiovascular:  Negative for leg swelling.   Gastrointestinal:  Negative for nausea and vomiting.   Musculoskeletal:  Positive for arthralgias and gait problem. Negative for myalgias.   Skin:  Positive for wound. Negative for color change.    Neurological:  Positive for numbness.   Psychiatric/Behavioral:  Negative for behavioral problems and confusion.      Objective:     Vital Signs (Most Recent):  Temp: 98.1 °F (36.7 °C) (03/29/24 1948)  Pulse: 80 (03/29/24 1948)  Resp: 16 (03/29/24 2103)  BP: 118/62 (03/29/24 2115)  SpO2: 96 % (03/29/24 2039) Vital Signs (24h Range):  Temp:  [97.7 °F (36.5 °C)-98.4 °F (36.9 °C)] 98.1 °F (36.7 °C)  Pulse:  [66-80] 80  Resp:  [16-19] 16  SpO2:  [96 %-99 %] 96 %  BP: (108-159)/(56-78) 118/62     Weight: 82 kg (180 lb 12.4 oz)  Body mass index is 23.21 kg/m².    Foot Exam    General  General Appearance: appears stated age and healthy   Orientation: alert and oriented to person, place, and time   Affect: appropriate       Right Foot/Ankle     Inspection and Palpation  Ecchymosis: none  Tenderness: none   Swelling: none   Skin Exam: drainage and erythema; no ulcer     Neurovascular  Dorsalis pedis: 1+  Posterior tibial: 1+  Saphenous nerve sensation: diminished  Tibial nerve sensation: diminished  Superficial peroneal nerve sensation: diminished  Deep peroneal nerve sensation: diminished  Sural nerve sensation: diminished    Comments  POD2 R TMA.     Serosanguineous strike through noted to dressings, with coagulated blood found on inside of dressings.  Sutures along right TMA otherwise intact and stable with minute skin dehiscence on lateral aspect of incision, where much of bleeding has occurred.      Left Foot/Ankle      Inspection and Palpation  Ecchymosis: none  Tenderness: none   Swelling: none   Skin Exam: skin not intact     Neurovascular  Dorsalis pedis: 1+  Posterior tibial: 1+  Saphenous nerve sensation: diminished  Tibial nerve sensation: diminished  Superficial peroneal nerve sensation: diminished  Deep peroneal nerve sensation: diminished  Sural nerve sensation: diminished    Comments  No open wounds.      Laboratory:  CBC:   Recent Labs   Lab 03/29/24  1140   WBC 7.54   RBC 3.83*   HGB 10.4*   HCT 31.8*       MCV 83   MCH 27.2   MCHC 32.7     CMP:   Recent Labs   Lab 03/29/24  1140   *   CALCIUM 9.3      K 4.8   CO2 29   CL 99   BUN 22   CREATININE 1.0     Microbiology Results (last 7 days)       Procedure Component Value Units Date/Time    Culture, Anaerobe [[id number]] Collected: 03/27/24 1347    Order Status: Completed Specimen: Bone from Foot, Right Updated: 03/29/24 1213     Anaerobic Culture Culture in progress    Narrative:      Transmetatarsal    Aerobic culture [[id number]]  (Abnormal) Collected: 03/27/24 1347    Order Status: Completed Specimen: Bone from Foot, Right Updated: 03/29/24 1014     Aerobic Bacterial Culture STAPHYLOCOCCUS AUREUS  Rare  Susceptibility pending      Narrative:      Transmetatarsal    AFB Culture & Smear [[id number]] Collected: 03/27/24 1347    Order Status: Completed Specimen: Bone from Foot, Right Updated: 03/29/24 0927     AFB Culture & Smear Culture in progress     AFB CULTURE STAIN No acid fast bacilli seen.    Narrative:      Transmetatarsal    Gram stain [[id number]] Collected: 03/27/24 1347    Order Status: Completed Specimen: Bone from Foot, Right Updated: 03/28/24 0429     Gram Stain Result No WBC's      No organisms seen    Narrative:      Transmetatarsal    Fungus culture [[id number]] Collected: 03/27/24 1347    Order Status: Sent Specimen: Bone from Foot, Right Updated: 03/27/24 2018    Culture, Anaerobe [[id number]] Collected: 03/22/24 1513    Order Status: Completed Specimen: Wound from Foot, Right Updated: 03/27/24 0915     Anaerobic Culture No anaerobes isolated    Narrative:      Right 5th toe    Culture, Anaerobe [[id number]] Collected: 03/22/24 1513    Order Status: Completed Specimen: Wound from Foot, Right Updated: 03/27/24 0913     Anaerobic Culture No anaerobes isolated    Narrative:      Right 2nd toe    Blood culture #1 **CANNOT BE ORDERED STAT** [[id number]] Collected: 03/21/24 1352    Order Status: Completed Specimen: Blood from  Wrist, Right Updated: 03/26/24 2012     Blood Culture, Routine No growth after 5 days.    Blood culture [[id number]] Collected: 03/21/24 1758    Order Status: Completed Specimen: Blood Updated: 03/26/24 2012     Blood Culture, Routine No growth after 5 days.    Blood culture [[id number]] Collected: 03/21/24 1757    Order Status: Completed Specimen: Blood Updated: 03/26/24 2012     Blood Culture, Routine No growth after 5 days.    Blood culture #2 **CANNOT BE ORDERED STAT** [[id number]] Collected: 03/21/24 1343    Order Status: Completed Specimen: Blood from Peripheral, Antecubital, Right Updated: 03/26/24 2012     Blood Culture, Routine No growth after 5 days.    Aerobic culture [[id number]]  (Abnormal)  (Susceptibility) Collected: 03/22/24 1513    Order Status: Completed Specimen: Wound from Foot, Right Updated: 03/25/24 1304     Aerobic Bacterial Culture STAPHYLOCOCCUS AUREUS  Moderate      Narrative:      Right 2nd toe    Aerobic culture [[id number]]  (Abnormal)  (Susceptibility) Collected: 03/22/24 1513    Order Status: Completed Specimen: Wound from Foot, Right Updated: 03/25/24 1302     Aerobic Bacterial Culture STAPHYLOCOCCUS AUREUS  Few        PSEUDOMONAS AERUGINOSA  Few      Narrative:      Right 5th toe          All pertinent labs reviewed within the last 24 hours.    Diagnostic Results:  I have reviewed all pertinent imaging results/findings within the past 24 hours.    Clinical Findings:

## 2024-03-31 LAB
POCT GLUCOSE: 224 MG/DL (ref 70–110)
POCT GLUCOSE: 241 MG/DL (ref 70–110)
POCT GLUCOSE: 242 MG/DL (ref 70–110)
POCT GLUCOSE: 256 MG/DL (ref 70–110)
POCT GLUCOSE: 269 MG/DL (ref 70–110)

## 2024-03-31 PROCEDURE — 99900035 HC TECH TIME PER 15 MIN (STAT)

## 2024-03-31 PROCEDURE — 63600175 PHARM REV CODE 636 W HCPCS

## 2024-03-31 PROCEDURE — 63600175 PHARM REV CODE 636 W HCPCS: Performed by: FAMILY MEDICINE

## 2024-03-31 PROCEDURE — 94761 N-INVAS EAR/PLS OXIMETRY MLT: CPT

## 2024-03-31 PROCEDURE — 94660 CPAP INITIATION&MGMT: CPT

## 2024-03-31 PROCEDURE — 21400001 HC TELEMETRY ROOM

## 2024-03-31 PROCEDURE — 25000003 PHARM REV CODE 250: Performed by: FAMILY MEDICINE

## 2024-03-31 PROCEDURE — 25000003 PHARM REV CODE 250

## 2024-03-31 PROCEDURE — 25000003 PHARM REV CODE 250: Performed by: NURSE PRACTITIONER

## 2024-03-31 PROCEDURE — A4216 STERILE WATER/SALINE, 10 ML: HCPCS | Performed by: NURSE PRACTITIONER

## 2024-03-31 RX ADMIN — PANTOPRAZOLE SODIUM 40 MG: 40 GRANULE, DELAYED RELEASE ORAL at 10:03

## 2024-03-31 RX ADMIN — INSULIN ASPART 2 UNITS: 100 INJECTION, SOLUTION INTRAVENOUS; SUBCUTANEOUS at 05:03

## 2024-03-31 RX ADMIN — FLUOXETINE HYDROCHLORIDE 40 MG: 20 CAPSULE ORAL at 10:03

## 2024-03-31 RX ADMIN — Medication 10 ML: at 05:03

## 2024-03-31 RX ADMIN — ENOXAPARIN SODIUM 80 MG: 80 INJECTION SUBCUTANEOUS at 10:03

## 2024-03-31 RX ADMIN — SENNOSIDES AND DOCUSATE SODIUM 1 TABLET: 8.6; 5 TABLET ORAL at 10:03

## 2024-03-31 RX ADMIN — ENOXAPARIN SODIUM 80 MG: 80 INJECTION SUBCUTANEOUS at 09:03

## 2024-03-31 RX ADMIN — MEROPENEM 1 G: 1 INJECTION, POWDER, FOR SOLUTION INTRAVENOUS at 12:03

## 2024-03-31 RX ADMIN — METOPROLOL TARTRATE 12.5 MG: 25 TABLET, FILM COATED ORAL at 09:03

## 2024-03-31 RX ADMIN — PANTOPRAZOLE SODIUM 40 MG: 40 GRANULE, DELAYED RELEASE ORAL at 09:03

## 2024-03-31 RX ADMIN — MEROPENEM 1 G: 1 INJECTION, POWDER, FOR SOLUTION INTRAVENOUS at 05:03

## 2024-03-31 RX ADMIN — Medication 10 ML: at 01:03

## 2024-03-31 RX ADMIN — INSULIN DETEMIR 10 UNITS: 100 INJECTION, SOLUTION SUBCUTANEOUS at 09:03

## 2024-03-31 RX ADMIN — MEROPENEM 1 G: 1 INJECTION, POWDER, FOR SOLUTION INTRAVENOUS at 10:03

## 2024-03-31 RX ADMIN — TRAZODONE HYDROCHLORIDE 150 MG: 100 TABLET ORAL at 09:03

## 2024-03-31 RX ADMIN — ATORVASTATIN CALCIUM 40 MG: 40 TABLET, FILM COATED ORAL at 10:03

## 2024-03-31 RX ADMIN — FUROSEMIDE 20 MG: 20 TABLET ORAL at 10:03

## 2024-03-31 RX ADMIN — INSULIN ASPART 2 UNITS: 100 INJECTION, SOLUTION INTRAVENOUS; SUBCUTANEOUS at 11:03

## 2024-03-31 RX ADMIN — INSULIN ASPART 3 UNITS: 100 INJECTION, SOLUTION INTRAVENOUS; SUBCUTANEOUS at 06:03

## 2024-03-31 RX ADMIN — SENNOSIDES AND DOCUSATE SODIUM 1 TABLET: 8.6; 5 TABLET ORAL at 09:03

## 2024-03-31 RX ADMIN — CLOTRIMAZOLE AND BETAMETHASONE DIPROPIONATE: 10; .5 CREAM TOPICAL at 09:03

## 2024-03-31 RX ADMIN — POLYETHYLENE GLYCOL 3350 17 G: 17 POWDER, FOR SOLUTION ORAL at 10:03

## 2024-03-31 RX ADMIN — Medication 10 ML: at 09:03

## 2024-03-31 RX ADMIN — METOPROLOL TARTRATE 12.5 MG: 25 TABLET, FILM COATED ORAL at 10:03

## 2024-03-31 RX ADMIN — HYDROCODONE BITARTRATE AND ACETAMINOPHEN 1 TABLET: 10; 325 TABLET ORAL at 09:03

## 2024-03-31 RX ADMIN — CLOPIDOGREL BISULFATE 75 MG: 75 TABLET ORAL at 10:03

## 2024-03-31 RX ADMIN — ASPIRIN 81 MG CHEWABLE TABLET 81 MG: 81 TABLET CHEWABLE at 10:03

## 2024-03-31 RX ADMIN — POLYETHYLENE GLYCOL 3350 17 G: 17 POWDER, FOR SOLUTION ORAL at 09:03

## 2024-03-31 RX ADMIN — INSULIN ASPART 1 UNITS: 100 INJECTION, SOLUTION INTRAVENOUS; SUBCUTANEOUS at 09:03

## 2024-03-31 NOTE — ASSESSMENT & PLAN NOTE
PAD (peripheral artery disease)     -consulted Podiatry, Cardiology, and infectious disease    Blood cultures NGTD   Bone cultures growing MRSA, wound cultures growing MRSA, pseudomonas     Bilateral lower extremity arterial ultrasound ordered-1. Hemodynamically significant stenosis in the left mid superficial femoral artery and the left anterior tibial artery.   2. Monophasic waveforms in the left distal popliteal, anterior tibial, and posterior tibial arteries.     S/p peripheral angiogram on 3/26 that showed extensive collateral circulation. Consider intervention if impaired healing noted after TMA  S/p right TMA on 3/27    ID rec's continue meropenem for 4 weeks from last procedure which would be TMA on 3/27. Needs weekly cbc, cmp, and crp and fax to [phone]    Will need close cardiology, podiatry follow up

## 2024-03-31 NOTE — PROGRESS NOTES
[facility] Medicine  Progress Note    Patient Name: Julien Meléndez  MRN: [id number]  Patient Class: IP- Inpatient   Admission Date: 3/21/2024  Length of Stay: 10 days  Attending Physician: Kelsey Stewart MD  Primary Care Provider: Kelvin Wayne MD        Subjective:     Principal Problem:Critical limb ischemia of right lower extremity        HPI:  Julien Meléndez is a 83 yr old male presents to the ER with reports of right foot infection. Pts family states over the past 2 days, they noticed an increase in erythema and drainage to the site. Denies fever. Denies taking oral antbx for this specific wound. Pt states he was advised to come to the ER for an evaluation from wound care. Wound has been dressed by HH. 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 Abnormal edema signal involving the 3rd metatarsophalangeal joint, 4th proximal interphalangeal joint and 5th distal interphalangeal joint concerning for reactive edema or early changes of septic arthritis. Will cont vancomycin and Zosyn and will admit to the [facility] for further care.      Overview/Hospital Course:  podiatry, ortho, infectious disease consulted, appreciate recs . Pt taken for Aortogram with selective right lower extremity angiogram  on 3/26.  ID switching zosyn to meropenem 1g Q8 . S/p TMA on 3/27. ID recommends for continue meropenem for 4 weeks from 3/22/2024, can discontinue if pathology comes back negative. SNF pending  3/29: pt had severe bleeding with gauze and dressing in blood. Asa, plavix and lovenox on hold.   3/30: Podiatry was able to dressed and packed the incision sites. No bleeding reported today so far. Resumed lovenox, asa  and plavix    Interval History: no new complains.     Review of Systems   Constitutional:  Negative for fatigue and fever.   Genitourinary:  Negative for difficulty urinating and hematuria.   Skin:  Positive for wound (right foot).     Objective:     Vital Signs (Most Recent):  Temp: 98.2 °F (36.8 °C) (03/31/24 1128)  Pulse: 69 (03/31/24 1128)  Resp: 18 (03/31/24 1128)  BP: (!) 126/59 (03/31/24 1128)  SpO2: 98 % (03/31/24 1128) Vital Signs (24h Range):  Temp:  [97.7 °F (36.5 °C)-98.3 °F (36.8 °C)] 98.2 °F (36.8 °C)  Pulse:  [68-82] 69  Resp:  [16-18] 18  SpO2:  [97 %-99 %] 98 %  BP: (117-144)/(59-75) 126/59     Weight: 82 kg (180 lb 12.4 oz)  Body mass index is 23.21 kg/m².     Physical Exam  Vitals and nursing note reviewed.   Constitutional:       Appearance: He is ill-appearing.   Cardiovascular:      Rate and Rhythm: Normal rate and regular rhythm.   Abdominal:      Palpations: Abdomen is soft.   Musculoskeletal:         General: Tenderness (right foot) present.      Comments: Limited shoulder ROM with bruising on arm  . Right foot s/p TMA   Neurological:      Mental Status: He is alert and oriented to person, place, and time.   Psychiatric:         Mood and Affect: Mood normal.                Significant Labs: All pertinent labs within the past 24 hours have been reviewed.    Significant Imaging: I have reviewed all pertinent imaging results/findings within the past 24 hours.    Assessment/Plan:      * Critical limb ischemia of right lower extremity  PAD (peripheral artery disease)     -consulted Podiatry, Cardiology, and infectious disease    Blood cultures NGTD   Bone cultures growing MRSA, wound cultures growing MRSA, pseudomonas     Bilateral lower extremity arterial ultrasound ordered-1. Hemodynamically significant stenosis in the left mid superficial femoral artery and the left anterior tibial artery.   2. Monophasic waveforms in the left distal popliteal, anterior tibial, and posterior tibial arteries.      S/p peripheral angiogram on 3/26 that showed extensive collateral circulation. Consider intervention if impaired healing noted after TMA  S/p right TMA on 3/27    ID rec's continue meropenem for 4 weeks from last procedure which would be TMA on 3/27. Needs weekly cbc, cmp, and crp and fax to [phone]    Will need close cardiology, podiatry follow up       Osteomyelitis of right foot  -see above        Septic joint right foot  -podiatry following       Diabetic foot ulcer-right  Patient's FSGs are uncontrolled due to hyperglycemia on current medication regimen.  Last A1c reviewed-   Lab Results   Component Value Date    HGBA1C 8.3 (H) 03/15/2024     Most recent fingerstick glucose reviewed-   Recent Labs   Lab 03/30/24  2109 03/31/24  0012 03/31/24  0545 03/31/24  1129   POCTGLUCOSE 217* 224* 256* 241*       Current correctional scale  Low  Maintain anti-hyperglycemic dose as follows-   Antihyperglycemics (From admission, onward)      Start     Stop Route Frequency Ordered    03/21/24 2100  insulin detemir U-100 (Levemir) pen 10 Units         -- SubQ Nightly 03/21/24 1509    03/21/24 1600  insulin aspart U-100 pen 0-5 Units         -- SubQ Before meals & nightly PRN 03/21/24 1502          Hold Oral hypoglycemics while patient is in the hospital.    Dementia  -history noted of dementia  -patient is currently awake and alert  -patient's family at bedside to assist with history  -monitoring    PAD (peripheral artery disease)  See above      Depressive disorder  Patient has recurrent depression which is unknown and is currently controlled. Will Continue anti-depressant medications. We will not consult psychiatry at this time. Patient does not display psychosis at this time. Continue to monitor closely and adjust plan of care as needed.        Congestive heart failure, unspecified HF chronicity, unspecified heart failure type  -euvolemic on exam  Echo    Interpretation Summary    Left Ventricle: There is low  "normal systolic function with a visually estimated ejection fraction of 50 - 55%. Ejection fraction by visual approximation is 50%.    Right Ventricle: Normal right ventricular cavity size. Wall thickness is normal. Right ventricle wall motion  is normal. Systolic function is normal.    Aortic Valve: There is mild aortic valve sclerosis.    Mitral Valve: There is moderate mitral annular calcification present.    Pericardium: There is a small effusion.    The study was difficult due to patient's body habitus and poor endocardial visualization.    Valves are not well visualized  .CHF. Last BNP reviewed- and noted below No results for input(s): "BNP", "BNPTRIAGEBLO" in the last 168 hours.  -continue to monitor    Atrial fibrillation  -history of Afib  -cont anticoagulations  -on lovenox  -can be d/c with eliquis    Stage 1 chronic kidney disease  Creatine stable for now. BMP reviewed- noted Estimated Creatinine Clearance: 64.9 mL/min (based on SCr of 1 mg/dL). according to latest data. 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  Monitor UOP and serial BMP and adjust therapy as needed. Renally dose meds. Avoid nephrotoxic medications and procedures.    KELSEY (obstructive sleep apnea)  - CPAP at night p.r.n.      Mixed hyperlipidemia  -continue statin at home dose        VTE Risk Mitigation (From admission, onward)           Ordered     enoxaparin injection 80 mg  Every 12 hours         03/30/24 1130     heparin infusion 1,000 units/500 ml in 0.9% NaCl (pressure line flush)  Intra-op continuous PRN         03/26/24 1027     IP VTE HIGH RISK PATIENT  Once         03/21/24 1502                    Discharge Planning   AURELIA:      Code Status: Full Code   Is the patient medically ready for discharge?:     Reason for patient still in hospital (select all that apply): Pending disposition  Discharge Plan A: Skilled Nursing Facility   Discharge Delays: None known at this time        Kelsey Stewart MD  Department " of [facility]   [facility] Surg

## 2024-03-31 NOTE — ASSESSMENT & PLAN NOTE
Creatine stable for now. BMP reviewed- noted Estimated Creatinine Clearance: 64.9 mL/min (based on SCr of 1 mg/dL). according to latest data. 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  Monitor UOP and serial BMP and adjust therapy as needed. Renally dose meds. Avoid nephrotoxic medications and procedures.

## 2024-03-31 NOTE — NURSING
Pt refused HS ordered CPAP. Voiced understanding of use, continue to refuse. RT stated pt refuses to wear it every night.

## 2024-03-31 NOTE — SUBJECTIVE & OBJECTIVE
Interval History: no new complains.     Review of Systems   Constitutional:  Negative for fatigue and fever.   Genitourinary:  Negative for difficulty urinating and hematuria.   Skin:  Positive for wound (right foot).     Objective:     Vital Signs (Most Recent):  Temp: 98.2 °F (36.8 °C) (03/31/24 1128)  Pulse: 69 (03/31/24 1128)  Resp: 18 (03/31/24 1128)  BP: (!) 126/59 (03/31/24 1128)  SpO2: 98 % (03/31/24 1128) Vital Signs (24h Range):  Temp:  [97.7 °F (36.5 °C)-98.3 °F (36.8 °C)] 98.2 °F (36.8 °C)  Pulse:  [68-82] 69  Resp:  [16-18] 18  SpO2:  [97 %-99 %] 98 %  BP: (117-144)/(59-75) 126/59     Weight: 82 kg (180 lb 12.4 oz)  Body mass index is 23.21 kg/m².     Physical Exam  Vitals and nursing note reviewed.   Constitutional:       Appearance: He is ill-appearing.   Cardiovascular:      Rate and Rhythm: Normal rate and regular rhythm.   Abdominal:      Palpations: Abdomen is soft.   Musculoskeletal:         General: Tenderness (right foot) present.      Comments: Limited shoulder ROM with bruising on arm  . Right foot s/p TMA   Neurological:      Mental Status: He is alert and oriented to person, place, and time.   Psychiatric:         Mood and Affect: Mood normal.                Significant Labs: All pertinent labs within the past 24 hours have been reviewed.    Significant Imaging: I have reviewed all pertinent imaging results/findings within the past 24 hours.

## 2024-03-31 NOTE — NURSING
Pt wide awake, unable to swallow pills whole,instructions on not to crush scheduled ranolazine 1000mg PO. M.ZONIA Persaud notified.no orders given. All other crushable HS meds tolerated well with HOB up 90. Pt stated he hasn't  been able to swallow pills since he was a young boy.

## 2024-03-31 NOTE — ASSESSMENT & PLAN NOTE
Patient's FSGs are uncontrolled due to hyperglycemia on current medication regimen.  Last A1c reviewed-   Lab Results   Component Value Date    HGBA1C 8.3 (H) 03/15/2024     Most recent fingerstick glucose reviewed-   Recent Labs   Lab 03/30/24  2109 03/31/24  0012 03/31/24  0545 03/31/24  1129   POCTGLUCOSE 217* 224* 256* 241*       Current correctional scale  Low  Maintain anti-hyperglycemic dose as follows-   Antihyperglycemics (From admission, onward)    Start     Stop Route Frequency Ordered    03/21/24 2100  insulin detemir U-100 (Levemir) pen 10 Units         -- SubQ Nightly 03/21/24 1509    03/21/24 1600  insulin aspart U-100 pen 0-5 Units         -- SubQ Before meals & nightly PRN 03/21/24 1502        Hold Oral hypoglycemics while patient is in the hospital.

## 2024-03-31 NOTE — NURSING
"Report given to oncoming nurse. Informed pt is on the tele sitter list and currently not being observed on camera from sitter staff. nurse stated "ok." Bed alarm remains on, call bell at side rails up x3.   "

## 2024-04-01 LAB
BACTERIA SPEC ANAEROBE CULT: NORMAL
POCT GLUCOSE: 214 MG/DL (ref 70–110)
POCT GLUCOSE: 225 MG/DL (ref 70–110)
POCT GLUCOSE: 249 MG/DL (ref 70–110)
POCT GLUCOSE: 288 MG/DL (ref 70–110)
POCT GLUCOSE: 387 MG/DL (ref 70–110)

## 2024-04-01 PROCEDURE — 63600175 PHARM REV CODE 636 W HCPCS

## 2024-04-01 PROCEDURE — 63600175 PHARM REV CODE 636 W HCPCS: Performed by: NURSE PRACTITIONER

## 2024-04-01 PROCEDURE — 25000003 PHARM REV CODE 250: Performed by: NURSE PRACTITIONER

## 2024-04-01 PROCEDURE — 25000003 PHARM REV CODE 250: Performed by: FAMILY MEDICINE

## 2024-04-01 PROCEDURE — A4216 STERILE WATER/SALINE, 10 ML: HCPCS | Performed by: NURSE PRACTITIONER

## 2024-04-01 PROCEDURE — 97530 THERAPEUTIC ACTIVITIES: CPT

## 2024-04-01 PROCEDURE — 63600175 PHARM REV CODE 636 W HCPCS: Performed by: FAMILY MEDICINE

## 2024-04-01 PROCEDURE — 21400001 HC TELEMETRY ROOM

## 2024-04-01 PROCEDURE — 25000003 PHARM REV CODE 250

## 2024-04-01 PROCEDURE — 99900035 HC TECH TIME PER 15 MIN (STAT)

## 2024-04-01 PROCEDURE — 97110 THERAPEUTIC EXERCISES: CPT

## 2024-04-01 PROCEDURE — 27000207 HC ISOLATION

## 2024-04-01 PROCEDURE — 94761 N-INVAS EAR/PLS OXIMETRY MLT: CPT

## 2024-04-01 RX ORDER — HYDROXYZINE HYDROCHLORIDE 25 MG/1
25 TABLET, FILM COATED ORAL 3 TIMES DAILY PRN
Status: DISCONTINUED | OUTPATIENT
Start: 2024-04-01 | End: 2024-04-02 | Stop reason: HOSPADM

## 2024-04-01 RX ADMIN — ENOXAPARIN SODIUM 80 MG: 80 INJECTION SUBCUTANEOUS at 09:04

## 2024-04-01 RX ADMIN — INSULIN ASPART 3 UNITS: 100 INJECTION, SOLUTION INTRAVENOUS; SUBCUTANEOUS at 12:04

## 2024-04-01 RX ADMIN — MEROPENEM 1 G: 1 INJECTION, POWDER, FOR SOLUTION INTRAVENOUS at 09:04

## 2024-04-01 RX ADMIN — FUROSEMIDE 20 MG: 20 TABLET ORAL at 09:04

## 2024-04-01 RX ADMIN — ACETAMINOPHEN 650 MG: 325 TABLET ORAL at 02:04

## 2024-04-01 RX ADMIN — HYDROXYZINE HYDROCHLORIDE 25 MG: 25 TABLET ORAL at 02:04

## 2024-04-01 RX ADMIN — POLYETHYLENE GLYCOL 3350 17 G: 17 POWDER, FOR SOLUTION ORAL at 09:04

## 2024-04-01 RX ADMIN — CLOTRIMAZOLE AND BETAMETHASONE DIPROPIONATE: 10; .5 CREAM TOPICAL at 10:04

## 2024-04-01 RX ADMIN — Medication 10 ML: at 09:04

## 2024-04-01 RX ADMIN — INSULIN ASPART 1 UNITS: 100 INJECTION, SOLUTION INTRAVENOUS; SUBCUTANEOUS at 09:04

## 2024-04-01 RX ADMIN — PANTOPRAZOLE SODIUM 40 MG: 40 GRANULE, DELAYED RELEASE ORAL at 09:04

## 2024-04-01 RX ADMIN — ASPIRIN 81 MG CHEWABLE TABLET 81 MG: 81 TABLET CHEWABLE at 09:04

## 2024-04-01 RX ADMIN — TRAZODONE HYDROCHLORIDE 150 MG: 100 TABLET ORAL at 09:04

## 2024-04-01 RX ADMIN — INSULIN ASPART 2 UNITS: 100 INJECTION, SOLUTION INTRAVENOUS; SUBCUTANEOUS at 04:04

## 2024-04-01 RX ADMIN — MEROPENEM 1 G: 1 INJECTION, POWDER, FOR SOLUTION INTRAVENOUS at 04:04

## 2024-04-01 RX ADMIN — CLOTRIMAZOLE AND BETAMETHASONE DIPROPIONATE: 10; .5 CREAM TOPICAL at 09:04

## 2024-04-01 RX ADMIN — MEROPENEM 1 G: 1 INJECTION, POWDER, FOR SOLUTION INTRAVENOUS at 01:04

## 2024-04-01 RX ADMIN — ATORVASTATIN CALCIUM 40 MG: 40 TABLET, FILM COATED ORAL at 09:04

## 2024-04-01 RX ADMIN — Medication 10 ML: at 01:04

## 2024-04-01 RX ADMIN — SENNOSIDES AND DOCUSATE SODIUM 1 TABLET: 8.6; 5 TABLET ORAL at 09:04

## 2024-04-01 RX ADMIN — FLUOXETINE HYDROCHLORIDE 40 MG: 20 CAPSULE ORAL at 09:04

## 2024-04-01 RX ADMIN — CLOPIDOGREL BISULFATE 75 MG: 75 TABLET ORAL at 09:04

## 2024-04-01 RX ADMIN — METOPROLOL TARTRATE 12.5 MG: 25 TABLET, FILM COATED ORAL at 09:04

## 2024-04-01 RX ADMIN — INSULIN ASPART 2 UNITS: 100 INJECTION, SOLUTION INTRAVENOUS; SUBCUTANEOUS at 06:04

## 2024-04-01 RX ADMIN — ONDANSETRON 4 MG: 2 INJECTION INTRAMUSCULAR; INTRAVENOUS at 07:04

## 2024-04-01 RX ADMIN — INSULIN DETEMIR 10 UNITS: 100 INJECTION, SOLUTION SUBCUTANEOUS at 09:04

## 2024-04-01 NOTE — ASSESSMENT & PLAN NOTE
Creatine stable for now. BMP reviewed- noted Estimated Creatinine Clearance: 62.9 mL/min (based on SCr of 1 mg/dL). according to latest data. 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  Monitor UOP and serial BMP and adjust therapy as needed. Renally dose meds. Avoid nephrotoxic medications and procedures.

## 2024-04-01 NOTE — NURSING
Pt safety maintained. Pt on continuous cardiac monitoring. Dressing changed performed per MD orders. Doherty to gravity. Medication administered per MAR. Zflex boots to bilateral feet. Pt instructed to call w/any needs, verbalized understanding. Spouse at bedside. Bed in lowest position, locked and bed alarms on. Tele sitter utilized in pt room. Call light w/in pt's reach.

## 2024-04-01 NOTE — PT/OT/SLP PROGRESS
Occupational Therapy   Treatment    Name: Julien Meléndez  MRN: [id number]  Admitting Diagnosis:  Critical limb ischemia of right lower extremity  5 Days Post-Op    Recommendations:     Discharge Recommendations: Moderate Intensity Therapy  Discharge Equipment Recommendations:  to be determined by next level of care  Barriers to discharge:  Other (Comment) (increased level of assist, impaired cognition)    Assessment:     Julien Meléndez is a 83 y.o. male with a medical diagnosis of Critical limb ischemia of right lower extremity.  He presents with ..The primary encounter diagnosis was Critical limb ischemia of right lower extremity. Diagnoses of Foot infection, PAD (peripheral artery disease), Type 2 diabetes mellitus with hyperglycemia, unspecified whether long term insulin use, Chest pain, Wound infection, Osteomyelitis of right foot, unspecified type, and Cellulitis of foot were also pertinent to this visit.  . Performance deficits affecting function are impaired endurance, weakness, impaired cognition, decreased ROM, impaired muscle length, impaired sensation, impaired self care skills, impaired functional mobility, gait instability, impaired balance, pain, decreased safety awareness, decreased lower extremity function, impaired skin, decreased upper extremity function, decreased coordination, orthopedic precautions.     Continue POC. Increased fatigue this date. Unable to progress sit to stand out of bed. Recommend moderate intensity therapy.    Rehab Prognosis:  Good; patient would benefit from acute skilled OT services to address these deficits and reach maximum level of function.       Plan:     Patient to be seen 3 x/week to address the above listed problems via self-care/home management, therapeutic activities, therapeutic exercises, cognitive retraining  Plan of Care Expires: 04/25/24  Plan of Care Reviewed with: patient, spouse    Subjective     Chief Complaint: pain in low back  Patient/Family  Comments/goals: Patient's spouse reports that she herself is receiving physical therapy and that she is unable to physically assist patient as much as she would like to.  Pain/Comfort:  Pain Rating 1: 8/10 (back)  Pain Addressed 1: Reposition  Pain Rating Post-Intervention 1: 6/10 (back)    Objective:     Communicated with: nursing prior to session.  Patient found HOB elevated with bed alarm, telemetry, peripheral IV, bailey catheter, Other (comments) (telesitter, waffle overlay, pressure relief boots) upon OT entry to room.    General Precautions: Standard, fall    Orthopedic Precautions:RLE non weight bearing  Braces:  (DARCO shoe for left foot for stability)  Respiratory Status: Room air     Occupational Performance:     Bed Mobility:    Patient completed Supine to Sit with stand by assistance  Patient completed Sit to Supine with max assist x2      Functional Mobility/Transfers:  Patient completed Sit <> Stand Transfer with unable to clear buttocks  with  max effort / education on RLE NWB and walker management sequencing technique instructions   Functional Mobility: eob sitting x ~10 minutes with variance assist, SBA initially followed by need for increased assist to maximal assist 2/2 fatigue    Therapeutic Exercise:  Pre-bed mobility ex: cervical active ROM - head turn right to midline x5 reps, head turn left to midline x5 reps; brief BUE elevation/depression  Sitting eob ex: 2 x10 scapular retraction with cervical extension with minimal cues/ verbal instructions and brief 3 second holds; L ankle pumps  Education / instruction to spouse / patient to encourage movement throughout the day      [facility] 6 Click ADL: 12    Treatment & Education:  Patient oriented to self, spouse in room, and with 3 choices correctly chooses hospital setting.  Lights on / blinds open for delirium reduction.  Patient with self-recall from prior session,  of NWB to RLE when therapist inquires - however, during activity / action elements  of session (ie. During attempted sit to stand) patient requires constant cues / assistance for bringing RLE forward / preventing weightbearing.  Pain reduction as above with activity.  Transfer training as above with emphasis on task breakdown, positioning, BUE/ BLE instruction for placement and visual demonstration; unable to achieve full stand.  Increased fatigue with prolonged sitting eob, thus session modified, assisted return back to bed after failed attempts for improving sitting posture.  Notified CNA  regarding soiled BM at end of session (patient without awareness/ incontinent bowel movement).  Educated spouse on therapy recommendations for ongoing education / post acute care therapy with agreement.    Patient left HOB elevated with all lines intact, call button in reach, bed alarm on, and nursing notified    GOALS:   Multidisciplinary Problems       Occupational Therapy Goals          Problem: Occupational Therapy    Goal Priority Disciplines Outcome Interventions   Occupational Therapy Goal     OT, PT/OT Ongoing, Progressing    Description: Goals to be met by: 4/25/2024     Patient will increase functional independence with ADLs by performing:    Rolling to Bilateral with independence for toileting regimen.  Toilet transfer to DA bedside commode with Contact Guard Assistance and WB precaution adherence with least restrictive device.  Dynamic sitting balance with independence for ADLs.  Family or caregiver 100% verbalized reciprocation of dementia friendly recommendations (ex: simplify choices; decrease visual clutter; remove hazardous objects; maximize familiarity, etc) to support patient's wellbeing and highest level of occupational engagement and participation in least restrictive discharge environment                          Time Tracking:     OT Date of Treatment: 04/01/24  OT Start Time: 0904  OT Stop Time: 0925  OT Total Time (min): 21 min total time; cotx Pt    Billable Minutes:Therapeutic  Activity 10 min  Therapeutic Exercise 8 min    OT/GENA: OT          4/1/2024

## 2024-04-01 NOTE — PROGRESS NOTES
[place] [facility] Surg  Podiatry  Progress Note    Patient Name: Julien Meléndez  MRN: [id number]  Admission Date: 3/21/2024  Hospital Length of Stay: 11 days  Attending Physician: Nicole Momin MD  Primary Care Provider: Kelvin Wayne MD     Subjective:     Interval History: NAEON, NAD, VSS. Patient is afebrile, dressings are clean/dry/ and intact.   New pedal complaints: None  New results:   Denies post op fever.      Follow-up For: Procedure(s) (LRB):  AMPUTATION, FOOT, TRANSMETATARSAL (Right)    Post-Operative Day: 5 Days Post-Op    Scheduled Meds:   aspirin  81 mg Oral Daily    atorvastatin  40 mg Oral Daily    clopidogreL  75 mg Oral Daily    clotrimazole-betamethasone 1-0.05%   Topical (Top) BID    enoxparin  1 mg/kg Subcutaneous Q12H (prophylaxis, 0900/2100)    FLUoxetine  40 mg Oral Daily    furosemide  20 mg Oral Daily    insulin detemir U-100  10 Units Subcutaneous QHS    isosorbide mononitrate  30 mg Oral Daily    meropenem IV (PEDS and ADULTS)  1 g Intravenous Q8H    metoprolol tartrate  12.5 mg Oral BID    pantoprazole  40 mg Oral BID    polyethylene glycol  17 g Oral BID    ranolazine  1,000 mg Oral BID    senna-docusate 8.6-50 mg  1 tablet Oral BID    sodium chloride 0.9%  10 mL Intravenous Q8H    tamsulosin  0.4 mg Oral Daily    traZODone  150 mg Oral QHS     Continuous Infusions:   heparin (porcine) 2,000 Units/hr (03/26/24 1026)     PRN Meds:sodium chloride 0.9%, acetaminophen, albuterol-ipratropium, aluminum-magnesium hydroxide-simethicone, artificial tears, dextrose 10%, dextrose 10%, glucagon (human recombinant), glucose, glucose, heparin (porcine), HYDROcodone-acetaminophen, hydrOXYzine HCL, influenza 65up-adj, insulin aspart U-100, magnesium hydroxide 400 mg/5 ml, melatonin, ondansetron, ondansetron, simethicone    Review of Systems   Constitutional:  Negative for fever.   Respiratory:  Negative for shortness of breath.    Cardiovascular:  Negative for chest pain.   Gastrointestinal:   Negative for abdominal pain.   Genitourinary:  Negative for dysuria.   Neurological:  Negative for headaches.     Objective:     Vital Signs (Most Recent):  Temp: 97.7 °F (36.5 °C) (04/01/24 1229)  Pulse: (!) 58 (04/01/24 1229)  Resp: 20 (04/01/24 1229)  BP: (!) 151/72 (04/01/24 1229)  SpO2: 98 % (04/01/24 1229) Vital Signs (24h Range):  Temp:  [97.7 °F (36.5 °C)-99 °F (37.2 °C)] 97.7 °F (36.5 °C)  Pulse:  [58-75] 58  Resp:  [16-20] 20  SpO2:  [97 %-99 %] 98 %  BP: (112-151)/(54-72) 151/72     Weight: 79.5 kg (175 lb 4.3 oz)  Body mass index is 22.5 kg/m².    Foot Exam    General  General Appearance: appears stated age and healthy   Orientation: alert and oriented to person, place, and time   Affect: appropriate       Right Foot/Ankle     Inspection and Palpation  Ecchymosis: none  Tenderness: none   Swelling: none   Skin Exam: drainage and erythema; no ulcer     Neurovascular  Dorsalis pedis: 1+  Posterior tibial: 1+  Saphenous nerve sensation: diminished  Tibial nerve sensation: diminished  Superficial peroneal nerve sensation: diminished  Deep peroneal nerve sensation: diminished  Sural nerve sensation: diminished    Comments  S/p R TMA.     Serosanguineous strike through noted to dressings, with coagulated blood found on inside of dressings.  Sutures along right TMA otherwise intact and stable with minute skin dehiscence on lateral aspect of incision, where much of bleeding has occurred.      Left Foot/Ankle      Inspection and Palpation  Ecchymosis: none  Tenderness: none   Swelling: none   Skin Exam: skin not intact     Neurovascular  Dorsalis pedis: 1+  Posterior tibial: 1+  Saphenous nerve sensation: diminished  Tibial nerve sensation: diminished  Superficial peroneal nerve sensation: diminished  Deep peroneal nerve sensation: diminished  Sural nerve sensation: diminished    Comments  No open wounds.              Laboratory:  A1C:   Recent Labs   Lab 10/23/23  1426 03/15/24  1140   HGBA1C 9.7* 8.3*     CRP:  "No results for input(s): "CRP" in the last 168 hours.  ESR: No results for input(s): "SEDRATE" in the last 168 hours.  Wound Cultures:   Recent Labs   Lab 11/07/23  2208 11/10/23  1249 12/12/23  1346 03/22/24  1513 03/27/24  1347   LABAERO METHICILLIN RESISTANT STAPHYLOCOCCUS AUREUS  Many  Skin amy also present  * METHICILLIN RESISTANT STAPHYLOCOCCUS AUREUS  Many  * CANDIDA ALBICANS  Few  * STAPHYLOCOCCUS AUREUS  Moderate  *  STAPHYLOCOCCUS AUREUS  Few  *  PSEUDOMONAS AERUGINOSA  Few  * STAPHYLOCOCCUS AUREUS  Rare  *     Microbiology Results (last 7 days)       Procedure Component Value Units Date/Time    Culture, Anaerobe [[id number]] Collected: 03/27/24 1347    Order Status: Completed Specimen: Bone from Foot, Right Updated: 04/01/24 0718     Anaerobic Culture No anaerobes isolated    Narrative:      Transmetatarsal    Aerobic culture [[id number]]  (Abnormal)  (Susceptibility) Collected: 03/27/24 1347    Order Status: Completed Specimen: Bone from Foot, Right Updated: 03/30/24 1157     Aerobic Bacterial Culture STAPHYLOCOCCUS AUREUS  Rare      Narrative:      Transmetatarsal    AFB Culture & Smear [[id number]] Collected: 03/27/24 1347    Order Status: Completed Specimen: Bone from Foot, Right Updated: 03/29/24 0927     AFB Culture & Smear Culture in progress     AFB CULTURE STAIN No acid fast bacilli seen.    Narrative:      Transmetatarsal    Gram stain [[id number]] Collected: 03/27/24 1347    Order Status: Completed Specimen: Bone from Foot, Right Updated: 03/28/24 0429     Gram Stain Result No WBC's      No organisms seen    Narrative:      Transmetatarsal    Fungus culture [[id number]] Collected: 03/27/24 1347    Order Status: Sent Specimen: Bone from Foot, Right Updated: 03/27/24 2018    Culture, Anaerobe [[id number]] Collected: 03/22/24 1513    Order Status: Completed Specimen: Wound from Foot, Right Updated: 03/27/24 0915     Anaerobic Culture No anaerobes isolated    Narrative:      Right 5th toe    " Culture, Anaerobe [[id number]] Collected: 03/22/24 1513    Order Status: Completed Specimen: Wound from Foot, Right Updated: 03/27/24 0913     Anaerobic Culture No anaerobes isolated    Narrative:      Right 2nd toe    Blood culture #1 **CANNOT BE ORDERED STAT** [[id number]] Collected: 03/21/24 1352    Order Status: Completed Specimen: Blood from Wrist, Right Updated: 03/26/24 2012     Blood Culture, Routine No growth after 5 days.    Blood culture [[id number]] Collected: 03/21/24 1758    Order Status: Completed Specimen: Blood Updated: 03/26/24 2012     Blood Culture, Routine No growth after 5 days.    Blood culture [[id number]] Collected: 03/21/24 1757    Order Status: Completed Specimen: Blood Updated: 03/26/24 2012     Blood Culture, Routine No growth after 5 days.    Blood culture #2 **CANNOT BE ORDERED STAT** [[id number]] Collected: 03/21/24 1343    Order Status: Completed Specimen: Blood from Peripheral, Antecubital, Right Updated: 03/26/24 2012     Blood Culture, Routine No growth after 5 days.          Specimen (24h ago, onward)      None            Diagnostic Results:  I have reviewed all pertinent imaging results/findings within the past 24 hours.      Assessment/Plan:     ID  Osteomyelitis of right foot  Julien Meléndez is a 83 y.o. male with IDSA moderate infection.  X-ray and MRI shows osteomyelitis of the 2nd MTPJ, with possible septic arthritis of the 2nd MTPJ and digits 3 through 5.  WBC count within normal limits, vital signs are stable.      Now s/p Right TMA (3/27/24)    - Seen POD5 of TMA of Right foot.  Scant serosanguineous drainage was reported in interim.  - Dressed today by Podiatry  - nursing to continue wound care   - continue wearing heel offloading boots.  - May resume anticoagulants as necessary  - interventional cardiology following.  - ABX plan per ID   - Discharge recs per plan of care note by Dr. Leung 3/28  - podiatry will follow         Mac Allen DPM  Podiatry  [facility]  Surg

## 2024-04-01 NOTE — PLAN OF CARE
Patient A&Ox1. On IV antibiotics. Doherty intact and draining well. PRN pain medicine given. Pt c/o burning in the right groin area, triad cream placed, MD informed and added PRN hydroxyzine. Right foot dressing changed by Md. Zflex boots on. Call light within reach. Bed alarm on.

## 2024-04-01 NOTE — PT/OT/SLP PROGRESS
Occupational Therapy   Treatment    Name: Julien Meléndez  MRN: [id number]  Admitting Diagnosis:  Critical limb ischemia of right lower extremity  5 Days Post-Op    Recommendations:     Discharge Recommendations: Moderate Intensity Therapy  Discharge Equipment Recommendations:  to be determined by next level of care  Barriers to discharge:  Other (Comment) (increased level of assist, impaired cognition)    Assessment:     Julien Meléndez is a 83 y.o. male with a medical diagnosis of Critical limb ischemia of right lower extremity.  He presents with ..The primary encounter diagnosis was Critical limb ischemia of right lower extremity. Diagnoses of Foot infection, PAD (peripheral artery disease), Type 2 diabetes mellitus with hyperglycemia, unspecified whether long term insulin use, Chest pain, Wound infection, Osteomyelitis of right foot, unspecified type, and Cellulitis of foot were also pertinent to this visit.  . Performance deficits affecting function are impaired endurance, weakness, impaired cognition, decreased ROM, impaired muscle length, impaired sensation, impaired self care skills, impaired functional mobility, gait instability, impaired balance, pain, decreased safety awareness, decreased lower extremity function, impaired skin, decreased upper extremity function, decreased coordination, orthopedic precautions.     Continue POC. Increased fatigue this date. Unable to progress sit to stand out of bed. Recommend moderate intensity therapy.    Rehab Prognosis:  Good; patient would benefit from acute skilled OT services to address these deficits and reach maximum level of function.       Plan:     Patient to be seen 3 x/week to address the above listed problems via self-care/home management, therapeutic activities, therapeutic exercises, cognitive retraining  Plan of Care Expires: 04/25/24  Plan of Care Reviewed with: patient, spouse    Subjective     Chief Complaint: pain in low back  Patient/Family  Comments/goals: Patient's spouse reports that she herself is receiving physical therapy and that she is unable to physically assist patient as much as she would like to.  Pain/Comfort:  Pain Rating 1: 8/10 (back)  Pain Addressed 1: Reposition  Pain Rating Post-Intervention 1: 6/10 (back)    Objective:     Communicated with: nursing prior to session.  Patient found HOB elevated with bed alarm, telemetry, peripheral IV, bailye catheter, Other (comments) (telesitter, waffle overlay, pressure relief boots) upon OT entry to room.    General Precautions: Standard, fall, contact    Orthopedic Precautions:RLE non weight bearing  Braces:  (DAR[employer] shoe for left foot for stability)  Respiratory Status: Room air     Occupational Performance:     Bed Mobility:    Patient completed Supine to Sit with stand by assistance  Patient completed Sit to Supine with max assist x2      Functional Mobility/Transfers:  Patient completed Sit <> Stand Transfer with unable to clear buttocks  with  max effort / education on RLE NWB and walker management sequencing technique instructions   Functional Mobility: eob sitting x ~10 minutes with variance assist, SBA initially followed by need for increased assist to maximal assist 2/2 fatigue    Therapeutic Exercise:  Pre-bed mobility ex: cervical active ROM - head turn right to midline x5 reps, head turn left to midline x5 reps; brief BUE elevation/depression  Sitting eob ex: 2 x10 scapular retraction with cervical extension with minimal cues/ verbal instructions and brief 3 second holds; L ankle pumps  Education / instruction to spouse / patient to encourage movement throughout the day      [facility] 6 Click ADL: 12    Treatment & Education:  Patient oriented to self, spouse in room, and with 3 choices correctly chooses hospital setting.  Cotx with  PT for portions of session due to complex nature of patient and for safety with mobility to decrease fall risk for patient and caregiver injury requiring two  skilled therapists to provide different interventions.  Lights on / blinds open for delirium reduction.  Patient with self-recall from prior session,  of NWB to RLE when therapist inquires - however, during activity / action elements of session (ie. During attempted sit to stand) patient requires constant cues / assistance for bringing RLE forward / preventing weightbearing.  Pain reduction as above with activity.  Transfer training as above with emphasis on task breakdown, positioning, BUE/ BLE instruction for placement and visual demonstration; unable to achieve full stand.  Increased fatigue with prolonged sitting eob, thus session modified, assisted return back to bed after failed attempts for improving sitting posture.  Notified CNA  regarding soiled BM at end of session (patient without awareness/ incontinent bowel movement).  Educated spouse on therapy recommendations for ongoing education / post acute care therapy with agreement.    Patient left HOB elevated with all lines intact, call button in reach, bed alarm on, and nursing notified    GOALS:   Multidisciplinary Problems       Occupational Therapy Goals          Problem: Occupational Therapy    Goal Priority Disciplines Outcome Interventions   Occupational Therapy Goal     OT, PT/OT Ongoing, Progressing    Description: Goals to be met by: 4/25/2024     Patient will increase functional independence with ADLs by performing:    Rolling to Bilateral with independence for toileting regimen.  Toilet transfer to DA bedside commode with Contact Guard Assistance and WB precaution adherence with least restrictive device.  Dynamic sitting balance with independence for ADLs.  Family or caregiver 100% verbalized reciprocation of dementia friendly recommendations (ex: simplify choices; decrease visual clutter; remove hazardous objects; maximize familiarity, etc) to support patient's wellbeing and highest level of occupational engagement and participation in least  restrictive discharge environment                          Time Tracking:     OT Date of Treatment: 04/01/24  OT Start Time: 0904  OT Stop Time: 0925  OT Total Time (min): 21 min total time; cotx Pt    Billable Minutes:Therapeutic Activity 10 min  Therapeutic Exercise 8 min    OT/GENA: OT          4/1/2024

## 2024-04-01 NOTE — SUBJECTIVE & OBJECTIVE
Subjective:     Interval History: NAEON, NAD, VSS. Patient is afebrile, dressings are clean/dry/ and intact.   New pedal complaints: None  New results:   Denies post op fever.      Follow-up For: Procedure(s) (LRB):  AMPUTATION, FOOT, TRANSMETATARSAL (Right)    Post-Operative Day: 5 Days Post-Op    Scheduled Meds:   aspirin  81 mg Oral Daily    atorvastatin  40 mg Oral Daily    clopidogreL  75 mg Oral Daily    clotrimazole-betamethasone 1-0.05%   Topical (Top) BID    enoxparin  1 mg/kg Subcutaneous Q12H (prophylaxis, 0900/2100)    FLUoxetine  40 mg Oral Daily    furosemide  20 mg Oral Daily    insulin detemir U-100  10 Units Subcutaneous QHS    isosorbide mononitrate  30 mg Oral Daily    meropenem IV (PEDS and ADULTS)  1 g Intravenous Q8H    metoprolol tartrate  12.5 mg Oral BID    pantoprazole  40 mg Oral BID    polyethylene glycol  17 g Oral BID    ranolazine  1,000 mg Oral BID    senna-docusate 8.6-50 mg  1 tablet Oral BID    sodium chloride 0.9%  10 mL Intravenous Q8H    tamsulosin  0.4 mg Oral Daily    traZODone  150 mg Oral QHS     Continuous Infusions:   heparin (porcine) 2,000 Units/hr (03/26/24 1026)     PRN Meds:sodium chloride 0.9%, acetaminophen, albuterol-ipratropium, aluminum-magnesium hydroxide-simethicone, artificial tears, dextrose 10%, dextrose 10%, glucagon (human recombinant), glucose, glucose, heparin (porcine), HYDROcodone-acetaminophen, hydrOXYzine HCL, influenza 65up-adj, insulin aspart U-100, magnesium hydroxide 400 mg/5 ml, melatonin, ondansetron, ondansetron, simethicone    Review of Systems   Constitutional:  Negative for fever.   Respiratory:  Negative for shortness of breath.    Cardiovascular:  Negative for chest pain.   Gastrointestinal:  Negative for abdominal pain.   Genitourinary:  Negative for dysuria.   Neurological:  Negative for headaches.     Objective:     Vital Signs (Most Recent):  Temp: 97.7 °F (36.5 °C) (04/01/24 1229)  Pulse: (!) 58 (04/01/24 1229)  Resp: 20 (04/01/24  "1229)  BP: (!) 151/72 (04/01/24 1229)  SpO2: 98 % (04/01/24 1229) Vital Signs (24h Range):  Temp:  [97.7 °F (36.5 °C)-99 °F (37.2 °C)] 97.7 °F (36.5 °C)  Pulse:  [58-75] 58  Resp:  [16-20] 20  SpO2:  [97 %-99 %] 98 %  BP: (112-151)/(54-72) 151/72     Weight: 79.5 kg (175 lb 4.3 oz)  Body mass index is 22.5 kg/m².    Foot Exam    General  General Appearance: appears stated age and healthy   Orientation: alert and oriented to person, place, and time   Affect: appropriate       Right Foot/Ankle     Inspection and Palpation  Ecchymosis: none  Tenderness: none   Swelling: none   Skin Exam: drainage and erythema; no ulcer     Neurovascular  Dorsalis pedis: 1+  Posterior tibial: 1+  Saphenous nerve sensation: diminished  Tibial nerve sensation: diminished  Superficial peroneal nerve sensation: diminished  Deep peroneal nerve sensation: diminished  Sural nerve sensation: diminished    Comments  S/p R TMA.     Serosanguineous strike through noted to dressings, with coagulated blood found on inside of dressings.  Sutures along right TMA otherwise intact and stable with minute skin dehiscence on lateral aspect of incision, where much of bleeding has occurred.      Left Foot/Ankle      Inspection and Palpation  Ecchymosis: none  Tenderness: none   Swelling: none   Skin Exam: skin not intact     Neurovascular  Dorsalis pedis: 1+  Posterior tibial: 1+  Saphenous nerve sensation: diminished  Tibial nerve sensation: diminished  Superficial peroneal nerve sensation: diminished  Deep peroneal nerve sensation: diminished  Sural nerve sensation: diminished    Comments  No open wounds.              Laboratory:  A1C:   Recent Labs   Lab 10/23/23  1426 03/15/24  1140   HGBA1C 9.7* 8.3*     CRP: No results for input(s): "CRP" in the last 168 hours.  ESR: No results for input(s): "SEDRATE" in the last 168 hours.  Wound Cultures:   Recent Labs   Lab 11/07/23  2208 11/10/23  1249 12/12/23  1346 03/22/24  1513 03/27/24  1347   LABAERO " METHICILLIN RESISTANT STAPHYLOCOCCUS AUREUS  Many  Skin amy also present  * METHICILLIN RESISTANT STAPHYLOCOCCUS AUREUS  Many  * CANDIDA ALBICANS  Few  * STAPHYLOCOCCUS AUREUS  Moderate  *  STAPHYLOCOCCUS AUREUS  Few  *  PSEUDOMONAS AERUGINOSA  Few  * STAPHYLOCOCCUS AUREUS  Rare  *     Microbiology Results (last 7 days)       Procedure Component Value Units Date/Time    Culture, Anaerobe [[id number]] Collected: 03/27/24 1347    Order Status: Completed Specimen: Bone from Foot, Right Updated: 04/01/24 0718     Anaerobic Culture No anaerobes isolated    Narrative:      Transmetatarsal    Aerobic culture [[id number]]  (Abnormal)  (Susceptibility) Collected: 03/27/24 1347    Order Status: Completed Specimen: Bone from Foot, Right Updated: 03/30/24 1157     Aerobic Bacterial Culture STAPHYLOCOCCUS AUREUS  Rare      Narrative:      Transmetatarsal    AFB Culture & Smear [[id number]] Collected: 03/27/24 1347    Order Status: Completed Specimen: Bone from Foot, Right Updated: 03/29/24 0927     AFB Culture & Smear Culture in progress     AFB CULTURE STAIN No acid fast bacilli seen.    Narrative:      Transmetatarsal    Gram stain [[id number]] Collected: 03/27/24 1347    Order Status: Completed Specimen: Bone from Foot, Right Updated: 03/28/24 0429     Gram Stain Result No WBC's      No organisms seen    Narrative:      Transmetatarsal    Fungus culture [[id number]] Collected: 03/27/24 1347    Order Status: Sent Specimen: Bone from Foot, Right Updated: 03/27/24 2018    Culture, Anaerobe [[id number]] Collected: 03/22/24 1513    Order Status: Completed Specimen: Wound from Foot, Right Updated: 03/27/24 0915     Anaerobic Culture No anaerobes isolated    Narrative:      Right 5th toe    Culture, Anaerobe [[id number]] Collected: 03/22/24 1513    Order Status: Completed Specimen: Wound from Foot, Right Updated: 03/27/24 0913     Anaerobic Culture No anaerobes isolated    Narrative:      Right 2nd toe    Blood culture #1  **CANNOT BE ORDERED STAT** [[id number]] Collected: 03/21/24 1352    Order Status: Completed Specimen: Blood from Wrist, Right Updated: 03/26/24 2012     Blood Culture, Routine No growth after 5 days.    Blood culture [[id number]] Collected: 03/21/24 1758    Order Status: Completed Specimen: Blood Updated: 03/26/24 2012     Blood Culture, Routine No growth after 5 days.    Blood culture [[id number]] Collected: 03/21/24 1757    Order Status: Completed Specimen: Blood Updated: 03/26/24 2012     Blood Culture, Routine No growth after 5 days.    Blood culture #2 **CANNOT BE ORDERED STAT** [[id number]] Collected: 03/21/24 1343    Order Status: Completed Specimen: Blood from Peripheral, Antecubital, Right Updated: 03/26/24 2012     Blood Culture, Routine No growth after 5 days.          Specimen (24h ago, onward)      None            Diagnostic Results:  I have reviewed all pertinent imaging results/findings within the past 24 hours.

## 2024-04-01 NOTE — PLAN OF CARE
went to meet with patient and spouse Kylee at bedside. Plan confirmed as [facility] for SNF. Per ID note, patient will need 4 weeks of IV antibiotics. Dr. Momin to write PICC line orders.  contacted Ericka with [facility] to update her on patient. She will contact patient's spouse to sign papers. Spouse aware as well.  sent updated clinicals to [facility] via [facility]. Cardiology, Podiatry, and ID follow-up appointments requested from access navigator. Patient/Spouse encouraged to call with any questions or concerns.  will continue to follow patient through transitions of care and assist with any discharge needs.     Patient Contacts    Name Relation Home Work Mobile   Kylee Meléndez Spouse [phone]     Future Appointments   Date Time Provider Department Center   4/15/2024  8:00 AM Gladis Fermin NP [facility]         04/01/24 1445   Discharge Reassessment   Assessment Type Discharge Planning Reassessment   Did the patient's condition or plan change since previous assessment? No   Discharge Plan discussed with: Spouse/sig other;Patient   Discharge Plan A Skilled Nursing Facility   Discharge Plan B Home with family;Home Health   DME Needed Upon Discharge  none   Transition of Care Barriers None   Why the patient remains in the hospital Requires continued medical care   Post-Acute Status   Post-Acute Authorization Placement   Post-Acute Placement Status Referrals Sent   Hospital Resources/Appts/Education Provided Appointments scheduled and added to AVS   Discharge Delays None known at this time     Adriana Fernández RN    [phone]

## 2024-04-01 NOTE — PROGRESS NOTES
[facility] Medicine  Progress Note    Patient Name: Julien Meléndez  MRN: [id number]  Patient Class: IP- Inpatient   Admission Date: 3/21/2024  Length of Stay: 11 days  Attending Physician: Nicole Momin MD  Primary Care Provider: Kelvin Wayne MD        Subjective:     Principal Problem:Critical limb ischemia of right lower extremity        HPI:  Julien Meléndez is a 83 yr old male presents to the ER with reports of right foot infection. Pts family states over the past 2 days, they noticed an increase in erythema and drainage to the site. Denies fever. Denies taking oral antbx for this specific wound. Pt states he was advised to come to the ER for an evaluation from wound care. Wound has been dressed by HH. 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 Abnormal edema signal involving the 3rd metatarsophalangeal joint, 4th proximal interphalangeal joint and 5th distal interphalangeal joint concerning for reactive edema or early changes of septic arthritis. Will cont vancomycin and Zosyn and will admit to the [facility] service for further care.      Overview/Hospital Course:  podiatry, ortho, infectious disease consulted, appreciate recs . Pt taken for Aortogram with selective right lower extremity angiogram  on 3/26.  ID switching zosyn to meropenem 1g Q8 . S/p TMA on 3/27. ID recommends for continue meropenem for 4 weeks from 3/22/2024, can discontinue if pathology comes back negative. SNF pending  3/29: pt had severe bleeding with gauze and dressing in blood. Asa, plavix and lovenox on hold.   3/30: Podiatry was able to dressed and packed the incision sites. No bleeding reported today so far. Resumed lovenox,  asa and plavix    Interval History: NAEON. No acute concerns.     Review of Systems   Constitutional:  Negative for fever.   Respiratory:  Negative for shortness of breath.    Cardiovascular:  Negative for chest pain.   Gastrointestinal:  Negative for abdominal pain.   Genitourinary:  Negative for dysuria.   Neurological:  Negative for headaches.     Objective:     Vital Signs (Most Recent):  Temp: 97.7 °F (36.5 °C) (04/01/24 1229)  Pulse: (!) 58 (04/01/24 1229)  Resp: 20 (04/01/24 1229)  BP: (!) 151/72 (04/01/24 1229)  SpO2: 98 % (04/01/24 1229) Vital Signs (24h Range):  Temp:  [97.7 °F (36.5 °C)-99 °F (37.2 °C)] 97.7 °F (36.5 °C)  Pulse:  [58-75] 58  Resp:  [16-20] 20  SpO2:  [97 %-99 %] 98 %  BP: (112-151)/(54-72) 151/72     Weight: 79.5 kg (175 lb 4.3 oz)  Body mass index is 22.5 kg/m².    Intake/Output Summary (Last 24 hours) at 4/1/2024 1231  Last data filed at 4/1/2024 0645  Gross per 24 hour   Intake 1123.37 ml   Output 650 ml   Net 473.37 ml         Physical Exam  Vitals and nursing note reviewed.   Constitutional:       General: He is not in acute distress.     Appearance: He is well-developed.   HENT:      Head: Normocephalic and atraumatic.   Eyes:      Conjunctiva/sclera: Conjunctivae normal.   Neck:      Vascular: No JVD.   Cardiovascular:      Rate and Rhythm: Normal rate and regular rhythm.      Heart sounds: Normal heart sounds.   Pulmonary:      Effort: Pulmonary effort is normal.      Breath sounds: Normal breath sounds.   Abdominal:      General: Bowel sounds are normal. There is no distension.      Palpations: Abdomen is soft.      Tenderness: There is no abdominal tenderness.   Musculoskeletal:      Cervical back: Neck supple.      Right lower leg: No edema.      Left lower leg: No edema.   Feet:      Comments: R foot in bulky dressing  Neurological:      Mental Status: He is alert.   Psychiatric:         Behavior: Behavior normal.             Significant Labs: All pertinent labs within the  past 24 hours have been reviewed.    Significant Imaging:  none    Assessment/Plan:      * Critical limb ischemia of right lower extremity  PAD (peripheral artery disease)     -Podiatry, Cardiology, and infectious disease following    Blood cultures NGTD   Bone cultures growing MRSA, wound cultures growing MRSA/ pseudomonas     Bilateral lower extremity arterial ultrasound:  1. Hemodynamically significant stenosis in the left mid superficial femoral artery and the left anterior tibial artery.   2. Monophasic waveforms in the left distal popliteal, anterior tibial, and posterior tibial arteries.     S/p peripheral angiogram on 3/26 that showed extensive collateral circulation. Consider intervention if impaired healing noted after TMA  S/p right TMA on 3/27    ID rec's:  continue meropenem for 4 weeks from last procedure which would be TMA on 3/27. Needs weekly cbc, cmp, and crp and fax to [phone]    Will need close cardiology, podiatry follow up       Phimosis  Stable       Osteomyelitis of right foot  -s/p TMA        Septic joint right foot  S/p TMA      Diabetic foot ulcer-right  S/p TMA    Dementia  -history noted of dementia  - no acute issues    PAD (peripheral artery disease)  Stable    Depressive disorder  Patient has depression which is currently controlled. Will Continue anti-depressant medications. We will not consult psychiatry at this time. Patient does not display psychosis at this time. Continue to monitor closely and adjust plan of care as needed.        Atrial fibrillation  -history of Afib  -cont anticoagulation  -on Lovenox while IP  -can resume eliquis upon DC    KELSEY (obstructive sleep apnea)  - CPAP at night and p.r.n.      Mixed hyperlipidemia  -continue statin         VTE Risk Mitigation (From admission, onward)           Ordered     enoxaparin injection 80 mg  Every 12 hours         03/30/24 1130     heparin infusion 1,000 units/500 ml in 0.9% NaCl (pressure line flush)  Intra-op  continuous PRN         03/26/24 1027     IP VTE HIGH RISK PATIENT  Once         03/21/24 1502                    Discharge Planning   AURELIA:      Code Status: Full Code   Is the patient medically ready for discharge?:     Reason for patient still in hospital (select all that apply): Pending disposition  Discharge Plan A: Skilled Nursing Facility   Discharge Delays: None known at this time      Nicole Momin MD  Department of Hospital Medicine   [facility]

## 2024-04-01 NOTE — PT/OT/SLP PROGRESS
Physical Therapy Treatment    Patient Name:  Julien Meléndez   MRN:  [id number]    Recommendations:     Discharge Recommendations: Moderate Intensity Therapy  Discharge Equipment Recommendations: to be determined by next level of care  Barriers to discharge: Decreased caregiver support    Assessment:     Julien Meléndez is a 83 y.o. male admitted with a medical diagnosis of Critical limb ischemia of right lower extremity.  He presents with the following impairments/functional limitations: weakness, impaired endurance, impaired functional mobility, gait instability, impaired balance, impaired cognition, decreased lower extremity function, decreased upper extremity function, decreased safety awareness, pain, decreased ROM, impaired cardiopulmonary response to activity. PT treatment performed this date as a co-tx with OT in order to progress patient to standing. PT provided visual demonstration for patient on how to complete transfers NWB with RW and patient given tactile cues and max verbal education, however, pt unable to maintain NWB RLE during set up of transfer when getting to the appropriate starting position. Patient with quick onset fatigue this date as he tolerated eob x 10 min with variable assist levels ranging from sba to max a. See below for further treatment details. Pt remains unsafe to dc home and will benefit from further skilled PT at time of dc.     Rehab Prognosis: Good; patient would benefit from acute skilled PT services to address these deficits and reach maximum level of function.    Recent Surgery: Procedure(s) (LRB):  AMPUTATION, FOOT, TRANSMETATARSAL (Right) 5 Days Post-Op    Plan:     During this hospitalization, patient to be seen 3 x/week to address the identified rehab impairments via wheelchair management/training and progress toward the following goals:    Plan of Care Expires:  04/11/24    Subjective     Chief Complaint: pain in his bottom, improved with repositioning.   Patient/Family  Comments/goals: to go home   Pain/Comfort:  Pain Rating 1: 8/10  Location 1:  (buttock)  Pain Addressed 1: Reposition, Cessation of Activity  Pain Rating Post-Intervention 1: 6/10  Location 2:  (buttock)      Objective:     Communicated with RN prior to session.  Patient found supine with bed alarm, telemetry, bailey catheter upon PT entry to room.     General Precautions: Standard, fall  Orthopedic Precautions: RLE non weight bearing  Braces: N/A  Respiratory Status: Room air     Functional Mobility:  Bed Mobility:     Supine to Sit: stand by assistance  Sit to Supine: maximal assistance and of 2 persons  Transfers:   Attempted, educated on positioning for optimal success with transfer training. Pt unable to sequence and coordinate BUE/BLE optimal hand placement  and maintain Wbing restrictions.   Balance: Good static sitting balance initially unsupported, with onset of fatigue, requires max a to maintain given significant anterior trunk lean. Rounded shoulders noted and pt with kyphosis. Cues for optimal upright posture and cervical extension, as patient presents with flexion on c-spine with onset of fatigue.       AM-PAC 6 CLICK MOBILITY  Turning over in bed (including adjusting bedclothes, sheets and blankets)?: 3  Sitting down on and standing up from a chair with arms (e.g., wheelchair, bedside commode, etc.): 1  Moving from lying on back to sitting on the side of the bed?: 2  Moving to and from a bed to a chair (including a wheelchair)?: 1  Need to walk in hospital room?: 1  Climbing 3-5 steps with a railing?: 1  Basic Mobility Total Score: 9       Treatment & Education:  See above for further details and treatment performance.   Pt requiring increased assistance for return to supine given fatigueo and worsening sitting balance. Pt sat eob x 10 min with sba-max a and provided vc's for postural impairments and improved upright posture. Will continue to follow and progress as able.     Patient left supine with  all lines intact, call button in reach, and RN notified..    GOALS:   Multidisciplinary Problems       Physical Therapy Goals          Problem: Physical Therapy    Goal Priority Disciplines Outcome Goal Variances Interventions   Physical Therapy Goal     PT, PT/OT Ongoing, Progressing     Description: Goals to be met by: 2024     Patient will increase functional independence with mobility by performin. Sit to stand transfer with Moderate Assistance w/RW  2. Bed to chair transfer with Moderate Assistance using Rolling Walker  3. Patient will be able to laterally scoot along eob with spv x 5 reps to prepare for transfer training.                          Time Tracking:     PT Received On: 24  PT Start Time: 906     PT Stop Time: 925  PT Total Time (min): 19 min     Billable Minutes: Therapeutic Activity 19    Treatment Type: Treatment  PT/PTA: PT     Number of PTA visits since last PT visit: 0     2024

## 2024-04-01 NOTE — SUBJECTIVE & OBJECTIVE
Interval History: NAEON. No acute concerns.     Review of Systems   Constitutional:  Negative for fever.   Respiratory:  Negative for shortness of breath.    Cardiovascular:  Negative for chest pain.   Gastrointestinal:  Negative for abdominal pain.   Genitourinary:  Negative for dysuria.   Neurological:  Negative for headaches.     Objective:     Vital Signs (Most Recent):  Temp: 97.7 °F (36.5 °C) (04/01/24 1229)  Pulse: (!) 58 (04/01/24 1229)  Resp: 20 (04/01/24 1229)  BP: (!) 151/72 (04/01/24 1229)  SpO2: 98 % (04/01/24 1229) Vital Signs (24h Range):  Temp:  [97.7 °F (36.5 °C)-99 °F (37.2 °C)] 97.7 °F (36.5 °C)  Pulse:  [58-75] 58  Resp:  [16-20] 20  SpO2:  [97 %-99 %] 98 %  BP: (112-151)/(54-72) 151/72     Weight: 79.5 kg (175 lb 4.3 oz)  Body mass index is 22.5 kg/m².    Intake/Output Summary (Last 24 hours) at 4/1/2024 1231  Last data filed at 4/1/2024 0645  Gross per 24 hour   Intake 1123.37 ml   Output 650 ml   Net 473.37 ml         Physical Exam  Vitals and nursing note reviewed.   Constitutional:       General: He is not in acute distress.     Appearance: He is well-developed.   HENT:      Head: Normocephalic and atraumatic.   Eyes:      Conjunctiva/sclera: Conjunctivae normal.   Neck:      Vascular: No JVD.   Cardiovascular:      Rate and Rhythm: Normal rate and regular rhythm.      Heart sounds: Normal heart sounds.   Pulmonary:      Effort: Pulmonary effort is normal.      Breath sounds: Normal breath sounds.   Abdominal:      General: Bowel sounds are normal. There is no distension.      Palpations: Abdomen is soft.      Tenderness: There is no abdominal tenderness.   Musculoskeletal:      Cervical back: Neck supple.      Right lower leg: No edema.      Left lower leg: No edema.   Feet:      Comments: R foot in bulky dressing  Neurological:      Mental Status: He is alert.   Psychiatric:         Behavior: Behavior normal.             Significant Labs: All pertinent labs within the past 24 hours have  been reviewed.    Significant Imaging:  none

## 2024-04-01 NOTE — ASSESSMENT & PLAN NOTE
Julien Meléndez is a 83 y.o. male with IDSA moderate infection.  X-ray and MRI shows osteomyelitis of the 2nd MTPJ, with possible septic arthritis of the 2nd MTPJ and digits 3 through 5.  WBC count within normal limits, vital signs are stable.      Now s/p Right TMA (3/27/24)    - Seen POD5 of TMA of Right foot.  Scant serosanguineous drainage was reported in interim.  - Dressed today by Podiatry  - nursing to continue wound care   - continue wearing heel offloading boots.  - May resume anticoagulants as necessary  - interventional cardiology following.  - ABX plan per ID   - Discharge recs per plan of care note by Dr. Leung 3/28  - podiatry will follow

## 2024-04-01 NOTE — ASSESSMENT & PLAN NOTE
Patient has depression which is currently controlled. Will Continue anti-depressant medications. We will not consult psychiatry at this time. Patient does not display psychosis at this time. Continue to monitor closely and adjust plan of care as needed.

## 2024-04-01 NOTE — ASSESSMENT & PLAN NOTE
PAD (peripheral artery disease)     -Podiatry, Cardiology, and infectious disease following    Blood cultures NGTD   Bone cultures growing MRSA, wound cultures growing MRSA/ pseudomonas     Bilateral lower extremity arterial ultrasound:  1. Hemodynamically significant stenosis in the left mid superficial femoral artery and the left anterior tibial artery.   2. Monophasic waveforms in the left distal popliteal, anterior tibial, and posterior tibial arteries.     S/p peripheral angiogram on 3/26 that showed extensive collateral circulation. Consider intervention if impaired healing noted after TMA  S/p right TMA on 3/27    ID rec's:  continue meropenem for 4 weeks from last procedure which would be TMA on 3/27. Needs weekly cbc, cmp, and crp and fax to [phone]    Will need close cardiology, podiatry follow up

## 2024-04-02 ENCOUNTER — TELEPHONE (OUTPATIENT)
Dept: PODIATRY | Facility: CLINIC | Age: 84
End: 2024-04-02
Payer: MEDICARE

## 2024-04-02 VITALS
DIASTOLIC BLOOD PRESSURE: 63 MMHG | HEART RATE: 71 BPM | OXYGEN SATURATION: 99 % | TEMPERATURE: 98 F | HEIGHT: 74 IN | RESPIRATION RATE: 20 BRPM | BODY MASS INDEX: 22.49 KG/M2 | SYSTOLIC BLOOD PRESSURE: 123 MMHG | WEIGHT: 175.25 LBS

## 2024-04-02 LAB
ALBUMIN SERPL BCP-MCNC: 3 G/DL (ref 3.5–5.2)
ALP SERPL-CCNC: 77 U/L (ref 55–135)
ALT SERPL W/O P-5'-P-CCNC: 10 U/L (ref 10–44)
ANION GAP SERPL CALC-SCNC: 8 MMOL/L (ref 8–16)
AST SERPL-CCNC: 9 U/L (ref 10–40)
BASOPHILS # BLD AUTO: 0.05 K/UL (ref 0–0.2)
BASOPHILS NFR BLD: 0.5 % (ref 0–1.9)
BILIRUB SERPL-MCNC: 0.4 MG/DL (ref 0.1–1)
BUN SERPL-MCNC: 17 MG/DL (ref 8–23)
CALCIUM SERPL-MCNC: 9.3 MG/DL (ref 8.7–10.5)
CHLORIDE SERPL-SCNC: 100 MMOL/L (ref 95–110)
CO2 SERPL-SCNC: 28 MMOL/L (ref 23–29)
CREAT SERPL-MCNC: 1.1 MG/DL (ref 0.5–1.4)
DIFFERENTIAL METHOD BLD: ABNORMAL
EOSINOPHIL # BLD AUTO: 0.4 K/UL (ref 0–0.5)
EOSINOPHIL NFR BLD: 4.3 % (ref 0–8)
ERYTHROCYTE [DISTWIDTH] IN BLOOD BY AUTOMATED COUNT: 16.9 % (ref 11.5–14.5)
EST. GFR  (NO RACE VARIABLE): >60 ML/MIN/1.73 M^2
GLUCOSE SERPL-MCNC: 296 MG/DL (ref 70–110)
HCT VFR BLD AUTO: 31.3 % (ref 40–54)
HGB BLD-MCNC: 10.3 G/DL (ref 14–18)
IMM GRANULOCYTES # BLD AUTO: 0.05 K/UL (ref 0–0.04)
IMM GRANULOCYTES NFR BLD AUTO: 0.5 % (ref 0–0.5)
LYMPHOCYTES # BLD AUTO: 2.3 K/UL (ref 1–4.8)
LYMPHOCYTES NFR BLD: 25.1 % (ref 18–48)
MCH RBC QN AUTO: 27.8 PG (ref 27–31)
MCHC RBC AUTO-ENTMCNC: 32.9 G/DL (ref 32–36)
MCV RBC AUTO: 84 FL (ref 82–98)
MONOCYTES # BLD AUTO: 0.7 K/UL (ref 0.3–1)
MONOCYTES NFR BLD: 7.6 % (ref 4–15)
NEUTROPHILS # BLD AUTO: 5.7 K/UL (ref 1.8–7.7)
NEUTROPHILS NFR BLD: 62 % (ref 38–73)
NRBC BLD-RTO: 0 /100 WBC
PLATELET # BLD AUTO: 277 K/UL (ref 150–450)
PMV BLD AUTO: 10.6 FL (ref 9.2–12.9)
POCT GLUCOSE: 191 MG/DL (ref 70–110)
POCT GLUCOSE: 285 MG/DL (ref 70–110)
POCT GLUCOSE: 349 MG/DL (ref 70–110)
POTASSIUM SERPL-SCNC: 4.5 MMOL/L (ref 3.5–5.1)
PROT SERPL-MCNC: 6.9 G/DL (ref 6–8.4)
RBC # BLD AUTO: 3.71 M/UL (ref 4.6–6.2)
SODIUM SERPL-SCNC: 136 MMOL/L (ref 136–145)
WBC # BLD AUTO: 9.22 K/UL (ref 3.9–12.7)

## 2024-04-02 PROCEDURE — 36415 COLL VENOUS BLD VENIPUNCTURE: CPT | Performed by: INTERNAL MEDICINE

## 2024-04-02 PROCEDURE — 97530 THERAPEUTIC ACTIVITIES: CPT | Mod: CO

## 2024-04-02 PROCEDURE — 36410 VNPNXR 3YR/> PHY/QHP DX/THER: CPT

## 2024-04-02 PROCEDURE — A4216 STERILE WATER/SALINE, 10 ML: HCPCS | Performed by: NURSE PRACTITIONER

## 2024-04-02 PROCEDURE — 94660 CPAP INITIATION&MGMT: CPT

## 2024-04-02 PROCEDURE — 25000003 PHARM REV CODE 250: Performed by: STUDENT IN AN ORGANIZED HEALTH CARE EDUCATION/TRAINING PROGRAM

## 2024-04-02 PROCEDURE — 25000003 PHARM REV CODE 250: Performed by: NURSE PRACTITIONER

## 2024-04-02 PROCEDURE — 85025 COMPLETE CBC W/AUTO DIFF WBC: CPT | Performed by: INTERNAL MEDICINE

## 2024-04-02 PROCEDURE — 99900035 HC TECH TIME PER 15 MIN (STAT)

## 2024-04-02 PROCEDURE — 63600175 PHARM REV CODE 636 W HCPCS: Performed by: FAMILY MEDICINE

## 2024-04-02 PROCEDURE — 97535 SELF CARE MNGMENT TRAINING: CPT | Mod: CO

## 2024-04-02 PROCEDURE — 25000003 PHARM REV CODE 250

## 2024-04-02 PROCEDURE — C1751 CATH, INF, PER/CENT/MIDLINE: HCPCS

## 2024-04-02 PROCEDURE — 80053 COMPREHEN METABOLIC PANEL: CPT | Performed by: INTERNAL MEDICINE

## 2024-04-02 PROCEDURE — 97530 THERAPEUTIC ACTIVITIES: CPT

## 2024-04-02 PROCEDURE — 63600175 PHARM REV CODE 636 W HCPCS

## 2024-04-02 PROCEDURE — 25000003 PHARM REV CODE 250: Performed by: FAMILY MEDICINE

## 2024-04-02 RX ORDER — AMOXICILLIN 250 MG
1 CAPSULE ORAL 2 TIMES DAILY
Qty: 60 TABLET | Refills: 0 | Status: SHIPPED | OUTPATIENT
Start: 2024-04-02 | End: 2024-05-02

## 2024-04-02 RX ORDER — HYDROCODONE BITARTRATE AND ACETAMINOPHEN 10; 325 MG/1; MG/1
1 TABLET ORAL EVERY 6 HOURS PRN
Qty: 20 TABLET | Refills: 0 | Status: SHIPPED | OUTPATIENT
Start: 2024-04-02 | End: 2024-04-29

## 2024-04-02 RX ORDER — POLYETHYLENE GLYCOL 3350 17 G/17G
17 POWDER, FOR SOLUTION ORAL 2 TIMES DAILY
Qty: 60 EACH | Refills: 0 | Status: SHIPPED | OUTPATIENT
Start: 2024-04-02 | End: 2024-05-02

## 2024-04-02 RX ORDER — METOPROLOL TARTRATE 25 MG/1
12.5 TABLET, FILM COATED ORAL 2 TIMES DAILY
Qty: 30 TABLET | Refills: 11 | Status: SHIPPED | OUTPATIENT
Start: 2024-04-02 | End: 2024-04-22

## 2024-04-02 RX ORDER — SODIUM CHLORIDE 0.9 % (FLUSH) 0.9 %
10 SYRINGE (ML) INJECTION
Status: DISCONTINUED | OUTPATIENT
Start: 2024-04-02 | End: 2024-04-02 | Stop reason: HOSPADM

## 2024-04-02 RX ORDER — SODIUM CHLORIDE 0.9 % (FLUSH) 0.9 %
10 SYRINGE (ML) INJECTION EVERY 6 HOURS
Status: DISCONTINUED | OUTPATIENT
Start: 2024-04-02 | End: 2024-04-02 | Stop reason: HOSPADM

## 2024-04-02 RX ORDER — NAPROXEN SODIUM 220 MG/1
81 TABLET, FILM COATED ORAL DAILY
Qty: 30 TABLET | Refills: 11 | Status: SHIPPED | OUTPATIENT
Start: 2024-04-03 | End: 2025-04-03

## 2024-04-02 RX ORDER — TAMSULOSIN HYDROCHLORIDE 0.4 MG/1
0.4 CAPSULE ORAL DAILY
Qty: 30 CAPSULE | Refills: 2 | Status: SHIPPED | OUTPATIENT
Start: 2024-04-03 | End: 2024-07-02

## 2024-04-02 RX ORDER — CLOTRIMAZOLE AND BETAMETHASONE DIPROPIONATE 10; .64 MG/G; MG/G
CREAM TOPICAL 2 TIMES DAILY
Qty: 15 G | Refills: 1 | Status: SHIPPED | OUTPATIENT
Start: 2024-04-02 | End: 2024-04-04

## 2024-04-02 RX ORDER — FUROSEMIDE 20 MG/1
20 TABLET ORAL DAILY
Qty: 90 TABLET | Refills: 3 | Status: SHIPPED | OUTPATIENT
Start: 2024-04-02 | End: 2024-04-22

## 2024-04-02 RX ADMIN — METOPROLOL TARTRATE 12.5 MG: 25 TABLET, FILM COATED ORAL at 09:04

## 2024-04-02 RX ADMIN — PANTOPRAZOLE SODIUM 40 MG: 40 GRANULE, DELAYED RELEASE ORAL at 08:04

## 2024-04-02 RX ADMIN — ATORVASTATIN CALCIUM 40 MG: 40 TABLET, FILM COATED ORAL at 08:04

## 2024-04-02 RX ADMIN — ENOXAPARIN SODIUM 80 MG: 80 INJECTION SUBCUTANEOUS at 08:04

## 2024-04-02 RX ADMIN — SENNOSIDES AND DOCUSATE SODIUM 1 TABLET: 8.6; 5 TABLET ORAL at 08:04

## 2024-04-02 RX ADMIN — RANOLAZINE 1000 MG: 500 TABLET, FILM COATED, EXTENDED RELEASE ORAL at 08:04

## 2024-04-02 RX ADMIN — ASPIRIN 81 MG CHEWABLE TABLET 81 MG: 81 TABLET CHEWABLE at 08:04

## 2024-04-02 RX ADMIN — ACETAMINOPHEN 650 MG: 325 TABLET ORAL at 12:04

## 2024-04-02 RX ADMIN — INSULIN ASPART 3 UNITS: 100 INJECTION, SOLUTION INTRAVENOUS; SUBCUTANEOUS at 12:04

## 2024-04-02 RX ADMIN — POLYETHYLENE GLYCOL 3350 17 G: 17 POWDER, FOR SOLUTION ORAL at 09:04

## 2024-04-02 RX ADMIN — INSULIN ASPART 4 UNITS: 100 INJECTION, SOLUTION INTRAVENOUS; SUBCUTANEOUS at 04:04

## 2024-04-02 RX ADMIN — FLUOXETINE HYDROCHLORIDE 40 MG: 20 CAPSULE ORAL at 08:04

## 2024-04-02 RX ADMIN — CLOPIDOGREL BISULFATE 75 MG: 75 TABLET ORAL at 08:04

## 2024-04-02 RX ADMIN — ISOSORBIDE MONONITRATE 30 MG: 30 TABLET, EXTENDED RELEASE ORAL at 08:04

## 2024-04-02 RX ADMIN — CLOTRIMAZOLE AND BETAMETHASONE DIPROPIONATE: 10; .5 CREAM TOPICAL at 12:04

## 2024-04-02 RX ADMIN — MEROPENEM 1 G: 1 INJECTION, POWDER, FOR SOLUTION INTRAVENOUS at 04:04

## 2024-04-02 RX ADMIN — MEROPENEM 1 G: 1 INJECTION, POWDER, FOR SOLUTION INTRAVENOUS at 09:04

## 2024-04-02 RX ADMIN — Medication 10 ML: at 04:04

## 2024-04-02 RX ADMIN — FUROSEMIDE 20 MG: 20 TABLET ORAL at 08:04

## 2024-04-02 RX ADMIN — TAMSULOSIN HYDROCHLORIDE 0.4 MG: 0.4 CAPSULE ORAL at 08:04

## 2024-04-02 RX ADMIN — MEROPENEM 1 G: 1 INJECTION, POWDER, FOR SOLUTION INTRAVENOUS at 12:04

## 2024-04-02 NOTE — PLAN OF CARE
[facility]    FACILITY TRANSFER ORDERS      Patient Name: Julien Meléndez  YOB: 1940    PCP: Kelvin Wayne MD   PCP Address: [address] / [place] [address]  PCP Phone Number: [phone]  PCP Fax: [phone]    Encounter Date: 04/02/2024    Admit to: SNF    Vital Signs:  Routine    Diagnoses:   Active Hospital Problems    Diagnosis  POA    *Critical limb ischemia of right lower extremity [I70.221]  Yes    Phimosis [N47.1]  Yes    Diabetic foot ulcer-right [E11.621, L97.509]  Yes    Septic joint right foot [M00.9]  Yes    Osteomyelitis of right foot [M86.9]  Yes    Dementia [F03.90]  Yes    PAD (peripheral artery disease) [I73.9]  Yes    Depressive disorder [F32.A]  Yes    Atrial fibrillation [I48.91]  Yes    KELSEY (obstructive sleep apnea) [G47.33]  Yes         12/2016   AHI 5.7 with oxygen jude 87%   AHI 11.2 in supine position   REM AHI 40            Mixed hyperlipidemia [E78.2]  Yes      Resolved Hospital Problems   No resolved problems to display.       Allergies:  Review of patient's allergies indicates:   Allergen Reactions    Nystatin      Other reaction(s): Unknown       Diet: cardiac diet    Activities: Activity as tolerated    Goals of Care Treatment Preferences:  Code Status: Full Code          What is most important right now is to focus on spending time at home, avoiding the hospital, remaining as independent as possible, symptom/pain control, extending life as long as possible, even it it means sacrificing quality.  Accordingly, we have decided that the best plan to meet the patient's goals includes continuing with treatment.      Nursing:    continue wearing heel offloading boots     CPAP qHS, exp pressure 6    Midline care    Labs: CBC, CMP, ESR, and CRP Daily for 5 days   please obtain weekly cbc, cmp, and crp and fax to [phone] Infectious Diseases    CONSULTS:    Physical Therapy to evaluate and treat. , Occupational Therapy to evaluate and  treat., and  to evaluate for community resources/long-range planning.    MISCELLANEOUS CARE:  Doherty Care: Empty Doherty bag every shift. Change Doherty every month.  Please perform voiding trial on 4/3/24, will need close Urology follow up    WOUND CARE ORDERS  Remove foot dressings.   Cleanse wound with normal saline or wound . Dry well.    Apply gentian violet to kelsi wound maceration if needed.   Apply xeroform to foot wound(s). Cover with 4x4 gauze  Next apply foam pad x 2 to plantar forefoot  Top with football dressing consisting 3 rolls of cast padding. Top with flexinet, secure in darco shoe  Change 3  times per week and PRN    Medications: Review discharge medications with patient and family and provide education.      Meropenem 1g q8h IV end date 4/19/24    Current Discharge Medication List        START taking these medications    Details   artificial tears (ISOPTO TEARS) 0.5 % ophthalmic solution Place 1 drop into both eyes 3 (three) times daily as needed (dry eyes).  Qty: 15 mL, Refills: 2      aspirin 81 MG Chew Take 1 tablet (81 mg total) by mouth once daily.  Qty: 30 tablet, Refills: 11      clotrimazole-betamethasone 1-0.05% (LOTRISONE) cream Apply topically 2 (two) times daily. for 2 days  Qty: 15 g, Refills: 1      metoprolol tartrate (LOPRESSOR) 25 MG tablet Take 0.5 tablets (12.5 mg total) by mouth 2 (two) times daily.  Qty: 30 tablet, Refills: 11    Comments: .      polyethylene glycol (GLYCOLAX) 17 gram PwPk Take 17 g by mouth 2 (two) times daily.  Qty: 60 each, Refills: 0      senna-docusate 8.6-50 mg (PERICOLACE) 8.6-50 mg per tablet Take 1 tablet by mouth 2 (two) times daily.  Qty: 60 tablet, Refills: 0      sodium chloride 0.9 % PgBk 100 mL with meropenem 1 gram SolR 1 g Inject 1 g into the vein every 8 (eight) hours. for 17 days      tamsulosin (FLOMAX) 0.4 mg Cap Take 1 capsule (0.4 mg total) by mouth once daily.  Qty: 30 capsule, Refills: 2           CONTINUE these  medications which have CHANGED    Details   furosemide (LASIX) 20 MG tablet Take 1 tablet (20 mg total) by mouth once daily.  Qty: 90 tablet, Refills: 3    Associated Diagnoses: Congestive heart failure, unspecified HF chronicity, unspecified heart failure type           CONTINUE these medications which have NOT CHANGED    Details   atorvastatin (LIPITOR) 40 MG tablet Take 1 tablet (40 mg total) by mouth once daily.  Qty: 90 tablet, Refills: 3    Associated Diagnoses: Mixed hyperlipidemia      clopidogreL (PLAVIX) 75 mg tablet Take 1 tablet (75 mg total) by mouth once daily.      ELIQUIS 5 mg Tab Take 5 mg by mouth 2 (two) times daily.      FLUoxetine 40 MG capsule Take 40 mg by mouth once daily.      HYDROcodone-acetaminophen (NORCO)  mg per tablet Take 1 tablet by mouth every 6 (six) hours as needed for Pain.  Qty: 5 tablet, Refills: 0    Comments: Quantity prescribed more than 7 day supply? No      isosorbide mononitrate (IMDUR) 30 MG 24 hr tablet Take 1 tablet (30 mg total) by mouth once daily.  Qty: 90 tablet, Refills: 3    Associated Diagnoses: Coronary artery disease      LANTUS SOLOSTAR U-100 INSULIN glargine 100 units/mL SubQ pen Inject 15 Units into the skin every evening.      nitroGLYCERIN (NITROSTAT) 0.4 MG SL tablet Place 0.4 mg under the tongue every 5 (five) minutes as needed for Chest pain.      ondansetron (ZOFRAN-ODT) 8 MG TbDL Take 1 tablet (8 mg total) by mouth 3 (three) times daily as needed (nausea or vomiting).  Qty: 20 tablet, Refills: 0      pantoprazole (PROTONIX) 40 MG tablet Take 1 tablet (40 mg total) by mouth 2 (two) times daily.  Qty: 60 tablet, Refills: 11      ranolazine (RANEXA) 1,000 mg Tb12 Take 1,000 mg by mouth 2 (two) times daily.      SITagliptan-metformin (JANUMET XR) 100-1,000 mg TM24 Take 1 tablet by mouth once daily.  Qty: 30 tablet, Refills: 11    Associated Diagnoses: Type 2 diabetes mellitus without complication, without long-term current use of insulin       traZODone (DESYREL) 150 MG tablet Take 1 tablet (150 mg total) by mouth every evening.  Qty: 90 tablet, Refills: 3      alcohol swabs PadM Apply 1 each topically as needed (for use with glucose monitoring).  Qty: 200 each, Refills: 3    Associated Diagnoses: Type 2 diabetes mellitus with diabetic peripheral angiopathy without gangrene, without long-term current use of insulin      blood sugar diagnostic (TRUE METRIX GLUCOSE TEST STRIP) Strp 1 strip by Misc.(Non-Drug; Combo Route) route 2 (two) times a day.  Qty: 100 each, Refills: 6    Associated Diagnoses: Type 2 diabetes mellitus with diabetic peripheral angiopathy without gangrene, without long-term current use of insulin      blood-glucose meter (TRUE METRIX AIR GLUCOSE METER) Misc USE AS DIRECTED  Qty: 1 each, Refills: 0    Associated Diagnoses: Type 2 diabetes mellitus with diabetic peripheral angiopathy without gangrene, without long-term current use of insulin      lancets 28 gauge Misc 1 lancet  by Misc.(Non-Drug; Combo Route) route 2 (two) times a day.  Qty: 200 each, Refills: 3    Associated Diagnoses: Type 2 diabetes mellitus with diabetic peripheral angiopathy without gangrene, without long-term current use of insulin           STOP taking these medications       amLODIPine (NORVASC) 5 MG tablet Comments:   Reason for Stopping:         metoprolol succinate (TOPROL-XL) 25 MG 24 hr tablet Comments:   Reason for Stopping:                  Immunizations Administered as of 4/2/2024       No immunizations on file.            This patient has had both covid vaccinations    Some patients may experience side effects after vaccination.  These may include fever, headache, muscle or joint aches.  Most symptoms resolve with 24-48 hours and do not require urgent medical evaluation unless they persist for more than 72 hours or symptoms are concerning for an unrelated medical condition.          _________________________________  Marina Gomez  MD  04/02/2024

## 2024-04-02 NOTE — NURSING
IV Insertion Nurse Note     Single lumen 18G, 10CM midline placed in the right basilic vein. Needle advanced into the vessel under real time ultrasound guidance.    Max dwell date: 5/1/24   Lot number: [id number]

## 2024-04-02 NOTE — PLAN OF CARE
Admission documents for [facility] left at bedside for wife to fill out (per Ericka request).    1139--Ericka stated they have auth.  will notify MD team to write orders. Midline also ordered fir discharge.  also left voicemail for wife need admit papers signed (left at bedside).    [facility]   [phone] [address] [place] [id number]      Next Steps: Follow up  Instructions: Skilled Nursing Facility      04/02/24 1138   Post-Acute Status   Post-Acute Authorization Placement   Post-Acute Placement Status Pending post-acute provider review/more information requested  (Pending orders, midline, admit papers.)   Hospital Resources/Appts/Education Provided Appointments scheduled and added to AVS   Discharge Plan   Discharge Plan A Skilled Nursing Facility   Discharge Plan B Home with family;Home Health     Adriana Fernández RN    [phone]

## 2024-04-02 NOTE — PLAN OF CARE
Pt on RA. Pt does not require prn therapy at this time. Pt has no apparent distress noted. Will continue to monitor.

## 2024-04-02 NOTE — PT/OT/SLP PROGRESS
Physical Therapy Treatment    Patient Name:  Julien Meléndez   MRN:  [id number]    Recommendations:     Discharge Recommendations: Moderate Intensity Therapy  Discharge Equipment Recommendations: to be determined by next level of care  Barriers to discharge: None    Assessment:     Julien Meléndez is a 83 y.o. male admitted with a medical diagnosis of Critical limb ischemia of right lower extremity.  He presents with the following impairments/functional limitations: weakness, impaired endurance, impaired functional mobility, gait instability, impaired balance, decreased lower extremity function, impaired cardiopulmonary response to activity, orthopedic precautions, decreased ROM, decreased safety awareness, impaired cognition, decreased upper extremity function. Pt progress towards goals well, other than transfer goal d/t decreased ability to sequence transfer while adhering NWB to RLE. Pt with decreased postural control this date and requiring increased assist for static sitting at eob for trunk control and for improved midline orientation. Pt sat eob x 20 minutes and demonstrates L/R lateral lean throughout. Pt with increased cervical flexion at eob, only able to maintain cervical extension for > 10 seconds at time and requires constant cueing.     Rehab Prognosis: Good; patient would benefit from acute skilled PT services to address these deficits and reach maximum level of function.    Recent Surgery: Procedure(s) (LRB):  AMPUTATION, FOOT, TRANSMETATARSAL (Right) 6 Days Post-Op    Plan:     During this hospitalization, patient to be seen 3 x/week to address the identified rehab impairments via gait training, therapeutic activities, wheelchair management/training, neuromuscular re-education, therapeutic exercises and progress toward the following goals:    Plan of Care Expires:  04/11/24    Subjective     Chief Complaint: weakness   Patient/Family Comments/goals: to go to [facility]   Pain/Comfort:  Pain Rating 1:  0/10      Objective:     Communicated with RN prior to session.  Patient found supine with bed alarm, telemetry, bailey catheter upon PT entry to room.     General Precautions: Standard, fall  Orthopedic Precautions: RLE non weight bearing  Braces: N/A  Respiratory Status: Room air     Functional Mobility:  Bed Mobility:  Scooting: minimum assistance  Supine to Sit: minimum assistance  Sit to Supine: minimum assistance  - Scooting x 3 reps laterally towards HOB, max a x  1, and assist for RLE management to maintain NWB on RLE.     AM-PAC 6 CLICK MOBILITY  Turning over in bed (including adjusting bedclothes, sheets and blankets)?: 4  Sitting down on and standing up from a chair with arms (e.g., wheelchair, bedside commode, etc.): 3  Moving from lying on back to sitting on the side of the bed?: 3  Moving to and from a bed to a chair (including a wheelchair)?: 3  Need to walk in hospital room?: 3  Climbing 3-5 steps with a railing?: 3  Basic Mobility Total Score: 19       Treatment & Education:  Patient required assistance for trunk during supine to sit and LE management during sit to supine. Patient sat eob x 20 min with sba-mod a for static sitting balance. Pt's sitting balance worsened with onset of fatigue and pt demonstrated L and R lateral lean. Max vc's for midline orientation. Pt performed x 10 reps of L and R LAQs with R ankle pumps x 10 reps in sitting. Trunk flexion noted at eob as well with onset of fatigue. Darco shoe donned to L foot, educated pt on NWB to RLE. Pt only oriented x 1 this date and seems more confused this date compared to previous dates. Will continue to follow and progress as able.     Patient left supine with all lines intact, call button in reach, bed alarm on, and RN notified..    GOALS:   Multidisciplinary Problems       Physical Therapy Goals          Problem: Physical Therapy    Goal Priority Disciplines Outcome Goal Variances Interventions   Physical Therapy Goal     PT, PT/OT  Ongoing, Progressing     Description: Goals to be met by: 2024     Patient will increase functional independence with mobility by performin. Sit to stand transfer with Moderate Assistance w/RW  2. Bed to chair transfer with Moderate Assistance using Rolling Walker  3. Patient will be able to laterally scoot along eob with spv x 5 reps to prepare for transfer training.                          Time Tracking:     PT Received On: 24  PT Start Time: 1050     PT Stop Time: 1116  PT Total Time (min): 26 min     Billable Minutes: Therapeutic Activity 26    Treatment Type: Treatment  PT/PTA: PT     Number of PTA visits since last PT visit: 0     2024

## 2024-04-02 NOTE — PT/OT/SLP PROGRESS
"Occupational Therapy   Treatment    Name: Julien Meléndez  MRN: [id number]  Admitting Diagnosis:  Critical limb ischemia of right lower extremity  6 Days Post-Op    Recommendations:     Discharge Recommendations: Moderate Intensity Therapy  Discharge Equipment Recommendations:  to be determined by next level of care  Barriers to discharge:  Other (Comment) (Increased level of assistance/Impaired cognition)    Assessment:     Julien Meléndez is a 83 y.o. male with a medical diagnosis of Critical limb ischemia of right lower extremity.  Performance deficits affecting function are weakness, impaired endurance, impaired self care skills, impaired functional mobility, gait instability, impaired balance, impaired cognition, decreased upper extremity function, decreased lower extremity function, decreased safety awareness, decreased ROM, impaired skin, edema, orthopedic precautions.    Pt found in bed, agreeable to therapy.  Pt pleasantly confused, O x person only. Pt reports feeling weaker today requiring increased assistance while sitting EOB.  Pt is slowly progressing towards goals. Continue OT services to address functional goals, progressing as able.      Rehab Prognosis:  Good; patient would benefit from acute skilled OT services to address these deficits and reach maximum level of function.       Plan:     Patient to be seen 3 x/week to address the above listed problems via self-care/home management, therapeutic activities, therapeutic exercises  Plan of Care Expires: 04/25/24  Plan of Care Reviewed with: patient    Subjective     Chief Complaint: "I thin they did something with my R foot."    Pain/Comfort:  Pain Rating 1: 0/10  Pain Rating Post-Intervention 1: 0/10    Objective:     Communicated with: RN prior to session.  Patient found HOB elevated with bed alarm, telemetry, peripheral IV, bailey catheter upon OT entry to room.    General Precautions: Standard, fall    Orthopedic Precautions:RLE non weight " bearing  Braces: N/A       Occupational Performance:     Bed Mobility:    Patient completed Rolling/Turning to Left with  minimum assistance and with side rail  Patient completed Scooting/Bridging with contact guard assistance to scooted seated to EOB  Patient completed Supine to Sit with minimum assistance, HOB elevated, increased time and effort, vc's for effective technique  Patient completed Sit to Supine with minimum assistance     Functional Mobility/Transfers:  Unable     Activities of Daily Living:  Lower Body Dressing: moderate assistance to don L Darco shoe 2/2 Velcro strapping using Figure 4 position.  He is able to don L sock with SBA.    Grooming: minimum assistance and vcs.  Pt looking for toothpaste that was in L hand and placing cap on toothpaste upside down.        [facility] 6 Click ADL: 15    Treatment & Education:  Pt sat EOB ~20 min with SBA-Mod A 2/2 lateral lean initially to L side then to R side and anterior/posterior drift.    Pt scoots seated at EOB with Max A x 1 and Min A x 1 at RLE to maintain RLE NWB.    Pt sat EOB to perform g/h task and BUE AROM ex.  Pt limited with L shld A/PROM.  Pt reports fall on shld  causing increased pain and limited ROM in L shld.      Patient left HOB elevated with all lines intact, call button in reach, bed alarm on, and dtr present    GOALS:   Multidisciplinary Problems       Occupational Therapy Goals          Problem: Occupational Therapy    Goal Priority Disciplines Outcome Interventions   Occupational Therapy Goal     OT, PT/OT Ongoing, Progressing    Description: Goals to be met by: 4/25/2024     Patient will increase functional independence with ADLs by performing:    Rolling to Bilateral with independence for toileting regimen.  Toilet transfer to DA bedside commode with Contact Guard Assistance and WB precaution adherence with least restrictive device.  Dynamic sitting balance with independence for ADLs.  Family or caregiver 100% verbalized reciprocation  of dementia friendly recommendations (ex: simplify choices; decrease visual clutter; remove hazardous objects; maximize familiarity, etc) to support patient's wellbeing and highest level of occupational engagement and participation in least restrictive discharge environment                          Time Tracking:     OT Date of Treatment: 04/02/24  OT Start Time: 1050  OT Stop Time: 1118  OT Total Time (min): 28 min    Billable Minutes:Self Care/Home Management 10  Therapeutic Activity 18               4/2/2024

## 2024-04-02 NOTE — PLAN OF CARE
Right foot dressing cdi,zflex boots on,waffle mattress in place,pt reoriented multiple times,provider Yola NP notified unable to give pt his ranexa because it cannot be crushed,bed alarm set,continue poc.

## 2024-04-02 NOTE — PLAN OF CARE
[facility] received insurance authorization.  awaiting facility orders to send. Midline info placed in [facility]port. Wife will be coming by hospital today to sign admit forms.  will continue to follow.    1317-- met with patient and family at bedside. They stated wife would be here soon to sign admit papers for  to send to facility. Dr. Gomez to write orders to discharge as well.     1432--Intake Forms completed by patient's spouse and verified with children in room patient's code status. Family wants to keep patient full code (reflected on intake forms). Intake Forms sent to [facility] via [facility]port.  requested facility orders again. Wife wants patient to keep seeing his Cardiologist at [facility]. Cardiology and Urology appointments requested.    1500--Facility orders and scripts sent to [facility] via [facility]port. Ericka updated to review. Nurse Chino stated patient unable to safely transport via wheelchair, will need ambulance for discharge.    Per Ericka at [facility], patient is clear to arrive. I spoke with nurse Chino to update. Script will be placed in transport packet. Patient will need ambulance due to postural instability and lateral lean. Nurse to administer his antibiotic dose prior to discharge.  met with patient, spouse, and daughter to update. Patient choice form signed.  Ambulance transport set up for 5:30 pm.  You can call report to [phone]  Call report to [address] [place] [address] Nurse room 1006       Patient Contacts    Name Relation Home Work Mobile   Kylee Meléndez Spouse [phone]     Future Appointments   Date Time Provider Department Center   4/15/2024  8:00 AM Gladis Fermin NP [facility] C3[facility]   4/16/2024  1:00 PM Diya Leung DPM [facility] WOUND [facility]   5/27/2024  9:30 AM Ishan Jaramillo MD [facility] ID [facility]        [facility]   [phone] [address] [facility] [address]       Next Steps: Follow up  Instructions: Skilled Nursing Facility        04/02/24 1148   Final Note   Assessment Type Final Discharge Note   Anticipated Discharge Disposition SNF   Hospital Resources/Appts/Education Provided Appointments scheduled and added to AVS   Post-Acute Status   Post-Acute Authorization Placement   Post-Acute Placement Status   (Midline/Facility Orders)   Discharge Delays (!) Ambulance Transport/Facility Transport     Adriana Fernández RN    [phone]

## 2024-04-02 NOTE — TELEPHONE ENCOUNTER
----- Message from Diana Felton RN sent at 4/2/2024  9:25 AM CDT -----  Regarding: RE: HFU  I have him scheduled on Tuesday 4/16 at 1:00. He's being discharged to [facility], so I'll have to contact the facility once he's discharged over there to make sure they are able to bring him on that day. Also I'll have to get them to sign and return a letter of credit stating that they will be responsible for the bill since he is on a SN unit. They usually will sign and return the letter of credit, but for some reason if they don't , I will have to cancel his appointment. Just keeping everyone in the loop  ----- Message -----  From: Crystal Mack CO  Sent: 4/1/2024   4:13 PM CDT  To: Cassidy Sharma; Diana Felton RN; #  Subject: FW: HFU                                          Yolanda Ortiz, please see message regarding the patient, thank you  ----- Message -----  From: Cassidy Sharma  Sent: 4/1/2024   4:06 PM CDT  To: [facility] VO Staff  Subject: HFU                                              Patient will be discharging from [facility] and a HFU was requested with Podiatry by DC provider.  Please schedule and message me back so DC can relay appointment information to patient prior to discharge.  Patient is established.    Patient will be discharging to SNF.  Please schedule 2/3 weeks out.    DX: Critical limb ischemia of right lower extremity    Kacie Goemz  Physician Referral Specialist/Discharge

## 2024-04-02 NOTE — NURSING
Patient A&Ox1, for transfer today to [facility]. On contact isolation.Right upper arm midline intact. On Iv antibiotics. Doherty intact and draining well. Report given to nurse Alek. AVS printed and included in discharge packet. PIV and tele removed. Call light within reach. HANNY in the . Bed alarm on. Awaiting transport services to  patient.

## 2024-04-02 NOTE — PLAN OF CARE
Problem: Occupational Therapy  Goal: Occupational Therapy Goal  Description: Goals to be met by: 4/25/2024     Patient will increase functional independence with ADLs by performing:    Rolling to Bilateral with independence for toileting regimen.  Toilet transfer to DA bedside commode with Contact Guard Assistance and WB precaution adherence with least restrictive device.  Dynamic sitting balance with independence for ADLs.  Family or caregiver 100% verbalized reciprocation of dementia friendly recommendations (ex: simplify choices; decrease visual clutter; remove hazardous objects; maximize familiarity, etc) to support patient's wellbeing and highest level of occupational engagement and participation in least restrictive discharge environment     Outcome: Ongoing, Progressing   Julien Meléndez is a 83 y.o. male with a medical diagnosis of Critical limb ischemia of right lower extremity.  Performance deficits affecting function are weakness, impaired endurance, impaired self care skills, impaired functional mobility, gait instability, impaired balance, impaired cognition, decreased upper extremity function, decreased lower extremity function, decreased safety awareness, decreased ROM, impaired skin, edema, orthopedic precautions.    Pt found in bed, agreeable to therapy.  Pt pleasantly confused, O x person only. Pt reports feeling weaker today requiring increased assistance while sitting EOB.  Pt is slowly progressing towards goals. Continue OT services to address functional goals, progressing as able.

## 2024-04-02 NOTE — PLAN OF CARE
Follow-Up Appointments:     Future Appointments   Date Time Provider Department Center   4/15/2024  8:00 AM Gladis Fermin, ZONIA [facility] C3H [place]   4/16/2024  1:00 PM Diya Leung, DPM [facility] WOUND [facility]   5/27/2024  9:30 AM Ishan Jaramillo MD [facility] ID [facility]         [facility]   [phone] [address] [place] [address] [place] [address]      Next Steps: Follow up  Instructions: Skilled Nursing Facility    Aisha Malone FNP  Urology [phone] [address] [place] [address]     Next Steps: Follow up  Instructions: Urology Follow-Up Requested    Jose Martin MD Consulting Physician Interventional Cardiology Cardiology [phone] [address] [place] [id number]     Next Steps: Follow up on 5/6/2024  Instructions: at 1:10 PM--Cardiology Follow-Up ()     Adriana Fernández RN    [phone]

## 2024-04-03 ENCOUNTER — DOCUMENT SCAN (OUTPATIENT)
Dept: HOME HEALTH SERVICES | Facility: HOSPITAL | Age: 84
End: 2024-04-03
Payer: MEDICARE

## 2024-04-03 NOTE — PLAN OF CARE
Julien Meléndez MRN:[id number] ([facility]#[id number]) (83 y.o. M) (Adm: 24)  [facility] [id number] A  Patient Demographics    Patient Name  Julien Meléndez Legal Sex  Male          Age  1940 (83 y.o.) N   Address  [address] [place] [address] Phone  [phone] (Home)  [phone] (Mobile) *Preferred*     Patient Demographics    Address  [address] [place] [address] Phone  [phone] (Home)  [phone] (Mobile) *Preferred* E-mail Address  [email or web address]     PCP and Center    Primary Care Provider  Kelvin Wayne MD Phone  [phone] Center  [facility]     Patient Contacts    Name Relation Home Work Mobile   Kylee Meléndez Spouse [phone]     Documents on File     Status Date Received Description   Documents for the Patient   Notice of Privacy Pract Ackn Received 12    Insurance Documents Received 12 phn card   Advance Directive Acknowledgement Not Received     Clinic Authorization Received () 12    Outside Progress or Provider Notes  13    Outside Procedure Note  13    Clinic Authorization Received () 14    Patient ID Received 17 [place] DL E XP     Insurance Documents Received 09/15/15 PEOPLES [facility]  [phone]   Clinic Authorization Signed () 07/14/15 CLINIC AUTH   Plain Language Summary English No, Patient Declined Print () 12/28/15    Insurance Documents Received 16 Humana   Plain Language Summary English No, Patient Declined Print () 16    HIM MICHELLE Authorization  16    Plain Language Summary English No, Patient Declined Print () 10/26/16    Clinic Authorization Signed () 10/26/16    Plain Language Summary English No, Patient Declined Print () 16    Sleep Study Report   PSG SLEEP STUDY MUNA CHRISTINE [id number]   Notice of Privacy Practice [facility] Received () 17 HIPAA/SELF   Advance  Beneficiary Notice Not Received     Miscellaneous Documents clinic   pt order per dr Watson   Miscellaneous Documents clinic   pt order per dr watson   Plain Language Summary English No, Patient Declined Print () 17    Consents   Consents   Plain Language Summary English No, Patient Declined Print () 17    Insurance Documents Received 17 humana   Plain Language Summary English No, Patient Declined Print () 17    Plain Language Summary English No, Patient Declined Print () 17    Plain Language Summary English No, Patient Declined Print () 17    Plain Language Summary English No, Patient Declined Print () 17    Plain Language Summary English      Plain Language Summary English No, Patient Declined Print () 17    Plain Language Summary English No, Patient Declined Print () 17    Plain Language Summary English No, Patient Declined Print () 17    Plain Language Summary English No, Patient Declined Print () 17    Plain Language Summary English No, Patient Declined Print () 17    Plain Language Summary English No, Patient Declined Print () 10/10/17    Notice of Privacy Pract Ackn Signed 10/24/17    Plain Language Summary English No, Patient Declined Print () 10/24/17    [facility] Provider Based Facility Disclosure Signed () 17    Clinic Authorization Signed () 17    Plain Language Summary English Acknowledged () 19    Plain Language Summary English No, Patient Declined Print () 17    Advance Directives and Living Will Not Received     Plain Language Summary English No, Patient Declined Print () 18    [facility] Provider Based Facility Disclosure Unable to Obtain () 18    [facility] Provider Based Facility Disclosure Signed () 18    Plain Language Summary English No, Patient Declined Print  () 18 fa info   Sleep Study Report Received 18 CPAP SLEEP STUDY with settings Rika Victoria [id number].pdf   [facility] Contracted Facility Disclosure Signed () 18 [email or web address]   Clinic Authorization Signed () 18 [email or web address]   Plain Language Summary English Acknowledged () 18    Plain Language Summary English Acknowledged () 19 PL   Plain Language Summary English Acknowledged () 19    [facility] Contracted Facility Disclosure Signed 19    Plain Language Summary English Acknowledged () 19    Plain Language Summary English Acknowledged () 19    Plain Language Summary English Acknowledged () 10/03/19    Plain Language Summary English Acknowledged () 19 fin ast info   HIM MICHELLE Authorization Received 20 HIM Auth for ReleaseID [id number]   HIM MICHELLE Authorization Received 20 HIM Auth for ReleaseID [id number]   Plain Language Summary English Acknowledged () 20    Insurance Documents Received 20 Medicare   Insurance Documents Received 20 Humana   Clinic Authorization Signed () 20    Plain Language Summary English Acknowledged () 20    [facility] Acknowledgement of Risks and Waiver of Claims      Plain Language Summary English Acknowledged () 20    Patient ID Received 20 [place] DL EXP 2023   Outside Consultation Note Received 10/13/20 signify health visit 2020   Outside Progress or Provider Notes Received 20 [place] Visit 10/28/2020   Plain Language Summary English Acknowledged () 20    Plain Language Summary English Acknowledged () 20    Miscellaneous Documents clinic Received 21 Humana hedis 2021   Insurance Card Pt ID Received 21 Insurance card    Plain Language Summary English Acknowledged () 21    Miscellaneous Documents clinic Received 03/10/21 medicare  wellness survey    Notice of Privacy Practice [facility]      Involvement In Care      Outpatient Authorization [facility] Signed () 21    [facility] Facility Disclosure Signed 21    Plain Language Summary English Acknowledged () 21    Notice of Privacy Practice [facility] Signed 21    Outside Progress or Provider Notes Received 21 Sylvester Visit [id number].PDF   Notice of Privacy Pract Ackn Signed 21    Clinic Authorization Signed () 21    Plain Language Summary English Acknowledged () 21    Plain Language Summary English Acknowledged () 21    Plain Language Summary English Acknowledged () 21    Plain Language Summary English Acknowledged () 22    Outpatient Authorization [facility] Signed () 22    Plain Language Summary English Acknowledged () 22    Plain Language Summary English Acknowledged () 22    Cardiac Rehab Individual Treatment Plan Received 22    Consents Received () 22 Cardiac Rehab Consent   Miscellaneous Documents clinic Received 22 Cardiac Rehab Session Report     Miscellaneous Documents clinic Received 22 Cardiac Rehab Session 1   Miscellaneous Documents clinic Received 22 Cardiac Rehab Assessment   Clinic Authorization Signed () 09/15/22    Outside Home Health Nursing Home [facility] Received 22 Signify health visit [id number]   Cardiac Rehab Individual Treatment Plan Received 22    Outside Cardiology Report Received 10/05/22 EKG [place] 22.pdf   Cardiac Rehab Individual Treatment Plan Received 10/19/22    Miscellaneous Documents clinic Received 10/31/22 Medicare Wellness Survey    Cardiac Rehab Individual Treatment Plan Received 22    Miscellaneous Documents clinic Received 22 outcome report   Cardiac Rehab Individual Treatment Plan Received 22    [facility] Not Contracted Facility  Disclosure      Plain Language Summary English Acknowledged () 23    Insurance Card Pt ID Received 23 Insurance card    Patient Rights and Responsibilities Acknowledged () 23    Consents Received () 23 MRI SCREENING FORM   Plain Language Summary English Acknowledged () 23    Release of Information Received 23    Plain Language Summary English Acknowledged () 23    Outpatient Authorization [facility] Signed 23    Outside Home Health [facility] Home [facility]osp[facility] Received 23 Home Health note [id number].PDF   Outside Home Health [facility] Received 23 Home Health note.PDF   Lab Received 23 Labs.PDF   Outside Home Health [facility] Received 23 Home Health report [id number].PDF   Outside Home Health Nursing Home [facility]ospice Received 23 [facility].PDF   Plain Language Summary English Acknowledged () 23    Plain Language Summary English Acknowledged () 06/10/23    HIM MICHELLE Authorization  23    HIM MICHELLE Authorization Received 23    Miscellaneous Documents clinic Received 23 Julien Meléndez   needs appt for padiotry.PDF   Patient ID      Miscellaneous Documents clinic Received 23 letter of credit   Plain Language Summary English Acknowledged () 23 FIN ASSIST INFO   Consents Received 23 non healing wound Dr corbin   Miscellaneous Documents clinic Received 23 wound care new patient referral   Outside Misc Notes or Reports Received () 23 [facility]/ Client Coordination report   Plain Language Summary English Acknowledged () 23    Plain Language Summary English Acknowledged () 23 fin assist info   Outside Lab Received 23 [facility] lab results [id number].PDF   Miscellaneous Documents clinic Received 23 TCOM 2023   Outside Lab Received 23 Lab results [id number].PDF   Outside Misc  Notes or Reports Received () 23 [facility] / Wound score change   Outside Home Health [facility] Received 23 Client report [facility].PDF   Outside Home Health [facility] Received 23 [facility] note [id number].PDF   Outside Misc Notes or Reports Received () 23 [facility] / Therapy SOAP note   Outside Misc Notes or Reports Received () 23 [facility] / Wound change report   Plain Language Summary English Acknowledged () 23 fin assist info   Outside Misc Notes or Reports Received () 23 [facility] / Wound score change report   Plain Language Summary English Acknowledged () 23 fin assist info   Clinic Authorization Unable to Obtain () 23    Clinic Authorization      Consents Received 10/02/23 MRI SCREENING FORM   Outside Misc Notes or Reports Received () 10/03/23 [facility] / Woundcare report   Plain Language Summary English Acknowledged () 10/03/23 fin assist info   Clinic Authorization Unable to Obtain () 10/03/23    Miscellaneous Documents clinic Received 10/03/23 clinic level of care   Plain Language Summary English Acknowledged () 10/17/23 fin assist info   Clinic Authorization Signed 10/17/23    [facility] Contracted Facility Disclosure Signed 10/17/23    Plain Language Summary English Acknowledged () 10/23/23 fin assist info   Rai Registration Authorization Outpatient      [facility] Contracted Facility Disclosure Unable to Obtain 10/23/23    Miscellaneous Documents clinic Received 10/24/23 clinic level of care   Plain Language Summary English Acknowledged () 10/31/23 fin assist info   Miscellaneous Documents clinic Received 23 TCOM   Clinic Authorization Signed 23    Plain Language Summary English Acknowledged () 23    Outside Misc Notes or Reports Received () 23 [facility] . Wound score change   Miscellaneous  Documents clinic Received 11/10/23 mri questionaire   Plain Language Summary English Acknowledged () 11/15/23    Plain Language Summary English Acknowledged () 23    Outside Misc Notes or Reports Received () 23 [facility] / Episode summary report   HIM MICHELLE Authorization Received 23 HIM Auth for ReleaseID [id number]   Plain Language Summary English Acknowledged () 23 fin assist info   HIM MICHELLE Authorization Received 23 HIM Auth for ReleaseID [id number]   Authorization or Referral Received 23 LEVEL I PASRR SCREEN AND DETERMINATION (4 PAGES)   Authorization or Referral Received 23 [facility] access hospital MEDICAID PROGRAM NOTICE OF MEDICAL CERTIFICATION   Plain Language Summary English Acknowledged () 23    Outside Misc Notes or Reports Received () 23 [facility] /episode summary   Plain Language Summary English Acknowledged () 23 fin assistn info   Authorization or Referral Received 23 Ortho Referral and Records   Outside Misc Notes or Reports Received () 23 [facility] / client report   Plain Language Summary English Acknowledged () 23    Plain Language Summary English Acknowledged () 23    Outside Discharge Summary Received 24 Discharge Medication Reconciliation report   Outside Misc Notes or Reports Received () 24 [facility]  / client report   Miscellaneous Documents clinic Received 24 [facility] report [id number]   Outside Misc Notes or Reports Received () 24 [facility] / Wound change   HIM MICHELLE Authorization Received 24 [facility] Auth for ReleaseID [id number]   Outside Home Health Nursing Home Hospice Received 24 [facility] report [id number]   Outside Misc Notes or Reports Received () 24 wound score change report   Outside Misc Notes or Reports Received () 24 [facility]  / Therapy SOAP note   Miscellaneous  Documents clinic Received 24 Orders [facility]   Plain Language Summary English Acknowledged () 24    Miscellaneous Documents clinic Received 02/15/24 [facility] wound score chart   Outside Home Health [facility] Received 02/15/24 Wound care report [id number]   Miscellaneous Documents clinic Received 24 Therapy SOAP report [id number]   Outside Progress or Provider Notes Received 24 [facility]   Miscellaneous Documents clinic Received 24 Episode summary report   HIM MICHELLE Authorization  24    Plain Language Summary English Acknowledged () 24    Outside Progress or Provider Notes Received 24 Wound care follow up [facility]   Outside Records Clinic Received 24 IRENE Meléndez wound care notes   Miscellaneous Documents clinic Received 24 [place] [facility] Aging Meal Request   Outside Misc Notes or Reports Received () 24 [facility] / plan of care   Patient ID Received 24    Plain Language Summary English Acknowledged () 24    Outside Progress or Provider Notes Received 24 Pt transfer report   Miscellaneous Documents clinic Received 24 [facility] meals form   Provider Based Acknowledgement      Patient ID Received (Deleted) 12 photo id      Provider Based Acknowledgement  (Deleted)     Patient Photo   Photo of Patient   HIM Release of Information Output  (Deleted) 20 Requested records   HIM Release of Information Output  (Deleted) 20 Requested records   Patient Photo   Photo of Patient   Plain Language Summary English  (Deleted)     HIM Release of Information Output  (Deleted) 23 Requested records   HIM Release of Information Output  (Deleted) 23 Requested records   HIM Release of Information Output  11/15/23 Requested records   HIM Release of Information Output  (Deleted) 23 Requested records   HIM Release of Information Output   (Deleted) 03/07/24 Requested records   Documents for the Encounter   Medicare Lifetime Reserve Form      Important Medicare Message Printed at Discharge      Advance Beneficiary Notice      Hospital Authorization      Hospital Authorization Signed 03/21/24    Photographs      Photographs      Photographs      Photographs      Ambulance Record Received 03/21/24    Important Medicare Message Received 03/22/24    Photographs      Photographs      Photographs      Photographs      Photographs      Photographs      Photographs      Photographs      Photographs      Clinical References Attachment   Diabetes and Infections (English)   Photographs      Photographs      Ambulance Record Received 03/21/24    Photographs      Photographs      Photographs      Clinical References Attachment   Peripheral Vascular (Arterial) Disease Discharge Instructions (English)   Clinical References Attachment   Osteomyelitis Discharge Instructions (English)   Photographs      Photographs   Right foot dorsal   Photographs   Right foot medial   Photographs   Right foot plantar   Photographs   Right 4th interspacd   Anesthesia Consent (In Person) Signed 03/27/24    Hospital Authorization Signed 03/27/24 [facility] Laboratory Requesition Consent  3/15/2024 11:40am   Photographs      Photographs      Clinical References Attachment   How to Prevent Catheter Associated Urinary Tract Infections (English)   Photographs      Photographs      Clinical References Attachment   Chest Pain Discharge Instructions (English)   Clinical References Attachment   Heart Failure, Adult (English)   Clinical References Attachment   How to Weigh Yourself (English)   Clinical References Attachment   Wound Infection (English)   Clinical References Attachment   Peripherally-Inserted Central Catheter Discharge Instructions (English)   Clinical References Attachment   Aspirin, ADULT (English)   Clinical References Attachment   Betamethasone and Clotrimazole, ADULT (English)    Clinical References Attachment   Metoprolol, ADULT (English)   Clinical References Attachment   Meropenem, ADULT (English)   Clinical References Attachment   Tamsulosin, ADULT (English)   Clinical References Attachment   Polyethylene Glycol 3350, ADULT (English)   Clinical References Attachment   Senna, ADULT (English)   Clinical References Attachment   Artificial Tears, ADULT (English)   Clinical References Attachment   How to Care for Your Doherty Catheter (English)   Clinical References Attachment   Pressure Sores Discharge Instructions (English)   Clinical References Attachment   How to Prevent Pressure Sores (English)   After Visit Summary   IP AVS   DICOM Study  (Deleted)     DICOM Series  (Deleted)     DICOM Image  (Deleted)     DICOM Series  (Deleted)     DICOM Image  (Deleted)     DICOM Series  (Deleted)     DICOM Image  (Deleted)     DICOM Study      DICOM Series      DICOM Image      DICOM Series      DICOM Image      DICOM Series      DICOM Image      DICOM Study  (Deleted)     DICOM Series  (Deleted)     DICOM Image  (Deleted)     DICOM Series  (Deleted)     DICOM Image  (Deleted)     DICOM Study      DICOM Series      DICOM Image      DICOM Series      DICOM Image      DICOM Study  (Deleted)     DICOM Series  (Deleted)     DICOM Image  (Deleted)     DICOM Series  (Deleted)     DICOM Image  (Deleted)     DICOM Study      DICOM Series      DICOM Image      DICOM Series      DICOM Image      Clinical References Attachment  (Deleted)  Cellulitis (Skin Infection) Discharge Instructions, Adult (English)   Study Attachment for Report   CV Cardiac Cath Printable Result Report   Study Attachment for Report   CV Cardiac Cath Printable Result Report   DICOM Study  (Deleted)     DICOM Series  (Deleted)     DICOM Image  (Deleted)     DICOM Series  (Deleted)     DICOM Image  (Deleted)     DICOM Series  (Deleted)     DICOM Image  (Deleted)     DICOM Series  (Deleted)     DICOM Image  (Deleted)     DICOM Study       DICOM Series      DICOM Image      DICOM Series      DICOM Image      DICOM Series      DICOM Image      DICOM Series      DICOM Image      DICOM Study      DICOM Series      DICOM Image      DICOM Series      DICOM Image      Hospital Authorization Received (Deleted) 03/27/24    Hospital Authorization Scanned Signed (Deleted) 03/27/24    Hospital Authorization Signed (Deleted) 03/27/24    Clinical References Attachment  (Deleted)  Cellulitis (Skin Infection) Discharge Instructions, Adult (English)     Admission Information    Current Information    Attending at Discharge Admitting Provider Admission Type Admission Status   Marina Gomez MD Patel, Janki Y., MD Emergency Confirmed Discharge          Admission Date/Time Discharge Date Hospital Service Auth/Cert Status   03/21/24  1418 04/02/24 Hospital Medicine Incomplete          Hospital Area Unit Room/Bed    [facility] MEDICAL SURGICAL UNIT ACUTE K523/K523 A            Discharge Disposition Discharge Destination   Skilled Nursing Facility Skilled Nursing     Admission    Complaint        Hospital Account    Name Acct ID Class Status Primary Coverage   Julien Meléndez [id number] IP- Inpatient Discharged/Not Billed HU[employer] MANAGED MEDICARE - HU[employer]NA MEDICARE HMO          Guarantor Account (for Hospital Account #[id number])    Name Relation to Pt Service Area Active? Acct Type   Julien Meléndez Self OHSSA Yes Personal/Family   Address Phone     [address] [place] [address] [phone](H)            Coverage Information (for Hospital Account #[id number])    F/O Payor/Plan Precert #   HU[employer] MANAGED MEDICARE/HUMANA MEDICARE HMO [id number]   Subscriber Subscriber #   Julien Meléndez [id number]   Address Phone   [address] [address]  [place] [address] [phone]

## 2024-04-04 ENCOUNTER — DOCUMENT SCAN (OUTPATIENT)
Dept: HOME HEALTH SERVICES | Facility: HOSPITAL | Age: 84
End: 2024-04-04
Payer: MEDICARE

## 2024-04-04 LAB
FINAL PATHOLOGIC DIAGNOSIS: NORMAL
GROSS: NORMAL
Lab: NORMAL

## 2024-04-05 ENCOUNTER — TELEPHONE (OUTPATIENT)
Dept: WOUND CARE | Facility: HOSPITAL | Age: 84
End: 2024-04-05
Payer: MEDICARE

## 2024-04-05 NOTE — TELEPHONE ENCOUNTER
Received a call from Aiad with [facility] calling to inform us that [facility] no longer does letters of credit for patients that are on a skilled unit. She stated that [facility]'s administration informed her about this, and that they have a provider that goes to the skilled units to do wound care. Informed Aida that I will cancel the appointment in the wound clinic and message Dr. Leung's staff in podiatry to see if they are able to see the patient in the podiatry clinic

## 2024-04-07 NOTE — DISCHARGE SUMMARY
[facility] Discharge Summary    Attending Physician: Patricia    Date of Admit: 3/21/2024  Date of Discharge: 4/2/2024    Discharge to: SNF  Condition: Stable    Discharge Diagnoses     R Foot Osteomyelitis, Septic Joint, S/p R TMA  Critical Limb Ischemia, PAD  Urinary retention, phimosis  AF  DM  Dementia    Consultants and Procedures     Consultants:  Infectious Dz, Cardiology, Podiatry, Urology    Procedures:   Amputation, transmetatarsal    Brief History of Present Illness      Julien Meléndez is a 83 yr old male presents to the ER with reports of right foot infection. Pts family states over the past 2 days, they noticed an increase in erythema and drainage to the site. Denies fever. Denies taking oral antbx for this specific wound. Pt states he was advised to come to the ER for an evaluation from wound care. Wound has been dressed by HH. 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 Abnormal edema signal involving the 3rd metatarsophalangeal joint, 4th proximal interphalangeal joint and 5th distal interphalangeal joint concerning for reactive edema or early changes of septic arthritis. Will cont vancomycin and Zosyn and will admit to the [facility] for further care.     For the full HPI please refer to the History & Physical from this admission.    Hospital Course By Problem with Pertinent Findings     R Foot Osteomyelitis/Septic Joint with Critical limb ischemia RLE +Staph aureus, Pseudomonas  Bone cultures growing MRSA, wound cultures growing MRSA/ pseudomonas   Bilateral lower extremity arterial ultrasound:  1. Hemodynamically significant stenosis in the left mid superficial femoral artery and the left  "anterior tibial artery.   2. Monophasic waveforms in the left distal popliteal, anterior tibial, and posterior tibial arteries.     S/p peripheral angiogram on 3/26 that showed extensive collateral circulation. Consider intervention if impaired healing noted after TMA  S/p right TMA on 3/27  ID recommendations  - continue meropenem for 4 weeks from 3/22/2024, can discontinue if pathology comes back negative  Needs weekly cbc, cmp, and crp and fax to [phone]  Will need close cardiology, podiatry follow up       Phimosis  Urology consulted:  - Patient with significant phimosis.  Once foreskin was retracted, a 16 French Doherty catheter was placed without difficulty.  500 cc of clear yellow urine was returned.  10 cc in the balloon.  - Recommend twice daily bathing of foreskin with normal saline solution.   - Recommend Lotrisone applied to the foreskin BID for 1-2 weeks.   - Start Flomax  - Maintain Doherty catheter for 5 days. Please perform early morning voiding trial if patient remains inpatient. If he is discharged prior to then, please have case management arrange outpatient Urology visit for a voiding trial next week.       Dementia  -history noted of dementia  - no acute issues     PAD (peripheral artery disease)  Stable     Depressive disorder  Patient has depression which is currently controlled.  Atrial fibrillation  -history of Afib  -cont anticoagulation  -resume eliquis upon DC     KELSEY (obstructive sleep apnea)  - CPAP at night and p.r.n.     Mixed hyperlipidemia  -continue statin     Discharge Time: Greater than 30 minutes? Yes    /63   Pulse 71   Temp 97.9 °F (36.6 °C)   Resp 20   Ht 6' 2" (1.88 m)   Wt 79.5 kg (175 lb 4.3 oz)   SpO2 99%   BMI 22.50 kg/m²       Discharge Medications        Medication List        START taking these medications      artificial tears 0.5 % ophthalmic solution  Commonly known as: ISOPTO TEARS  Place 1 drop into both eyes 3 (three) times daily as needed (dry " eyes).     aspirin 81 MG Chew  Take 1 tablet (81 mg total) by mouth once daily.     metoprolol tartrate 25 MG tablet  Commonly known as: LOPRESSOR  Take 0.5 tablets (12.5 mg total) by mouth 2 (two) times daily.     polyethylene glycol 17 gram Pwpk  Commonly known as: GLYCOLAX  Take 17 g by mouth 2 (two) times daily.     senna-docusate 8.6-50 mg 8.6-50 mg per tablet  Commonly known as: PERICOLACE  Take 1 tablet by mouth 2 (two) times daily.     sodium chloride 0.9 % PgBk 100 mL with meropenem 1 gram SolR 1 g  Inject 1 g into the vein every 8 (eight) hours. for 17 days     tamsulosin 0.4 mg Cap  Commonly known as: FLOMAX  Take 1 capsule (0.4 mg total) by mouth once daily.            CHANGE how you take these medications      atorvastatin 40 MG tablet  Commonly known as: LIPITOR  Take 1 tablet (40 mg total) by mouth once daily.  What changed: when to take this     furosemide 20 MG tablet  Commonly known as: LASIX  Take 1 tablet (20 mg total) by mouth once daily.  What changed: Another medication with the same name was removed. Continue taking this medication, and follow the directions you see here.            CONTINUE taking these medications      alcohol swabs Padm  Apply 1 each topically as needed (for use with glucose monitoring).     blood sugar diagnostic Strp  Commonly known as: TRUE METRIX GLUCOSE TEST STRIP  1 strip by Misc.(Non-Drug; Combo Route) route 2 (two) times a day.     clopidogreL 75 mg tablet  Commonly known as: PLAVIX  Take 1 tablet (75 mg total) by mouth once daily.     ELIQUIS 5 mg Tab  Generic drug: apixaban     FLUoxetine 40 MG capsule     HYDROcodone-acetaminophen  mg per tablet  Commonly known as: NORCO  Take 1 tablet by mouth every 6 (six) hours as needed for Pain.     isosorbide mononitrate 30 MG 24 hr tablet  Commonly known as: IMDUR  Take 1 tablet (30 mg total) by mouth once daily.     JANUMET -1,000 mg Tm24  Generic drug: SITagliptan-metformin  Take 1 tablet by mouth once  daily.     lancets 28 gauge Misc  1 lancet  by Misc.(Non-Drug; Combo Route) route 2 (two) times a day.     LANTUS SOLOSTAR U-100 INSULIN glargine 100 units/mL SubQ pen  Generic drug: insulin     nitroGLYCERIN 0.4 MG SL tablet  Commonly known as: NITROSTAT     ondansetron 8 MG Tbdl  Commonly known as: ZOFRAN-ODT  Take 1 tablet (8 mg total) by mouth 3 (three) times daily as needed (nausea or vomiting).     pantoprazole 40 MG tablet  Commonly known as: PROTONIX  Take 1 tablet (40 mg total) by mouth 2 (two) times daily.     ranolazine 1,000 mg Tb12  Commonly known as: RANEXA     traZODone 150 MG tablet  Commonly known as: DESYREL  Take 1 tablet (150 mg total) by mouth every evening.     TRUE METRIX AIR GLUCOSE METER Misc  Generic drug: blood-glucose meter  USE AS DIRECTED            STOP taking these medications      amLODIPine 5 MG tablet  Commonly known as: NORVASC     metoprolol succinate 25 MG 24 hr tablet  Commonly known as: TOPROL-XL            ASK your doctor about these medications      clotrimazole-betamethasone 1-0.05% cream  Commonly known as: LOTRISONE  Apply topically 2 (two) times daily. for 2 days  Ask about: Should I take this medication?               Where to Get Your Medications        These medications were sent to [facility] [address] [id number]      Hours: Mon-Fri, 8a-5:30p Phone: [phone]   artificial tears 0.5 % ophthalmic solution  aspirin 81 MG Chew  clotrimazole-betamethasone 1-0.05% cream  furosemide 20 MG tablet  metoprolol tartrate 25 MG tablet  polyethylene glycol 17 gram Pwpk  senna-docusate 8.6-50 mg 8.6-50 mg per tablet  tamsulosin 0.4 mg Cap       You can get these medications from any pharmacy    Bring a paper prescription for each of these medications  HYDROcodone-acetaminophen  mg per tablet       Information about where to get these medications is not yet available    Ask your nurse or doctor about these medications  sodium chloride  0.9 % PgBk 100 mL with meropenem 1 gram SolR 1 g         Discharge Information:   Diet:  Cardiac/diabetic    Physical Activity:  As tolerated    Instructions:  1. Take all medications as prescribed  2. Keep all follow-up appointments  3. Return to the hospital or call your primary care physicians if any worsening symptoms such as fevers, chills, sweats, poorly healing wound or other concerns occur.      Follow-Up Appointments:  PCP in 1 week  Cardiology 1-2 weeks  Podiatry 1 week  Urology 1 week      Follow up items for PCP and tests that have not resulted at time of discharge:   Pathology results from amputation      Marina Gomez MD  [facility]

## 2024-04-16 ENCOUNTER — EXTERNAL HOME HEALTH (OUTPATIENT)
Dept: HOME HEALTH SERVICES | Facility: HOSPITAL | Age: 84
End: 2024-04-16
Payer: MEDICARE

## 2024-04-16 ENCOUNTER — OFFICE VISIT (OUTPATIENT)
Dept: PODIATRY | Facility: CLINIC | Age: 84
End: 2024-04-16
Payer: MEDICARE

## 2024-04-16 VITALS
DIASTOLIC BLOOD PRESSURE: 58 MMHG | SYSTOLIC BLOOD PRESSURE: 94 MMHG | HEIGHT: 74 IN | HEART RATE: 69 BPM | BODY MASS INDEX: 22.49 KG/M2 | WEIGHT: 175.25 LBS

## 2024-04-16 DIAGNOSIS — Z89.439 HISTORY OF TRANSMETATARSAL AMPUTATION OF FOOT: Primary | ICD-10-CM

## 2024-04-16 PROCEDURE — 99999 PR PBB SHADOW E&M-EST. PATIENT-LVL IV: CPT | Mod: PBBFAC,,, | Performed by: STUDENT IN AN ORGANIZED HEALTH CARE EDUCATION/TRAINING PROGRAM

## 2024-04-16 PROCEDURE — 99024 POSTOP FOLLOW-UP VISIT: CPT | Mod: S$GLB,,, | Performed by: STUDENT IN AN ORGANIZED HEALTH CARE EDUCATION/TRAINING PROGRAM

## 2024-04-16 PROCEDURE — 1159F MED LIST DOCD IN RCRD: CPT | Mod: CPTII,S$GLB,, | Performed by: STUDENT IN AN ORGANIZED HEALTH CARE EDUCATION/TRAINING PROGRAM

## 2024-04-16 PROCEDURE — 1126F AMNT PAIN NOTED NONE PRSNT: CPT | Mod: CPTII,S$GLB,, | Performed by: STUDENT IN AN ORGANIZED HEALTH CARE EDUCATION/TRAINING PROGRAM

## 2024-04-16 PROCEDURE — 3288F FALL RISK ASSESSMENT DOCD: CPT | Mod: CPTII,S$GLB,, | Performed by: STUDENT IN AN ORGANIZED HEALTH CARE EDUCATION/TRAINING PROGRAM

## 2024-04-16 PROCEDURE — 3078F DIAST BP <80 MM HG: CPT | Mod: CPTII,S$GLB,, | Performed by: STUDENT IN AN ORGANIZED HEALTH CARE EDUCATION/TRAINING PROGRAM

## 2024-04-16 PROCEDURE — 3074F SYST BP LT 130 MM HG: CPT | Mod: CPTII,S$GLB,, | Performed by: STUDENT IN AN ORGANIZED HEALTH CARE EDUCATION/TRAINING PROGRAM

## 2024-04-16 PROCEDURE — 1101F PT FALLS ASSESS-DOCD LE1/YR: CPT | Mod: CPTII,S$GLB,, | Performed by: STUDENT IN AN ORGANIZED HEALTH CARE EDUCATION/TRAINING PROGRAM

## 2024-04-16 NOTE — PROGRESS NOTES
Subjective:     Patient ID: Julien Meléndez is a 83 y.o. male.    Chief Complaint: Post-op Evaluation (Stiches in right foot 3 weeks)    Julien is a 83 y.o. male who presents to the clinic for evaluation and treatment of high risk feet. Julien has a past medical history of Diabetes mellitus, Hiatal hernia, Hyperlipidemia, Hypertension, MI (myocardial infarction), and Sleep apnea. The patient's chief complaint is foot ulcer, both feet. This patient has documented high risk feet requiring routine maintenance secondary to diabetes mellitis and those secondary complications of diabetes, as mentioned..    4/16/24: Seen today, s/p TMA, here for suture removal. No new pedal complaints.     PCP: Kelvin Wayne MD    Date Last Seen by PCP:     Current shoe gear:    History of Trauma: negative  Sign of Infection: none    Hemoglobin A1C   Date Value Ref Range Status   03/15/2024 8.3 (H) 4.0 - 5.6 % Final     Comment:     ADA Screening Guidelines:  5.7-6.4%  Consistent with prediabetes  >or=6.5%  Consistent with diabetes    High levels of fetal hemoglobin interfere with the HbA1C  assay. Heterozygous hemoglobin variants (HbS, HgC, etc)do  not significantly interfere with this assay.   However, presence of multiple variants may affect accuracy.     10/23/2023 9.7 (H) 4.0 - 5.6 % Final     Comment:     ADA Screening Guidelines:  5.7-6.4%  Consistent with prediabetes  >or=6.5%  Consistent with diabetes    High levels of fetal hemoglobin interfere with the HbA1C  assay. Heterozygous hemoglobin variants (HbS, HgC, etc)do  not significantly interfere with this assay.   However, presence of multiple variants may affect accuracy.     03/25/2023 7.0 (H) 4.0 - 5.6 % Final     Comment:     ADA Screening Guidelines:  5.7-6.4%  Consistent with prediabetes  >or=6.5%  Consistent with diabetes    High levels of fetal hemoglobin interfere with the HbA1C  assay. Heterozygous hemoglobin variants (HbS, HgC, etc)do  not significantly interfere  with this assay.   However, presence of multiple variants may affect accuracy.         Review of Systems   Constitutional: Negative for chills, decreased appetite, diaphoresis and fever.   HENT:  Negative for congestion and hearing loss.    Cardiovascular:  Negative for chest pain, claudication, leg swelling and syncope.   Respiratory:  Negative for cough and shortness of breath.    Skin:  Positive for dry skin, nail changes and poor wound healing. Negative for color change, flushing, itching and rash.   Musculoskeletal:  Negative for arthritis, back pain, joint pain and joint swelling.   Gastrointestinal:  Negative for nausea and vomiting.   Neurological:  Positive for numbness and paresthesias. Negative for focal weakness and weakness.   Psychiatric/Behavioral:  Negative for altered mental status. The patient is not nervous/anxious.         Objective:     Physical Exam  Constitutional:       General: He is not in acute distress.     Appearance: Normal appearance. He is well-developed. He is not diaphoretic.   Cardiovascular:      Comments: Dorsalis pedis and posterior tibial pulses are diminished. Skin temperature is within normal limits. Toes are cool to touch and feet are warm proximally. Hair growth is diminished. Skin is atrophic and with mild hyperpigmentation. Mild edema noted, bilaterally. Telangiectasias to medial ankle, bilaterally   Musculoskeletal:         General: Tenderness present.      Comments: Adequate joint range of motion without pain, limitation, nor crepitation to foot and ankle joints. Muscle strength is 5/5 in all groups bilaterally.    S/p TMA to RLE, sutures intact and skin well coapted, no open wounds/drainage or signs of infection, upon suture removal, site with superficial appearing eschar and underlying fragile epithelium.        Feet:      Right foot:      Skin integrity: Dry skin present. No ulcer, blister, erythema or warmth.      Left foot:      Skin integrity: Dry skin present. No  ulcer, blister, erythema or warmth.   Skin:     General: Skin is warm and dry.      Capillary Refill: Capillary refill takes less than 2 seconds.      Comments: Skin is warm and dry, no acute signs of infection noted bilaterally      Toenails are thickened by 2-4 mm's, dystrophic, and are darkened in coloration with subungual fungal debris.     Otherwise, no open wounds, macerations or hyperkeratotic lesions, bilaterally            Neurological:      Mental Status: He is alert and oriented to person, place, and time.      Sensory: Sensory deficit present.      Motor: No abnormal muscle tone.      Comments: Light touch within normal limits. [place] 5.07 monofilamant testing is diminished. Vibratory sensation is diminished bilaterally.   Psychiatric:         Mood and Affect: Mood normal.         Behavior: Behavior normal.         Thought Content: Thought content normal.         PREVIOUS PICTURES LLE      RLE        Assessment:      Encounter Diagnosis   Name Primary?    History of transmetatarsal amputation of foot Yes       Plan:     Julien was seen today for post-op evaluation.    Diagnoses and all orders for this visit:    History of transmetatarsal amputation of foot        I counseled the patient on his conditions, their implications and medical management.    Nursing to change dressings as follows:  Remove foot dressings.   Cleanse wound with normal saline or wound . Dry well.    Apply gentian violet to kelsi wound maceration if needed.   Apply hydrafera blue ready to to TMA site over eschar  Top with football dressing consisting 3 rolls of cast padding. Top with flexinet  Change 3  times per week     Sutures removed. Dressing application as per above. Darco shoe applied to offload the area. Ok for PWB to heel only for short distances with assistance.     Return to clinic in 2-4 weeks, sooner PRN

## 2024-04-22 PROBLEM — Z89.411 ACQUIRED ABSENCE OF RIGHT GREAT TOE: Status: ACTIVE | Noted: 2024-01-02

## 2024-04-22 PROBLEM — Z79.4 LONG TERM (CURRENT) USE OF INSULIN: Status: ACTIVE | Noted: 2024-01-02

## 2024-04-23 ENCOUNTER — DOCUMENT SCAN (OUTPATIENT)
Dept: HOME HEALTH SERVICES | Facility: HOSPITAL | Age: 84
End: 2024-04-23
Payer: MEDICARE

## 2024-04-30 ENCOUNTER — CARE AT HOME (OUTPATIENT)
Dept: HOME HEALTH SERVICES | Facility: CLINIC | Age: 84
End: 2024-04-30
Payer: MEDICARE

## 2024-04-30 DIAGNOSIS — N47.1 PHIMOSIS: ICD-10-CM

## 2024-04-30 DIAGNOSIS — F03.A0 MILD DEMENTIA, UNSPECIFIED DEMENTIA TYPE, UNSPECIFIED WHETHER BEHAVIORAL, PSYCHOTIC, OR MOOD DISTURBANCE OR ANXIETY: ICD-10-CM

## 2024-04-30 DIAGNOSIS — M25.522 LEFT ELBOW PAIN: Primary | ICD-10-CM

## 2024-04-30 DIAGNOSIS — L97.419 DIABETIC ULCER OF RIGHT MIDFOOT ASSOCIATED WITH TYPE 2 DIABETES MELLITUS, UNSPECIFIED ULCER STAGE: ICD-10-CM

## 2024-04-30 DIAGNOSIS — E11.621 DIABETIC ULCER OF RIGHT MIDFOOT ASSOCIATED WITH TYPE 2 DIABETES MELLITUS, UNSPECIFIED ULCER STAGE: ICD-10-CM

## 2024-04-30 DIAGNOSIS — I51.9 SYSTOLIC DYSFUNCTION: ICD-10-CM

## 2024-04-30 LAB — FUNGUS SPEC CULT: NORMAL

## 2024-04-30 PROCEDURE — 1111F DSCHRG MED/CURRENT MED MERGE: CPT | Mod: CPTII,S$GLB,, | Performed by: NURSE PRACTITIONER

## 2024-04-30 PROCEDURE — 99349 HOME/RES VST EST MOD MDM 40: CPT | Mod: S$GLB,,, | Performed by: NURSE PRACTITIONER

## 2024-04-30 NOTE — PROGRESS NOTES
[facility]  Medical Chronic Care Home Visit    Encounter Provider: Gladis Fermin   PCP: Kelvin Wayne MD  Consult Requested By: No ref. provider found    HISTORY OF PRESENT ILLNESS      Patient ID: Julien Meléndez is a 83 y.o. male is being seen in the home due to physical debility that presents a taxing effort to leave the home, to mitigate high risk of hospital readmission and/or due to the limited availability of reliable or safe options for transportation to the point of access to the provider. Prior to treatment on this visit the chart was reviewed and patient verbal consent was obtained.    Chronic medical conditions synopsis:    Julien Meléndez was seen at home today for a medical follow up of chronic conditions. He was recently hospitalized with CLI and infected diabetic foot wound and had amputation of right toe. He is now home from rehab and antibiotics have been completed. He is receiving wound care, HH, and PT. He was sent home with a bailey catheter. On record review, he had phimosis and urinary retention and was to follow up with urology. He does not appear to have an appt. He also reports left elbow pain with decreased range of motion. His wife reports this has been since he fell, unclear on timeline. Has not seen orthopedic. Did have xray with no acute finding.     DECISION MAKING TODAY       Assessment & Plan:  1. Left elbow pain  -     Ambulatory referral/consult to Orthopedics; Future; Expected date: 05/08/2024    --cont tylenol as needed  --f/u with orthopedic    2. Phimosis  -     Ambulatory referral/consult to Urology; Future; Expected date: 05/08/2024    --f/u with urology  --home health to assess and change bailey catheter monthly and as needed    3. Systolic dysfunction  --stable, cont current regimen      4. Mild dementia, unspecified dementia type, unspecified whether behavioral, psychotic, or mood disturbance or anxiety  --hospital bed ordered  --cont HH, PT, add  OT    5. 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  Yes  Name of Caregiver: wife  History provided by: caregiver    4Ms for Medical Decision-Making in Older Adults    Last Completed EAWV: None    MOBILITY:  Get Up and Go:       No data to display              Activities of Daily Living:       No data to display              Whisper Test:       No data to display              Disability Status:      10/24/2017     7:49 AM   Disability Status   Are you deaf or do you have serious difficulty hearing? N   Are you blind or do you have serious difficulty seeing, even when wearing glasses? N   Because of a physical, mental, or emotional condition, do you have serious difficulty concentrating, remembering, or making decisions? N   Do you have serious difficulty walking or climbing stairs? N   Do you have difficulty dressing or bathing? N   Because of a physical, mental, or emotional condition, do you have difficulty doing errands alone such as visiting a doctor's office or shopping? N     Nutrition Screening:       No data to display             Screening Score: 0-7 Malnourished, 8-11 At Risk, 12-14 Normal    MENTATION:   Depression Patient Health Questionnaire:      11/9/2022    10:29 AM   Depression Patient Health Questionnaire   Over the last two weeks how often have you been bothered by little interest or pleasure in doing things Not at all   Over the last two weeks how often have you been bothered by feeling down, depressed or hopeless Not at all   PHQ-2 Total Score 0   PHQ-9 Total Score 2   PHQ-9 Interpretation Minimal or None     Has Dementia Dx: Yes    Cognitive Function Screening:       No data to display               Cognitive Function Screening Total - Less than 4 = Abnormal,  Greater than or equal to 4 = Normal    MEDICATIONS:  High Risk Medications:  Total Active Medications: 1  FLUoxetine - 40 MG    WHAT MATTERS MOST:  Advance Care Planning   ACP Status:   Patient has had an ACP conversation  Living Will: No  Power of : No  [place]: No    What is most important right now is to focus on spending time at homeavoiding the hospitalremaining as independent as possiblesymptom/pain controlextending life as long as possible, even it it means sacrificing quality    Accordingly, we have decided that the best plan to meet the patient's goals includes continuing with treatment      What matters most to patient today is: elbow pain           Is patient hospice appropriate: No  (If needed, use PPS <30 or FAST score >7)  Was referral to hospice placed: No    Impression upon entering the home:  Physical Dwelling: single family home   Appearance of home environment: cleaniness: clean  Functional Status: max assistance  Mobility: bedbound  Nutritional access: adequate intake and access  Home Health: Yes,  Agency [facility]    DME/Supplies: hospital bed and wheelchair     Diagnostic tests reviewed/disposition: No diagnosic tests pending after this hospitalization.  Disease/illness education: Diabetes, CHF, and foot ulcer, elbow pain, catheter care  Establishment or re-establishment of referral orders for community resources: No other necessary community resources.   Discussion with other health care providers: No discussion with other health care providers necessary.   Does patient have a PCP at [facility]? Yes   Repatriation plan with PCP? follow-up with PCP within 90d   Does patient have an ostomy (ileostomy, colostomy, suprapubic catheter, nephrostomy tube, tracheostomy, PEG tube, pleurex catheter, cholecystostomy, etc)? No  Were BPAs reviewed? Yes    Social History     Socioeconomic History    Marital status:     Tobacco Use    Smoking status: Never    Smokeless tobacco: Never   Substance and Sexual Activity    Alcohol use: No    Drug use: No     Social Determinants of Health     Financial Resource Strain: Low Risk  (3/22/2024)    Overall Financial Resource Strain (CARDIA)     Difficulty of Paying Living Expenses: Not hard at all   Food Insecurity: No Food Insecurity (3/22/2024)    Hunger Vital Sign     Worried About Running Out of Food in the Last Year: Never true     Ran Out of Food in the Last Year: Never true   Transportation Needs: No Transportation Needs (3/22/2024)    PRAPARE - Transportation     Lack of Transportation (Medical): No     Lack of Transportation (Non-Medical): No   Physical Activity: Inactive (3/22/2024)    Exercise Vital Sign     Days of Exercise per Week: 0 days     Minutes of Exercise per Session: 0 min   Stress: Stress Concern Present (3/22/2024)    [employer] - Occupational Stress Questionnaire     Feeling of Stress : To some extent   Housing Stability: Low Risk  (3/22/2024)    Housing Stability Vital Sign     Unable to Pay for Housing in the Last Year: No     Number of Places Lived in the Last Year: 1     Unstable Housing in the Last Year: No         OBJECTIVE:     Vital Signs:  There were no vitals filed for this visit.    Review of Systems   Constitutional:  Negative for chills and fever.   HENT:  Negative for congestion.    Eyes:  Negative for visual disturbance.   Respiratory:  Negative for shortness of breath.    Cardiovascular:  Negative for chest pain.   Gastrointestinal:  Negative for abdominal pain and nausea.   Genitourinary:  Negative for dysuria.   Musculoskeletal:  Positive for arthralgias.        Left arm pain   Skin:  Positive for wound.   Neurological:  Positive for weakness. Negative for headaches.   Psychiatric/Behavioral:  Negative for agitation.        Physical Exam:  Physical Exam  Vitals reviewed.   Constitutional:       General: He is not in  acute distress.     Appearance: He is not ill-appearing.   HENT:      Head: Normocephalic and atraumatic.      Nose: Nose normal.      Mouth/Throat:      Mouth: Mucous membranes are moist.   Eyes:      Pupils: Pupils are equal, round, and reactive to light.   Cardiovascular:      Rate and Rhythm: Normal rate and regular rhythm.      Pulses: Normal pulses.      Heart sounds: Normal heart sounds.   Pulmonary:      Effort: Pulmonary effort is normal.      Breath sounds: Normal breath sounds.   Abdominal:      General: Bowel sounds are normal.      Palpations: Abdomen is soft.   Musculoskeletal:         General: No swelling.      Cervical back: Normal range of motion.   Skin:     General: Skin is warm and dry.      Findings: Lesion (foot wrapped) present.   Neurological:      Mental Status: He is alert. Mental status is at baseline. He is disoriented.      Motor: Weakness present.      Comments: Memory deficit   Psychiatric:         Behavior: Behavior normal.         INSTRUCTIONS FOR PATIENT:     Scheduled Follow-up, Appts Reviewed with Modifications if Needed: Yes  Future Appointments   Date Time Provider Department Center   5/7/2024  8:45 AM Diya Leung DPM [facility]   5/27/2024  9:30 AM Ishan Jaramillo MD [facility]   7/30/2024  8:00 AM Gladis Fermin NP [address]         Current Outpatient Medications:     alcohol swabs PadM, Apply 1 each topically as needed (for use with glucose monitoring)., Disp: 200 each, Rfl: 3    amLODIPine (NORVASC) 5 MG tablet, Take 5 mg by mouth once daily., Disp: , Rfl:     artificial tears (ISOPTO TEARS) 0.5 % ophthalmic solution, Place 1 drop into both eyes 3 (three) times daily as needed (dry eyes)., Disp: 15 mL, Rfl: 2    aspirin 81 MG Chew, Take 1 tablet (81 mg total) by mouth once daily., Disp: 30 tablet, Rfl: 11    atorvastatin (LIPITOR) 40 MG tablet, Take 1 tablet (40 mg total) by mouth once daily. (Patient taking differently:  Take 40 mg by mouth every evening.), Disp: 90 tablet, Rfl: 3    blood sugar diagnostic (TRUE METRIX GLUCOSE TEST STRIP) Strp, 1 strip by Misc.(Non-Drug; Combo Route) route 2 (two) times a day., Disp: 100 each, Rfl: 6    blood-glucose meter (TRUE METRIX AIR GLUCOSE METER) Misc, USE AS DIRECTED, Disp: 1 each, Rfl: 0    clopidogreL (PLAVIX) 75 mg tablet, Take 1 tablet (75 mg total) by mouth once daily., Disp: , Rfl:     ELIQUIS 5 mg Tab, Take 5 mg by mouth 2 (two) times daily., Disp: , Rfl:     FLUoxetine 40 MG capsule, Take 40 mg by mouth once daily., Disp: , Rfl:     furosemide (LASIX) 40 MG tablet, Take 40 mg by mouth once daily., Disp: , Rfl:     gentamicin (GARAMYCIN) 0.1 % ointment, , Disp: , Rfl:     gentian violet 1 % topical solution, , Disp: , Rfl:     insulin detemir U-100, Levemir, (LEVEMIR FLEXPEN) 100 unit/mL (3 mL) InPn pen, Inject 10 Units into the skin once daily., Disp: , Rfl:     isosorbide mononitrate (IMDUR) 30 MG 24 hr tablet, Take 1 tablet (30 mg total) by mouth once daily., Disp: 90 tablet, Rfl: 3    lancets 28 gauge Misc, 1 lancet  by Misc.(Non-Drug; Combo Route) route 2 (two) times a day., Disp: 200 each, Rfl: 3    metoprolol succinate (TOPROL-XL) 25 MG 24 hr tablet, Take 25 mg by mouth once daily., Disp: , Rfl:     nitroGLYCERIN (NITROSTAT) 0.4 MG SL tablet, Place 0.4 mg under the tongue every 5 (five) minutes as needed for Chest pain., Disp: , Rfl:     nystatin-triamcinolone (MYCOLOG II) cream, Apply topically., Disp: , Rfl:     ondansetron (ZOFRAN-ODT) 8 MG TbDL, Take 1 tablet (8 mg total) by mouth 3 (three) times daily as needed (nausea or vomiting)., Disp: 20 tablet, Rfl: 0    pantoprazole (PROTONIX) 40 MG tablet, Take 1 tablet (40 mg total) by mouth 2 (two) times daily., Disp: 60 tablet, Rfl: 11    polyethylene glycol (GLYCOLAX) 17 gram PwPk, Take 17 g by mouth 2 (two) times daily., Disp: 60 each, Rfl: 0    ranolazine (RANEXA) 1,000 mg Tb12, Take 1,000 mg by mouth 2 (two) times daily.,  Disp: , Rfl:     SANTYL ointment, Apply topically once daily., Disp: , Rfl:     senna-docusate 8.6-50 mg (PERICOLACE) 8.6-50 mg per tablet, Take 1 tablet by mouth 2 (two) times daily., Disp: 60 tablet, Rfl: 0    SITagliptan-metformin (JANUMET XR) 100-1,000 mg TM24, Take 1 tablet by mouth once daily., Disp: 30 tablet, Rfl: 11    tamsulosin (FLOMAX) 0.4 mg Cap, Take 1 capsule (0.4 mg total) by mouth once daily., Disp: 30 capsule, Rfl: 2    traZODone (DESYREL) 150 MG tablet, Take 1 tablet (150 mg total) by mouth every evening., Disp: 90 tablet, Rfl: 3    Medication Reconciliation:  Were medications changed during this appointment? No  Were medications in the home? Yes  Is the patient taking the medications as directed? Yes  Does the patient/caregiver understand the medications and changes? Yes  Does updated med list accurately reflects meds patient is currently taking? Yes    Signature: Gladis Fermin NP    Total time spent 45 minutes

## 2024-05-01 ENCOUNTER — TELEPHONE (OUTPATIENT)
Dept: HOME HEALTH SERVICES | Facility: CLINIC | Age: 84
End: 2024-05-01
Payer: MEDICARE

## 2024-05-01 DIAGNOSIS — E11.69 DIABETIC FOOT ULCER WITH OSTEOMYELITIS: ICD-10-CM

## 2024-05-01 DIAGNOSIS — I50.9 HEART FAILURE, UNSPECIFIED HF CHRONICITY, UNSPECIFIED HEART FAILURE TYPE: Primary | ICD-10-CM

## 2024-05-01 DIAGNOSIS — E11.621 DIABETIC FOOT ULCER WITH OSTEOMYELITIS: ICD-10-CM

## 2024-05-01 DIAGNOSIS — F03.90 SENILE DEMENTIA, UNCOMPLICATED: ICD-10-CM

## 2024-05-01 DIAGNOSIS — M86.9 DIABETIC FOOT ULCER WITH OSTEOMYELITIS: ICD-10-CM

## 2024-05-01 DIAGNOSIS — L97.509 DIABETIC FOOT ULCER WITH OSTEOMYELITIS: ICD-10-CM

## 2024-05-01 PROBLEM — L97.519 DIABETIC ULCER OF TOE OF RIGHT FOOT ASSOCIATED WITH TYPE 2 DIABETES MELLITUS: Status: ACTIVE | Noted: 2024-03-21

## 2024-05-01 PROBLEM — L97.519 DIABETIC ULCER OF TOE OF RIGHT FOOT ASSOCIATED WITH TYPE 2 DIABETES MELLITUS: Status: RESOLVED | Noted: 2024-03-21 | Resolved: 2024-05-01

## 2024-05-01 NOTE — TELEPHONE ENCOUNTER
Orders placed per NP request for hospital bed.  [facility]  Fax confirmation received.

## 2024-05-07 ENCOUNTER — OFFICE VISIT (OUTPATIENT)
Dept: PODIATRY | Facility: CLINIC | Age: 84
End: 2024-05-07
Payer: MEDICARE

## 2024-05-07 VITALS
BODY MASS INDEX: 22.97 KG/M2 | DIASTOLIC BLOOD PRESSURE: 60 MMHG | HEART RATE: 80 BPM | SYSTOLIC BLOOD PRESSURE: 101 MMHG | WEIGHT: 179 LBS | HEIGHT: 74 IN

## 2024-05-07 DIAGNOSIS — L97.511 ULCER OF RIGHT FOOT, LIMITED TO BREAKDOWN OF SKIN: Primary | ICD-10-CM

## 2024-05-07 PROCEDURE — 99499 UNLISTED E&M SERVICE: CPT | Mod: S$GLB,,, | Performed by: STUDENT IN AN ORGANIZED HEALTH CARE EDUCATION/TRAINING PROGRAM

## 2024-05-07 PROCEDURE — 99999 PR PBB SHADOW E&M-EST. PATIENT-LVL IV: CPT | Mod: PBBFAC,,, | Performed by: STUDENT IN AN ORGANIZED HEALTH CARE EDUCATION/TRAINING PROGRAM

## 2024-05-07 PROCEDURE — 11042 DBRDMT SUBQ TIS 1ST 20SQCM/<: CPT | Mod: S$GLB,,, | Performed by: STUDENT IN AN ORGANIZED HEALTH CARE EDUCATION/TRAINING PROGRAM

## 2024-05-07 NOTE — PROCEDURES
Wound Debridement    Date/Time: 5/7/2024 8:45 AM    Performed by: Diya Leung DPM  Authorized by: Diya Leung DPM    Consent Done?:  Yes (Verbal)  Local anesthesia used?: No      Wound Details:    Location:  Right foot    Location:  Right Midfoot    Type of Debridement:  Excisional (5th met stump)       Length (cm):  0.2       Area (sq cm):  0.04       Width (cm):  0.2       Percent Debrided (%):  100       Depth (cm):  0.2       Total Area Debrided (sq cm):  0.04    Depth of debridement:  Subcutaneous tissue    Tissue debrided:  Subcutaneous and Other    Devitalized tissue debrided:  Biofilm, Callus and Fibrin    Instruments:  Blade and Curette  Patient tolerance:  Patient tolerated the procedure well with no immediate complications     No cultures were taken during this visit   Wound Debridement    Date/Time: 5/7/2024 8:45 AM    Performed by: Dyia Leung DPM  Authorized by: Diya Leung DPM    Consent Done?:  Yes (Verbal)  Local anesthesia used?: No      Wound Details:    Location:  Right foot    Location:  Right Midfoot    Type of Debridement:  Excisional (4th met stump)       Length (cm):  0.2       Area (sq cm):  0.04       Width (cm):  0.2       Percent Debrided (%):  100       Depth (cm):  0.2       Total Area Debrided (sq cm):  0.04    Depth of debridement:  Subcutaneous tissue    Tissue debrided:  Subcutaneous and Other    Devitalized tissue debrided:  Biofilm, Callus and Fibrin    Instruments:  Blade and Curette  Patient tolerance:  Patient tolerated the procedure well with no immediate complications     No cultures were taken during this visit

## 2024-05-07 NOTE — PROGRESS NOTES
Subjective:     Patient ID: Julien Meléndez is a 83 y.o. male.    Chief Complaint: Wound Check    Julien is a 83 y.o. male who presents to the clinic for evaluation and treatment of high risk feet. Julien has a past medical history of Diabetes mellitus, Hiatal hernia, Hyperlipidemia, Hypertension, MI (myocardial infarction), and Sleep apnea. The patient's chief complaint is foot ulcer, both feet. This patient has documented high risk feet requiring routine maintenance secondary to diabetes mellitis and those secondary complications of diabetes, as mentioned..    4/16/24: Seen today, s/p TMA, here for suture removal. No new pedal complaints.     5/7/24: Seen today for a wound check    PCP: Kelvin Wayne MD    Date Last Seen by PCP:     Current shoe gear:    History of Trauma: negative  Sign of Infection: none    Hemoglobin A1C   Date Value Ref Range Status   03/15/2024 8.3 (H) 4.0 - 5.6 % Final     Comment:     ADA Screening Guidelines:  5.7-6.4%  Consistent with prediabetes  >or=6.5%  Consistent with diabetes    High levels of fetal hemoglobin interfere with the HbA1C  assay. Heterozygous hemoglobin variants (HbS, HgC, etc)do  not significantly interfere with this assay.   However, presence of multiple variants may affect accuracy.     10/23/2023 9.7 (H) 4.0 - 5.6 % Final     Comment:     ADA Screening Guidelines:  5.7-6.4%  Consistent with prediabetes  >or=6.5%  Consistent with diabetes    High levels of fetal hemoglobin interfere with the HbA1C  assay. Heterozygous hemoglobin variants (HbS, HgC, etc)do  not significantly interfere with this assay.   However, presence of multiple variants may affect accuracy.     03/25/2023 7.0 (H) 4.0 - 5.6 % Final     Comment:     ADA Screening Guidelines:  5.7-6.4%  Consistent with prediabetes  >or=6.5%  Consistent with diabetes    High levels of fetal hemoglobin interfere with the HbA1C  assay. Heterozygous hemoglobin variants (HbS, HgC, etc)do  not significantly interfere  with this assay.   However, presence of multiple variants may affect accuracy.         Review of Systems   Constitutional: Negative for chills, decreased appetite, diaphoresis and fever.   HENT:  Negative for congestion and hearing loss.    Cardiovascular:  Negative for chest pain, claudication, leg swelling and syncope.   Respiratory:  Negative for cough and shortness of breath.    Skin:  Positive for dry skin, nail changes and poor wound healing. Negative for color change, flushing, itching and rash.   Musculoskeletal:  Negative for arthritis, back pain, joint pain and joint swelling.   Gastrointestinal:  Negative for nausea and vomiting.   Neurological:  Positive for numbness and paresthesias. Negative for focal weakness and weakness.   Psychiatric/Behavioral:  Negative for altered mental status. The patient is not nervous/anxious.         Objective:     Physical Exam  Constitutional:       General: He is not in acute distress.     Appearance: Normal appearance. He is well-developed. He is not diaphoretic.   Cardiovascular:      Comments: Dorsalis pedis and posterior tibial pulses are diminished. Skin temperature is within normal limits. Toes are cool to touch and feet are warm proximally. Hair growth is diminished. Skin is atrophic and with mild hyperpigmentation. Mild edema noted, bilaterally. Telangiectasias to medial ankle, bilaterally   Musculoskeletal:         General: Tenderness present.      Comments: Adequate joint range of motion without pain, limitation, nor crepitation to foot and ankle joints. Muscle strength is 5/5 in all groups bilaterally.    S/p TMA to RLE, see wound description below       Feet:      Right foot:      Skin integrity: Dry skin present. No ulcer, blister, erythema or warmth.      Left foot:      Skin integrity: Dry skin present. No ulcer, blister, erythema or warmth.   Skin:     General: Skin is warm and dry.      Capillary Refill: Capillary refill takes less than 2 seconds.       Comments: Skin is warm and dry, no acute signs of infection noted bilaterally      Toenails are thickened by 2-4 mm's, dystrophic, and are darkened in coloration with subungual fungal debris.     Otherwise, no open wounds, macerations or hyperkeratotic lesions, bilaterally            Neurological:      Mental Status: He is alert and oriented to person, place, and time.      Sensory: Sensory deficit present.      Motor: No abnormal muscle tone.      Comments: Light touch within normal limits. [place] 5.07 monofilamant testing is diminished. Vibratory sensation is diminished bilaterally.   Psychiatric:         Mood and Affect: Mood normal.         Behavior: Behavior normal.         Thought Content: Thought content normal.         Right foot TMA, incision site is healed medially, 2 ulcers to lateral aspect of incision site, both measures 0.2x0.2x0.2cm with granular base and mild bloody drainage after debridement. No signs of infection, hyperkeratotic dry skin surrounding wounds.     PREVIOUS PICTURES LLE      RLE        Assessment:      Encounter Diagnosis   Name Primary?    Ulcer of right foot, limited to breakdown of skin Yes         Plan:     Julien was seen today for wound check.    Diagnoses and all orders for this visit:    Ulcer of right foot, limited to breakdown of skin  -     SUBSEQUENT HOME HEALTH ORDERS  -     Wound Debridement  -     Wound Debridement          I counseled the patient on his conditions, their implications and medical management.    Nursing to change dressings as follows:  Remove foot dressings.   Cleanse wound with normal saline or wound . Clean/remove overlying debris/biofilm to ulcer with gauze.    Apply gentian violet to kelsi wound maceration if needed.   Apply saline moistened aniyah to TMA site over ulcer x2  Top with football dressing consisting 3 rolls of cast padding. Top with flexinet  Change 3  times per week     Dressing application as per above. Darco shoe  applied to offload the area. Ok for PWB to heel only for short distances with assistance.     Return to clinic in 2-4 weeks, sooner PRN

## 2024-05-09 ENCOUNTER — OFFICE VISIT (OUTPATIENT)
Dept: UROLOGY | Facility: CLINIC | Age: 84
End: 2024-05-09
Payer: MEDICARE

## 2024-05-09 VITALS
HEART RATE: 83 BPM | HEIGHT: 74 IN | WEIGHT: 179 LBS | DIASTOLIC BLOOD PRESSURE: 62 MMHG | BODY MASS INDEX: 22.97 KG/M2 | SYSTOLIC BLOOD PRESSURE: 101 MMHG

## 2024-05-09 DIAGNOSIS — B37.49 YEAST UTI: ICD-10-CM

## 2024-05-09 DIAGNOSIS — R31.29 MICROSCOPIC HEMATURIA: ICD-10-CM

## 2024-05-09 DIAGNOSIS — R33.9 URINARY RETENTION: ICD-10-CM

## 2024-05-09 DIAGNOSIS — N30.01 ACUTE CYSTITIS WITH HEMATURIA: ICD-10-CM

## 2024-05-09 DIAGNOSIS — N47.1 PHIMOSIS: Primary | ICD-10-CM

## 2024-05-09 PROCEDURE — 99214 OFFICE O/P EST MOD 30 MIN: CPT | Mod: S$GLB,,, | Performed by: NURSE PRACTITIONER

## 2024-05-09 PROCEDURE — 3074F SYST BP LT 130 MM HG: CPT | Mod: CPTII,S$GLB,, | Performed by: NURSE PRACTITIONER

## 2024-05-09 PROCEDURE — 1126F AMNT PAIN NOTED NONE PRSNT: CPT | Mod: CPTII,S$GLB,, | Performed by: NURSE PRACTITIONER

## 2024-05-09 PROCEDURE — 1101F PT FALLS ASSESS-DOCD LE1/YR: CPT | Mod: CPTII,S$GLB,, | Performed by: NURSE PRACTITIONER

## 2024-05-09 PROCEDURE — 3288F FALL RISK ASSESSMENT DOCD: CPT | Mod: CPTII,S$GLB,, | Performed by: NURSE PRACTITIONER

## 2024-05-09 PROCEDURE — 99999 PR PBB SHADOW E&M-EST. PATIENT-LVL V: CPT | Mod: PBBFAC,,, | Performed by: NURSE PRACTITIONER

## 2024-05-09 PROCEDURE — 3078F DIAST BP <80 MM HG: CPT | Mod: CPTII,S$GLB,, | Performed by: NURSE PRACTITIONER

## 2024-05-09 PROCEDURE — 1160F RVW MEDS BY RX/DR IN RCRD: CPT | Mod: CPTII,S$GLB,, | Performed by: NURSE PRACTITIONER

## 2024-05-09 PROCEDURE — 1159F MED LIST DOCD IN RCRD: CPT | Mod: CPTII,S$GLB,, | Performed by: NURSE PRACTITIONER

## 2024-05-09 RX ORDER — CLOTRIMAZOLE AND BETAMETHASONE DIPROPIONATE 10; .64 MG/G; MG/G
CREAM TOPICAL 2 TIMES DAILY
Qty: 45 G | Refills: 0 | Status: SHIPPED | OUTPATIENT
Start: 2024-05-09 | End: 2024-05-19

## 2024-05-09 NOTE — PROGRESS NOTES
Subjective:       Patient ID: Julien Meléndez is a 83 y.o. male.    Chief Complaint: Urinary Retention    Patient is here today for an ER follow-up for phimosis, UTI with yeast, and bailey catheter evaluation. He was started on Augmentin and Diflucan in the ER on 5/6/2024. Patient had bailey catheter inserted approximately a month ago during a hospital stay for toe amputation surgery. Home health nurse was doing a bailey catheter change, but could not re-insert bailey. It wouldn't advance. Patient was advised to go to the ER for bailey re-insertion. Bailey catheter re-inserted in the ER on 5/6/2024. Patient denies personal hx of BPH and family hx of prostate cancer. He is currently taking tamsulosin 0.4 mg daily. Patient is here today with his wife (Ms. Pichardo).     Follow-up  This is a new (phimosis) problem. The current episode started in the past 7 days. The problem occurs daily. The problem has been gradually improving. Associated symptoms include urinary symptoms and weakness. Pertinent negatives include no abdominal pain, change in bowel habit, chills, fever, nausea, swollen glands or vomiting. Nothing aggravates the symptoms. Treatments tried: Diflucan and Augmentin. The treatment provided moderate relief.     Review of Systems   Constitutional:  Negative for chills and fever.   Gastrointestinal:  Negative for abdominal pain, change in bowel habit, nausea and vomiting.   Genitourinary:  Positive for difficulty urinating (bailey catheter in place). Negative for decreased urine volume, dysuria, hematuria, penile pain, penile swelling, scrotal swelling and testicular pain.   Neurological:  Positive for weakness.   Psychiatric/Behavioral: Negative.           Objective:      Physical Exam  Vitals and nursing note reviewed.   Constitutional:       General: He is not in acute distress.     Appearance: He is well-developed and normal weight. He is not ill-appearing.   HENT:      Head: Normocephalic and atraumatic.   Eyes:       Pupils: Pupils are equal, round, and reactive to light.   Cardiovascular:      Rate and Rhythm: Normal rate.   Pulmonary:      Effort: Pulmonary effort is normal. No respiratory distress.   Abdominal:      Palpations: Abdomen is soft.      Tenderness: There is no abdominal tenderness.   Musculoskeletal:         General: Normal range of motion.      Cervical back: Normal range of motion.   Skin:     General: Skin is warm and dry.   Neurological:      Mental Status: He is alert and oriented to person, place, and time.      Coordination: Coordination normal.   Psychiatric:         Mood and Affect: Mood normal.         Behavior: Behavior normal.         Thought Content: Thought content normal.         Judgment: Judgment normal.         Assessment:       Problem List Items Addressed This Visit          Renal/    Phimosis - Primary    Relevant Medications    clotrimazole-betamethasone 1-0.05% (LOTRISONE) cream     Other Visit Diagnoses       Urinary retention        Acute cystitis with hematuria        Yeast UTI        Microscopic hematuria                Plan:           Julien was seen today for urinary retention.    Diagnoses and all orders for this visit:    Phimosis  -     clotrimazole-betamethasone 1-0.05% (LOTRISONE) cream; Apply topically 2 (two) times daily. for 10 days    Urinary retention    Acute cystitis with hematuria    Yeast UTI    Microscopic hematuria    Other orders  Continue and complete antibiotic therapy (Augmentin) and antifungal therapy (Diflucan).  Continue tamsulosin (Flomax) 0.4 mg daily to promote bladder emptying.     Follow-up next week for a voiding trial, DELISA, and PSA.    Rachelle Busch DNP

## 2024-05-09 NOTE — PATIENT INSTRUCTIONS
Continue and complete antibiotic therapy (Augmentin) and antifungal therapy (Diflucan).  Continue tamsulosin (Flomax) 0.4 mg daily to promote bladder emptying.   Follow-up next week for a voiding trial, DELISA, and PSA.

## 2024-05-13 ENCOUNTER — DOCUMENT SCAN (OUTPATIENT)
Dept: HOME HEALTH SERVICES | Facility: HOSPITAL | Age: 84
End: 2024-05-13
Payer: MEDICARE

## 2024-05-15 ENCOUNTER — OFFICE VISIT (OUTPATIENT)
Dept: UROLOGY | Facility: CLINIC | Age: 84
End: 2024-05-15
Payer: MEDICARE

## 2024-05-15 VITALS
BODY MASS INDEX: 22.97 KG/M2 | HEART RATE: 80 BPM | HEIGHT: 74 IN | WEIGHT: 179 LBS | SYSTOLIC BLOOD PRESSURE: 97 MMHG | DIASTOLIC BLOOD PRESSURE: 63 MMHG

## 2024-05-15 DIAGNOSIS — B37.49 YEAST UTI: ICD-10-CM

## 2024-05-15 DIAGNOSIS — N30.01 ACUTE CYSTITIS WITH HEMATURIA: ICD-10-CM

## 2024-05-15 DIAGNOSIS — N47.1 PHIMOSIS: ICD-10-CM

## 2024-05-15 DIAGNOSIS — N40.0 ENLARGED PROSTATE: ICD-10-CM

## 2024-05-15 DIAGNOSIS — R33.9 URINARY RETENTION: Primary | ICD-10-CM

## 2024-05-15 DIAGNOSIS — R31.29 MICROSCOPIC HEMATURIA: ICD-10-CM

## 2024-05-15 DIAGNOSIS — Z97.8 FOLEY CATHETER IN PLACE: ICD-10-CM

## 2024-05-15 PROCEDURE — 51700 IRRIGATION OF BLADDER: CPT | Mod: S$GLB,,, | Performed by: NURSE PRACTITIONER

## 2024-05-15 PROCEDURE — 1101F PT FALLS ASSESS-DOCD LE1/YR: CPT | Mod: CPTII,S$GLB,, | Performed by: NURSE PRACTITIONER

## 2024-05-15 PROCEDURE — 1126F AMNT PAIN NOTED NONE PRSNT: CPT | Mod: CPTII,S$GLB,, | Performed by: NURSE PRACTITIONER

## 2024-05-15 PROCEDURE — 3078F DIAST BP <80 MM HG: CPT | Mod: CPTII,S$GLB,, | Performed by: NURSE PRACTITIONER

## 2024-05-15 PROCEDURE — 99999 PR PBB SHADOW E&M-EST. PATIENT-LVL V: CPT | Mod: PBBFAC,,, | Performed by: NURSE PRACTITIONER

## 2024-05-15 PROCEDURE — 1159F MED LIST DOCD IN RCRD: CPT | Mod: CPTII,S$GLB,, | Performed by: NURSE PRACTITIONER

## 2024-05-15 PROCEDURE — 1160F RVW MEDS BY RX/DR IN RCRD: CPT | Mod: CPTII,S$GLB,, | Performed by: NURSE PRACTITIONER

## 2024-05-15 PROCEDURE — 3288F FALL RISK ASSESSMENT DOCD: CPT | Mod: CPTII,S$GLB,, | Performed by: NURSE PRACTITIONER

## 2024-05-15 PROCEDURE — 99499 UNLISTED E&M SERVICE: CPT | Mod: S$GLB,,, | Performed by: NURSE PRACTITIONER

## 2024-05-15 PROCEDURE — 3074F SYST BP LT 130 MM HG: CPT | Mod: CPTII,S$GLB,, | Performed by: NURSE PRACTITIONER

## 2024-05-15 RX ORDER — DUTASTERIDE 0.5 MG/1
0.5 CAPSULE, LIQUID FILLED ORAL DAILY
Qty: 90 CAPSULE | Refills: 3 | Status: SHIPPED | OUTPATIENT
Start: 2024-05-15 | End: 2025-05-15

## 2024-05-15 NOTE — PROGRESS NOTES
Subjective:       Patient ID: Julien Meléndez is a 83 y.o. male.    Chief Complaint: Follow-up (Void and trial)    Patient is here today for a voiding trial and possible bailey catheter removal. He is here today with his wife (Kylee).     5/9/2024:  Patient is here today for an ER follow-up for phimosis, UTI with yeast, and bailey catheter evaluation. He was started on Augmentin and Diflucan in the ER on 5/6/2024. Patient had bailey catheter inserted approximately a month ago during a hospital stay for toe amputation surgery. Home health nurse was doing a bailey catheter change, but could not re-insert bailey. It wouldn't advance. Patient was advised to go to the ER for bailey re-insertion. Bailey catheter re-inserted in the ER on 5/6/2024. Patient denies personal hx of BPH and family hx of prostate cancer. He is currently taking tamsulosin 0.4 mg daily. Patient is here today with his wife (Ms. Pichardo).     Follow-up  This is a new (urinary retention) problem. The current episode started more than 1 month ago. The problem occurs daily. The problem has been unchanged. Associated symptoms include urinary symptoms and weakness. Pertinent negatives include no abdominal pain, change in bowel habit, chills, fever, headaches, nausea, swollen glands or vomiting. Nothing aggravates the symptoms. Treatments tried: bailey catheter. The treatment provided significant relief.     Review of Systems   Constitutional:  Negative for chills and fever.   Gastrointestinal:  Negative for abdominal pain, change in bowel habit, nausea and vomiting.   Genitourinary:  Positive for difficulty urinating (bailey catheter in place). Negative for decreased urine volume, discharge, flank pain, hematuria, penile pain, penile swelling, scrotal swelling and testicular pain.   Neurological:  Positive for weakness. Negative for dizziness and headaches.   Psychiatric/Behavioral:  Negative for agitation and confusion. The patient is not nervous/anxious.           Objective:      Physical Exam  Vitals and nursing note reviewed.   Constitutional:       General: He is not in acute distress.     Appearance: He is well-developed. He is not ill-appearing.   HENT:      Head: Normocephalic and atraumatic.   Eyes:      Pupils: Pupils are equal, round, and reactive to light.   Cardiovascular:      Rate and Rhythm: Normal rate.   Pulmonary:      Effort: Pulmonary effort is normal. No respiratory distress.   Abdominal:      Palpations: Abdomen is soft.      Tenderness: There is no abdominal tenderness.   Genitourinary:     Penis: Phimosis present.        Musculoskeletal:      Cervical back: Normal range of motion.      Comments: Currently in wheelchair.    Skin:     General: Skin is warm and dry.   Neurological:      Mental Status: He is alert and oriented to person, place, and time.      Coordination: Coordination normal.   Psychiatric:         Mood and Affect: Mood normal.         Behavior: Behavior normal.         Thought Content: Thought content normal.         Judgment: Judgment normal.         Assessment:       Problem List Items Addressed This Visit          Renal/    Phimosis     Other Visit Diagnoses       Acute cystitis with hematuria    -  Primary    Relevant Orders    Urine culture    Urinalysis    Yeast UTI        Relevant Orders    Urine culture    Urinalysis    Microscopic hematuria        Relevant Orders    Urine culture    Urinalysis    Urinary retention        Relevant Orders    Urine culture    Urinalysis    Bailey catheter - discontinue (Completed)    Insert bailey catheter (Completed)    Bailey catheter in place        Enlarged prostate        Relevant Medications    dutasteride (AVODART) 0.5 mg capsule    Other Relevant Orders    PSA, Total and Free            Plan:           Julien was seen today for follow-up.    Diagnoses and all orders for this visit:    Urinary retention  -     Urine culture; Future  -     Urinalysis; Future  -     Bailey catheter -  discontinue  -     Insert bailey catheter    Bailey catheter in place    Acute cystitis with hematuria  -     Urine culture; Future  -     Urinalysis; Future    Yeast UTI  -     Urine culture; Future  -     Urinalysis; Future    Phimosis    Microscopic hematuria  -     Urine culture; Future  -     Urinalysis; Future    Enlarged prostate  -     dutasteride (AVODART) 0.5 mg capsule; Take 1 capsule (0.5 mg total) by mouth once daily.  -     PSA, Total and Free; Future    Other orders  Voiding trial failed today in-clinic.   Re-insert bailey catheter  PSA, total and free today  Continue taking tamsulosin (Flomax) 0.4 mg daily for BPH.   Start taking dutasteride (Avodart) 0.5 mg daily for BPH.   Continue taking Augmentin antibiotic as previously prescribed.   Schedule patient for in-clinic cystoscopy for evaluation of urinary retention.    Rachelle Busch, DNP

## 2024-05-15 NOTE — PROGRESS NOTES
Patient here for void trial. I instilled approx 200 cc sterile water into existing Doherty cath (14F coude). Patient did not report feeling full however no more water would go in by gravity. I then completely deflated the balloon, approx 9 cc clear fluid. I withdrew the catheter easily, tip and balloon intact. Patient was then given urinal and asked to urinate. He does not report the urge to urinate and is not able to urinate. Results given to Rachelle Busch DNP. She also was present at bedside and observed some degraded skin (split) on the glans. It is approximately one inch long beginning at the urethral meatus. Orders received to replace catheter. I placed a new 14Fr coude catheter easily using sterile technique. Balloon inflated to 10 cc sterile water. 220 cc of clear, yellow urine was immediately drained. Results communicated to provider.

## 2024-05-15 NOTE — PATIENT INSTRUCTIONS
Voiding trial failed today in-clinic.   Re-insert bailey catheter  PSA, total and free today  Schedule patient for in-clinic cystoscopy for evaluation of urinary retention.  Continue taking tamsulosin (Flomax) 0.4 mg daily for BPH.   Start taking dutasteride (Avodart) 0.5 mg daily for BPH.   Continue taking Augmentin antibiotic as previously prescribed.

## 2024-05-16 ENCOUNTER — TELEPHONE (OUTPATIENT)
Dept: UROLOGY | Facility: CLINIC | Age: 84
End: 2024-05-16
Payer: MEDICARE

## 2024-05-16 LAB
ACID FAST MOD KINY STN SPEC: NORMAL
MYCOBACTERIUM SPEC QL CULT: NORMAL

## 2024-05-16 NOTE — TELEPHONE ENCOUNTER
I attempted to call kylee , no answer. Left result note on voicemail    ----- Message from Rachelle Busch NP sent at 5/16/2024  9:16 AM CDT -----  Please inform patient's wife/caregiver (Kylee) that patient's PSA (prostate cancer screening lab) was normal at 1.8 ng/mL. Recommended to repeat in 1 year.

## 2024-05-20 ENCOUNTER — EXTERNAL HOME HEALTH (OUTPATIENT)
Dept: HOME HEALTH SERVICES | Facility: HOSPITAL | Age: 84
End: 2024-05-20
Payer: MEDICARE

## 2024-05-21 ENCOUNTER — DOCUMENT SCAN (OUTPATIENT)
Dept: HOME HEALTH SERVICES | Facility: HOSPITAL | Age: 84
End: 2024-05-21
Payer: MEDICARE

## 2024-05-27 ENCOUNTER — OFFICE VISIT (OUTPATIENT)
Dept: INFECTIOUS DISEASES | Facility: CLINIC | Age: 84
End: 2024-05-27
Payer: MEDICARE

## 2024-05-27 ENCOUNTER — LAB VISIT (OUTPATIENT)
Dept: LAB | Facility: HOSPITAL | Age: 84
End: 2024-05-27
Attending: INTERNAL MEDICINE
Payer: MEDICARE

## 2024-05-27 VITALS
SYSTOLIC BLOOD PRESSURE: 123 MMHG | HEART RATE: 83 BPM | WEIGHT: 179 LBS | BODY MASS INDEX: 24.24 KG/M2 | DIASTOLIC BLOOD PRESSURE: 69 MMHG | HEIGHT: 72 IN

## 2024-05-27 DIAGNOSIS — M86.60 CHRONIC OSTEOMYELITIS: Primary | ICD-10-CM

## 2024-05-27 DIAGNOSIS — M86.60 CHRONIC OSTEOMYELITIS: ICD-10-CM

## 2024-05-27 LAB
BASOPHILS # BLD AUTO: 0.05 K/UL (ref 0–0.2)
BASOPHILS NFR BLD: 0.6 % (ref 0–1.9)
CRP SERPL-MCNC: 0.5 MG/L (ref 0–8.2)
DIFFERENTIAL METHOD BLD: ABNORMAL
EOSINOPHIL # BLD AUTO: 0.2 K/UL (ref 0–0.5)
EOSINOPHIL NFR BLD: 1.9 % (ref 0–8)
ERYTHROCYTE [DISTWIDTH] IN BLOOD BY AUTOMATED COUNT: 15.9 % (ref 11.5–14.5)
HCT VFR BLD AUTO: 37.5 % (ref 40–54)
HGB BLD-MCNC: 12.3 G/DL (ref 14–18)
IMM GRANULOCYTES # BLD AUTO: 0.07 K/UL (ref 0–0.04)
IMM GRANULOCYTES NFR BLD AUTO: 0.8 % (ref 0–0.5)
LYMPHOCYTES # BLD AUTO: 2.3 K/UL (ref 1–4.8)
LYMPHOCYTES NFR BLD: 27.4 % (ref 18–48)
MCH RBC QN AUTO: 27.3 PG (ref 27–31)
MCHC RBC AUTO-ENTMCNC: 32.8 G/DL (ref 32–36)
MCV RBC AUTO: 83 FL (ref 82–98)
MONOCYTES # BLD AUTO: 0.8 K/UL (ref 0.3–1)
MONOCYTES NFR BLD: 9.1 % (ref 4–15)
NEUTROPHILS # BLD AUTO: 5.1 K/UL (ref 1.8–7.7)
NEUTROPHILS NFR BLD: 60.2 % (ref 38–73)
NRBC BLD-RTO: 0 /100 WBC
PLATELET # BLD AUTO: 230 K/UL (ref 150–450)
PMV BLD AUTO: 12.2 FL (ref 9.2–12.9)
RBC # BLD AUTO: 4.51 M/UL (ref 4.6–6.2)
WBC # BLD AUTO: 8.47 K/UL (ref 3.9–12.7)

## 2024-05-27 PROCEDURE — 1159F MED LIST DOCD IN RCRD: CPT | Mod: CPTII,S$GLB,, | Performed by: INTERNAL MEDICINE

## 2024-05-27 PROCEDURE — 1126F AMNT PAIN NOTED NONE PRSNT: CPT | Mod: CPTII,S$GLB,, | Performed by: INTERNAL MEDICINE

## 2024-05-27 PROCEDURE — 3288F FALL RISK ASSESSMENT DOCD: CPT | Mod: CPTII,S$GLB,, | Performed by: INTERNAL MEDICINE

## 2024-05-27 PROCEDURE — 85025 COMPLETE CBC W/AUTO DIFF WBC: CPT | Performed by: INTERNAL MEDICINE

## 2024-05-27 PROCEDURE — 3078F DIAST BP <80 MM HG: CPT | Mod: CPTII,S$GLB,, | Performed by: INTERNAL MEDICINE

## 2024-05-27 PROCEDURE — 86140 C-REACTIVE PROTEIN: CPT | Performed by: INTERNAL MEDICINE

## 2024-05-27 PROCEDURE — 1101F PT FALLS ASSESS-DOCD LE1/YR: CPT | Mod: CPTII,S$GLB,, | Performed by: INTERNAL MEDICINE

## 2024-05-27 PROCEDURE — 99213 OFFICE O/P EST LOW 20 MIN: CPT | Mod: S$GLB,,, | Performed by: INTERNAL MEDICINE

## 2024-05-27 PROCEDURE — 36415 COLL VENOUS BLD VENIPUNCTURE: CPT | Performed by: INTERNAL MEDICINE

## 2024-05-27 PROCEDURE — 99999 PR PBB SHADOW E&M-EST. PATIENT-LVL IV: CPT | Mod: PBBFAC,,, | Performed by: INTERNAL MEDICINE

## 2024-05-27 PROCEDURE — 3074F SYST BP LT 130 MM HG: CPT | Mod: CPTII,S$GLB,, | Performed by: INTERNAL MEDICINE

## 2024-05-27 NOTE — PROGRESS NOTES
[facility]  Clinic note    Patient Name: Julien Meléndez  YOB: 1940    PRESENTING HISTORY       History of Present Illness:  Mr. Julien Meléndez is a 83 y.o.male with PMH significant for DMII, HLD, HTN, MI, depression, CHF, Afib, CKD1, diabetic foot ulcer and KELSEY who was recently admitted for critical limb ischemia with gangrene and osteomyelitis of multiple toes of the right foot. TMA of the right foot was performed and pathology showed remaining osteo. He was treated for 4 weeks with meropenem for MSSA and pseudomonas osteomyelitis. He reports that the wound has healed well so far and he reports no fever or chills. Has now been off antibiotics for roughly 4 weeks    Review of Systems:  Constitutional: no fever or chills  Eyes: no visual changes  ENT: no nasal congestion or sore throat  Respiratory: no cough or shortness of breath  Cardiovascular: no chest pain  Gastrointestinal: no nausea or vomiting, no abdominal pain, no constipation, no diarrhea  Genitourinary: no hematuria or dysuria  Musculoskeletal: no arthralgias or myalgias  Skin: no rash  Neurological: no headaches, numbness, or paresthesias    PAST HISTORY:     Past Medical History:   Diagnosis Date    Diabetes mellitus     Hiatal hernia     under Dr Saenz' care    Hyperlipidemia     Hypertension     MI (myocardial infarction)     Sleep apnea        Past Surgical History:   Procedure Laterality Date    ABDOMINAL HERNIA REPAIR      ANGIOGRAM, EXTREMITY, BILATERAL Bilateral 4/5/2023    Procedure: ANGIOGRAM, EXTREMITY, BILATERAL;  Surgeon: Michael Giraldo III, MD;  Location: [facility] CATH LAB;  Service: Cardiology;  Laterality: Bilateral;    ANGIOGRAPHY OF LOWER EXTREMITY Left 4/12/2023    Procedure: Angiogram Extremity Unilateral;  Surgeon: Michael Giraldo III, MD;  Location: [facility] CATH LAB;  Service: Cardiology;  Laterality: Left;    AORTOGRAPHY WITH SERIALOGRAPHY Bilateral 4/5/2023    Procedure: AORTOGRAM, WITH  SERIALOGRAPHY;  Surgeon: Michael Giraldo III, MD;  Location: [facility] CATH LAB;  Service: Cardiology;  Laterality: Bilateral;    AORTOGRAPHY WITH SERIALOGRAPHY N/A 3/26/2024    Procedure: AORTOGRAM, WITH SERIALOGRAPHY;  Surgeon: Jin Malloy MD;  Location: [facility] CATH LAB/EP;  Service: Cardiology;  Laterality: N/A;    APPLICATION OF WOUND VACUUM-ASSISTED CLOSURE DEVICE Left 3/29/2023    Procedure: APPLICATION, WOUND VAC;  Surgeon: Alona Pierce DPM;  Location: [facility] OR;  Service: Podiatry;  Laterality: Left;    APPLICATION OF WOUND VACUUM-ASSISTED CLOSURE DEVICE Left 4/4/2023    Procedure: APPLICATION, WOUND VAC;  Surgeon: Alona Pierce DPM;  Location: [facility] OR;  Service: Podiatry;  Laterality: Left;    APPLICATION OF WOUND VACUUM-ASSISTED CLOSURE DEVICE Left 4/13/2023    Procedure: APPLICATION, WOUND VAC;  Surgeon: Alona Pierce DPM;  Location: [facility] OR;  Service: Podiatry;  Laterality: Left;    BONE BIOPSY Left 3/29/2023    Procedure: BIOPSY, BONE;  Surgeon: Alona Pierce DPM;  Location: [facility] OR;  Service: Podiatry;  Laterality: Left;  1st met    CARDIAC CATHETERIZATION      3 stents placed    CATARACT EXTRACTION, BILATERAL      CLOSURE DEVICE Left 4/12/2023    Procedure: Placement of Closure Device;  Surgeon: Michael Giraldo III, MD;  Location: [facility] CATH LAB;  Service: Cardiology;  Laterality: Left;    CORONARY ANGIOGRAPHY N/A 10/3/2019    Procedure: ANGIOGRAM, CORONARY ARTERY;  Surgeon: Edwin Azul MD;  Location: [facility] CATH LAB/EP;  Service: Cardiology;  Laterality: N/A;    DEBRIDEMENT OF FOOT Left 3/29/2023    Procedure: DEBRIDEMENT, FOOT;  Surgeon: Alona Pierce DPM;  Location: [facility] OR;  Service: Podiatry;  Laterality: Left;    DEBRIDEMENT OF FOOT Left 4/4/2023    Procedure: DEBRIDEMENT, FOOT;  Surgeon: Alona Pierce DPM;  Location: [facility] OR;  Service: Podiatry;  Laterality: Left;    DEBRIDEMENT OF FOOT Left 4/13/2023    Procedure: DEBRIDEMENT, FOOT;  Surgeon: Alona Pierce DPM;  Location: [facility] OR;   Service: Podiatry;  Laterality: Left;    ESOPHAGOGASTRODUODENOSCOPY N/A 4/17/2023    Procedure: EGD (ESOPHAGOGASTRODUODENOSCOPY);  Surgeon: Otto Villarreal MD;  Location: [facility] ENDO;  Service: Endoscopy;  Laterality: N/A;    FOOT AMPUTATION THROUGH METATARSAL Right 3/27/2024    Procedure: AMPUTATION, FOOT, TRANSMETATARSAL;  Surgeon: Diya Leung DPM;  Location: [facility] OR;  Service: Podiatry;  Laterality: Right;    HERNIA REPAIR      inguinal    IVUS (INTRAVASCULAR ULTRASOUND) Left 4/12/2023    Procedure: ULTRASOUND, INTRAVASCULAR;  Surgeon: Michael Giraldo III, MD;  Location: [facility] CATH LAB;  Service: Cardiology;  Laterality: Left;    LEFT HEART CATHETERIZATION Left 9/13/2019    Procedure: Left heart cath;  Surgeon: Edwin Azul MD;  Location: [facility] CATH LAB/EP;  Service: Cardiology;  Laterality: Left;    LEFT HEART CATHETERIZATION N/A 10/3/2019    Procedure: Left heart cath;  Surgeon: Edwin Azul MD;  Location: [facility] CATH LAB/EP;  Service: Cardiology;  Laterality: N/A;    OSTEOTOMY Left 6/7/2023    Procedure: OSTEOTOMY;  Surgeon: Alona Pierce DPM;  Location: [facility] OR;  Service: Podiatry;  Laterality: Left;    OSTEOTOMY OF METATARSAL BONE Right 4/4/2023    Procedure: OSTEOTOMY, METATARSAL BONE;  Surgeon: Alona Pierce DPM;  Location: [facility] OR;  Service: Podiatry;  Laterality: Right;    PERCUTANEOUS TRANSLUMINAL ANGIOPLASTY Left 4/12/2023    Procedure: PTA (ANGIOPLASTY, PERCUTANEOUS, TRANSLUMINAL);  Surgeon: Michael Giraldo III, MD;  Location: [facility] CATH LAB;  Service: Cardiology;  Laterality: Left;    SURGICAL REMOVAL OF BUNION WITH OSTEOTOMY OF METATARSAL BONE Right 4/4/2023    Procedure: BUNIONECTOMY, WITH METATARSAL OSTEOTOMY;  Surgeon: Alona Pierce DPM;  Location: [facility] OR;  Service: Podiatry;  Laterality: Right;    TOE AMPUTATION Left 4/4/2023    Procedure: AMPUTATION, TOE;  Surgeon: Alona Pierce DPM;  Location: [facility] OR;  Service: Podiatry;  Laterality: Left;  1st ray    TOE AMPUTATION Right  11/10/2023    Procedure: AMPUTATION, TOE;  Surgeon: Diya Leung DPM;  Location: [facility] OR;  Service: Podiatry;  Laterality: Right;    WASHOUT Left 6/7/2023    Procedure: WASHOUT;  Surgeon: Alona Pierce DPM;  Location: [facility] OR;  Service: Podiatry;  Laterality: Left;    WOUND DEBRIDEMENT Left 6/7/2023    Procedure: DEBRIDEMENT, WOUND;  Surgeon: Alona Pierce DPM;  Location: [facility] OR;  Service: Podiatry;  Laterality: Left;       Family History   Problem Relation Name Age of Onset    Heart disease Mother      Stroke Mother      Heart disease Sister      Heart disease Brother      Heart disease Maternal Uncle      Heart disease Brother      Heart disease Sister      Heart disease Maternal Uncle      Heart disease Maternal Uncle      Heart disease Maternal Uncle         Social History     Socioeconomic History    Marital status:    Tobacco Use    Smoking status: Never    Smokeless tobacco: Never   Substance and Sexual Activity    Alcohol use: No    Drug use: No     Social Determinants of Health     Financial Resource Strain: Low Risk  (3/22/2024)    Overall Financial Resource Strain (CARDIA)     Difficulty of Paying Living Expenses: Not hard at all   Food Insecurity: No Food Insecurity (3/22/2024)    Hunger Vital Sign     Worried About Running Out of Food in the Last Year: Never true     Ran Out of Food in the Last Year: Never true   Transportation Needs: No Transportation Needs (3/22/2024)    PRAPARE - Transportation     Lack of Transportation (Medical): No     Lack of Transportation (Non-Medical): No   Physical Activity: Inactive (3/22/2024)    Exercise Vital Sign     Days of Exercise per Week: 0 days     Minutes of Exercise per Session: 0 min   Stress: Stress Concern Present (3/22/2024)    [employer] [place] [employer] - Occupational Stress Questionnaire     Feeling of Stress : To some extent   Housing Stability: Low Risk  (3/22/2024)    Housing Stability Vital Sign     Unable to Pay for Housing  in the Last Year: No     Number of Places Lived in the Last Year: 1     Unstable Housing in the Last Year: No       MEDICATIONS & ALLERGIES:     Current Outpatient Medications on File Prior to Visit   Medication Sig    alcohol swabs PadM Apply 1 each topically as needed (for use with glucose monitoring).    amLODIPine (NORVASC) 5 MG tablet Take 5 mg by mouth once daily.    atorvastatin (LIPITOR) 40 MG tablet Take 1 tablet (40 mg total) by mouth once daily. (Patient taking differently: Take 40 mg by mouth every evening.)    blood sugar diagnostic (TRUE METRIX GLUCOSE TEST STRIP) Strp 1 strip by Misc.(Non-Drug; Combo Route) route 2 (two) times a day.    blood-glucose meter (TRUE METRIX AIR GLUCOSE METER) Misc USE AS DIRECTED    clopidogreL (PLAVIX) 75 mg tablet Take 1 tablet (75 mg total) by mouth once daily.    ELIQUIS 5 mg Tab Take 5 mg by mouth 2 (two) times daily.    FLUoxetine 40 MG capsule Take 40 mg by mouth once daily.    furosemide (LASIX) 40 MG tablet Take 40 mg by mouth once daily.    gentamicin (GARAMYCIN) 0.1 % ointment     insulin detemir U-100, Levemir, (LEVEMIR FLEXPEN) 100 unit/mL (3 mL) InPn pen Inject 10 Units into the skin once daily.    lancets 28 gauge Misc 1 lancet  by Misc.(Non-Drug; Combo Route) route 2 (two) times a day.    nitroGLYCERIN (NITROSTAT) 0.4 MG SL tablet Place 0.4 mg under the tongue every 5 (five) minutes as needed for Chest pain.    ondansetron (ZOFRAN-ODT) 8 MG TbDL Take 1 tablet (8 mg total) by mouth 3 (three) times daily as needed (nausea or vomiting).    pantoprazole (PROTONIX) 40 MG tablet Take 1 tablet (40 mg total) by mouth 2 (two) times daily.    ranolazine (RANEXA) 1,000 mg Tb12 Take 1,000 mg by mouth 2 (two) times daily.    SITagliptan-metformin (JANUMET XR) 100-1,000 mg TM24 Take 1 tablet by mouth once daily.    tamsulosin (FLOMAX) 0.4 mg Cap Take 1 capsule (0.4 mg total) by mouth once daily.    aspirin 81 MG Chew Take 1 tablet (81 mg total) by mouth once daily.  (Patient not taking: Reported on 5/27/2024)    dutasteride (AVODART) 0.5 mg capsule Take 1 capsule (0.5 mg total) by mouth once daily.    gentian violet 1 % topical solution  (Patient not taking: Reported on 5/27/2024)    isosorbide mononitrate (IMDUR) 30 MG 24 hr tablet Take 1 tablet (30 mg total) by mouth once daily.    metoprolol succinate (TOPROL-XL) 25 MG 24 hr tablet Take 25 mg by mouth once daily. (Patient not taking: Reported on 5/27/2024)    nystatin-triamcinolone (MYCOLOG II) cream Apply topically. (Patient not taking: Reported on 5/27/2024)    SANTYL ointment Apply topically once daily. (Patient not taking: Reported on 5/27/2024)     No current facility-administered medications on file prior to visit.       Review of patient's allergies indicates:   Allergen Reactions    Nystatin      Other reaction(s): Unknown       OBJECTIVE:   Vital Signs:  Vitals:    05/27/24 0939   BP: 123/69   Pulse: 83   Weight: 81.2 kg (179 lb)   Height: 6' (1.829 m)       No results found for this or any previous visit (from the past 24 hour(s)).      Physical Exam:   General:  Well developed, well nourished, no acute distress  HEENT:  Normocephalic, atraumatic, EOMI, clear sclera, throat clear without erythema or exudates  CVS:  RRR, S1 and S2 normal, no murmurs, rubs, gallops  Resp:  Lungs clear to auscultation, no wheezes, rales, rhonchi  GI:  Abdomen soft, non-tender, non-distended, normoactive bowel sounds, no masses  MSK:  s/p TMA of right foot, see recent podiatry note  Skin:  No rashes, ulcers, erythema  Neuro:  CNII-XII grossly intact  Psych:  Alert and oriented to person, place, and time    ASSESSMENT:     1. Chronic osteomyelitis  3 y.o.male with PMH significant for DMII, HLD, HTN, MI, depression, CHF, Afib, CKD1, diabetic foot ulcer and KELSEY who was recently admitted for critical limb ischemia with gangrene and osteomyelitis of multiple toes of the right foot. TMA of the right foot was performed and pathology showed  remaining osteo. He was treated for 4 weeks with meropenem for MSSA and pseudomonas osteomyelitis       PLAN:     -would healing without sign off infection per podiatry and patient  -will check cbc and crp  -plan to monitor for now   RTC PRN

## 2024-06-05 ENCOUNTER — TELEPHONE (OUTPATIENT)
Dept: PODIATRY | Facility: CLINIC | Age: 84
End: 2024-06-05
Payer: MEDICARE

## 2024-06-05 ENCOUNTER — OFFICE VISIT (OUTPATIENT)
Dept: PODIATRY | Facility: CLINIC | Age: 84
End: 2024-06-05
Payer: MEDICARE

## 2024-06-05 VITALS
HEIGHT: 72 IN | BODY MASS INDEX: 24.24 KG/M2 | DIASTOLIC BLOOD PRESSURE: 69 MMHG | WEIGHT: 179 LBS | HEART RATE: 86 BPM | SYSTOLIC BLOOD PRESSURE: 117 MMHG

## 2024-06-05 DIAGNOSIS — Z89.439 HISTORY OF TRANSMETATARSAL AMPUTATION OF FOOT: ICD-10-CM

## 2024-06-05 DIAGNOSIS — B35.1 ONYCHOMYCOSIS DUE TO DERMATOPHYTE: ICD-10-CM

## 2024-06-05 DIAGNOSIS — L85.3 DRY SKIN: ICD-10-CM

## 2024-06-05 DIAGNOSIS — I73.9 PAD (PERIPHERAL ARTERY DISEASE): Primary | ICD-10-CM

## 2024-06-05 DIAGNOSIS — Z87.828 HEALED WOUND: ICD-10-CM

## 2024-06-05 DIAGNOSIS — E11.42 DIABETIC POLYNEUROPATHY ASSOCIATED WITH TYPE 2 DIABETES MELLITUS: ICD-10-CM

## 2024-06-05 PROCEDURE — 3078F DIAST BP <80 MM HG: CPT | Mod: CPTII,S$GLB,, | Performed by: STUDENT IN AN ORGANIZED HEALTH CARE EDUCATION/TRAINING PROGRAM

## 2024-06-05 PROCEDURE — 3288F FALL RISK ASSESSMENT DOCD: CPT | Mod: CPTII,S$GLB,, | Performed by: STUDENT IN AN ORGANIZED HEALTH CARE EDUCATION/TRAINING PROGRAM

## 2024-06-05 PROCEDURE — 99214 OFFICE O/P EST MOD 30 MIN: CPT | Mod: 25,S$GLB,, | Performed by: STUDENT IN AN ORGANIZED HEALTH CARE EDUCATION/TRAINING PROGRAM

## 2024-06-05 PROCEDURE — 11720 DEBRIDE NAIL 1-5: CPT | Mod: 79,S$GLB,, | Performed by: STUDENT IN AN ORGANIZED HEALTH CARE EDUCATION/TRAINING PROGRAM

## 2024-06-05 PROCEDURE — 99999 PR PBB SHADOW E&M-EST. PATIENT-LVL IV: CPT | Mod: PBBFAC,,, | Performed by: STUDENT IN AN ORGANIZED HEALTH CARE EDUCATION/TRAINING PROGRAM

## 2024-06-05 PROCEDURE — 1101F PT FALLS ASSESS-DOCD LE1/YR: CPT | Mod: CPTII,S$GLB,, | Performed by: STUDENT IN AN ORGANIZED HEALTH CARE EDUCATION/TRAINING PROGRAM

## 2024-06-05 PROCEDURE — 3074F SYST BP LT 130 MM HG: CPT | Mod: CPTII,S$GLB,, | Performed by: STUDENT IN AN ORGANIZED HEALTH CARE EDUCATION/TRAINING PROGRAM

## 2024-06-05 PROCEDURE — 1159F MED LIST DOCD IN RCRD: CPT | Mod: CPTII,S$GLB,, | Performed by: STUDENT IN AN ORGANIZED HEALTH CARE EDUCATION/TRAINING PROGRAM

## 2024-06-05 PROCEDURE — 1126F AMNT PAIN NOTED NONE PRSNT: CPT | Mod: CPTII,S$GLB,, | Performed by: STUDENT IN AN ORGANIZED HEALTH CARE EDUCATION/TRAINING PROGRAM

## 2024-06-05 RX ORDER — AMMONIUM LACTATE 12 G/100G
1 CREAM TOPICAL DAILY
Qty: 140 G | Refills: 5 | Status: SHIPPED | OUTPATIENT
Start: 2024-06-05

## 2024-06-05 NOTE — PROGRESS NOTES
Subjective:     Patient ID: Julien Meléndez is a 83 y.o. male.    Chief Complaint: Follow-up    Julien is a 83 y.o. male who presents to the clinic for evaluation and treatment of high risk feet. Julien has a past medical history of Diabetes mellitus, Hiatal hernia, Hyperlipidemia, Hypertension, MI (myocardial infarction), and Sleep apnea. The patient's chief complaint is foot ulcer, both feet. This patient has documented high risk feet requiring routine maintenance secondary to diabetes mellitis and those secondary complications of diabetes, as mentioned..    4/16/24: Seen today, s/p TMA, here for suture removal. No new pedal complaints.     5/7/24: Seen today for a wound check    6/5/24: Seen today for routine foot care and dry skin. Plan for diabetic foot exam next visit.    PCP: Kelvin Wayne MD    Date Last Seen by PCP:     Current shoe gear:    History of Trauma: negative  Sign of Infection: none    Hemoglobin A1C   Date Value Ref Range Status   03/15/2024 8.3 (H) 4.0 - 5.6 % Final     Comment:     ADA Screening Guidelines:  5.7-6.4%  Consistent with prediabetes  >or=6.5%  Consistent with diabetes    High levels of fetal hemoglobin interfere with the HbA1C  assay. Heterozygous hemoglobin variants (HbS, HgC, etc)do  not significantly interfere with this assay.   However, presence of multiple variants may affect accuracy.     10/23/2023 9.7 (H) 4.0 - 5.6 % Final     Comment:     ADA Screening Guidelines:  5.7-6.4%  Consistent with prediabetes  >or=6.5%  Consistent with diabetes    High levels of fetal hemoglobin interfere with the HbA1C  assay. Heterozygous hemoglobin variants (HbS, HgC, etc)do  not significantly interfere with this assay.   However, presence of multiple variants may affect accuracy.     03/25/2023 7.0 (H) 4.0 - 5.6 % Final     Comment:     ADA Screening Guidelines:  5.7-6.4%  Consistent with prediabetes  >or=6.5%  Consistent with diabetes    High levels of fetal hemoglobin interfere with  the HbA1C  assay. Heterozygous hemoglobin variants (HbS, HgC, etc)do  not significantly interfere with this assay.   However, presence of multiple variants may affect accuracy.         Review of Systems   Constitutional: Negative for chills, decreased appetite, diaphoresis and fever.   HENT:  Negative for congestion and hearing loss.    Cardiovascular:  Negative for chest pain, claudication, leg swelling and syncope.   Respiratory:  Negative for cough and shortness of breath.    Skin:  Positive for dry skin, nail changes and poor wound healing. Negative for color change, flushing, itching and rash.   Musculoskeletal:  Negative for arthritis, back pain, joint pain and joint swelling.   Gastrointestinal:  Negative for nausea and vomiting.   Neurological:  Positive for numbness and paresthesias. Negative for focal weakness and weakness.   Psychiatric/Behavioral:  Negative for altered mental status. The patient is not nervous/anxious.         Objective:     Physical Exam  Constitutional:       General: He is not in acute distress.     Appearance: Normal appearance. He is well-developed. He is not diaphoretic.   Cardiovascular:      Comments: Dorsalis pedis and posterior tibial pulses are diminished. Skin temperature is within normal limits. Toes are cool to touch and feet are warm proximally. Hair growth is diminished. Skin is atrophic and with mild hyperpigmentation. Mild edema noted, bilaterally. Telangiectasias to medial ankle, bilaterally   Musculoskeletal:         General: Tenderness present.      Comments: Adequate joint range of motion without pain, limitation, nor crepitation to foot and ankle joints. Muscle strength is 5/5 in all groups bilaterally.    S/p TMA to RLE, see wound description below       Feet:      Right foot:      Skin integrity: Dry skin present. No ulcer, blister, erythema or warmth.      Left foot:      Skin integrity: Dry skin present. No ulcer, blister, erythema or warmth.   Skin:      General: Skin is warm and dry.      Capillary Refill: Capillary refill takes less than 2 seconds.      Comments: Skin is warm and dry, no acute signs of infection noted bilaterally      Toenails are thickened by 2-4 mm's, dystrophic, and are darkened in coloration with subungual fungal debris.     Otherwise, no open wounds, macerations or hyperkeratotic lesions, bilaterally            Neurological:      Mental Status: He is alert and oriented to person, place, and time.      Sensory: Sensory deficit present.      Motor: No abnormal muscle tone.      Comments: Light touch within normal limits. Brocket-Lashay 5.07 monofilamant testing is diminished. Vibratory sensation is diminished bilaterally.   Psychiatric:         Mood and Affect: Mood normal.         Behavior: Behavior normal.         Thought Content: Thought content normal.         ALL WOUNDS ARE HEALED    PREVIOUS PICTURES LLE      RLE        Assessment:      Encounter Diagnoses   Name Primary?    PAD (peripheral artery disease) Yes    Onychomycosis due to dermatophyte     Healed wound     History of transmetatarsal amputation of foot     Diabetic polyneuropathy associated with type 2 diabetes mellitus     Dry skin            Plan:     Julien was seen today for follow-up.    Diagnoses and all orders for this visit:    PAD (peripheral artery disease)  -     Routine Foot Care  -     DIABETIC SHOES FOR HOME USE    Onychomycosis due to dermatophyte  -     Routine Foot Care  -     DIABETIC SHOES FOR HOME USE    Healed wound    History of transmetatarsal amputation of foot  -     DIABETIC SHOES FOR HOME USE    Diabetic polyneuropathy associated with type 2 diabetes mellitus  -     DIABETIC SHOES FOR HOME USE    Dry skin    Other orders  -     ammonium lactate 12 % Crea; Apply 1 application  topically once daily.            I counseled the patient on his conditions, their implications and medical management.  Wounds are healed  Routine foot care per attached  note  Rx diabetic shoes  Lotion 1-2x per day for dry skin  Return to clinic in 3-6 mo, sooner PRN

## 2024-06-05 NOTE — TELEPHONE ENCOUNTER
Called pt wife back and she ask if her  can weight bearing his foot and if Dr. Leung want him to have more PT, please advice, will call pt wife back

## 2024-06-05 NOTE — TELEPHONE ENCOUNTER
----- Message from Diana Page sent at 6/5/2024  3:34 PM CDT -----  Contact: roscoe  Type:  Needs Medical Advice    Who Called: pt wife  Symptoms (please be specific): had a couple questions needing answers to waiting for a call back    Would the patient rather a call back or a response via [facility]? call  Best Call Back Number: [phone]  Additional Information:

## 2024-06-05 NOTE — PROCEDURES
"Routine Foot Care    Date/Time: 6/5/2024 1:15 PM    Performed by: Diya Leung DPM  Authorized by: Diya Leung DPM    Time out: Immediately prior to procedure a "time out" was called to verify the correct patient, procedure, equipment, support staff and site/side marked as required.    Consent Done?:  Yes (Verbal)  Hyperkeratotic Skin Lesions?: No      Nail Care Type:  Debride(Left 2nd Toe, Left 3rd Toe, Left 4th Toe and Left 5th Toe)  Patient tolerance:  Patient tolerated the procedure well with no immediate complications    "

## 2024-06-06 ENCOUNTER — TELEPHONE (OUTPATIENT)
Dept: PODIATRY | Facility: CLINIC | Age: 84
End: 2024-06-06
Payer: MEDICARE

## 2024-06-06 NOTE — TELEPHONE ENCOUNTER
Called pt wife back to let her know that I spoke to Dr. Leung and she said weight bearing as tolerated, should walk with a walker and be very careful  Put a new order for PT in for the patient

## 2024-06-07 DIAGNOSIS — Z89.439 HISTORY OF TRANSMETATARSAL AMPUTATION OF FOOT: Primary | ICD-10-CM

## 2024-06-11 ENCOUNTER — PROCEDURE VISIT (OUTPATIENT)
Dept: UROLOGY | Facility: CLINIC | Age: 84
End: 2024-06-11
Payer: MEDICARE

## 2024-06-11 VITALS
DIASTOLIC BLOOD PRESSURE: 67 MMHG | BODY MASS INDEX: 24.28 KG/M2 | HEART RATE: 79 BPM | HEIGHT: 72 IN | SYSTOLIC BLOOD PRESSURE: 112 MMHG

## 2024-06-11 DIAGNOSIS — Z97.8 FOLEY CATHETER IN PLACE: ICD-10-CM

## 2024-06-11 DIAGNOSIS — R33.9 URINARY RETENTION: ICD-10-CM

## 2024-06-11 DIAGNOSIS — R39.14 BENIGN PROSTATIC HYPERPLASIA WITH INCOMPLETE BLADDER EMPTYING: ICD-10-CM

## 2024-06-11 DIAGNOSIS — N47.1 PHIMOSIS: Primary | ICD-10-CM

## 2024-06-11 DIAGNOSIS — N40.1 BENIGN PROSTATIC HYPERPLASIA WITH INCOMPLETE BLADDER EMPTYING: ICD-10-CM

## 2024-06-11 PROCEDURE — 52000 CYSTOURETHROSCOPY: CPT | Mod: S$GLB,,, | Performed by: UROLOGY

## 2024-06-11 RX ORDER — CIPROFLOXACIN 500 MG/1
500 TABLET ORAL 2 TIMES DAILY
Qty: 10 TABLET | Refills: 0 | Status: SHIPPED | OUTPATIENT
Start: 2024-06-11 | End: 2024-06-16

## 2024-06-11 NOTE — PATIENT INSTRUCTIONS
Continue Flomax and Avodart   Cipro for 5 days   Patient to follow-up in 3-4 weeks with bladder scan postvoid.    If difficulty urinating or if patient requires further catheterization will consider doing UroLift procedure.  Patient is a candidate.

## 2024-06-11 NOTE — PROGRESS NOTES
Deflated balloon removing 10cc clear fluid, 14fr coude removed without difficulty. Catheter tip grossly intact. Pt tolerated well.

## 2024-06-11 NOTE — PROCEDURES
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 req  Anesthesia:  See MAR for details  Preparation: Patient was prepped and draped in usual sterile fashion    Scope type:  Flexible cystoscopeNo  Stent inserted: No    Stent removed: No    External exam normal: No    Circumcised: No    Urethral Meatus Normal: No (catheter cutting underside of urethral meatus)    Meatal stenosis: No    Hypospadius: No    Condyloma: No    Digital exam performed: No    Urethra normal: Yes    Prostate normal: No     Hyperplasia   Negative Polyp   Negative Varices   no prostate solid tumor   Bilobar  Length (cm):  5  Bladder neck normal: Yes    Bladder normal: Yes     patient tolerated the procedure well with no immediate complications  Comments:       Patient to follow-up in clinic in 4 weeks with bladder scan postvoid.  Patient to continue Flomax and Avodart.  Five days Cipro.

## 2024-06-19 ENCOUNTER — DOCUMENT SCAN (OUTPATIENT)
Dept: HOME HEALTH SERVICES | Facility: HOSPITAL | Age: 84
End: 2024-06-19
Payer: MEDICARE

## 2024-07-01 ENCOUNTER — DOCUMENT SCAN (OUTPATIENT)
Dept: HOME HEALTH SERVICES | Facility: HOSPITAL | Age: 84
End: 2024-07-01
Payer: MEDICARE

## 2024-07-08 ENCOUNTER — DOCUMENT SCAN (OUTPATIENT)
Dept: HOME HEALTH SERVICES | Facility: HOSPITAL | Age: 84
End: 2024-07-08
Payer: MEDICARE

## 2024-07-09 ENCOUNTER — OFFICE VISIT (OUTPATIENT)
Dept: UROLOGY | Facility: CLINIC | Age: 84
End: 2024-07-09
Payer: MEDICARE

## 2024-07-09 VITALS
SYSTOLIC BLOOD PRESSURE: 92 MMHG | HEIGHT: 73 IN | HEART RATE: 68 BPM | WEIGHT: 168 LBS | DIASTOLIC BLOOD PRESSURE: 57 MMHG | BODY MASS INDEX: 22.26 KG/M2

## 2024-07-09 DIAGNOSIS — R39.14 BENIGN PROSTATIC HYPERPLASIA WITH INCOMPLETE BLADDER EMPTYING: Primary | ICD-10-CM

## 2024-07-09 DIAGNOSIS — N40.1 BENIGN PROSTATIC HYPERPLASIA WITH INCOMPLETE BLADDER EMPTYING: Primary | ICD-10-CM

## 2024-07-09 DIAGNOSIS — N30.01 ACUTE CYSTITIS WITH HEMATURIA: ICD-10-CM

## 2024-07-09 PROCEDURE — 3078F DIAST BP <80 MM HG: CPT | Mod: CPTII,S$GLB,, | Performed by: NURSE PRACTITIONER

## 2024-07-09 PROCEDURE — 99214 OFFICE O/P EST MOD 30 MIN: CPT | Mod: S$GLB,,, | Performed by: NURSE PRACTITIONER

## 2024-07-09 PROCEDURE — 1101F PT FALLS ASSESS-DOCD LE1/YR: CPT | Mod: CPTII,S$GLB,, | Performed by: NURSE PRACTITIONER

## 2024-07-09 PROCEDURE — 3074F SYST BP LT 130 MM HG: CPT | Mod: CPTII,S$GLB,, | Performed by: NURSE PRACTITIONER

## 2024-07-09 PROCEDURE — 1126F AMNT PAIN NOTED NONE PRSNT: CPT | Mod: CPTII,S$GLB,, | Performed by: NURSE PRACTITIONER

## 2024-07-09 PROCEDURE — 99999 PR PBB SHADOW E&M-EST. PATIENT-LVL V: CPT | Mod: PBBFAC,,, | Performed by: NURSE PRACTITIONER

## 2024-07-09 PROCEDURE — 3288F FALL RISK ASSESSMENT DOCD: CPT | Mod: CPTII,S$GLB,, | Performed by: NURSE PRACTITIONER

## 2024-07-09 NOTE — PROGRESS NOTES
Subjective:       Patient ID: Julien Meléndez is a 84 y.o. male.    Chief Complaint: Follow-up    Patient is here today for a follow-up for BPH since having his bailey catheter removed and in-clinic cystoscopy by Dr. Estes. He also had a recent UTI and treated with antibiotic by another provider. Patient reports he is doing well at this time from a urology standpoint. He is currently taking tamsulosin 0.4 mg daily and dutasteride 0.5 mg daily for his BPH. Patient is here today with his wife (Kylee).     Follow-up  This is a new (BPH) problem. The current episode started more than 1 month ago. The problem occurs daily. The problem has been gradually improving. Associated symptoms include weakness. Pertinent negatives include no abdominal pain, change in bowel habit, chills, fever, nausea, swollen glands, urinary symptoms or vomiting. Nothing aggravates the symptoms. Treatments tried: tamsulosin and dutasteride. The treatment provided significant relief.     Review of Systems   Constitutional:  Negative for chills and fever.   Gastrointestinal:  Negative for abdominal pain, change in bowel habit, nausea and vomiting.   Genitourinary:  Negative for decreased urine volume, difficulty urinating, discharge, dysuria, flank pain, frequency, hematuria, penile pain, penile swelling, scrotal swelling, testicular pain and urgency.   Neurological:  Positive for weakness.   Psychiatric/Behavioral: Negative.           Objective:      Physical Exam  Vitals and nursing note reviewed.   Constitutional:       General: He is not in acute distress.     Appearance: He is well-developed. He is not ill-appearing.   HENT:      Head: Normocephalic and atraumatic.   Eyes:      Pupils: Pupils are equal, round, and reactive to light.   Cardiovascular:      Rate and Rhythm: Normal rate.   Pulmonary:      Effort: Pulmonary effort is normal. No respiratory distress.   Abdominal:      Palpations: Abdomen is soft.      Tenderness: There is no  abdominal tenderness.   Musculoskeletal:      Cervical back: Normal range of motion.      Comments: Currently in wheelchair.    Skin:     General: Skin is warm and dry.   Neurological:      Mental Status: He is alert and oriented to person, place, and time.      Coordination: Coordination normal.   Psychiatric:         Mood and Affect: Mood normal.         Behavior: Behavior normal.         Thought Content: Thought content normal.         Judgment: Judgment normal.         Assessment:       Problem List Items Addressed This Visit          Renal/    Benign prostatic hyperplasia with incomplete bladder emptying - Primary    Relevant Orders    Urinalysis (Completed)    Urine culture (Completed)    POCT Bladder Scan     Other Visit Diagnoses       Acute cystitis with hematuria        Relevant Orders    Urinalysis (Completed)    Urine culture (Completed)            Plan:           Julien was seen today for follow-up.    Diagnoses and all orders for this visit:    Benign prostatic hyperplasia with incomplete bladder emptying  -     Urinalysis; Future  -     Urine culture; Future  -     POCT Bladder Scan    Acute cystitis with hematuria  -     Urinalysis; Future  -     Urine culture; Future    Other orders  Continue taking tamsulosin (Flomax) 0.4 mg daily for BPH.   Continue taking dutasteride (Avodart) 0.5 mg daily for BPH.    Follow-up pending urine cx results.     Rachelle Busch, DNP

## 2024-07-09 NOTE — PATIENT INSTRUCTIONS
U/A and urine cx (home collect; drop urine specimen off to [facility] outpatient lab).   Continue taking tamsulosin (Flomax) 0.4 mg daily for BPH.   Continue taking dutasteride (Avodart) 0.5 mg daily for BPH.  Follow-up pending urine cx results.

## 2024-07-12 ENCOUNTER — DOCUMENT SCAN (OUTPATIENT)
Dept: HOME HEALTH SERVICES | Facility: HOSPITAL | Age: 84
End: 2024-07-12
Payer: MEDICARE

## 2024-07-15 ENCOUNTER — TELEPHONE (OUTPATIENT)
Dept: UROLOGY | Facility: CLINIC | Age: 84
End: 2024-07-15
Payer: MEDICARE

## 2024-07-15 DIAGNOSIS — N30.01 ACUTE CYSTITIS WITH HEMATURIA: Primary | ICD-10-CM

## 2024-07-15 RX ORDER — TAMSULOSIN HYDROCHLORIDE 0.4 MG/1
0.4 CAPSULE ORAL DAILY
Qty: 90 CAPSULE | Refills: 3 | Status: SHIPPED | OUTPATIENT
Start: 2024-07-15 | End: 2025-07-15

## 2024-07-15 RX ORDER — NITROFURANTOIN 25; 75 MG/1; MG/1
100 CAPSULE ORAL 2 TIMES DAILY
Qty: 20 CAPSULE | Refills: 0 | Status: SHIPPED | OUTPATIENT
Start: 2024-07-15 | End: 2024-07-25

## 2024-07-15 NOTE — TELEPHONE ENCOUNTER
----- Message from Rachelle Busch NP sent at 7/15/2024  8:49 AM CDT -----  Please inform patient's caregiver/wife via telephone that patient's urine cx came back positive for a UTI. Script for Macrobid sent to [facility]. Urinalysis reflexed to cx needed after completion of antibiotic therapy. Order placed.

## 2024-07-19 ENCOUNTER — DOCUMENT SCAN (OUTPATIENT)
Dept: HOME HEALTH SERVICES | Facility: HOSPITAL | Age: 84
End: 2024-07-19
Payer: MEDICARE

## 2024-07-31 ENCOUNTER — TELEPHONE (OUTPATIENT)
Dept: PODIATRY | Facility: CLINIC | Age: 84
End: 2024-07-31
Payer: MEDICARE

## 2024-07-31 NOTE — TELEPHONE ENCOUNTER
Wife walked in to the clinic and request the last clinical note from the patient faxed over to [facility], what I did, faxed/routed it over to [facility]

## 2024-08-03 ENCOUNTER — EXTERNAL HOME HEALTH (OUTPATIENT)
Dept: HOME HEALTH SERVICES | Facility: HOSPITAL | Age: 84
End: 2024-08-03
Payer: MEDICARE

## 2024-08-27 ENCOUNTER — TELEPHONE (OUTPATIENT)
Dept: PODIATRY | Facility: CLINIC | Age: 84
End: 2024-08-27
Payer: MEDICARE

## 2024-08-27 NOTE — TELEPHONE ENCOUNTER
----- Message from Karen Gallagher sent at 8/27/2024  3:20 PM CDT -----  Type:  Sooner Appointment Request    Caller is requesting a sooner appointment.  Caller declined first available appointment listed below.  Caller will not accept being placed on the waitlist and is requesting a message be sent to doctor.  Name of Caller:Pt 's wife   When is the first available appointment? 09/19  Symptoms: infection between toes / f/u from amputation  Would the patient rather a call back or a response via [facility]? Call   Best Call Back Number:[phone]   Additional Information:        Called the patient let her know Dr. Yeh will be out the rest of the week & the earliest we can see him would be 9/11. Pts wife declined said his foot is infected needs to be seen right away so I referred her since [place] to go to Dr. Issa.

## 2024-08-28 ENCOUNTER — TELEPHONE (OUTPATIENT)
Dept: PODIATRY | Facility: CLINIC | Age: 84
End: 2024-08-28
Payer: MEDICARE

## 2024-08-30 ENCOUNTER — TELEPHONE (OUTPATIENT)
Dept: PODIATRY | Facility: CLINIC | Age: 84
End: 2024-08-30
Payer: MEDICARE

## 2024-09-04 ENCOUNTER — OFFICE VISIT (OUTPATIENT)
Dept: PODIATRY | Facility: CLINIC | Age: 84
End: 2024-09-04
Payer: MEDICARE

## 2024-09-04 VITALS
SYSTOLIC BLOOD PRESSURE: 128 MMHG | HEART RATE: 73 BPM | DIASTOLIC BLOOD PRESSURE: 74 MMHG | BODY MASS INDEX: 22.35 KG/M2 | HEIGHT: 73 IN | WEIGHT: 168.63 LBS

## 2024-09-04 DIAGNOSIS — B35.3 TINEA PEDIS, UNSPECIFIED LATERALITY: ICD-10-CM

## 2024-09-04 DIAGNOSIS — I73.9 PAD (PERIPHERAL ARTERY DISEASE): ICD-10-CM

## 2024-09-04 DIAGNOSIS — L30.4 TOE WEB INTERTRIGO: Primary | ICD-10-CM

## 2024-09-04 DIAGNOSIS — L60.9 DISEASE OF NAIL: ICD-10-CM

## 2024-09-04 PROCEDURE — 99214 OFFICE O/P EST MOD 30 MIN: CPT | Mod: S$GLB,,, | Performed by: STUDENT IN AN ORGANIZED HEALTH CARE EDUCATION/TRAINING PROGRAM

## 2024-09-04 PROCEDURE — 1159F MED LIST DOCD IN RCRD: CPT | Mod: CPTII,S$GLB,, | Performed by: STUDENT IN AN ORGANIZED HEALTH CARE EDUCATION/TRAINING PROGRAM

## 2024-09-04 PROCEDURE — 99999 PR PBB SHADOW E&M-EST. PATIENT-LVL II: CPT | Mod: PBBFAC,,, | Performed by: STUDENT IN AN ORGANIZED HEALTH CARE EDUCATION/TRAINING PROGRAM

## 2024-09-04 PROCEDURE — 3078F DIAST BP <80 MM HG: CPT | Mod: CPTII,S$GLB,, | Performed by: STUDENT IN AN ORGANIZED HEALTH CARE EDUCATION/TRAINING PROGRAM

## 2024-09-04 PROCEDURE — 3288F FALL RISK ASSESSMENT DOCD: CPT | Mod: CPTII,S$GLB,, | Performed by: STUDENT IN AN ORGANIZED HEALTH CARE EDUCATION/TRAINING PROGRAM

## 2024-09-04 PROCEDURE — 1126F AMNT PAIN NOTED NONE PRSNT: CPT | Mod: CPTII,S$GLB,, | Performed by: STUDENT IN AN ORGANIZED HEALTH CARE EDUCATION/TRAINING PROGRAM

## 2024-09-04 PROCEDURE — 1101F PT FALLS ASSESS-DOCD LE1/YR: CPT | Mod: CPTII,S$GLB,, | Performed by: STUDENT IN AN ORGANIZED HEALTH CARE EDUCATION/TRAINING PROGRAM

## 2024-09-04 PROCEDURE — 3074F SYST BP LT 130 MM HG: CPT | Mod: CPTII,S$GLB,, | Performed by: STUDENT IN AN ORGANIZED HEALTH CARE EDUCATION/TRAINING PROGRAM

## 2024-09-04 RX ORDER — CLOTRIMAZOLE AND BETAMETHASONE DIPROPIONATE 10; .64 MG/G; MG/G
CREAM TOPICAL 2 TIMES DAILY
Qty: 45 G | Refills: 1 | Status: SHIPPED | OUTPATIENT
Start: 2024-09-04

## 2024-09-04 RX ORDER — CLOTRIMAZOLE AND BETAMETHASONE DIPROPIONATE 10; .64 MG/G; MG/G
CREAM TOPICAL 2 TIMES DAILY
Qty: 45 G | Refills: 1 | Status: SHIPPED | OUTPATIENT
Start: 2024-09-04 | End: 2024-09-04

## 2024-09-04 NOTE — PROCEDURES
"Routine Foot Care    Date/Time: 9/4/2024 1:30 PM    Performed by: Diya Leung DPM  Authorized by: Diya Leung DPM    Time out: Immediately prior to procedure a "time out" was called to verify the correct patient, procedure, equipment, support staff and site/side marked as required.    Consent Done?:  Yes (Verbal)  Hyperkeratotic Skin Lesions?: No      Nail Care Type:  Debride(Left 2nd Toe, Left 3rd Toe, Left 4th Toe and Left 5th Toe)  Patient tolerance:  Patient tolerated the procedure well with no immediate complications    "

## 2024-09-04 NOTE — PROGRESS NOTES
Subjective:     Patient ID: Julien Meléndez is a 83 y.o. male.    Chief Complaint: Follow-up    Julien is a 83 y.o. male who presents to the clinic for evaluation and treatment of high risk feet. Julien has a past medical history of Diabetes mellitus, Hiatal hernia, Hyperlipidemia, Hypertension, MI (myocardial infarction), and Sleep apnea. The patient's chief complaint is foot ulcer, both feet. This patient has documented high risk feet requiring routine maintenance secondary to diabetes mellitis and those secondary complications of diabetes, as mentioned..    4/16/24: Seen today, s/p TMA, here for suture removal. No new pedal complaints.     5/7/24: Seen today for a wound check    6/5/24: Seen today for routine foot care and dry skin.     9/4/24: Seen today, relates recently went to ED for cellulitis to left foot. Relates started like a rash in between the toes. It has been clearing up with antibiotics. Plan for diabetic foot exam next visit.    PCP: Kelvin Wayne MD    Date Last Seen by PCP:     Current shoe gear:    History of Trauma: negative  Sign of Infection: none    Hemoglobin A1C   Date Value Ref Range Status   03/15/2024 8.3 (H) 4.0 - 5.6 % Final     Comment:     ADA Screening Guidelines:  5.7-6.4%  Consistent with prediabetes  >or=6.5%  Consistent with diabetes    High levels of fetal hemoglobin interfere with the HbA1C  assay. Heterozygous hemoglobin variants (HbS, HgC, etc)do  not significantly interfere with this assay.   However, presence of multiple variants may affect accuracy.     10/23/2023 9.7 (H) 4.0 - 5.6 % Final     Comment:     ADA Screening Guidelines:  5.7-6.4%  Consistent with prediabetes  >or=6.5%  Consistent with diabetes    High levels of fetal hemoglobin interfere with the HbA1C  assay. Heterozygous hemoglobin variants (HbS, HgC, etc)do  not significantly interfere with this assay.   However, presence of multiple variants may affect accuracy.     03/25/2023 7.0 (H) 4.0 - 5.6 %  Final     Comment:     ADA Screening Guidelines:  5.7-6.4%  Consistent with prediabetes  >or=6.5%  Consistent with diabetes    High levels of fetal hemoglobin interfere with the HbA1C  assay. Heterozygous hemoglobin variants (HbS, HgC, etc)do  not significantly interfere with this assay.   However, presence of multiple variants may affect accuracy.         Review of Systems   Constitutional: Negative for chills, decreased appetite, diaphoresis and fever.   HENT:  Negative for congestion and hearing loss.    Cardiovascular:  Negative for chest pain, claudication, leg swelling and syncope.   Respiratory:  Negative for cough and shortness of breath.    Skin:  Positive for dry skin, nail changes and poor wound healing. Negative for color change, flushing, itching and rash.   Musculoskeletal:  Negative for arthritis, back pain, joint pain and joint swelling.   Gastrointestinal:  Negative for nausea and vomiting.   Neurological:  Positive for numbness and paresthesias. Negative for focal weakness and weakness.   Psychiatric/Behavioral:  Negative for altered mental status. The patient is not nervous/anxious.         Objective:     Physical Exam  Constitutional:       General: He is not in acute distress.     Appearance: Normal appearance. He is well-developed. He is not diaphoretic.   Cardiovascular:      Comments: Dorsalis pedis and posterior tibial pulses are diminished. Skin temperature is within normal limits. Toes are cool to touch and feet are warm proximally. Hair growth is diminished. Skin is atrophic and with mild hyperpigmentation. Mild edema noted, bilaterally. Telangiectasias to medial ankle, bilaterally   Musculoskeletal:         General: Tenderness present.      Comments: Adequate joint range of motion without pain, limitation, nor crepitation to foot and ankle joints. Muscle strength is 5/5 in all groups bilaterally.    S/p TMA to RLE, see wound description below       Feet:      Right foot:      Skin  integrity: Dry skin present. No ulcer, blister, erythema or warmth.      Left foot:      Skin integrity: Dry skin present. No ulcer, blister, erythema or warmth.   Skin:     General: Skin is warm and dry.      Capillary Refill: Capillary refill takes less than 2 seconds.      Comments: Skin is warm and dry, no acute signs of infection noted bilaterally      Toenails are thickened by 2-4 mm's, dystrophic, and are darkened in coloration with subungual fungal debris.     Mild periwound maceration to 3rd and 4th interdigital spaces of left foot. No open wounds or signs of infection . Skin is dry to plantar foot in moccasin distribution, bilaterally     Otherwise, no open wounds, macerations or hyperkeratotic lesions, bilaterally            Neurological:      Mental Status: He is alert and oriented to person, place, and time.      Sensory: Sensory deficit present.      Motor: No abnormal muscle tone.      Comments: Light touch within normal limits. Car[place]-Lashay 5.07 monofilamant testing is diminished. Vibratory sensation is diminished bilaterally.   Psychiatric:         Mood and Affect: Mood normal.         Behavior: Behavior normal.         Thought Content: Thought content normal.         ALL WOUNDS ARE HEALED    PREVIOUS PICTURES LLE      RLE        Assessment:      Encounter Diagnoses   Name Primary?    Toe web intertrigo Yes    Tinea pedis, unspecified laterality     Disease of nail     PAD (peripheral artery disease)              Plan:     Julien was seen today for follow-up.    Diagnoses and all orders for this visit:    Toe web intertrigo    Tinea pedis, unspecified laterality    Disease of nail  -     Cancel: Routine Foot Care    PAD (peripheral artery disease)  -     Cancel: Routine Foot Care    Other orders  -     Discontinue: clotrimazole-betamethasone 1-0.05% (LOTRISONE) cream; Apply topically 2 (two) times daily.  -     clotrimazole-betamethasone 1-0.05% (LOTRISONE) cream; Apply topically 2 (two) times  daily.              I counseled the patient on his conditions, their implications and medical management.  Cellulitis appears improved with antibiotics, continue to completion  Rx Lotrisone cream for tinea pedis  For interdigital maceration, recommend dry thoroughly in between toes after showering, may apply betadine to site and keep digits  with cast padding  Tennis/diabetic shoes with a wide toe box to reduce pressure  Return to clinic in 3-6 mo, sooner PRN

## 2024-09-17 ENCOUNTER — LAB VISIT (OUTPATIENT)
Dept: LAB | Facility: HOSPITAL | Age: 84
End: 2024-09-17
Attending: INTERNAL MEDICINE
Payer: MEDICARE

## 2024-09-17 DIAGNOSIS — E11.51 TYPE 2 DIABETES MELLITUS WITH DIABETIC PERIPHERAL ANGIOPATHY WITHOUT GANGRENE, WITHOUT LONG-TERM CURRENT USE OF INSULIN: ICD-10-CM

## 2024-09-17 DIAGNOSIS — E11.628 TYPE 2 DIABETES MELLITUS WITH OTHER SKIN COMPLICATIONS: ICD-10-CM

## 2024-09-17 PROBLEM — S31.809A WOUND OF BUTTOCK: Status: ACTIVE | Noted: 2024-09-17

## 2024-09-17 LAB
ALBUMIN SERPL BCP-MCNC: 3.5 G/DL (ref 3.5–5.2)
ALP SERPL-CCNC: 81 U/L (ref 55–135)
ALT SERPL W/O P-5'-P-CCNC: 30 U/L (ref 10–44)
ANION GAP SERPL CALC-SCNC: 7 MMOL/L (ref 8–16)
AST SERPL-CCNC: 17 U/L (ref 10–40)
BASOPHILS # BLD AUTO: 0.05 K/UL (ref 0–0.2)
BASOPHILS NFR BLD: 0.8 % (ref 0–1.9)
BILIRUB SERPL-MCNC: 0.4 MG/DL (ref 0.1–1)
BUN SERPL-MCNC: 19 MG/DL (ref 8–23)
CALCIUM SERPL-MCNC: 9.7 MG/DL (ref 8.7–10.5)
CHLORIDE SERPL-SCNC: 106 MMOL/L (ref 95–110)
CHOLEST SERPL-MCNC: 132 MG/DL (ref 120–199)
CHOLEST/HDLC SERPL: 3.9 {RATIO} (ref 2–5)
CO2 SERPL-SCNC: 26 MMOL/L (ref 23–29)
CREAT SERPL-MCNC: 1.2 MG/DL (ref 0.5–1.4)
DIFFERENTIAL METHOD BLD: ABNORMAL
EOSINOPHIL # BLD AUTO: 0.2 K/UL (ref 0–0.5)
EOSINOPHIL NFR BLD: 2.6 % (ref 0–8)
ERYTHROCYTE [DISTWIDTH] IN BLOOD BY AUTOMATED COUNT: 14.9 % (ref 11.5–14.5)
EST. GFR  (NO RACE VARIABLE): 60 ML/MIN/1.73 M^2
ESTIMATED AVG GLUCOSE: 206 MG/DL (ref 68–131)
GLUCOSE SERPL-MCNC: 341 MG/DL (ref 70–110)
HBA1C MFR BLD: 8.8 % (ref 4–5.6)
HCT VFR BLD AUTO: 41.3 % (ref 40–54)
HDLC SERPL-MCNC: 34 MG/DL (ref 40–75)
HDLC SERPL: 25.8 % (ref 20–50)
HGB BLD-MCNC: 13.8 G/DL (ref 14–18)
IMM GRANULOCYTES # BLD AUTO: 0.05 K/UL (ref 0–0.04)
IMM GRANULOCYTES NFR BLD AUTO: 0.8 % (ref 0–0.5)
LDLC SERPL CALC-MCNC: 66.2 MG/DL (ref 63–159)
LYMPHOCYTES # BLD AUTO: 2.1 K/UL (ref 1–4.8)
LYMPHOCYTES NFR BLD: 33.3 % (ref 18–48)
MCH RBC QN AUTO: 30.6 PG (ref 27–31)
MCHC RBC AUTO-ENTMCNC: 33.4 G/DL (ref 32–36)
MCV RBC AUTO: 92 FL (ref 82–98)
MONOCYTES # BLD AUTO: 0.6 K/UL (ref 0.3–1)
MONOCYTES NFR BLD: 9.6 % (ref 4–15)
NEUTROPHILS # BLD AUTO: 3.3 K/UL (ref 1.8–7.7)
NEUTROPHILS NFR BLD: 52.9 % (ref 38–73)
NONHDLC SERPL-MCNC: 98 MG/DL
NRBC BLD-RTO: 0 /100 WBC
PLATELET # BLD AUTO: 181 K/UL (ref 150–450)
PMV BLD AUTO: 11.7 FL (ref 9.2–12.9)
POTASSIUM SERPL-SCNC: 4.3 MMOL/L (ref 3.5–5.1)
PROT SERPL-MCNC: 7 G/DL (ref 6–8.4)
RBC # BLD AUTO: 4.51 M/UL (ref 4.6–6.2)
SODIUM SERPL-SCNC: 139 MMOL/L (ref 136–145)
TRIGL SERPL-MCNC: 159 MG/DL (ref 30–150)
WBC # BLD AUTO: 6.27 K/UL (ref 3.9–12.7)

## 2024-09-17 PROCEDURE — 83036 HEMOGLOBIN GLYCOSYLATED A1C: CPT | Performed by: INTERNAL MEDICINE

## 2024-09-17 PROCEDURE — 80061 LIPID PANEL: CPT | Performed by: INTERNAL MEDICINE

## 2024-09-17 PROCEDURE — 85025 COMPLETE CBC W/AUTO DIFF WBC: CPT | Performed by: INTERNAL MEDICINE

## 2024-09-17 PROCEDURE — 80053 COMPREHEN METABOLIC PANEL: CPT | Performed by: INTERNAL MEDICINE

## 2024-09-26 ENCOUNTER — EXTERNAL HOME HEALTH (OUTPATIENT)
Dept: HOME HEALTH SERVICES | Facility: HOSPITAL | Age: 84
End: 2024-09-26
Payer: MEDICARE

## 2024-10-11 ENCOUNTER — DOCUMENT SCAN (OUTPATIENT)
Dept: HOME HEALTH SERVICES | Facility: HOSPITAL | Age: 84
End: 2024-10-11
Payer: MEDICARE

## 2024-11-07 ENCOUNTER — OFFICE VISIT (OUTPATIENT)
Dept: UROLOGY | Facility: CLINIC | Age: 84
End: 2024-11-07
Payer: MEDICARE

## 2024-11-07 VITALS
BODY MASS INDEX: 22.26 KG/M2 | HEIGHT: 73 IN | SYSTOLIC BLOOD PRESSURE: 139 MMHG | WEIGHT: 168 LBS | DIASTOLIC BLOOD PRESSURE: 88 MMHG | HEART RATE: 81 BPM

## 2024-11-07 DIAGNOSIS — N48.1 BALANITIS: Primary | ICD-10-CM

## 2024-11-07 DIAGNOSIS — R30.0 DYSURIA: ICD-10-CM

## 2024-11-07 DIAGNOSIS — N30.01 ACUTE CYSTITIS WITH HEMATURIA: ICD-10-CM

## 2024-11-07 PROCEDURE — 1101F PT FALLS ASSESS-DOCD LE1/YR: CPT | Mod: CPTII,S$GLB,, | Performed by: NURSE PRACTITIONER

## 2024-11-07 PROCEDURE — 99214 OFFICE O/P EST MOD 30 MIN: CPT | Mod: S$GLB,,, | Performed by: NURSE PRACTITIONER

## 2024-11-07 PROCEDURE — 3075F SYST BP GE 130 - 139MM HG: CPT | Mod: CPTII,S$GLB,, | Performed by: NURSE PRACTITIONER

## 2024-11-07 PROCEDURE — 1159F MED LIST DOCD IN RCRD: CPT | Mod: CPTII,S$GLB,, | Performed by: NURSE PRACTITIONER

## 2024-11-07 PROCEDURE — 99999 PR PBB SHADOW E&M-EST. PATIENT-LVL V: CPT | Mod: PBBFAC,,, | Performed by: NURSE PRACTITIONER

## 2024-11-07 PROCEDURE — 3288F FALL RISK ASSESSMENT DOCD: CPT | Mod: CPTII,S$GLB,, | Performed by: NURSE PRACTITIONER

## 2024-11-07 PROCEDURE — 1125F AMNT PAIN NOTED PAIN PRSNT: CPT | Mod: CPTII,S$GLB,, | Performed by: NURSE PRACTITIONER

## 2024-11-07 PROCEDURE — 1160F RVW MEDS BY RX/DR IN RCRD: CPT | Mod: CPTII,S$GLB,, | Performed by: NURSE PRACTITIONER

## 2024-11-07 PROCEDURE — 3079F DIAST BP 80-89 MM HG: CPT | Mod: CPTII,S$GLB,, | Performed by: NURSE PRACTITIONER

## 2024-11-07 RX ORDER — CLOTRIMAZOLE 1 %
CREAM (GRAM) TOPICAL 2 TIMES DAILY
Qty: 24 G | Refills: 1 | Status: SHIPPED | OUTPATIENT
Start: 2024-11-07 | End: 2024-11-17

## 2024-11-07 RX ORDER — NITROFURANTOIN 25; 75 MG/1; MG/1
100 CAPSULE ORAL 2 TIMES DAILY
Qty: 20 CAPSULE | Refills: 0 | Status: SHIPPED | OUTPATIENT
Start: 2024-11-07 | End: 2024-11-17

## 2024-11-07 RX ORDER — KETOROLAC TROMETHAMINE 10 MG/1
10 TABLET, FILM COATED ORAL EVERY 6 HOURS PRN
Qty: 20 TABLET | Refills: 0 | Status: SHIPPED | OUTPATIENT
Start: 2024-11-07 | End: 2024-11-12

## 2024-11-07 RX ORDER — FLUCONAZOLE 150 MG/1
150 TABLET ORAL DAILY
Qty: 1 TABLET | Refills: 0 | Status: SHIPPED | OUTPATIENT
Start: 2024-11-07 | End: 2024-11-08

## 2024-11-07 RX ORDER — FLUCONAZOLE 100 MG/1
100 TABLET ORAL DAILY
Qty: 7 TABLET | Refills: 0 | Status: CANCELLED | OUTPATIENT
Start: 2024-11-07 | End: 2024-11-14

## 2024-11-07 NOTE — PROGRESS NOTES
Subjective:       Patient ID: Julien Meléndez is a 84 y.o. male.    Chief Complaint: Burning in Penis Area    Patient is here today with his wife/caregiver (Kylee). She is a good historian for patient medical hx and current medical problems. She reports patient is complaining of burning to his genitals area when she washes him at night. Patient has Type 2 DM that's uncontrolled and he is uncircumcised. Differential dx are balanitis vs a UTI. Patient unable to provide urine sample today while in clinic.       Review of Systems   Constitutional:  Negative for chills and fever.   Gastrointestinal:  Negative for abdominal pain, change in bowel habit, nausea and vomiting.   Genitourinary:  Positive for bladder incontinence, enuresis and penile pain. Negative for decreased urine volume, discharge, flank pain, hematuria, penile swelling, scrotal swelling and testicular pain.         Objective:      Physical Exam  Vitals and nursing note reviewed.   Constitutional:       General: He is not in acute distress.     Appearance: He is well-developed. He is not ill-appearing.   HENT:      Head: Normocephalic and atraumatic.   Eyes:      Pupils: Pupils are equal, round, and reactive to light.   Cardiovascular:      Rate and Rhythm: Normal rate.   Pulmonary:      Effort: Pulmonary effort is normal. No respiratory distress.   Abdominal:      Palpations: Abdomen is soft.      Tenderness: There is no abdominal tenderness.   Musculoskeletal:      Cervical back: Normal range of motion.      Comments: Currently in wheelchair.    Skin:     General: Skin is warm and dry.   Neurological:      Mental Status: He is alert and oriented to person, place, and time.      Coordination: Coordination normal.   Psychiatric:         Mood and Affect: Mood normal.         Behavior: Behavior normal.         Thought Content: Thought content normal.         Judgment: Judgment normal.       Assessment:       Problem List Items Addressed This Visit     None  Visit Diagnoses       Balanitis    -  Primary    Relevant Medications    clotrimazole (LOTRIMIN) 1 % cream    ketorolac (TORADOL) 10 mg tablet    fluconazole (DIFLUCAN) 150 MG Tab    Other Relevant Orders    Urine culture    Urinalysis    Dysuria        Relevant Medications    ketorolac (TORADOL) 10 mg tablet    Other Relevant Orders    Urine culture    Urinalysis    Acute cystitis with hematuria        Relevant Medications    nitrofurantoin, macrocrystal-monohydrate, (MACROBID) 100 MG capsule    Other Relevant Orders    Urine culture    Urinalysis            Plan:           Julien was seen today for burning in penis area.    Diagnoses and all orders for this visit:    Balanitis  -     clotrimazole (LOTRIMIN) 1 % cream; Apply topically 2 (two) times daily. for 10 days  -     Urine culture; Future  -     Urinalysis; Future  -     ketorolac (TORADOL) 10 mg tablet; Take 1 tablet (10 mg total) by mouth every 6 (six) hours as needed for Pain.  -     fluconazole (DIFLUCAN) 150 MG Tab; Take 1 tablet (150 mg total) by mouth once daily. for 1 day    Dysuria  -     Urine culture; Future  -     Urinalysis; Future  -     ketorolac (TORADOL) 10 mg tablet; Take 1 tablet (10 mg total) by mouth every 6 (six) hours as needed for Pain.    Acute cystitis with hematuria  -     Urine culture; Future  -     Urinalysis; Future  -     nitrofurantoin, macrocrystal-monohydrate, (MACROBID) 100 MG capsule; Take 1 capsule (100 mg total) by mouth 2 (two) times daily. for 10 days    Other orders  Start taking antibiotic (Macrobid) for possible UTI.   Start using Lotrimin cream to penis area for treatment of possible balanitis (yeast).  Take 1 pill of diflucan for possible balanitis (yeast).   Take toradol as prescribed for pain.   After completion of antibiotic therapy (Macrobid) urine specimen needed. Home collect; drop urine specimen off to [facility] outpatient lab.     Follow-up pending urine cx results.      Rachelle Busch, DNP

## 2024-11-07 NOTE — PATIENT INSTRUCTIONS
Start taking antibiotic (Macrobid) for possible UTI.   Start using Lotrimin cream to penis area for treatment of possible balanitis (yeast).  Take 1 pill of diflucan for possible balanitis (yeast).   Take toradol as prescribed for pain.   After completion of antibiotic therapy (Macrobid) urine specimen needed. Home collect; drop urine specimen off to [facility] outpatient lab.   Follow-up pending urine cx results.

## 2024-11-11 ENCOUNTER — TELEPHONE (OUTPATIENT)
Dept: UROLOGY | Facility: CLINIC | Age: 84
End: 2024-11-11
Payer: MEDICARE

## 2024-11-11 DIAGNOSIS — N48.1 BALANITIS: ICD-10-CM

## 2024-11-11 DIAGNOSIS — R30.0 DYSURIA: ICD-10-CM

## 2024-11-11 RX ORDER — KETOROLAC TROMETHAMINE 10 MG/1
10 TABLET, FILM COATED ORAL EVERY 6 HOURS PRN
Qty: 20 TABLET | Refills: 0 | Status: SHIPPED | OUTPATIENT
Start: 2024-11-11 | End: 2024-11-16

## 2024-11-11 NOTE — TELEPHONE ENCOUNTER
Called patient to see which pharmacy patient would like to get torodal sent to since it is on back order at [facility], no answer, left message

## 2024-11-11 NOTE — TELEPHONE ENCOUNTER
----- Message from Rachelle Busch DNP sent at 11/11/2024  4:14 PM CST -----  Done. Please inform patient's wife.  ----- Message -----  From: Domonique Jimenez  Sent: 11/11/2024   1:47 PM CST  To: Rachelle Busch DNP    Type: Call    Who Called:pt  Does the patient know what this is regarding?:med  Would the patient rather a call back or a response via [facility]? call  Best Call Back Number: [phone]  Additional Information: Pt's wife stated you can send  torodal to [facility] in [place]

## 2024-11-11 NOTE — TELEPHONE ENCOUNTER
----- Message from Domonique sent at 11/11/2024  1:45 PM CST -----  Type: Call    Who Called:pt  Does the patient know what this is regarding?:med  Would the patient rather a call back or a response via [facility]? call  Best Call Back Number: [phone]  Additional Information: Pt's wife stated you can send  torodal to [facility] in [place]

## 2024-11-11 NOTE — TELEPHONE ENCOUNTER
----- Message from Kanchan sent at 11/11/2024 12:44 PM CST -----  Regarding: Call back  Contact: [facility] [phone]  Type:  Pharmacy Calling to Clarify an RX    Name of Caller: Cindi  Pharmacy Name: [facility] [phone]  Prescription Name: ketorolac (TORADOL) 10 mg tablet 20 tablet  What do they need to clarify?: Medication is on back order   Best Call Back Number:   Additional Information:

## 2024-11-19 NOTE — PT/OT/SLP EVAL
"Occupational Therapy   Evaluation    Name: Julien Meléndez  MRN: [id number]  Admitting Diagnosis: Acute osteomyelitis  Recent Surgery: Procedure(s) (LRB):  AMPUTATION, TOE (Right) 3 Days Post-Op    Recommendations:     Discharge Recommendations: Moderate Intensity Therapy  Discharge Equipment Recommendations:  to be determined by next level of care  Barriers to discharge:  Other (Comment) (Pt requires increased level of assistance)    Assessment:     Julien Meléndez is a 83 y.o. male with a medical diagnosis of Acute osteomyelitis.  He presents with The primary encounter diagnosis was Acute osteomyelitis. Diagnoses of Cellulitis of great toe of right foot, Diabetic foot infection, Wound infection, Chest pain, MRSA bacteremia, Bacteremia, and Cellulitis of foot were also pertinent to this visit. Performance deficits affecting function: weakness, impaired functional mobility, decreased safety awareness, impaired cognition, impaired coordination, impaired joint extensibility, orthopedic precautions, edema, impaired skin, decreased ROM, decreased lower extremity function, decreased upper extremity function, impaired balance, impaired self care skills, impaired endurance, gait instability, decreased coordination, impaired fine motor, pain.      Pt would benefit from cont OT services in order to maximize functional independence. Recommending moderate intensity upon d/c. Pt agreeable to therapy this date. Per ortho in room activity as tolerated LUE. Per podiatry RLE "Patient to only to transfer in short distances to affected foot with Darco shoe, partial weightbearing to heel ". Pt educated on WB precautions, issued forefoot offloading DARCO shoe RLE. Pt unable to tolerate full stand this date with MaxA x2 & B HHA 2/2 increased LUE pain & BLE weakness. Will progress as able.     Rehab Prognosis: Fair; patient would benefit from acute skilled OT services to address these deficits and reach maximum level of function.   " [FreeTextEntry1] : #Multiple benign nevi - chronic, stable - I discussed the chronic nature and course of the condition - Photoprotection discussed, recommend daily broad-spectrum sunscreen, SPF 30 or greater, UPF hat, clothing. - Pt educated on ABCDE of melanoma - Recommend self-skin exam and annual skin exam by MD - Pt instructed to return for new or changing lesions especially if any moles start to change, itch, or bleed   # Seborrheic keratosis, trunk/extremities  - chronic, stable -I discussed the chronic nature and course of the condition -reviewed benign nature  #Scar  2/2 EIC previously excised rtc if inflamed   RTC 1 year for TBSE, sooner PRN      Plan:     Patient to be seen 5 x/week to address the above listed problems via self-care/home management, therapeutic activities, therapeutic exercises  Plan of Care Expires: 12/13/23  Plan of Care Reviewed with: patient, family    Subjective     Chief Complaint: Pain L shoulder  Patient/Family Comments/goals: Agreeable to therapy    Occupational Profile:  Living Environment: Pt lives w/spouse, SSH, 0 JUSTIN, WIS w/SC  Previous level of function: Pt requires increased assistance for stand pivot t/f to WC (spouse reports varying assist levels) spouse sponge bathes pt, pt requires assist dressing & toileting from [facility] in room, sleeps in hospital bed, able to feed self. Pt able to self propel WC.  Roles and Routines: No falls ~2 months  Equipment Used at Home: wheelchair, walker, rolling, shower chair, hospital bed, bedside commode  Assistance upon Discharge: Family    Pain/Comfort:  Pain Rating 1: 0/10 (at rest)  Location - Side 1: Left  Location 1: shoulder  Pain Addressed 1: Reposition, Distraction, Cessation of Activity, Nurse notified  Pain Rating Post-Intervention 1: other (see comments) (increased pain w/movement & activity)    Patients cultural, spiritual, Faith conflicts given the current situation: no    Objective:     Communicated with: nsg prior to session.  Patient found HOB elevated with bed alarm, SCD, PureWick, peripheral IV upon OT entry to room.    General Precautions: Standard, fall, contact  Orthopedic Precautions: LUE weight bearing as tolerated, RLE partial weight bearing (Patient to only to transfer in short distances to affected foot with Darco shoe, partial weightbearing to heel)  Braces:  (forefoot offloading R darco)  Respiratory Status: Room air    Occupational Performance:    Bed Mobility:    Patient completed Rolling/Turning to Left with  maximal assistance  Patient completed Rolling/Turning to Right with maximal assistance  Patient completed Scooting/Bridging with maximal assistance  "and 2 persons  Patient completed Supine to Sit with moderate assistance and 2 persons  Patient completed Sit to Supine with maximal assistance and 2 persons    Functional Mobility/Transfers:  Patient attempting Sit <> Stand Transfer with maximal assistance and of 2 persons  with  B HHA, unable to reach full upright stand, B knees flexed, returning to sitting 2/2 pain  Functional Mobility: Pt not able to take steps at this time    Activities of Daily Living:  Lower Body Dressing: maximal assistance to david RLE DARCO shoe & LLE post op shoe    Cognitive/Visual Perceptual:  Cognitive/Psychosocial Skills:     -       Oriented to: Person, Place, and confused to month "February" & year "1975"   -       Follows Commands/attention:Follows one-step commands and Follows two-step commands  -       Memory: Impaired STM and Impaired LTM  -       Safety awareness/insight to disability: impaired   -       Mood/Affect/Coping skills/emotional control: Cooperative    Physical Exam:  Edema:  Moderate L hand  Upper Extremity Range of Motion:     -       Right Upper Extremity: WFL  -       Left Upper Extremity: Deficits: pt with increased pain, shoulder flex AROM ~20 degrees; PROM pt reports pain around ~80 degrees  Upper Extremity Strength:    -       Right Upper Extremity: 3+/5  -       Left Upper Extremity: 3/5 distally   Strength:    -       Right Upper Extremity: WFL  -       Left Upper Extremity: Fair 2/2 increased edema    AMPAC 6 Click ADL:  AMPAC Total Score: 14    Treatment & Education:  Pt would benefit from cont OT services in order to maximize functional independence. Recommending moderate intensity upon d/c.   Pt agreeable to therapy this date.   Per ortho in room activity as tolerated LUE.   Per podiatry "Patient to only to transfer in short distances to affected foot with Darco shoe, partial weightbearing to heel ". Pt educated on WB precautions, issued forefoot offloading DARCO shoe LLE.   Pt unable to tolerate full " stand this date with MaxA x2 & B HHA 2/2 increased LUE pain & BLE weakness.   Pt unable to tolerate PROM LUE at this time 2/2 pain; educated on AROM L hand/wrist/elbow as able. Educated on elevation. Spoke w/nsg about ice/heat for pain.  Will progress as able.     Patient left HOB elevated with all lines intact, call button in reach, bed alarm on, nsg notified, family present, and RLE & LUE elevated    GOALS:   Multidisciplinary Problems       Occupational Therapy Goals          Problem: Occupational Therapy    Goal Priority Disciplines Outcome Interventions   Occupational Therapy Goal     OT, PT/OT Ongoing, Progressing    Description: Goals to be met by: 12/13/2023     Patient will increase functional independence with ADLs by performing:    Feeding with Set-up Assistance.  Grooming while seated with Set-up Assistance.  Toileting from bedside commode with Minimal Assistance for hygiene and clothing management.   Rolling to Bilateral with Stand-by Assistance.   Supine to sit with Minimal Assistance.  Stand pivot transfer with Moderate Assistance  Toilet transfer to bedside commode with Moderate Assistance.                         History:     Past Medical History:   Diagnosis Date    Diabetes mellitus     Hiatal hernia     under Dr Saenz' care    Hyperlipidemia     Hypertension     MI (myocardial infarction)     Sleep apnea          Past Surgical History:   Procedure Laterality Date    ABDOMINAL HERNIA REPAIR      ANGIOGRAM, EXTREMITY, BILATERAL Bilateral 4/5/2023    Procedure: ANGIOGRAM, EXTREMITY, BILATERAL;  Surgeon: Michael Giraldo III, MD;  Location: [facility] CATH LAB;  Service: Cardiology;  Laterality: Bilateral;    ANGIOGRAPHY OF LOWER EXTREMITY Left 4/12/2023    Procedure: Angiogram Extremity Unilateral;  Surgeon: Michael Giraldo III, MD;  Location: [facility] CATH LAB;  Service: Cardiology;  Laterality: Left;    AORTOGRAPHY WITH SERIALOGRAPHY Bilateral 4/5/2023    Procedure: AORTOGRAM, WITH SERIALOGRAPHY;   Surgeon: Michael Giraldo III, MD;  Location: [facility] CATH LAB;  Service: Cardiology;  Laterality: Bilateral;    APPLICATION OF WOUND VACUUM-ASSISTED CLOSURE DEVICE Left 3/29/2023    Procedure: APPLICATION, WOUND VAC;  Surgeon: Alona Pierce DPM;  Location: [facility] OR;  Service: Podiatry;  Laterality: Left;    APPLICATION OF WOUND VACUUM-ASSISTED CLOSURE DEVICE Left 4/4/2023    Procedure: APPLICATION, WOUND VAC;  Surgeon: Alona Pierce DPM;  Location: [facility] OR;  Service: Podiatry;  Laterality: Left;    APPLICATION OF WOUND VACUUM-ASSISTED CLOSURE DEVICE Left 4/13/2023    Procedure: APPLICATION, WOUND VAC;  Surgeon: Alona Pierce DPM;  Location: [facility] OR;  Service: Podiatry;  Laterality: Left;    BONE BIOPSY Left 3/29/2023    Procedure: BIOPSY, BONE;  Surgeon: Alona Pierce DPM;  Location: [facility] OR;  Service: Podiatry;  Laterality: Left;  1st met    CARDIAC CATHETERIZATION      3 stents placed    CATARACT EXTRACTION, BILATERAL      CLOSURE DEVICE Left 4/12/2023    Procedure: Placement of Closure Device;  Surgeon: Michael Giraldo III, MD;  Location: [facility] CATH LAB;  Service: Cardiology;  Laterality: Left;    CORONARY ANGIOGRAPHY N/A 10/3/2019    Procedure: ANGIOGRAM, CORONARY ARTERY;  Surgeon: Edwin Azul MD;  Location: [facility] CATH LAB/EP;  Service: Cardiology;  Laterality: N/A;    DEBRIDEMENT OF FOOT Left 3/29/2023    Procedure: DEBRIDEMENT, FOOT;  Surgeon: Alona Pierce DPM;  Location: [facility] OR;  Service: Podiatry;  Laterality: Left;    DEBRIDEMENT OF FOOT Left 4/4/2023    Procedure: DEBRIDEMENT, FOOT;  Surgeon: Alona Pierce DPM;  Location: [facility] OR;  Service: Podiatry;  Laterality: Left;    DEBRIDEMENT OF FOOT Left 4/13/2023    Procedure: DEBRIDEMENT, FOOT;  Surgeon: Alona Pierce DPM;  Location: [facility] OR;  Service: Podiatry;  Laterality: Left;    ESOPHAGOGASTRODUODENOSCOPY N/A 4/17/2023    Procedure: EGD (ESOPHAGOGASTRODUODENOSCOPY);  Surgeon: Otto Villarreal MD;  Location: [facility] ENDO;  Service: Endoscopy;  Laterality:  N/A;    HERNIA REPAIR      inguinal    IVUS (INTRAVASCULAR ULTRASOUND) Left 4/12/2023    Procedure: ULTRASOUND, INTRAVASCULAR;  Surgeon: Micheal Giraldo III, MD;  Location: [facility] CATH LAB;  Service: Cardiology;  Laterality: Left;    LEFT HEART CATHETERIZATION Left 9/13/2019    Procedure: Left heart cath;  Surgeon: Edwin Azul MD;  Location: [facility] CATH LAB/EP;  Service: Cardiology;  Laterality: Left;    LEFT HEART CATHETERIZATION N/A 10/3/2019    Procedure: Left heart cath;  Surgeon: Edwin Azul MD;  Location: [facility] CATH LAB/EP;  Service: Cardiology;  Laterality: N/A;    OSTEOTOMY Left 6/7/2023    Procedure: OSTEOTOMY;  Surgeon: Alona Pierce DPM;  Location: [facility] OR;  Service: Podiatry;  Laterality: Left;    OSTEOTOMY OF METATARSAL BONE Right 4/4/2023    Procedure: OSTEOTOMY, METATARSAL BONE;  Surgeon: Alona Pierce DPM;  Location: [facility] OR;  Service: Podiatry;  Laterality: Right;    PERCUTANEOUS TRANSLUMINAL ANGIOPLASTY Left 4/12/2023    Procedure: PTA (ANGIOPLASTY, PERCUTANEOUS, TRANSLUMINAL);  Surgeon: Michael Giraldo III, MD;  Location: [facility] CATH LAB;  Service: Cardiology;  Laterality: Left;    SURGICAL REMOVAL OF BUNION WITH OSTEOTOMY OF METATARSAL BONE Right 4/4/2023    Procedure: BUNIONECTOMY, WITH METATARSAL OSTEOTOMY;  Surgeon: Alona Pierce DPM;  Location: [facility];  Service: Podiatry;  Laterality: Right;    TOE AMPUTATION Left 4/4/2023    Procedure: AMPUTATION, TOE;  Surgeon: Alona Pierce DPM;  Location: [facility] OR;  Service: Podiatry;  Laterality: Left;  1st ray    TOE AMPUTATION Right 11/10/2023    Procedure: AMPUTATION, TOE;  Surgeon: Diya Leung DPM;  Location: [facility] OR;  Service: Podiatry;  Laterality: Right;    WASHOUT Left 6/7/2023    Procedure: WASHOUT;  Surgeon: Alona Pierce DPM;  Location: [facility] OR;  Service: Podiatry;  Laterality: Left;    WOUND DEBRIDEMENT Left 6/7/2023    Procedure: DEBRIDEMENT, WOUND;  Surgeon: Alona Pierce DPM;  Location: [facility] OR;  Service: Podiatry;   Laterality: Left;       Time Tracking:     OT Date of Treatment: 11/13/23  OT Start Time: 1524  OT Stop Time: 1557  OT Total Time (min): 33 min w/PT    Billable Minutes:Evaluation 10  Therapeutic Activity 23 11/13/2023

## 2024-11-22 ENCOUNTER — TELEPHONE (OUTPATIENT)
Dept: UROLOGY | Facility: CLINIC | Age: 84
End: 2024-11-22
Payer: MEDICARE

## 2024-11-22 DIAGNOSIS — R33.9 URINARY RETENTION: Primary | ICD-10-CM

## 2024-11-22 NOTE — TELEPHONE ENCOUNTER
Pt wife informed of result note below, pt is still having symptoms and she asked if pt can do a home collect instead because its earlier for her to collect the sample at home. Pt is currently in diapers and she collected the last sample in a urinal w/o clean catch instructions. Orders placed and she will  a specimen cup on Monday.

## 2025-01-04 NOTE — PROGRESS NOTES
INR at goal. Medications and chart reviewed. No changes noted to necessitate adjustment of warfarin or follow-up plan. See calendar.      
No

## 2025-01-26 PROBLEM — M86.9 OSTEOMYELITIS OF RIGHT FOOT: Status: RESOLVED | Noted: 2024-03-21 | Resolved: 2025-01-26

## 2025-01-26 PROBLEM — L97.419 DIABETIC ULCER OF RIGHT MIDFOOT ASSOCIATED WITH TYPE 2 DIABETES MELLITUS: Status: RESOLVED | Noted: 2023-05-31 | Resolved: 2025-01-26

## 2025-01-26 PROBLEM — R94.39 EQUIVOCAL STRESS TEST: Status: RESOLVED | Noted: 2017-03-27 | Resolved: 2025-01-26

## 2025-01-26 PROBLEM — L97.529 ULCER OF LEFT FOOT: Status: RESOLVED | Noted: 2024-01-04 | Resolved: 2025-01-26

## 2025-01-26 PROBLEM — M25.512 LEFT SHOULDER PAIN: Status: RESOLVED | Noted: 2023-11-13 | Resolved: 2025-01-26

## 2025-01-26 PROBLEM — M00.9 SEPTIC JOINT: Status: RESOLVED | Noted: 2024-03-21 | Resolved: 2025-01-26

## 2025-01-26 PROBLEM — I51.9 SYSTOLIC DYSFUNCTION: Status: RESOLVED | Noted: 2017-04-25 | Resolved: 2025-01-26

## 2025-01-26 PROBLEM — I48.91 ATRIAL FIBRILLATION: Status: RESOLVED | Noted: 2020-11-02 | Resolved: 2025-01-26

## 2025-01-26 PROBLEM — L97.519 ULCER OF RIGHT FOOT: Status: RESOLVED | Noted: 2024-01-04 | Resolved: 2025-01-26

## 2025-01-26 PROBLEM — I25.118 ATHEROSCLEROTIC HEART DISEASE OF NATIVE CORONARY ARTERY WITH OTHER FORMS OF ANGINA PECTORIS: Status: RESOLVED | Noted: 2017-04-25 | Resolved: 2025-01-26

## 2025-01-26 PROBLEM — I70.213 INTERMITTENT CLAUDICATION OF BOTH LOWER EXTREMITIES DUE TO ATHEROSCLEROSIS: Status: RESOLVED | Noted: 2022-05-31 | Resolved: 2025-01-26

## 2025-01-26 PROBLEM — E44.0 MODERATE PROTEIN-CALORIE MALNUTRITION: Status: RESOLVED | Noted: 2024-03-22 | Resolved: 2025-01-26

## 2025-01-26 PROBLEM — E87.1 HYPONATREMIA: Status: RESOLVED | Noted: 2023-11-08 | Resolved: 2025-01-26

## 2025-01-26 PROBLEM — E11.621 DIABETIC ULCER OF RIGHT MIDFOOT ASSOCIATED WITH TYPE 2 DIABETES MELLITUS: Status: RESOLVED | Noted: 2023-05-31 | Resolved: 2025-01-26

## 2025-01-26 PROBLEM — M86.10 ACUTE OSTEOMYELITIS: Status: RESOLVED | Noted: 2023-11-08 | Resolved: 2025-01-26

## 2025-01-26 PROBLEM — Z74.01 BEDBOUND: Status: RESOLVED | Noted: 2024-01-04 | Resolved: 2025-01-26

## 2025-01-26 PROBLEM — Z98.890 POSTOPERATIVE STATE: Status: RESOLVED | Noted: 2023-04-17 | Resolved: 2025-01-26

## 2025-01-26 PROBLEM — M86.60 CHRONIC OSTEOMYELITIS: Status: RESOLVED | Noted: 2023-09-12 | Resolved: 2025-01-26

## 2025-01-26 PROBLEM — I70.223 CRITICAL LIMB ISCHEMIA OF BOTH LOWER EXTREMITIES: Status: RESOLVED | Noted: 2023-04-14 | Resolved: 2025-01-26

## 2025-01-26 PROBLEM — L97.514 ULCER OF TOE OF RIGHT FOOT, WITH NECROSIS OF BONE: Status: RESOLVED | Noted: 2023-11-06 | Resolved: 2025-01-26

## 2025-01-26 PROBLEM — K57.30 DIVERTICULOSIS OF LARGE INTESTINE WITHOUT PERFORATION OR ABSCESS WITHOUT BLEEDING: Status: RESOLVED | Noted: 2017-04-25 | Resolved: 2025-01-26

## 2025-01-26 PROBLEM — L97.419: Status: RESOLVED | Noted: 2023-08-10 | Resolved: 2025-01-26

## 2025-01-26 PROBLEM — R50.9 FEVER AND CHILLS: Status: ACTIVE | Noted: 2025-01-26

## 2025-01-26 PROBLEM — D68.69 OTHER THROMBOPHILIA: Status: RESOLVED | Noted: 2023-11-06 | Resolved: 2025-01-26

## 2025-01-26 PROBLEM — L97.514 SKIN ULCER OF TOE OF RIGHT FOOT WITH NECROSIS OF BONE: Status: RESOLVED | Noted: 2023-10-31 | Resolved: 2025-01-26

## 2025-01-26 PROBLEM — R78.81 BACTEREMIA: Status: RESOLVED | Noted: 2023-11-11 | Resolved: 2025-01-26

## 2025-01-26 PROBLEM — I70.234 ATHEROSCLEROSIS OF NATIVE ARTERY OF RIGHT LOWER EXTREMITY WITH ULCERATION OF MIDFOOT: Status: RESOLVED | Noted: 2023-04-11 | Resolved: 2025-01-26

## 2025-01-26 PROBLEM — N39.0 RECURRENT URINARY TRACT INFECTION: Status: ACTIVE | Noted: 2025-01-26

## 2025-01-26 PROBLEM — R53.82 CHRONIC FATIGUE: Status: RESOLVED | Noted: 2017-07-11 | Resolved: 2025-01-26

## 2025-01-29 PROBLEM — R50.9 FEVER AND CHILLS: Status: RESOLVED | Noted: 2025-01-26 | Resolved: 2025-01-29

## 2025-01-30 ENCOUNTER — PATIENT OUTREACH (OUTPATIENT)
Dept: ADMINISTRATIVE | Facility: CLINIC | Age: 85
End: 2025-01-30
Payer: MEDICARE

## 2025-01-30 DIAGNOSIS — Z97.8 FOLEY CATHETER IN PLACE: Primary | ICD-10-CM

## 2025-01-30 DIAGNOSIS — N39.0 RECURRENT URINARY TRACT INFECTION: ICD-10-CM

## 2025-01-30 NOTE — PROGRESS NOTES
C3 nurse attempted to contact Julien Meléndez for a TCC post hospital discharge follow up call. No answer, left voicemail with callback information. The patient's AVS states the pt has a HOSFU with his PCP, Kelvin Wayne MD on 2/5/25 at 1100, but it is not listed in [facility]. C3 nurse called Dr. Wayne's office and a staff member confirmed the appt. No messages routed at this time.

## 2025-01-31 NOTE — PROGRESS NOTES
C3 nurse spoke with Julien Meléndez's spouse, Kylee, for a TCC post hospital discharge follow up call. Patient's AVS states the pt has a HOSFU with his PCP, Kelvin Wayne MD on 2/5/25 at 1100, but it is not listed in Epic. C3 nurse called Dr. Wayne's office and a staff member confirmed the appt. Pts spouse states she may not be able to get him to that appt, so a [facility]PH[facility] referral was placed. She confirms understanding that if they do decide to not attend the HOSFU with Kelvin Wayne MD, that they do need to call and cancel. Message routed to Kelvin Wayne MD.

## 2025-02-03 ENCOUNTER — TELEPHONE (OUTPATIENT)
Dept: HOME HEALTH SERVICES | Facility: CLINIC | Age: 85
End: 2025-02-03
Payer: MEDICARE

## 2025-02-04 ENCOUNTER — TELEPHONE (OUTPATIENT)
Dept: HOME HEALTH SERVICES | Facility: CLINIC | Age: 85
End: 2025-02-04
Payer: MEDICARE

## 2025-02-05 ENCOUNTER — TELEPHONE (OUTPATIENT)
Dept: UROLOGY | Facility: CLINIC | Age: 85
End: 2025-02-05
Payer: MEDICARE

## 2025-02-05 NOTE — TELEPHONE ENCOUNTER
Patient's wife states patient cannot get out of bed at this time, patient has not logged into [employer] in 3 years. Scheduled for audio only. Please advise if this is not acceptable.

## 2025-02-06 ENCOUNTER — OFFICE VISIT (OUTPATIENT)
Dept: HOME HEALTH SERVICES | Facility: CLINIC | Age: 85
End: 2025-02-06
Payer: MEDICARE

## 2025-02-06 ENCOUNTER — E-CONSULT (OUTPATIENT)
Dept: ENDOCRINOLOGY | Facility: CLINIC | Age: 85
End: 2025-02-06
Payer: MEDICARE

## 2025-02-06 VITALS
OXYGEN SATURATION: 96 % | RESPIRATION RATE: 18 BRPM | HEART RATE: 74 BPM | DIASTOLIC BLOOD PRESSURE: 62 MMHG | SYSTOLIC BLOOD PRESSURE: 118 MMHG

## 2025-02-06 DIAGNOSIS — S98.112A AMPUTATION OF LEFT GREAT TOE: ICD-10-CM

## 2025-02-06 DIAGNOSIS — N40.1 BENIGN PROSTATIC HYPERPLASIA WITH INCOMPLETE BLADDER EMPTYING: ICD-10-CM

## 2025-02-06 DIAGNOSIS — Z97.8 FOLEY CATHETER IN PLACE: ICD-10-CM

## 2025-02-06 DIAGNOSIS — Z89.431 PARTIAL NONTRAUMATIC AMPUTATION OF RIGHT FOOT: ICD-10-CM

## 2025-02-06 DIAGNOSIS — E11.65 TYPE 2 DIABETES MELLITUS WITH HYPERGLYCEMIA, WITH LONG-TERM CURRENT USE OF INSULIN: Primary | ICD-10-CM

## 2025-02-06 DIAGNOSIS — Z79.4 TYPE 2 DIABETES MELLITUS WITH HYPERGLYCEMIA, WITH LONG-TERM CURRENT USE OF INSULIN: Primary | ICD-10-CM

## 2025-02-06 DIAGNOSIS — N39.0 RECURRENT URINARY TRACT INFECTION: ICD-10-CM

## 2025-02-06 DIAGNOSIS — F03.90 SENILE DEMENTIA: ICD-10-CM

## 2025-02-06 DIAGNOSIS — R39.14 BENIGN PROSTATIC HYPERPLASIA WITH INCOMPLETE BLADDER EMPTYING: ICD-10-CM

## 2025-02-06 DIAGNOSIS — E11.628 TYPE 2 DIABETES MELLITUS WITH OTHER SKIN COMPLICATIONS: ICD-10-CM

## 2025-02-06 PROBLEM — I70.221 CRITICAL LIMB ISCHEMIA OF RIGHT LOWER EXTREMITY: Status: RESOLVED | Noted: 2024-03-21 | Resolved: 2025-02-06

## 2025-02-06 PROBLEM — M25.562 PAIN IN LEFT KNEE: Status: RESOLVED | Noted: 2021-03-09 | Resolved: 2025-02-06

## 2025-02-06 PROBLEM — S31.809A WOUND OF BUTTOCK: Status: RESOLVED | Noted: 2024-09-17 | Resolved: 2025-02-06

## 2025-02-06 PROBLEM — L89.890: Status: RESOLVED | Noted: 2023-03-27 | Resolved: 2025-02-06

## 2025-02-06 PROCEDURE — 3074F SYST BP LT 130 MM HG: CPT | Mod: CPTII,S$GLB,, | Performed by: NURSE PRACTITIONER

## 2025-02-06 PROCEDURE — 1159F MED LIST DOCD IN RCRD: CPT | Mod: CPTII,S$GLB,, | Performed by: NURSE PRACTITIONER

## 2025-02-06 PROCEDURE — 3078F DIAST BP <80 MM HG: CPT | Mod: CPTII,S$GLB,, | Performed by: NURSE PRACTITIONER

## 2025-02-06 PROCEDURE — 1160F RVW MEDS BY RX/DR IN RCRD: CPT | Mod: CPTII,S$GLB,, | Performed by: NURSE PRACTITIONER

## 2025-02-06 PROCEDURE — 99496 TRANSJ CARE MGMT HIGH F2F 7D: CPT | Mod: S$GLB,,, | Performed by: NURSE PRACTITIONER

## 2025-02-06 PROCEDURE — 99499 UNLISTED E&M SERVICE: CPT | Mod: S$GLB,,, | Performed by: STUDENT IN AN ORGANIZED HEALTH CARE EDUCATION/TRAINING PROGRAM

## 2025-02-06 PROCEDURE — 1111F DSCHRG MED/CURRENT MED MERGE: CPT | Mod: CPTII,S$GLB,, | Performed by: NURSE PRACTITIONER

## 2025-02-06 RX ORDER — BLOOD-GLUCOSE SENSOR
1 EACH MISCELLANEOUS WEEKLY
Start: 2025-02-06 | End: 2025-03-02

## 2025-02-06 RX ORDER — BLOOD-GLUCOSE,RECEIVER,CONT
1 EACH MISCELLANEOUS ONCE
Status: ON HOLD
Start: 2025-02-06 | End: 2025-02-06

## 2025-02-06 RX ORDER — INSULIN GLARGINE 100 [IU]/ML
28 INJECTION, SOLUTION SUBCUTANEOUS NIGHTLY
Qty: 8.4 ML | Refills: 11 | Status: SHIPPED | OUTPATIENT
Start: 2025-02-06 | End: 2025-02-12

## 2025-02-06 NOTE — ASSESSMENT & PLAN NOTE
Doherty in place  Flomax on dc med list not in home  Message to urology staff for recs  Keep upcoming urology next week

## 2025-02-06 NOTE — PROGRESS NOTES
[facility]  Transitional Care Management (TCM) Home Visit    Encounter Provider: Kanchan Fischer   PCP: Kelvin Wayne MD  Consult Requested By: Dr. Esmer Martinez  Admit Date: 1/25/25   IP Discharge Date: 1/29/25  Hospital Length of Stay: 4 days  Days since discharge (from IP or SNF): 7  [place] On Call Contact Note:   Hospital Diagnosis: Doherty catheter in place [Z97.8];Recurrent urinary tract infection [N39.0]     HISTORY OF PRESENT ILLNESS      Patient ID: Julien Meléndez is a 84 y.o. male was recently admitted to the hospital, this is their TCM encounter.    Hospital Course Synopsis:  84 year old male admitted from home with nausea, vomiting and fever. He is evaluated and admitted with IV antibiotics, Urology sees him due to difficulties with Doherty placement. Patient tolerated procedure well, urine sent to lab analysis and for cultures. Patient is doing better overall today, ate well, wound care done to address groin and scrotum yeast infection.      1/28:  Patient reports that he is feeling much better today.  Oral intake improving.  No growth on blood or urine culture, will transition to oral antibiotics with Keflex.  Patient still has significant skin breakdown around groin and buttocks from urinary incontinence, will continue Doherty catheter for now.  PT/OT consulted and will evaluate patient for home health versus rehab.  Possible voiding trial tomorrow.     1/29:  Patient reports that he continues to feel well today.  Oral intake has returned to baseline.  Patient tolerating oral Keflex well, will continue Keflex and fluconazole for 7 more days.  Lotrisone prescribed for patient's rash around groin and buttocks.  Will continue Doherty catheter now to protect skin and patient to follow up with Urology closely in outpatient setting for removal of Doherty catheter.  PT/OT has evaluated patient and recommendations for home health appreciated.  Home health ordered.  Patient clinically improved and medically  stable for discharge home with instructions to follow-up with PCP in 1-2 weeks.    Today he is found in his home. Awake and oriented to self and place. His wife is present. She reports he is improving with his wounds and rash but his glucose remains high. Glucose has been over 300 last few days. He is taking 26 U of Lantus at bedtime. She is concerned over the readings. He was previously on Januvia but she can no longer afford the copay. She had metformin and resumed that but he is having diarrhea and and does not want to continue as it does not appear to be lowering his glucose.    Therapy is improving his mobility and home health is assisting with his rash. He has a CNA from the [place] [facility] that assist with bathing and grooming.      DECISION MAKING TODAY       Assessment & Plan:  1. Type 2 diabetes mellitus with hyperglycemia, with long-term current use of insulin  Assessment & Plan:  Last A1c 9.7 trending up-not at goal  POC glucose 331 this am.  Lantus 26 nightly in place  Increase to 28 units  No longer on Januvia d/t cost, metformin d/t side effects  E-consult to Endocrine for recs  Order for Dexcom sent  ADA diet      Orders:  -     E-Consult to Endocrinology  -     blood-glucose sensor (DEXCOM G7 SENSOR) Uzma; 1 each by Misc.(Non-Drug; Combo Route) route once a week.  -     blood-glucose meter,continuous (DEXCOM G6 ) Misc; 1 Device by Misc.(Non-Drug; Combo Route) route once. for 1 dose    2. Bailey catheter in place  Assessment & Plan:  Followed by urology for exchanges    Orders:  -     Ambulatory referral/consult to [facility]    3. Recurrent urinary tract infection  Assessment & Plan:  Urine clear in bailey bag  Keflex in place-complete course  Keep urology FU next week    Orders:  -     Ambulatory referral/consult to [facility]    4. Benign prostatic hyperplasia with incomplete bladder emptying  Assessment & Plan:  Bailey in place  Flomax on dc med list not in  home  Message to urology staff for recs  Keep upcoming urology next week      5. Amputation of left great toe  Assessment & Plan:  S/p amputation 6/2023  Stable  Monitor      6. Partial nontraumatic amputation of right foot  Assessment & Plan:  Chronic and stable  Monitor      7. Senile dementia  Assessment & Plan:  Mild to moderate  He is calm and can answer simple questions-follows commands  he is bed-bound and incontinent  Basically alert to self only  Appetite is good  Supportive care          8. Type 2 diabetes mellitus with other skin complications  -     insulin glargine U-100, Lantus, (LANTUS SOLOSTAR U-100 INSULIN) 100 unit/mL (3 mL) InPn pen; Inject 28 Units into the skin every evening.  Dispense: 8.4 mL; Refill: 11         Medication List on Discharge:     Medication List            Accurate as of February 6, 2025  2:31 PM. If you have any questions, ask your nurse or doctor.                START taking these medications      DEXCOM G6  Misc  Generic drug: blood-glucose meter,continuous  1 Device by Misc.(Non-Drug; Combo Route) route once. for 1 dose  Started by: Kanchan Fischer NP     DEXCOM G7 SENSOR Zuma  Generic drug: blood-glucose sensor  1 each by Misc.(Non-Drug; Combo Route) route once a week.  Started by: Kanchan Fischer NP            CHANGE how you take these medications      LANTUS SOLOSTAR U-100 INSULIN 100 unit/mL (3 mL) Inpn pen  Generic drug: insulin glargine U-100 (Lantus)  Inject 28 Units into the skin every evening.  What changed:   how much to take  when to take this  Changed by: Kanchan Fischer NP            CONTINUE taking these medications      alcohol swabs Padm  Apply 1 each topically as needed (for use with glucose monitoring).     amLODIPine 5 MG tablet  Commonly known as: NORVASC  Take 1 tablet (5 mg total) by mouth once daily.     apixaban 2.5 mg Tab  Commonly known as: ELIQUIS  Take 1 tablet (2.5 mg total) by mouth 2 (two) times daily.     aspirin 81 MG Chew  Take 1  tablet (81 mg total) by mouth once daily.     atorvastatin 40 MG tablet  Commonly known as: LIPITOR  Take 1 tablet (40 mg total) by mouth once daily.     blood sugar diagnostic Strp  Commonly known as: TRUE METRIX GLUCOSE TEST STRIP  1 strip by Misc.(Non-Drug; Combo Route) route 2 (two) times a day.     cephALEXin 500 MG capsule  Commonly known as: KEFLEX  Take 1 capsule (500 mg total) by mouth every 6 (six) hours. for 10 days     clopidogreL 75 mg tablet  Commonly known as: PLAVIX  Take 1 tablet (75 mg total) by mouth once daily.     clotrimazole-betamethasone 1-0.05% cream  Commonly known as: LOTRISONE  Apply topically 2 (two) times daily. APPLY TO GROIN TWICE DAILY     FLUoxetine 40 MG capsule  Take 1 capsule (40 mg total) by mouth once daily.     furosemide 40 MG tablet  Commonly known as: LASIX  Take 1 tablet (40 mg total) by mouth once daily.     isosorbide mononitrate 30 MG 24 hr tablet  Commonly known as: IMDUR  Take 1 tablet (30 mg total) by mouth once daily.     lancets 28 gauge Misc  1 lancet  by Misc.(Non-Drug; Combo Route) route 2 (two) times a day.     metoprolol succinate 25 MG 24 hr tablet  Commonly known as: TOPROL-XL  Take 25 mg by mouth once daily.     nitroGLYCERIN 0.4 MG SL tablet  Commonly known as: NITROSTAT  Place 0.4 mg under the tongue every 5 (five) minutes as needed for Chest pain.     ondansetron 4 MG Tbdl  Commonly known as: ZOFRAN-ODT  Take 1 tablet (4 mg total) by mouth every 8 (eight) hours as needed.     pantoprazole 40 MG tablet  Commonly known as: PROTONIX  Take 1 tablet (40 mg total) by mouth 2 (two) times daily.     ranolazine 1,000 mg Tb12  Commonly known as: RANEXA  Take 1,000 mg by mouth 2 (two) times daily.     tamsulosin 0.4 mg Cap  Commonly known as: FLOMAX  Take 1 capsule (0.4 mg total) by mouth once daily.     traZODone 150 MG tablet  Commonly known as: DESYREL  TAKE 1 TABLET EVERY NIGHT     TRUE METRIX AIR GLUCOSE METER Misc  Generic drug: blood-glucose meter  USE AS  DIRECTED            STOP taking these medications      fluconazole 200 MG Tab  Commonly known as: DIFLUCAN  Stopped by: Kanchan Fischer NP     metFORMIN 1000 MG tablet  Commonly known as: GLUCOPHAGE  Stopped by: Kanchan Fischer NP              Medication Reconciliation:  Were medications changed on discharge? Yes  Were medications in the home? Yes  Is the patient taking the medications as directed? Yes  Does the patient understand the medications and changes? Yes  Does updated med list accurately reflects meds patient is currently taking? Yes    ENVIRONMENT OF CARE      Family and/or Caregiver present at visit?  Yes  Name of Caregiver:   History provided by: caregiver    Advance Care Planning   Advanced Care Planning Status:  Patient has had an ACP conversation  Living Will: No  Power of : No  [place]: No    Does Caregiver have HCPoA: No  Changes today: none  Is patient hospice appropriate: No  (If needed, use PPS <30 or FAST score >7)  Was referral to hospice placed: No       Impression upon entering the home:  Physical Dwelling: single family home   Appearance of home environment: cleaniness: clean  Functional Status: max assistance  Mobility: ambulatory with person assist only with PT/bedbound  Nutritional access: adequate intake and access  Home Health: Yes,  Ag[facility]    DME/Supplies: hospital bed     Diagnostic tests reviewed/disposition: No diagnosic tests pending after this hospitalization.  Disease/illness education: Diabetes  Establishment or re-establishment of referral orders for community resources: No other necessary community resources.   Discussion with other health care providers:   Does patient have a PCP at [facility]? Yes   Repatriation plan with PCP? follow-up with PCP within 90d   Does patient have an ostomy (ileostomy, colostomy, suprapubic catheter, nephrostomy tube, tracheostomy, PEG tube, pleurex catheter, cholecystostomy, etc)? No  Were BPAs reviewed? Yes    Social History      Socioeconomic History    Marital status:    Tobacco Use    Smoking status: Never    Smokeless tobacco: Never   Substance and Sexual Activity    Alcohol use: No    Drug use: No     Social Drivers of Health     Financial Resource Strain: Low Risk  (1/26/2025)    Overall Financial Resource Strain (CARDIA)     Difficulty of Paying Living Expenses: Not hard at all   Food Insecurity: No Food Insecurity (1/26/2025)    Hunger Vital Sign     Worried About Running Out of Food in the Last Year: Never true     Ran Out of Food in the Last Year: Never true   Transportation Needs: No Transportation Needs (1/27/2025)    TRANSPORTATION NEEDS     Transportation : No   Physical Activity: Inactive (3/22/2024)    Exercise Vital Sign     Days of Exercise per Week: 0 days     Minutes of Exercise per Session: 0 min   Stress: No Stress Concern Present (1/26/2025)    [employer] - Occupational Stress Questionnaire     Feeling of Stress : Not at all   Housing Stability: Low Risk  (1/27/2025)    Housing Stability Vital Sign     Unable to Pay for Housing in the Last Year: No     Homeless in the Last Year: No       OBJECTIVE:     Vital Signs:  Vitals:    02/06/25 1312   BP: 118/62   Pulse: 74   Resp: 18       Review of Systems   Constitutional:  Negative for activity change, fatigue and fever.   HENT: Negative.     Eyes: Negative.    Respiratory:  Negative for chest tightness.    Cardiovascular: Negative.  Negative for leg swelling.   Gastrointestinal: Negative.    Endocrine: Negative.    Genitourinary: Negative.    Musculoskeletal:  Positive for gait problem.   Skin: Negative.    Allergic/Immunologic: Negative.    Hematological: Negative.    Psychiatric/Behavioral: Negative.  Negative for agitation.    All other systems reviewed and are negative.      Physical Exam:  Physical Exam  Vitals reviewed.   Constitutional:       General: He is not in acute distress.     Appearance: He is well-developed.   HENT:       Head: Normocephalic and atraumatic.      Nose: Nose normal.      Mouth/Throat:      Mouth: Mucous membranes are dry.   Eyes:      Pupils: Pupils are equal, round, and reactive to light.   Cardiovascular:      Rate and Rhythm: Normal rate and regular rhythm.      Heart sounds: Normal heart sounds.   Pulmonary:      Effort: Pulmonary effort is normal.      Breath sounds: Normal breath sounds.   Abdominal:      General: Bowel sounds are normal.      Palpations: Abdomen is soft.   Genitourinary:     Comments: Doherty in place with clear yellow urine  Musculoskeletal:      Cervical back: Normal range of motion and neck supple.      Comments: Partial right foot amputation  Great toe amputation to left foot   Skin:     General: Skin is warm and dry.   Neurological:      Mental Status: He is alert and oriented to person, place, and time.      Cranial Nerves: No cranial nerve deficit.   Psychiatric:         Behavior: Behavior normal.         Thought Content: Thought content normal.         Judgment: Judgment normal.         INSTRUCTIONS FOR PATIENT:     Scheduled Follow-up, Appts Reviewed with Modifications if Needed: Yes  Future Appointments   Date Time Provider Department Center   2/7/2025  1:30 PM Rachelle Busch DNP [facility] [place]   3/18/2025  2:30 PM Kelvin Wayne MD [facility]         Signature: Kanchan Fischer NP    Transition of Care Visit:  I have reviewed and updated the history and problem list.  I have reconciled the medication list.  I have discussed the hospitalization and current medical issues, prognosis and plans with the patient/family.

## 2025-02-06 NOTE — ASSESSMENT & PLAN NOTE
Mild to moderate  He is calm and can answer simple questions-follows commands  he is bed-bound and incontinent  Basically alert to self only  Appetite is good  Supportive care

## 2025-02-06 NOTE — ASSESSMENT & PLAN NOTE
Last A1c 9.7 trending up-not at goal  POC glucose 331 this am.  Lantus 26 nightly in place  Increase to 28 units  No longer on Januvia d/t cost, metformin d/t side effects  E-consult to Endocrine for recs  Order for Dexcom sent  ADA diet

## 2025-02-07 ENCOUNTER — OFFICE VISIT (OUTPATIENT)
Dept: UROLOGY | Facility: CLINIC | Age: 85
End: 2025-02-07
Payer: MEDICARE

## 2025-02-07 DIAGNOSIS — N40.1 BENIGN PROSTATIC HYPERPLASIA WITH INCOMPLETE BLADDER EMPTYING: ICD-10-CM

## 2025-02-07 DIAGNOSIS — Z09 HOSPITAL DISCHARGE FOLLOW-UP: ICD-10-CM

## 2025-02-07 DIAGNOSIS — Z97.8 FOLEY CATHETER IN PLACE: ICD-10-CM

## 2025-02-07 DIAGNOSIS — R39.14 BENIGN PROSTATIC HYPERPLASIA WITH INCOMPLETE BLADDER EMPTYING: ICD-10-CM

## 2025-02-07 DIAGNOSIS — N47.1 PHIMOSIS: Primary | ICD-10-CM

## 2025-02-07 DIAGNOSIS — R33.9 URINARY RETENTION: ICD-10-CM

## 2025-02-07 RX ORDER — TAMSULOSIN HYDROCHLORIDE 0.4 MG/1
0.4 CAPSULE ORAL DAILY
Qty: 90 CAPSULE | Refills: 3 | Status: SHIPPED | OUTPATIENT
Start: 2025-02-07 | End: 2025-03-02

## 2025-02-07 NOTE — CONSULTS
"[facility]  Response for E-Consult     Patient Name: Julien Meléndez  MRN: [id number]  Primary Care Provider: Kelvin Wayne MD   Requesting Provider: Kanchan Fischer NP  E-Consult to Endocrinology  Consult performed by: Cameron Camara DO  Consult ordered by: Kanchan Fischer NP  Reason for consult: Diabetes  Assessment/Recommendations: See note      Unfortunately, this eConsult has been declined due to: Other    Other:  This eConsult submission cannot be completed at this time due to Insulin dependent diabetes would need referral given complexity..    Hello,   Your eConsult was declined as the management of diabetes requiring insulin is more complex and cannot be appropriately addressed via eConsult. Recommendations for care take into consideration patient-specific factors such as diet/eating patterns, compliance with dosing, dexterity issues and socioeconomic factors which require a visit to be understood.  An eConsult should be used as an inter professional service between clinicians to answer a question, much like a "curbside consult", that is documented to help you manage your patient in your practice.  Please consider referral of this patient for an office visit for further care    As noted please consider referral given insulin dependent diabetes can be complex.  One option if not attempted and able to be tolerated would be use of GLP-1 RA.  Given BMI is mildly elevated it likely could be tolerated at lower doses without excessive weight loss but with glucose benefits to hopefully improve control without risk of hypoglycemia.  If other in depth suggestions needed then please refer the patient.      Thank you for this eConsult referral.     DO [facility]      "

## 2025-02-07 NOTE — PATIENT INSTRUCTIONS
Continue taking tamsulosin (Flomax) 0.4 mg daily for BPH.  Next bailey catheter change in 2 weeks by [facility] and then every 4 weeks thereafter.  Follow-up in 10 weeks with bailey catheter change at that time.

## 2025-02-07 NOTE — PROGRESS NOTES
Audio Only Telehealth Visit     The patient location is: Home  The chief complaint leading to consultation is: Hospital follow-up.  Visit type: Virtual visit with audio only (telephone)  Total time spent with patient, reviewing chart, and documenting: Approximately 20 minutes.     The reason for the audio only service rather than synchronous audio and video virtual visit was related to technical difficulties or patient preference/necessity.     Each patient to whom I provide medical services by telemedicine is:  (1) informed of the relationship between the physician and patient and the respective role of any other health care provider with respect to management of the patient; and (2) notified that they may decline to receive medical services by telemedicine and may withdraw from such care at any time. Patient verbally consented to receive this service via voice-only telephone call.       HPI: Patient and his wife (Ms. Pichardo) present via telephone encounter for a hospital follow-up for phimosis and since having bailey catheter inserted (1/29/2025). Patient's wife reports patient is doing better. He was prescribed Lotrisone for phimosis which appears effective. She denies that patient was having difficulty voiding, but states the bailey catheter was inserted to keep patient dry since he has a pressure injury to his sacrum.      Diagnoses and all orders for this visit:    Phimosis    Bailey catheter in place    Urinary retention  -     tamsulosin (FLOMAX) 0.4 mg Cap; Take 1 capsule (0.4 mg total) by mouth once daily.    Benign prostatic hyperplasia with incomplete bladder emptying  -     tamsulosin (FLOMAX) 0.4 mg Cap; Take 1 capsule (0.4 mg total) by mouth once daily.    Hospital discharge follow-up    Other orders  Continue taking tamsulosin (Flomax) 0.4 mg daily for BPH.  Next bailey catheter change in 2 weeks by [facility] and then every 4 weeks thereafter.     Follow-up in 10 weeks with bailey  catheter change at that time.    Rachelle Busch, DNP           This service was not originating from a related E/M service provided within the previous 7 days nor will  to an E/M service or procedure within the next 24 hours or my soonest available appointment.  Prevailing standard of care was able to be met in this audio-only visit.

## 2025-02-12 ENCOUNTER — TELEPHONE (OUTPATIENT)
Dept: HOME HEALTH SERVICES | Facility: CLINIC | Age: 85
End: 2025-02-12
Payer: MEDICARE

## 2025-02-12 DIAGNOSIS — E11.65 TYPE 2 DIABETES MELLITUS WITH HYPERGLYCEMIA, WITH LONG-TERM CURRENT USE OF INSULIN: Primary | ICD-10-CM

## 2025-02-12 DIAGNOSIS — E11.628 TYPE 2 DIABETES MELLITUS WITH OTHER SKIN COMPLICATIONS: ICD-10-CM

## 2025-02-12 DIAGNOSIS — Z79.4 TYPE 2 DIABETES MELLITUS WITH HYPERGLYCEMIA, WITH LONG-TERM CURRENT USE OF INSULIN: Primary | ICD-10-CM

## 2025-02-12 RX ORDER — INSULIN GLARGINE 100 [IU]/ML
30 INJECTION, SOLUTION SUBCUTANEOUS NIGHTLY
Qty: 9 ML | Refills: 11 | Status: SHIPPED | OUTPATIENT
Start: 2025-02-12 | End: 2026-02-12

## 2025-02-12 RX ORDER — GLIMEPIRIDE 2 MG/1
2 TABLET ORAL
Qty: 90 TABLET | Refills: 1 | Status: SHIPPED | OUTPATIENT
Start: 2025-02-12 | End: 2025-08-11

## 2025-02-12 NOTE — TELEPHONE ENCOUNTER
Received a call from patient's home health therapist, Nany, stating that patient's fasting glucose was 256 today.  Since it is out of normal range, they are required to notify the NP.  NP notified and she states it is better than his last reading which was in the 300's.  No changes made to POC at this time.  Therapist also notified.

## 2025-02-12 NOTE — TELEPHONE ENCOUNTER
[facility] therapist reports glucose is 256.  Elevated daily.   Readings in 300s last week, increase in Lantus to 28U has improved but not at goal  Increase Lantus to 30U at bedtime, glimepiride 2mg in am.  Left wife a message and notified home health on education.  Will attempt to arrange a visit with endocrine

## 2025-02-14 ENCOUNTER — PATIENT OUTREACH (OUTPATIENT)
Dept: ADMINISTRATIVE | Facility: OTHER | Age: 85
End: 2025-02-14
Payer: MEDICARE

## 2025-02-14 ENCOUNTER — TELEPHONE (OUTPATIENT)
Dept: HOME HEALTH SERVICES | Facility: CLINIC | Age: 85
End: 2025-02-14
Payer: MEDICARE

## 2025-02-14 NOTE — TELEPHONE ENCOUNTER
Faxed orders, notes, and demographics to [facility] for Dexcom order per NP request.  Fax confirmation received.

## 2025-02-17 NOTE — PROGRESS NOTES
CHW - Initial Contact    Marjorie Gaston, Community Health Worker updated the Social Determinant of Health questionnaire with Mrs. Meléndez, Pts' wife and caregiver   via telephone today.    Pt identified barriers of most importance are: Mrs. Meléndez stated her  needs assistance with prescription medicine.    Referrals to community agencies completed with patient/caregiver consent outside of [facility] include: [facility].   Referrals were put through [facility] - no:   Other information discussed the patient needs / wants help with: None at this time.      Follow-up Outreach - Due: 2/25/2025

## 2025-02-20 ENCOUNTER — TELEPHONE (OUTPATIENT)
Dept: UROLOGY | Facility: CLINIC | Age: 85
End: 2025-02-20
Payer: MEDICARE

## 2025-02-20 ENCOUNTER — DOCUMENTATION ONLY (OUTPATIENT)
Dept: UROLOGY | Facility: CLINIC | Age: 85
End: 2025-02-20
Payer: MEDICARE

## 2025-02-20 NOTE — TELEPHONE ENCOUNTER
"I called and spoke to Anahi Delacruz LPN at [facility]. I notified her that Bailey was documented as being changed by Dr. Estes on 1/26/25. Per Dr. Busch note on 2/7 we will plan to change the bailey two weeks from 2/7/25 and then every four weeks after. I called pt's wife and made an in office appointment for them 10 weeks (per Narayan note) from 2/7/25 (4/22/25) to change the Bailey. I documented a note "documentation only" with these orders and faxed to Anahi at [place].   "

## 2025-02-20 NOTE — PROGRESS NOTES
16Fr Doherty catheter was inserted by Dr. Estes on 1/26/25 while patient was hospitalized. Home health to replace 16Fr Doherty cath around 2/26/25 per Dr. Busch note (if coude is currently in place, please replace with coude). Home health to replace Doherty q4 weeks thereafter (per Rachelle Busch, DNP order from her note) with 10 cc sterile water in Doherty balloon. OK to irrigate with 60 cc sterile water daily PRN clogging. Please call office for questions. [phone].

## 2025-02-20 NOTE — TELEPHONE ENCOUNTER
----- Message from Kanchan sent at 2/20/2025 12:02 PM CST -----  Regarding: Anahi Calling from [facility] [phone]  Contact: Anahi Calling from [facility] [phone]  Who Called: Anahi Salazar from [facility] [phone] to talk to nurse in regards to rescheduling patients follow up appointment and also when does patient need bailey changed.

## 2025-02-26 ENCOUNTER — TELEPHONE (OUTPATIENT)
Dept: HOME HEALTH SERVICES | Facility: CLINIC | Age: 85
End: 2025-02-26
Payer: MEDICARE

## 2025-02-26 ENCOUNTER — PATIENT OUTREACH (OUTPATIENT)
Dept: ADMINISTRATIVE | Facility: OTHER | Age: 85
End: 2025-02-26
Payer: MEDICARE

## 2025-02-26 DIAGNOSIS — Z74.01 BEDBOUND: ICD-10-CM

## 2025-02-26 DIAGNOSIS — E11.65 TYPE 2 DIABETES MELLITUS WITH HYPERGLYCEMIA, WITH LONG-TERM CURRENT USE OF INSULIN: Primary | ICD-10-CM

## 2025-02-26 DIAGNOSIS — Z79.4 TYPE 2 DIABETES MELLITUS WITH HYPERGLYCEMIA, WITH LONG-TERM CURRENT USE OF INSULIN: Primary | ICD-10-CM

## 2025-02-26 NOTE — PROGRESS NOTES
CHW - Follow Up    Marjorie Gaston, Community Health Worker completed a follow up visit with Mrs. Meléndez, Pts' wife and caregiver  via telephone today.  Pt/Caregiver reported: Mrs. Meléndez reported that she has not heard from anyone from the [facility] as of yet.    Community Health Worker provided: CHW resend referral to [facility] and mailed Pt another application for the program.      Follow-up Outreach - Due: 3/7/2025

## 2025-02-26 NOTE — TELEPHONE ENCOUNTER
Orders placed per NP request for add on skin monitoring during SN visits.  Orders faxed to [facility].  Fax confirmation received.

## 2025-03-02 PROBLEM — G93.41 ENCEPHALOPATHY, METABOLIC: Status: ACTIVE | Noted: 2025-03-02

## 2025-03-06 PROBLEM — G93.41 ENCEPHALOPATHY, METABOLIC: Status: RESOLVED | Noted: 2025-03-02 | Resolved: 2025-03-06

## 2025-03-06 PROBLEM — N39.0 UTI (URINARY TRACT INFECTION): Status: RESOLVED | Noted: 2025-01-26 | Resolved: 2025-03-06

## 2025-03-10 ENCOUNTER — PATIENT OUTREACH (OUTPATIENT)
Dept: ADMINISTRATIVE | Facility: CLINIC | Age: 85
End: 2025-03-10
Payer: MEDICARE

## 2025-03-10 NOTE — PROGRESS NOTES
C3 nurse attempted to contact Julien Meléndez's wife Kylee for a TCC post hospital discharge follow up call. The patient is unable to conduct the call @ this time. The patient requested a callback later this afternoon.     Per AVS, the patient has a scheduled HOSFU appointment with Kelvin Wayne MD on 03/18/25 @ 9441.

## 2025-03-10 NOTE — PROGRESS NOTES
C3 nurse spoke with Julien Meléndez's wife Kylee for a TCC post hospital discharge follow up call. Per AVS, the patient has a scheduled HOSFU appointment with Kelvin Wayne MD  on 03/18/25 @ 3093.

## 2025-03-11 ENCOUNTER — PATIENT OUTREACH (OUTPATIENT)
Dept: ADMINISTRATIVE | Facility: OTHER | Age: 85
End: 2025-03-11
Payer: MEDICARE

## 2025-03-11 NOTE — PROGRESS NOTES
CHW - Follow Up    Marjorie Gaston, Community Health Worker completed a follow up visit with Mrs Kylee Meléndez, Pts' wife and caregiver  via telephone today.  Pt/Caregiver reported:  stated that Mr. Meléndez did not receive the [facility] application and no one called him about pharmacy assistance.   Community Health Worker provided: CHW resent the [facility] application and send an EPIC message to our pharmacy assistance program.    Follow-up Outreach - Due: 3/17/2025

## 2025-03-12 ENCOUNTER — TELEPHONE (OUTPATIENT)
Dept: PHARMACY | Facility: CLINIC | Age: 85
End: 2025-03-12
Payer: MEDICARE

## 2025-03-12 NOTE — TELEPHONE ENCOUNTER
Welcome letter sent via phone, letter, and portal message  to inform patient of the application process for Eliquis and Lantus Pens. Please ensure the chart reflects a current order for the requested medication. Follow-up phone call will be made to patient in 5 business days.    Your help is appreciated  Gypsy Norman  Pharmacy Patient Assistance Team         [employer]  Patient Assistance Program guidelines state patient must demonstrate he is ineligible for Medicare's Low Income Subsidy(LIS)/ Extra Help Program. Patient can apply by calling [phone] or online @ https://secure.[employer].gov/i1020/start.  Please provide a copy of  the determination letter to assigned advocate Gypsy LAWSON for application submission     [employer]  Patient Assistance Program guidelines state, patient must demonstrate he has spent a minimum 3% of his household income on prescriptions for the current year.        Patient may be eligible for a Medicare Prescription Payment Plan. Patient has been advised to contact his insurance company for enrollment details.           Sincerely   Gypsy Norman  Pharmacy Patient Assistance Team

## 2025-03-12 NOTE — LETTER
March 12, 2025    Julien Meléndez  [address] [place] [address]               Dear Mr. Meléndez,      My name is Gypsy Emerson  I am reaching out on behalf of [facility] Patient Assistance Team in regards to your request for medication assistance. Our goal is to assist qualified [facility] patients with obtaining financial assistance for prescribed medications.     Please note that enrollment into available support may require the following documents:      Proof of household Income (such as social security award letter, pension statement,1040)  Copy of all insurance cards (front and back)  Print out from your insurance or pharmacy showing how much you have spent on prescriptions for the current year  Completed Medication Access Center Authorization Forms        Please reach out to my phone number below if you are still in need of assistance with your medications. Follow-up call will be made in 5 business days. We look forward to hearing from you soon!    Thank you for choosing [facility] for your healthcare needs      Sincerely  Gypsy SMITH @[phone]  Pharmacy Patient Assistance Team  [address] [place] [address]  [id number] [place] [id number]  Fax: [phone]  Email: [email or web address]

## 2025-03-12 NOTE — LETTER
March 12, 2025    Julien Meléndez  [address] [place] [address]             Dear Mr. Meléndez        My name is Gypsy Norman.  I am reaching out on behalf of [facility] Patient Assistance Team after receiving a referral from your Provider inquiring about assistance with your Eliquis. Unfortunately, The Pharmacy Patient Assistance Team is unable to submit your application at this time due to the following reasons       [facility]  Patient Assistance Program guidelines state, patient must demonstrate he is ineligible for Medicare's Low Income Subsidy(LIS)/ Extra Help Program. Apply by calling [phone] or online @ https://secure.[facility].gov/i1020/start.  Please provide a copy of  the determination letter to assigned advocate Gypsy Norman ASAP for application submission     [facility]  Patient Assistance Program guidelines state patient must demonstrate he has spent a minimum 3% of his household income on prescriptions for the current year.        Patient may be eligible for a Medicare Prescription Payment Plan. Please contact your insurance company for enrollment details.        Sincerely  Gypsy SMITH @[phone]  Pharmacy Patient Assistance  [address] [facility] [address]  [place] [address]  Fax: [phone]  [email or web address]

## 2025-03-13 ENCOUNTER — TELEPHONE (OUTPATIENT)
Dept: HOME HEALTH SERVICES | Facility: CLINIC | Age: 85
End: 2025-03-13

## 2025-03-13 ENCOUNTER — OFFICE VISIT (OUTPATIENT)
Dept: HOME HEALTH SERVICES | Facility: CLINIC | Age: 85
End: 2025-03-13
Payer: MEDICARE

## 2025-03-13 DIAGNOSIS — Z79.4 TYPE 2 DIABETES MELLITUS WITH HYPERGLYCEMIA, WITH LONG-TERM CURRENT USE OF INSULIN: Primary | ICD-10-CM

## 2025-03-13 DIAGNOSIS — Z97.8 FOLEY CATHETER IN PLACE: ICD-10-CM

## 2025-03-13 DIAGNOSIS — R53.81 DEBILITY: ICD-10-CM

## 2025-03-13 DIAGNOSIS — E11.65 TYPE 2 DIABETES MELLITUS WITH HYPERGLYCEMIA, WITH LONG-TERM CURRENT USE OF INSULIN: Primary | ICD-10-CM

## 2025-03-13 DIAGNOSIS — I10 PRIMARY HYPERTENSION: ICD-10-CM

## 2025-03-13 NOTE — TELEPHONE ENCOUNTER
Called  Diabetes Management to follow up on orders for Dexcom sent on 2/14/25.  Was transferred 3 different times to different departments and was unable to get any answers.  Placed new orders in [place] to reorder through [facility] supplies.  NP aware. Orders placed per NP request for home health services to be restarted (SN/PT/OT).  Demographics, notes, and orders faxed to [facility].  Fax confirmation received.

## 2025-03-17 VITALS
HEART RATE: 74 BPM | RESPIRATION RATE: 16 BRPM | TEMPERATURE: 97 F | SYSTOLIC BLOOD PRESSURE: 126 MMHG | DIASTOLIC BLOOD PRESSURE: 78 MMHG

## 2025-03-17 PROBLEM — R53.81 DEBILITY: Status: ACTIVE | Noted: 2017-07-11

## 2025-03-17 NOTE — ASSESSMENT & PLAN NOTE
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

## 2025-03-17 NOTE — PROGRESS NOTES
[facility]  Transitional Care Management (TCM) Home Visit    Encounter Provider: Kanchan Fischer   PCP: Kelvin Wayne MD  Consult Requested By: No ref. provider found  Admit Date: 3/1/25   IP Discharge Date: 3/6/25  Hospital Length of Stay: 5 days  Days since discharge (from IP or SNF): 7  [facility] Contact Note:   Hospital Diagnosis: No admission diagnoses are documented for this encounter.     HISTORY OF PRESENT ILLNESS      Patient ID: Julien Meléndez is a 84 y.o. male was recently admitted to the hospital, this is their TCM encounter.    Hospital Course Synopsis:    Patient admitted to medical floor and started on gentle IV hydration and empiric antibiotics with IV ceftriaxone after going over patient's previous urine cultures, he grew ESBL E coli last year with sensitivities only to Merrem and ertapenem. Discontinued ceftriaxone and will continue IV Merrem for now and closely monitor urine and blood cultures, deescalate antibiotics as clinically indicated. Patient's neurological status improved this morning and awake and alert, oriented to self. Lactic acid improving.     Pt found in his hospital bed. Wife reports pt is weak. Near fall yesterday she assisted to floor and had to call fire dept to return him to bed. Reports weaker since discharge from hospital.   UTI symptoms have resolved  Glucose continues to be elevated currently on insulin and metformin    DECISION MAKING TODAY       Assessment & Plan:  1. Type 2 diabetes mellitus with hyperglycemia, with long-term current use of insulin  Assessment & Plan:  Lab Results   Component Value Date    HGBA1C 9.7 (H) 01/26/2025     Not at goal  Dexcom ordered today  Insulin and metformin in place  Will consult endocrine to assist with management-wife can do virtual visits.  Cannot afford Janumet      2. 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 Debility  Assessment & Plan:  Related to recent infection and admit  Near fall yesterday  Orders to PT to eval and treat           Medication List on Discharge:     Medication List            Accurate as of March 13, 2025 11:59 PM. If you have any questions, ask your nurse or doctor.                CONTINUE taking these medications      alcohol swabs Padm  Apply 1 each topically as needed (for use with glucose monitoring).     amLODIPine 5 MG tablet  Commonly known as: NORVASC  Take 1 tablet (5 mg total) by mouth once daily.     apixaban 2.5 mg Tab  Commonly known as: ELIQUIS  Take 1 tablet (2.5 mg total) by mouth 2 (two) times daily.     aspirin 81 MG Chew  Take 1 tablet (81 mg total) by mouth once daily.     atorvastatin 40 MG tablet  Commonly known as: LIPITOR  Take 1 tablet (40 mg total) by mouth once daily.     blood sugar diagnostic Strp  Commonly known as: TRUE METRIX GLUCOSE TEST STRIP  1 strip by Misc.(Non-Drug; Combo Route) route 2 (two) times a day.     clopidogreL 75 mg tablet  Commonly known as: PLAVIX  Take 1 tablet (75 mg total) by mouth once daily.     clotrimazole-betamethasone 1-0.05% cream  Commonly known as: LOTRISONE  Apply topically 2 (two) times daily. APPLY TO GROIN TWICE DAILY     DEXCOM G6  Misc  Generic drug: blood-glucose meter,continuous  1 Device by Misc.(Non-Drug; Combo Route) route once. for 1 dose     FLUoxetine 40 MG capsule  Take 1 capsule (40 mg total) by mouth once daily.     furosemide 40 MG tablet  Commonly known as: LASIX  Take 1 tablet (40 mg total) by mouth once daily.     isosorbide mononitrate 30 MG 24 hr tablet  Commonly known as: IMDUR  Take 1 tablet (30 mg total) by mouth once daily.     lancets 28 gauge Misc  1 lancet  by Misc.(Non-Drug; Combo Route) route 2 (two) times a day.     LANTUS SOLOSTAR U-100 INSULIN 100 unit/mL (3 mL) Inpn pen  Generic drug: insulin glargine U-100 (Lantus)  Inject 30 Units into the skin every evening.      levoFLOXacin 500 MG tablet  Commonly known as: LEVAQUIN  Take 1 tablet (500 mg total) by mouth once daily. for 10 days     metoprolol succinate 25 MG 24 hr tablet  Commonly known as: TOPROL-XL  Take 25 mg by mouth once daily.     nitroGLYCERIN 0.4 MG SL tablet  Commonly known as: NITROSTAT  Place 0.4 mg under the tongue every 5 (five) minutes as needed for Chest pain.     ondansetron 4 MG Tbdl  Commonly known as: ZOFRAN-ODT  Take 1 tablet (4 mg total) by mouth every 8 (eight) hours as needed.     ranolazine 1,000 mg Tb12  Commonly known as: RANEXA  Take 1,000 mg by mouth 2 (two) times daily.     traZODone 150 MG tablet  Commonly known as: DESYREL  TAKE 1 TABLET EVERY NIGHT     TRUE METRIX AIR GLUCOSE METER Misc  Generic drug: blood-glucose meter  USE AS DIRECTED              Medication Reconciliation:  Were medications changed on discharge? Yes  Were medications in the home? Yes  Is the patient taking the medications as directed? Yes  Does the patient understand the medications and changes? Yes  Does updated med list accurately reflects meds patient is currently taking? Yes    ENVIRONMENT OF CARE      Family and/or Caregiver present at visit?  Yes  Name of Caregiver:   History provided by: caregiver    Advance Care Planning   Advanced Care Planning Status:  Patient has had an ACP conversation  Living Will: No  Power of : No  [place]: No    Does Caregiver have HCPoA: No  Changes today:   Is patient hospice appropriate: Yes  (If needed, use PPS <30 or FAST score >7)  Was referral to hospice placed: No       Impression upon entering the home:  Physical Dwelling: single family home   Appearance of home environment: cleaniness: clean  Functional Status: max assistance  Mobility: bedbound  Nutritional access: adequate intake and access  Home Health: No, and will be referred today   DME/Supplies: hospital bed     Diagnostic tests reviewed/disposition: No diagnosic tests pending after this  hospitalization.  Disease/illness education: Diabetes  Establishment or re-establishment of referral orders for community resources: No other necessary community resources.   Discussion with other health care providers: No discussion with other health care providers necessary.   Does patient have a PCP at [facility]? Yes   Repatriation plan with PCP? follow-up with PCP within 90d   Does patient have an ostomy (ileostomy, colostomy, suprapubic catheter, nephrostomy tube, tracheostomy, PEG tube, pleurex catheter, cholecystostomy, etc)? No  Were BPAs reviewed? Yes    Social History     Socioeconomic History    Marital status:    Tobacco Use    Smoking status: Never    Smokeless tobacco: Never   Substance and Sexual Activity    Alcohol use: No    Drug use: No     Social Drivers of Health     Financial Resource Strain: Low Risk  (3/3/2025)    Overall Financial Resource Strain (CARDIA)     Difficulty of Paying Living Expenses: Not very hard   Food Insecurity: No Food Insecurity (3/3/2025)    Hunger Vital Sign     Worried About Running Out of Food in the Last Year: Never true     Ran Out of Food in the Last Year: Never true   Transportation Needs: No Transportation Needs (2/17/2025)    PRAPARE - Transportation     Lack of Transportation (Medical): No     Lack of Transportation (Non-Medical): No   Physical Activity: Inactive (3/3/2025)    Exercise Vital Sign     Days of Exercise per Week: 0 days     Minutes of Exercise per Session: 0 min   Stress: No Stress Concern Present (3/3/2025)    [employer] - Occupational Stress Questionnaire     Feeling of Stress : Not at all   Housing Stability: Low Risk  (3/3/2025)    Housing Stability Vital Sign     Unable to Pay for Housing in the Last Year: No     Number of Times Moved in the Last Year: 0     Homeless in the Last Year: No       OBJECTIVE:     Vital Signs:  Vitals:    03/13/25 1119   BP: 126/78   Pulse: 74   Resp: 16   Temp: 97.4 °F (36.3 °C)        Review of Systems   Constitutional:  Negative for activity change, fatigue and fever.   HENT: Negative.     Eyes: Negative.    Respiratory:  Negative for chest tightness.    Cardiovascular: Negative.  Negative for leg swelling.   Gastrointestinal: Negative.    Endocrine: Negative.    Genitourinary:         Chronic bailey   Musculoskeletal: Negative.    Skin: Negative.    Neurological:  Positive for weakness (generalized).   Hematological: Negative.    Psychiatric/Behavioral: Negative.  Negative for agitation.    All other systems reviewed and are negative.      Physical Exam:  Physical Exam  Vitals reviewed.   Constitutional:       General: He is not in acute distress.     Appearance: He is well-developed.   HENT:      Head: Normocephalic and atraumatic.      Nose: Nose normal.      Mouth/Throat:      Mouth: Mucous membranes are dry.   Eyes:      Pupils: Pupils are equal, round, and reactive to light.   Cardiovascular:      Rate and Rhythm: Normal rate and regular rhythm.      Heart sounds: Normal heart sounds.   Pulmonary:      Effort: Pulmonary effort is normal.      Breath sounds: Normal breath sounds.   Abdominal:      General: Bowel sounds are normal.      Palpations: Abdomen is soft.   Genitourinary:     Comments: Bailey with clear yellow urine  Musculoskeletal:      Cervical back: Normal range of motion and neck supple.   Skin:     General: Skin is warm and dry.   Neurological:      Mental Status: He is alert and oriented to person, place, and time.      Cranial Nerves: No cranial nerve deficit.   Psychiatric:         Behavior: Behavior normal.         INSTRUCTIONS FOR PATIENT:     Scheduled Follow-up, Appts Reviewed with Modifications if Needed: Yes  Future Appointments   Date Time Provider Department Center   3/18/2025  2:30 PM Kelvin Wayne MD [facility] [place] [facility]\""   4/22/2025  2:00 PM Rachelle Busch DNP [facility] [place]         Signature: Kanchan Fischer NP    Transition of Care Visit:  I have  reviewed and updated the history and problem list.  I have reconciled the medication list.  I have discussed the hospitalization and current medical issues, prognosis and plans with the patient/family.

## 2025-03-17 NOTE — ASSESSMENT & PLAN NOTE
Lab Results   Component Value Date    HGBA1C 9.7 (H) 01/26/2025     Not at goal  Dexcom ordered today  Insulin and metformin in place  Will consult endocrine to assist with management-wife can do virtual visits.  Cannot afford Janumet

## 2025-03-18 ENCOUNTER — TELEPHONE (OUTPATIENT)
Dept: INTERNAL MEDICINE | Facility: CLINIC | Age: 85
End: 2025-03-18
Payer: MEDICARE

## 2025-03-18 NOTE — TELEPHONE ENCOUNTER
----- Message from Kanchan Fischer NP sent at 3/17/2025  2:22 PM CDT -----  Regarding: Need for appt  Could you assist this pt with a virtual visitWife can do thru portal per herA1c high >9  Recent admits, cannot afford Janumet or [facility] have sent orders for [facility]

## 2025-03-19 ENCOUNTER — PATIENT MESSAGE (OUTPATIENT)
Dept: PHARMACY | Facility: CLINIC | Age: 85
End: 2025-03-19
Payer: MEDICARE

## 2025-03-20 ENCOUNTER — PATIENT OUTREACH (OUTPATIENT)
Dept: ADMINISTRATIVE | Facility: OTHER | Age: 85
End: 2025-03-20
Payer: MEDICARE

## 2025-03-26 ENCOUNTER — EXTERNAL HOME HEALTH (OUTPATIENT)
Dept: HOME HEALTH SERVICES | Facility: HOSPITAL | Age: 85
End: 2025-03-26
Payer: MEDICARE

## 2025-03-26 ENCOUNTER — TELEPHONE (OUTPATIENT)
Dept: HOME HEALTH SERVICES | Facility: CLINIC | Age: 85
End: 2025-03-26
Payer: MEDICARE

## 2025-03-26 NOTE — TELEPHONE ENCOUNTER
Received message in [place] from [facility] stating that they are having difficulty reaching patient to get Dexcom supplies ordered and sent.  Would like us to notify them that [facility] will be calling on a 727 number.  Phone call made to patient's spouse and notified her that they are trying to reach her. Verbalized understanding.

## 2025-03-31 ENCOUNTER — DOCUMENT SCAN (OUTPATIENT)
Dept: HOME HEALTH SERVICES | Facility: HOSPITAL | Age: 85
End: 2025-03-31
Payer: MEDICARE

## 2025-04-10 ENCOUNTER — PATIENT OUTREACH (OUTPATIENT)
Dept: ADMINISTRATIVE | Facility: OTHER | Age: 85
End: 2025-04-10
Payer: MEDICARE

## 2025-04-10 NOTE — PROGRESS NOTES
CHW - Follow Up    Marjorie Gaston, Community Health Worker completed a follow up visit with Kyleesantosh Meléndez, Pts wife and caregiver via telephone today. CHW updated the Social Drivers.   Pt/Caregiver reported:  Mrs. Meléndez stated Pt receives Meals on Wheels and some caregiver support from the [facility] on Aging.  Mrs. Meléndez explained that  Mr. Meléndez needs assistance with the cost of diabetic medications (insulin) and needs additional caregiver support.    Community Health Worker provided:  CHW referred Mr. Meléndez to [facility] and the [facility] to see if he qualifies for free insulin.  CHW also referred Pt to [employer] [place] to see if he applies for Medicaid.   CHW referred Pt to the [place] Department of Adult and Aging Services to apply for Long Term Care.      Follow-up Outreach - Due: 4/22/2025

## 2025-04-22 ENCOUNTER — TELEPHONE (OUTPATIENT)
Dept: UROLOGY | Facility: CLINIC | Age: 85
End: 2025-04-22
Payer: MEDICARE

## 2025-04-22 NOTE — TELEPHONE ENCOUNTER
----- Message from Brandan sent at 4/22/2025 10:08 AM CDT -----  Type:  Sooner Apoointment RequestCaller is requesting a sooner appointment.    Caller will accept being placed on the waitlist and is requesting a message be sent to doctor.Name of Caller:Pt spouseWhen is the first available appointment?Symptoms:Doherty exchange (Would the patient rather a call back or a response via [facility]? [facility] Call Back Number:[phone] Information:

## 2025-04-30 ENCOUNTER — OFFICE VISIT (OUTPATIENT)
Dept: INTERNAL MEDICINE | Facility: CLINIC | Age: 85
End: 2025-04-30
Payer: MEDICARE

## 2025-04-30 DIAGNOSIS — E11.65 TYPE 2 DIABETES MELLITUS WITH HYPERGLYCEMIA, WITH LONG-TERM CURRENT USE OF INSULIN: ICD-10-CM

## 2025-04-30 DIAGNOSIS — Z79.4 TYPE 2 DIABETES MELLITUS WITH HYPERGLYCEMIA, WITH LONG-TERM CURRENT USE OF INSULIN: ICD-10-CM

## 2025-04-30 DIAGNOSIS — Z79.4 TYPE 2 DIABETES MELLITUS WITH DIABETIC PERIPHERAL ANGIOPATHY WITHOUT GANGRENE, WITH LONG-TERM CURRENT USE OF INSULIN: Primary | ICD-10-CM

## 2025-04-30 DIAGNOSIS — I10 PRIMARY HYPERTENSION: ICD-10-CM

## 2025-04-30 DIAGNOSIS — I25.2 HISTORY OF MI (MYOCARDIAL INFARCTION): ICD-10-CM

## 2025-04-30 DIAGNOSIS — I73.9 PAD (PERIPHERAL ARTERY DISEASE): ICD-10-CM

## 2025-04-30 DIAGNOSIS — E78.2 MIXED HYPERLIPIDEMIA: ICD-10-CM

## 2025-04-30 DIAGNOSIS — Z97.8 FOLEY CATHETER IN PLACE: ICD-10-CM

## 2025-04-30 DIAGNOSIS — G47.33 OSA (OBSTRUCTIVE SLEEP APNEA): ICD-10-CM

## 2025-04-30 DIAGNOSIS — N39.0 RECURRENT UTI: ICD-10-CM

## 2025-04-30 DIAGNOSIS — F32.A DEPRESSIVE DISORDER: ICD-10-CM

## 2025-04-30 DIAGNOSIS — E11.51 TYPE 2 DIABETES MELLITUS WITH DIABETIC PERIPHERAL ANGIOPATHY WITHOUT GANGRENE, WITH LONG-TERM CURRENT USE OF INSULIN: Primary | ICD-10-CM

## 2025-04-30 DIAGNOSIS — I48.0 PAF (PAROXYSMAL ATRIAL FIBRILLATION): ICD-10-CM

## 2025-04-30 DIAGNOSIS — I25.812 CORONARY ARTERY DISEASE INVOLVING BYPASS GRAFT OF TRANSPLANTED HEART, UNSPECIFIED WHETHER ANGINA PRESENT: ICD-10-CM

## 2025-04-30 DIAGNOSIS — F03.90 SENILE DEMENTIA: ICD-10-CM

## 2025-04-30 PROBLEM — T83.511A CATHETER-ASSOCIATED URINARY TRACT INFECTION: Status: ACTIVE | Noted: 2025-01-26

## 2025-04-30 PROBLEM — Z16.24 MULTIPLE DRUG RESISTANT ORGANISM (MDRO) CULTURE POSITIVE: Status: ACTIVE | Noted: 2025-04-30

## 2025-04-30 PROCEDURE — 1159F MED LIST DOCD IN RCRD: CPT | Mod: CPTII,93,, | Performed by: NURSE PRACTITIONER

## 2025-04-30 PROCEDURE — 1160F RVW MEDS BY RX/DR IN RCRD: CPT | Mod: CPTII,93,, | Performed by: NURSE PRACTITIONER

## 2025-04-30 PROCEDURE — 98016 BRIEF COMUNICAJ TECH-BSD SVC: CPT | Mod: 93,,, | Performed by: NURSE PRACTITIONER

## 2025-05-02 ENCOUNTER — TELEPHONE (OUTPATIENT)
Dept: HOME HEALTH SERVICES | Facility: CLINIC | Age: 85
End: 2025-05-02
Payer: MEDICARE

## 2025-05-02 NOTE — TELEPHONE ENCOUNTER
Contacted pt spouse Kylee to schedule an appointment regarding a referral placed for a tcc home visit with a nurse practitioner.    Patient has been scheduled

## 2025-05-03 ENCOUNTER — DOCUMENT SCAN (OUTPATIENT)
Dept: HOME HEALTH SERVICES | Facility: HOSPITAL | Age: 85
End: 2025-05-03
Payer: MEDICARE

## 2025-05-05 ENCOUNTER — DOCUMENT SCAN (OUTPATIENT)
Dept: HOME HEALTH SERVICES | Facility: HOSPITAL | Age: 85
End: 2025-05-05
Payer: MEDICARE

## 2025-05-05 ENCOUNTER — PATIENT OUTREACH (OUTPATIENT)
Dept: ADMINISTRATIVE | Facility: CLINIC | Age: 85
End: 2025-05-05
Payer: MEDICARE

## 2025-05-05 NOTE — PROGRESS NOTES
C3 nurse spoke with Julien Meléndez's wife Kylee for a TCC post hospital discharge follow up call. The patient has a scheduled HOSFU appointment with Kanchan Fischer NP on 5/8/25 @ 1100.

## 2025-05-06 ENCOUNTER — PATIENT OUTREACH (OUTPATIENT)
Dept: ADMINISTRATIVE | Facility: OTHER | Age: 85
End: 2025-05-06
Payer: MEDICARE

## 2025-05-06 NOTE — PROGRESS NOTES
CHW - Outreach Attempt    Marjorie Gaston Community Health Worker left a voicemail message for 1st attempt to contact patient regarding: [facility]   Community Health Worker to attempt to contact patient on: May 12th.

## 2025-05-07 ENCOUNTER — DOCUMENT SCAN (OUTPATIENT)
Dept: HOME HEALTH SERVICES | Facility: HOSPITAL | Age: 85
End: 2025-05-07
Payer: MEDICARE

## 2025-05-08 ENCOUNTER — OFFICE VISIT (OUTPATIENT)
Dept: HOME HEALTH SERVICES | Facility: CLINIC | Age: 85
End: 2025-05-08
Payer: MEDICARE

## 2025-05-08 VITALS — HEART RATE: 68 BPM | OXYGEN SATURATION: 97 % | RESPIRATION RATE: 18 BRPM

## 2025-05-08 DIAGNOSIS — E11.65 TYPE 2 DIABETES MELLITUS WITH HYPERGLYCEMIA, WITH LONG-TERM CURRENT USE OF INSULIN: ICD-10-CM

## 2025-05-08 DIAGNOSIS — Z79.01 LONG TERM (CURRENT) USE OF ANTICOAGULANTS: ICD-10-CM

## 2025-05-08 DIAGNOSIS — T83.511D URINARY TRACT INFECTION ASSOCIATED WITH INDWELLING URETHRAL CATHETER, SUBSEQUENT ENCOUNTER: ICD-10-CM

## 2025-05-08 DIAGNOSIS — N39.0 URINARY TRACT INFECTION ASSOCIATED WITH INDWELLING URETHRAL CATHETER, SUBSEQUENT ENCOUNTER: ICD-10-CM

## 2025-05-08 DIAGNOSIS — N39.0 RECURRENT UTI: Primary | ICD-10-CM

## 2025-05-08 DIAGNOSIS — Z79.4 TYPE 2 DIABETES MELLITUS WITH HYPERGLYCEMIA, WITH LONG-TERM CURRENT USE OF INSULIN: ICD-10-CM

## 2025-05-08 PROBLEM — L03.119 CELLULITIS OF FOOT: Chronic | Status: RESOLVED | Noted: 2023-11-07 | Resolved: 2025-05-08

## 2025-05-08 PROBLEM — L03.115 CELLULITIS OF RIGHT LOWER EXTREMITY: Status: RESOLVED | Noted: 2023-03-25 | Resolved: 2025-05-08

## 2025-05-08 PROBLEM — I82.C19 INTERNAL JUGULAR (IJ) VEIN THROMBOEMBOLISM, ACUTE: Status: RESOLVED | Noted: 2023-11-12 | Resolved: 2025-05-08

## 2025-05-08 RX ORDER — METHENAMINE HIPPURATE 1000 MG/1
1 TABLET ORAL DAILY
Qty: 90 TABLET | Refills: 1 | Status: SHIPPED | OUTPATIENT
Start: 2025-05-08 | End: 2025-11-04

## 2025-05-08 RX ORDER — METFORMIN HYDROCHLORIDE 1000 MG/1
1000 TABLET ORAL
Start: 2025-05-08 | End: 2026-05-08

## 2025-05-08 NOTE — PROGRESS NOTES
[facility]  Transitional Care Management (TCM) Home Visit    Encounter Provider: Kanchan Fischer   PCP: Kelvin Wayne MD  Consult Requested By: Dr. Mellissa Mcdowell  Admit Date: 4/30/25   IP Discharge Date: 5/2/25  Hospital Length of Stay:3 days  Days since discharge (from IP or SNF): 6 days  [facility] Contact Note: 5/5/25  Hospital Diagnosis: Urinary tract infection associated with indwelling urethral catheter, subsequent encounter [T83.511D, N39.0]     HISTORY OF PRESENT ILLNESS      Patient ID: Julien Meléndez is a 84 y.o. male was recently admitted to the hospital, this is their TCM encounter.    Hospital Course Synopsis:  Case discussed with ID on main campus. Unclear if patient is actually symptomatic due to dementia. Discussed with pharmacist - opted for gentamicin x1 for one and done treatment, as home abx would have been difficult for patient's wife. Patient also referred to acute care at home on discharge. Patient seen and examined on day of discharged. Deemed stable.     Today, he is found in his home lying in a hospital bed. Oriented to self and place. His wife and daughter are present. He answers questions and denies any pain. Bailey has clear but dark indira urine-encouraged to increase water intake.  All antibiotics completed-no current Uti symptoms per wife  Diabetes: Continues to have elevated readings. Dexcom begun this week. 350 reading yesterday-wife gave extra metformin yesterday-instructed on avoiding extra doses. Wife is not comfortable with a sliding scale insulin.        DECISION MAKING TODAY       Assessment & Plan:  1. Recurrent UTI  Assessment & Plan:  3+ UTIs requiring admission this year  Chronic bailey failed voiding trial earlier this year-followed by urology  Urine dark clear in  bag today  HH is exchanging bailey monthly  Daughter is inquiring into SPT tubes- reviewed rationale and anatomy of SPTs.  Encouraged to discuss with urology  Reviewed measures to assist with UTI  prevention such as Hiprax, flushes, supplements.  Hiprax to pharmacy 1 grm daily        2. Urinary tract infection associated with indwelling urethral catheter, subsequent encounter  Assessment & Plan:  3+ UTIs requiring admission this year  Chronic bailey failed voiding trial earlier this year-followed by urology  Urine dark clear in  bag today  HH is exchanging bailey monthly  Daughter is inquiring into SPT tubes- reviewed rationale and anatomy of SPTs.  Encouraged to discuss with urology  Reviewed measures to assist with UTI prevention such as Hiprax, flushes, supplements.  Hiprax to pharmacy 1 grm daily      Orders:  -     Ambulatory referral/consult to [facility]  -     methenamine (HIPREX) 1 gram Tab; Take 1 tablet (1 g total) by mouth once daily.  Dispense: 90 tablet; Refill: 1    3. Long term (current) use of anticoagulants  Assessment & Plan:  -AC therapy- Eliquis  H/o clots      4. Type 2 diabetes mellitus with hyperglycemia, with long-term current use of insulin  Assessment & Plan:  Lab Results   Component Value Date    HGBA1C 9.6 (H) 04/30/2025     A1c not at goal, trending 9.6-9.7 this year  Dexcom ordered in March-received this week and has begun using.  Currently taking Metformin 100o mg daily and Lantus 28 U at bedtime  Reports glucose in 300s yesterday. Today range 250-285- reports he did not take metformin this am.  Increase Lantus to 30U nightly.  ADA diet  Continue Metformin  Missed endocrine appt while in hospital. Will message to reschedule             Medication List on Discharge:     Medication List            Accurate as of May 8, 2025  7:21 PM. If you have any questions, ask your nurse or doctor.                START taking these medications      methenamine 1 gram Tab  Commonly known as: HIPREX  Take 1 tablet (1 g total) by mouth once daily.  Started by: Kanchan Fischer NP            CONTINUE taking these medications      alcohol swabs Padm  Apply 1 each topically as  needed (for use with glucose monitoring).     amLODIPine 5 MG tablet  Commonly known as: NORVASC  Take 1 tablet (5 mg total) by mouth once daily.     apixaban 2.5 mg Tab  Commonly known as: ELIQUIS  Take 1 tablet (2.5 mg total) by mouth 2 (two) times daily.     aspirin 81 MG Chew  Take 1 tablet (81 mg total) by mouth once daily.     atorvastatin 40 MG tablet  Commonly known as: LIPITOR  Take 1 tablet (40 mg total) by mouth once daily.     blood sugar diagnostic Strp  Commonly known as: TRUE METRIX GLUCOSE TEST STRIP  1 strip by Misc.(Non-Drug; Combo Route) route 2 (two) times a day.     clopidogreL 75 mg tablet  Commonly known as: PLAVIX  Take 1 tablet (75 mg total) by mouth once daily.     clotrimazole-betamethasone 1-0.05% cream  Commonly known as: LOTRISONE  Apply topically 2 (two) times daily. APPLY TO GROIN TWICE DAILY     DEXCOM G6  Misc  Generic drug: blood-glucose,,cont  1 Device by Misc.(Non-Drug; Combo Route) route once. for 1 dose     FLUoxetine 40 MG capsule  Take 1 capsule (40 mg total) by mouth once daily.     furosemide 40 MG tablet  Commonly known as: LASIX  Take 1 tablet (40 mg total) by mouth once daily.     isosorbide mononitrate 30 MG 24 hr tablet  Commonly known as: IMDUR  Take 1 tablet (30 mg total) by mouth once daily.     lancets 28 gauge Misc  1 lancet  by Misc.(Non-Drug; Combo Route) route 2 (two) times a day.     LANTUS SOLOSTAR U-100 INSULIN 100 unit/mL (3 mL) Inpn pen  Generic drug: insulin glargine U-100 (Lantus)  Inject 30 Units into the skin every evening.     metoprolol succinate 25 MG 24 hr tablet  Commonly known as: TOPROL-XL  Take 25 mg by mouth once daily.     nitroGLYCERIN 0.4 MG SL tablet  Commonly known as: NITROSTAT  Place 0.4 mg under the tongue every 5 (five) minutes as needed for Chest pain.     ondansetron 4 MG Tbdl  Commonly known as: ZOFRAN-ODT  Take 1 tablet (4 mg total) by mouth every 8 (eight) hours as needed.     ranolazine 1,000 mg Tb12  Commonly  known as: RANEXA  Take 1,000 mg by mouth 2 (two) times daily.     traZODone 150 MG tablet  Commonly known as: DESYREL  TAKE 1 TABLET EVERY NIGHT     TRUE METRIX AIR GLUCOSE METER Misc  Generic drug: blood-glucose meter  USE AS DIRECTED              Medication Reconciliation:  Were medications changed on discharge? No  Were medications in the home? Yes  Is the patient taking the medications as directed? Yes  Does the patient understand the medications and changes? Yes  Does updated med list accurately reflects meds patient is currently taking? Yes    ENVIRONMENT OF CARE      Family and/or Caregiver present at visit?  Yes  Name of Caregiver:   History provided by: caregiver    Advance Care Planning   Advanced Care Planning Status:  Patient has had an ACP conversation  Living Will: No  Power of : No  [place]: No    Does Caregiver have HCPoA: No  Changes today: none  Is patient hospice appropriate: No  (If needed, use PPS <30 or FAST score >7)  Was referral to hospice placed: No       Impression upon entering the home:  Physical Dwelling: single family home   Appearance of home environment: cleaniness: clean  Functional Status: max assistance  Mobility: bedbound  Nutritional access: adequate intake and access  Home Health: Yes,  Agency [facility]   DME/Supplies: hospital bed and Dexcom     Diagnostic tests reviewed/disposition: No diagnosic tests pending after this hospitalization.  Disease/illness education: Diabetes  Establishment or re-establishment of referral orders for community resources: No other necessary community resources.   Discussion with other health care providers: No discussion with other health care providers necessary.   Does patient have a PCP at [facility]? Yes   Repatriation plan with PCP? [facility] at Home reason: mobility   Does patient have an ostomy (ileostomy, colostomy, suprapubic catheter, nephrostomy tube, tracheostomy, PEG tube, pleurex catheter, cholecystostomy, etc)? No  Were BPAs reviewed?  Yes    Social History     Socioeconomic History    Marital status:    Tobacco Use    Smoking status: Never    Smokeless tobacco: Never   Substance and Sexual Activity    Alcohol use: No    Drug use: No     Social Drivers of Health     Financial Resource Strain: Low Risk  (5/2/2025)    Overall Financial Resource Strain (CARDIA)     Difficulty of Paying Living Expenses: Not very hard   Food Insecurity: No Food Insecurity (5/2/2025)    Hunger Vital Sign     Worried About Running Out of Food in the Last Year: Never true     Ran Out of Food in the Last Year: Never true   Transportation Needs: No Transportation Needs (5/1/2025)    PRAPARE - Transportation     Lack of Transportation (Medical): No     Lack of Transportation (Non-Medical): No   Physical Activity: Inactive (3/3/2025)    Exercise Vital Sign     Days of Exercise per Week: 0 days     Minutes of Exercise per Session: 0 min   Stress: No Stress Concern Present (5/2/2025)    [employer] - Occupational Stress Questionnaire     Feeling of Stress : Not at all   Housing Stability: Low Risk  (5/2/2025)    Housing Stability Vital Sign     Unable to Pay for Housing in the Last Year: No     Number of Times Moved in the Last Year: 0     Homeless in the Last Year: No       OBJECTIVE:     Vital Signs:  Vitals:    05/08/25 1400   Pulse: 68   Resp: 18       Review of Systems   Constitutional:  Negative for activity change, fatigue and fever.   HENT: Negative.     Eyes: Negative.    Respiratory:  Negative for chest tightness.    Cardiovascular: Negative.  Negative for leg swelling.   Gastrointestinal: Negative.    Endocrine: Negative.    Genitourinary:  Positive for difficulty urinating.   Skin:  Positive for wound.   Allergic/Immunologic: Negative.    Neurological:  Positive for weakness.   Hematological: Negative.    Psychiatric/Behavioral: Negative.  Negative for agitation.    All other systems reviewed and are negative.      Physical  Exam:  Physical Exam  Vitals reviewed.   Constitutional:       General: He is not in acute distress.     Appearance: He is well-developed.   HENT:      Head: Normocephalic and atraumatic.      Nose: Nose normal.      Mouth/Throat:      Mouth: Mucous membranes are dry.   Eyes:      Pupils: Pupils are equal, round, and reactive to light.   Cardiovascular:      Rate and Rhythm: Normal rate and regular rhythm.      Heart sounds: Normal heart sounds.   Pulmonary:      Effort: Pulmonary effort is normal.      Breath sounds: Normal breath sounds.   Abdominal:      General: Bowel sounds are normal.      Palpations: Abdomen is soft.   Genitourinary:     Comments: Doherty with dark indira urine  Musculoskeletal:      Cervical back: Normal range of motion and neck supple.   Skin:     General: Skin is warm and dry.      Comments: Wound to sacrum   Neurological:      Mental Status: He is alert. Mental status is at baseline.      Cranial Nerves: No cranial nerve deficit.   Psychiatric:         Behavior: Behavior normal.         INSTRUCTIONS FOR PATIENT:     Scheduled Follow-up, Appts Reviewed with Modifications if Needed: Yes  Future Appointments   Date Time Provider Department Center   6/5/2025 11:00 AM Kelvin Wayne MD [facility]       Signature: Kanchan Fischer NP    Transition of Care Visit:  I have reviewed and updated the history and problem list.  I have reconciled the medication list.  I have discussed the hospitalization and current medical issues, prognosis and plans with the patient/family.

## 2025-05-09 ENCOUNTER — DOCUMENT SCAN (OUTPATIENT)
Dept: HOME HEALTH SERVICES | Facility: HOSPITAL | Age: 85
End: 2025-05-09
Payer: MEDICARE

## 2025-05-09 NOTE — ASSESSMENT & PLAN NOTE
3+ UTIs requiring admission this year  Chronic bailey failed voiding trial earlier this year-followed by urology  Urine dark clear in  bag today  HH is exchanging bailey monthly  Daughter is inquiring into SPT tubes- reviewed rationale and anatomy of SPTs.  Encouraged to discuss with urology  Reviewed measures to assist with UTI prevention such as Hiprax, flushes, supplements.  Hiprax to pharmacy 1 grm daily

## 2025-05-09 NOTE — ASSESSMENT & PLAN NOTE
Lab Results   Component Value Date    HGBA1C 9.6 (H) 04/30/2025     A1c not at goal, trending 9.6-9.7 this year  Dexcom ordered in March-received this week and has begun using.  Currently taking Metformin 100o mg daily and Lantus 28 U at bedtime  Reports glucose in 300s yesterday. Today range 250-285- reports he did not take metformin this am.  Increase Lantus to 30U nightly.  ADA diet  Continue Metformin  Missed endocrine appt while in hospital. Will message to reschedule

## 2025-05-12 RX ORDER — PEN NEEDLE, DIABETIC 30 GX3/16"
NEEDLE, DISPOSABLE MISCELLANEOUS
Qty: 100 EACH | Refills: 11 | Status: SHIPPED | OUTPATIENT
Start: 2025-05-12

## 2025-05-12 RX ORDER — INSULIN ASPART 100 [IU]/ML
INJECTION, SOLUTION INTRAVENOUS; SUBCUTANEOUS
Qty: 15 ML | Refills: 6 | Status: SHIPPED | OUTPATIENT
Start: 2025-05-12

## 2025-05-12 NOTE — PROGRESS NOTES
Audio Only Telehealth Visit     The patient location is: home   The chief complaint leading to consultation is: type 2 diabetes  Visit type: Virtual visit with audio only (telephone)  Total time spent in medical discussion with patient: 5 minutes  Total time spent on date of the encounter:20 minutes     Tues, Wed w/ Anahi (RN)  Lab Results   Component Value Date    HGBA1C 9.6 (H) 04/30/2025       The reason for the audio only service rather than synchronous audio and video virtual visit was related to technical difficulties or patient preference/necessity.       Each patient to whom I provide medical services by telemedicine is:  (1) informed of the relationship between the physician and patient and the respective role of any other health care provider with respect to management of the patient; and (2) notified that they may decline to receive medical services by telemedicine and may withdraw from such care at any time. Patient verbally consented to receive this service via voice-only telephone call.    Addendum : On MDI up 3 x a day  Novolog correction added   Lantus 30 units at night     Testing 4 x a day  Patient is willing and able to use the device  Demonstrated an understanding of the technology and is motivated to use CGM  Patient expected to adhere to a comprehensive diabetes treatment plan and patient has adequate medical supervision  Patient experiences multiple impaired awareness of hypoglycemia (hypoglycemia unawareness)    Current meds:  Lantus 30 units at night  Metformin 1000 mg qd     HPI: type 2 diabetes      Assessment and plan:    1. Type 2 diabetes mellitus with diabetic peripheral angiopathy without gangrene, with long-term current use of insulin  Hemoglobin A1C next time  F/u in 4 months  A1c goal less than 8%   F/u with vascular prn   Add novolog correction scale 180-230 +2 ,etc.  Continue lantus 30 units at night   Add dexcom g7 w/ -send to [facility]   Discussed complexity,  appetite/dietary habits       2. Type 2 diabetes mellitus with hyperglycemia, with long-term current use of insulin  See above  Use of cgm will be helpful       3. Recurrent UTI  May increase bg if not controlled    On hiprex   Has bailey-can complicate condition      4. Senile dementia   F/u with neuro prn   Need caretaker/spouse for visits   Declining- affects day to day activities       5. PAD (peripheral artery disease)  F/u with vascular   Stable       6. KELSEY (obstructive sleep apnea)  If not consistent w/ cpap   May affect cardiovascular system,metabolism, bg       7. Mixed hyperlipidemia  On statin  Goal < 70 per ldl      8. Primary hypertension  Bp managed per pcp   On arb/acei       9. History of MI (myocardial infarction)  Avoid hypoglycemia  F/u with cards   On plavix, lasix       10. 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    Afib                                                                         on eliquis, f/u with cards , if not controlled-will     elevate bg    12. Depressive disorder  On ssri  Stable                                 This service was not originating from a related E/M service provided within the previous 7 days nor will  to an E/M service or procedure within the next 24 hours or my soonest available appointment.  Prevailing standard of care was able to be met in this audio-only visit.

## 2025-05-13 ENCOUNTER — PATIENT OUTREACH (OUTPATIENT)
Dept: ADMINISTRATIVE | Facility: OTHER | Age: 85
End: 2025-05-13
Payer: MEDICARE

## 2025-05-13 NOTE — PROGRESS NOTES
CHW - Outreach Attempt    Marjorie Gaston Community Health Worker left a voicemail message for 2nd attempt to contact patient regarding: [facility]   Community Health Worker to attempt to contact patient on: May 19th.

## 2025-05-16 ENCOUNTER — TELEPHONE (OUTPATIENT)
Dept: DIABETES | Facility: CLINIC | Age: 85
End: 2025-05-16
Payer: MEDICARE

## 2025-05-21 ENCOUNTER — PATIENT OUTREACH (OUTPATIENT)
Dept: ADMINISTRATIVE | Facility: OTHER | Age: 85
End: 2025-05-21
Payer: MEDICARE

## 2025-05-21 ENCOUNTER — TELEPHONE (OUTPATIENT)
Dept: HOME HEALTH SERVICES | Facility: CLINIC | Age: 85
End: 2025-05-21
Payer: MEDICARE

## 2025-05-21 DIAGNOSIS — Z97.8 FOLEY CATHETER IN PLACE: ICD-10-CM

## 2025-05-21 DIAGNOSIS — E11.65 TYPE 2 DIABETES MELLITUS WITH HYPERGLYCEMIA, WITH LONG-TERM CURRENT USE OF INSULIN: Primary | ICD-10-CM

## 2025-05-21 DIAGNOSIS — Z79.4 TYPE 2 DIABETES MELLITUS WITH HYPERGLYCEMIA, WITH LONG-TERM CURRENT USE OF INSULIN: Primary | ICD-10-CM

## 2025-05-21 RX ORDER — CLOTRIMAZOLE AND BETAMETHASONE DIPROPIONATE 10; .64 MG/G; MG/G
CREAM TOPICAL 2 TIMES DAILY
Qty: 45 G | Refills: 11 | Status: SHIPPED | OUTPATIENT
Start: 2025-05-21

## 2025-05-21 NOTE — PROGRESS NOTES
CHW - Case Closure    Marjorie Gaston, Community Health Worker spoke to Kylee Meléndez, Pts wife and caregiver via telephone today.   Pt/Caregiver reported: Mrs. Meléndez reported that she has not contacted Ms. Gypsy Norman regarding Mr. Meléndez prescription medicine because his provider may take him off Lantus.    Mrs. Meléndez denied any additional needs at this time and agrees with episode closure at this time.  CHW provided caregiver with Community Health Worker's contact information and encouraged her to contact this Community Health Worker if additional needs arise.

## 2025-05-21 NOTE — TELEPHONE ENCOUNTER
Orders placed per NP request for [facility] for HH services.  Orders faxed to [facility].  Fax confirmation received.

## 2025-05-26 ENCOUNTER — DOCUMENT SCAN (OUTPATIENT)
Dept: HOME HEALTH SERVICES | Facility: HOSPITAL | Age: 85
End: 2025-05-26
Payer: MEDICARE

## 2025-05-28 ENCOUNTER — EXTERNAL HOME HEALTH (OUTPATIENT)
Dept: HOME HEALTH SERVICES | Facility: HOSPITAL | Age: 85
End: 2025-05-28
Payer: MEDICARE

## 2025-05-30 ENCOUNTER — TELEPHONE (OUTPATIENT)
Dept: UROLOGY | Facility: CLINIC | Age: 85
End: 2025-05-30
Payer: MEDICARE

## 2025-05-30 NOTE — TELEPHONE ENCOUNTER
I returned call from  nurse Anahi, she states she was in contact with patient's spouse last night and he is urinating. Review of the notes reveals that patient has had some difficulty urinating in the past but the catheter was mainly in place to allow sacral skin to heal. Left  at [phone] and also at [phone] requesting call back to try to arrange visit with provider to discuss replacing bailey cath.

## 2025-06-06 ENCOUNTER — TELEPHONE (OUTPATIENT)
Dept: HOME HEALTH SERVICES | Facility: CLINIC | Age: 85
End: 2025-06-06
Payer: MEDICARE

## 2025-06-06 DIAGNOSIS — R53.81 DEBILITY: Primary | ICD-10-CM

## 2025-06-06 DIAGNOSIS — E11.628 TYPE 2 DIABETES MELLITUS WITH OTHER SKIN COMPLICATIONS: ICD-10-CM

## 2025-06-06 DIAGNOSIS — I50.9 HEART FAILURE, UNSPECIFIED HF CHRONICITY, UNSPECIFIED HEART FAILURE TYPE: ICD-10-CM

## 2025-06-06 PROBLEM — I50.22 CHRONIC SYSTOLIC (CONGESTIVE) HEART FAILURE: Status: ACTIVE | Noted: 2025-06-06

## 2025-06-06 PROBLEM — R29.898 LEG WEAKNESS, BILATERAL: Status: ACTIVE | Noted: 2025-06-06

## 2025-06-06 PROBLEM — R26.81 UNSTEADY GAIT: Status: ACTIVE | Noted: 2025-06-06

## 2025-06-18 DIAGNOSIS — R53.81 PHYSICAL DECONDITIONING: ICD-10-CM

## 2025-06-18 DIAGNOSIS — E11.51 TYPE 2 DIABETES MELLITUS WITH DIABETIC PERIPHERAL ANGIOPATHY WITHOUT GANGRENE, WITH LONG-TERM CURRENT USE OF INSULIN: Primary | ICD-10-CM

## 2025-06-18 DIAGNOSIS — Z79.4 TYPE 2 DIABETES MELLITUS WITH DIABETIC PERIPHERAL ANGIOPATHY WITHOUT GANGRENE, WITH LONG-TERM CURRENT USE OF INSULIN: Primary | ICD-10-CM

## 2025-06-18 NOTE — PROGRESS NOTES
HH PT requesting Allison Lift for pt.  Pt is NONAMBULATORY AND IS NOT ABLE TO EFFECTIVELY TRANSFER WITH WIFE ASSISTANCE ON DAILY BASIS. PATIENT WOULD BENEFIT FROM ALLISON LIFT   Orders placed

## 2025-06-24 ENCOUNTER — TELEPHONE (OUTPATIENT)
Dept: HOME HEALTH SERVICES | Facility: CLINIC | Age: 85
End: 2025-06-24
Payer: MEDICARE

## 2025-06-24 NOTE — TELEPHONE ENCOUNTER
Faxed orders, notes, and demographics to [facility] for timi lift order per NP request.  Fax confirmation received.

## 2025-06-26 ENCOUNTER — TELEPHONE (OUTPATIENT)
Dept: HOME HEALTH SERVICES | Facility: CLINIC | Age: 85
End: 2025-06-26
Payer: MEDICARE

## 2025-06-26 DIAGNOSIS — R53.81 PHYSICAL DECONDITIONING: ICD-10-CM

## 2025-06-26 DIAGNOSIS — R26.81 UNSTEADY GAIT: ICD-10-CM

## 2025-06-26 DIAGNOSIS — I25.2 HISTORY OF MI (MYOCARDIAL INFARCTION): ICD-10-CM

## 2025-06-26 DIAGNOSIS — I10 PRIMARY HYPERTENSION: ICD-10-CM

## 2025-06-26 DIAGNOSIS — I50.9 CONGESTIVE HEART FAILURE, UNSPECIFIED HF CHRONICITY, UNSPECIFIED HEART FAILURE TYPE: ICD-10-CM

## 2025-06-26 DIAGNOSIS — I50.9 HEART FAILURE, UNSPECIFIED HF CHRONICITY, UNSPECIFIED HEART FAILURE TYPE: ICD-10-CM

## 2025-06-26 DIAGNOSIS — R29.898 LEG WEAKNESS, BILATERAL: Primary | ICD-10-CM

## 2025-06-26 NOTE — TELEPHONE ENCOUNTER
Contacted patient's spouse to find out where her previous hospital bed is from.  We are trying to get patient a timi lift but [facility] cannot provide because they don't have history of patient ordering hospital bed or wheelchair through them.  Spoke with spouse and she states that the hospital bed was passed on from another family member and the wheelchair came from [employer].  Orders placed per NP request for hospital bed.  Demographics, notes, and orders faxed to [facility] for both hospital bed and timi lift.  Fax confirmation received.

## 2025-07-03 ENCOUNTER — CARE AT HOME (OUTPATIENT)
Dept: HOME HEALTH SERVICES | Facility: CLINIC | Age: 85
End: 2025-07-03
Payer: MEDICARE

## 2025-07-03 VITALS
DIASTOLIC BLOOD PRESSURE: 78 MMHG | HEART RATE: 88 BPM | OXYGEN SATURATION: 97 % | SYSTOLIC BLOOD PRESSURE: 126 MMHG | RESPIRATION RATE: 18 BRPM

## 2025-07-03 DIAGNOSIS — Z74.01 BEDBOUND: ICD-10-CM

## 2025-07-03 DIAGNOSIS — E11.51 TYPE 2 DIABETES MELLITUS WITH DIABETIC PERIPHERAL ANGIOPATHY WITHOUT GANGRENE, WITH LONG-TERM CURRENT USE OF INSULIN: ICD-10-CM

## 2025-07-03 DIAGNOSIS — Z79.4 TYPE 2 DIABETES MELLITUS WITH DIABETIC PERIPHERAL ANGIOPATHY WITHOUT GANGRENE, WITH LONG-TERM CURRENT USE OF INSULIN: ICD-10-CM

## 2025-07-03 DIAGNOSIS — Z97.8 FOLEY CATHETER IN PLACE: Primary | ICD-10-CM

## 2025-07-03 DIAGNOSIS — I10 PRIMARY HYPERTENSION: ICD-10-CM

## 2025-07-03 PROCEDURE — 1159F MED LIST DOCD IN RCRD: CPT | Mod: CPTII,S$GLB,, | Performed by: NURSE PRACTITIONER

## 2025-07-03 PROCEDURE — 1160F RVW MEDS BY RX/DR IN RCRD: CPT | Mod: CPTII,S$GLB,, | Performed by: NURSE PRACTITIONER

## 2025-07-03 PROCEDURE — 1126F AMNT PAIN NOTED NONE PRSNT: CPT | Mod: CPTII,S$GLB,, | Performed by: NURSE PRACTITIONER

## 2025-07-03 PROCEDURE — 3078F DIAST BP <80 MM HG: CPT | Mod: CPTII,S$GLB,, | Performed by: NURSE PRACTITIONER

## 2025-07-03 PROCEDURE — 99350 HOME/RES VST EST HIGH MDM 60: CPT | Mod: S$GLB,,, | Performed by: NURSE PRACTITIONER

## 2025-07-03 PROCEDURE — 3074F SYST BP LT 130 MM HG: CPT | Mod: CPTII,S$GLB,, | Performed by: NURSE PRACTITIONER

## 2025-07-03 NOTE — ASSESSMENT & PLAN NOTE
Pt has been bedbound for >1 yr.  Multiple courses of PT and pt has not regain mobility or ability to transfer  He requires a Allison lift and hospital bed to get to   Orders placed for both  Turn and reposition

## 2025-07-03 NOTE — PROGRESS NOTES
ASSESSMENT AND PLAN    1. Well adult health check  No concerning findings on exam  Discussed diet and exercise  Monthly self skin and testicular exams. Reviewed warning signs.  Recommended yearly dental visits. To floss daily and brush teeth twice daily.  Immunizations: Up To Date after today  Labs: Per orders  Substance use: None    2. History of cold sores  3. Herpes simplex infection of penis  - valACYclovir (VALTREX) 500 MG tablet; Take it twice a day for 5 days as needed during acute flare up - cold sores  Dispense: 30 tablet; Refill: 2        4. 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  Dispense: 1 each; Refill: 0    5. Elevated blood pressure reading without diagnosis of hypertension  Follow up RN blood pressure check    6. Acute left-sided low back pain with left-sided sciatica  Red flags reviewed - doubt neurologic compromise, fracture, infection, malignancy, or cauda equina at this time.     Offered PT but he declined given improvement. Offer on table    Return precautions discussed.     7. Screening for STD (sexually transmitted disease)  - HIV 1/HIV 2 Antigen/Antibody Screen; Future  - SYPT T. pallidum Total IgG/IgM Ab Reverse Syphilis Screen Algorithm; Future  - Hepatitis C Antibody With Reflex; Future  - Hepatitis B Surface Antigen; Future  - Chlamydia/Gonorrhea by Nucleic Acid Amplification; Future    8. Need for vaccination  - Influenza Quadrivalent Split Pres Free 0.5 mL Pre-filled Vacc (FluLaval, Fluzone, Fluarix; ages 6+ months - [id number]       Chief Complaint   Patient presents with   • Office Visit   • Physical     Patient here for preventative visit and to discuss a few problems    History of herpes  Noticed a couple of blisters recently  Also has a history of cold sores    Does not some slight tingling in his penis - no discharge  Requests STD testing    Back Pain:   Began 3 weeks ago   Threw back out  Left  [facility]  Medical Chronic Care Home Visit    Encounter Provider: Kanchan Fischer   PCP: Kelvin Wayne MD  Consult Requested By: No ref. provider found    HISTORY OF PRESENT ILLNESS      Patient ID: Julien Meléndez is a 84 y.o. male is being seen in the home due to physical debility that presents a taxing effort to leave the home, to mitigate high risk of hospital readmission and/or due to the limited availability of reliable or safe options for transportation to the point of access to the provider. Prior to treatment on this visit the chart was reviewed and patient verbal consent was obtained.    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 Wife is present.  Denies any new problems today. Pt needs a hospital bed and allison lift.    See history and problem list  DECISION MAKING TODAY       Assessment & Plan:  1. 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 Type 2 diabetes mellitus with diabetic peripheral angiopathy without gangrene, with long-term current use of insulin  Assessment & Plan:  Lab Results   Component Value Date    HGBA1C 9.6 (H) 04/30/2025     Dexcom now in use  Referred to endocrine  Wife reports 125 today  ADA diet        3. Bedbound  Assessment & Plan:  Pt has been bedbound for >1 yr.  Multiple courses of PT and pt has not regain mobility or ability to transfer  He requires a Allison lift and hospital bed to get to   Orders placed for both  Turn and reposition      4. Primary hypertension  Assessment & Plan:  Bp at  goal today  Continue current regimen              He requires a hospital bed due to her/him requiring positioning of the body in ways not feasible with an ordinary bed. He has lower extremity weakness and requires bed to alleviate pain/ is completely immobile /or limited mobility and cannot independently make changes in body position without the use of the bed.  The positioning of the body cannot be sufficiently resolved by the use of pillows and wedges.                  ENVIRONMENT OF CARE      Family and/or Caregiver present at visit?  Yes  Name of Caregiver:   History provided by: patient    4Ms for Medical Decision-Making in Older Adults    Last Completed EAWV:  None    MEDICATIONS:  High Risk Medications:  Total Active Medications: 1  FLUoxetine - 40 MG    MOBILITY:  Activities of Daily Living:       No data to display              Fall Risk:      11/7/2024    11:00 AM 9/4/2024     1:30 PM 7/9/2024     2:00 PM   Fall Risk Assessment - Outpatient   Mobility Status Ambulatory w/ assistance  Ambulatory w/ assistance   Number of falls 0 0 0   Identified as fall risk True  True     Disability Status:      10/24/2017     7:49 AM   Disability Status   Are you deaf or do you have serious difficulty hearing? N   Are you blind or do you have serious difficulty seeing, even when wearing glasses? N   Because of a physical, mental, or emotional condition, do you have serious difficulty concentrating, remembering, or making decisions? N   Do you have serious difficulty walking or climbing stairs? N   Do you have difficulty dressing or bathing? N   Because of a physical, mental, or emotional condition, do you have difficulty doing errands alone such as visiting a doctor's office or shopping? N     Nutrition Screening:       No data to display             Screening Score: 0-7 Malnourished, 8-11 At Risk, 12-14 Normal  Get Up and Go:       No data to display              Whisper Test:       No data to display           radicular symptoms  Has improved     No history of back surgery    Red Flags:  Less than 6 weeks  No h/o significant trauma, osteoporosis, or chronic steroid use  No history of HIV, immunosuppression, or fevers  No history of cancer or unexplained weight loss  No urinary or fecal incontinence, urinary retention, or saddle anesthesia  No significant weakness or progressive neurological deficits      Vitals:    09/20/23 1458   BP: (!) 140/82   Pulse: 80   Temp: 98.3 °F (36.8 °C)   TempSrc: Oral   SpO2: 99%   Weight: 112 kg (247 lb)   Height: 5' 11.5\" (1.816 m)     Body mass index is 33.97 kg/m².  Wt Readings from Last 4 Encounters:   09/20/23 112 kg (247 lb)   09/06/22 104.3 kg (230 lb)   11/19/19 113.2 kg (249 lb 9 oz)   09/30/19 110.9 kg (244 lb 7.8 oz)     General: Healthy-appearing, in no distress.  Alert, interactive, appears well-nourished.  HEENT:   Head: Normocephalic, atraumatic  Ears: Normal appearing ears. Tympanic membranes are pearly gray with normal landmarks, no erythema, no retraction. Canals are clear without erythema. Hearing grossly intact.    Eyes: No scleral icterus or injection. No discharge.  Oropharynx: Moist mucous membranes with no enanthems or oral lesions visualized. Good dentition. Posterior oropharynx with no erythema or exudate.  Neck: Neck supple.  Thyroid without nodules, tenderness or enlargement.   CV: Regular, rate, and rhythm.  No murmur. No peripheral edema.  Resp:  Normal work of breathing.  Clear to auscultation bilaterally.  Abdomen: Soft, non distended. No tenderness to palpation. No rebound or guarding. No hepatosplenomegaly. No umbilical hernia.  Skin: Warm and dry.  No concerning moles on face or trunk  Lymph: No inguinal or cervical lymphadenopathy  Neuro: Moving all extremities, no focal deficits grossly, normal gait, normal speech  Psych: Pleasant, casually dressed and groomed, not fearful. Affect bright, appropriate in content, not attending to internal stimuli    Back:            MENTATION:   Has Dementia Dx: Yes  Has Anxiety Dx: No    Depression Patient Health Questionnaire:      9/17/2024     4:16 PM   Depression Patient Health Questionnaire   Over the last two weeks how often have you been bothered by little interest or pleasure in doing things Several days   Over the last two weeks how often have you been bothered by feeling down, depressed or hopeless Several days   PHQ-2 Total Score 2   PHQ-9 Total Score 5   PHQ-9 Interpretation Mild     Cognitive Function Screening:       No data to display              Cognitive Function Screening Total - Less than 4 = Abnormal,  Greater than or equal to 4 = Normal        WHAT MATTERS MOST:  Advance Care Planning   ACP Status:   Patient has had an ACP conversation  Living Will: No  Power of : No  LaPOST: No    What is most important right now is to focus on avoiding the hospital    Accordingly, we have decided that the best plan to meet the patient's goals includes no further escalation in treatment      What matters most to patient today is: DME           Is patient hospice appropriate: No  (If needed, use PPS <30 or FAST score >7)  Was referral to hospice placed: No    Impression upon entering the home:  Physical Dwelling: single family home   Appearance of home environment: cleaniness: clean  Functional Status: max assistance  Mobility: bedbound  Nutritional access: adequate intake and access  Home Health: Yes, HH [facility]   DME/Supplies: wheelchair       Does patient have a PCP at [place]? Yes   Repatriation plan with PCP? Care at Home reason: mobility   Does patient have an ostomy (ileostomy, colostomy, suprapubic catheter, nephrostomy tube, tracheostomy, PEG tube, pleurex catheter, cholecystostomy, etc)? No  Were BPAs reviewed? Yes    Social History     Socioeconomic History    Marital status:    Tobacco Use    Smoking status: Never    Smokeless tobacco: Never   Substance and Sexual Activity    Alcohol use: No    Drug use:    Inspection: No visible deformity of spine  Palpation: No swelling. No supraspinous tenderness. No palpable spasm of paravertebral muscles  ROM: Normal  Strength: Ambulates, 5/5 strength in LE.  Special tests: Negative straight leg raise test bilaterally. No saddle anesthesia.  Skin: Warm and dry. No skin changes visualized.      PHQ 2:  PHQ 2 Score Adult PHQ 2 Score Adult PHQ 2 Interpretation Little interest or pleasure in activity?   9/20/2023   2:58 PM 0 No further screening needed 0     Social History     Tobacco Use   Smoking Status Every Day   • Current packs/day: 0.50   • Types: Cigarettes   Smokeless Tobacco Former   Tobacco Comments    declined material 2/18/15 BD        No     Social Drivers of Health     Financial Resource Strain: Low Risk  (5/2/2025)    Overall Financial Resource Strain (CARDIA)     Difficulty of Paying Living Expenses: Not very hard   Food Insecurity: No Food Insecurity (5/2/2025)    Hunger Vital Sign     Worried About Running Out of Food in the Last Year: Never true     Ran Out of Food in the Last Year: Never true   Transportation Needs: No Transportation Needs (5/1/2025)    PRAPARE - Transportation     Lack of Transportation (Medical): No     Lack of Transportation (Non-Medical): No   Physical Activity: Inactive (3/3/2025)    Exercise Vital Sign     Days of Exercise per Week: 0 days     Minutes of Exercise per Session: 0 min   Stress: No Stress Concern Present (5/2/2025)    [employer] - Occupational Stress Questionnaire     Feeling of Stress : Not at all   Housing Stability: Low Risk  (5/2/2025)    Housing Stability Vital Sign     Unable to Pay for Housing in the Last Year: No     Number of Times Moved in the Last Year: 0     Homeless in the Last Year: No         OBJECTIVE:     Vital Signs:  Vitals:    07/03/25 1248   BP: 126/78   Pulse: 88   Resp: 18       Review of Systems   Constitutional:  Negative for activity change, fatigue and fever.   HENT: Negative.     Eyes: Negative.    Respiratory:  Negative for chest tightness.    Cardiovascular: Negative.  Negative for leg swelling.   Gastrointestinal: Negative.    Endocrine: Negative.    Genitourinary:  Positive for difficulty urinating.   Musculoskeletal:  Positive for gait problem.   Skin: Negative.    Allergic/Immunologic: Negative.    Neurological:  Positive for weakness.   Hematological: Negative.    Psychiatric/Behavioral: Negative.  Negative for agitation.    All other systems reviewed and are negative.      Physical Exam:  Physical Exam  Vitals reviewed.   Constitutional:       General: He is not in acute distress.     Appearance: He is well-developed.   HENT:      Head:  Normocephalic and atraumatic.      Nose: Nose normal.      Mouth/Throat:      Mouth: Mucous membranes are dry.   Eyes:      Pupils: Pupils are equal, round, and reactive to light.   Cardiovascular:      Rate and Rhythm: Normal rate and regular rhythm.      Heart sounds: Normal heart sounds.   Pulmonary:      Effort: Pulmonary effort is normal.      Breath sounds: Normal breath sounds.   Abdominal:      General: Bowel sounds are normal.      Palpations: Abdomen is soft.   Musculoskeletal:      Cervical back: Normal range of motion and neck supple.   Skin:     General: Skin is warm and dry.   Neurological:      Mental Status: He is alert and oriented to person, place, and time.      Cranial Nerves: No cranial nerve deficit.   Psychiatric:         Behavior: Behavior normal.         Thought Content: Thought content normal.         INSTRUCTIONS FOR PATIENT:     Scheduled Follow-up, Appts Reviewed with Modifications if Needed: Yes  Future Appointments   Date Time Provider Department Center   8/22/2025 12:00 PM HOSPITAL LAB, [facility]   8/27/2025  4:30 PM [facility] APR[facility]       Current Medications[1]    Medication Reconciliation:  Were medications changed during this appointment? No  Were medications in the home? Yes  Is the patient taking the medications as directed? Yes  Does the patient/caregiver understand the medications and changes? Yes  Does updated med list accurately reflects meds patient is currently taking? Yes    Signature: Kanchan Fischer NP         [1]   Current Outpatient Medications:     alcohol swabs PadM, Apply 1 each topically as needed (for use with glucose monitoring)., Disp: 200 each, Rfl: 3    amLODIPine (NORVASC) 5 MG tablet, Take 1 tablet (5 mg total) by mouth once daily., Disp: 90 tablet, Rfl: 3    apixaban (ELIQUIS) 2.5 mg Tab, Take 1 tablet (2.5 mg total) by mouth 2 (two) times daily., Disp: 180 tablet, Rfl: 3    atorvastatin (LIPITOR)  40 MG tablet, Take 1 tablet (40 mg total) by mouth once daily., Disp: 90 tablet, Rfl: 3    blood sugar diagnostic (TRUE METRIX GLUCOSE TEST STRIP) Strp, 1 strip by Misc.(Non-Drug; Combo Route) route 2 (two) times a day., Disp: 100 each, Rfl: 6    blood-glucose meter (TRUE METRIX AIR GLUCOSE METER) Misc, USE AS DIRECTED, Disp: 1 each, Rfl: 0    blood-glucose meter,continuous (DEXCOM G6 ) Misc, 1 Device by Misc.(Non-Drug; Combo Route) route once. for 1 dose, Disp: , Rfl:     clopidogreL (PLAVIX) 75 mg tablet, Take 1 tablet (75 mg total) by mouth once daily., Disp: 90 tablet, Rfl: 3    clotrimazole-betamethasone 1-0.05% (LOTRISONE) cream, APPLY TOPICALLY 2 (TWO) TIMES DAILY., Disp: 45 g, Rfl: 11    FLUoxetine 40 MG capsule, Take 1 capsule (40 mg total) by mouth once daily., Disp: 90 capsule, Rfl: 3    furosemide (LASIX) 40 MG tablet, Take 1 tablet (40 mg total) by mouth once daily., Disp: 90 tablet, Rfl: 3    insulin glargine U-100, Lantus, (LANTUS SOLOSTAR U-100 INSULIN) 100 unit/mL (3 mL) InPn pen, Inject 30 Units into the skin every evening., Disp: 9 mL, Rfl: 11    isosorbide mononitrate (IMDUR) 30 MG 24 hr tablet, Take 1 tablet (30 mg total) by mouth once daily., Disp: 90 tablet, Rfl: 3    lancets 28 gauge Misc, 1 lancet  by Misc.(Non-Drug; Combo Route) route 2 (two) times a day., Disp: 200 each, Rfl: 3    metFORMIN (GLUCOPHAGE) 1000 MG tablet, Take 1 tablet (1,000 mg total) by mouth daily with breakfast., Disp: , Rfl:     methenamine (HIPREX) 1 gram Tab, Take 1 tablet (1 g total) by mouth once daily., Disp: 90 tablet, Rfl: 1    metoprolol succinate (TOPROL-XL) 25 MG 24 hr tablet, Take 25 mg by mouth once daily., Disp: , Rfl:     nitroGLYCERIN (NITROSTAT) 0.4 MG SL tablet, Place 0.4 mg under the tongue every 5 (five) minutes as needed for Chest pain., Disp: , Rfl:     NOVOLOG FLEXPEN U-100 INSULIN 100 unit/mL (3 mL) InPn pen, Use up to 4 x a day if sugar > 180, 180-230 2 units , 231-280 4 units, 281-330 6  "units, >330 8 units, >380 10 units. Max daily 40 units., Disp: 15 mL, Rfl: 6    ondansetron (ZOFRAN-ODT) 4 MG TbDL, Take 1 tablet (4 mg total) by mouth every 8 (eight) hours as needed., Disp: 14 tablet, Rfl: 1    pen needle, diabetic (BD TAHIRA 2ND GEN PEN NEEDLE) 32 gauge x 5/32" Ndle, Use 3x a day, Disp: 100 each, Rfl: 11    ranolazine (RANEXA) 1,000 mg Tb12, Take 1,000 mg by mouth 2 (two) times daily., Disp: , Rfl:     traZODone (DESYREL) 150 MG tablet, TAKE 1 TABLET EVERY NIGHT, Disp: 90 tablet, Rfl: 3    "

## 2025-07-03 NOTE — ASSESSMENT & PLAN NOTE
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

## 2025-07-03 NOTE — ASSESSMENT & PLAN NOTE
Lab Results   Component Value Date    HGBA1C 9.6 (H) 04/30/2025     Dexcom now in use  Referred to endocrine  Wife reports 125 today  ADA diet

## 2025-09-05 ENCOUNTER — DOCUMENT SCAN (OUTPATIENT)
Dept: HOME HEALTH SERVICES | Facility: HOSPITAL | Age: 85
End: 2025-09-05
Payer: MEDICARE

## (undated) DEVICE — INFLATOR ENCORE 26 BLLN INFL

## (undated) DEVICE — SWAB CULTURETTE SINGLE

## (undated) DEVICE — CATH IMPULSE 5FR PIGTAIL 125CM

## (undated) DEVICE — SET MICRO PUNCT 4FR/MPIS-401

## (undated) DEVICE — VISE RADIFOCUS MULTI TORQUE

## (undated) DEVICE — GLOVE BIOGEL PI MICRO INDIC 7

## (undated) DEVICE — PAD ABDOMINAL STERILE 5X9IN

## (undated) DEVICE — DRESSING XEROFORM 1X8IN

## (undated) DEVICE — SHEATH INTRODUCER 4FR 11CM

## (undated) DEVICE — KIT CO-PILOT

## (undated) DEVICE — CATH TURNPIKE LP 135CM

## (undated) DEVICE — HEMOSTAT VASC BAND LONG 27CM

## (undated) DEVICE — PAD ABDOMINAL 5X9 STERILE

## (undated) DEVICE — SEE MEDLINE ITEM 154981

## (undated) DEVICE — NDL HYPO STD REG BVL 18GX1.5IN

## (undated) DEVICE — KIT GLIDESHEATH SLEND 6FR 10CM

## (undated) DEVICE — APPLICATOR CHLORAPREP ORN 26ML

## (undated) DEVICE — Device

## (undated) DEVICE — SPONGE GAUZE 16PLY 4X4

## (undated) DEVICE — BLADE LONG 31.0MM X 9.0MM

## (undated) DEVICE — PAD DEFIB CADENCE ADULT R2

## (undated) DEVICE — VISIPAQUE 320 200ML +PK

## (undated) DEVICE — BLADE SURG #15 CARBON STEEL

## (undated) DEVICE — CATH EMERGE MR 20 X 2.50

## (undated) DEVICE — TOURNIQUET SB QC DP 18X4IN

## (undated) DEVICE — GLOVE BIOGEL ECLIPSE SZ 7

## (undated) DEVICE — KIT LEFT HEART MANIFOLD CUSTOM

## (undated) DEVICE — GUIDEWIRE STD .035X260CM ANG

## (undated) DEVICE — SEE MEDLINE ITEM 157187

## (undated) DEVICE — SPONGE COTTON TRAY 4X4IN

## (undated) DEVICE — KIT INTRODUCER W/GUIDEWIRE

## (undated) DEVICE — SHEATH INTRODUCER 6FR 11CM

## (undated) DEVICE — CATH IMA INFINITI 4FRX100CM

## (undated) DEVICE — SOL POVIDONE PREP IODINE 4 OZ

## (undated) DEVICE — CATH EAGLE EYE ST .014X20X150

## (undated) DEVICE — GUIDEWIRE ADVNTG 035X260CM ANG

## (undated) DEVICE — COVER OVERHEAD SURG LT BLUE

## (undated) DEVICE — INTERPULSE SET

## (undated) DEVICE — GUIDEWIRE COUGAR XT 190 ST HY

## (undated) DEVICE — BLADE SAG MIC FINE 9.5X25.5MM

## (undated) DEVICE — GUIDEWIRE FIELDER XT.014X190CM

## (undated) DEVICE — PAD PREP CUFFED NS 24X48IN

## (undated) DEVICE — KIT COLLECTION E SWAB REGULAR

## (undated) DEVICE — PAD CAST SPEC STRL COT 4X4YD

## (undated) DEVICE — GLIDESHEATH SLENDER SS 5FR10CM

## (undated) DEVICE — NDL HYPO REG 25G X 1 1/2

## (undated) DEVICE — SYR 10CC LUER LOCK

## (undated) DEVICE — SHOE POST-OP VEL CLOS M/LG

## (undated) DEVICE — STOCKINET TUBULAR 1 PLY 6X60IN

## (undated) DEVICE — COVER BAND BAG 40 X 40

## (undated) DEVICE — GUIDE LAUNCHER 6FR AL 2.0

## (undated) DEVICE — BANDAGE MATRIX HK LOOP 4IN 5YD

## (undated) DEVICE — ELECTRODE REM PLYHSV RETURN 9

## (undated) DEVICE — GUIDEWIRE INQWIRE SE 3MM JTIP

## (undated) DEVICE — SPIKE SHORT LG BORE 1-WAY 2IN

## (undated) DEVICE — SUT ETHILON 4-0 BLK MONO

## (undated) DEVICE — SOL IRR NACL .9% 3000ML

## (undated) DEVICE — DEVICE PERCLOSE SUT CLSR 6FR

## (undated) DEVICE — TUBING HPCIL ROT M/F ADPT 48IN

## (undated) DEVICE — SYR MARK 7 ARTERION 150ML

## (undated) DEVICE — BANDAGE ESMARK ELASTIC ST 4X9

## (undated) DEVICE — GUIDE LAUNCHER 8FR EBU 3.5

## (undated) DEVICE — GUIDEWIRE EMERALD .035IN 260CM

## (undated) DEVICE — CATH GUIDE LINER  V3 6F

## (undated) DEVICE — GUIDE LAUNCHER 6FR JR 4.0

## (undated) DEVICE — COVER PROBE US 5.5X58L NON LTX

## (undated) DEVICE — VALVE CONTROL COPILOT

## (undated) DEVICE — DRESSING GAUZE XEROFORM 5X9

## (undated) DEVICE — GAUZE SPONGE 4X4 12PLY

## (undated) DEVICE — CATH TREK RX 3.0MM X 20MM

## (undated) DEVICE — INTRODUCER BRITE TIP 8F 35CM

## (undated) DEVICE — CATH GLIDECATH PV MLTCRV 4FR

## (undated) DEVICE — DRAPE ANGIO BRACH 38X44IN

## (undated) DEVICE — GUIDEWIRE EMERALD 150CM PTFE

## (undated) DEVICE — BANDAGE ESMARK ELASTIC ST 6X9

## (undated) DEVICE — GLOVE SURGICAL LATEX SZ 7

## (undated) DEVICE — CONTRAST VISIPAQUE 150ML

## (undated) DEVICE — CATH JACKY RADIAL 3.5 110CM

## (undated) DEVICE — SCRUB 10% POVIDONE IODINE 4OZ